# Patient Record
Sex: FEMALE | Race: WHITE | Employment: OTHER | URBAN - NONMETROPOLITAN AREA
[De-identification: names, ages, dates, MRNs, and addresses within clinical notes are randomized per-mention and may not be internally consistent; named-entity substitution may affect disease eponyms.]

---

## 2017-02-21 ENCOUNTER — OFFICE VISIT (OUTPATIENT)
Dept: FAMILY MEDICINE | Facility: OTHER | Age: 62
End: 2017-02-21
Attending: PHYSICIAN ASSISTANT
Payer: COMMERCIAL

## 2017-02-21 VITALS
DIASTOLIC BLOOD PRESSURE: 84 MMHG | BODY MASS INDEX: 23.9 KG/M2 | HEART RATE: 49 BPM | WEIGHT: 140 LBS | TEMPERATURE: 98 F | HEIGHT: 64 IN | SYSTOLIC BLOOD PRESSURE: 120 MMHG | OXYGEN SATURATION: 99 %

## 2017-02-21 DIAGNOSIS — Z01.818 PREOP GENERAL PHYSICAL EXAM: Primary | ICD-10-CM

## 2017-02-21 LAB
BASOPHILS # BLD AUTO: 0.1 10E9/L (ref 0–0.2)
BASOPHILS NFR BLD AUTO: 0.8 %
DIFFERENTIAL METHOD BLD: NORMAL
EOSINOPHIL # BLD AUTO: 0.2 10E9/L (ref 0–0.7)
EOSINOPHIL NFR BLD AUTO: 2.3 %
ERYTHROCYTE [DISTWIDTH] IN BLOOD BY AUTOMATED COUNT: 12.6 % (ref 10–15)
HCT VFR BLD AUTO: 39.9 % (ref 35–47)
HGB BLD-MCNC: 13.6 G/DL (ref 11.7–15.7)
IMM GRANULOCYTES # BLD: 0 10E9/L (ref 0–0.4)
IMM GRANULOCYTES NFR BLD: 0.3 %
LYMPHOCYTES # BLD AUTO: 2.1 10E9/L (ref 0.8–5.3)
LYMPHOCYTES NFR BLD AUTO: 29.8 %
MCH RBC QN AUTO: 30.1 PG (ref 26.5–33)
MCHC RBC AUTO-ENTMCNC: 34.1 G/DL (ref 31.5–36.5)
MCV RBC AUTO: 88 FL (ref 78–100)
MONOCYTES # BLD AUTO: 0.6 10E9/L (ref 0–1.3)
MONOCYTES NFR BLD AUTO: 8.2 %
NEUTROPHILS # BLD AUTO: 4.1 10E9/L (ref 1.6–8.3)
NEUTROPHILS NFR BLD AUTO: 58.6 %
NRBC # BLD AUTO: 0 10*3/UL
NRBC BLD AUTO-RTO: 0 /100
PLATELET # BLD AUTO: 175 10E9/L (ref 150–450)
RBC # BLD AUTO: 4.52 10E12/L (ref 3.8–5.2)
WBC # BLD AUTO: 7.1 10E9/L (ref 4–11)

## 2017-02-21 PROCEDURE — 36415 COLL VENOUS BLD VENIPUNCTURE: CPT | Performed by: PHYSICIAN ASSISTANT

## 2017-02-21 PROCEDURE — 85025 COMPLETE CBC W/AUTO DIFF WBC: CPT | Performed by: PHYSICIAN ASSISTANT

## 2017-02-21 PROCEDURE — 99214 OFFICE O/P EST MOD 30 MIN: CPT | Performed by: PHYSICIAN ASSISTANT

## 2017-02-21 NOTE — PROGRESS NOTES
[facility] [place]  [address]  [place] [address]  [phone]  Dept: [phone]    PRE-OP EVALUATION:  Today's date: 2017    Denise Taylor (: 1955) presents for pre-operative evaluation assessment as requested by Dr. Stephanie griffin.  She requires evaluation and anesthesia risk assessment prior to undergoing surgery/procedure for treatment of right foot surgery .  Proposed procedure: neuroma removals    Date of Surgery/ Procedure: 2017  Time of Surgery/ Procedure: D  Hospital/Surgical Facility: [facility]    Primary Physician: Mirna Roblero  Type of Anesthesia Anticipated: to be determined    Patient has a Health Care Directive or Living Will:  NO    1. NO - Do you have a history of heart attack, stroke, stent, bypass or surgery on an artery in the head, neck, heart or legs?  2. NO - Do you ever have any pain or discomfort in your chest?  3. NO - Do you have a history of  Heart Failure?  4. NO - Are you troubled by shortness of breath when: walking on the level, up a slight hill or at night?  5. NO - Do you currently have a cold, bronchitis or other respiratory infection?  6. NO - Do you have a cough, shortness of breath or wheezing?  7. NO - Do you sometimes get pains in the calves of your legs when you walk?  8. NO - Do you or anyone in your family have previous history of blood clots?  9. NO - Do you or does anyone in your family have a serious bleeding problem such as prolonged bleeding following surgeries or cuts?  10. NO - Have you ever had problems with anemia or been told to take iron pills?  11. NO - Have you had any abnormal blood loss such as black, tarry or bloody stools, or abnormal vaginal bleeding?  12. NO - Have you ever had a blood transfusion?  13. NO - Have you or any of your relatives ever had problems with anesthesia?  14. NO - Do you have sleep apnea, excessive snoring or daytime drowsiness?  15. NO - Do you have any prosthetic heart  valves?  16. NO - Do you have prosthetic joints?  17. NO - Is there any chance that you may be pregnant?      HPI:                                                      Brief HPI related to upcoming procedure: having neuromas removed from right foot.       See problem list for active medical problems.  Problems all longstanding and stable, except as noted/documented.  See ROS for pertinent symptoms related to these conditions.                                                                                                  .    MEDICAL HISTORY:                                                      Patient Active Problem List    Diagnosis Date Noted     Pneumonia 07/20/2015     Priority: Medium     History of basal cell carcinoma 05/18/2015     Priority: Medium     Hyperlipidemia with target LDL less than 130 05/18/2015     Priority: Medium     Diagnosis updated by automated process. Provider to review and confirm.        Past Medical History   Diagnosis Date     Pneumonia      Past Surgical History   Procedure Laterality Date     Colonoscopy  2006     Dr Lara     Shoulder surgery Right 2011/10/2012     Colonoscopy N/A 10/10/2016     Procedure: COLONOSCOPY;  Surgeon: Christine Cintron MD;  Location: [facility]     Current Outpatient Prescriptions   Medication Sig Dispense Refill     Na Sulfate-K Sulfate-Mg Sulf (SUPREP BOWEL PREP) solution Take 177 mLs (1 Bottle) by mouth See Admin Instructions 2 Bottle 0     ASPIRIN PO Take 81 mg by mouth daily       levothyroxine (SYNTHROID, LEVOTHROID) 75 MCG tablet Take 1 tablet (75 mcg) by mouth daily 90 tablet 3     simvastatin (ZOCOR) 40 MG tablet Take 1 tablet (40 mg) by mouth At Bedtime 90 tablet 3     Eletriptan Hydrobromide (RELPAX PO) Take 40 mg by mouth once as needed for migraine       FLUoxetine HCl, PMDD, (SARAFEM) 10 MG TABS Take 10 mg by mouth daily       OTC products: None, except as noted above    Allergies   Allergen Reactions     Penicillins Rash      Latex Allergy:  "NO    Social History   Substance Use Topics     Smoking status: Never Smoker     Smokeless tobacco: Never Used     Alcohol use 1.0 oz/week     2 Glasses of wine per week      Comment: occasionally     History   Drug Use No       REVIEW OF SYSTEMS:                                                    C: NEGATIVE for fever, chills, change in weight  I: NEGATIVE for worrisome rashes, moles or lesions  E: NEGATIVE for vision changes or irritation  E/M: NEGATIVE for ear, mouth and throat problems  R: NEGATIVE for significant cough or SOB  CV: NEGATIVE for chest pain, palpitations or peripheral edema  GI: NEGATIVE for nausea, abdominal pain, heartburn, or change in bowel habits  : NEGATIVE for frequency, dysuria, or hematuria  MUSCULOSKELETAL:see hpi  N: NEGATIVE for weakness, dizziness or paresthesias  E: NEGATIVE for temperature intolerance, skin/hair changes  H: NEGATIVE for bleeding problems  P: NEGATIVE for changes in mood or affect    EXAM:                                                    /84 (BP Location: Left arm, Patient Position: Supine, Cuff Size: Adult Regular)  Pulse (!) 49  Temp 98  F (36.7  C) (Tympanic)  Ht 5' 3.5\" (1.613 m)  Wt 140 lb (63.5 kg)  SpO2 99%  Breastfeeding? No  BMI 24.41 kg/m2    GENERAL APPEARANCE: healthy, alert and no distress     EYES: EOMI,- PERRL     HENT: ear canals and TM's normal and nose and mouth without ulcers or lesions     NECK: no adenopathy, no asymmetry, masses, or scars and thyroid normal to palpation     RESP: lungs clear to auscultation - no rales, rhonchi or wheezes     CV: regular rates and rhythm, normal S1 S2, no S3 or S4 and no murmur, click or rub -     ABDOMEN:  soft, nontender, no HSM or masses and bowel sounds normal     MS: extremities normal- no gross deformities noted, no evidence of inflammation in joints, FROM in all extremities.     SKIN: no suspicious lesions or rashes     NEURO: Normal strength and tone, sensory exam grossly normal, mentation " intact and speech normal     PSYCH: mentation appears normal. and affect normal/bright     LYMPHATICS: No axillary, cervical, inguinal, or supraclavicular nodes    DIAGNOSTICS:                                                      Labs Resulted Today:   Results for orders placed or performed in visit on 11/17/16   XR FOOT RT G/E 3 VW (Clinic Performed)    Narrative    RIGHT FOOT    REPORT:  There appears to have been an osteotomy in the distal first  metatarsal.  Mild degenerative changes are seen in the first  metatarsophalangeal joint.  There also appears to have been an  osteotomy performed of the distal aspect of the proximal phalanx of  the second toe.  The articular spaces are normal in height.  No  destructive lesions are noted.    IMPRESSION:  POSTOPERATIVE CHANGES, DISTAL FIRST METATARSAL AND THE  DISTAL ASPECT OF PROXIMAL PHALANX OF THE SECOND TOE.  Exam Date: Nov 17, 2016 09:48:00 AM  Author: HUMZA MCCORMACK  This report is final and null         No results for input(s): HGB, PLT, INR, NA, POTASSIUM, CR, A1C in the last 50491 hours.     IMPRESSION:                                                    Reason for surgery/procedure: neuroma removal of her right foot. By Stephanie Dietrich on 2/[facility] [place] [facility]   Diagnosis/reason for consult: medical clearance.     The proposed surgical procedure is considered LOW risk.    REVISED CARDIAC RISK INDEX  The patient has the following serious cardiovascular risks for perioperative complications such as (MI, PE, VFib and 3  AV Block):  No serious cardiac risks  INTERPRETATION: 0 risks: Class I (very low risk - 0.4% complication rate)    The patient has the following additional risks for perioperative complications:  No identified additional risks    Results for orders placed or performed in visit on 02/21/17 (from the past 24 hour(s))   CBC with platelets and differential   Result Value Ref Range    WBC 7.1 4.0 - 11.0 10e9/L    RBC Count 4.52 3.8 - 5.2  10e12/L    Hemoglobin 13.6 11.7 - 15.7 g/dL    Hematocrit 39.9 35.0 - 47.0 %    MCV 88 78 - 100 fl    MCH 30.1 26.5 - 33.0 pg    MCHC 34.1 31.5 - 36.5 g/dL    RDW 12.6 10.0 - 15.0 %    Platelet Count 175 150 - 450 10e9/L    Diff Method Automated Method     % Neutrophils 58.6 %    % Lymphocytes 29.8 %    % Monocytes 8.2 %    % Eosinophils 2.3 %    % Basophils 0.8 %    % Immature Granulocytes 0.3 %    Nucleated RBCs 0 0 /100    Absolute Neutrophil 4.1 1.6 - 8.3 10e9/L    Absolute Lymphocytes 2.1 0.8 - 5.3 10e9/L    Absolute Monocytes 0.6 0.0 - 1.3 10e9/L    Absolute Eosinophils 0.2 0.0 - 0.7 10e9/L    Absolute Basophils 0.1 0.0 - 0.2 10e9/L    Abs Immature Granulocytes 0.0 0 - 0.4 10e9/L    Absolute Nucleated RBC 0.0          RECOMMENDATIONS:                                                      1. Preop general physical exam  She will be optimized and CBC is obtained.  No other concerns.   Pt is aware of risk and benefits per Dr. Dietrich.   - CBC with platelets and differential                 Signed Electronically by: DASH Vital    Copy of this evaluation report is provided to requesting physician.    [facility] Preop Guidelines

## 2017-02-21 NOTE — NURSING NOTE
"Chief Complaint   Patient presents with     Pre-Op Exam     2/27/17 right foot surgery- removal of neuromas, rhonda griffin, [facility]        Initial /84 (BP Location: Left arm, Patient Position: Supine, Cuff Size: Adult Regular)  Pulse (!) 49  Temp 98  F (36.7  C) (Tympanic)  Ht 5' 3.5\" (1.613 m)  Wt 140 lb (63.5 kg)  SpO2 99%  Breastfeeding? No  BMI 24.41 kg/m2 Estimated body mass index is 24.41 kg/(m^2) as calculated from the following:    Height as of this encounter: 5' 3.5\" (1.613 m).    Weight as of this encounter: 140 lb (63.5 kg).  Medication Reconciliation: complete   Sammie Granado LPN        "

## 2017-02-21 NOTE — MR AVS SNAPSHOT
After Visit Summary   2/21/2017    Denise Taylor    MRN: [id number]           Patient Information     Date Of Birth          1955        Visit Information        Provider Department      2/21/2017 1:30 PM Mirna Roblero PA [facility]        Today's Diagnoses     Preop general physical exam    -  1      Care Instructions      Before Your Surgery      Call your surgeon if there is any change in your health. This includes signs of a cold or flu (such as a sore throat, runny nose, cough, rash or fever).    Do not smoke, drink alcohol or take over the counter medicine (unless your surgeon or primary care doctor tells you to) for the 24 hours before and after surgery.    If you take prescribed drugs: Follow your doctor s orders about which medicines to take and which to stop until after surgery.    Eating and drinking prior to surgery: follow the instructions from your surgeon    Take a shower or bath the night before surgery. Use the soap your surgeon gave you to gently clean your skin. If you do not have soap from your surgeon, use your regular soap. Do not shave or scrub the surgery site.  Wear clean pajamas and have clean sheets on your bed.         Follow-ups after your visit        Who to contact     If you have questions or need follow up information about today's clinic visit or your schedule please contact [facility] directly at [phone].  Normal or non-critical lab and imaging results will be communicated to you by MyChart, letter or phone within 4 business days after the clinic has received the results. If you do not hear from us within 7 days, please contact the clinic through MyChart or phone. If you have a critical or abnormal lab result, we will notify you by phone as soon as possible.  Submit refill requests through [employer] or call your pharmacy and they will forward the refill request to us. Please allow 3 business days for your refill to be  "completed.          Additional Information About Your Visit        [employer] Information     [employer] lets you send messages to your doctor, view your test results, renew your prescriptions, schedule appointments and more. To sign up, go to [email or web address] . Click on \"Log in\" on the left side of the screen, which will take you to the Welcome page. Then click on \"Sign up Now\" on the right side of the page.     You will be asked to enter the access code listed below, as well as some personal information. Please follow the directions to create your username and password.     Your access code is: [email or web address]  Expires: 2017  2:22 PM     Your access code will  in 90 days. If you need help or a new code, please call your [place] clinic or [phone].        Care EveryWhere ID     This is your Care EveryWhere ID. This could be used by other organizations to access your [place] medical records  [id number]        Your Vitals Were     Pulse Temperature Height Pulse Oximetry Breastfeeding? BMI (Body Mass Index)    49 98  F (36.7  C) (Tympanic) 5' 3.5\" (1.613 m) 99% No 24.41 kg/m2       Blood Pressure from Last 3 Encounters:   17 120/84   16 115/80   10/10/16 118/79    Weight from Last 3 Encounters:   17 140 lb (63.5 kg)   16 135 lb (61.2 kg)   10/10/16 138 lb (62.6 kg)              We Performed the Following     CBC with platelets and differential        Primary Care Provider Office Phone # Fax #    DASH Angelo [phone]       [facility] [address] [place] [id number]        Thank you!     Thank you for choosing [facility]  for your care. Our goal is always to provide you with excellent care. Hearing back from our patients is one way we can continue to improve our services. Please take a few minutes to complete the written survey that you may receive in the mail after your visit with us. Thank you!             Your " Updated Medication List - Protect others around you: Learn how to safely use, store and throw away your medicines at www.disposemymeds.org.          This list is accurate as of: 2/21/17  2:22 PM.  Always use your most recent med list.                   Brand Name Dispense Instructions for use    ASPIRIN PO      Take 81 mg by mouth daily       levothyroxine 75 MCG tablet    SYNTHROID/LEVOTHROID    90 tablet    Take 1 tablet (75 mcg) by mouth daily       Na Sulfate-K Sulfate-Mg Sulf solution    SUPREP BOWEL PREP    2 Bottle    Take 177 mLs (1 Bottle) by mouth See Admin Instructions       RELPAX PO      Take 40 mg by mouth once as needed for migraine       SARAFEM 10 MG Tabs   Generic drug:  FLUoxetine HCl (PMDD)      Take 10 mg by mouth daily       simvastatin 40 MG tablet    ZOCOR    90 tablet    Take 1 tablet (40 mg) by mouth At Bedtime

## 2017-02-22 ENCOUNTER — TELEPHONE (OUTPATIENT)
Dept: FAMILY MEDICINE | Facility: OTHER | Age: 62
End: 2017-02-22

## 2017-02-27 ENCOUNTER — TRANSFERRED RECORDS (OUTPATIENT)
Dept: HEALTH INFORMATION MANAGEMENT | Facility: HOSPITAL | Age: 62
End: 2017-02-27

## 2017-02-27 ENCOUNTER — RESULTS ONLY (OUTPATIENT)
Dept: LAB | Age: 62
End: 2017-02-27

## 2017-02-27 ENCOUNTER — APPOINTMENT (OUTPATIENT)
Dept: LAB | Facility: HOSPITAL | Age: 62
End: 2017-02-27
Attending: PODIATRIST
Payer: COMMERCIAL

## 2017-02-27 ENCOUNTER — APPOINTMENT (OUTPATIENT)
Dept: ANESTHESIOLOGY | Facility: HOSPITAL | Age: 62
End: 2017-02-27
Attending: PODIATRIST
Payer: COMMERCIAL

## 2017-02-27 PROCEDURE — 88304 TISSUE EXAM BY PATHOLOGIST: CPT | Mod: TC | Performed by: PODIATRIST

## 2017-02-27 PROCEDURE — 01480 ANES OPEN PX LOWER L/A/F NOS: CPT | Mod: QZ | Performed by: NURSE ANESTHETIST, CERTIFIED REGISTERED

## 2017-03-01 LAB — COPATH REPORT: NORMAL

## 2017-07-05 DIAGNOSIS — Z12.31 VISIT FOR SCREENING MAMMOGRAM: Primary | ICD-10-CM

## 2017-07-27 ENCOUNTER — OFFICE VISIT (OUTPATIENT)
Dept: FAMILY MEDICINE | Facility: OTHER | Age: 62
End: 2017-07-27
Attending: PHYSICIAN ASSISTANT
Payer: COMMERCIAL

## 2017-07-27 VITALS
TEMPERATURE: 97.8 F | HEART RATE: 53 BPM | OXYGEN SATURATION: 98 % | WEIGHT: 142 LBS | BODY MASS INDEX: 24.76 KG/M2 | DIASTOLIC BLOOD PRESSURE: 78 MMHG | SYSTOLIC BLOOD PRESSURE: 122 MMHG

## 2017-07-27 DIAGNOSIS — E03.9 HYPOTHYROIDISM, UNSPECIFIED TYPE: ICD-10-CM

## 2017-07-27 DIAGNOSIS — E78.5 HYPERLIPIDEMIA WITH TARGET LDL LESS THAN 130: Primary | ICD-10-CM

## 2017-07-27 DIAGNOSIS — Z11.59 ENCOUNTER FOR HEPATITIS C SCREENING TEST FOR LOW RISK PATIENT: ICD-10-CM

## 2017-07-27 DIAGNOSIS — Z71.89 ACP (ADVANCE CARE PLANNING): Chronic | ICD-10-CM

## 2017-07-27 LAB
ALT SERPL W P-5'-P-CCNC: 41 U/L (ref 0–50)
AST SERPL W P-5'-P-CCNC: 36 U/L (ref 0–45)
CHOLEST SERPL-MCNC: 143 MG/DL
CK SERPL-CCNC: 420 U/L (ref 30–225)
HDLC SERPL-MCNC: 66 MG/DL
LDLC SERPL CALC-MCNC: 61 MG/DL
NONHDLC SERPL-MCNC: 77 MG/DL
TRIGL SERPL-MCNC: 82 MG/DL
TSH SERPL DL<=0.005 MIU/L-ACNC: 1.08 MU/L (ref 0.4–4)

## 2017-07-27 PROCEDURE — 80061 LIPID PANEL: CPT | Performed by: PHYSICIAN ASSISTANT

## 2017-07-27 PROCEDURE — 84450 TRANSFERASE (AST) (SGOT): CPT | Performed by: PHYSICIAN ASSISTANT

## 2017-07-27 PROCEDURE — 84443 ASSAY THYROID STIM HORMONE: CPT | Performed by: PHYSICIAN ASSISTANT

## 2017-07-27 PROCEDURE — 82550 ASSAY OF CK (CPK): CPT | Performed by: PHYSICIAN ASSISTANT

## 2017-07-27 PROCEDURE — 36415 COLL VENOUS BLD VENIPUNCTURE: CPT | Performed by: PHYSICIAN ASSISTANT

## 2017-07-27 PROCEDURE — 84460 ALANINE AMINO (ALT) (SGPT): CPT | Performed by: PHYSICIAN ASSISTANT

## 2017-07-27 PROCEDURE — 99000 SPECIMEN HANDLING OFFICE-LAB: CPT | Performed by: PHYSICIAN ASSISTANT

## 2017-07-27 PROCEDURE — 86803 HEPATITIS C AB TEST: CPT | Mod: 90 | Performed by: PHYSICIAN ASSISTANT

## 2017-07-27 PROCEDURE — 99214 OFFICE O/P EST MOD 30 MIN: CPT | Performed by: PHYSICIAN ASSISTANT

## 2017-07-27 ASSESSMENT — ANXIETY QUESTIONNAIRES
2. NOT BEING ABLE TO STOP OR CONTROL WORRYING: NOT AT ALL
5. BEING SO RESTLESS THAT IT IS HARD TO SIT STILL: NOT AT ALL
3. WORRYING TOO MUCH ABOUT DIFFERENT THINGS: NOT AT ALL
4. TROUBLE RELAXING: NOT AT ALL
7. FEELING AFRAID AS IF SOMETHING AWFUL MIGHT HAPPEN: NOT AT ALL
6. BECOMING EASILY ANNOYED OR IRRITABLE: NOT AT ALL
GAD7 TOTAL SCORE: 0
1. FEELING NERVOUS, ANXIOUS, OR ON EDGE: NOT AT ALL

## 2017-07-27 ASSESSMENT — PAIN SCALES - GENERAL: PAINLEVEL: NO PAIN (0)

## 2017-07-27 NOTE — PROGRESS NOTES
SUBJECTIVE:                                                    Denise Taylor is a 61 year old female who presents to clinic today for the following health issues:        Hyperlipidemia Follow-Up      Rate your low fat/cholesterol diet?: not monitoring fat    Taking statin?  Yes, no muscle aches from statin    Other lipid medications/supplements?:  none    Hypothyroidism Follow-up      Since last visit, patient describes the following symptoms: Weight stable, no hair loss, no skin changes, no constipation, no loose stools        Amount of exercise or physical activity: None    Problems taking medications regularly: No    Medication side effects: none  Diet: regular (no restrictions)          Problem list and histories reviewed & adjusted, as indicated.  Additional history: as documented    Patient Active Problem List   Diagnosis     History of basal cell carcinoma     Hyperlipidemia with target LDL less than 130     Pneumonia     ACP (advance care planning)     Hypothyroidism, unspecified type     Past Surgical History:   Procedure Laterality Date     COLONOSCOPY  2006    Dr Lara     COLONOSCOPY N/A 10/10/2016    Procedure: COLONOSCOPY;  Surgeon: Christine Cintron MD;  Location: [facility]     SHOULDER SURGERY Right 2011/10/2012       Social History   Substance Use Topics     Smoking status: Never Smoker     Smokeless tobacco: Never Used     Alcohol use 1.0 oz/week     2 Glasses of wine per week      Comment: occasionally     Family History   Problem Relation Age of Onset     Myocardial Infarction Mother      Hypertension Mother      Stomach Cancer Father          Current Outpatient Prescriptions   Medication Sig Dispense Refill     Na Sulfate-K Sulfate-Mg Sulf (SUPREP BOWEL PREP) solution Take 177 mLs (1 Bottle) by mouth See Admin Instructions 2 Bottle 0     ASPIRIN PO Take 81 mg by mouth daily       levothyroxine (SYNTHROID, LEVOTHROID) 75 MCG tablet Take 1 tablet (75 mcg) by mouth daily 90 tablet 3      simvastatin (ZOCOR) 40 MG tablet Take 1 tablet (40 mg) by mouth At Bedtime 90 tablet 3     Eletriptan Hydrobromide (RELPAX PO) Take 40 mg by mouth once as needed for migraine       FLUoxetine HCl, PMDD, (SARAFEM) 10 MG TABS Take 10 mg by mouth daily       Allergies   Allergen Reactions     Penicillins Rash     BP Readings from Last 3 Encounters:   07/27/17 122/78   02/21/17 120/84   11/17/16 115/80    Wt Readings from Last 3 Encounters:   07/27/17 142 lb (64.4 kg)   02/21/17 140 lb (63.5 kg)   11/17/16 135 lb (61.2 kg)                        Reviewed and updated as needed this visit by clinical staffTobao  Meds       Reviewed and updated as needed this visit by Provider         ROS:  Constitutional, neuro, ENT, endocrine, pulmonary, cardiac, gastrointestinal, genitourinary, musculoskeletal, integument and psychiatric systems are negative, except as otherwise noted.      OBJECTIVE:                                                    /78 (BP Location: Left arm, Patient Position: Chair, Cuff Size: Adult Regular)  Pulse 53  Temp 97.8  F (36.6  C) (Tympanic)  Wt 142 lb (64.4 kg)  SpO2 98%  Breastfeeding? No  BMI 24.76 kg/m2  Body mass index is 24.76 kg/(m^2).  GENERAL APPEARANCE: healthy, alert and no distress  EYES: Eyes grossly normal to inspection, PERRL and conjunctivae and sclerae normal  HENT: ear canals and TM's normal and nose and mouth without ulcers or lesions  NECK: no adenopathy, no asymmetry, masses, or scars and thyroid normal to palpation  RESP: lungs clear to auscultation - no rales, rhonchi or wheezes  CV: regular rates and rhythm, normal S1 S2, no S3 or S4 and no murmur, click or rub  MS: extremities normal- no gross deformities noted  SKIN: no suspicious lesions or rashes  NEURO: Normal strength and tone, mentation intact and speech normal  PSYCH: mentation appears normal and affect normal/bright    Diagnostic test results:  Diagnostic Test Results:  Results for orders placed or  performed in visit on 07/27/17   Lipid Profile   Result Value Ref Range    Cholesterol 143 <200 mg/dL    Triglycerides 82 <150 mg/dL    HDL Cholesterol 66 >49 mg/dL    LDL Cholesterol Calculated 61 <100 mg/dL    Non HDL Cholesterol 77 <130 mg/dL   TSH with free T4 reflex   Result Value Ref Range    TSH 1.08 0.40 - 4.00 mU/L   AST   Result Value Ref Range    AST 36 0 - 45 U/L   ALT   Result Value Ref Range    ALT 41 0 - 50 U/L   CK total   Result Value Ref Range    CK Total 420 (H) 30 - 225 U/L   Hepatitis C Screen Reflex to HCV RNA Quant and Genotype   Result Value Ref Range    Hepatitis C Antibody  NR     Nonreactive   Assay performance characteristics have not been established for newborns,   infants, and children            ASSESSMENT/PLAN:                                                    1. ACP (advance care planning)  Given this.     2. Hyperlipidemia with target LDL less than 130  Long standing.  Doing ok on the Simvastatin.  Mild heel pain which would not be classic of myalgia.  otherwise has no complaints. Recheck   - Lipid Profile  - AST  - ALT  - CK total    3. Hypothyroidism, unspecified type  She is going to have medication given and dose per labs.   - TSH with free T4 reflex    4. Encounter for hepatitis C screening test for low risk patient  Discussion held and would like to go ahead with this.   - Hepatitis C Screen Reflex to HCV RNA Quant and Genotype      See Patient Instructions    DASH Vital  [facility]

## 2017-07-27 NOTE — LETTER
Denise Taylor  [address] [address]        July 31, 2017          Dear ,    We are writing to inform you of your test results.    Your test results fall within the expected range(s) or remain unchanged from previous results.  Please continue with current treatment plan.    Resulted Orders   Lipid Profile   Result Value Ref Range    Cholesterol 143 <200 mg/dL    Triglycerides 82 <150 mg/dL      Comment:      Fasting specimen    HDL Cholesterol 66 >49 mg/dL    LDL Cholesterol Calculated 61 <100 mg/dL      Comment:      Desirable:       <100 mg/dl    Non HDL Cholesterol 77 <130 mg/dL   TSH with free T4 reflex   Result Value Ref Range    TSH 1.08 0.40 - 4.00 mU/L   AST   Result Value Ref Range    AST 36 0 - 45 U/L   ALT   Result Value Ref Range    ALT 41 0 - 50 U/L   CK total   Result Value Ref Range    CK Total 420 (H) 30 - 225 U/L   Hepatitis C Screen Reflex to HCV RNA Quant and Genotype   Result Value Ref Range    Hepatitis C Antibody  NR     Nonreactive   Assay performance characteristics have not been established for newborns,   infants, and children         If you have any questions or concerns, please call the clinic at the number listed above.       Sincerely,      The office of  DASH Vital

## 2017-07-27 NOTE — MR AVS SNAPSHOT
After Visit Summary   7/27/2017    Denise Taylor    MRN: [id number]           Patient Information     Date Of Birth          1955        Visit Information        Provider Department      7/27/2017 1:45 PM Mirna Roblero PA [facility]        Today's Diagnoses     Hyperlipidemia with target LDL less than 130    -  1    ACP (advance care planning)        Hypothyroidism, unspecified type        Encounter for hepatitis C screening test for low risk patient          Care Instructions      Thank you for choosing [facility].   I have office hours 8:00 am to 4:30 pm on Monday's, Wednesday's, Thursday's and Friday's. My nurse and I are out of the office every Tuesday.    Following your visit, when your labs and diagnostic testing have returned, I will review then and you will be contacted by my nurse.  If you are on My Chart, you can also view results there.    For refills, notify your pharmacy regarding what you need and the pharmacy will generate a refill request. Do not call my nurse as she is unable to process refill request. Please plan ahead and allow 3-5 days for refill requests.    You will generally receive a reminder call the day prior to your appointment.  If you cannot attend your appointment, please cancel your appointment with as much notice as possible.  If there is a pattern of failure to present for your appointments, I cannot provide consistent, meaningful, ongoing care for you. It is very important to me that you come in for your care, so we can best assist you with your health care needs.    IMPORTANT:  Please note that it is my standard of practice to NOT participate in prescribing ongoing requested Narcotic Analgesic therapy, and/or participate in the prescribing of other controlled substances.  My nurse and I am happy to assist you with the process of referral for alternative pain management as needed, and other treatment modalities including but  not limited to:  Physical Therapy, Physical Medicine and Rehab, Counseling, Chiropractic Care, Orthopedic Care, and non-narcotic medication management.     In the event that you need to be seen for emergent concerns and I am out of office,  please see one of my colleagues for acute concerns.  You may also present to  or ER.  I appreciate the opportunity to serve you and look forward to supporting your healthcare needs in the future. Please contact me with any questions or concerns that you may have.    Sincerely,      Mirna Roblero RN, PA-C               Follow-ups after your visit        Who to contact     If you have questions or need follow up information about today's clinic visit or your schedule please contact [facility] directly at [phone].  Normal or non-critical lab and imaging results will be communicated to you by MyChart, letter or phone within 4 business days after the clinic has received the results. If you do not hear from us within 7 days, please contact the clinic through MyChart or phone. If you have a critical or abnormal lab result, we will notify you by phone as soon as possible.  Submit refill requests through [employer] or call your pharmacy and they will forward the refill request to us. Please allow 3 business days for your refill to be completed.          Additional Information About Your Visit        Care EveryWhere ID     This is your Care EveryWhere ID. This could be used by other organizations to access your [facility] medical records  [id number]        Your Vitals Were     Pulse Temperature Pulse Oximetry Breastfeeding? BMI (Body Mass Index)       53 97.8  F (36.6  C) (Tympanic) 98% No 24.76 kg/m2        Blood Pressure from Last 3 Encounters:   07/27/17 122/78   02/21/17 120/84   11/17/16 115/80    Weight from Last 3 Encounters:   07/27/17 142 lb (64.4 kg)   02/21/17 140 lb (63.5 kg)   11/17/16 135 lb (61.2 kg)              We Performed the Following     ALT     AST      CK total     Hepatitis C Screen Reflex to HCV RNA Quant and Genotype     Lipid Profile     TSH with free T4 reflex        Primary Care Provider Office Phone # Fax #    DASH Angelo [phone]       [facility] [address]  [facility] [address]        Equal Access to Services     GIGI KYLE : Hadii aad ku hadasho Soomaali, waaxda luqadaha, qaybta kaalmada adeegyada, waxay karlin hayaan adejasmina barrosocamilajewell [place]ott. So [facility] [phone].    ATENCIÓN: Si habla español, tiene a mayo disposición servicios gratuitos de asistencia lingüística. Deepthi al [phone].    We comply with applicable federal civil rights laws and [place] laws. We do not discriminate on the basis of race, color, national origin, age, disability sex, sexual orientation or gender identity.            Thank you!     Thank you for choosing [facility]  for your care. Our goal is always to provide you with excellent care. Hearing back from our patients is one way we can continue to improve our services. Please take a few minutes to complete the written survey that you may receive in the mail after your visit with us. Thank you!             Your Updated Medication List - Protect others around you: Learn how to safely use, store and throw away your medicines at www.disposemymeds.org.          This list is accurate as of: 7/27/17  2:04 PM.  Always use your most recent med list.                   Brand Name Dispense Instructions for use Diagnosis    ASPIRIN PO      Take 81 mg by mouth daily        levothyroxine 75 MCG tablet    SYNTHROID/LEVOTHROID    90 tablet    Take 1 tablet (75 mcg) by mouth daily    Hypothyroidism, unspecified type       Na Sulfate-K Sulfate-Mg Sulf solution    SUPREP BOWEL PREP    2 Bottle    Take 177 mLs (1 Bottle) by mouth See Admin Instructions    Screen for colon cancer       RELPAX PO      Take 40 mg by mouth once as needed for migraine        SARAFEM 10 MG Tabs   Generic  drug:  FLUoxetine HCl (PMDD)      Take 10 mg by mouth daily        simvastatin 40 MG tablet    ZOCOR    90 tablet    Take 1 tablet (40 mg) by mouth At Bedtime    High cholesterol

## 2017-07-27 NOTE — PATIENT INSTRUCTIONS
Thank you for choosing [facility].   I have office hours 8:00 am to 4:30 pm on Monday's, Wednesday's, Thursday's and Friday's. My nurse and I are out of the office every Tuesday.    Following your visit, when your labs and diagnostic testing have returned, I will review then and you will be contacted by my nurse.  If you are on My Chart, you can also view results there.    For refills, notify your pharmacy regarding what you need and the pharmacy will generate a refill request. Do not call my nurse as she is unable to process refill request. Please plan ahead and allow 3-5 days for refill requests.    You will generally receive a reminder call the day prior to your appointment.  If you cannot attend your appointment, please cancel your appointment with as much notice as possible.  If there is a pattern of failure to present for your appointments, I cannot provide consistent, meaningful, ongoing care for you. It is very important to me that you come in for your care, so we can best assist you with your health care needs.    IMPORTANT:  Please note that it is my standard of practice to NOT participate in prescribing ongoing requested Narcotic Analgesic therapy, and/or participate in the prescribing of other controlled substances.  My nurse and I am happy to assist you with the process of referral for alternative pain management as needed, and other treatment modalities including but not limited to:  Physical Therapy, Physical Medicine and Rehab, Counseling, Chiropractic Care, Orthopedic Care, and non-narcotic medication management.     In the event that you need to be seen for emergent concerns and I am out of office,  please see one of my colleagues for acute concerns.  You may also present to  or ER.  I appreciate the opportunity to serve you and look forward to supporting your healthcare needs in the future. Please contact me with any questions or concerns that you may  have.    Sincerely,      Mirna Roblero RN, PA-C

## 2017-07-27 NOTE — NURSING NOTE
"Chief Complaint   Patient presents with     *_* Health Care Directive *_*     declined       Initial /78 (BP Location: Left arm, Patient Position: Chair, Cuff Size: Adult Regular)  Pulse 53  Temp 97.8  F (36.6  C) (Tympanic)  Wt 142 lb (64.4 kg)  SpO2 98%  Breastfeeding? No  BMI 24.76 kg/m2 Estimated body mass index is 24.76 kg/(m^2) as calculated from the following:    Height as of 2/21/17: 5' 3.5\" (1.613 m).    Weight as of this encounter: 142 lb (64.4 kg).  Medication Reconciliation: complete   Marah Henry CMA(AAMA)   "

## 2017-07-28 ENCOUNTER — TELEPHONE (OUTPATIENT)
Dept: FAMILY MEDICINE | Facility: OTHER | Age: 62
End: 2017-07-28

## 2017-07-28 DIAGNOSIS — E78.5 HYPERLIPIDEMIA LDL GOAL <100: Primary | ICD-10-CM

## 2017-07-28 DIAGNOSIS — E03.9 HYPOTHYROIDISM, UNSPECIFIED TYPE: ICD-10-CM

## 2017-07-28 LAB — HCV AB SERPL QL IA: NORMAL

## 2017-07-28 RX ORDER — ROSUVASTATIN CALCIUM 5 MG/1
5 TABLET, COATED ORAL DAILY
Qty: 30 TABLET | Refills: 1 | Status: SHIPPED | OUTPATIENT
Start: 2017-07-28 | End: 2017-09-11

## 2017-07-28 RX ORDER — LEVOTHYROXINE SODIUM 75 UG/1
75 TABLET ORAL DAILY
Qty: 90 TABLET | Refills: 3 | Status: SHIPPED | OUTPATIENT
Start: 2017-07-28 | End: 2018-07-17

## 2017-07-28 ASSESSMENT — ANXIETY QUESTIONNAIRES: GAD7 TOTAL SCORE: 0

## 2017-07-28 ASSESSMENT — PATIENT HEALTH QUESTIONNAIRE - PHQ9: SUM OF ALL RESPONSES TO PHQ QUESTIONS 1-9: 0

## 2017-07-28 NOTE — TELEPHONE ENCOUNTER
Patient notified of labs, Is willing to try Crestor. Would like this sent into [facility]. Also needs refill of thyroid meds.  Nancy Israel LPN

## 2017-09-11 ENCOUNTER — TELEPHONE (OUTPATIENT)
Dept: FAMILY MEDICINE | Facility: OTHER | Age: 62
End: 2017-09-11

## 2017-09-11 NOTE — TELEPHONE ENCOUNTER
Spoke with patient. Continues to have the same muscle aches and issues she was having with the Zocor. Should she continue the Crestor, wondering if she needs labs redrawn as well.

## 2017-09-11 NOTE — TELEPHONE ENCOUNTER
10:22 AM    Reason for Call: Phone Call    Description: Patient had been started on rosuvastatin (CRESTOR) 5 MG tablet and is inquiring if she needs to be seen again before she can get a refill? If you could call patient back at your earliest convenience.    Was an appointment offered for this call? No  If yes : Appointment type              Date    Preferred method for responding to this message: Telephone Call [phone]  What is your phone number ?    If we cannot reach you directly, may we leave a detailed response at the number you provided? Yes    Can this message wait until your PCP/provider returns, if available today? YES    Opal Jacobs

## 2017-09-11 NOTE — TELEPHONE ENCOUNTER
Spoke with patient. She will stop the medication. See you back in about a week to discuss treatment options for her cholesterol and/or concerns if the pain is continuing.

## 2017-09-18 DIAGNOSIS — Z12.31 VISIT FOR SCREENING MAMMOGRAM: Primary | ICD-10-CM

## 2017-09-18 PROCEDURE — 77063 BREAST TOMOSYNTHESIS BI: CPT | Mod: TC | Performed by: RADIOLOGY

## 2017-09-18 PROCEDURE — G0202 SCR MAMMO BI INCL CAD: HCPCS | Mod: TC | Performed by: RADIOLOGY

## 2017-09-21 ENCOUNTER — OFFICE VISIT (OUTPATIENT)
Dept: FAMILY MEDICINE | Facility: OTHER | Age: 62
End: 2017-09-21
Attending: PHYSICIAN ASSISTANT
Payer: COMMERCIAL

## 2017-09-21 VITALS
SYSTOLIC BLOOD PRESSURE: 112 MMHG | DIASTOLIC BLOOD PRESSURE: 76 MMHG | HEART RATE: 51 BPM | WEIGHT: 140 LBS | TEMPERATURE: 97.6 F | BODY MASS INDEX: 24.41 KG/M2 | OXYGEN SATURATION: 95 %

## 2017-09-21 DIAGNOSIS — Z23 NEED FOR PROPHYLACTIC VACCINATION AND INOCULATION AGAINST INFLUENZA: ICD-10-CM

## 2017-09-21 DIAGNOSIS — R74.8 ELEVATED CK: ICD-10-CM

## 2017-09-21 DIAGNOSIS — M25.50 MULTIPLE JOINT PAIN: Primary | ICD-10-CM

## 2017-09-21 LAB
ALBUMIN SERPL-MCNC: 4.3 G/DL (ref 3.4–5)
ALP SERPL-CCNC: 64 U/L (ref 40–150)
ALT SERPL W P-5'-P-CCNC: 39 U/L (ref 0–50)
ANION GAP SERPL CALCULATED.3IONS-SCNC: 8 MMOL/L (ref 3–14)
AST SERPL W P-5'-P-CCNC: 40 U/L (ref 0–45)
BASOPHILS # BLD AUTO: 0 10E9/L (ref 0–0.2)
BASOPHILS NFR BLD AUTO: 0.7 %
BILIRUB SERPL-MCNC: 0.5 MG/DL (ref 0.2–1.3)
BUN SERPL-MCNC: 18 MG/DL (ref 7–30)
CALCIUM SERPL-MCNC: 9.3 MG/DL (ref 8.5–10.1)
CHLORIDE SERPL-SCNC: 104 MMOL/L (ref 94–109)
CK SERPL-CCNC: 517 U/L (ref 30–225)
CO2 SERPL-SCNC: 27 MMOL/L (ref 20–32)
CREAT SERPL-MCNC: 0.8 MG/DL (ref 0.52–1.04)
CRP SERPL-MCNC: <2.9 MG/L (ref 0–8)
DIFFERENTIAL METHOD BLD: NORMAL
EOSINOPHIL # BLD AUTO: 0.1 10E9/L (ref 0–0.7)
EOSINOPHIL NFR BLD AUTO: 1.9 %
ERYTHROCYTE [DISTWIDTH] IN BLOOD BY AUTOMATED COUNT: 13.2 % (ref 10–15)
ERYTHROCYTE [SEDIMENTATION RATE] IN BLOOD BY WESTERGREN METHOD: 9 MM/H (ref 0–30)
GFR SERPL CREATININE-BSD FRML MDRD: 73 ML/MIN/1.7M2
GLUCOSE SERPL-MCNC: 80 MG/DL (ref 70–99)
HCT VFR BLD AUTO: 41.5 % (ref 35–47)
HGB BLD-MCNC: 14 G/DL (ref 11.7–15.7)
IMM GRANULOCYTES # BLD: 0 10E9/L (ref 0–0.4)
IMM GRANULOCYTES NFR BLD: 0.4 %
LYMPHOCYTES # BLD AUTO: 1.7 10E9/L (ref 0.8–5.3)
LYMPHOCYTES NFR BLD AUTO: 29.9 %
MCH RBC QN AUTO: 30.4 PG (ref 26.5–33)
MCHC RBC AUTO-ENTMCNC: 33.7 G/DL (ref 31.5–36.5)
MCV RBC AUTO: 90 FL (ref 78–100)
MONOCYTES # BLD AUTO: 0.5 10E9/L (ref 0–1.3)
MONOCYTES NFR BLD AUTO: 8 %
NEUTROPHILS # BLD AUTO: 3.3 10E9/L (ref 1.6–8.3)
NEUTROPHILS NFR BLD AUTO: 59.1 %
NRBC # BLD AUTO: 0 10*3/UL
NRBC BLD AUTO-RTO: 0 /100
PLATELET # BLD AUTO: 168 10E9/L (ref 150–450)
POTASSIUM SERPL-SCNC: 3.9 MMOL/L (ref 3.4–5.3)
PROT SERPL-MCNC: 7.7 G/DL (ref 6.8–8.8)
RBC # BLD AUTO: 4.61 10E12/L (ref 3.8–5.2)
SODIUM SERPL-SCNC: 139 MMOL/L (ref 133–144)
URATE SERPL-MCNC: 4.4 MG/DL (ref 2.6–6)
WBC # BLD AUTO: 5.7 10E9/L (ref 4–11)

## 2017-09-21 PROCEDURE — 86431 RHEUMATOID FACTOR QUANT: CPT | Mod: 90 | Performed by: PHYSICIAN ASSISTANT

## 2017-09-21 PROCEDURE — 86140 C-REACTIVE PROTEIN: CPT | Performed by: PHYSICIAN ASSISTANT

## 2017-09-21 PROCEDURE — 80053 COMPREHEN METABOLIC PANEL: CPT | Performed by: PHYSICIAN ASSISTANT

## 2017-09-21 PROCEDURE — 86618 LYME DISEASE ANTIBODY: CPT | Mod: 90 | Performed by: PHYSICIAN ASSISTANT

## 2017-09-21 PROCEDURE — 86225 DNA ANTIBODY NATIVE: CPT | Performed by: PHYSICIAN ASSISTANT

## 2017-09-21 PROCEDURE — 99000 SPECIMEN HANDLING OFFICE-LAB: CPT | Performed by: PHYSICIAN ASSISTANT

## 2017-09-21 PROCEDURE — 82550 ASSAY OF CK (CPK): CPT | Performed by: PHYSICIAN ASSISTANT

## 2017-09-21 PROCEDURE — 36415 COLL VENOUS BLD VENIPUNCTURE: CPT | Performed by: PHYSICIAN ASSISTANT

## 2017-09-21 PROCEDURE — 90471 IMMUNIZATION ADMIN: CPT | Performed by: PHYSICIAN ASSISTANT

## 2017-09-21 PROCEDURE — 90686 IIV4 VACC NO PRSV 0.5 ML IM: CPT | Performed by: PHYSICIAN ASSISTANT

## 2017-09-21 PROCEDURE — 85652 RBC SED RATE AUTOMATED: CPT | Performed by: PHYSICIAN ASSISTANT

## 2017-09-21 PROCEDURE — 85025 COMPLETE CBC W/AUTO DIFF WBC: CPT | Performed by: PHYSICIAN ASSISTANT

## 2017-09-21 PROCEDURE — 84550 ASSAY OF BLOOD/URIC ACID: CPT | Performed by: PHYSICIAN ASSISTANT

## 2017-09-21 PROCEDURE — 99214 OFFICE O/P EST MOD 30 MIN: CPT | Mod: 25 | Performed by: PHYSICIAN ASSISTANT

## 2017-09-21 ASSESSMENT — PAIN SCALES - GENERAL: PAINLEVEL: NO PAIN (0)

## 2017-09-21 NOTE — PATIENT INSTRUCTIONS
Thank you for choosing [facility].   I have office hours 8:00 am to 4:30 pm on Monday's, Wednesday's, Thursday's and Friday's. My nurse and I are out of the office every Tuesday.    Following your visit, when your labs and diagnostic testing have returned, I will review then and you will be contacted by my nurse.  If you are on My Chart, you can also view results there.    For refills, notify your pharmacy regarding what you need and the pharmacy will generate a refill request. Do not call my nurse as she is unable to process refill request. Please plan ahead and allow 3-5 days for refill requests.    You will generally receive a reminder call the day prior to your appointment.  If you cannot attend your appointment, please cancel your appointment with as much notice as possible.  If there is a pattern of failure to present for your appointments, I cannot provide consistent, meaningful, ongoing care for you. It is very important to me that you come in for your care, so we can best assist you with your health care needs.    IMPORTANT:  Please note that it is my standard of practice to NOT participate in prescribing ongoing requested Narcotic Analgesic therapy, and/or participate in the prescribing of other controlled substances.  My nurse and I am happy to assist you with the process of referral for alternative pain management as needed, and other treatment modalities including but not limited to:  Physical Therapy, Physical Medicine and Rehab, Counseling, Chiropractic Care, Orthopedic Care, and non-narcotic medication management.     In the event that you need to be seen for emergent concerns and I am out of office,  please see one of my colleagues for acute concerns.  You may also present to  or ER.  I appreciate the opportunity to serve you and look forward to supporting your healthcare needs in the future. Please contact me with any questions or concerns that you may  have.    Sincerely,      Mirna Roblero RN, PA-C

## 2017-09-21 NOTE — PROGRESS NOTES
SUBJECTIVE:   Denise Taylor is a 62 year old female who presents to clinic today for the following health issues:        Hyperlipidemia Follow-Up      Rate your low fat/cholesterol diet?: not monitoring fat    Taking statin?  No, had too many side effect. CK went up.     Other lipid medications/supplements?:  none        Amount of exercise or physical activity: None    Problems taking medications regularly: No    Medication side effects: none  Diet: regular (no restrictions)          Problem list and histories reviewed & adjusted, as indicated.  Additional history: as documented    Patient Active Problem List   Diagnosis     History of basal cell carcinoma     Hyperlipidemia with target LDL less than 130     Pneumonia     ACP (advance care planning)     Hypothyroidism, unspecified type     Past Surgical History:   Procedure Laterality Date     COLONOSCOPY  2006    Dr Lara     COLONOSCOPY N/A 10/10/2016    Procedure: COLONOSCOPY;  Surgeon: Christine Cintron MD;  Location: [facility] OR     SHOULDER SURGERY Right 2011/10/2012       Social History   Substance Use Topics     Smoking status: Never Smoker     Smokeless tobacco: Never Used     Alcohol use 1.0 oz/week     2 Glasses of wine per week      Comment: occasionally     Family History   Problem Relation Age of Onset     Myocardial Infarction Mother      Hypertension Mother      Stomach Cancer Father          Current Outpatient Prescriptions   Medication Sig Dispense Refill     levothyroxine (SYNTHROID/LEVOTHROID) 75 MCG tablet Take 1 tablet (75 mcg) by mouth daily 90 tablet 3     ASPIRIN PO Take 81 mg by mouth daily       Eletriptan Hydrobromide (RELPAX PO) Take 40 mg by mouth once as needed for migraine       FLUoxetine HCl, PMDD, (SARAFEM) 10 MG TABS Take 10 mg by mouth daily       Allergies   Allergen Reactions     Crestor [Rosuvastatin] Muscle Pain (Myalgia)     Zocor [Simvastatin] Muscle Pain (Myalgia)     Penicillins Rash     Recent Labs   Lab Test   "07/27/17   1413  07/27/16   1038  07/17/15   1106   LDL  61  66  82   HDL  66  66  60   TRIG  82  99  104   ALT  41   --    --    TSH  1.08  2.77  1.32      BP Readings from Last 3 Encounters:   09/21/17 112/76   07/27/17 122/78   02/21/17 120/84    Wt Readings from Last 3 Encounters:   09/21/17 140 lb (63.5 kg)   07/27/17 142 lb (64.4 kg)   02/21/17 140 lb (63.5 kg)                          Reviewed and updated as needed this visit by clinical staffAllergies       Reviewed and updated as needed this visit by Provider         ROS:  Constitutional, neuro, ENT, endocrine, pulmonary, cardiac, gastrointestinal, genitourinary, musculoskeletal, integument and psychiatric systems are negative, except as otherwise noted.      OBJECTIVE:                                                    /76 (BP Location: Left arm, Patient Position: Chair, Cuff Size: Adult Regular)  Pulse 51  Temp 97.6  F (36.4  C) (Tympanic)  Wt 140 lb (63.5 kg)  SpO2 95%  BMI 24.41 kg/m2  Body mass index is 24.41 kg/(m^2).  GENERAL APPEARANCE: healthy, alert and no distress  EYES: Eyes grossly normal to inspection, PERRL and conjunctivae and sclerae normal  HENT: ear canals and TM's normal and nose and mouth without ulcers or lesions  NECK: no adenopathy, no asymmetry, masses, or scars and thyroid normal to palpation  RESP: lungs clear to auscultation - no rales, rhonchi or wheezes  CV: regular rates and rhythm, normal S1 S2, no S3 or S4 and no murmur, click or rub  LYMPHATICS: normal ant/post cervical and supraclavicular nodes  MS: extremities normal- no gross deformities noted. Pain in many muscle regions and joints.  No stiffness now but start like that in the a.m. For a \"short while\".   No warmth no redness. Seems all symptoms are on the left side. Left arm, shoulder, leg, knee, foot.   SKIN: no suspicious lesions or rashes  NEURO: Normal strength and tone, mentation intact and speech normal  PSYCH: mentation appears normal and affect " normal/bright    Diagnostic test results:  Diagnostic Test Results:  Results for orders placed or performed in visit on 09/21/17 (from the past 24 hour(s))   CRP, inflammation   Result Value Ref Range    CRP Inflammation <2.9 0.0 - 8.0 mg/L   ESR: Erythrocyte sedimentation rate   Result Value Ref Range    Sed Rate 9 0 - 30 mm/h   CK total   Result Value Ref Range    CK Total 517 (H) 30 - 225 U/L   Uric acid   Result Value Ref Range    Uric Acid 4.4 2.6 - 6.0 mg/dL   CBC with platelets differential   Result Value Ref Range    WBC 5.7 4.0 - 11.0 10e9/L    RBC Count 4.61 3.8 - 5.2 10e12/L    Hemoglobin 14.0 11.7 - 15.7 g/dL    Hematocrit 41.5 35.0 - 47.0 %    MCV 90 78 - 100 fl    MCH 30.4 26.5 - 33.0 pg    MCHC 33.7 31.5 - 36.5 g/dL    RDW 13.2 10.0 - 15.0 %    Platelet Count 168 150 - 450 10e9/L    Diff Method Automated Method     % Neutrophils 59.1 %    % Lymphocytes 29.9 %    % Monocytes 8.0 %    % Eosinophils 1.9 %    % Basophils 0.7 %    % Immature Granulocytes 0.4 %    Nucleated RBCs 0 0 /100    Absolute Neutrophil 3.3 1.6 - 8.3 10e9/L    Absolute Lymphocytes 1.7 0.8 - 5.3 10e9/L    Absolute Monocytes 0.5 0.0 - 1.3 10e9/L    Absolute Eosinophils 0.1 0.0 - 0.7 10e9/L    Absolute Basophils 0.0 0.0 - 0.2 10e9/L    Abs Immature Granulocytes 0.0 0 - 0.4 10e9/L    Absolute Nucleated RBC 0.0           ASSESSMENT/PLAN:                                                    1. Multiple joint pain  Her left side of her body remains sore with morning stiffness. Nothing is symmetrical and nothing is showing warmth or  Effusion.  But her CRP is elevating and her pain is not better.  All other testing is negative.  I do not think this is related to her cholesterol medications.   - CRP, inflammation  - ESR: Erythrocyte sedimentation rate  - Lyme Disease Dania with reflex to WB Serum  - Rheumatoid factor  - Uric acid  - DNA double stranded antibodies  - CBC with platelets differential  - RHEUMATOLOGY REFERRAL    2. Elevated CK  Now  mid 500's. Not sure why it is not really going down. Spoke with Dr. Nikia Llamas and we will make referral.   - CK total  - UA reflex to Microscopic and Culture - [place]; Future  - Comprehensive metabolic panel (BMP + Alb, Alk Phos, ALT, AST, Total. Bili, TP)  - RHEUMATOLOGY REFERRAL    3. Need for prophylactic vaccination and inoculation against influenza  Given.   - HC FLU VAC PRESRV FREE QUAD SPLIT VIR 3+YRS IM  - Vaccine Administration, Initial [[id number]]        See Patient Instructions    Minra Roblero, PA  [facility] [place]

## 2017-09-21 NOTE — MR AVS SNAPSHOT
After Visit Summary   9/21/2017    Denise Taylor    MRN: [id number]           Patient Information     Date Of Birth          1955        Visit Information        Provider Department      9/21/2017 10:15 AM Mirna Roblero PA [facility] ([facility]        Today's Diagnoses     Multiple joint pain    -  1    Elevated CK          Care Instructions      Thank you for choosing [facility].   I have office hours 8:00 am to 4:30 pm on Monday's, Wednesday's, Thursday's and Friday's. My nurse and I are out of the office every Tuesday.    Following your visit, when your labs and diagnostic testing have returned, I will review then and you will be contacted by my nurse.  If you are on My Chart, you can also view results there.    For refills, notify your pharmacy regarding what you need and the pharmacy will generate a refill request. Do not call my nurse as she is unable to process refill request. Please plan ahead and allow 3-5 days for refill requests.    You will generally receive a reminder call the day prior to your appointment.  If you cannot attend your appointment, please cancel your appointment with as much notice as possible.  If there is a pattern of failure to present for your appointments, I cannot provide consistent, meaningful, ongoing care for you. It is very important to me that you come in for your care, so we can best assist you with your health care needs.    IMPORTANT:  Please note that it is my standard of practice to NOT participate in prescribing ongoing requested Narcotic Analgesic therapy, and/or participate in the prescribing of other controlled substances.  My nurse and I am happy to assist you with the process of referral for alternative pain management as needed, and other treatment modalities including but not limited to:  Physical Therapy, Physical Medicine and Rehab, Counseling, Chiropractic Care, Orthopedic Care, and non-narcotic medication  management.     In the event that you need to be seen for emergent concerns and I am out of office,  please see one of my colleagues for acute concerns.  You may also present to UC or ER.  I appreciate the opportunity to serve you and look forward to supporting your healthcare needs in the future. Please contact me with any questions or concerns that you may have.    Sincerely,      Mirna Roblero RN, PA-C               Follow-ups after your visit        Who to contact     If you have questions or need follow up information about today's clinic visit or your schedule please contact [facility] directly at [phone].  Normal or non-critical lab and imaging results will be communicated to you by MyChart, letter or phone within 4 business days after the clinic has received the results. If you do not hear from us within 7 days, please contact the clinic through MyChart or phone. If you have a critical or abnormal lab result, we will notify you by phone as soon as possible.  Submit refill requests through [employer] or call your pharmacy and they will forward the refill request to us. Please allow 3 business days for your refill to be completed.          Additional Information About Your Visit        Care EveryWhere ID     This is your Care EveryWhere ID. This could be used by other organizations to access your [facility] medical records  [id number]        Your Vitals Were     Pulse Temperature Pulse Oximetry BMI (Body Mass Index)          51 97.6  F (36.4  C) (Tympanic) 95% 24.41 kg/m2         Blood Pressure from Last 3 Encounters:   09/21/17 112/76   07/27/17 122/78   02/21/17 120/84    Weight from Last 3 Encounters:   09/21/17 140 lb (63.5 kg)   07/27/17 142 lb (64.4 kg)   02/21/17 140 lb (63.5 kg)              We Performed the Following     CBC with platelets differential     CK total     CRP, inflammation     DNA double stranded antibodies     ESR: Erythrocyte sedimentation rate     Lyme Disease Dania  with reflex to WB Serum     Rheumatoid factor     Uric acid        Primary Care Provider Office Phone # Fax #    DASH Angelo [phone]       [facility] [address]  [facility] [address]        Equal Access to Services     GIGI KYLE : Hadii aad ku hadasho Soomaali, waaxda luqadaha, qaybta kaalmada adeegyada, waxay idiin hayaan adejasmina khkavin uriel scott. So [facility] [phone].    ATENCIÓN: Si habla español, tiene a mayo disposición servicios gratuitos de asistencia lingüística. Llame al [phone].    We comply with applicable federal civil rights laws and [place] laws. We do not discriminate on the basis of race, color, national origin, age, disability sex, sexual orientation or gender identity.            Thank you!     Thank you for choosing [facility]  for your care. Our goal is always to provide you with excellent care. Hearing back from our patients is one way we can continue to improve our services. Please take a few minutes to complete the written survey that you may receive in the mail after your visit with us. Thank you!             Your Updated Medication List - Protect others around you: Learn how to safely use, store and throw away your medicines at www.disposemymeds.org.          This list is accurate as of: 9/21/17 10:45 AM.  Always use your most recent med list.                   Brand Name Dispense Instructions for use Diagnosis    ASPIRIN PO      Take 81 mg by mouth daily        levothyroxine 75 MCG tablet    SYNTHROID/LEVOTHROID    90 tablet    Take 1 tablet (75 mcg) by mouth daily    Hypothyroidism, unspecified type       RELPAX PO      Take 40 mg by mouth once as needed for migraine        SARAFEM 10 MG Tabs   Generic drug:  FLUoxetine HCl (PMDD)      Take 10 mg by mouth daily

## 2017-09-21 NOTE — PROGRESS NOTES
Injectable Influenza Immunization Documentation    1.  Is the person to be vaccinated sick today?   No    2. Does the person to be vaccinated have an allergy to a component   of the vaccine?   No    3. Has the person to be vaccinated ever had a serious reaction   to influenza vaccine in the past?   No    4. Has the person to be vaccinated ever had Guillain-Barré syndrome?   No    Form completed by Marah Henry CMA([facility])

## 2017-09-23 LAB — B BURGDOR IGG+IGM SER QL: 0.03 (ref 0–0.89)

## 2017-09-25 LAB
DSDNA AB SER-ACNC: 3 IU/ML
RHEUMATOID FACT SER NEPH-ACNC: <20 IU/ML (ref 0–20)

## 2017-09-30 ENCOUNTER — HOSPITAL ENCOUNTER (EMERGENCY)
Facility: HOSPITAL | Age: 62
Discharge: HOME | End: 2017-09-30
Attending: PHYSICIAN ASSISTANT | Admitting: PHYSICIAN ASSISTANT
Payer: COMMERCIAL

## 2017-09-30 VITALS
TEMPERATURE: 98.5 F | DIASTOLIC BLOOD PRESSURE: 67 MMHG | SYSTOLIC BLOOD PRESSURE: 131 MMHG | OXYGEN SATURATION: 97 % | RESPIRATION RATE: 16 BRPM

## 2017-09-30 DIAGNOSIS — J18.9 COMMUNITY ACQUIRED PNEUMONIA OF RIGHT MIDDLE LOBE OF LUNG: ICD-10-CM

## 2017-09-30 LAB
BASOPHILS # BLD AUTO: 0.1 10E9/L (ref 0–0.2)
BASOPHILS NFR BLD AUTO: 0.7 %
CRP SERPL-MCNC: 181 MG/L (ref 0–8)
DIFFERENTIAL METHOD BLD: NORMAL
EOSINOPHIL # BLD AUTO: 0.1 10E9/L (ref 0–0.7)
EOSINOPHIL NFR BLD AUTO: 1.6 %
ERYTHROCYTE [DISTWIDTH] IN BLOOD BY AUTOMATED COUNT: 12.8 % (ref 10–15)
HCT VFR BLD AUTO: 36.1 % (ref 35–47)
HGB BLD-MCNC: 12.2 G/DL (ref 11.7–15.7)
IMM GRANULOCYTES # BLD: 0 10E9/L (ref 0–0.4)
IMM GRANULOCYTES NFR BLD: 0.3 %
LYMPHOCYTES # BLD AUTO: 1.4 10E9/L (ref 0.8–5.3)
LYMPHOCYTES NFR BLD AUTO: 19.4 %
MCH RBC QN AUTO: 30.4 PG (ref 26.5–33)
MCHC RBC AUTO-ENTMCNC: 33.8 G/DL (ref 31.5–36.5)
MCV RBC AUTO: 90 FL (ref 78–100)
MONOCYTES # BLD AUTO: 0.7 10E9/L (ref 0–1.3)
MONOCYTES NFR BLD AUTO: 9.9 %
NEUTROPHILS # BLD AUTO: 4.8 10E9/L (ref 1.6–8.3)
NEUTROPHILS NFR BLD AUTO: 68.1 %
NRBC # BLD AUTO: 0 10*3/UL
NRBC BLD AUTO-RTO: 0 /100
PLATELET # BLD AUTO: 198 10E9/L (ref 150–450)
RBC # BLD AUTO: 4.01 10E12/L (ref 3.8–5.2)
WBC # BLD AUTO: 7 10E9/L (ref 4–11)

## 2017-09-30 PROCEDURE — 85025 COMPLETE CBC W/AUTO DIFF WBC: CPT | Performed by: NURSE PRACTITIONER

## 2017-09-30 PROCEDURE — 99214 OFFICE O/P EST MOD 30 MIN: CPT | Performed by: NURSE PRACTITIONER

## 2017-09-30 PROCEDURE — 71020 ZZHC CHEST TWO VIEWS, FRONT/LAT: CPT | Mod: TC

## 2017-09-30 PROCEDURE — 96372 THER/PROPH/DIAG INJ SC/IM: CPT

## 2017-09-30 PROCEDURE — 25000128 H RX IP 250 OP 636: Performed by: NURSE PRACTITIONER

## 2017-09-30 PROCEDURE — 36415 COLL VENOUS BLD VENIPUNCTURE: CPT | Performed by: NURSE PRACTITIONER

## 2017-09-30 PROCEDURE — 86140 C-REACTIVE PROTEIN: CPT | Performed by: NURSE PRACTITIONER

## 2017-09-30 PROCEDURE — 99213 OFFICE O/P EST LOW 20 MIN: CPT | Mod: 25

## 2017-09-30 PROCEDURE — 25000125 ZZHC RX 250: Performed by: NURSE PRACTITIONER

## 2017-09-30 RX ORDER — ALBUTEROL SULFATE 90 UG/1
2 AEROSOL, METERED RESPIRATORY (INHALATION) EVERY 6 HOURS PRN
Qty: 1 INHALER | Refills: 0 | Status: SHIPPED | OUTPATIENT
Start: 2017-09-30 | End: 2017-10-11

## 2017-09-30 RX ORDER — AZITHROMYCIN 250 MG/1
TABLET, FILM COATED ORAL
Qty: 6 TABLET | Refills: 0 | Status: SHIPPED | OUTPATIENT
Start: 2017-09-30 | End: 2017-10-03 | Stop reason: ALTCHOICE

## 2017-09-30 RX ORDER — CEFTRIAXONE SODIUM 1 G
1 VIAL (EA) INJECTION ONCE
Status: COMPLETED | OUTPATIENT
Start: 2017-09-30 | End: 2017-09-30

## 2017-09-30 RX ADMIN — CEFTRIAXONE SODIUM 1 G: 1 INJECTION, POWDER, FOR SOLUTION INTRAMUSCULAR; INTRAVENOUS at 17:43

## 2017-09-30 NOTE — DISCHARGE INSTRUCTIONS

## 2017-09-30 NOTE — PROGRESS NOTES
Initially I thought this pt was assigned to me. This is why I reviewed the record and even started a note. Once I realized the pt would not be mine, I cancelled the note.  Liliya Loomis Certified  Physician Assistant  9/30/2017  5:22 PM  URGENT CARE CLINIC

## 2017-09-30 NOTE — ED AVS SNAPSHOT
[facility]    [address] [address]    Phone:  [phone]                                       Denise Taylor   MRN: [id number]    Department:  [facility]   Date of Visit:  9/30/2017           After Visit Summary Signature Page     I have received my discharge instructions, and my questions have been answered. I have discussed any challenges I see with this plan with the nurse or doctor.    ..........................................................................................................................................  Patient/Patient Representative Signature      ..........................................................................................................................................  Patient Representative Print Name and Relationship to Patient    ..................................................               ................................................  Date                                            Time    ..........................................................................................................................................  Reviewed by Signature/Title    ...................................................              ..............................................  Date                                                            Time

## 2017-09-30 NOTE — ED NOTES
Patient presents with cough X 5 days and body aches X 5 days and fevers X 1 day (today).  Patient received flu shot X 1 week ago.

## 2017-09-30 NOTE — ED AVS SNAPSHOT
[facility]    [address]    [place] [address]    Phone:  [phone]                                       Denise Taylor   MRN: [id number]    Department:  [facility]   Date of Visit:  9/30/2017           Patient Information     Date Of Birth          1955        Your diagnoses for this visit were:     Acute bronchitis, unspecified organism        You were seen by Liliya Loomis PA and Edie Pacheco NP.      Follow-up Information     Follow up with Mirna Roblero PA.    Specialty:  Family Practice    Why:  As needed, If symptoms worsen    Contact information:    [facility]  [address]  [place] [address]  [phone]          Follow up with [facility].    Specialty:  EMERGENCY MEDICINE    Why:  As needed, If symptoms worsen    Contact information:    [address]  [place] [address] [phone]    Additional information:    From [facility]: Take [address]. Turn left at [address]. Turn left at the first stoplight on [address]. At the first stop sign, take a right onto [address]. Take a left into the parking lot and continue through until you reach the North enterance of the building.       From [place]: Take [address]. Take the [address] ramp towards [place]. Turn left onto [address]. Take a slight right onto [address]. Turn left at the first stoplight on [address]. At the first stop sign, take a right onto [address]. Take a left into the parking lot and continue through until you reach the North enterance of the building.       From [place]: Take [address]. Take a right at [address]. At the first stop sign, take a right onto [address]. Take a left into the parking lot and continue through until you reach the North enterance of the building.         Discharge Instructions         What Is Acute  Bronchitis?  Acute bronchitis is when the airways in your lungs (bronchial tubes) become red and swollen (inflamed). It is usually caused by a viral infection. But it can also occur because of a bacteria or allergen. Symptoms include a cough that produces yellow or greenish mucus and can last for days or sometimes weeks.  Inside healthy lungs    Air travels in and out of the lungs through the airways. The linings of these airways produce sticky mucus. This mucus traps particles that enter the lungs. Tiny structures called cilia then sweep the particles out of the airways.     Healthy airway: Airways are normally open. Air moves in and out easily.      Healthy cilia: Tiny, hairlike cilia sweep mucus and particles up and out of the airways.   Lungs with bronchitis  Bronchitis often occurs with a cold or the flu virus. The airways become inflamed (red and swollen). There is a deep hacking cough from the extra mucus. Other symptoms may include:    Wheezing    Chest discomfort    Shortness of breath    Mild fever  A second infection, this time due to bacteria, may then occur. And airways irritated by allergens or smoke are more likely to get infected.        Inflamed airway: Inflammation and extra mucus narrow the airway, causing shortness of breath.      Impaired cilia: Extra mucus impairs cilia, causing congestion and wheezing. Smoking makes the problem worse.   Making a diagnosis  A physical exam, health history, and certain tests help your healthcare provider make the diagnosis.  Health history  Your healthcare provider will ask you about your symptoms.  The exam  Your provider listens to your chest for signs of congestion. He or she may also check your ears, nose, and throat.  Possible tests    A sputum test for bacteria. This requires a sample of mucus from your lungs.    A nasal or throat swab. This tests to see if you have a bacterial infection.    A chest X-ray. This is done if your healthcare provider thinks  you have pneumonia.    Tests to check for an underlying condition. Other tests may be done to check for things such as allergies, asthma, or COPD (chronic obstructive pulmonary disease). You may need to see a specialist for more lung function testing.  Treating a cough  The main treatment for bronchitis is easing symptoms. Avoiding smoke, allergens, and other things that trigger coughing can often help. If the infection is bacterial, you may be given antibiotics. During the illness, it's important to get plenty of sleep. To ease symptoms:    Don t smoke. Also avoid secondhand smoke.    Use a humidifier. Or try breathing in steam from a hot shower. This may help loosen mucus.    Drink a lot of water and juice. They can soothe the throat and may help thin mucus.    Sit up or use extra pillows when in bed. This helps to lessen coughing and congestion.    Ask your provider about using medicine. Ask about using cough medicine, pain and fever medicine, or a decongestant.  Antibiotics  Most cases of bronchitis are caused by cold or flu viruses. They don t need antibiotics to treat them, even if your mucus is thick and green or yellow. Antibiotics don t treat viral illness and antibiotics have not been shown to have any benefit in cases of acute bronchitis. Taking antibiotics when they are not needed increases your risk of getting an infection later that is antibiotic-resistant. Antibiotics can also cause severe cases of diarrhea that require other antibiotics to treat.  It is important that you accept your healthcare provider's opinion to not use antibiotics. Your provider will prescribe antibiotics if the infection is caused by bacteria. If they are prescribed:    Take all of the medicine. Take the medicine until it is used up, even if symptoms have improved. If you don t, the bronchitis may come back.    Take the medicines as directed. For instance, some medicines should be taken with food.    Ask about side effects. Ask  your provider or pharmacist what side effects are common, and what to do about them.  Follow-up care  You should see your provider again in 2 to 3 weeks. By this time, symptoms should have improved. An infection that lasts longer may mean you have a more serious problem.  Prevention    Avoid tobacco smoke. If you smoke, quit. Stay away from smoky places. Ask friends and family not to smoke around you, or in your home or car.    Get checked for allergies.    Ask your provider about getting a yearly flu shot. Also ask about pneumococcal or pneumonia shots.    Wash your hands often. This helps reduce the chance of picking up viruses that cause colds and flu.  Call your healthcare provider if:    Symptoms worsen, or you have new symptoms    Breathing problems worsen or  become severe    Symptoms don t get better within a week, or within 3 days of taking antibiotics   Date Last Reviewed: 2/1/2017 2000-2017 [employer]. [address] [place] [address]. All rights reserved. This information is not intended as a substitute for professional medical care. Always follow your healthcare professional's instructions.             Review of your medicines      START taking        Dose / Directions Last dose taken    albuterol 108 (90 BASE) MCG/ACT Inhaler   Commonly known as:  PROAIR HFA/PROVENTIL HFA/VENTOLIN HFA   Dose:  2 puff   Quantity:  1 Inhaler        Inhale 2 puffs into the lungs every 6 hours as needed for shortness of breath / dyspnea or wheezing   Refills:  0        azithromycin 250 MG tablet   Commonly known as:  ZITHROMAX Z-BYRON   Quantity:  6 tablet        Two tablets on the first day, then one tablet daily for the next 4 days   Refills:  0          Our records show that you are taking the medicines listed below. If these are incorrect, please call your family doctor or clinic.        Dose / Directions Last dose taken    ASPIRIN PO   Dose:  81 mg        Take 81 mg by mouth daily   Refills:   0        levothyroxine 75 MCG tablet   Commonly known as:  SYNTHROID/LEVOTHROID   Dose:  75 mcg   Quantity:  90 tablet        Take 1 tablet (75 mcg) by mouth daily   Refills:  3        RELPAX PO   Dose:  40 mg        Take 40 mg by mouth once as needed for migraine   Refills:  0        SARAFEM 10 MG Tabs   Dose:  10 mg   Generic drug:  FLUoxetine HCl (PMDD)        Take 10 mg by mouth daily   Refills:  0                Prescriptions were sent or printed at these locations (2 Prescriptions)                   [facility] [place] - [address] AT [facility] [address]   [address], [facility] [address]    Telephone:  [phone]   Fax:  [phone]   Hours:                  E-Prescribed (2 of 2)         azithromycin (ZITHROMAX Z-BYRON) 250 MG tablet               albuterol (PROAIR HFA/PROVENTIL HFA/VENTOLIN HFA) 108 (90 BASE) MCG/ACT Inhaler                Procedures and tests performed during your visit     CBC with platelets differential    CRP inflammation    Chest XR,  PA & LAT      Orders Needing Specimen Collection     None      Pending Results     Date and Time Order Name Status Description    9/30/2017 1712 Chest XR,  PA & LAT In process             Pending Culture Results     No orders found from 9/28/2017 to 10/1/2017.            Thank you for choosing [facility]       Thank you for choosing [facility] for your care. Our goal is always to provide you with excellent care. Hearing back from our patients is one way we can continue to improve our services. Please take a few minutes to complete the written survey that you may receive in the mail after you visit with us. Thank you!        Care EveryWhere ID     This is your Care EveryWhere ID. This could be used by other organizations to access your [facility] medical records  [id number]        Equal Access to Services     GIGI KYLE AH: gene Mas qaybta kaalmada adeegyada, waxay idiin hayaan adeeg  felisha trevino ah. So [facility] [phone].    ATENCIÓN: Si habla español, tiene a mayo disposición servicios gratuitos de asistencia lingüística. Llame al [phone].    We comply with applicable federal civil rights laws and [place] laws. We do not discriminate on the basis of race, color, national origin, age, disability, sex, sexual orientation, or gender identity.            After Visit Summary       This is your record. Keep this with you and show to your community pharmacist(s) and doctor(s) at your next visit.

## 2017-10-02 ASSESSMENT — ENCOUNTER SYMPTOMS
NAUSEA: 0
VOMITING: 0
VOICE CHANGE: 1
DIARRHEA: 0
EYES NEGATIVE: 1
HEMATOLOGIC/LYMPHATIC NEGATIVE: 1
GASTROINTESTINAL NEGATIVE: 1
NEUROLOGICAL NEGATIVE: 1
CARDIOVASCULAR NEGATIVE: 1
COUGH: 1
RHINORRHEA: 1
ACTIVITY CHANGE: 0
MUSCULOSKELETAL NEGATIVE: 1

## 2017-10-02 NOTE — PROGRESS NOTES
Chest XR - IMPRESSION: FINDINGS SUGGEST RIGHT MIDDLE LOBE AND LINGULAR PNEUMONIA. FOLLOW-UP UNTIL RESOLUTION IS ADVISED.  Advised to follow up with PCP.  Report routed to PCP, DASH Torrez.

## 2017-10-02 NOTE — ED PROVIDER NOTES
History     Chief Complaint   Patient presents with     Cough     cough, congestion, fever     The history is provided by the patient. No  was used.     Denise Taylor is a 62 year old female who presents with a few days of harsh cough, nasal congestion, ST, laryngitis. Low grade fever.  She has had a hx of pneumonia in the past  She got her flu shot one week ago    I have reviewed the Medications, Allergies, Past Medical and Surgical History, and Social History in the Epic system.        Review of Systems   Constitutional: Negative for activity change.   HENT: Positive for congestion, postnasal drip, rhinorrhea and voice change (laryngitis).    Eyes: Negative.    Respiratory: Positive for cough (harsh, hacking, non productive).    Cardiovascular: Negative.    Gastrointestinal: Negative.  Negative for diarrhea, nausea and vomiting.   Genitourinary: Negative.  Negative for decreased urine volume.   Musculoskeletal: Negative.    Skin: Negative.  Negative for rash.   Neurological: Negative.    Hematological: Negative.        Physical Exam   BP: 131/67  Heart Rate: 59  Temp: 98.5  F (36.9  C)  Resp: 16  SpO2: 97 %  Physical Exam   Constitutional: She is oriented to person, place, and time. She appears well-developed and well-nourished. No distress.   HENT:   Head: Normocephalic and atraumatic.   Right Ear: External ear normal.   Left Ear: External ear normal.   Mouth/Throat: Oropharynx is clear and moist. No oropharyngeal exudate.   Nasal mucosa is erythematous, swollen     Eyes: Conjunctivae and EOM are normal. Pupils are equal, round, and reactive to light.   Neck: Normal range of motion. Neck supple.   Cardiovascular: Normal rate, regular rhythm and normal heart sounds.  Exam reveals no gallop and no friction rub.    No murmur heard.  Pulmonary/Chest: Effort normal and breath sounds normal. She has no wheezes. She has no rales.   Abdominal: Soft. Bowel sounds are normal. She exhibits no  mass. There is no tenderness. There is no rebound and no guarding.   No CVa tenderness   Musculoskeletal: Normal range of motion.   Lymphadenopathy:     She has no cervical adenopathy.   Neurological: She is alert and oriented to person, place, and time.   Skin: Skin is warm and dry. She is not diaphoretic. No erythema.   Nursing note and vitals reviewed.      ED Course     ED Course     Procedures               Labs Ordered and Resulted from Time of ED Arrival Up to the Time of Departure from the ED   CRP INFLAMMATION - Abnormal; Notable for the following:        Result Value    CRP Inflammation 181.0 (*)     All other components within normal limits   CBC WITH PLATELETS DIFFERENTIAL     Medications   cefTRIAXone (ROCEPHIN) 1 g in lidocaine (PF) (XYLOCAINE) 1 % injection (1 g Intramuscular Given 9/30/17 1743)       Assessments & Plan (with Medical Decision Making)     I have reviewed the nursing notes.    Pathophysiology, possible etiology and treatment with potential outcomes, risks, benefits, and alternatives discussed to the best of my ability    Chest x ray obtained and reviewed there are patchy opacities in the right middle lobe and at the hilum/ rads reading pending  Pt treated for a likely CAP. She has had this in the past.  She should closely monitor her sx and f/u for any worsening.  She may end up needing a flouroquinolone for this.   Use sx measures , force fluids, mucinex, tylenol or ibuprofen for fever and discomfort  I have reviewed the findings, diagnosis, plan and need for follow up with the patient.      Discharge Medication List as of 9/30/2017  5:45 PM      START taking these medications    Details   azithromycin (ZITHROMAX Z-BYRON) 250 MG tablet Two tablets on the first day, then one tablet daily for the next 4 days, Disp-6 tablet, R-0, E-Prescribe      albuterol (PROAIR HFA/PROVENTIL HFA/VENTOLIN HFA) 108 (90 BASE) MCG/ACT Inhaler Inhale 2 puffs into the lungs every 6 hours as needed for  shortness of breath / dyspnea or wheezing, Disp-1 Inhaler, R-0, E-Prescribe           Pt verbalizes understanding and agreement with plan.  Follow up for worsening symptoms    Final diagnoses:   Community acquired pneumonia of right middle lobe of lung (H)     Pt verbalizes understanding and agreement with plan.  Follow up for worsening symptoms    9/30/2017   [facility]     Edie Pacheco NP  10/02/17 1006

## 2017-10-03 ENCOUNTER — OFFICE VISIT (OUTPATIENT)
Dept: FAMILY MEDICINE | Facility: OTHER | Age: 62
End: 2017-10-03
Attending: PHYSICIAN ASSISTANT
Payer: COMMERCIAL

## 2017-10-03 VITALS
OXYGEN SATURATION: 97 % | HEIGHT: 64 IN | SYSTOLIC BLOOD PRESSURE: 132 MMHG | HEART RATE: 58 BPM | WEIGHT: 135 LBS | TEMPERATURE: 100.7 F | RESPIRATION RATE: 16 BRPM | BODY MASS INDEX: 23.05 KG/M2 | DIASTOLIC BLOOD PRESSURE: 86 MMHG

## 2017-10-03 DIAGNOSIS — J18.9 PNEUMONIA OF RIGHT MIDDLE LOBE DUE TO INFECTIOUS ORGANISM: Primary | ICD-10-CM

## 2017-10-03 PROCEDURE — 99213 OFFICE O/P EST LOW 20 MIN: CPT | Performed by: NURSE PRACTITIONER

## 2017-10-03 RX ORDER — CODEINE PHOSPHATE AND GUAIFENESIN 10; 100 MG/5ML; MG/5ML
1 SOLUTION ORAL EVERY 4 HOURS PRN
Qty: 180 ML | Refills: 0 | Status: SHIPPED | OUTPATIENT
Start: 2017-10-03 | End: 2017-10-11

## 2017-10-03 RX ORDER — LEVOFLOXACIN 500 MG/1
500 TABLET, FILM COATED ORAL DAILY
Qty: 10 TABLET | Refills: 0 | Status: SHIPPED | OUTPATIENT
Start: 2017-10-03 | End: 2017-10-25

## 2017-10-03 ASSESSMENT — ANXIETY QUESTIONNAIRES
1. FEELING NERVOUS, ANXIOUS, OR ON EDGE: NOT AT ALL
5. BEING SO RESTLESS THAT IT IS HARD TO SIT STILL: NOT AT ALL
IF YOU CHECKED OFF ANY PROBLEMS ON THIS QUESTIONNAIRE, HOW DIFFICULT HAVE THESE PROBLEMS MADE IT FOR YOU TO DO YOUR WORK, TAKE CARE OF THINGS AT HOME, OR GET ALONG WITH OTHER PEOPLE: NOT DIFFICULT AT ALL
GAD7 TOTAL SCORE: 0
7. FEELING AFRAID AS IF SOMETHING AWFUL MIGHT HAPPEN: NOT AT ALL
3. WORRYING TOO MUCH ABOUT DIFFERENT THINGS: NOT AT ALL
2. NOT BEING ABLE TO STOP OR CONTROL WORRYING: NOT AT ALL
6. BECOMING EASILY ANNOYED OR IRRITABLE: NOT AT ALL
4. TROUBLE RELAXING: NOT AT ALL

## 2017-10-03 ASSESSMENT — PAIN SCALES - GENERAL: PAINLEVEL: NO PAIN (0)

## 2017-10-03 ASSESSMENT — PATIENT HEALTH QUESTIONNAIRE - PHQ9: SUM OF ALL RESPONSES TO PHQ QUESTIONS 1-9: 6

## 2017-10-03 NOTE — PATIENT INSTRUCTIONS
Treating Pneumonia  Pneumonia is an infection of one or both of the lungs. Pneumonia:    Is usually caused by either a virus or a bacteria    Can be very serious, especially in infants, young children, and older adults. It s also serious for those with other long-term health problems or weakened immune systems.    Is sometimes treated at home and sometimes in the hospital    Antibiotic medicines  Antibiotics may be prescribed for pneumonia caused by bacteria. They may be pills (oral medicines), or shots (injections). Or they may be given by IV (intravenously) into a vein. If you are taking oral medicines at home:    Fill your prescription and start taking your medicine as soon as you can.    You will likely start to feel better in a day or 2, but don t stop taking the antibiotic.    Use a pill organizer to help you remember to take your medicine.    Let your healthcare provider know if you have side effects.    Take your medicine exactly as directed on the label. Talk to your provider or pharmacist if you have any questions.  Antiviral medicines  Antiviral medicine may be prescribed for pneumonia caused by a virus. For example, antiviral medicine may be prescribed for pneumonia caused by the flu virus. Antibiotics do not work against viruses. If you are taking antiviral medicine at home:    Fill your prescription and start taking your medicine as soon as you can.    Talk with your provider or pharmacist about possible side effects.    Take the medicine exactly as instructed.  To relieve symptoms  There are many medicines that can help relieve symptoms of pneumonia. Some are prescription and some are over-the-counter.  Your healthcare provider may recommend:    Acetaminophen or ibuprofen to lower your fever and to lessen headache or other pain    Cough medicine to loosen mucus or to reduce coughing  Make sure you check with your healthcare provider or pharmacist before taking any over-the-counter medicines.   Special treatments  If you are hospitalized for pneumonia, you may have other therapies, including:    Inhaled medicines to help with breathing or chest congestion    Supplemental oxygen to increase low oxygen levels  Drink fluids and eat healthy  You should eat healthy to help your body fight the infection. Drinking a lot of fluids helps to replace fluids lost from fever and to loosen mucus in your chest.    Diet. Make healthy food choices, including fruits and vegetables, lean meats and other proteins, 100% whole grain and low- or no-fat dairy products.    Fluids. Drink at least 6 to 8 tall glasses a day. Water and 100% fruit or vegetable juice are best.  Get plenty of rest and sleep  You may be more tired than usual for a while. It is important to get enough sleep at night. It s also important to rest during the day. Talk with your healthcare provider if coughing or other symptoms are interfering with your sleep.  Preventing the spread of germs  The best thing you can do to prevent spreading germs is to wash your hands often. You should:    Rub your hand with soap and water for 20 to 30 seconds.    Clean in between your fingers, the backs of your hands, and around your nails.    Dry your hands on a separate towel or use paper towels.  You should also:    Keep alcohol-based hand  nearby.    Make sure you also clean surfaces that you touch. Use a product that kills all types of germs.    Stay away from others until you are feeling better.  When to call your healthcare provider  Call your healthcare provider if you have any of the following:    Symptoms get worse    Fever continues    Shortness of breath gets worse    Increased mucus or mucus that is darker in color    Coughing gets worse    Lips or fingers are bluish in color    Side effects from your medicine   Date Last Reviewed: 12/1/2016 2000-2017 [employer]. [address] [place] [address], [place] [address]. All rights reserved. This  information is not intended as a substitute for professional medical care. Always follow your healthcare professional's instructions.

## 2017-10-03 NOTE — PROGRESS NOTES
SUBJECTIVE:   Denise Taylor is a 62 year old female who presents to clinic today for the following health issues:      RESPIRATORY SYMPTOMS      Duration: 7 days    Description  nasal congestion, sore throat, facial pain/pressure, cough, fever, chills, fatigue/malaise, hoarse voice and achy    Severity: severe - states feels slightly better then Saturday    Accompanying signs and symptoms: no energy, fatigue    History (predisposing factors):  none    Precipitating or alleviating factors: None    Therapies tried and outcome:  rest and fluids albuteral inhaler, tylenol and ibu  Non smoker, non asthmatic.  Saw Edie Castellanosapp in  on Saturday.  Give oral and shot antibiotics.  Relates blisters on left leg/thigh and upper abdomen.  Has had the shingles shot.  Chest is tight.  Worse not than when in ER.          Problem list and histories reviewed & adjusted, as indicated.  Additional history: as documented    Patient Active Problem List   Diagnosis     History of basal cell carcinoma     Hyperlipidemia with target LDL less than 130     Pneumonia     ACP (advance care planning)     Hypothyroidism, unspecified type     Past Surgical History:   Procedure Laterality Date     COLONOSCOPY  2006    Dr Lara     COLONOSCOPY N/A 10/10/2016    Procedure: COLONOSCOPY;  Surgeon: Christine Cintron MD;  Location: [facility]     SHOULDER SURGERY Right 2011/10/2012       Social History   Substance Use Topics     Smoking status: Never Smoker     Smokeless tobacco: Never Used     Alcohol use 1.0 oz/week     2 Glasses of wine per week      Comment: occasionally     Family History   Problem Relation Age of Onset     Myocardial Infarction Mother      Hypertension Mother      Stomach Cancer Father          Current Outpatient Prescriptions   Medication Sig Dispense Refill     albuterol (PROAIR HFA/PROVENTIL HFA/VENTOLIN HFA) 108 (90 BASE) MCG/ACT Inhaler Inhale 2 puffs into the lungs every 6 hours as needed for shortness of breath / dyspnea  "or wheezing 1 Inhaler 0     levothyroxine (SYNTHROID/LEVOTHROID) 75 MCG tablet Take 1 tablet (75 mcg) by mouth daily 90 tablet 3     ASPIRIN PO Take 81 mg by mouth daily       Eletriptan Hydrobromide (RELPAX PO) Take 40 mg by mouth once as needed for migraine       FLUoxetine HCl, PMDD, (SARAFEM) 10 MG TABS Take 10 mg by mouth daily       Allergies   Allergen Reactions     Crestor [Rosuvastatin] Muscle Pain (Myalgia)     Zocor [Simvastatin] Muscle Pain (Myalgia)     Penicillins Rash         Reviewed and updated as needed this visit by clinical staffTobacco  Allergies  Med Hx  Surg Hx  Fam Hx  Soc Hx      Reviewed and updated as needed this visit by Provider         ROS:  CONSTITUTIONAL:chills, fatigue and fever   INTEGUMENTARY/SKIN: noted a rash on her upper abdomen and upper thighs  ENT/MOUTH: upper palate is sore laryngitis   RESP:cough-non productive and SOB/dyspnea  CV: NEGATIVE for chest pain, palpitations or peripheral edema    OBJECTIVE:     /86  Pulse 58  Temp 100.7  F (38.2  C) (Tympanic)  Resp 16  Ht 5' 3.5\" (1.613 m)  Wt 135 lb (61.2 kg)  SpO2 97%  BMI 23.54 kg/m2  Body mass index is 23.54 kg/(m^2).   GENERAL: alert and fatigued  HENT: nasal congestion and horse voice  RESP: non-productive cough and crackles right posterior lung  CV: regular rate and rhythm, normal S1 S2, no S3 or S4, no murmur, click or rub, no peripheral edema and peripheral pulses strong  ABDOMEN: soft, nontender, no hepatosplenomegaly, no masses and bowel sounds normal  SKIN: rash - erythema raised area across upper abdomen itching  PSYCH: mentation appears normal, affect normal/bright  LYMPH: normal ant/post cervical, supraclavicular nodes    Diagnostic Test Results:  Results for orders placed or performed during the hospital encounter of 09/30/17   Chest XR,  PA & LAT    Narrative    PA AND LATERAL RADIOGRAPHS OF THE CHEST    INDICATION: Cough.    COMPARISON:  Today's study is compared to a prior examination which " is  dated July 16, 2014.    FINDINGS: The cardiomediastinal silhouette is stable.  There are new  patchy ill-defined airspace opacities within the lingula and right  middle lobe.  No effusion or pneumothorax is seen.  No  subdiaphragmatic free air is seen.    IMPRESSION: FINDINGS SUGGEST RIGHT MIDDLE LOBE AND LINGULAR PNEUMONIA.  FOLLOW-UP UNTIL RESOLUTION IS ADVISED.      Exam Date: Sep 30, 2017 05:31:00 PM  Author: JUSTICE GORDON  This report is final and signed     CBC with platelets differential   Result Value Ref Range    WBC 7.0 4.0 - 11.0 10e9/L    RBC Count 4.01 3.8 - 5.2 10e12/L    Hemoglobin 12.2 11.7 - 15.7 g/dL    Hematocrit 36.1 35.0 - 47.0 %    MCV 90 78 - 100 fl    MCH 30.4 26.5 - 33.0 pg    MCHC 33.8 31.5 - 36.5 g/dL    RDW 12.8 10.0 - 15.0 %    Platelet Count 198 150 - 450 10e9/L    Diff Method Automated Method     % Neutrophils 68.1 %    % Lymphocytes 19.4 %    % Monocytes 9.9 %    % Eosinophils 1.6 %    % Basophils 0.7 %    % Immature Granulocytes 0.3 %    Nucleated RBCs 0 0 /100    Absolute Neutrophil 4.8 1.6 - 8.3 10e9/L    Absolute Lymphocytes 1.4 0.8 - 5.3 10e9/L    Absolute Monocytes 0.7 0.0 - 1.3 10e9/L    Absolute Eosinophils 0.1 0.0 - 0.7 10e9/L    Absolute Basophils 0.1 0.0 - 0.2 10e9/L    Abs Immature Granulocytes 0.0 0 - 0.4 10e9/L    Absolute Nucleated RBC 0.0    CRP inflammation   Result Value Ref Range    CRP Inflammation 181.0 (H) 0.0 - 8.0 mg/L       ASSESSMENT:       PLAN:   ASSESSMENT / PLAN:  (J18.1) Pneumonia of right middle lobe due to infectious organism ([facility])  (primary encounter diagnosis)  Comment: with continued slightly elevated temperature and only mild improvement in symptoms encouraged follow up with PCP next week. Vital signs remain stable at this time. Continue with inhaler. Changed antibiotic today due to rash on abdomen. Added robitussin AC for cough that is keeping Denise up at night. Denise is encouraged to stay hydrated and rest.  Possible repeat chest xray  next week with follow up. If symptoms worsen Denise is encouraged to go to the emergency room or follow up at the clinic before next appointment if starting to feel better in a few days  Plan:  guaiFENesin-codeine (ROBITUSSIN AC) 100-10 MG/5ML SOLN solution,    levofloxacin (LEVAQUIN) 500 MG tablet              See Patient Instructions    CESAR Michelle [facility] [place] EYAD

## 2017-10-03 NOTE — MR AVS SNAPSHOT
After Visit Summary   10/3/2017    Denise Taylor    MRN: [id number]           Patient Information     Date Of Birth          1955        Visit Information        Provider Department      10/3/2017 3:30 PM Luz Huber APRN [facility]        Today's Diagnoses     Pneumonia of right middle lobe due to infectious organism (H)    -  1      Care Instructions      Treating Pneumonia  Pneumonia is an infection of one or both of the lungs. Pneumonia:    Is usually caused by either a virus or a bacteria    Can be very serious, especially in infants, young children, and older adults. It s also serious for those with other long-term health problems or weakened immune systems.    Is sometimes treated at home and sometimes in the hospital    Antibiotic medicines  Antibiotics may be prescribed for pneumonia caused by bacteria. They may be pills (oral medicines), or shots (injections). Or they may be given by IV (intravenously) into a vein. If you are taking oral medicines at home:    Fill your prescription and start taking your medicine as soon as you can.    You will likely start to feel better in a day or 2, but don t stop taking the antibiotic.    Use a pill organizer to help you remember to take your medicine.    Let your healthcare provider know if you have side effects.    Take your medicine exactly as directed on the label. Talk to your provider or pharmacist if you have any questions.  Antiviral medicines  Antiviral medicine may be prescribed for pneumonia caused by a virus. For example, antiviral medicine may be prescribed for pneumonia caused by the flu virus. Antibiotics do not work against viruses. If you are taking antiviral medicine at home:    Fill your prescription and start taking your medicine as soon as you can.    Talk with your provider or pharmacist about possible side effects.    Take the medicine exactly as instructed.  To relieve symptoms  There are  many medicines that can help relieve symptoms of pneumonia. Some are prescription and some are over-the-counter.  Your healthcare provider may recommend:    Acetaminophen or ibuprofen to lower your fever and to lessen headache or other pain    Cough medicine to loosen mucus or to reduce coughing  Make sure you check with your healthcare provider or pharmacist before taking any over-the-counter medicines.  Special treatments  If you are hospitalized for pneumonia, you may have other therapies, including:    Inhaled medicines to help with breathing or chest congestion    Supplemental oxygen to increase low oxygen levels  Drink fluids and eat healthy  You should eat healthy to help your body fight the infection. Drinking a lot of fluids helps to replace fluids lost from fever and to loosen mucus in your chest.    Diet. Make healthy food choices, including fruits and vegetables, lean meats and other proteins, 100% whole grain and low- or no-fat dairy products.    Fluids. Drink at least 6 to 8 tall glasses a day. Water and 100% fruit or vegetable juice are best.  Get plenty of rest and sleep  You may be more tired than usual for a while. It is important to get enough sleep at night. It s also important to rest during the day. Talk with your healthcare provider if coughing or other symptoms are interfering with your sleep.  Preventing the spread of germs  The best thing you can do to prevent spreading germs is to wash your hands often. You should:    Rub your hand with soap and water for 20 to 30 seconds.    Clean in between your fingers, the backs of your hands, and around your nails.    Dry your hands on a separate towel or use paper towels.  You should also:    Keep alcohol-based hand  nearby.    Make sure you also clean surfaces that you touch. Use a product that kills all types of germs.    Stay away from others until you are feeling better.  When to call your healthcare provider  Call your healthcare  "provider if you have any of the following:    Symptoms get worse    Fever continues    Shortness of breath gets worse    Increased mucus or mucus that is darker in color    Coughing gets worse    Lips or fingers are bluish in color    Side effects from your medicine   Date Last Reviewed: 12/1/2016 2000-2017 [employer]. [address] [place] [address]. All rights reserved. This information is not intended as a substitute for professional medical care. Always follow your healthcare professional's instructions.                Follow-ups after your visit        Follow-up notes from your care team     Return in about 1 week (around 10/10/2017).      Who to contact     If you have questions or need follow up information about today's clinic visit or your schedule please contact [facility] directly at [phone].  Normal or non-critical lab and imaging results will be communicated to you by MyChart, letter or phone within 4 business days after the clinic has received the results. If you do not hear from us within 7 days, please contact the clinic through MyChart or phone. If you have a critical or abnormal lab result, we will notify you by phone as soon as possible.  Submit refill requests through [employer] or call your pharmacy and they will forward the refill request to us. Please allow 3 business days for your refill to be completed.          Additional Information About Your Visit        Care EveryWhere ID     This is your Care EveryWhere ID. This could be used by other organizations to access your [facility] medical records  [id number]        Your Vitals Were     Pulse Temperature Respirations Height Pulse Oximetry BMI (Body Mass Index)    58 100.7  F (38.2  C) (Tympanic) 16 5' 3.5\" (1.613 m) 97% 23.54 kg/m2       Blood Pressure from Last 3 Encounters:   10/03/17 132/86   09/30/17 131/67   09/21/17 112/76    Weight from Last 3 Encounters:   10/03/17 135 lb (61.2 kg)   09/21/17 " 140 lb (63.5 kg)   07/27/17 142 lb (64.4 kg)              Today, you had the following     No orders found for display         Today's Medication Changes          These changes are accurate as of: 10/3/17  3:55 PM.  If you have any questions, ask your nurse or doctor.               Start taking these medicines.        Dose/Directions    guaiFENesin-codeine 100-10 MG/5ML Soln solution   Commonly known as:  ROBITUSSIN AC   Used for:  Pneumonia of right middle lobe due to infectious organism (H)   Started by:  Luz Huber APRN CNP        Dose:  1 tsp.   Take 5 mLs by mouth every 4 hours as needed for cough   Quantity:  180 mL   Refills:  0       levofloxacin 500 MG tablet   Commonly known as:  LEVAQUIN   Used for:  Pneumonia of right middle lobe due to infectious organism (H)   Started by:  Luz Huber APRN CNP        Dose:  500 mg   Take 1 tablet (500 mg) by mouth daily   Quantity:  10 tablet   Refills:  0         Stop taking these medicines if you haven't already. Please contact your care team if you have questions.     azithromycin 250 MG tablet   Commonly known as:  ZITHROMAX Z-BYRON   Stopped by:  Luz Huber APRN CNP                Where to get your medicines      [id number] - [place], [place] - [address] AT [facility] [address], [facility] [address]     Phone:  [phone]     levofloxacin 500 MG tablet         Some of these will need a paper prescription and others can be bought over the counter.  Ask your nurse if you have questions.     Bring a paper prescription for each of these medications     guaiFENesin-codeine 100-10 MG/5ML Soln solution                Primary Care Provider Office Phone # Fax #    DASH Angelo [phone]       [facility] [address] [place] [address] [address]  [facility] [place] [address]        Equal Access to Services     GIGI KYLE AH: gene Mas  melchor yoandy almaarabella ford. So [facility] [phone].    ATENCIÓN: Si chaz young, tiene a mayo disposición servicios gratuitos de asistencia lingüística. Deepthi al [phone].    We comply with applicable federal civil rights laws and [place] laws. We do not discriminate on the basis of race, color, national origin, age, disability, sex, sexual orientation, or gender identity.            Thank you!     Thank you for [facility]  [facility]. Our goal is always to provide you with excellent care. Hearing back from our patients is one way we can continue to improve our services. Please take a few minutes to complete the written survey that you may receive in the mail after your visit with us. Thank you!             Your Updated Medication List - Protect others around you: Learn how to safely use, store and throw away your medicines at www.disposemymeds.org.          This list is accurate as of: 10/3/17  3:55 PM.  Always use your most recent med list.                   Brand Name Dispense Instructions for use Diagnosis    albuterol 108 (90 BASE) MCG/ACT Inhaler    PROAIR HFA/PROVENTIL HFA/VENTOLIN HFA    1 Inhaler    Inhale 2 puffs into the lungs every 6 hours as needed for shortness of breath / dyspnea or wheezing        ASPIRIN PO      Take 81 mg by mouth daily        guaiFENesin-codeine 100-10 MG/5ML Soln solution    ROBITUSSIN AC    180 mL    Take 5 mLs by mouth every 4 hours as needed for cough    Pneumonia of right middle lobe due to infectious organism (H)       levofloxacin 500 MG tablet    LEVAQUIN    10 tablet    Take 1 tablet (500 mg) by mouth daily    Pneumonia of right middle lobe due to infectious organism (H)       levothyroxine 75 MCG tablet    SYNTHROID/LEVOTHROID    90 tablet    Take 1 tablet (75 mcg) by mouth daily    Hypothyroidism, unspecified type       RELPAX PO      Take 40 mg by mouth once as needed for migraine         SARAFEM 10 MG Tabs   Generic drug:  FLUoxetine HCl (PMDD)      Take 10 mg by mouth daily

## 2017-10-03 NOTE — NURSING NOTE
"Chief Complaint   Patient presents with     RECHECK     UC       Initial BP (!) 122/94 (BP Location: Right arm, Patient Position: Sitting, Cuff Size: Adult Regular)  Pulse 58  Temp 100.7  F (38.2  C) (Tympanic)  Resp 16  Ht 5' 3.5\" (1.613 m)  Wt 135 lb (61.2 kg)  SpO2 97%  BMI 23.54 kg/m2 Estimated body mass index is 23.54 kg/(m^2) as calculated from the following:    Height as of this encounter: 5' 3.5\" (1.613 m).    Weight as of this encounter: 135 lb (61.2 kg).  Medication Reconciliation: complete   Sharlene Sousa      "

## 2017-10-04 ASSESSMENT — ANXIETY QUESTIONNAIRES: GAD7 TOTAL SCORE: 0

## 2017-10-11 ENCOUNTER — OFFICE VISIT (OUTPATIENT)
Dept: FAMILY MEDICINE | Facility: OTHER | Age: 62
End: 2017-10-11
Attending: PHYSICIAN ASSISTANT
Payer: COMMERCIAL

## 2017-10-11 ENCOUNTER — RADIANT APPOINTMENT (OUTPATIENT)
Dept: GENERAL RADIOLOGY | Facility: OTHER | Age: 62
End: 2017-10-11
Attending: PHYSICIAN ASSISTANT
Payer: COMMERCIAL

## 2017-10-11 VITALS
DIASTOLIC BLOOD PRESSURE: 82 MMHG | HEART RATE: 54 BPM | SYSTOLIC BLOOD PRESSURE: 114 MMHG | BODY MASS INDEX: 23.89 KG/M2 | WEIGHT: 137 LBS | TEMPERATURE: 97.6 F | OXYGEN SATURATION: 97 %

## 2017-10-11 DIAGNOSIS — J18.9 COMMUNITY ACQUIRED PNEUMONIA OF LEFT LOWER LOBE OF LUNG: ICD-10-CM

## 2017-10-11 DIAGNOSIS — J18.9 COMMUNITY ACQUIRED PNEUMONIA OF LEFT LOWER LOBE OF LUNG: Primary | ICD-10-CM

## 2017-10-11 DIAGNOSIS — R74.8 ELEVATED CK: ICD-10-CM

## 2017-10-11 LAB
BASOPHILS # BLD AUTO: 0 10E9/L (ref 0–0.2)
BASOPHILS NFR BLD AUTO: 0.6 %
CK SERPL-CCNC: 198 U/L (ref 30–225)
CRP SERPL-MCNC: 7.1 MG/L (ref 0–8)
DIFFERENTIAL METHOD BLD: NORMAL
EOSINOPHIL # BLD AUTO: 0.1 10E9/L (ref 0–0.7)
EOSINOPHIL NFR BLD AUTO: 1.6 %
ERYTHROCYTE [DISTWIDTH] IN BLOOD BY AUTOMATED COUNT: 13.1 % (ref 10–15)
HCT VFR BLD AUTO: 37.8 % (ref 35–47)
HGB BLD-MCNC: 12.8 G/DL (ref 11.7–15.7)
IMM GRANULOCYTES # BLD: 0 10E9/L (ref 0–0.4)
IMM GRANULOCYTES NFR BLD: 0.6 %
LYMPHOCYTES # BLD AUTO: 1.8 10E9/L (ref 0.8–5.3)
LYMPHOCYTES NFR BLD AUTO: 25.9 %
MCH RBC QN AUTO: 30 PG (ref 26.5–33)
MCHC RBC AUTO-ENTMCNC: 33.9 G/DL (ref 31.5–36.5)
MCV RBC AUTO: 89 FL (ref 78–100)
MONOCYTES # BLD AUTO: 0.4 10E9/L (ref 0–1.3)
MONOCYTES NFR BLD AUTO: 5.4 %
NEUTROPHILS # BLD AUTO: 4.5 10E9/L (ref 1.6–8.3)
NEUTROPHILS NFR BLD AUTO: 65.9 %
NRBC # BLD AUTO: 0 10*3/UL
NRBC BLD AUTO-RTO: 0 /100
PLATELET # BLD AUTO: 327 10E9/L (ref 150–450)
RBC # BLD AUTO: 4.26 10E12/L (ref 3.8–5.2)
WBC # BLD AUTO: 6.8 10E9/L (ref 4–11)

## 2017-10-11 PROCEDURE — 82550 ASSAY OF CK (CPK): CPT | Performed by: PHYSICIAN ASSISTANT

## 2017-10-11 PROCEDURE — 73030 X-RAY EXAM OF SHOULDER: CPT | Mod: TC

## 2017-10-11 PROCEDURE — 85025 COMPLETE CBC W/AUTO DIFF WBC: CPT | Performed by: PHYSICIAN ASSISTANT

## 2017-10-11 PROCEDURE — 36415 COLL VENOUS BLD VENIPUNCTURE: CPT | Performed by: PHYSICIAN ASSISTANT

## 2017-10-11 PROCEDURE — 86140 C-REACTIVE PROTEIN: CPT | Performed by: PHYSICIAN ASSISTANT

## 2017-10-11 PROCEDURE — 71020 XR CHEST 2 VW: CPT | Mod: TC

## 2017-10-11 PROCEDURE — 99214 OFFICE O/P EST MOD 30 MIN: CPT | Performed by: PHYSICIAN ASSISTANT

## 2017-10-11 RX ORDER — ALBUTEROL SULFATE 90 UG/1
2 AEROSOL, METERED RESPIRATORY (INHALATION) EVERY 6 HOURS PRN
Qty: 1 INHALER | Refills: 0 | Status: SHIPPED | OUTPATIENT
Start: 2017-10-11 | End: 2017-12-18

## 2017-10-11 ASSESSMENT — ANXIETY QUESTIONNAIRES
6. BECOMING EASILY ANNOYED OR IRRITABLE: NOT AT ALL
1. FEELING NERVOUS, ANXIOUS, OR ON EDGE: NOT AT ALL
3. WORRYING TOO MUCH ABOUT DIFFERENT THINGS: NOT AT ALL
5. BEING SO RESTLESS THAT IT IS HARD TO SIT STILL: NOT AT ALL
GAD7 TOTAL SCORE: 0
7. FEELING AFRAID AS IF SOMETHING AWFUL MIGHT HAPPEN: NOT AT ALL
4. TROUBLE RELAXING: NOT AT ALL
2. NOT BEING ABLE TO STOP OR CONTROL WORRYING: NOT AT ALL

## 2017-10-11 ASSESSMENT — PAIN SCALES - GENERAL: PAINLEVEL: NO PAIN (0)

## 2017-10-11 ASSESSMENT — PATIENT HEALTH QUESTIONNAIRE - PHQ9: SUM OF ALL RESPONSES TO PHQ QUESTIONS 1-9: 1

## 2017-10-11 NOTE — MR AVS SNAPSHOT
After Visit Summary   10/11/2017    Denise Taylor    MRN: [id number]           Patient Information     Date Of Birth          1955        Visit Information        Provider Department      10/11/2017 11:00 AM Mirna Roblero PA [facility]        Today's Diagnoses     Community acquired pneumonia of left lower lobe of lung (H)    -  1    Elevated CK          Care Instructions      Thank you for choosing [facility].   I have office hours 8:00 am to 4:30 pm on Monday's, Wednesday's, Thursday's and Friday's. My nurse and I are out of the office every Tuesday.    Following your visit, when your labs and diagnostic testing have returned, I will review then and you will be contacted by my nurse.  If you are on My Chart, you can also view results there.    For refills, notify your pharmacy regarding what you need and the pharmacy will generate a refill request. Do not call my nurse as she is unable to process refill request. Please plan ahead and allow 3-5 days for refill requests.    You will generally receive a reminder call the day prior to your appointment.  If you cannot attend your appointment, please cancel your appointment with as much notice as possible.  If there is a pattern of failure to present for your appointments, I cannot provide consistent, meaningful, ongoing care for you. It is very important to me that you come in for your care, so we can best assist you with your health care needs.    IMPORTANT:  Please note that it is my standard of practice to NOT participate in prescribing ongoing requested Narcotic Analgesic therapy, and/or participate in the prescribing of other controlled substances.  My nurse and I am happy to assist you with the process of referral for alternative pain management as needed, and other treatment modalities including but not limited to:  Physical Therapy, Physical Medicine and Rehab, Counseling, Chiropractic Care, Orthopedic  Care, and non-narcotic medication management.     In the event that you need to be seen for emergent concerns and I am out of office,  please see one of my colleagues for acute concerns.  You may also present to UC or ER.  I appreciate the opportunity to serve you and look forward to supporting your healthcare needs in the future. Please contact me with any questions or concerns that you may have.    Sincerely,      Mirna Roblero RN, PA-C               Follow-ups after your visit        Who to contact     If you have questions or need follow up information about today's clinic visit or your schedule please contact [facility] directly at [phone].  Normal or non-critical lab and imaging results will be communicated to you by MyChart, letter or phone within 4 business days after the clinic has received the results. If you do not hear from us within 7 days, please contact the clinic through MyChart or phone. If you have a critical or abnormal lab result, we will notify you by phone as soon as possible.  Submit refill requests through [employer] or call your pharmacy and they will forward the refill request to us. Please allow 3 business days for your refill to be completed.          Additional Information About Your Visit        Care EveryWhere ID     This is your Care EveryWhere ID. This could be used by other organizations to access your [place] medical records  [id number]        Your Vitals Were     Pulse Temperature Pulse Oximetry BMI (Body Mass Index)          54 97.6  F (36.4  C) (Tympanic) 97% 23.89 kg/m2         Blood Pressure from Last 3 Encounters:   10/11/17 114/82   10/03/17 132/86   09/30/17 131/67    Weight from Last 3 Encounters:   10/11/17 137 lb (62.1 kg)   10/03/17 135 lb (61.2 kg)   09/21/17 140 lb (63.5 kg)              We Performed the Following     CBC with platelets and differential     CK total     CRP, inflammation     XR SHOULDER LEFT G/E 2 VIEWS (Clinic Performed)           Where to get your medicines      These medications were sent [id number] [place] [facility] [address] AT [facility] [address]  [address], [place] [address]     Phone:  [phone]     albuterol 108 (90 BASE) MCG/ACT Inhaler          Primary Care Provider Office Phone # Fax #    Mirna WHEELER DASH Roblero [phone]       [facility] [address]  [place] [address]        Equal Access to Services     [facility]: Hadii aad ku hadasho Soomaali, waaxda luqadaha, qaybta kaalmada adeegyada, waxay idiin hayaan adeeg chioarajewell trevino . So [facility] [phone].    ATENCIÓN: Si habla español, tiene a mayo disposición servicios gratuitos de asistencia lingüística. [facility] [phone].    We comply with applicable federal civil rights laws and [place] laws. We do not discriminate on the basis of race, color, national origin, age, disability, sex, sexual orientation, or gender identity.            Thank you!     Thank you for choosing [facility]  for your care. Our goal is always to provide you with excellent care. Hearing back from our patients is one way we can continue to improve our services. Please take a few minutes to complete the written survey that you may receive in the mail after your visit with us. Thank you!             Your Updated Medication List - Protect others around you: Learn how to safely use, store and throw away your medicines at www.disposemymeds.org.          This list is accurate as of: 10/11/17 12:13 PM.  Always use your most recent med list.                   Brand Name Dispense Instructions for use Diagnosis    albuterol 108 (90 BASE) MCG/ACT Inhaler    PROAIR HFA/PROVENTIL HFA/VENTOLIN HFA    1 Inhaler    Inhale 2 puffs into the lungs every 6 hours as needed for shortness of breath / dyspnea or wheezing    Community acquired pneumonia of left lower lobe of lung (H)       ASPIRIN PO      Take 81 mg by mouth daily         levofloxacin 500 MG tablet    LEVAQUIN    10 tablet    Take 1 tablet (500 mg) by mouth daily    Pneumonia of right middle lobe due to infectious organism (H)       levothyroxine 75 MCG tablet    SYNTHROID/LEVOTHROID    90 tablet    Take 1 tablet (75 mcg) by mouth daily    Hypothyroidism, unspecified type       RELPAX PO      Take 40 mg by mouth once as needed for migraine        SARAFEM 10 MG Tabs   Generic drug:  FLUoxetine HCl (PMDD)      Take 10 mg by mouth daily

## 2017-10-11 NOTE — NURSING NOTE
"Chief Complaint   Patient presents with     URI     follow up pneumonia        Initial /82 (BP Location: Left arm, Patient Position: Chair, Cuff Size: Adult Regular)  Pulse 54  Temp 97.6  F (36.4  C) (Tympanic)  Wt 137 lb (62.1 kg)  SpO2 97%  BMI 23.89 kg/m2 Estimated body mass index is 23.89 kg/(m^2) as calculated from the following:    Height as of 10/3/17: 5' 3.5\" (1.613 m).    Weight as of this encounter: 137 lb (62.1 kg).  Medication Reconciliation: complete   Marah Henry CMA(AAMA)   "

## 2017-10-11 NOTE — PROGRESS NOTES
SUBJECTIVE:   Denise Taylor is a 62 year old female who presents to clinic today for the following health issues:        RESPIRATORY SYMPTOMS      Duration: 1 week follow up for pneumonia     Description  cough, wheezing and fatigue/malaise    Severity: mild    Accompanying signs and symptoms: None    History (predisposing factors):  Pneumonia     Precipitating or alleviating factors: None    Therapies tried and outcome:  rest and fluids albuterol inhaler and Levaquin             Problem list and histories reviewed & adjusted, as indicated.  Additional history: as documented    Patient Active Problem List   Diagnosis     History of basal cell carcinoma     Hyperlipidemia with target LDL less than 130     Pneumonia     ACP (advance care planning)     Hypothyroidism, unspecified type     Past Surgical History:   Procedure Laterality Date     COLONOSCOPY  2006    Dr Lara     COLONOSCOPY N/A 10/10/2016    Procedure: COLONOSCOPY;  Surgeon: Christine Cintron MD;  Location: [facility]     SHOULDER SURGERY Right 2011/10/2012       Social History   Substance Use Topics     Smoking status: Never Smoker     Smokeless tobacco: Never Used     Alcohol use 1.0 oz/week     2 Glasses of wine per week      Comment: occasionally     Family History   Problem Relation Age of Onset     Myocardial Infarction Mother      Hypertension Mother      Stomach Cancer Father          Current Outpatient Prescriptions   Medication Sig Dispense Refill     levofloxacin (LEVAQUIN) 500 MG tablet Take 1 tablet (500 mg) by mouth daily 10 tablet 0     albuterol (PROAIR HFA/PROVENTIL HFA/VENTOLIN HFA) 108 (90 BASE) MCG/ACT Inhaler Inhale 2 puffs into the lungs every 6 hours as needed for shortness of breath / dyspnea or wheezing 1 Inhaler 0     levothyroxine (SYNTHROID/LEVOTHROID) 75 MCG tablet Take 1 tablet (75 mcg) by mouth daily 90 tablet 3     ASPIRIN PO Take 81 mg by mouth daily       Eletriptan Hydrobromide (RELPAX PO) Take 40 mg by mouth once  as needed for migraine       FLUoxetine HCl, PMDD, (SARAFEM) 10 MG TABS Take 10 mg by mouth daily       Allergies   Allergen Reactions     Crestor [Rosuvastatin] Muscle Pain (Myalgia)     Zocor [Simvastatin] Muscle Pain (Myalgia)     Penicillins Rash     Zithromax [Azithromycin] Rash     BP Readings from Last 3 Encounters:   10/11/17 114/82   10/03/17 132/86   09/30/17 131/67    Wt Readings from Last 3 Encounters:   10/11/17 137 lb (62.1 kg)   10/03/17 135 lb (61.2 kg)   09/21/17 140 lb (63.5 kg)                        Reviewed and updated as needed this visit by clinical staffAllergies       Reviewed and updated as needed this visit by Provider         ROS:  Constitutional, neuro, ENT, endocrine, pulmonary, cardiac, gastrointestinal, genitourinary, musculoskeletal, integument and psychiatric systems are negative, except as otherwise noted.      OBJECTIVE:                                                    /82 (BP Location: Left arm, Patient Position: Chair, Cuff Size: Adult Regular)  Pulse 54  Temp 97.6  F (36.4  C) (Tympanic)  Wt 137 lb (62.1 kg)  SpO2 97%  BMI 23.89 kg/m2  Body mass index is 23.89 kg/(m^2).  GENERAL APPEARANCE: healthy, alert and no distress  EYES: Eyes grossly normal to inspection, PERRL and conjunctivae and sclerae normal  HENT: ear canals and TM's normal and nose and mouth without ulcers or lesions. Voice is hoarse.   NECK: no adenopathy, no asymmetry, masses, or scars and thyroid normal to palpation  RESP: lungs clear to auscultation - no rales, rhonchi or wheezes  CV: regular rates and rhythm, normal S1 S2, no S3 or S4 and no murmur, click or rub  LYMPHATICS: normal ant/post cervical and supraclavicular nodes  ABDOMEN: soft, nontender, without hepatosplenomegaly or masses and bowel sounds normal  MS: extremities normal- no gross deformities noted  SKIN: no suspicious lesions or rashes  NEURO: Normal strength and tone, mentation intact and speech normal  PSYCH: she is tired and  fatigued.     Diagnostic test results:  Diagnostic Test Results:  Results for orders placed or performed in visit on 10/11/17 (from the past 24 hour(s))   CBC with platelets and differential   Result Value Ref Range    WBC 6.8 4.0 - 11.0 10e9/L    RBC Count 4.26 3.8 - 5.2 10e12/L    Hemoglobin 12.8 11.7 - 15.7 g/dL    Hematocrit 37.8 35.0 - 47.0 %    MCV 89 78 - 100 fl    MCH 30.0 26.5 - 33.0 pg    MCHC 33.9 31.5 - 36.5 g/dL    RDW 13.1 10.0 - 15.0 %    Platelet Count 327 150 - 450 10e9/L    Diff Method Automated Method     % Neutrophils 65.9 %    % Lymphocytes 25.9 %    % Monocytes 5.4 %    % Eosinophils 1.6 %    % Basophils 0.6 %    % Immature Granulocytes 0.6 %    Nucleated RBCs 0 0 /100    Absolute Neutrophil 4.5 1.6 - 8.3 10e9/L    Absolute Lymphocytes 1.8 0.8 - 5.3 10e9/L    Absolute Monocytes 0.4 0.0 - 1.3 10e9/L    Absolute Eosinophils 0.1 0.0 - 0.7 10e9/L    Absolute Basophils 0.0 0.0 - 0.2 10e9/L    Abs Immature Granulocytes 0.0 0 - 0.4 10e9/L    Absolute Nucleated RBC 0.0    CRP, inflammation   Result Value Ref Range    CRP Inflammation 7.1 0.0 - 8.0 mg/L   CK total   Result Value Ref Range    CK Total 198 30 - 225 U/L          ASSESSMENT/PLAN:                                                        1. Community acquired pneumonia of left lower lobe of lung (H)  Her labs are better, her breathing is better, her coughing is still there.  No fevers.  Recheck again in 2 weeks.  Still has some fluid in left lung.    - XR CHEST 2 VW (Clinic Performed); Future  - CBC with platelets and differential  - CRP, inflammation  - albuterol (PROAIR HFA/PROVENTIL HFA/VENTOLIN HFA) 108 (90 BASE) MCG/ACT Inhaler; Inhale 2 puffs into the lungs every 6 hours as needed for shortness of breath / dyspnea or wheezing  Dispense: 1 Inhaler; Refill: 0    2. Elevated CK  Improved on medications.  We are going to look at her left shoulder still quite painful.     - CK total  - XR SHOULDER LEFT G/E 2 VIEWS (Clinic Performed)    See  Patient Instructions    Mirna Roblero, PA  [facility]

## 2017-10-11 NOTE — PATIENT INSTRUCTIONS
Thank you for choosing [facility].   I have office hours 8:00 am to 4:30 pm on Monday's, Wednesday's, Thursday's and Friday's. My nurse and I are out of the office every Tuesday.    Following your visit, when your labs and diagnostic testing have returned, I will review then and you will be contacted by my nurse.  If you are on My Chart, you can also view results there.    For refills, notify your pharmacy regarding what you need and the pharmacy will generate a refill request. Do not call my nurse as she is unable to process refill request. Please plan ahead and allow 3-5 days for refill requests.    You will generally receive a reminder call the day prior to your appointment.  If you cannot attend your appointment, please cancel your appointment with as much notice as possible.  If there is a pattern of failure to present for your appointments, I cannot provide consistent, meaningful, ongoing care for you. It is very important to me that you come in for your care, so we can best assist you with your health care needs.    IMPORTANT:  Please note that it is my standard of practice to NOT participate in prescribing ongoing requested Narcotic Analgesic therapy, and/or participate in the prescribing of other controlled substances.  My nurse and I am happy to assist you with the process of referral for alternative pain management as needed, and other treatment modalities including but not limited to:  Physical Therapy, Physical Medicine and Rehab, Counseling, Chiropractic Care, Orthopedic Care, and non-narcotic medication management.     In the event that you need to be seen for emergent concerns and I am out of office,  please see one of my colleagues for acute concerns.  You may also present to  or ER.  I appreciate the opportunity to serve you and look forward to supporting your healthcare needs in the future. Please contact me with any questions or concerns that you may  have.    Sincerely,      Mirna Roblero RN, PA-C

## 2017-10-12 ASSESSMENT — ANXIETY QUESTIONNAIRES: GAD7 TOTAL SCORE: 0

## 2017-10-25 ENCOUNTER — RADIANT APPOINTMENT (OUTPATIENT)
Dept: GENERAL RADIOLOGY | Facility: OTHER | Age: 62
End: 2017-10-25
Attending: PHYSICIAN ASSISTANT
Payer: COMMERCIAL

## 2017-10-25 ENCOUNTER — OFFICE VISIT (OUTPATIENT)
Dept: FAMILY MEDICINE | Facility: OTHER | Age: 62
End: 2017-10-25
Attending: PHYSICIAN ASSISTANT
Payer: COMMERCIAL

## 2017-10-25 VITALS
TEMPERATURE: 97.9 F | HEART RATE: 50 BPM | BODY MASS INDEX: 23.89 KG/M2 | DIASTOLIC BLOOD PRESSURE: 78 MMHG | SYSTOLIC BLOOD PRESSURE: 118 MMHG | OXYGEN SATURATION: 97 % | WEIGHT: 137 LBS

## 2017-10-25 DIAGNOSIS — J18.9 PNEUMONIA OF LEFT LOWER LOBE DUE TO INFECTIOUS ORGANISM: ICD-10-CM

## 2017-10-25 DIAGNOSIS — E78.5 HYPERLIPIDEMIA WITH TARGET LDL LESS THAN 130: ICD-10-CM

## 2017-10-25 DIAGNOSIS — J18.9 PNEUMONIA OF LEFT LOWER LOBE DUE TO INFECTIOUS ORGANISM: Primary | ICD-10-CM

## 2017-10-25 PROCEDURE — 71020 XR CHEST 2 VW: CPT | Mod: TC

## 2017-10-25 PROCEDURE — 99213 OFFICE O/P EST LOW 20 MIN: CPT | Performed by: PHYSICIAN ASSISTANT

## 2017-10-25 RX ORDER — ROSUVASTATIN CALCIUM 10 MG/1
10 TABLET, COATED ORAL DAILY
Qty: 30 TABLET | Refills: 1 | Status: SHIPPED | OUTPATIENT
Start: 2017-10-25 | End: 2018-01-11

## 2017-10-25 ASSESSMENT — ANXIETY QUESTIONNAIRES
7. FEELING AFRAID AS IF SOMETHING AWFUL MIGHT HAPPEN: NOT AT ALL
6. BECOMING EASILY ANNOYED OR IRRITABLE: NOT AT ALL
4. TROUBLE RELAXING: NOT AT ALL
5. BEING SO RESTLESS THAT IT IS HARD TO SIT STILL: NOT AT ALL
1. FEELING NERVOUS, ANXIOUS, OR ON EDGE: NOT AT ALL
GAD7 TOTAL SCORE: 0
3. WORRYING TOO MUCH ABOUT DIFFERENT THINGS: NOT AT ALL
2. NOT BEING ABLE TO STOP OR CONTROL WORRYING: NOT AT ALL

## 2017-10-25 ASSESSMENT — PAIN SCALES - GENERAL: PAINLEVEL: NO PAIN (1)

## 2017-10-25 ASSESSMENT — PATIENT HEALTH QUESTIONNAIRE - PHQ9: SUM OF ALL RESPONSES TO PHQ QUESTIONS 1-9: 0

## 2017-10-25 NOTE — PROGRESS NOTES
SUBJECTIVE:   Denise Taylor is a 62 year old female who presents to clinic today for the following health issues:        RESPIRATORY SYMPTOMS      Duration: 1 week follow up pneumonia     Description  cough and mild tightness in chest     Severity: mild    Accompanying signs and symptoms: None    History (predisposing factors):  Pneumonia     Precipitating or alleviating factors: None    Therapies tried and outcome:  rest and fluids albuterol inhaler and finished Levaquin             Problem list and histories reviewed & adjusted, as indicated.  Additional history: as documented    Patient Active Problem List   Diagnosis     History of basal cell carcinoma     Hyperlipidemia with target LDL less than 130     Pneumonia     ACP (advance care planning)     Hypothyroidism, unspecified type     Past Surgical History:   Procedure Laterality Date     COLONOSCOPY  2006    Dr Lara     COLONOSCOPY N/A 10/10/2016    Procedure: COLONOSCOPY;  Surgeon: Christine Cintron MD;  Location: [facility]     SHOULDER SURGERY Right 2011/10/2012       Social History   Substance Use Topics     Smoking status: Never Smoker     Smokeless tobacco: Never Used     Alcohol use 1.0 oz/week     2 Glasses of wine per week      Comment: occasionally     Family History   Problem Relation Age of Onset     Myocardial Infarction Mother      Hypertension Mother      Stomach Cancer Father          Current Outpatient Prescriptions   Medication Sig Dispense Refill     albuterol (PROAIR HFA/PROVENTIL HFA/VENTOLIN HFA) 108 (90 BASE) MCG/ACT Inhaler Inhale 2 puffs into the lungs every 6 hours as needed for shortness of breath / dyspnea or wheezing 1 Inhaler 0     levothyroxine (SYNTHROID/LEVOTHROID) 75 MCG tablet Take 1 tablet (75 mcg) by mouth daily 90 tablet 3     ASPIRIN PO Take 81 mg by mouth daily       Eletriptan Hydrobromide (RELPAX PO) Take 40 mg by mouth once as needed for migraine       FLUoxetine HCl, PMDD, (SARAFEM) 10 MG TABS Take 10 mg by  mouth daily       Allergies   Allergen Reactions     Crestor [Rosuvastatin] Muscle Pain (Myalgia)     Zocor [Simvastatin] Muscle Pain (Myalgia)     Penicillins Rash     Zithromax [Azithromycin] Rash     BP Readings from Last 3 Encounters:   10/25/17 118/78   10/11/17 114/82   10/03/17 132/86    Wt Readings from Last 3 Encounters:   10/25/17 137 lb (62.1 kg)   10/11/17 137 lb (62.1 kg)   10/03/17 135 lb (61.2 kg)                        Reviewed and updated as needed this visit by clinical staffTobacco  Allergies  Meds       Reviewed and updated as needed this visit by Provider         ROS:  Constitutional, neuro, ENT, endocrine, pulmonary, cardiac, gastrointestinal, genitourinary, musculoskeletal, integument and psychiatric systems are negative, except as otherwise noted.      OBJECTIVE:                                                    /78 (BP Location: Left arm, Patient Position: Chair, Cuff Size: Adult Regular)  Pulse 50  Temp 97.9  F (36.6  C) (Tympanic)  Wt 137 lb (62.1 kg)  SpO2 97%  Breastfeeding? No  BMI 23.89 kg/m2  Body mass index is 23.89 kg/(m^2).  GENERAL APPEARANCE: healthy, alert and no distress  EYES: Eyes grossly normal to inspection, PERRL and conjunctivae and sclerae normal  HENT: ear canals and TM's normal and nose and mouth without ulcers or lesions  NECK: no adenopathy, no asymmetry, masses, or scars and thyroid normal to palpation  RESP: lungs clear to auscultation - no rales, rhonchi or wheezes  CV: regular rates and rhythm, normal S1 S2, no S3 or S4 and no murmur, click or rub  LYMPHATICS: normal ant/post cervical and supraclavicular nodes  ABDOMEN: soft, nontender, without hepatosplenomegaly or masses and bowel sounds normal  MS: extremities normal- no gross deformities noted  SKIN: no suspicious lesions or rashes  NEURO: Normal strength and tone, mentation intact and speech normal  PSYCH: mentation appears normal and affect normal/bright    Diagnostic test  results:  Diagnostic Test Results:  No results found for this or any previous visit (from the past 24 hour(s)).       ASSESSMENT/PLAN:                                                    1. Pneumonia of left lower lobe due to infectious organism (H)  CK is better.  Her aches and pains are not better.  Will have her see Rheumatology in Jan.  Knows there is something wrong here.   - XR CHEST 2 VW (Clinic Performed); Future      2. Hyperlipidemia with target LDL less than 130  She is going to be given Crestor back.   - rosuvastatin (CRESTOR) 10 MG tablet; Take 1 tablet (10 mg) by mouth daily  Dispense: 30 tablet; Refill: 1      See Patient Instructions    DASH Vital  [facility] [place]

## 2017-10-25 NOTE — NURSING NOTE
"Chief Complaint   Patient presents with     URI     pnuemonia follow up        Initial /78 (BP Location: Left arm, Patient Position: Chair, Cuff Size: Adult Regular)  Pulse 50  Temp 97.9  F (36.6  C) (Tympanic)  Wt 137 lb (62.1 kg)  SpO2 97%  Breastfeeding? No  BMI 23.89 kg/m2 Estimated body mass index is 23.89 kg/(m^2) as calculated from the following:    Height as of 10/3/17: 5' 3.5\" (1.613 m).    Weight as of this encounter: 137 lb (62.1 kg).  Medication Reconciliation: complete   Marah Henry CMA(AAMA)   "

## 2017-10-25 NOTE — MR AVS SNAPSHOT
After Visit Summary   10/25/2017    Denise Taylor    MRN: [id number]           Patient Information     Date Of Birth          1955        Visit Information        Provider Department      10/25/2017 11:30 AM Mirna Roblero PA [facility]        Today's Diagnoses     Pneumonia of left lower lobe due to infectious organism (H)    -  1    Hyperlipidemia with target LDL less than 130          Care Instructions      Thank you for choosing [facility].   I have office hours 8:00 am to 4:30 pm on Monday's, Wednesday's, Thursday's and Friday's. My nurse and I are out of the office every Tuesday.    Following your visit, when your labs and diagnostic testing have returned, I will review then and you will be contacted by my nurse.  If you are on My Chart, you can also view results there.    For refills, notify your pharmacy regarding what you need and the pharmacy will generate a refill request. Do not call my nurse as she is unable to process refill request. Please plan ahead and allow 3-5 days for refill requests.    You will generally receive a reminder call the day prior to your appointment.  If you cannot attend your appointment, please cancel your appointment with as much notice as possible.  If there is a pattern of failure to present for your appointments, I cannot provide consistent, meaningful, ongoing care for you. It is very important to me that you come in for your care, so we can best assist you with your health care needs.    IMPORTANT:  Please note that it is my standard of practice to NOT participate in prescribing ongoing requested Narcotic Analgesic therapy, and/or participate in the prescribing of other controlled substances.  My nurse and I am happy to assist you with the process of referral for alternative pain management as needed, and other treatment modalities including but not limited to:  Physical Therapy, Physical Medicine and Rehab, Counseling,  Chiropractic Care, Orthopedic Care, and non-narcotic medication management.     In the event that you need to be seen for emergent concerns and I am out of office,  please see one of my colleagues for acute concerns.  You may also present to UC or ER.  I appreciate the opportunity to serve you and look forward to supporting your healthcare needs in the future. Please contact me with any questions or concerns that you may have.    Sincerely,      Mirna Roblero RN, PA-C               Follow-ups after your visit        Who to contact     If you have questions or need follow up information about today's clinic visit or your schedule please contact [facility] directly at [phone].  Normal or non-critical lab and imaging results will be communicated to you by MyChart, letter or phone within 4 business days after the clinic has received the results. If you do not hear from us within 7 days, please contact the clinic through MyChart or phone. If you have a critical or abnormal lab result, we will notify you by phone as soon as possible.  Submit refill requests through [employer] or call your pharmacy and they will forward the refill request to us. Please allow 3 business days for your refill to be completed.          Additional Information About Your Visit        Care EveryWhere ID     This is your Care EveryWhere ID. This could be used by other organizations to access your [place] medical records  [id number]        Your Vitals Were     Pulse Temperature Pulse Oximetry Breastfeeding? BMI (Body Mass Index)       50 97.9  F (36.6  C) (Tympanic) 97% No 23.89 kg/m2        Blood Pressure from Last 3 Encounters:   10/25/17 118/78   10/11/17 114/82   10/03/17 132/86    Weight from Last 3 Encounters:   10/25/17 137 lb (62.1 kg)   10/11/17 137 lb (62.1 kg)   10/03/17 135 lb (61.2 kg)                 Today's Medication Changes          These changes are accurate as of: 10/25/17 12:12 PM.  If you have any questions,  ask your nurse or doctor.               Start taking these medicines.        Dose/Directions    rosuvastatin 10 MG tablet   Commonly known as:  CRESTOR   Used for:  Hyperlipidemia with target LDL less than 130   Started by:  Mirna Roblero PA        Dose:  10 mg   Take 1 tablet (10 mg) by mouth daily   Quantity:  30 tablet   Refills:  1            Where to get your medicines      [id number] - [place], [place] - [address] AT [facility] [address], [facility] [address]     Phone:  [phone]     rosuvastatin 10 MG tablet                Primary Care Provider Office Phone # Fax #    DASH Angelo [phone]       [facility] [address]  [facility] [address]        Equal Access to Services     GIGI KYLE : Hadii aad ku hadasho Soomaali, waaxda luqadaha, qaybta kaalmada adeegyada, waxay karlin hayaan maryam trevino . So [facility] [phone].    ATENCIÓN: Si habla español, tiene a mayo disposición servicios gratuitos de asistencia lingüística. [facility] [phone].    We comply with applicable federal civil rights laws and [place] laws. We do not discriminate on the basis of race, color, national origin, age, disability, sex, sexual orientation, or gender identity.            Thank you!     Thank you for choosing [facility]  for your care. Our goal is always to provide you with excellent care. Hearing back from our patients is one way we can continue to improve our services. Please take a few minutes to complete the written survey that you may receive in the mail after your visit with us. Thank you!             Your Updated Medication List - Protect others around you: Learn how to safely use, store and throw away your medicines at www.disposemymeds.org.          This list is accurate as of: 10/25/17 12:12 PM.  Always use your most recent med list.                   Brand Name Dispense  Instructions for use Diagnosis    albuterol 108 (90 BASE) MCG/ACT Inhaler    PROAIR HFA/PROVENTIL HFA/VENTOLIN HFA    1 Inhaler    Inhale 2 puffs into the lungs every 6 hours as needed for shortness of breath / dyspnea or wheezing    Community acquired pneumonia of left lower lobe of lung (H)       ASPIRIN PO      Take 81 mg by mouth daily        levothyroxine 75 MCG tablet    SYNTHROID/LEVOTHROID    90 tablet    Take 1 tablet (75 mcg) by mouth daily    Hypothyroidism, unspecified type       RELPAX PO      Take 40 mg by mouth once as needed for migraine        rosuvastatin 10 MG tablet    CRESTOR    30 tablet    Take 1 tablet (10 mg) by mouth daily    Hyperlipidemia with target LDL less than 130       SARAFEM 10 MG Tabs   Generic drug:  FLUoxetine HCl (PMDD)      Take 10 mg by mouth daily

## 2017-10-25 NOTE — PATIENT INSTRUCTIONS
Thank you for choosing [facility].   I have office hours 8:00 am to 4:30 pm on Monday's, Wednesday's, Thursday's and Friday's. My nurse and I are out of the office every Tuesday.    Following your visit, when your labs and diagnostic testing have returned, I will review then and you will be contacted by my nurse.  If you are on My Chart, you can also view results there.    For refills, notify your pharmacy regarding what you need and the pharmacy will generate a refill request. Do not call my nurse as she is unable to process refill request. Please plan ahead and allow 3-5 days for refill requests.    You will generally receive a reminder call the day prior to your appointment.  If you cannot attend your appointment, please cancel your appointment with as much notice as possible.  If there is a pattern of failure to present for your appointments, I cannot provide consistent, meaningful, ongoing care for you. It is very important to me that you come in for your care, so we can best assist you with your health care needs.    IMPORTANT:  Please note that it is my standard of practice to NOT participate in prescribing ongoing requested Narcotic Analgesic therapy, and/or participate in the prescribing of other controlled substances.  My nurse and I am happy to assist you with the process of referral for alternative pain management as needed, and other treatment modalities including but not limited to:  Physical Therapy, Physical Medicine and Rehab, Counseling, Chiropractic Care, Orthopedic Care, and non-narcotic medication management.     In the event that you need to be seen for emergent concerns and I am out of office,  please see one of my colleagues for acute concerns.  You may also present to  or ER.  I appreciate the opportunity to serve you and look forward to supporting your healthcare needs in the future. Please contact me with any questions or concerns that you may  have.    Sincerely,      Mirna Roblero RN, PA-C

## 2017-10-26 ASSESSMENT — ANXIETY QUESTIONNAIRES: GAD7 TOTAL SCORE: 0

## 2017-10-30 ENCOUNTER — HOSPITAL ENCOUNTER (OUTPATIENT)
Dept: CT IMAGING | Facility: HOSPITAL | Age: 62
Discharge: HOME | End: 2017-10-30
Attending: PHYSICIAN ASSISTANT | Admitting: PHYSICIAN ASSISTANT
Payer: COMMERCIAL

## 2017-10-30 ENCOUNTER — TELEPHONE (OUTPATIENT)
Dept: FAMILY MEDICINE | Facility: OTHER | Age: 62
End: 2017-10-30

## 2017-10-30 DIAGNOSIS — J18.9 PNEUMONIA OF LEFT LOWER LOBE DUE TO INFECTIOUS ORGANISM: ICD-10-CM

## 2017-10-30 PROCEDURE — 71260 CT THORAX DX C+: CPT | Mod: TC

## 2017-10-30 PROCEDURE — 25000128 H RX IP 250 OP 636: Performed by: RADIOLOGY

## 2017-10-30 RX ORDER — IOPAMIDOL 612 MG/ML
100 INJECTION, SOLUTION INTRAVASCULAR ONCE
Status: COMPLETED | OUTPATIENT
Start: 2017-10-30 | End: 2017-10-30

## 2017-10-30 RX ADMIN — IOPAMIDOL 100 ML: 612 INJECTION, SOLUTION INTRAVENOUS at 15:22

## 2017-10-30 NOTE — TELEPHONE ENCOUNTER
8:03 AM    Reason for Call: Phone Call    Description: Pt called and states that she would like a call back regarding why she has a cat scan for today she does not know why she has this schedule.     Was an appointment offered for this call? No  If yes : Appointment type              Date    Preferred method for responding to this message: Telephone Call  What is your phone number ?    If we cannot reach you directly, may we leave a detailed response at the number you provided? Yes    Can this message wait until your PCP/provider returns, if available today? Not applicable,     Yudi Hines

## 2017-11-22 ENCOUNTER — TELEPHONE (OUTPATIENT)
Dept: FAMILY MEDICINE | Facility: OTHER | Age: 62
End: 2017-11-22

## 2017-11-22 DIAGNOSIS — M25.522 PAIN IN JOINT INVOLVING UPPER ARM, LEFT: Primary | ICD-10-CM

## 2017-11-22 NOTE — TELEPHONE ENCOUNTER
Patient was called backed and patient stated she that see if Mirna Roblero would schedule an MRI for left arm  If it is appropriate due to arm really bothering the patient.  Patient states pain rating in the left arm today is 7/10 and patient is not taking any medication for the arm pain. Patient does have an appointment with a rheumatologist  1/8/18.  Marah Henry CMA([facility])

## 2017-11-22 NOTE — TELEPHONE ENCOUNTER
To: Mirna Roblero nurse  Please call patient back to discuss MRI on her left arm.  Phone is [phone].  Thank you

## 2017-11-28 ENCOUNTER — HOSPITAL ENCOUNTER (OUTPATIENT)
Dept: MRI IMAGING | Facility: HOSPITAL | Age: 62
Discharge: HOME | End: 2017-11-28
Attending: PHYSICIAN ASSISTANT | Admitting: PHYSICIAN ASSISTANT
Payer: COMMERCIAL

## 2017-11-28 DIAGNOSIS — M25.522 PAIN IN JOINT INVOLVING UPPER ARM, LEFT: ICD-10-CM

## 2017-11-28 PROCEDURE — 73221 MRI JOINT UPR EXTREM W/O DYE: CPT | Mod: TC,LT

## 2017-11-30 ENCOUNTER — TELEPHONE (OUTPATIENT)
Dept: FAMILY MEDICINE | Facility: OTHER | Age: 62
End: 2017-11-30

## 2017-11-30 DIAGNOSIS — M77.8 SHOULDER TENDONITIS, LEFT: Primary | ICD-10-CM

## 2017-11-30 NOTE — TELEPHONE ENCOUNTER
Patient needs to see orthopedics for shoulder tendonitis and muscle tearing. Would like to see someone local, Will send referral to one of our providers.  Nancy Israel LPN

## 2017-12-15 ENCOUNTER — TELEPHONE (OUTPATIENT)
Dept: ORTHOPEDICS | Facility: OTHER | Age: 62
End: 2017-12-15

## 2017-12-15 DIAGNOSIS — M25.512 PAIN IN JOINT OF LEFT SHOULDER: Primary | ICD-10-CM

## 2017-12-18 ENCOUNTER — OFFICE VISIT (OUTPATIENT)
Dept: FAMILY MEDICINE | Facility: OTHER | Age: 62
End: 2017-12-18
Attending: FAMILY MEDICINE
Payer: COMMERCIAL

## 2017-12-18 ENCOUNTER — OFFICE VISIT (OUTPATIENT)
Dept: ORTHOPEDICS | Facility: OTHER | Age: 62
End: 2017-12-18
Attending: ORTHOPAEDIC SURGERY
Payer: COMMERCIAL

## 2017-12-18 VITALS
OXYGEN SATURATION: 98 % | HEIGHT: 64 IN | HEART RATE: 66 BPM | WEIGHT: 137 LBS | TEMPERATURE: 98.3 F | SYSTOLIC BLOOD PRESSURE: 126 MMHG | BODY MASS INDEX: 23.39 KG/M2 | DIASTOLIC BLOOD PRESSURE: 70 MMHG

## 2017-12-18 VITALS
HEART RATE: 66 BPM | SYSTOLIC BLOOD PRESSURE: 126 MMHG | DIASTOLIC BLOOD PRESSURE: 70 MMHG | TEMPERATURE: 98.3 F | RESPIRATION RATE: 16 BRPM | HEIGHT: 64 IN | OXYGEN SATURATION: 98 % | WEIGHT: 137 LBS | BODY MASS INDEX: 23.39 KG/M2

## 2017-12-18 DIAGNOSIS — M77.8 SHOULDER TENDONITIS, LEFT: ICD-10-CM

## 2017-12-18 DIAGNOSIS — J20.9 ACUTE BRONCHITIS WITH SYMPTOMS > 10 DAYS: Primary | ICD-10-CM

## 2017-12-18 PROCEDURE — 99203 OFFICE O/P NEW LOW 30 MIN: CPT | Performed by: ORTHOPAEDIC SURGERY

## 2017-12-18 PROCEDURE — 99213 OFFICE O/P EST LOW 20 MIN: CPT | Performed by: FAMILY MEDICINE

## 2017-12-18 RX ORDER — DOXYCYCLINE 100 MG/1
100 TABLET ORAL 2 TIMES DAILY
Qty: 28 TABLET | Refills: 0 | Status: SHIPPED | OUTPATIENT
Start: 2017-12-18 | End: 2018-01-01

## 2017-12-18 ASSESSMENT — ANXIETY QUESTIONNAIRES
6. BECOMING EASILY ANNOYED OR IRRITABLE: NOT AT ALL
4. TROUBLE RELAXING: NOT AT ALL
1. FEELING NERVOUS, ANXIOUS, OR ON EDGE: NOT AT ALL
5. BEING SO RESTLESS THAT IT IS HARD TO SIT STILL: NOT AT ALL
3. WORRYING TOO MUCH ABOUT DIFFERENT THINGS: NOT AT ALL
2. NOT BEING ABLE TO STOP OR CONTROL WORRYING: NOT AT ALL
7. FEELING AFRAID AS IF SOMETHING AWFUL MIGHT HAPPEN: NOT AT ALL
GAD7 TOTAL SCORE: 0

## 2017-12-18 ASSESSMENT — PAIN SCALES - GENERAL
PAINLEVEL: NO PAIN (0)
PAINLEVEL: NO PAIN (0)

## 2017-12-18 ASSESSMENT — PATIENT HEALTH QUESTIONNAIRE - PHQ9: SUM OF ALL RESPONSES TO PHQ QUESTIONS 1-9: 2

## 2017-12-18 NOTE — MR AVS SNAPSHOT
"              After Visit Summary   2017    Denise Taylor    MRN: [id number]           Patient Information     Date Of Birth          1955        Visit Information        Provider Department      2017 10:00 AM Alma Loyd MD [facility]        Today's Diagnoses     Acute bronchitis with symptoms > 10 days    -  1      Care Instructions    Complete antibiotic course.  Symptomatic cares - fluids, rest, OTC remedies.  Follow up as needed.          Follow-ups after your visit        Who to contact     If you have questions or need follow up information about today's clinic visit or your schedule please contact [facility] directly at [phone].  Normal or non-critical lab and imaging results will be communicated to you by MyChart, letter or phone within 4 business days after the clinic has received the results. If you do not hear from us within 7 days, please contact the clinic through MyChart or phone. If you have a critical or abnormal lab result, we will notify you by phone as soon as possible.  Submit refill requests through e[employer]dy or call your pharmacy and they will forward the refill request to us. Please allow 3 business days for your refill to be completed.          Additional Information About Your Visit        MyChart Information     eBuddy lets you send messages to your doctor, view your test results, renew your prescriptions, schedule appointments and more. To sign up, go to [email or web address] . Click on \"Log in\" on the left side of the screen, which will take you to the Welcome page. Then click on \"Sign up Now\" on the right side of the page.     You will be asked to enter the access code listed below, as well as some personal information. Please follow the directions to create your username and password.     Your access code is: [email or web address]  Expires: 3/18/2018 11:04 AM     Your access code will  in 90 days. If you need help or a new " "code, please call your [facility] clinic or [phone].        Care EveryWhere ID     This is your Care EveryWhere ID. This could be used by other organizations to access your [facility] medical records  [id number]        Your Vitals Were     Pulse Temperature Respirations Height Pulse Oximetry BMI (Body Mass Index)    66 98.3  F (36.8  C) 16 5' 3.5\" (1.613 m) 98% 23.89 kg/m2       Blood Pressure from Last 3 Encounters:   12/18/17 126/70   12/18/17 126/70   10/25/17 118/78    Weight from Last 3 Encounters:   12/18/17 137 lb (62.1 kg)   12/18/17 137 lb (62.1 kg)   10/25/17 137 lb (62.1 kg)              Today, you had the following     No orders found for display         Today's Medication Changes          These changes are accurate as of: 12/18/17 12:24 PM.  If you have any questions, ask your nurse or doctor.               Start taking these medicines.        Dose/Directions    doxycycline Monohydrate 100 MG Tabs   Used for:  Acute bronchitis with symptoms > 10 days   Started by:  Alma Loyd MD        Dose:  100 mg   Take 100 mg by mouth 2 times daily for 14 days   Quantity:  28 tablet   Refills:  0         Stop taking these medicines if you haven't already. Please contact your care team if you have questions.     albuterol 108 (90 BASE) MCG/ACT Inhaler   Commonly known as:  PROAIR HFA/PROVENTIL HFA/VENTOLIN HFA   Stopped by:  Alma Loyd MD                Where to get your medicines      These medications were sent to [facility] - [place], [place] - [address] AT [facility] [address]  [address], [place] [address]     Phone:  [phone]     doxycycline Monohydrate 100 MG Tabs                Primary Care Provider Office Phone # Fax #    DASH Angelo [phone]       [facility] [address]  [place] [address]        Equal Access to Services     [facility] SAROJ AH: Hadii florencia hamilton hadasho Soomaali, waaxda luqadaha, qaybta kaalmada " arabella gossjasmina chiokavin zelayaaan ah. So [facility] [phone].    ATENCIÓN: Si frederickla hector, tiene a mayo disposición servicios gratuitos de asistencia lingüística. Deepthi al [phone].    We comply with applicable federal civil rights laws and [place] laws. We do not discriminate on the basis of race, color, national origin, age, disability, sex, sexual orientation, or gender identity.            Thank you!     Thank you for choosing [facility]  for your care. Our goal is always to provide you with excellent care. Hearing back from our patients is one way we can continue to improve our services. Please take a few minutes to complete the written survey that you may receive in the mail after your visit with us. Thank you!             Your Updated Medication List - Protect others around you: Learn how to safely use, store and throw away your medicines at www.disposemymeds.org.          This list is accurate as of: 12/18/17 12:24 PM.  Always use your most recent med list.                   Brand Name Dispense Instructions for use Diagnosis    ASPIRIN PO      Take 81 mg by mouth daily        doxycycline Monohydrate 100 MG Tabs     28 tablet    Take 100 mg by mouth 2 times daily for 14 days    Acute bronchitis with symptoms > 10 days       levothyroxine 75 MCG tablet    SYNTHROID/LEVOTHROID    90 tablet    Take 1 tablet (75 mcg) by mouth daily    Hypothyroidism, unspecified type       RELPAX PO      Take 40 mg by mouth once as needed for migraine        rosuvastatin 10 MG tablet    CRESTOR    30 tablet    Take 1 tablet (10 mg) by mouth daily    Hyperlipidemia with target LDL less than 130       SARAFEM 10 MG Tabs   Generic drug:  FLUoxetine HCl (PMDD)      Take 10 mg by mouth daily

## 2017-12-18 NOTE — PROGRESS NOTES
SUBJECTIVE: 62 year old female complaining of cough, congestion for 2 week(s).   The patient describes fatigue, malaise.   The patient denies a history of fever.   Smoking history: No.   Relevant past medical history: positive for Pneumonia in 10/2017, treated with Levaquin 10/3/17.  Labs done that day, negative, but no xray.  Did also have stomach flu for 48 hours over weekend, vomiting, diarrhea, which has resolved.    Current Outpatient Prescriptions   Medication     doxycycline Monohydrate 100 MG TABS     rosuvastatin (CRESTOR) 10 MG tablet     levothyroxine (SYNTHROID/LEVOTHROID) 75 MCG tablet     ASPIRIN PO     Eletriptan Hydrobromide (RELPAX PO)     FLUoxetine HCl, PMDD, (SARAFEM) 10 MG TABS     No current facility-administered medications for this visit.         Allergies   Allergen Reactions     Crestor [Rosuvastatin] Muscle Pain (Myalgia)     Zocor [Simvastatin] Muscle Pain (Myalgia)     Penicillins Rash     Zithromax [Azithromycin] Rash     Past Medical History:   Diagnosis Date     Pneumonia      Social History     Social History     Marital status:      Spouse name: N/A     Number of children: N/A     Years of education: N/A     Occupational History     Not on file.     Social History Main Topics     Smoking status: Never Smoker     Smokeless tobacco: Never Used     Alcohol use 1.0 oz/week     2 Glasses of wine per week      Comment: occasionally     Drug use: No     Sexual activity: Not on file     Other Topics Concern     Not on file     Social History Narrative       OBJECTIVE: The patient appears alert and no distress.   EARS: negative, External ears normal. Canals clear. TM's normal.  NOSE/SINUS: Nares normal. Septum midline. Mucosa normal. No drainage or sinus tenderness.   THROAT: mild erythema and post nasal drainage   NECK:Neck supple. No adenopathy. Thyroid symmetric, normal size  CHEST: Clear to auscultation  CV: regular rate, rhythm    ASSESSMENT:   (J20.9) Acute bronchitis with  symptoms > 10 days  (primary encounter diagnosis)  Comment: discussed potential viral nature still; patient prefers to proceed with antibiotics  Plan: doxycycline Monohydrate 100 MG TABS         PLAN:   Patient Instructions   Complete antibiotic course.  Symptomatic cares - fluids, rest, OTC remedies.  Follow up as needed.

## 2017-12-18 NOTE — NURSING NOTE
"Chief Complaint   Patient presents with     Consult     Left shoulder pain-MRI done 11/28/17, xrays done, also.-LISA Roblero is referring       Initial /70  Pulse 66  Temp 98.3  F (36.8  C) (Tympanic)  Ht 5' 3.5\" (1.613 m)  Wt 137 lb (62.1 kg)  SpO2 98%  BMI 23.89 kg/m2 Estimated body mass index is 23.89 kg/(m^2) as calculated from the following:    Height as of this encounter: 5' 3.5\" (1.613 m).    Weight as of this encounter: 137 lb (62.1 kg).  Medication Reconciliation: complete   RAVINDER MOODY      "

## 2017-12-18 NOTE — NURSING NOTE
"Chief Complaint   Patient presents with     URI     Had Pneumonia in October.  Feels run down, congested, dry cough.  Patient reports that she has had this cold for two weeks now and wants to catch anything before it turns into something else.       Initial /70  Pulse 66  Temp 98.3  F (36.8  C)  Resp 16  Ht 5' 3.5\" (1.613 m)  Wt 137 lb (62.1 kg)  SpO2 98%  BMI 23.89 kg/m2 Estimated body mass index is 23.89 kg/(m^2) as calculated from the following:    Height as of this encounter: 5' 3.5\" (1.613 m).    Weight as of this encounter: 137 lb (62.1 kg).  Medication Reconciliation: complete     Tashia Arroyo      "

## 2017-12-18 NOTE — PROGRESS NOTES
Patient presents today with a 7 or 8 month history of increasing pain and dysfunction in her left shoulder.  This began insidiously, there was no prior injury, she has no prior history of any similar condition in the left shoulder.  She does have a history of having had a acromioclavicular joint reconstruction that was done several years ago on her right shoulder, she is still not satisfied with results of that procedure.  Her symptoms in her left shoulder or night pain, difficulty with anything that requires lifting the arm over the head and any pushing and pulling such as pushing her arm through her coat vacuuming etc.  She has tried stretching, resting, anti-inflammatory medication avoidance of aggravating activity all without any improvement.     Past medical history: She's had recent bout of upper respiratory infections, knees were very hard for her to recover from.  She was just placed on antibiotics today for a cough, cough is productive and causes some chest discomfort, and a general feeling of malaise.  She denies having fever or chills etc.  Otherwise her past medical history is significant for hypothyroidism hyperlipidemia    Family history: Noncontributory    Allergies Zithromax, penicillins, Zocor and Crestor    Review of systems: General: No fever chills or constitutional symptoms    HEENT: Normal    Chest: Upper respiratory tract infection with cough    Cardiovascular: Normal    GI: Normal    : Normal    Neurologic: Normal.    Musculoskeletal: Refer to present complaints    Physical exam: Patient is an alert, healthy-appearing female in no obvious distress.    HEENT: Cranial nerves II through XII intact, full range of motion cervical spine, midline trachea.    Chest: Normal symmetry and excursion.    Cardiovascular: Regular rate and rhythm, normal capillary refill.    Abdomen: Appears normal    Neurologic: Normal gait and balance    A skilled skeletal: Examination of the right shoulder demonstrates  "a well-healed scar over the right acromioclavicular joint with tenderness over the distal clavicle, sutures from the previous surgery are palpable below the skin.  She has a full range of motion of the right shoulder, she has slight weakness to external rotation, internal rotation and forward flexion.  Examination of the left shoulder demonstrates tenderness over the acromioclavicular joint, tenderness over the coracoid process and tenderness over the long head of the biceps.  Patient has slight weakness to external rotation and slight weakness to forward flexion graded as grade 4+.  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  We discussed treatment options for this and should like to proceed with surgical intervention but unfortunately must first recover from her upper respiratory tract infections and be clear for approximately 30 days to be safe with anesthesia.  She is going to complete her antibiotics, be checked out by her primary care provider to make certain she's safer surgery and follow-up promptly for repeat evaluation and schedule a procedure.  In the meantime gentle range of motion, anti-inflammatory medications for discomfort, and avoiding aggravating activity will help relieve her symptoms somewhat./70  Pulse 66  Temp 98.3  F (36.8  C) (Tympanic)  Ht 5' 3.5\" (1.613 m)  Wt 137 lb (62.1 kg)  SpO2 98%  BMI 23.89 kg/m2  "

## 2017-12-18 NOTE — MR AVS SNAPSHOT
"              After Visit Summary   2017    Denise Taylor    MRN: [id number]           Patient Information     Date Of Birth          1955        Visit Information        Provider Department      2017 10:40 AM Baldev Lou, DO  ORTHOPEDICS        Today's Diagnoses     Shoulder tendonitis, left           Follow-ups after your visit        Who to contact     If you have questions or need follow up information about today's clinic visit or your schedule please contact  ORTHOPEDICS directly at [phone].  Normal or non-critical lab and imaging results will be communicated to you by MyChart, letter or phone within 4 business days after the clinic has received the results. If you do not hear from us within 7 days, please contact the clinic through Street[facility]ark[facility] or phone. If you have a critical or abnormal lab result, we will notify you by phone as soon as possible.  Submit refill requests through Samanage or call your pharmacy and they will forward the refill request to us. Please allow 3 business days for your refill to be completed.          Additional Information About Your Visit        MyChart Information     Samanage lets you send messages to your doctor, view your test results, renew your prescriptions, schedule appointments and more. To sign up, go to [email or web address] . Click on \"Log in\" on the left side of the screen, which will take you to the Welcome page. Then click on \"Sign up Now\" on the right side of the page.     You will be asked to enter the access code listed below, as well as some personal information. Please follow the directions to create your username and password.     Your access code is: [email or web address]  Expires: 3/18/2018 11:04 AM     Your access code will  in 90 days. If you need help or a new code, please call your [facility] or [phone].        Care EveryWhere ID     This is your Care EveryWhere ID. This could be used by other organizations to access " "your [facility] medical records  [id number]        Your Vitals Were     Pulse Temperature Height Pulse Oximetry BMI (Body Mass Index)       66 98.3  F (36.8  C) (Tympanic) 5' 3.5\" (1.613 m) 98% 23.89 kg/m2        Blood Pressure from Last 3 Encounters:   12/18/17 126/70   12/18/17 126/70   10/25/17 118/78    Weight from Last 3 Encounters:   12/18/17 137 lb (62.1 kg)   12/18/17 137 lb (62.1 kg)   10/25/17 137 lb (62.1 kg)              Today, you had the following     No orders found for display         Today's Medication Changes          These changes are accurate as of: 12/18/17 11:04 AM.  If you have any questions, ask your nurse or doctor.               Start taking these medicines.        Dose/Directions    doxycycline Monohydrate 100 MG Tabs   Used for:  Acute bronchitis with symptoms > 10 days   Started by:  Alma Loyd MD        Dose:  100 mg   Take 100 mg by mouth 2 times daily for 14 days   Quantity:  28 tablet   Refills:  0         Stop taking these medicines if you haven't already. Please contact your care team if you have questions.     albuterol 108 (90 BASE) MCG/ACT Inhaler   Commonly known as:  PROAIR HFA/PROVENTIL HFA/VENTOLIN HFA   Stopped by:  Alma Loyd MD                Where to get your medicines      These medications were sent to [id number]  [place], [place] - [address] AT [facility] [address]  [address], [facility] [address]     Phone:  [phone]     doxycycline Monohydrate 100 MG Tabs                Primary Care Provider Office Phone # Fax #    DASH Angelo [phone]       [facility] [address] [facility]  [place] [address]        Equal Access to Services     HERVE KYLE AH: Snehal dimas Soanoop, waaxda luqadaha, qaybta kaalmada adeegyada, arabella scott. [facility] [phone].    ATENCIÓN: Si habla español, tiene a mayo disposición servicios gratuitos de asistencia lingüística. " Deepthi elizondo [phone].    We comply with applicable federal civil rights laws and [place] laws. We do not discriminate on the basis of race, color, national origin, age, disability, sex, sexual orientation, or gender identity.            Thank you!     Thank you for choosing  ORTHOPEDICS  for your care. Our goal is always to provide you with excellent care. Hearing back from our patients is one way we can continue to improve our services. Please take a few minutes to complete the written survey that you may receive in the mail after your visit with us. Thank you!             Your Updated Medication List - Protect others around you: Learn how to safely use, store and throw away your medicines at www.disposemymeds.org.          This list is accurate as of: 12/18/17 11:04 AM.  Always use your most recent med list.                   Brand Name Dispense Instructions for use Diagnosis    ASPIRIN PO      Take 81 mg by mouth daily        doxycycline Monohydrate 100 MG Tabs     28 tablet    Take 100 mg by mouth 2 times daily for 14 days    Acute bronchitis with symptoms > 10 days       levothyroxine 75 MCG tablet    SYNTHROID/LEVOTHROID    90 tablet    Take 1 tablet (75 mcg) by mouth daily    Hypothyroidism, unspecified type       RELPAX PO      Take 40 mg by mouth once as needed for migraine        rosuvastatin 10 MG tablet    CRESTOR    30 tablet    Take 1 tablet (10 mg) by mouth daily    Hyperlipidemia with target LDL less than 130       SARAFEM 10 MG Tabs   Generic drug:  FLUoxetine HCl (PMDD)      Take 10 mg by mouth daily

## 2017-12-18 NOTE — PATIENT INSTRUCTIONS
Complete antibiotic course.  Symptomatic cares - fluids, rest, OTC remedies.  Follow up as needed.

## 2017-12-19 ASSESSMENT — ANXIETY QUESTIONNAIRES: GAD7 TOTAL SCORE: 0

## 2018-01-10 ENCOUNTER — TELEPHONE (OUTPATIENT)
Dept: FAMILY MEDICINE | Facility: OTHER | Age: 63
End: 2018-01-10

## 2018-01-10 NOTE — TELEPHONE ENCOUNTER
Patient was contacted and notified provider schedule full. Patient was offered to e transferred to scheduling to see if another provider has opening or if continues to get worse to go to the ER/Urgent care. Patient stated she would call tomorrow to make appointment with another provider.  Marah Henry CMA([facility])

## 2018-01-10 NOTE — TELEPHONE ENCOUNTER
2:50 PM    Reason for Call: OVERBOOK    Patient is having the following symptoms: URI on and off for 1 months.    The patient is requesting an appointment for 1-11-18 with Mirna Roblero.    Was an appointment offered for this call? Yes, spouse would like her to be seen on 1-11-18  If yes : Appointment type              Date    Preferred method for responding to this message: Telephone Call  What is your phone number ? [phone]    If we cannot reach you directly, may we leave a detailed response at the number you provided? Yes    Can this message wait until your PCP/provider returns, if unavailable today? YES    Opal Jacobs

## 2018-01-11 ENCOUNTER — OFFICE VISIT (OUTPATIENT)
Dept: FAMILY MEDICINE | Facility: OTHER | Age: 63
End: 2018-01-11
Attending: PHYSICIAN ASSISTANT
Payer: COMMERCIAL

## 2018-01-11 ENCOUNTER — RADIANT APPOINTMENT (OUTPATIENT)
Dept: GENERAL RADIOLOGY | Facility: OTHER | Age: 63
End: 2018-01-11
Attending: PHYSICIAN ASSISTANT
Payer: COMMERCIAL

## 2018-01-11 VITALS
SYSTOLIC BLOOD PRESSURE: 118 MMHG | WEIGHT: 138 LBS | HEART RATE: 64 BPM | OXYGEN SATURATION: 98 % | DIASTOLIC BLOOD PRESSURE: 70 MMHG | TEMPERATURE: 99.9 F | BODY MASS INDEX: 24.06 KG/M2 | RESPIRATION RATE: 20 BRPM

## 2018-01-11 DIAGNOSIS — R50.81 FEVER IN OTHER DISEASES: ICD-10-CM

## 2018-01-11 DIAGNOSIS — E78.5 HYPERLIPIDEMIA WITH TARGET LDL LESS THAN 130: ICD-10-CM

## 2018-01-11 DIAGNOSIS — J01.01 ACUTE RECURRENT MAXILLARY SINUSITIS: ICD-10-CM

## 2018-01-11 DIAGNOSIS — R50.81 FEVER IN OTHER DISEASES: Primary | ICD-10-CM

## 2018-01-11 DIAGNOSIS — J11.1 INFLUENZA-LIKE ILLNESS: ICD-10-CM

## 2018-01-11 LAB
BASOPHILS # BLD AUTO: 0 10E9/L (ref 0–0.2)
BASOPHILS NFR BLD AUTO: 0.6 %
CRP SERPL-MCNC: 36 MG/L (ref 0–8)
DIFFERENTIAL METHOD BLD: NORMAL
EOSINOPHIL # BLD AUTO: 0.3 10E9/L (ref 0–0.7)
EOSINOPHIL NFR BLD AUTO: 4.7 %
ERYTHROCYTE [DISTWIDTH] IN BLOOD BY AUTOMATED COUNT: 12.4 % (ref 10–15)
FLUAV+FLUBV AG SPEC QL: NEGATIVE
FLUAV+FLUBV AG SPEC QL: NEGATIVE
HCT VFR BLD AUTO: 38.8 % (ref 35–47)
HGB BLD-MCNC: 13.4 G/DL (ref 11.7–15.7)
IMM GRANULOCYTES # BLD: 0 10E9/L (ref 0–0.4)
IMM GRANULOCYTES NFR BLD: 0.3 %
LYMPHOCYTES # BLD AUTO: 1.3 10E9/L (ref 0.8–5.3)
LYMPHOCYTES NFR BLD AUTO: 18.4 %
MCH RBC QN AUTO: 30.3 PG (ref 26.5–33)
MCHC RBC AUTO-ENTMCNC: 34.5 G/DL (ref 31.5–36.5)
MCV RBC AUTO: 88 FL (ref 78–100)
MONOCYTES # BLD AUTO: 0.5 10E9/L (ref 0–1.3)
MONOCYTES NFR BLD AUTO: 7.5 %
NEUTROPHILS # BLD AUTO: 4.8 10E9/L (ref 1.6–8.3)
NEUTROPHILS NFR BLD AUTO: 68.5 %
NRBC # BLD AUTO: 0 10*3/UL
NRBC BLD AUTO-RTO: 0 /100
PLATELET # BLD AUTO: 183 10E9/L (ref 150–450)
RBC # BLD AUTO: 4.42 10E12/L (ref 3.8–5.2)
SPECIMEN SOURCE: NORMAL
WBC # BLD AUTO: 7 10E9/L (ref 4–11)

## 2018-01-11 PROCEDURE — 85025 COMPLETE CBC W/AUTO DIFF WBC: CPT | Performed by: PHYSICIAN ASSISTANT

## 2018-01-11 PROCEDURE — 71046 X-RAY EXAM CHEST 2 VIEWS: CPT | Mod: TC

## 2018-01-11 PROCEDURE — 99214 OFFICE O/P EST MOD 30 MIN: CPT | Performed by: PHYSICIAN ASSISTANT

## 2018-01-11 PROCEDURE — 87804 INFLUENZA ASSAY W/OPTIC: CPT | Mod: 59 | Performed by: PHYSICIAN ASSISTANT

## 2018-01-11 PROCEDURE — 86140 C-REACTIVE PROTEIN: CPT | Performed by: PHYSICIAN ASSISTANT

## 2018-01-11 PROCEDURE — 36415 COLL VENOUS BLD VENIPUNCTURE: CPT | Performed by: PHYSICIAN ASSISTANT

## 2018-01-11 RX ORDER — CEFPROZIL 500 MG/1
500 TABLET, FILM COATED ORAL 2 TIMES DAILY
Qty: 20 TABLET | Refills: 0 | Status: SHIPPED | OUTPATIENT
Start: 2018-01-11 | End: 2018-02-26

## 2018-01-11 RX ORDER — ROSUVASTATIN CALCIUM 10 MG/1
10 TABLET, COATED ORAL DAILY
Qty: 30 TABLET | Refills: 1 | Status: SHIPPED | OUTPATIENT
Start: 2018-01-11 | End: 2018-03-21

## 2018-01-11 ASSESSMENT — ANXIETY QUESTIONNAIRES
5. BEING SO RESTLESS THAT IT IS HARD TO SIT STILL: NOT AT ALL
1. FEELING NERVOUS, ANXIOUS, OR ON EDGE: NOT AT ALL
3. WORRYING TOO MUCH ABOUT DIFFERENT THINGS: NOT AT ALL
2. NOT BEING ABLE TO STOP OR CONTROL WORRYING: NOT AT ALL
GAD7 TOTAL SCORE: 0
7. FEELING AFRAID AS IF SOMETHING AWFUL MIGHT HAPPEN: NOT AT ALL
6. BECOMING EASILY ANNOYED OR IRRITABLE: NOT AT ALL

## 2018-01-11 ASSESSMENT — PATIENT HEALTH QUESTIONNAIRE - PHQ9
SUM OF ALL RESPONSES TO PHQ QUESTIONS 1-9: 0
5. POOR APPETITE OR OVEREATING: NOT AT ALL

## 2018-01-11 ASSESSMENT — PAIN SCALES - GENERAL: PAINLEVEL: NO PAIN (0)

## 2018-01-11 NOTE — MR AVS SNAPSHOT
After Visit Summary   1/11/2018    Denise Taylor    MRN: [id number]           Patient Information     Date Of Birth          1955        Visit Information        Provider Department      1/11/2018 11:00 AM Mirna Roblero PA [facility]        Today's Diagnoses     Fever in other diseases    -  1    Influenza-like illness        Acute recurrent maxillary sinusitis          Care Instructions      Thank you for choosing [facility].   I have office hours 8:00 am to 4:30 pm on Monday's, Wednesday's, Thursday's and Friday's. My nurse and I are out of the office every Tuesday.    Following your visit, when your labs and diagnostic testing have returned, I will review then and you will be contacted by my nurse.  If you are on My Chart, you can also view results there.    For refills, notify your pharmacy regarding what you need and the pharmacy will generate a refill request. Do not call my nurse as she is unable to process refill request. Please plan ahead and allow 3-5 days for refill requests.    You will generally receive a reminder call the day prior to your appointment.  If you cannot attend your appointment, please cancel your appointment with as much notice as possible.  If there is a pattern of failure to present for your appointments, I cannot provide consistent, meaningful, ongoing care for you. It is very important to me that you come in for your care, so we can best assist you with your health care needs.    IMPORTANT:  Please note that it is my standard of practice to NOT participate in prescribing ongoing requested Narcotic Analgesic therapy, and/or participate in the prescribing of other controlled substances.  My nurse and I am happy to assist you with the process of referral for alternative pain management as needed, and other treatment modalities including but not limited to:  Physical Therapy, Physical Medicine and Rehab, Counseling, Chiropractic  "Care, Orthopedic Care, and non-narcotic medication management.     In the event that you need to be seen for emergent concerns and I am out of office,  please see one of my colleagues for acute concerns.  You may also present to UC or ER.  I appreciate the opportunity to serve you and look forward to supporting your healthcare needs in the future. Please contact me with any questions or concerns that you may have.    Sincerely,      Mirna Roblero RN, PA-C               Follow-ups after your visit        Who to contact     If you have questions or need follow up information about today's clinic visit or your schedule please contact [facility] directly at [phone].  Normal or non-critical lab and imaging results will be communicated to you by MyChart, letter or phone within 4 business days after the clinic has received the results. If you do not hear from us within 7 days, please contact the clinic through MyChart or phone. If you have a critical or abnormal lab result, we will notify you by phone as soon as possible.  Submit refill requests through MDSave or call your pharmacy and they will forward the refill request to us. Please allow 3 business days for your refill to be completed.          Additional Information About Your Visit        MDSave Information     MDSave lets you send messages to your doctor, view your test results, renew your prescriptions, schedule appointments and more. To sign up, go to [email or web address] . Click on \"Log in\" on the left side of the screen, which will take you to the Welcome page. Then click on \"Sign up Now\" on the right side of the page.     You will be asked to enter the access code listed below, as well as some personal information. Please follow the directions to create your username and password.     Your access code is: [email or web address]  Expires: 3/18/2018 11:04 AM     Your access code will  in 90 days. If you need help or a new code, please call " your [facility] clinic or [phone].        Care EveryWhere ID     This is your Care EveryWhere ID. This could be used by other organizations to access your [facility] medical records  [id number]        Your Vitals Were     Pulse Temperature Respirations Pulse Oximetry BMI (Body Mass Index)       64 99.9  F (37.7  C) (Tympanic) 20 98% 24.06 kg/m2        Blood Pressure from Last 3 Encounters:   01/11/18 118/70   12/18/17 126/70   12/18/17 126/70    Weight from Last 3 Encounters:   01/11/18 138 lb (62.6 kg)   12/18/17 137 lb (62.1 kg)   12/18/17 137 lb (62.1 kg)              We Performed the Following     CBC with platelets and differential     CRP, inflammation     Influenza A/B antigen          Today's Medication Changes          These changes are accurate as of: 1/11/18  1:08 PM.  If you have any questions, ask your nurse or doctor.               Start taking these medicines.        Dose/Directions    cefPROZIL 500 MG tablet   Commonly known as:  CEFZIL   Used for:  Acute recurrent maxillary sinusitis   Started by:  Mirna Roblero PA        Dose:  500 mg   Take 1 tablet (500 mg) by mouth 2 times daily   Quantity:  20 tablet   Refills:  0            Where to get your medicines      These medications were sent to [id number]  [place] - [address] AT [facility] [address]  [address], [place] [address]     Phone:  [phone]     cefPROZIL 500 MG tablet                Primary Care Provider Office Phone # Fax #    DASH Angelo [phone]       [facility] [address]  [place] [address]        Equal Access to Services     HERVE KYLE AH: Hadii florencia dimas Soanoop, waaxda luqadaha, qaybta kaalmada adeegyada, arabella scott. [facility] [phone].    ATENCIÓN: Si habla español, tiene a mayo disposición servicios gratuitos de asistencia lingüística. Llame al [phone].    We comply with applicable federal civil  rights laws and [place] laws. We do not discriminate on the basis of race, color, national origin, age, disability, sex, sexual orientation, or gender identity.            Thank you!     Thank you for choosing [facility]  for your care. Our goal is always to provide you with excellent care. Hearing back from our patients is one way we can continue to improve our services. Please take a few minutes to complete the written survey that you may receive in the mail after your visit with us. Thank you!             Your Updated Medication List - Protect others around you: Learn how to safely use, store and throw away your medicines at www.disposemymeds.org.          This list is accurate as of: 1/11/18  1:08 PM.  Always use your most recent med list.                   Brand Name Dispense Instructions for use Diagnosis    ASPIRIN PO      Take 81 mg by mouth daily        cefPROZIL 500 MG tablet    CEFZIL    20 tablet    Take 1 tablet (500 mg) by mouth 2 times daily    Acute recurrent maxillary sinusitis       levothyroxine 75 MCG tablet    SYNTHROID/LEVOTHROID    90 tablet    Take 1 tablet (75 mcg) by mouth daily    Hypothyroidism, unspecified type       RELPAX PO      Take 40 mg by mouth once as needed for migraine        rosuvastatin 10 MG tablet    CRESTOR    30 tablet    Take 1 tablet (10 mg) by mouth daily    Hyperlipidemia with target LDL less than 130       SARAFEM 10 MG Tabs   Generic drug:  FLUoxetine HCl (PMDD)      Take 10 mg by mouth daily

## 2018-01-11 NOTE — PROGRESS NOTES
SUBJECTIVE:   Denise Taylor is a 62 year old female who presents to clinic today for the following health issues:      RESPIRATORY SYMPTOMS      Duration: started in October. This bout started sunday    Description  nasal congestion, cough, fever, chills, headache and fatigue/malaise    Severity: moderate    Accompanying signs and symptoms: None    History (predisposing factors):  Just finished 14 days of Abx and now sick again.  Had pneumonia in October    Precipitating or alleviating factors: none    Therapies tried and outcome:  Mucinex, Ibu helped a little            Problem list and histories reviewed & adjusted, as indicated.  Additional history: as documented    Patient Active Problem List   Diagnosis     History of basal cell carcinoma     Hyperlipidemia with target LDL less than 130     Pneumonia     ACP (advance care planning)     Hypothyroidism, unspecified type     Past Surgical History:   Procedure Laterality Date     COLONOSCOPY  2006    Dr Lara     COLONOSCOPY N/A 10/10/2016    Procedure: COLONOSCOPY;  Surgeon: Christine Cintron MD;  Location: [facility] OR     SHOULDER SURGERY Right 2011/10/2012       Social History   Substance Use Topics     Smoking status: Never Smoker     Smokeless tobacco: Never Used     Alcohol use 1.0 oz/week     2 Glasses of wine per week      Comment: occasionally     Family History   Problem Relation Age of Onset     Myocardial Infarction Mother      Hypertension Mother      Stomach Cancer Father          Current Outpatient Prescriptions   Medication Sig Dispense Refill     rosuvastatin (CRESTOR) 10 MG tablet Take 1 tablet (10 mg) by mouth daily 30 tablet 1     levothyroxine (SYNTHROID/LEVOTHROID) 75 MCG tablet Take 1 tablet (75 mcg) by mouth daily 90 tablet 3     ASPIRIN PO Take 81 mg by mouth daily       Eletriptan Hydrobromide (RELPAX PO) Take 40 mg by mouth once as needed for migraine       FLUoxetine HCl, PMDD, (SARAFEM) 10 MG TABS Take 10 mg by mouth daily        Allergies   Allergen Reactions     Penicillins Rash     Zithromax [Azithromycin] Rash     Recent Labs   Lab Test  09/21/17   1100  07/27/17   1413  07/27/16   1038  07/17/15   1106   LDL   --   61  66  82   HDL   --   66  66  60   TRIG   --   82  99  104   ALT  39  41   --    --    CR  0.80   --    --    --    GFRESTIMATED  73   --    --    --    GFRESTBLACK  88   --    --    --    POTASSIUM  3.9   --    --    --    TSH   --   1.08  2.77  1.32      BP Readings from Last 3 Encounters:   01/11/18 118/70   12/18/17 126/70   12/18/17 126/70    Wt Readings from Last 3 Encounters:   01/11/18 138 lb (62.6 kg)   12/18/17 137 lb (62.1 kg)   12/18/17 137 lb (62.1 kg)                          Reviewed and updated as needed this visit by clinical staff     Reviewed and updated as needed this visit by Provider         ROS:  Constitutional, neuro, ENT, endocrine, pulmonary, cardiac, gastrointestinal, genitourinary, musculoskeletal, integument and psychiatric systems are negative, except as otherwise noted.      OBJECTIVE:                                                    /70 (BP Location: Left arm, Patient Position: Sitting, Cuff Size: Adult Regular)  Pulse 64  Temp 99.9  F (37.7  C) (Tympanic)  Resp 20  Wt 138 lb (62.6 kg)  SpO2 98%  BMI 24.06 kg/m2  Body mass index is 24.06 kg/(m^2).  GENERAL APPEARANCE: alert, moderate distress, pale and fatigued  EYES: Eyes grossly normal to inspection, PERRL and conjunctivae and sclerae normal  HENT: ear canals and TM's normal and nose and mouth without ulcers or lesions  NECK: no adenopathy, no asymmetry, masses, or scars and thyroid normal to palpation  RESP: lungs clear to auscultation - no rales, rhonchi or wheezes  CV: regular rates and rhythm, normal S1 S2, no S3 or S4 and no murmur, click or rub  LYMPHATICS: normal ant/post cervical and supraclavicular nodes  ABDOMEN: soft, nontender, without hepatosplenomegaly or masses and bowel sounds normal  MS: extremities  normal- no gross deformities noted  SKIN: no suspicious lesions or rashes  NEURO: Normal strength and tone, mentation intact and speech normal  PSYCH: mentation appears normal and affect sullen is sick feeling.     Diagnostic test results:  Diagnostic Test Results:  Results for orders placed or performed in visit on 01/11/18 (from the past 24 hour(s))   Influenza A/B antigen   Result Value Ref Range    Influenza A/B Agn Specimen Nares     Influenza A Negative NEG^Negative    Influenza B Negative NEG^Negative   CBC with platelets and differential   Result Value Ref Range    WBC 7.0 4.0 - 11.0 10e9/L    RBC Count 4.42 3.8 - 5.2 10e12/L    Hemoglobin 13.4 11.7 - 15.7 g/dL    Hematocrit 38.8 35.0 - 47.0 %    MCV 88 78 - 100 fl    MCH 30.3 26.5 - 33.0 pg    MCHC 34.5 31.5 - 36.5 g/dL    RDW 12.4 10.0 - 15.0 %    Platelet Count 183 150 - 450 10e9/L    Diff Method Automated Method     % Neutrophils 68.5 %    % Lymphocytes 18.4 %    % Monocytes 7.5 %    % Eosinophils 4.7 %    % Basophils 0.6 %    % Immature Granulocytes 0.3 %    Nucleated RBCs 0 0 /100    Absolute Neutrophil 4.8 1.6 - 8.3 10e9/L    Absolute Lymphocytes 1.3 0.8 - 5.3 10e9/L    Absolute Monocytes 0.5 0.0 - 1.3 10e9/L    Absolute Eosinophils 0.3 0.0 - 0.7 10e9/L    Absolute Basophils 0.0 0.0 - 0.2 10e9/L    Abs Immature Granulocytes 0.0 0 - 0.4 10e9/L    Absolute Nucleated RBC 0.0    CRP, inflammation   Result Value Ref Range    CRP Inflammation 36.0 (H) 0.0 - 8.0 mg/L     Results for orders placed or performed in visit on 01/11/18   CBC with platelets and differential   Result Value Ref Range    WBC 7.0 4.0 - 11.0 10e9/L    RBC Count 4.42 3.8 - 5.2 10e12/L    Hemoglobin 13.4 11.7 - 15.7 g/dL    Hematocrit 38.8 35.0 - 47.0 %    MCV 88 78 - 100 fl    MCH 30.3 26.5 - 33.0 pg    MCHC 34.5 31.5 - 36.5 g/dL    RDW 12.4 10.0 - 15.0 %    Platelet Count 183 150 - 450 10e9/L    Diff Method Automated Method     % Neutrophils 68.5 %    % Lymphocytes 18.4 %    % Monocytes  7.5 %    % Eosinophils 4.7 %    % Basophils 0.6 %    % Immature Granulocytes 0.3 %    Nucleated RBCs 0 0 /100    Absolute Neutrophil 4.8 1.6 - 8.3 10e9/L    Absolute Lymphocytes 1.3 0.8 - 5.3 10e9/L    Absolute Monocytes 0.5 0.0 - 1.3 10e9/L    Absolute Eosinophils 0.3 0.0 - 0.7 10e9/L    Absolute Basophils 0.0 0.0 - 0.2 10e9/L    Abs Immature Granulocytes 0.0 0 - 0.4 10e9/L    Absolute Nucleated RBC 0.0    CRP, inflammation   Result Value Ref Range    CRP Inflammation 36.0 (H) 0.0 - 8.0 mg/L   Influenza A/B antigen   Result Value Ref Range    Influenza A/B Agn Specimen Nares     Influenza A Negative NEG^Negative    Influenza B Negative NEG^Negative          ASSESSMENT/PLAN:                                                    1. Fever in other disease  She is sick again.  Had pneumonia this spring feels like it is back.  Appears ill.  Negative CXR and showing elevated CRP but nothing to cooberate this. Watch for worsening sx. Return in one week if not better.   - Influenza A/B antigen  - CBC with platelets and differential  - CRP, inflammation  - XR CHEST 2 VW (Clinic Performed); Future    2. Influenza-like illness  All testing negative. Home and rest.     3. Acute recurrent maxillary sinusitis  Bloody nasal drainage and facial pain.   Do not believe it is why she has a high fever.   - cefPROZIL (CEFZIL) 500 MG tablet; Take 1 tablet (500 mg) by mouth 2 times daily  Dispense: 20 tablet; Refill: 0

## 2018-01-11 NOTE — PATIENT INSTRUCTIONS
Thank you for choosing [facility].   I have office hours 8:00 am to 4:30 pm on Monday's, Wednesday's, Thursday's and Friday's. My nurse and I are out of the office every Tuesday.    Following your visit, when your labs and diagnostic testing have returned, I will review then and you will be contacted by my nurse.  If you are on My Chart, you can also view results there.    For refills, notify your pharmacy regarding what you need and the pharmacy will generate a refill request. Do not call my nurse as she is unable to process refill request. Please plan ahead and allow 3-5 days for refill requests.    You will generally receive a reminder call the day prior to your appointment.  If you cannot attend your appointment, please cancel your appointment with as much notice as possible.  If there is a pattern of failure to present for your appointments, I cannot provide consistent, meaningful, ongoing care for you. It is very important to me that you come in for your care, so we can best assist you with your health care needs.    IMPORTANT:  Please note that it is my standard of practice to NOT participate in prescribing ongoing requested Narcotic Analgesic therapy, and/or participate in the prescribing of other controlled substances.  My nurse and I am happy to assist you with the process of referral for alternative pain management as needed, and other treatment modalities including but not limited to:  Physical Therapy, Physical Medicine and Rehab, Counseling, Chiropractic Care, Orthopedic Care, and non-narcotic medication management.     In the event that you need to be seen for emergent concerns and I am out of office,  please see one of my colleagues for acute concerns.  You may also present to  or ER.  I appreciate the opportunity to serve you and look forward to supporting your healthcare needs in the future. Please contact me with any questions or concerns that you may  have.    Sincerely,      Mirna Roblero RN, PA-C

## 2018-01-11 NOTE — NURSING NOTE
"Chief Complaint   Patient presents with     URI       Initial /70 (BP Location: Left arm, Patient Position: Sitting, Cuff Size: Adult Regular)  Pulse 64  Temp 99.9  F (37.7  C) (Tympanic)  Resp 20  Wt 138 lb (62.6 kg)  SpO2 98%  BMI 24.06 kg/m2 Estimated body mass index is 24.06 kg/(m^2) as calculated from the following:    Height as of 12/18/17: 5' 3.5\" (1.613 m).    Weight as of this encounter: 138 lb (62.6 kg).  Medication Reconciliation: complete   Nancy Israel LPN    "

## 2018-01-12 ASSESSMENT — ANXIETY QUESTIONNAIRES: GAD7 TOTAL SCORE: 0

## 2018-02-26 ENCOUNTER — OFFICE VISIT (OUTPATIENT)
Dept: FAMILY MEDICINE | Facility: OTHER | Age: 63
End: 2018-02-26
Attending: PHYSICIAN ASSISTANT
Payer: COMMERCIAL

## 2018-02-26 VITALS
WEIGHT: 141 LBS | SYSTOLIC BLOOD PRESSURE: 126 MMHG | OXYGEN SATURATION: 98 % | TEMPERATURE: 97.8 F | DIASTOLIC BLOOD PRESSURE: 84 MMHG | BODY MASS INDEX: 24.59 KG/M2 | HEART RATE: 51 BPM

## 2018-02-26 DIAGNOSIS — Z01.818 PREOP GENERAL PHYSICAL EXAM: Primary | ICD-10-CM

## 2018-02-26 LAB
BASOPHILS # BLD AUTO: 0.1 10E9/L (ref 0–0.2)
BASOPHILS NFR BLD AUTO: 1 %
DIFFERENTIAL METHOD BLD: NORMAL
EOSINOPHIL # BLD AUTO: 0.2 10E9/L (ref 0–0.7)
EOSINOPHIL NFR BLD AUTO: 3.3 %
ERYTHROCYTE [DISTWIDTH] IN BLOOD BY AUTOMATED COUNT: 13.2 % (ref 10–15)
HCT VFR BLD AUTO: 40 % (ref 35–47)
HGB BLD-MCNC: 13.4 G/DL (ref 11.7–15.7)
IMM GRANULOCYTES # BLD: 0 10E9/L (ref 0–0.4)
IMM GRANULOCYTES NFR BLD: 0 %
LYMPHOCYTES # BLD AUTO: 1.7 10E9/L (ref 0.8–5.3)
LYMPHOCYTES NFR BLD AUTO: 32.7 %
MCH RBC QN AUTO: 29.5 PG (ref 26.5–33)
MCHC RBC AUTO-ENTMCNC: 33.5 G/DL (ref 31.5–36.5)
MCV RBC AUTO: 88 FL (ref 78–100)
MONOCYTES # BLD AUTO: 0.5 10E9/L (ref 0–1.3)
MONOCYTES NFR BLD AUTO: 8.6 %
NEUTROPHILS # BLD AUTO: 2.9 10E9/L (ref 1.6–8.3)
NEUTROPHILS NFR BLD AUTO: 54.4 %
NRBC # BLD AUTO: 0 10*3/UL
NRBC BLD AUTO-RTO: 0 /100
PLATELET # BLD AUTO: 150 10E9/L (ref 150–450)
RBC # BLD AUTO: 4.54 10E12/L (ref 3.8–5.2)
WBC # BLD AUTO: 5.2 10E9/L (ref 4–11)

## 2018-02-26 PROCEDURE — 93000 ELECTROCARDIOGRAM COMPLETE: CPT | Performed by: INTERNAL MEDICINE

## 2018-02-26 PROCEDURE — 85025 COMPLETE CBC W/AUTO DIFF WBC: CPT | Performed by: PHYSICIAN ASSISTANT

## 2018-02-26 PROCEDURE — 36415 COLL VENOUS BLD VENIPUNCTURE: CPT | Performed by: PHYSICIAN ASSISTANT

## 2018-02-26 PROCEDURE — 99214 OFFICE O/P EST MOD 30 MIN: CPT | Mod: 25 | Performed by: PHYSICIAN ASSISTANT

## 2018-02-26 ASSESSMENT — ANXIETY QUESTIONNAIRES
3. WORRYING TOO MUCH ABOUT DIFFERENT THINGS: NOT AT ALL
7. FEELING AFRAID AS IF SOMETHING AWFUL MIGHT HAPPEN: NOT AT ALL
6. BECOMING EASILY ANNOYED OR IRRITABLE: NOT AT ALL
1. FEELING NERVOUS, ANXIOUS, OR ON EDGE: NOT AT ALL
5. BEING SO RESTLESS THAT IT IS HARD TO SIT STILL: NOT AT ALL
2. NOT BEING ABLE TO STOP OR CONTROL WORRYING: NOT AT ALL
4. TROUBLE RELAXING: NOT AT ALL
GAD7 TOTAL SCORE: 0

## 2018-02-26 ASSESSMENT — PAIN SCALES - GENERAL: PAINLEVEL: NO PAIN (0)

## 2018-02-26 NOTE — NURSING NOTE
"Chief Complaint   Patient presents with     Pre-Op Exam     left shoulder       Initial /84  Pulse 51  Temp 97.8  F (36.6  C) (Tympanic)  Wt 141 lb (64 kg)  SpO2 98%  BMI 24.59 kg/m2 Estimated body mass index is 24.59 kg/(m^2) as calculated from the following:    Height as of 12/18/17: 5' 3.5\" (1.613 m).    Weight as of this encounter: 141 lb (64 kg).  Medication Reconciliation: complete     Jody Shepard    "

## 2018-02-26 NOTE — MR AVS SNAPSHOT
After Visit Summary   2/26/2018    Denise Taylor    MRN: [id number]           Patient Information     Date Of Birth          1955        Visit Information        Provider Department      2/26/2018 9:00 AM Mirna Roblero PA [facility]        Today's Diagnoses     Preop general physical exam    -  1      Care Instructions      Before Your Surgery      Call your surgeon if there is any change in your health. This includes signs of a cold or flu (such as a sore throat, runny nose, cough, rash or fever).    Do not smoke, drink alcohol or take over the counter medicine (unless your surgeon or primary care doctor tells you to) for the 24 hours before and after surgery.    If you take prescribed drugs: Follow your doctor s orders about which medicines to take and which to stop until after surgery.    Eating and drinking prior to surgery: follow the instructions from your surgeon    Take a shower or bath the night before surgery. Use the soap your surgeon gave you to gently clean your skin. If you do not have soap from your surgeon, use your regular soap. Do not shave or scrub the surgery site.  Wear clean pajamas and have clean sheets on your bed.           Follow-ups after your visit        Your next 10 appointments already scheduled     Feb 27, 2018  9:40 AM CST   (Arrive by 9:25 AM)   Return Visit with Baldev Lou DO    ORTHOPEDICS ([facility] )    [address] [facility] [address] [phone]              Who to contact     If you have questions or need follow up information about today's clinic visit or your schedule please contact [facility] directly at [phone].  Normal or non-critical lab and imaging results will be communicated to you by MyChart, letter or phone within 4 business days after the clinic has received the results. If you do not hear from us within 7 days, please contact the clinic through MyChart or phone.  "If you have a critical or abnormal lab result, we will notify you by phone as soon as possible.  Submit refill requests through [employer] or call your pharmacy and they will forward the refill request to us. Please allow 3 business days for your refill to be completed.          Additional Information About Your Visit        efectivoxhart Information     [employer] lets you send messages to your doctor, view your test results, renew your prescriptions, schedule appointments and more. To sign up, go to [email or web address] . Click on \"Log in\" on the left side of the screen, which will take you to the Welcome page. Then click on \"Sign up Now\" on the right side of the page.     You will be asked to enter the access code listed below, as well as some personal information. Please follow the directions to create your username and password.     Your access code is: [email or web address]  Expires: 3/18/2018 11:04 AM     Your access code will  in 90 days. If you need help or a new code, please call your [place] clinic or [phone].        Care EveryWhere ID     This is your Care EveryWhere ID. This could be used by other organizations to access your [place] medical records  [id number]        Your Vitals Were     Pulse Temperature Pulse Oximetry BMI (Body Mass Index)          51 97.8  F (36.6  C) (Tympanic) 98% 24.59 kg/m2         Blood Pressure from Last 3 Encounters:   18 126/84   18 118/70   17 126/70    Weight from Last 3 Encounters:   18 141 lb (64 kg)   18 138 lb (62.6 kg)   17 137 lb (62.1 kg)              We Performed the Following     CBC with platelets and differential     EKG 12-lead complete w/read - (Clinic Performed)        Primary Care Provider Office Phone # Fax #    DASH Angelo [phone]       [facility] [address]  [facility] [address]        Equal Access to Services     GIGI KYLE AH: gene Mas " yoandy rochecatiajose barnesarabella saleem. So [facility] [phone].    ATENCIÓN: Si chaz young, tiene a mayo disposición servicios gratuitos de asistencia lingüística. Deepthi al [phone].    We comply with applicable federal civil rights laws and [place] laws. We do not discriminate on the basis of race, color, national origin, age, disability, sex, sexual orientation, or gender identity.            Thank you!     Thank you for choosing [facility]  for your care. Our goal is always to provide you with excellent care. Hearing back from our patients is one way we can continue to improve our services. Please take a few minutes to complete the written survey that you may receive in the mail after your visit with us. Thank you!             Your Updated Medication List - Protect others around you: Learn how to safely use, store and throw away your medicines at www.disposemymeds.org.          This list is accurate as of 2/26/18  9:26 AM.  Always use your most recent med list.                   Brand Name Dispense Instructions for use Diagnosis    ASPIRIN PO      Take 81 mg by mouth daily        levothyroxine 75 MCG tablet    SYNTHROID/LEVOTHROID    90 tablet    Take 1 tablet (75 mcg) by mouth daily    Hypothyroidism, unspecified type       RELPAX PO      Take 40 mg by mouth once as needed for migraine        rosuvastatin 10 MG tablet    CRESTOR    30 tablet    Take 1 tablet (10 mg) by mouth daily    Hyperlipidemia with target LDL less than 130       SARAFEM 10 MG Tabs   Generic drug:  FLUoxetine HCl (PMDD)      Take 10 mg by mouth daily

## 2018-02-26 NOTE — PROGRESS NOTES
[facility] [place]  [address]  [place] [address]  [phone]  Dept: [phone]    PRE-OP EVALUATION:  Today's date: 2018    Denise Taylor (: 1955) presents for pre-operative evaluation assessment as requested by Dr. Lou.  She requires evaluation and anesthesia risk assessment prior to undergoing surgery/procedure for treatment of shoulder pain .    Proposed Surgery/ Procedure: shoulder scope  Date of Surgery/ Procedure: TBD  Time of Surgery/ Procedure: TBD  Hospital/Surgical [facility]  Fax number for surgical facility: unknown  Primary Physician: Mirna Roblero  Type of Anesthesia Anticipated: to be determined    Patient has a Health Care Directive or Living Will:  NO    1. NO - Do you have a history of heart attack, stroke, stent, bypass or surgery on an artery in the head, neck, heart or legs?  2. NO - Do you ever have any pain or discomfort in your chest?  3. NO - Do you have a history of  Heart Failure?  4. NO - Are you troubled by shortness of breath when: walking on the level, up a slight hill or at night?  5. NO - Do you currently have a cold, bronchitis or other respiratory infection?  6. NO - Do you have a cough, shortness of breath or wheezing?  7. NO - Do you sometimes get pains in the calves of your legs when you walk?  8. NO - Do you or anyone in your family have previous history of blood clots?  9. NO - Do you or does anyone in your family have a serious bleeding problem such as prolonged bleeding following surgeries or cuts?  10. NO - Have you ever had problems with anemia or been told to take iron pills?  11. NO - Have you had any abnormal blood loss such as black, tarry or bloody stools, or abnormal vaginal bleeding?  12. NO - Have you ever had a blood transfusion?  13. NO - Have you or any of your relatives ever had problems with anesthesia?  14. NO - Do you have sleep apnea, excessive snoring or daytime drowsiness?  15. NO - Do you have any prosthetic  heart valves?  16. NO - Do you have prosthetic joints?  17. NO - Is there any chance that you may be pregnant?      HPI:     HPI related to upcoming procedure: has left rotator cuff tear and impingment.  She will be having laparoscopic repair by Dr. Lou       See problem list for active medical problems.  Problems all longstanding and stable, except as noted/documented.  See ROS for pertinent symptoms related to these conditions.                                                                                                  .    MEDICAL HISTORY:     Patient Active Problem List    Diagnosis Date Noted     ACP (advance care planning) 07/27/2017     Priority: Medium     Advance Care Planning 7/27/2017: ACP Review of Chart / Resources Provided:  Reviewed chart for advance care plan.  Denise MAYELA Taylor has no plan or code status on file. Discussed available resources and provided with information.   Added by Marah Henry             Hypothyroidism, unspecified type 07/27/2017     Priority: Medium     Pneumonia 07/20/2015     Priority: Medium     History of basal cell carcinoma 05/18/2015     Priority: Medium     Hyperlipidemia with target LDL less than 130 05/18/2015     Priority: Medium     Diagnosis updated by automated process. Provider to review and confirm.        Past Medical History:   Diagnosis Date     Pneumonia      Past Surgical History:   Procedure Laterality Date     COLONOSCOPY  2006    Dr Lara     COLONOSCOPY N/A 10/10/2016    Procedure: COLONOSCOPY;  Surgeon: Christine Cintron MD;  Location: [facility] OR     SHOULDER SURGERY Right 2011/10/2012     Current Outpatient Prescriptions   Medication Sig Dispense Refill     rosuvastatin (CRESTOR) 10 MG tablet Take 1 tablet (10 mg) by mouth daily 30 tablet 1     levothyroxine (SYNTHROID/LEVOTHROID) 75 MCG tablet Take 1 tablet (75 mcg) by mouth daily 90 tablet 3     ASPIRIN PO Take 81 mg by mouth daily       Eletriptan Hydrobromide (RELPAX PO) Take 40 mg by  mouth once as needed for migraine       FLUoxetine HCl, PMDD, (SARAFEM) 10 MG TABS Take 10 mg by mouth daily       OTC products: None, except as noted above    Allergies   Allergen Reactions     Penicillins Rash     Zithromax [Azithromycin] Rash      Latex Allergy: NO    Social History   Substance Use Topics     Smoking status: Never Smoker     Smokeless tobacco: Never Used     Alcohol use 1.0 oz/week     2 Glasses of wine per week      Comment: occasionally     History   Drug Use No       REVIEW OF SYSTEMS:   CONSTITUTIONAL: NEGATIVE for fever, chills, change in weight  INTEGUMENTARY/SKIN: NEGATIVE for worrisome rashes, moles or lesions  EYES: NEGATIVE for vision changes or irritation  ENT/MOUTH: NEGATIVE for ear, mouth and throat problems  RESP: NEGATIVE for significant cough or SOB  BREAST: NEGATIVE for masses, tenderness or discharge  CV: NEGATIVE for chest pain, palpitations or peripheral edema  GI: NEGATIVE for nausea, abdominal pain, heartburn, or change in bowel habits  : NEGATIVE for frequency, dysuria, or hematuria  MUSCULOSKELETAL:see hpi   NEURO: NEGATIVE for weakness, dizziness or paresthesias  ENDOCRINE: NEGATIVE for temperature intolerance, skin/hair changes  HEME: NEGATIVE for bleeding problems  PSYCHIATRIC: NEGATIVE for changes in mood or affect    EXAM:   /84  Pulse 51  Temp 97.8  F (36.6  C) (Tympanic)  Wt 141 lb (64 kg)  SpO2 98%  BMI 24.59 kg/m2    GENERAL APPEARANCE: healthy, alert and no distress     EYES: EOMI, PERRL     HENT: ear canals and TM's normal and nose and mouth without ulcers or lesions     NECK: no adenopathy, no asymmetry, masses, or scars and thyroid normal to palpation     RESP: lungs clear to auscultation - no rales, rhonchi or wheezes     CV: regular rates and rhythm, normal S1 S2, no S3 or S4 and no murmur, click or rub     ABDOMEN:  soft, nontender, no HSM or masses and bowel sounds normal     MS: extremities normal- no gross deformities noted, no evidence of  inflammation in joints, FROM in all extremities.     SKIN: no suspicious lesions or rashes     NEURO: Normal strength and tone, sensory exam grossly normal, mentation intact and speech normal     PSYCH: mentation appears normal. and affect normal/bright     LYMPHATICS: No cervical adenopathy    DIAGNOSTICS:     EKG: appears normal, NSR, normal axis, normal intervals, no acute ST/T changes c/w ischemia, no LVH by voltage criteria, unchanged from previous tracings  Labs Resulted Today:   Results for orders placed or performed in visit on 01/11/18   XR CHEST 2 VW (Clinic Performed)    Narrative    PROCEDURE: XR CHEST 2 VW 1/11/2018 12:21 PM    HISTORY: ; Fever in other diseases    COMPARISONS: 10/25/2017.    TECHNIQUE: 2 views.    FINDINGS: Heart is stable in size. No acute infiltrate or effusion is  seen. Lungs are somewhat hyperinflated with flattening of the  diaphragm. Mild degenerative changes seen in the spine.         Impression    IMPRESSION: No acute disease.    SOLEDAD RINCON MD       Recent Labs   Lab Test  01/11/18   1212  10/11/17   1127   09/21/17   1100   HGB  13.4  12.8   < >  14.0   PLT  183  327   < >  168   NA   --    --    --   139   POTASSIUM   --    --    --   3.9   CR   --    --    --   0.80    < > = values in this interval not displayed.        IMPRESSION:   Reason for surgery/procedure: she has Right Rotator Cuff disease and needing repair.  Will be undergoing general anesthesia.   Never reacted int he past to anesthesia.   Diagnosis/reason for consult: medical clearance.       The proposed surgical procedure is considered INTERMEDIATE risk.    REVISED CARDIAC RISK INDEX  The patient has the following serious cardiovascular risks for perioperative complications such as (MI, PE, VFib and 3  AV Block):  No serious cardiac risks  INTERPRETATION: 1 risks: Class II (low risk - 0.9% complication rate)    The patient has the following additional risks for perioperative complications:  No identified  additional risks      ICD-10-CM    1. Preop general physical exam Z01.818      Results for orders placed or performed in visit on 02/26/18 (from the past 24 hour(s))   CBC with platelets and differential   Result Value Ref Range    WBC 5.2 4.0 - 11.0 10e9/L    RBC Count 4.54 3.8 - 5.2 10e12/L    Hemoglobin 13.4 11.7 - 15.7 g/dL    Hematocrit 40.0 35.0 - 47.0 %    MCV 88 78 - 100 fl    MCH 29.5 26.5 - 33.0 pg    MCHC 33.5 31.5 - 36.5 g/dL    RDW 13.2 10.0 - 15.0 %    Platelet Count 150 150 - 450 10e9/L    Diff Method Automated Method     % Neutrophils 54.4 %    % Lymphocytes 32.7 %    % Monocytes 8.6 %    % Eosinophils 3.3 %    % Basophils 1.0 %    % Immature Granulocytes 0.0 %    Nucleated RBCs 0 0 /100    Absolute Neutrophil 2.9 1.6 - 8.3 10e9/L    Absolute Lymphocytes 1.7 0.8 - 5.3 10e9/L    Absolute Monocytes 0.5 0.0 - 1.3 10e9/L    Absolute Eosinophils 0.2 0.0 - 0.7 10e9/L    Absolute Basophils 0.1 0.0 - 0.2 10e9/L    Abs Immature Granulocytes 0.0 0 - 0.4 10e9/L    Absolute Nucleated RBC 0.0          RECOMMENDATIONS:         --Patient is to take all scheduled medications on the day of surgery EXCEPT for modifications listed below.    APPROVAL GIVEN to proceed with proposed procedure, without further diagnostic evaluation       Signed Electronically by: DASH Vital    Copy of this evaluation report is provided to requesting physician.    [place] Preop Guidelines

## 2018-02-27 ENCOUNTER — OFFICE VISIT (OUTPATIENT)
Dept: ORTHOPEDICS | Facility: OTHER | Age: 63
End: 2018-02-27
Attending: ORTHOPAEDIC SURGERY
Payer: COMMERCIAL

## 2018-02-27 VITALS
OXYGEN SATURATION: 98 % | DIASTOLIC BLOOD PRESSURE: 80 MMHG | HEIGHT: 65 IN | TEMPERATURE: 98.2 F | WEIGHT: 141 LBS | BODY MASS INDEX: 23.49 KG/M2 | SYSTOLIC BLOOD PRESSURE: 110 MMHG | HEART RATE: 52 BPM

## 2018-02-27 DIAGNOSIS — M77.8 SHOULDER TENDONITIS, LEFT: Primary | ICD-10-CM

## 2018-02-27 PROCEDURE — 99212 OFFICE O/P EST SF 10 MIN: CPT | Performed by: ORTHOPAEDIC SURGERY

## 2018-02-27 ASSESSMENT — PAIN SCALES - GENERAL: PAINLEVEL: NO PAIN (0)

## 2018-02-27 ASSESSMENT — PATIENT HEALTH QUESTIONNAIRE - PHQ9: SUM OF ALL RESPONSES TO PHQ QUESTIONS 1-9: 0

## 2018-02-27 ASSESSMENT — ANXIETY QUESTIONNAIRES: GAD7 TOTAL SCORE: 0

## 2018-02-27 NOTE — NURSING NOTE
"Chief Complaint   Patient presents with     Consult     Left shoulder surgery        Initial /80  Pulse 52  Temp 98.2  F (36.8  C) (Tympanic)  Ht 5' 5\" (1.651 m)  Wt 141 lb (64 kg)  SpO2 98%  BMI 23.46 kg/m2 Estimated body mass index is 23.46 kg/(m^2) as calculated from the following:    Height as of this encounter: 5' 5\" (1.651 m).    Weight as of this encounter: 141 lb (64 kg).  Medication Reconciliation: complete   Ayesha Camarillo      "

## 2018-02-27 NOTE — H&P (VIEW-ONLY)
[facility] [place]  [address]  [place] [address]  [phone]  Dept: [phone]    PRE-OP EVALUATION:  Today's date: 2018    Denise Taylor (: 1955) presents for pre-operative evaluation assessment as requested by Dr. Lou.  She requires evaluation and anesthesia risk assessment prior to undergoing surgery/procedure for treatment of shoulder pain .    Proposed Surgery/ Procedure: shoulder scope  Date of Surgery/ Procedure: TBD  Time of Surgery/ Procedure: TBD  Hospital/Surgical Facility: [facility]  Fax number for surgical facility: unknown  Primary Physician: Mirna Roblero  Type of Anesthesia Anticipated: to be determined    Patient has a Health Care Directive or Living Will:  NO    1. NO - Do you have a history of heart attack, stroke, stent, bypass or surgery on an artery in the head, neck, heart or legs?  2. NO - Do you ever have any pain or discomfort in your chest?  3. NO - Do you have a history of  Heart Failure?  4. NO - Are you troubled by shortness of breath when: walking on the level, up a slight hill or at night?  5. NO - Do you currently have a cold, bronchitis or other respiratory infection?  6. NO - Do you have a cough, shortness of breath or wheezing?  7. NO - Do you sometimes get pains in the calves of your legs when you walk?  8. NO - Do you or anyone in your family have previous history of blood clots?  9. NO - Do you or does anyone in your family have a serious bleeding problem such as prolonged bleeding following surgeries or cuts?  10. NO - Have you ever had problems with anemia or been told to take iron pills?  11. NO - Have you had any abnormal blood loss such as black, tarry or bloody stools, or abnormal vaginal bleeding?  12. NO - Have you ever had a blood transfusion?  13. NO - Have you or any of your relatives ever had problems with anesthesia?  14. NO - Do you have sleep apnea, excessive snoring or daytime drowsiness?  15. NO - Do you have any prosthetic  heart valves?  16. NO - Do you have prosthetic joints?  17. NO - Is there any chance that you may be pregnant?      HPI:     HPI related to upcoming procedure: has left rotator cuff tear and impingment.  She will be having laparoscopic repair by Dr. Lou       See problem list for active medical problems.  Problems all longstanding and stable, except as noted/documented.  See ROS for pertinent symptoms related to these conditions.                                                                                                  .    MEDICAL HISTORY:     Patient Active Problem List    Diagnosis Date Noted     ACP (advance care planning) 07/27/2017     Priority: Medium     Advance Care Planning 7/27/2017: ACP Review of Chart / Resources Provided:  Reviewed chart for advance care plan.  Denise MAYELA Taylor has no plan or code status on file. Discussed available resources and provided with information.   Added by Marah Henry             Hypothyroidism, unspecified type 07/27/2017     Priority: Medium     Pneumonia 07/20/2015     Priority: Medium     History of basal cell carcinoma 05/18/2015     Priority: Medium     Hyperlipidemia with target LDL less than 130 05/18/2015     Priority: Medium     Diagnosis updated by automated process. Provider to review and confirm.        Past Medical History:   Diagnosis Date     Pneumonia      Past Surgical History:   Procedure Laterality Date     COLONOSCOPY  2006    Dr Lara     COLONOSCOPY N/A 10/10/2016    Procedure: COLONOSCOPY;  Surgeon: Christine Cintron MD;  Location: [facility] OR     SHOULDER SURGERY Right 2011/10/2012     Current Outpatient Prescriptions   Medication Sig Dispense Refill     rosuvastatin (CRESTOR) 10 MG tablet Take 1 tablet (10 mg) by mouth daily 30 tablet 1     levothyroxine (SYNTHROID/LEVOTHROID) 75 MCG tablet Take 1 tablet (75 mcg) by mouth daily 90 tablet 3     ASPIRIN PO Take 81 mg by mouth daily       Eletriptan Hydrobromide (RELPAX PO) Take 40 mg by  mouth once as needed for migraine       FLUoxetine HCl, PMDD, (SARAFEM) 10 MG TABS Take 10 mg by mouth daily       OTC products: None, except as noted above    Allergies   Allergen Reactions     Penicillins Rash     Zithromax [Azithromycin] Rash      Latex Allergy: NO    Social History   Substance Use Topics     Smoking status: Never Smoker     Smokeless tobacco: Never Used     Alcohol use 1.0 oz/week     2 Glasses of wine per week      Comment: occasionally     History   Drug Use No       REVIEW OF SYSTEMS:   CONSTITUTIONAL: NEGATIVE for fever, chills, change in weight  INTEGUMENTARY/SKIN: NEGATIVE for worrisome rashes, moles or lesions  EYES: NEGATIVE for vision changes or irritation  ENT/MOUTH: NEGATIVE for ear, mouth and throat problems  RESP: NEGATIVE for significant cough or SOB  BREAST: NEGATIVE for masses, tenderness or discharge  CV: NEGATIVE for chest pain, palpitations or peripheral edema  GI: NEGATIVE for nausea, abdominal pain, heartburn, or change in bowel habits  : NEGATIVE for frequency, dysuria, or hematuria  MUSCULOSKELETAL:see hpi   NEURO: NEGATIVE for weakness, dizziness or paresthesias  ENDOCRINE: NEGATIVE for temperature intolerance, skin/hair changes  HEME: NEGATIVE for bleeding problems  PSYCHIATRIC: NEGATIVE for changes in mood or affect    EXAM:   /84  Pulse 51  Temp 97.8  F (36.6  C) (Tympanic)  Wt 141 lb (64 kg)  SpO2 98%  BMI 24.59 kg/m2    GENERAL APPEARANCE: healthy, alert and no distress     EYES: EOMI, PERRL     HENT: ear canals and TM's normal and nose and mouth without ulcers or lesions     NECK: no adenopathy, no asymmetry, masses, or scars and thyroid normal to palpation     RESP: lungs clear to auscultation - no rales, rhonchi or wheezes     CV: regular rates and rhythm, normal S1 S2, no S3 or S4 and no murmur, click or rub     ABDOMEN:  soft, nontender, no HSM or masses and bowel sounds normal     MS: extremities normal- no gross deformities noted, no evidence of  inflammation in joints, FROM in all extremities.     SKIN: no suspicious lesions or rashes     NEURO: Normal strength and tone, sensory exam grossly normal, mentation intact and speech normal     PSYCH: mentation appears normal. and affect normal/bright     LYMPHATICS: No cervical adenopathy    DIAGNOSTICS:     EKG: appears normal, NSR, normal axis, normal intervals, no acute ST/T changes c/w ischemia, no LVH by voltage criteria, unchanged from previous tracings  Labs Resulted Today:   Results for orders placed or performed in visit on 01/11/18   XR CHEST 2 VW (Clinic Performed)    Narrative    PROCEDURE: XR CHEST 2 VW 1/11/2018 12:21 PM    HISTORY: ; Fever in other diseases    COMPARISONS: 10/25/2017.    TECHNIQUE: 2 views.    FINDINGS: Heart is stable in size. No acute infiltrate or effusion is  seen. Lungs are somewhat hyperinflated with flattening of the  diaphragm. Mild degenerative changes seen in the spine.         Impression    IMPRESSION: No acute disease.    SOLEDAD RINCON MD       Recent Labs   Lab Test  01/11/18   1212  10/11/17   1127   09/21/17   1100   HGB  13.4  12.8   < >  14.0   PLT  183  327   < >  168   NA   --    --    --   139   POTASSIUM   --    --    --   3.9   CR   --    --    --   0.80    < > = values in this interval not displayed.        IMPRESSION:   Reason for surgery/procedure: she has Right Rotator Cuff disease and needing repair.  Will be undergoing general anesthesia.   Never reacted int he past to anesthesia.   Diagnosis/reason for consult: medical clearance.       The proposed surgical procedure is considered INTERMEDIATE risk.    REVISED CARDIAC RISK INDEX  The patient has the following serious cardiovascular risks for perioperative complications such as (MI, PE, VFib and 3  AV Block):  No serious cardiac risks  INTERPRETATION: 1 risks: Class II (low risk - 0.9% complication rate)    The patient has the following additional risks for perioperative complications:  No identified  additional risks      ICD-10-CM    1. Preop general physical exam Z01.818      Results for orders placed or performed in visit on 02/26/18 (from the past 24 hour(s))   CBC with platelets and differential   Result Value Ref Range    WBC 5.2 4.0 - 11.0 10e9/L    RBC Count 4.54 3.8 - 5.2 10e12/L    Hemoglobin 13.4 11.7 - 15.7 g/dL    Hematocrit 40.0 35.0 - 47.0 %    MCV 88 78 - 100 fl    MCH 29.5 26.5 - 33.0 pg    MCHC 33.5 31.5 - 36.5 g/dL    RDW 13.2 10.0 - 15.0 %    Platelet Count 150 150 - 450 10e9/L    Diff Method Automated Method     % Neutrophils 54.4 %    % Lymphocytes 32.7 %    % Monocytes 8.6 %    % Eosinophils 3.3 %    % Basophils 1.0 %    % Immature Granulocytes 0.0 %    Nucleated RBCs 0 0 /100    Absolute Neutrophil 2.9 1.6 - 8.3 10e9/L    Absolute Lymphocytes 1.7 0.8 - 5.3 10e9/L    Absolute Monocytes 0.5 0.0 - 1.3 10e9/L    Absolute Eosinophils 0.2 0.0 - 0.7 10e9/L    Absolute Basophils 0.1 0.0 - 0.2 10e9/L    Abs Immature Granulocytes 0.0 0 - 0.4 10e9/L    Absolute Nucleated RBC 0.0          RECOMMENDATIONS:         --Patient is to take all scheduled medications on the day of surgery EXCEPT for modifications listed below.    APPROVAL GIVEN to proceed with proposed procedure, without further diagnostic evaluation       Signed Electronically by: DASH Vital    Copy of this evaluation report is provided to requesting physician.    [facility] Preop Guidelines

## 2018-02-27 NOTE — MR AVS SNAPSHOT
"              After Visit Summary   2018    Denise Taylor    MRN: [id number]           Patient Information     Date Of Birth          1955        Visit Information        Provider Department      2018 9:40 AM Baldev Lou DO  OR[facility]ED[facility]         Follow-ups after your visit        Your next 10 appointments already scheduled     Mar 20, 2018  9:40 AM CDT   (Arrive by 9:20 AM)   Post Op with Baldev Lou DO    ORTHOPEDICS ([facility] )    [address]   [place] [address] [phone]              Who to contact     If you have questions or need follow up information about today's clinic visit or your schedule please contact  ORTHOPEDICS directly at [phone].  Normal or non-critical lab and imaging results will be communicated to you by MyChart, letter or phone within 4 business days after the clinic has received the results. If you do not hear from us within 7 days, please contact the clinic through MyChart or phone. If you have a critical or abnormal lab result, we will notify you by phone as soon as possible.  Submit refill requests through [employer] or call your pharmacy and they will forward the refill request to us. Please allow 3 business days for your refill to be completed.          Additional Information About Your Visit        MyChart Information     [employer] lets you send messages to your doctor, view your test results, renew your prescriptions, schedule appointments and more. To sign up, go to [email or web address] . Click on \"Log in\" on the left side of the screen, which will take you to the Welcome page. Then click on \"Sign up Now\" on the right side of the page.     You will be asked to enter the access code listed below, as well as some personal information. Please follow the directions to create your username and password.     Your access code is: [email or web address]  Expires: 3/18/2018 11:04 AM     Your access code will  in 90 days. If " "you need help or a new code, please call your [facility] clinic or [phone].        Care EveryWhere ID     This is your Care EveryWhere ID. This could be used by other organizations to access your [facility] medical records  [id number]        Your Vitals Were     Pulse Temperature Height Pulse Oximetry BMI (Body Mass Index)       52 98.2  F (36.8  C) (Tympanic) 5' 5\" (1.651 m) 98% 23.46 kg/m2        Blood Pressure from Last 3 Encounters:   02/27/18 110/80   02/26/18 126/84   01/11/18 118/70    Weight from Last 3 Encounters:   02/27/18 141 lb (64 kg)   02/26/18 141 lb (64 kg)   01/11/18 138 lb (62.6 kg)              Today, you had the following     No orders found for display       Primary Care Provider Office Phone # Fax #    DASH Angelo [phone]       [facility] [address] [address]        Equal Access to Services     [facility]: Hadii aad ku hadasho Soomaali, waaxda luqadaha, qaybta kaalmada adeegyada, waxay sujit hayaudra trevino . So [facility] [phone].    ATENCIÓN: Si habla español, tiene a mayo disposición servicios gratuitos de asistencia lingüística. Kaiame al [phone].    We comply with applicable federal civil rights laws and [place] laws. We do not discriminate on the basis of race, color, national origin, age, disability, sex, sexual orientation, or gender identity.            Thank you!     Thank you for choosing  ORTHOPEDICS  for your care. Our goal is always to provide you with excellent care. Hearing back from our patients is one way we can continue to improve our services. Please take a few minutes to complete the written survey that you may receive in the mail after your visit with us. Thank you!             Your Updated Medication List - Protect others around you: Learn how to safely use, store and throw away your medicines at www.disposemymeds.org.          This list is accurate as of 2/27/18  9:44 AM.  Always use " your most recent med list.                   Brand Name Dispense Instructions for use Diagnosis    ASPIRIN PO      Take 81 mg by mouth daily        levothyroxine 75 MCG tablet    SYNTHROID/LEVOTHROID    90 tablet    Take 1 tablet (75 mcg) by mouth daily    Hypothyroidism, unspecified type       RELPAX PO      Take 40 mg by mouth once as needed for migraine        rosuvastatin 10 MG tablet    CRESTOR    30 tablet    Take 1 tablet (10 mg) by mouth daily    Hyperlipidemia with target LDL less than 130       SARAFEM 10 MG Tabs   Generic drug:  FLUoxetine HCl (PMDD)      Take 10 mg by mouth daily

## 2018-02-27 NOTE — PROGRESS NOTES
"Patient presents today to reschedule her left shoulder arthroscopy for acromial clavicular joint arthritis, impingement, and rotator cuff tears.  She also has some biceps tendon pathology that needs to be evaluated and addressed.  She is completely free of her upper respiratory symptoms, had her preop yesterday.  We again discussed technical aspects of the procedure, usual expected recovery time, postoperative restrictions in physical therapy and she is ready for the procedure.  We will schedule this for March 7, vessel via arthroscopic exam of the left shoulder with acromial quicker toward resection, rotator cuff repair and probable biceps tenodesis./80  Pulse 52  Temp 98.2  F (36.8  C) (Tympanic)  Ht 5' 5\" (1.651 m)  Wt 141 lb (64 kg)  SpO2 98%  BMI 23.46 kg/m2  "

## 2018-03-06 ENCOUNTER — ANESTHESIA EVENT (OUTPATIENT)
Dept: SURGERY | Facility: HOSPITAL | Age: 63
End: 2018-03-06
Payer: COMMERCIAL

## 2018-03-06 NOTE — ANESTHESIA PREPROCEDURE EVALUATION
Anesthesia Evaluation     . Pt has had prior anesthetic.            ROS/MED HX    ENT/Pulmonary:  - neg pulmonary ROS     Neurologic:     (+)migraines,     Cardiovascular:  - neg cardiovascular ROS   (+) Dyslipidemia, ----. : . . . :. . Previous cardiac testing date:results:date: results:ECG reviewed date:2/26/2018 results:Marked SB@45, OWN date: results:          METS/Exercise Tolerance:     Hematologic:  - neg hematologic  ROS       Musculoskeletal:   (+) , , other musculoskeletal- SHOULDER TENDONITIS LEFT      GI/Hepatic:  - neg GI/hepatic ROS       Renal/Genitourinary:  - ROS Renal section negative       Endo:     (+) thyroid problem hypothyroidism, .      Psychiatric:     (+) psychiatric history anxiety      Infectious Disease:  - neg infectious disease ROS       Malignancy:   (+) Malignancy History of Skin  Skin CA status post Surgery,         Other:    - neg other ROS                 Physical Exam  Normal systems: cardiovascular and pulmonary    Airway   Mallampati: I  TM distance: >3 FB  Neck ROM: full    Dental     Cardiovascular   Rhythm and rate: regular and normal      Pulmonary    breath sounds clear to auscultation                    Anesthesia Plan      History & Physical Review  History and physical reviewed and following examination; no interval change.    ASA Status:  2 .    NPO Status:  > 8 hours    Plan for General, ETT and Periph. Nerve Block for postop pain with Intravenous and Propofol induction. Maintenance will be Balanced.    PONV prophylaxis:  Ondansetron (or other 5HT-3), Scopolamine patch and Dexamethasone or Solumedrol       Postoperative Care  Postoperative pain management:  IV analgesics, Oral pain medications and Peripheral nerve block (Single Shot).      Consents  Anesthetic plan, risks, benefits and alternatives discussed with:  Patient..                          .

## 2018-03-07 ENCOUNTER — HOSPITAL ENCOUNTER (OUTPATIENT)
Facility: HOSPITAL | Age: 63
Discharge: HOME | End: 2018-03-07
Attending: ORTHOPAEDIC SURGERY | Admitting: ORTHOPAEDIC SURGERY
Payer: COMMERCIAL

## 2018-03-07 ENCOUNTER — ANESTHESIA (OUTPATIENT)
Dept: SURGERY | Facility: HOSPITAL | Age: 63
End: 2018-03-07
Payer: COMMERCIAL

## 2018-03-07 ENCOUNTER — SURGERY (OUTPATIENT)
Age: 63
End: 2018-03-07

## 2018-03-07 VITALS
BODY MASS INDEX: 22.92 KG/M2 | DIASTOLIC BLOOD PRESSURE: 63 MMHG | OXYGEN SATURATION: 93 % | SYSTOLIC BLOOD PRESSURE: 107 MMHG | RESPIRATION RATE: 16 BRPM | TEMPERATURE: 96.8 F | WEIGHT: 137.6 LBS | HEIGHT: 65 IN

## 2018-03-07 DIAGNOSIS — Z98.890 HISTORY OF ARTHROSCOPIC SURGERY OF SHOULDER: Primary | ICD-10-CM

## 2018-03-07 PROCEDURE — 25000125 ZZHC RX 250: Performed by: ORTHOPAEDIC SURGERY

## 2018-03-07 PROCEDURE — 25000125 ZZHC RX 250: Performed by: NURSE ANESTHETIST, CERTIFIED REGISTERED

## 2018-03-07 PROCEDURE — 37000009 ZZH ANESTHESIA TECHNICAL FEE, EACH ADDTL 15 MIN: Performed by: ORTHOPAEDIC SURGERY

## 2018-03-07 PROCEDURE — 25000125 ZZHC RX 250: Performed by: ANESTHESIOLOGY

## 2018-03-07 PROCEDURE — 25000128 H RX IP 250 OP 636: Performed by: ANESTHESIOLOGY

## 2018-03-07 PROCEDURE — 27210794 ZZH OR GENERAL SUPPLY STERILE: Performed by: ORTHOPAEDIC SURGERY

## 2018-03-07 PROCEDURE — 27110028 ZZH OR GENERAL SUPPLY NON-STERILE: Performed by: ORTHOPAEDIC SURGERY

## 2018-03-07 PROCEDURE — 36000093 ZZH SURGERY LEVEL 4 1ST 30 MIN: Performed by: ORTHOPAEDIC SURGERY

## 2018-03-07 PROCEDURE — 71000014 ZZH RECOVERY PHASE 1 LEVEL 2 FIRST HR: Performed by: ORTHOPAEDIC SURGERY

## 2018-03-07 PROCEDURE — 29826 SHO ARTHRS SRG DECOMPRESSION: CPT | Mod: LT | Performed by: ORTHOPAEDIC SURGERY

## 2018-03-07 PROCEDURE — 37000011 ZZH ANESTHESIA WARD SERVICE: Performed by: NURSE ANESTHETIST, CERTIFIED REGISTERED

## 2018-03-07 PROCEDURE — 25000128 H RX IP 250 OP 636: Performed by: NURSE ANESTHETIST, CERTIFIED REGISTERED

## 2018-03-07 PROCEDURE — 29824 SHO ARTHRS SRG DSTL CLAVICLC: CPT | Mod: LT | Performed by: ORTHOPAEDIC SURGERY

## 2018-03-07 PROCEDURE — 01999 UNLISTED ANES PROCEDURE: CPT | Performed by: NURSE ANESTHETIST, CERTIFIED REGISTERED

## 2018-03-07 PROCEDURE — 37000008 ZZH ANESTHESIA TECHNICAL FEE, 1ST 30 MIN: Performed by: ORTHOPAEDIC SURGERY

## 2018-03-07 PROCEDURE — 71000027 ZZH RECOVERY PHASE 2 EACH 15 MINS: Performed by: ORTHOPAEDIC SURGERY

## 2018-03-07 PROCEDURE — C1713 ANCHOR/SCREW BN/BN,TIS/BN: HCPCS | Performed by: ORTHOPAEDIC SURGERY

## 2018-03-07 PROCEDURE — 36000063 ZZH SURGERY LEVEL 4 EA 15 ADDTL MIN: Performed by: ORTHOPAEDIC SURGERY

## 2018-03-07 PROCEDURE — 29827 SHO ARTHRS SRG RT8TR CUF RPR: CPT | Mod: LT | Performed by: ORTHOPAEDIC SURGERY

## 2018-03-07 PROCEDURE — 40000305 ZZH STATISTIC PRE PROC ASSESS I: Performed by: ORTHOPAEDIC SURGERY

## 2018-03-07 DEVICE — FIBERTAK SUTURE ANCHOR DOUBLE LOADED: Type: IMPLANTABLE DEVICE | Site: SHOULDER | Status: FUNCTIONAL

## 2018-03-07 RX ORDER — FENTANYL CITRATE 50 UG/ML
25-50 INJECTION, SOLUTION INTRAMUSCULAR; INTRAVENOUS
Status: DISCONTINUED | OUTPATIENT
Start: 2018-03-07 | End: 2018-03-07 | Stop reason: HOSPADM

## 2018-03-07 RX ORDER — NEOSTIGMINE METHYLSULFATE 1 MG/ML
VIAL (ML) INJECTION PRN
Status: DISCONTINUED | OUTPATIENT
Start: 2018-03-07 | End: 2018-03-07

## 2018-03-07 RX ORDER — KETOROLAC TROMETHAMINE 30 MG/ML
30 INJECTION, SOLUTION INTRAMUSCULAR; INTRAVENOUS EVERY 6 HOURS PRN
Status: DISCONTINUED | OUTPATIENT
Start: 2018-03-07 | End: 2018-03-07 | Stop reason: HOSPADM

## 2018-03-07 RX ORDER — CLINDAMYCIN PHOSPHATE 900 MG/50ML
900 INJECTION, SOLUTION INTRAVENOUS SEE ADMIN INSTRUCTIONS
Status: DISCONTINUED | OUTPATIENT
Start: 2018-03-07 | End: 2018-03-07 | Stop reason: HOSPADM

## 2018-03-07 RX ORDER — ONDANSETRON 4 MG/1
4 TABLET, ORALLY DISINTEGRATING ORAL
Status: DISCONTINUED | OUTPATIENT
Start: 2018-03-07 | End: 2018-03-07 | Stop reason: HOSPADM

## 2018-03-07 RX ORDER — ROPIVACAINE HYDROCHLORIDE 5 MG/ML
INJECTION, SOLUTION EPIDURAL; INFILTRATION; PERINEURAL PRN
Status: DISCONTINUED | OUTPATIENT
Start: 2018-03-07 | End: 2018-03-07

## 2018-03-07 RX ORDER — CLINDAMYCIN PHOSPHATE 900 MG/50ML
900 INJECTION, SOLUTION INTRAVENOUS
Status: COMPLETED | OUTPATIENT
Start: 2018-03-07 | End: 2018-03-07

## 2018-03-07 RX ORDER — ALBUTEROL SULFATE 0.83 MG/ML
2.5 SOLUTION RESPIRATORY (INHALATION) EVERY 4 HOURS PRN
Status: DISCONTINUED | OUTPATIENT
Start: 2018-03-07 | End: 2018-03-07 | Stop reason: HOSPADM

## 2018-03-07 RX ORDER — HYDROCODONE BITARTRATE AND ACETAMINOPHEN 5; 325 MG/1; MG/1
1-2 TABLET ORAL EVERY 4 HOURS PRN
Qty: 30 TABLET | Refills: 0 | Status: SHIPPED | OUTPATIENT
Start: 2018-03-07 | End: 2018-03-15

## 2018-03-07 RX ORDER — DEXAMETHASONE SODIUM PHOSPHATE 4 MG/ML
4 INJECTION, SOLUTION INTRA-ARTICULAR; INTRALESIONAL; INTRAMUSCULAR; INTRAVENOUS; SOFT TISSUE EVERY 10 MIN PRN
Status: DISCONTINUED | OUTPATIENT
Start: 2018-03-07 | End: 2018-03-07 | Stop reason: HOSPADM

## 2018-03-07 RX ORDER — PROMETHAZINE HYDROCHLORIDE 25 MG/ML
12.5 INJECTION, SOLUTION INTRAMUSCULAR; INTRAVENOUS
Status: DISCONTINUED | OUTPATIENT
Start: 2018-03-07 | End: 2018-03-07 | Stop reason: HOSPADM

## 2018-03-07 RX ORDER — SCOLOPAMINE TRANSDERMAL SYSTEM 1 MG/1
1 PATCH, EXTENDED RELEASE TRANSDERMAL ONCE
Status: COMPLETED | OUTPATIENT
Start: 2018-03-07 | End: 2018-03-07

## 2018-03-07 RX ORDER — HYDROMORPHONE HYDROCHLORIDE 1 MG/ML
.3-.5 INJECTION, SOLUTION INTRAMUSCULAR; INTRAVENOUS; SUBCUTANEOUS EVERY 10 MIN PRN
Status: DISCONTINUED | OUTPATIENT
Start: 2018-03-07 | End: 2018-03-07 | Stop reason: HOSPADM

## 2018-03-07 RX ORDER — EPHEDRINE SULFATE 50 MG/ML
INJECTION, SOLUTION INTRAMUSCULAR; INTRAVENOUS; SUBCUTANEOUS PRN
Status: DISCONTINUED | OUTPATIENT
Start: 2018-03-07 | End: 2018-03-07

## 2018-03-07 RX ORDER — SODIUM CHLORIDE, SODIUM LACTATE, POTASSIUM CHLORIDE, CALCIUM CHLORIDE 600; 310; 30; 20 MG/100ML; MG/100ML; MG/100ML; MG/100ML
INJECTION, SOLUTION INTRAVENOUS CONTINUOUS
Status: DISCONTINUED | OUTPATIENT
Start: 2018-03-07 | End: 2018-03-07 | Stop reason: HOSPADM

## 2018-03-07 RX ORDER — NALOXONE HYDROCHLORIDE 0.4 MG/ML
.1-.4 INJECTION, SOLUTION INTRAMUSCULAR; INTRAVENOUS; SUBCUTANEOUS
Status: DISCONTINUED | OUTPATIENT
Start: 2018-03-07 | End: 2018-03-07 | Stop reason: HOSPADM

## 2018-03-07 RX ORDER — DEXAMETHASONE SODIUM PHOSPHATE 10 MG/ML
INJECTION, SOLUTION INTRAMUSCULAR; INTRAVENOUS PRN
Status: DISCONTINUED | OUTPATIENT
Start: 2018-03-07 | End: 2018-03-07

## 2018-03-07 RX ORDER — MEPERIDINE HYDROCHLORIDE 25 MG/ML
12.5 INJECTION INTRAMUSCULAR; INTRAVENOUS; SUBCUTANEOUS
Status: DISCONTINUED | OUTPATIENT
Start: 2018-03-07 | End: 2018-03-07 | Stop reason: HOSPADM

## 2018-03-07 RX ORDER — HYDROCODONE BITARTRATE AND ACETAMINOPHEN 5; 325 MG/1; MG/1
1 TABLET ORAL
Status: DISCONTINUED | OUTPATIENT
Start: 2018-03-07 | End: 2018-03-07 | Stop reason: HOSPADM

## 2018-03-07 RX ORDER — BUPIVACAINE HYDROCHLORIDE AND EPINEPHRINE 5; 5 MG/ML; UG/ML
INJECTION, SOLUTION PERINEURAL PRN
Status: DISCONTINUED | OUTPATIENT
Start: 2018-03-07 | End: 2018-03-07 | Stop reason: HOSPADM

## 2018-03-07 RX ORDER — PROPOFOL 10 MG/ML
INJECTION, EMULSION INTRAVENOUS PRN
Status: DISCONTINUED | OUTPATIENT
Start: 2018-03-07 | End: 2018-03-07

## 2018-03-07 RX ORDER — OXYCODONE HYDROCHLORIDE 5 MG/1
5 TABLET ORAL EVERY 4 HOURS PRN
Status: DISCONTINUED | OUTPATIENT
Start: 2018-03-07 | End: 2018-03-07 | Stop reason: HOSPADM

## 2018-03-07 RX ORDER — ONDANSETRON 2 MG/ML
4 INJECTION INTRAMUSCULAR; INTRAVENOUS EVERY 30 MIN PRN
Status: DISCONTINUED | OUTPATIENT
Start: 2018-03-07 | End: 2018-03-07 | Stop reason: HOSPADM

## 2018-03-07 RX ORDER — ONDANSETRON 2 MG/ML
INJECTION INTRAMUSCULAR; INTRAVENOUS PRN
Status: DISCONTINUED | OUTPATIENT
Start: 2018-03-07 | End: 2018-03-07

## 2018-03-07 RX ORDER — HYDRALAZINE HYDROCHLORIDE 20 MG/ML
2.5-5 INJECTION INTRAMUSCULAR; INTRAVENOUS EVERY 10 MIN PRN
Status: DISCONTINUED | OUTPATIENT
Start: 2018-03-07 | End: 2018-03-07 | Stop reason: HOSPADM

## 2018-03-07 RX ORDER — FENTANYL CITRATE 50 UG/ML
INJECTION, SOLUTION INTRAMUSCULAR; INTRAVENOUS PRN
Status: DISCONTINUED | OUTPATIENT
Start: 2018-03-07 | End: 2018-03-07

## 2018-03-07 RX ORDER — ONDANSETRON 4 MG/1
4 TABLET, ORALLY DISINTEGRATING ORAL EVERY 30 MIN PRN
Status: DISCONTINUED | OUTPATIENT
Start: 2018-03-07 | End: 2018-03-07 | Stop reason: HOSPADM

## 2018-03-07 RX ORDER — LABETALOL HYDROCHLORIDE 5 MG/ML
10 INJECTION, SOLUTION INTRAVENOUS
Status: DISCONTINUED | OUTPATIENT
Start: 2018-03-07 | End: 2018-03-07 | Stop reason: HOSPADM

## 2018-03-07 RX ORDER — LIDOCAINE HYDROCHLORIDE 20 MG/ML
INJECTION, SOLUTION INFILTRATION; PERINEURAL PRN
Status: DISCONTINUED | OUTPATIENT
Start: 2018-03-07 | End: 2018-03-07

## 2018-03-07 RX ADMIN — Medication 5 MG: at 11:46

## 2018-03-07 RX ADMIN — SODIUM CHLORIDE, POTASSIUM CHLORIDE, SODIUM LACTATE AND CALCIUM CHLORIDE: 600; 310; 30; 20 INJECTION, SOLUTION INTRAVENOUS at 09:19

## 2018-03-07 RX ADMIN — ROPIVACAINE HYDROCHLORIDE 20 ML: 5 INJECTION, SOLUTION EPIDURAL; INFILTRATION; PERINEURAL at 10:18

## 2018-03-07 RX ADMIN — Medication 10 MG: at 11:44

## 2018-03-07 RX ADMIN — ROCURONIUM BROMIDE 50 MG: 10 INJECTION INTRAVENOUS at 11:22

## 2018-03-07 RX ADMIN — ONDANSETRON 4 MG: 2 INJECTION INTRAMUSCULAR; INTRAVENOUS at 12:32

## 2018-03-07 RX ADMIN — FENTANYL CITRATE 100 MCG: 50 INJECTION, SOLUTION INTRAMUSCULAR; INTRAVENOUS at 11:21

## 2018-03-07 RX ADMIN — LIDOCAINE HYDROCHLORIDE 40 MG: 20 INJECTION, SOLUTION INFILTRATION; PERINEURAL at 11:22

## 2018-03-07 RX ADMIN — NEOSTIGMINE METHYLSULFATE 1 MG: 1 INJECTION INTRAMUSCULAR; INTRAVENOUS; SUBCUTANEOUS at 12:41

## 2018-03-07 RX ADMIN — Medication 5 MG: at 12:17

## 2018-03-07 RX ADMIN — SCOPALAMINE 1 PATCH: 1 PATCH, EXTENDED RELEASE TRANSDERMAL at 09:22

## 2018-03-07 RX ADMIN — BUPIVACAINE HYDROCHLORIDE AND EPINEPHRINE BITARTRATE 20 ML: 5; .005 INJECTION, SOLUTION PERINEURAL at 12:38

## 2018-03-07 RX ADMIN — PROPOFOL 150 MG: 10 INJECTION, EMULSION INTRAVENOUS at 11:22

## 2018-03-07 RX ADMIN — NEOSTIGMINE METHYLSULFATE 1 MG: 1 INJECTION INTRAMUSCULAR; INTRAVENOUS; SUBCUTANEOUS at 12:39

## 2018-03-07 RX ADMIN — CLINDAMYCIN PHOSPHATE 900 MG: 18 INJECTION, SOLUTION INTRAVENOUS at 11:16

## 2018-03-07 RX ADMIN — DEXAMETHASONE SODIUM PHOSPHATE 10 MG: 10 INJECTION, SOLUTION INTRAMUSCULAR; INTRAVENOUS at 10:18

## 2018-03-07 NOTE — DISCHARGE INSTRUCTIONS
Remove the scopolamine patch behind your right ear         POST OPERATIVE PATIENT INFORMATION  Shoulder Surgery    DIET  No restrictions unless specifically ordered by your physician.  DISCOMFORT  You should have only minimal discomfort.  There may be some tenderness around your incision.  If you have a prescribed medication and it is not controlling your pain, please notify your doctor.  You may use ice to your shoulder for comfort as needed.  CARE OF INCISION  Staples/Stitches: If the staples/stitches were removed during your hospital stay and steri-strips (thin strips of tape) were applied to the incision, leave them on until you see your doctor or they loosen on their own.  If your doctor sends you home with the staples/stitches intact, keep the dressing dry.  If a clear plastic covering has been applied to the incision prior to discharge, leave it on until you return to see your doctor.  You may shower as instructed by your physician.  ACTIVITY  Sling/Immobilizer: Continue to wear your sling or immobilizer at all times unless instructed otherwise.  Be sure the sling/immobilizer is properly positioned and elevates your arm.  Positioning: Position the head of your bed up with pillows.  A small pillow positioned behind your affected shoulder will also keep your shoulder from falling back and this decreases pain.  Keep your arm close to your side.  Exercises: Wiggle your fingers frequently to aid circulation.  Practice other exercises only if you were instructed in physical therapy or by your physician.  STOP EXERCISING IMMEDIATELY IF YOU HAVE SEVERE SHOULDER PAIN OR DISCOMFORT.  ACTIVITIES TO AVOID  Do not move your affected arm/shoulder away from your side unless instructed by your doctor.  This motion places stress on the surgical area.  Do not lie on your affected side unless your doctor says you can.  CONTACT YOUR DOCTOR  Contact your doctor if any of the following conditions occur:    Have loss of movement or  sensation to your fingers or hand.    Notice redness, swelling or warmth around your incision.    Have more than a small amount of drainage.    Develop a fever of 100  or higher, or start having chills.    Have severe pain, unrelieved by the medication prescribed for you.    Fingers/hand become cold, blue or swollen  If you have any questions, call your doctor s office.       CODMAN S EXERCISE    GENTLE PASSIVE RANGE OF MOTION EXERCISE    Rock your body back and forth or in circles.    Let your arm hang at your side and swing like a pendulum.    Don t use arm muscles to move your arm, rather use the sway of your body.    Do this 3 times a day for 3 minutes.    *WHEN NOT EXERCISING KEEP ARM IN SLING! ear after 24 hours after application.   After removing the patch, wash your hands and the area behind your ear thoroughly with soap and water.   The patch will still contain some medicine after use.   To avoid accidental contact or ingestion by children or pets, fold the used patch in half with the sticky side together and throw away in the trash out of the reach of children and pets.         Post-Anesthesia Patient Instructions    IMMEDIATELY FOLLOWING SURGERY:  Do not drive or operate machinery for the first twenty four hours after surgery.  Do not make any important decisions for twenty four hours after surgery or while taking narcotic pain medications or sedatives.  If you develop intractable nausea and vomiting or a severe headache please notify your doctor immediately.    FOLLOW-UP:  Please make an appointment with your surgeon as instructed. You do not need to follow up with anesthesia unless specifically instructed to do so.    WOUND CARE INSTRUCTIONS (if applicable):  Keep a dry clean dressing on the anesthesia/puncture wound site if there is drainage.  Once the wound has quit draining you may leave it open to air.  Generally you should leave the bandage intact for twenty four hours unless there is drainage.  If  the epidural site drains for more than 36-48 hours please call the anesthesia department.    QUESTIONS?:  Please feel free to call your physician or the hospital  if you have any questions, and they will be happy to assist you.

## 2018-03-07 NOTE — ANESTHESIA POSTPROCEDURE EVALUATION
Patient: Denise Taylor    Procedure(s):  LEFT SHOULDER ARTHROSCOPY, REPAIR ROTATOR CUFF: subacromial Decompression and AC Joint Resection - Wound Class: I-Clean    Diagnosis:SHOULDER TENDONITIS LEFT  Diagnosis Additional Information: No value filed.    Anesthesia Type:  General, ETT, Periph. Nerve Block for postop pain    Note:  Anesthesia Post Evaluation    Patient location during evaluation: Bedside  Patient participation: Able to fully participate in evaluation  Level of consciousness: awake and alert  Pain management: adequate  Airway patency: patent  Cardiovascular status: acceptable and stable  Respiratory status: acceptable and room air  Hydration status: acceptable  PONV: none     Anesthetic complications: None          Last vitals:  Vitals:    03/07/18 1335 03/07/18 1340 03/07/18 1400   BP: 109/66 104/68 111/62   Resp: 16 16 16   Temp:      SpO2: 94% 94% 96%         Electronically Signed By: CESAR Negron CRNA  March 7, 2018  2:08 PM

## 2018-03-07 NOTE — ANESTHESIA CARE TRANSFER NOTE
Patient: Denise Taylor    Procedure(s):  LEFT SHOULDER ARTHROSCOPY, REPAIR ROTATOR CUFF: subacromial Decompression and AC Joint Resection - Wound Class: I-Clean    Diagnosis: SHOULDER TENDONITIS LEFT  Diagnosis Additional Information: No value filed.    Anesthesia Type:   General, ETT, Periph. Nerve Block for postop pain     Note:  Airway :Nasal Cannula  Patient transferred to:PACU  Handoff Report: Identifed the Patient, Identified the Reponsible Provider, Reviewed the pertinent medical history, Discussed the surgical course, Reviewed Intra-OP anesthesia mangement and issues during anesthesia, Set expectations for post-procedure period and Allowed opportunity for questions and acknowledgement of understanding      Vitals: (Last set prior to Anesthesia Care Transfer)    CRNA VITALS  3/7/2018 1216 - 3/7/2018 1302      3/7/2018             Pulse: 78    SpO2: 100 %    Resp Rate (observed): (!)  1    Resp Rate (set): 8                Electronically Signed By: CESAR Oliver CRNA  March 7, 2018  1:02 PM

## 2018-03-07 NOTE — ANESTHESIA PROCEDURE NOTES
Peripheral nerve/Neuraxial procedure note : Interscalene  Pre-Procedure  Performed by   Referred by DR. SEN  Location: pre-op    Procedure Times:3/7/2018 10:10 AM and 3/7/2018 10:18 AM  Pre-Anesthestic Checklist: patient identified, IV checked, site marked, risks and benefits discussed, informed consent, monitors and equipment checked, pre-op evaluation, at physician/surgeon's request and post-op pain management    Timeout  Correct Patient: Yes   Correct Procedure: Yes   Correct Site: Yes   Correct Laterality: Yes   Correct Position: Yes   Site Marked: Yes   .   Procedure Documentation    .    Procedure:  left  Interscalene.  Local skin infiltrated with 2 mL of 1% lidocaine.     Ultrasound used to identify targeted nerve, plexus, or vascular marker and placed a needle adjacent to it., Ultrasound was used to visualize the spread of the anesthetic in close proximity to the above stated nerve. A permanent image is entered into the patient's record.  Patient Prep;chlorhexidine gluconate and isopropyl alcohol.  .  Needle: short bevel Needle Gauge: 22.    Needle Length (Inches) 4  Insertion Method: Single Shot.       Assessment/Narrative  Paresthesias: No.  Injection made incrementally with aspirations every 5 mL..  The placement was negative for: blood aspirated, painful injection and site bleeding.  Bolus given via needle..   Secured via.   Complications: none. Comments:  Left interscalene block. R/B/A/C discussed, pt expressed understanding, signed consent. Questions and concerns addressed. Sterility maintained throughout. Injection by JONY King. No complications. Pt reports warmth and tingling in left arm.

## 2018-03-07 NOTE — IP AVS SNAPSHOT
[address] [address]    Phone:  [phone]                                       After Visit Summary   3/7/2018    Denies Taylor    MRN: [id number]           After Visit Summary Signature Page     I have received my discharge instructions, and my questions have been answered. I have discussed any challenges I see with this plan with the nurse or doctor.    ..........................................................................................................................................  Patient/Patient Representative Signature      ..........................................................................................................................................  Patient Representative Print Name and Relationship to Patient    ..................................................               ................................................  Date                                            Time    ..........................................................................................................................................  Reviewed by Signature/Title    ...................................................              ..............................................  Date                                                            Time

## 2018-03-07 NOTE — OP NOTE
Preoperative diagnosis: Left shoulder rotator cuff tear, acromioclavicular joint arthritis and impingement    Postoperative diagnosis: Left shoulder rotator cuff tear, impingement and acromioclavicular joint arthritis    Procedure performed: Left shoulder arthroscopic exam with subacromial decompression, rotator cuff tear repair and acromioclavicular joint resection.    Anesthetic utilized: Interscalene block plus general anesthesia    EBL: Less than 10 cc complications: None    Wound classification: Clean    Description of procedure: After appropriate level anesthetic administered, the patient was prepped draped and positioned in the beachchair position on the operating table.  The shoulder was examined under anesthesia and found to have a full passive range of motion, with no instability noted.  The acromial clavicular joint was enlarged, and peripheral osteophytes could easily be palpated under the skin.  A small incision was made in the posterior aspect of the shoulder and the arthroscope introduced into the glenohumeral joint.  A full-thickness tear just posterior to the intertubercular groove was noted in the supraspinatus tendon.  The biceps tendon itself appeared intact wound without significant inflammation, glenohumeral joint was intact, the labrum was intact.  The tear was localized with a spinal needle and a small incision made over the anterolateral aspect of the shoulder.  Debrider was passed into this arthroscopic portal incision and used to debride the superior surface of the greater tuberosity as well as to debride the torn edge of the rotator cuff tendon.  The arthroscope was then withdrawn and reintroduced into the subacromial space.  Considerable amount of inflamed bursal tissue was present and this was removed using a motorized debrider and arthroscopic cautery.  An arthroscopic bur was used to remove bony excrescences and a sharp spike from the subacromial portion of the bone, as well as to  resect approximately 7 or 8 mm of distal clavicle, removing the peripheral osteophytes as well.  A small drill hole was then made in the prepared bed of the greater tuberosity and a fiber tack suture anchor, double-armed, was passed into the small drill hole the rotator cuff was then repaired using a double row technique, and securely fastened to the prepared bed of bone on the greater tuberosity.  The repair was probed and inspected and found to be quite secure.  The arthroscope was then used to explore the shoulder joint again, no additional pathology was encountered.  The instrumentation was withdrawn and the surgical wounds closed with subcuticular Monocryl suture.  Sterile dressing and a sling were applied and she was taken to the recovery room with no apparent early postoperative complications.

## 2018-03-07 NOTE — OR NURSING
Pateint discharged to home.  Diana score 20. Pain level 0/10.  Discharged from unit via walking .

## 2018-03-13 ENCOUNTER — TELEPHONE (OUTPATIENT)
Dept: ORTHOPEDICS | Facility: OTHER | Age: 63
End: 2018-03-13

## 2018-03-13 DIAGNOSIS — Z98.890 HISTORY OF ARTHROSCOPIC SURGERY OF SHOULDER: ICD-10-CM

## 2018-03-13 NOTE — TELEPHONE ENCOUNTER
Called left voice mail stating she had surgery with Dr. Lou last Wednesday and she will be out of pain medication. States she isn't taking it every 4 hours but she still needs its.  Asked if it could be sent to [facility]    Questions call [phone]

## 2018-03-13 NOTE — TELEPHONE ENCOUNTER
Spoke with patient at 1000. Stated that she is now out of her pain medication. States she only been using it sparingly which seems to keep pain under control. Explained to patient that Dr Lou is out of office today returns tomorrow but is in surgery all day. But that I would contact her primary Mirnaalba Roblero to see if she would fill pain medication until her post up next week and that I would call her back. Patient states understanding.

## 2018-03-15 RX ORDER — HYDROCODONE BITARTRATE AND ACETAMINOPHEN 5; 325 MG/1; MG/1
1-2 TABLET ORAL EVERY 4 HOURS PRN
Qty: 30 TABLET | Refills: 0 | Status: SHIPPED | OUTPATIENT
Start: 2018-03-15 | End: 2018-03-20

## 2018-03-16 NOTE — TELEPHONE ENCOUNTER
Called patient to let her know that her written RX for Norco is ready to be picked up at the [facility] registration desk.  Patient verbalized understanding.

## 2018-03-20 ENCOUNTER — OFFICE VISIT (OUTPATIENT)
Dept: ORTHOPEDICS | Facility: OTHER | Age: 63
End: 2018-03-20
Attending: ORTHOPAEDIC SURGERY
Payer: COMMERCIAL

## 2018-03-20 VITALS
OXYGEN SATURATION: 98 % | TEMPERATURE: 98.6 F | HEIGHT: 65 IN | SYSTOLIC BLOOD PRESSURE: 110 MMHG | WEIGHT: 137 LBS | BODY MASS INDEX: 22.82 KG/M2 | HEART RATE: 55 BPM | DIASTOLIC BLOOD PRESSURE: 84 MMHG

## 2018-03-20 DIAGNOSIS — Z98.890 HISTORY OF ARTHROSCOPIC SURGERY OF SHOULDER: Primary | ICD-10-CM

## 2018-03-20 PROCEDURE — 99024 POSTOP FOLLOW-UP VISIT: CPT | Performed by: ORTHOPAEDIC SURGERY

## 2018-03-20 ASSESSMENT — PAIN SCALES - GENERAL: PAINLEVEL: NO PAIN (1)

## 2018-03-20 NOTE — NURSING NOTE
"Chief Complaint   Patient presents with     Surgical Followup     Left shoulder 3/7/18       Initial /84  Pulse 55  Temp 98.6  F (37  C) (Tympanic)  Ht 5' 5\" (1.651 m)  Wt 137 lb (62.1 kg)  SpO2 98%  BMI 22.8 kg/m2 Estimated body mass index is 22.8 kg/(m^2) as calculated from the following:    Height as of this encounter: 5' 5\" (1.651 m).    Weight as of this encounter: 137 lb (62.1 kg).  Medication Reconciliation: complete   Ayesha Camarillo      "

## 2018-03-20 NOTE — PROGRESS NOTES
"Patient presents today for 2 weeks after her left shoulder arthroscopically assisted rotator cuff repair with subacromial decompression and acromioclavicular joint resection.  She has minimal discomfort at the present time, has been very compliant with her sling wear.  She's been doing her Codman's exercises as well.  The wounds are clean and dry and the limb is neurovascularly intact.  Plan is to have her continue with her present regimen for another 3-4 weeks, follow-up at that time for an examination and referral to physical therapy./84  Pulse 55  Temp 98.6  F (37  C) (Tympanic)  Ht 5' 5\" (1.651 m)  Wt 137 lb (62.1 kg)  SpO2 98%  BMI 22.8 kg/m2  "

## 2018-03-20 NOTE — MR AVS SNAPSHOT
"              After Visit Summary   3/20/2018    Denise Taylor    MRN: [id number]           Patient Information     Date Of Birth          1955        Visit Information        Provider Department      3/20/2018 9:40 AM Baldev Lou DO  ORTHOPEDICS        Today's Diagnoses     History of arthroscopic surgery of shoulder    -  1       Follow-ups after your visit        Your next 10 appointments already scheduled     Apr 17, 2018  9:40 AM CDT   (Arrive by 9:20 AM)   Return Visit with Baldev Lou DO    ORTHOPEDICS ([facility] )    [address] [facility] [id number] [phone]              Who to contact     If you have questions or need follow up information about today's clinic visit or your schedule please contact  ORTHOPEDICS directly at [phone].  Normal or non-critical lab and imaging results will be communicated to you by iLive[employer], letter or phone within 4 business days after the clinic has received the results. If you do not hear from us within 7 days, please contact the clinic through MyChart or phone. If you have a critical or abnormal lab result, we will notify you by phone as soon as possible.  Submit refill requests through [facility] or call your pharmacy and they will forward the refill request to us. Please allow 3 business days for your refill to be completed.          Additional Information About Your Visit        MyChart Information     [facility] lets you send messages to your doctor, view your test results, renew your prescriptions, schedule appointments and more. To sign up, go to [email or web address] . Click on \"Log in\" on the left side of the screen, which will take you to the Welcome page. Then click on \"Sign up Now\" on the right side of the page.     You will be asked to enter the access code listed below, as well as some personal information. Please follow the directions to create your username and password.     Your access code is: " "[email or web address]  Expires: 2018  9:53 AM     Your access code will  in 90 days. If you need help or a new code, please call your [place] clinic or [phone].        Care EveryWhere ID     This is your Care EveryWhere ID. This could be used by other organizations to access your [place] medical records  [id number]        Your Vitals Were     Pulse Temperature Height Pulse Oximetry BMI (Body Mass Index)       55 98.6  F (37  C) (Tympanic) 5' 5\" (1.651 m) 98% 22.8 kg/m2        Blood Pressure from Last 3 Encounters:   18 110/84   18 107/63   18 110/80    Weight from Last 3 Encounters:   18 137 lb (62.1 kg)   18 137 lb 9.6 oz (62.4 kg)   18 141 lb (64 kg)              Today, you had the following     No orders found for display         Today's Medication Changes          These changes are accurate as of 3/20/18 10:03 AM.  If you have any questions, ask your nurse or doctor.               Stop taking these medicines if you haven't already. Please contact your care team if you have questions.     HYDROcodone-acetaminophen 5-325 MG per tablet   Commonly known as:  NORCO   Stopped by:  Baldev Lou DO                    Primary Care Provider Office Phone # Fax #    DASH Angelo [phone]       [address] [address]        Equal Access to Services     [facility]: Hadii florencia ku hadasho Soomaali, waaxda luqadaha, qaybta kaalmada adeegyada, arabella scott. So [facility] [phone].    ATENCIÓN: Si habla español, tiene a mayo disposición servicios gratuitos de asistencia lingüística. Llame al [phone].    We comply with applicable federal civil rights laws and [place] laws. We do not discriminate on the basis of race, color, national origin, age, disability, sex, sexual orientation, or gender identity.            Thank you!     Thank you for choosing  ORTHOPEDICS  for your care. " Our goal is always to provide you with excellent care. Hearing back from our patients is one way we can continue to improve our services. Please take a few minutes to complete the written survey that you may receive in the mail after your visit with us. Thank you!             Your Updated Medication List - Protect others around you: Learn how to safely use, store and throw away your medicines at www.disposemymeds.org.          This list is accurate as of 3/20/18 10:03 AM.  Always use your most recent med list.                   Brand Name Dispense Instructions for use Diagnosis    ASPIRIN PO      Take 81 mg by mouth daily        levothyroxine 75 MCG tablet    SYNTHROID/LEVOTHROID    90 tablet    Take 1 tablet (75 mcg) by mouth daily    Hypothyroidism, unspecified type       RELPAX PO      Take 40 mg by mouth once as needed for migraine        rosuvastatin 10 MG tablet    CRESTOR    30 tablet    Take 1 tablet (10 mg) by mouth daily    Hyperlipidemia with target LDL less than 130       SARAFEM 10 MG Tabs   Generic drug:  FLUoxetine HCl (PMDD)      Take 10 mg by mouth daily

## 2018-03-21 DIAGNOSIS — E78.5 HYPERLIPIDEMIA WITH TARGET LDL LESS THAN 130: ICD-10-CM

## 2018-03-21 RX ORDER — ROSUVASTATIN CALCIUM 10 MG/1
10 TABLET, COATED ORAL DAILY
Qty: 30 TABLET | Refills: 5 | Status: SHIPPED | OUTPATIENT
Start: 2018-03-21 | End: 2018-10-23

## 2018-04-02 ENCOUNTER — RADIANT APPOINTMENT (OUTPATIENT)
Dept: GENERAL RADIOLOGY | Facility: OTHER | Age: 63
End: 2018-04-02
Attending: PHYSICIAN ASSISTANT
Payer: COMMERCIAL

## 2018-04-02 ENCOUNTER — OFFICE VISIT (OUTPATIENT)
Dept: FAMILY MEDICINE | Facility: OTHER | Age: 63
End: 2018-04-02
Attending: PHYSICIAN ASSISTANT
Payer: COMMERCIAL

## 2018-04-02 VITALS
OXYGEN SATURATION: 95 % | WEIGHT: 136.6 LBS | SYSTOLIC BLOOD PRESSURE: 104 MMHG | HEART RATE: 66 BPM | DIASTOLIC BLOOD PRESSURE: 72 MMHG | BODY MASS INDEX: 22.76 KG/M2 | HEIGHT: 65 IN | TEMPERATURE: 100.1 F

## 2018-04-02 DIAGNOSIS — R09.89 PULMONARY CONGESTION: ICD-10-CM

## 2018-04-02 DIAGNOSIS — M79.10 MYALGIA: Primary | ICD-10-CM

## 2018-04-02 LAB
BASOPHILS # BLD AUTO: 0 10E9/L (ref 0–0.2)
BASOPHILS NFR BLD AUTO: 0.4 %
CRP SERPL-MCNC: 37.6 MG/L (ref 0–8)
DIFFERENTIAL METHOD BLD: ABNORMAL
EOSINOPHIL # BLD AUTO: 0.1 10E9/L (ref 0–0.7)
EOSINOPHIL NFR BLD AUTO: 0.6 %
ERYTHROCYTE [DISTWIDTH] IN BLOOD BY AUTOMATED COUNT: 13.2 % (ref 10–15)
FLUAV+FLUBV AG SPEC QL: NEGATIVE
FLUAV+FLUBV AG SPEC QL: NEGATIVE
HCT VFR BLD AUTO: 38.6 % (ref 35–47)
HGB BLD-MCNC: 13.2 G/DL (ref 11.7–15.7)
IMM GRANULOCYTES # BLD: 0 10E9/L (ref 0–0.4)
IMM GRANULOCYTES NFR BLD: 0.3 %
LYMPHOCYTES # BLD AUTO: 1.9 10E9/L (ref 0.8–5.3)
LYMPHOCYTES NFR BLD AUTO: 16.5 %
MCH RBC QN AUTO: 29.6 PG (ref 26.5–33)
MCHC RBC AUTO-ENTMCNC: 34.2 G/DL (ref 31.5–36.5)
MCV RBC AUTO: 87 FL (ref 78–100)
MONOCYTES # BLD AUTO: 0.8 10E9/L (ref 0–1.3)
MONOCYTES NFR BLD AUTO: 6.9 %
NEUTROPHILS # BLD AUTO: 8.5 10E9/L (ref 1.6–8.3)
NEUTROPHILS NFR BLD AUTO: 75.3 %
NRBC # BLD AUTO: 0 10*3/UL
NRBC BLD AUTO-RTO: 0 /100
PLATELET # BLD AUTO: 209 10E9/L (ref 150–450)
RBC # BLD AUTO: 4.46 10E12/L (ref 3.8–5.2)
SPECIMEN SOURCE: NORMAL
WBC # BLD AUTO: 11.3 10E9/L (ref 4–11)

## 2018-04-02 PROCEDURE — 36415 COLL VENOUS BLD VENIPUNCTURE: CPT | Performed by: PHYSICIAN ASSISTANT

## 2018-04-02 PROCEDURE — 87804 INFLUENZA ASSAY W/OPTIC: CPT | Performed by: PHYSICIAN ASSISTANT

## 2018-04-02 PROCEDURE — 99214 OFFICE O/P EST MOD 30 MIN: CPT | Performed by: PHYSICIAN ASSISTANT

## 2018-04-02 PROCEDURE — 85025 COMPLETE CBC W/AUTO DIFF WBC: CPT | Performed by: PHYSICIAN ASSISTANT

## 2018-04-02 PROCEDURE — 71046 X-RAY EXAM CHEST 2 VIEWS: CPT | Mod: TC

## 2018-04-02 PROCEDURE — 86140 C-REACTIVE PROTEIN: CPT | Performed by: PHYSICIAN ASSISTANT

## 2018-04-02 RX ORDER — CEFPROZIL 500 MG/1
500 TABLET, FILM COATED ORAL 2 TIMES DAILY
Qty: 20 TABLET | Refills: 0 | Status: SHIPPED | OUTPATIENT
Start: 2018-04-02 | End: 2018-04-12

## 2018-04-02 RX ORDER — ALBUTEROL SULFATE 90 UG/1
2 AEROSOL, METERED RESPIRATORY (INHALATION) EVERY 6 HOURS PRN
Qty: 1 INHALER | Refills: 0 | Status: SHIPPED | OUTPATIENT
Start: 2018-04-02 | End: 2018-11-09

## 2018-04-02 ASSESSMENT — ANXIETY QUESTIONNAIRES
6. BECOMING EASILY ANNOYED OR IRRITABLE: NOT AT ALL
3. WORRYING TOO MUCH ABOUT DIFFERENT THINGS: NOT AT ALL
5. BEING SO RESTLESS THAT IT IS HARD TO SIT STILL: NOT AT ALL
7. FEELING AFRAID AS IF SOMETHING AWFUL MIGHT HAPPEN: NOT AT ALL
4. TROUBLE RELAXING: NOT AT ALL
GAD7 TOTAL SCORE: 0
2. NOT BEING ABLE TO STOP OR CONTROL WORRYING: NOT AT ALL
1. FEELING NERVOUS, ANXIOUS, OR ON EDGE: NOT AT ALL

## 2018-04-02 ASSESSMENT — PAIN SCALES - GENERAL: PAINLEVEL: NO PAIN (0)

## 2018-04-02 NOTE — NURSING NOTE
"Chief Complaint   Patient presents with     URI       Initial /72  Pulse 66  Temp 100.1  F (37.8  C)  Ht 5' 5\" (1.651 m)  Wt 136 lb 9.6 oz (62 kg)  SpO2 95%  BMI 22.73 kg/m2 Estimated body mass index is 22.73 kg/(m^2) as calculated from the following:    Height as of this encounter: 5' 5\" (1.651 m).    Weight as of this encounter: 136 lb 9.6 oz (62 kg).  Medication Reconciliation: complete   [facility]   Medical Assistant      "

## 2018-04-02 NOTE — MR AVS SNAPSHOT
After Visit Summary   4/2/2018    Denise Taylor    MRN: [id number]           Patient Information     Date Of Birth          1955        Visit Information        Provider Department      4/2/2018 1:30 PM Mirna Roblero PA [facility]        Today's Diagnoses     Myalgia    -  1    Pulmonary congestion          Care Instructions      Thank you for choosing [facility].   I have office hours 8:00 am to 4:30 pm on Monday's, Wednesday's, Thursday's and Friday's. My nurse and I are out of the office every Tuesday.    Following your visit, when your labs and diagnostic testing have returned, I will review then and you will be contacted by my nurse.  If you are on My Chart, you can also view results there.    For refills, notify your pharmacy regarding what you need and the pharmacy will generate a refill request. Do not call my nurse as she is unable to process refill request. Please plan ahead and allow 3-5 days for refill requests.    You will generally receive a reminder call the day prior to your appointment.  If you cannot attend your appointment, please cancel your appointment with as much notice as possible.  If there is a pattern of failure to present for your appointments, I cannot provide consistent, meaningful, ongoing care for you. It is very important to me that you come in for your care, so we can best assist you with your health care needs.    IMPORTANT:  Please note that it is my standard of practice to NOT participate in prescribing ongoing requested Narcotic Analgesic therapy, and/or participate in the prescribing of other controlled substances.  My nurse and I am happy to assist you with the process of referral for alternative pain management as needed, and other treatment modalities including but not limited to:  Physical Therapy, Physical Medicine and Rehab, Counseling, Chiropractic Care, Orthopedic Care, and non-narcotic medication management.  "    In the event that you need to be seen for emergent concerns and I am out of office,  please see one of my colleagues for acute concerns.  You may also present to UC or ER.  I appreciate the opportunity to serve you and look forward to supporting your healthcare needs in the future. Please contact me with any questions or concerns that you may have.    Sincerely,      Mirna Roblero RN, PA-C               Follow-ups after your visit        Your next 10 appointments already scheduled     Apr 17, 2018  9:40 AM CDT   (Arrive by 9:20 AM)   Return Visit with Baldev Lou DO    ORTHOPEDICS ([facility] )    [address] [facility] [address] [phone]              Who to contact     If you have questions or need follow up information about today's clinic visit or your schedule please contact [facility] directly at [phone].  Normal or non-critical lab and imaging results will be communicated to you by MyChart, letter or phone within 4 business days after the clinic has received the results. If you do not hear from us within 7 days, please contact the clinic through MyChart or phone. If you have a critical or abnormal lab result, we will notify you by phone as soon as possible.  Submit refill requests through [facility] or call your pharmacy and they will forward the refill request to us. Please allow 3 business days for your refill to be completed.          Additional Information About Your Visit        MyChart Information     [facility] lets you send messages to your doctor, view your test results, renew your prescriptions, schedule appointments and more. To sign up, go to [email or web address] . Click on \"Log in\" on the left side of the screen, which will take you to the Welcome page. Then click on \"Sign up Now\" on the right side of the page.     You will be asked to enter the access code listed below, as well as some personal information. Please follow the directions " "to create your username and password.     Your access code is: [email or web address]  Expires: 2018  9:53 AM     Your access code will  in 90 days. If you need help or a new code, please call your [place] clinic or [phone].        Care EveryWhere ID     This is your Care EveryWhere ID. This could be used by other organizations to access your [place] medical records  [id number]        Your Vitals Were     Pulse Temperature Height Pulse Oximetry BMI (Body Mass Index)       66 100.1  F (37.8  C) 5' 5\" (1.651 m) 95% 22.73 kg/m2        Blood Pressure from Last 3 Encounters:   18 104/72   18 110/84   18 107/63    Weight from Last 3 Encounters:   18 136 lb 9.6 oz (62 kg)   18 137 lb (62.1 kg)   18 137 lb 9.6 oz (62.4 kg)              We Performed the Following     CBC with platelets and differential     CRP, inflammation     Influenza A/B antigen          Today's Medication Changes          These changes are accurate as of 18  2:43 PM.  If you have any questions, ask your nurse or doctor.               Start taking these medicines.        Dose/Directions    albuterol 108 (90 BASE) MCG/ACT Inhaler   Commonly known as:  PROAIR HFA/PROVENTIL HFA/VENTOLIN HFA   Used for:  Pulmonary congestion   Started by:  Mirna Roblero PA        Dose:  2 puff   Inhale 2 puffs into the lungs every 6 hours as needed for shortness of breath / dyspnea or wheezing   Quantity:  1 Inhaler   Refills:  0       cefPROZIL 500 MG tablet   Commonly known as:  CEFZIL   Used for:  Pulmonary congestion   Started by:  Mirna Roblero PA        Dose:  500 mg   Take 1 tablet (500 mg) by mouth 2 times daily   Quantity:  20 tablet   Refills:  0            Where to get your medicines      These medications were sent to [facility] - [place] - [address] AT [facility] [address], [place] [address]     Phone:  [phone]     albuterol 108 (90 BASE) MCG/ACT Inhaler "    cefPROZIL 500 MG tablet                Primary Care Provider Office Phone # Fax #    Mirna WHEELER DASH Roblero [phone]       [facility] [address] [facility] [address]  [facility] [address]        Equal Access to Services     GIGI KYLE : Hadii florencia ku hadsharlao Soomaali, waaxda luqadaha, qaybta kaalmada adeegyada, waxtommie idiin hayhakeemn adejasmina baeza uriel scott. So [facility] [phone].    ATENCIÓN: Si habla español, tiene a mayo disposición servicios gratuitos de asistencia lingüística. Llame al [phone].    We comply with applicable federal civil rights laws and [place] laws. We do not discriminate on the basis of race, color, national origin, age, disability, sex, sexual orientation, or gender identity.            Thank you!     Thank you for choosing [facility]  for your care. Our goal is always to provide you with excellent care. Hearing back from our patients is one way we can continue to improve our services. Please take a few minutes to complete the written survey that you may receive in the mail after your visit with us. Thank you!             Your Updated Medication List - Protect others around you: Learn how to safely use, store and throw away your medicines at www.disposemymeds.org.          This list is accurate as of 4/2/18  2:43 PM.  Always use your most recent med list.                   Brand Name Dispense Instructions for use Diagnosis    albuterol 108 (90 BASE) MCG/ACT Inhaler    PROAIR HFA/PROVENTIL HFA/VENTOLIN HFA    1 Inhaler    Inhale 2 puffs into the lungs every 6 hours as needed for shortness of breath / dyspnea or wheezing    Pulmonary congestion       ASPIRIN PO      Take 81 mg by mouth daily        cefPROZIL 500 MG tablet    CEFZIL    20 tablet    Take 1 tablet (500 mg) by mouth 2 times daily    Pulmonary congestion       levothyroxine 75 MCG tablet    SYNTHROID/LEVOTHROID    90 tablet    Take 1 tablet (75 mcg) by mouth daily    Hypothyroidism, unspecified type        RELPAX PO      Take 40 mg by mouth once as needed for migraine        rosuvastatin 10 MG tablet    CRESTOR    30 tablet    Take 1 tablet (10 mg) by mouth daily    Hyperlipidemia with target LDL less than 130       SARAFEM 10 MG Tabs   Generic drug:  FLUoxetine HCl (PMDD)      Take 10 mg by mouth daily

## 2018-04-02 NOTE — PROGRESS NOTES
SUBJECTIVE:   Denise Taylor is a 62 year old female who presents to clinic today for the following health issues:      RESPIRATORY SYMPTOMS      Duration: 10 days    Description  nasal congestion, rhinorrhea, facial pain/pressure, cough, fever, chills, headache, fatigue/malaise, hoarse voice and myalgias    Severity: severe    Accompanying signs and symptoms: Coughing,blowing clear mucous    History (predisposing factors):  none    Precipitating or alleviating factors: None    Therapies tried and outcome:  rest and fluids,mucinex,    Ibuprofen,nasal sprays,stress.          Problem list and histories reviewed & adjusted, as indicated.  Additional history: as documented    Patient Active Problem List   Diagnosis     History of basal cell carcinoma     Hyperlipidemia with target LDL less than 130     Pneumonia     ACP (advance care planning)     Hypothyroidism, unspecified type     Past Surgical History:   Procedure Laterality Date     ARTHROSCOPY SHOULDER ROTATOR CUFF REPAIR Left 3/7/2018    Procedure: ARTHROSCOPY SHOULDER ROTATOR CUFF REPAIR;  LEFT SHOULDER ARTHROSCOPY, REPAIR ROTATOR CUFF: subacromial Decompression and AC Joint Resection;  Surgeon: Baldev Lou DO;  Location: [facility]     COLONOSCOPY  2006    Dr Lara     COLONOSCOPY N/A 10/10/2016    Procedure: COLONOSCOPY;  Surgeon: Christine Cintron MD;  Location: [facility]     SHOULDER SURGERY Right 2011/10/2012       Social History   Substance Use Topics     Smoking status: Never Smoker     Smokeless tobacco: Never Used     Alcohol use 1.0 oz/week     2 Glasses of wine per week      Comment: occasionally     Family History   Problem Relation Age of Onset     Myocardial Infarction Mother      Hypertension Mother      Stomach Cancer Father          Current Outpatient Prescriptions   Medication Sig Dispense Refill     rosuvastatin (CRESTOR) 10 MG tablet Take 1 tablet (10 mg) by mouth daily 30 tablet 5     levothyroxine (SYNTHROID/LEVOTHROID) 75 MCG tablet  "Take 1 tablet (75 mcg) by mouth daily 90 tablet 3     ASPIRIN PO Take 81 mg by mouth daily       Eletriptan Hydrobromide (RELPAX PO) Take 40 mg by mouth once as needed for migraine       FLUoxetine HCl, PMDD, (SARAFEM) 10 MG TABS Take 10 mg by mouth daily       Allergies   Allergen Reactions     Penicillins Rash     Zithromax [Azithromycin] Rash     BP Readings from Last 3 Encounters:   04/02/18 104/72   03/20/18 110/84   03/07/18 107/63    Wt Readings from Last 3 Encounters:   04/02/18 136 lb 9.6 oz (62 kg)   03/20/18 137 lb (62.1 kg)   03/07/18 137 lb 9.6 oz (62.4 kg)                    Reviewed and updated as needed this visit by clinical staff  Tobacco  Allergies  Meds  Med Hx  Surg Hx  Fam Hx  Soc Hx      Reviewed and updated as needed this visit by Provider         ROS:  CONSTITUTIONAL: NEGATIVE for fever, chills, change in weight  INTEGUMENTARY/SKIN: NEGATIVE for worrisome rashes, moles or lesions  EYES: NEGATIVE for vision changes or irritation  ENT/MOUTH: has nasal congestion and fatigue, facial pressure and discharge. Lots of post nasal drip and significant coughing.   RESP:NEGATIVE for significant cough or SOB and cough-non productive  CV: NEGATIVE for chest pain, palpitations or peripheral edema  GI: NEGATIVE for nausea, abdominal pain, heartburn, or change in bowel habits  : NEGATIVE for frequency, dysuria, or hematuria  MUSCULOSKELETAL:significant myalgia.   NEURO: NEGATIVE for weakness, dizziness or paresthesias  ENDOCRINE: NEGATIVE for temperature intolerance, skin/hair changes  HEME: NEGATIVE for bleeding problems  PSYCHIATRIC: NEGATIVE for changes in mood or affect    OBJECTIVE:                                                    /72  Pulse 66  Temp 100.1  F (37.8  C)  Ht 5' 5\" (1.651 m)  Wt 136 lb 9.6 oz (62 kg)  SpO2 95%  BMI 22.73 kg/m2  Body mass index is 22.73 kg/(m^2).  GENERAL: healthy, alert and no distress  EYES: Eyes grossly normal to inspection, extraocular movements - " intact, and PERRL  HENT: ear canals- normal; TMs- normal; Nose- normal; Mouth- no ulcers, no lesions  NECK: no tenderness, no adenopathy, no asymmetry, no masses, no stiffness; thyroid- normal to palpation  RESP: diminished sounds on left lung.  Hard coughing and pain in central chest when coughing.    CV: regular rates and rhythm, normal S1 S2, no S3 or S4 and no murmur, no click or rub -  ABDOMEN: soft, no tenderness, no  hepatosplenomegaly, no masses, normal bowel sounds  MS: extremities- no gross deformities noted, no edema  SKIN: no suspicious lesions, no rashes  NEURO: strength and tone- normal, sensory exam- grossly normal, mentation- intact, speech- normal, reflexes- symmetric  BACK: no CVA tenderness, no paralumbar tenderness  - female: cervix- normal, adnexae- normal; uterus- normal, no masses, no discharge  RECTAL- female: no masses, no hemorrhoids  PSYCH: Alert and oriented times 3; speech- coherent , normal rate and volume; able to articulate logical thoughts, able to abstract reason, no tangential thoughts, no hallucinations or delusions, affect- normal  LYMPHATICS: ant. cervical- normal, post. cervical- normal, axillary- normal, supraclavicular- normal, inguinal- normal    Diagnostic test results:  Diagnostic Test Results:  Results for orders placed or performed in visit on 04/02/18 (from the past 24 hour(s))   Influenza A/B antigen   Result Value Ref Range    Influenza A/B Agn Specimen Nose     Influenza A Negative NEG^Negative    Influenza B Negative NEG^Negative   CBC with platelets and differential   Result Value Ref Range    WBC 11.3 (H) 4.0 - 11.0 10e9/L    RBC Count 4.46 3.8 - 5.2 10e12/L    Hemoglobin 13.2 11.7 - 15.7 g/dL    Hematocrit 38.6 35.0 - 47.0 %    MCV 87 78 - 100 fl    MCH 29.6 26.5 - 33.0 pg    MCHC 34.2 31.5 - 36.5 g/dL    RDW 13.2 10.0 - 15.0 %    Platelet Count 209 150 - 450 10e9/L    Diff Method Automated Method     % Neutrophils 75.3 %    % Lymphocytes 16.5 %    % Monocytes  6.9 %    % Eosinophils 0.6 %    % Basophils 0.4 %    % Immature Granulocytes 0.3 %    Nucleated RBCs 0 0 /100    Absolute Neutrophil 8.5 (H) 1.6 - 8.3 10e9/L    Absolute Lymphocytes 1.9 0.8 - 5.3 10e9/L    Absolute Monocytes 0.8 0.0 - 1.3 10e9/L    Absolute Eosinophils 0.1 0.0 - 0.7 10e9/L    Absolute Basophils 0.0 0.0 - 0.2 10e9/L    Abs Immature Granulocytes 0.0 0 - 0.4 10e9/L    Absolute Nucleated RBC 0.0    CRP, inflammation   Result Value Ref Range    CRP Inflammation 37.6 (H) 0.0 - 8.0 mg/L        ASSESSMENT/PLAN:                                                    1. Myalgia  She has bacterial shift in her WBC and is 4 weeks post op.  Has been sick   - Influenza A/B antigen    2. Pulmonary congestion  She is going to be given below.  See us back as recommended.    - XR CHEST 2 VW (Clinic Performed); Future  - CBC with platelets and differential  - CRP, inflammation  - cefPROZIL (CEFZIL) 500 MG tablet; Take 1 tablet (500 mg) by mouth 2 times daily  Dispense: 20 tablet; Refill: 0  - albuterol (PROAIR HFA/PROVENTIL HFA/VENTOLIN HFA) 108 (90 BASE) MCG/ACT Inhaler; Inhale 2 puffs into the lungs every 6 hours as needed for shortness of breath / dyspnea or wheezing  Dispense: 1 Inhaler; Refill: 0        See Patient Instructions    DASH Vital  [facility]

## 2018-04-02 NOTE — PATIENT INSTRUCTIONS
Thank you for choosing [facility].   I have office hours 8:00 am to 4:30 pm on Monday's, Wednesday's, Thursday's and Friday's. My nurse and I are out of the office every Tuesday.    Following your visit, when your labs and diagnostic testing have returned, I will review then and you will be contacted by my nurse.  If you are on My Chart, you can also view results there.    For refills, notify your pharmacy regarding what you need and the pharmacy will generate a refill request. Do not call my nurse as she is unable to process refill request. Please plan ahead and allow 3-5 days for refill requests.    You will generally receive a reminder call the day prior to your appointment.  If you cannot attend your appointment, please cancel your appointment with as much notice as possible.  If there is a pattern of failure to present for your appointments, I cannot provide consistent, meaningful, ongoing care for you. It is very important to me that you come in for your care, so we can best assist you with your health care needs.    IMPORTANT:  Please note that it is my standard of practice to NOT participate in prescribing ongoing requested Narcotic Analgesic therapy, and/or participate in the prescribing of other controlled substances.  My nurse and I am happy to assist you with the process of referral for alternative pain management as needed, and other treatment modalities including but not limited to:  Physical Therapy, Physical Medicine and Rehab, Counseling, Chiropractic Care, Orthopedic Care, and non-narcotic medication management.     In the event that you need to be seen for emergent concerns and I am out of office,  please see one of my colleagues for acute concerns.  You may also present to  or ER.  I appreciate the opportunity to serve you and look forward to supporting your healthcare needs in the future. Please contact me with any questions or concerns that you may  have.    Sincerely,      Mirna Roblero RN, PA-C

## 2018-04-03 ASSESSMENT — PATIENT HEALTH QUESTIONNAIRE - PHQ9: SUM OF ALL RESPONSES TO PHQ QUESTIONS 1-9: 0

## 2018-04-03 ASSESSMENT — ANXIETY QUESTIONNAIRES: GAD7 TOTAL SCORE: 0

## 2018-04-12 ENCOUNTER — OFFICE VISIT (OUTPATIENT)
Dept: FAMILY MEDICINE | Facility: OTHER | Age: 63
End: 2018-04-12
Attending: FAMILY MEDICINE
Payer: COMMERCIAL

## 2018-04-12 VITALS
OXYGEN SATURATION: 98 % | DIASTOLIC BLOOD PRESSURE: 80 MMHG | HEART RATE: 51 BPM | TEMPERATURE: 98.6 F | HEIGHT: 65 IN | SYSTOLIC BLOOD PRESSURE: 112 MMHG | BODY MASS INDEX: 23.09 KG/M2 | WEIGHT: 138.6 LBS

## 2018-04-12 DIAGNOSIS — J20.9 ACUTE BRONCHITIS, UNSPECIFIED ORGANISM: Primary | ICD-10-CM

## 2018-04-12 PROCEDURE — 99213 OFFICE O/P EST LOW 20 MIN: CPT | Performed by: PHYSICIAN ASSISTANT

## 2018-04-12 ASSESSMENT — ANXIETY QUESTIONNAIRES
3. WORRYING TOO MUCH ABOUT DIFFERENT THINGS: NOT AT ALL
2. NOT BEING ABLE TO STOP OR CONTROL WORRYING: NOT AT ALL
4. TROUBLE RELAXING: NOT AT ALL
5. BEING SO RESTLESS THAT IT IS HARD TO SIT STILL: NOT AT ALL
1. FEELING NERVOUS, ANXIOUS, OR ON EDGE: NOT AT ALL
GAD7 TOTAL SCORE: 0
7. FEELING AFRAID AS IF SOMETHING AWFUL MIGHT HAPPEN: NOT AT ALL
6. BECOMING EASILY ANNOYED OR IRRITABLE: NOT AT ALL

## 2018-04-12 ASSESSMENT — PAIN SCALES - GENERAL: PAINLEVEL: NO PAIN (0)

## 2018-04-12 NOTE — PATIENT INSTRUCTIONS
Thank you for choosing [facility].   I have office hours 8:00 am to 4:30 pm on Monday's, Wednesday's, Thursday's and Friday's. My nurse and I are out of the office every Tuesday.    Following your visit, when your labs and diagnostic testing have returned, I will review then and you will be contacted by my nurse.  If you are on My Chart, you can also view results there.    For refills, notify your pharmacy regarding what you need and the pharmacy will generate a refill request. Do not call my nurse as she is unable to process refill request. Please plan ahead and allow 3-5 days for refill requests.    You will generally receive a reminder call the day prior to your appointment.  If you cannot attend your appointment, please cancel your appointment with as much notice as possible.  If there is a pattern of failure to present for your appointments, I cannot provide consistent, meaningful, ongoing care for you. It is very important to me that you come in for your care, so we can best assist you with your health care needs.    IMPORTANT:  Please note that it is my standard of practice to NOT participate in prescribing ongoing requested Narcotic Analgesic therapy, and/or participate in the prescribing of other controlled substances.  My nurse and I am happy to assist you with the process of referral for alternative pain management as needed, and other treatment modalities including but not limited to:  Physical Therapy, Physical Medicine and Rehab, Counseling, Chiropractic Care, Orthopedic Care, and non-narcotic medication management.     In the event that you need to be seen for emergent concerns and I am out of office,  please see one of my colleagues for acute concerns.  You may also present to  or ER.  I appreciate the opportunity to serve you and look forward to supporting your healthcare needs in the future. Please contact me with any questions or concerns that you may  have.    Sincerely,      Mirna Roblero RN, PA-C

## 2018-04-12 NOTE — NURSING NOTE
"Chief Complaint   Patient presents with     URI       Initial /80  Pulse 51  Temp 98.6  F (37  C)  Ht 5' 5\" (1.651 m)  Wt 138 lb 9.6 oz (62.9 kg)  SpO2 98%  BMI 23.06 kg/m2 Estimated body mass index is 23.06 kg/(m^2) as calculated from the following:    Height as of this encounter: 5' 5\" (1.651 m).    Weight as of this encounter: 138 lb 9.6 oz (62.9 kg).  Medication Reconciliation: complete   [facility]   Medical Assistant      "

## 2018-04-12 NOTE — PROGRESS NOTES
SUBJECTIVE:   Denise Taylor is a 62 year old female who presents to clinic today for the following health issues:      RESPIRATORY SYMPTOMS-1 week F/U      Duration: 3/23/2018    Description None      Severity: None    Accompanying signs and symptoms: None    History (predisposing factors):  none    Precipitating or alleviating factors: None    Therapies tried and outcome:  Inhaler,cefzil and feeling much better.          Problem list and histories reviewed & adjusted, as indicated.  Additional history: as documented    Patient Active Problem List   Diagnosis     History of basal cell carcinoma     Hyperlipidemia with target LDL less than 130     Pneumonia     ACP (advance care planning)     Hypothyroidism, unspecified type     Past Surgical History:   Procedure Laterality Date     ARTHROSCOPY SHOULDER ROTATOR CUFF REPAIR Left 3/7/2018    Procedure: ARTHROSCOPY SHOULDER ROTATOR CUFF REPAIR;  LEFT SHOULDER ARTHROSCOPY, REPAIR ROTATOR CUFF: subacromial Decompression and AC Joint Resection;  Surgeon: Baldev Lou DO;  Location: [facility]     COLONOSCOPY  2006    Dr Lara     COLONOSCOPY N/A 10/10/2016    Procedure: COLONOSCOPY;  Surgeon: Christine Cintron MD;  Location: [facility]     SHOULDER SURGERY Right 2011/10/2012       Social History   Substance Use Topics     Smoking status: Never Smoker     Smokeless tobacco: Never Used     Alcohol use 1.0 oz/week     2 Glasses of wine per week      Comment: occasionally     Family History   Problem Relation Age of Onset     Myocardial Infarction Mother      Hypertension Mother      Stomach Cancer Father          Current Outpatient Prescriptions   Medication Sig Dispense Refill     albuterol (PROAIR HFA/PROVENTIL HFA/VENTOLIN HFA) 108 (90 BASE) MCG/ACT Inhaler Inhale 2 puffs into the lungs every 6 hours as needed for shortness of breath / dyspnea or wheezing 1 Inhaler 0     rosuvastatin (CRESTOR) 10 MG tablet Take 1 tablet (10 mg) by mouth daily 30 tablet 5      "levothyroxine (SYNTHROID/LEVOTHROID) 75 MCG tablet Take 1 tablet (75 mcg) by mouth daily 90 tablet 3     ASPIRIN PO Take 81 mg by mouth daily       Eletriptan Hydrobromide (RELPAX PO) Take 40 mg by mouth once as needed for migraine       FLUoxetine HCl, PMDD, (SARAFEM) 10 MG TABS Take 10 mg by mouth daily       Allergies   Allergen Reactions     Penicillins Rash     Zithromax [Azithromycin] Rash     BP Readings from Last 3 Encounters:   04/12/18 112/80   04/02/18 104/72   03/20/18 110/84    Wt Readings from Last 3 Encounters:   04/12/18 138 lb 9.6 oz (62.9 kg)   04/02/18 136 lb 9.6 oz (62 kg)   03/20/18 137 lb (62.1 kg)                    Reviewed and updated as needed this visit by clinical staff  Tobacco  Allergies  Meds  Med Hx  Surg Hx  Fam Hx  Soc Hx      Reviewed and updated as needed this visit by Provider         ROS:  Constitutional, neuro, ENT, endocrine, pulmonary, cardiac, gastrointestinal, genitourinary, musculoskeletal, integument and psychiatric systems are negative, except as otherwise noted.    OBJECTIVE:                                                    /80  Pulse 51  Temp 98.6  F (37  C)  Ht 5' 5\" (1.651 m)  Wt 138 lb 9.6 oz (62.9 kg)  SpO2 98%  BMI 23.06 kg/m2  Body mass index is 23.06 kg/(m^2).  GENERAL APPEARANCE: healthy, alert and no distress  EYES: Eyes grossly normal to inspection, PERRL and conjunctivae and sclerae normal  HENT: ear canals and TM's normal and nose and mouth without ulcers or lesions  NECK: no adenopathy, no asymmetry, masses, or scars and thyroid normal to palpation  RESP: lungs clear to auscultation - no rales, rhonchi or wheezes  CV: regular rates and rhythm, normal S1 S2, no S3 or S4 and no murmur, click or rub  MS: extremities normal- no gross deformities noted  SKIN: no suspicious lesions or rashes  NEURO: Normal strength and tone, mentation intact and speech normal  PSYCH: mentation appears normal and affect normal/bright    Diagnostic test " results:  Diagnostic Test Results:  Results for orders placed or performed in visit on 04/02/18   XR CHEST 2 VW (Clinic Performed)    Narrative    PROCEDURE: XR CHEST 2 VW 4/2/2018 2:09 PM    HISTORY: ; Pulmonary congestion    COMPARISONS: None.    TECHNIQUE: 2 views.    FINDINGS: There is a mild S-shaped scoliosis. Heart is not enlarged.  Lungs are clear and no pleural effusion is seen. Lungs are  hyperinflated with flattening of the diaphragm.         Impression    IMPRESSION: No acute disease.    SOLEDAD RINCON MD        ASSESSMENT/PLAN:                                                      1. Acute bronchitis, unspecified organism  She is much better will continue to use her inhaler.  Let us know if sx are getting worse.     See Patient Instructions    DASH Vital  [facility]

## 2018-04-12 NOTE — MR AVS SNAPSHOT
After Visit Summary   4/12/2018    Denise Taylor    MRN: [id number]           Patient Information     Date Of Birth          1955        Visit Information        Provider Department      4/12/2018 10:45 AM Mirna Roblero PA [facility]        Today's Diagnoses     Acute bronchitis, unspecified organism    -  1      Care Instructions      Thank you for choosing [facility].   I have office hours 8:00 am to 4:30 pm on Monday's, Wednesday's, Thursday's and Friday's. My nurse and I are out of the office every Tuesday.    Following your visit, when your labs and diagnostic testing have returned, I will review then and you will be contacted by my nurse.  If you are on My Chart, you can also view results there.    For refills, notify your pharmacy regarding what you need and the pharmacy will generate a refill request. Do not call my nurse as she is unable to process refill request. Please plan ahead and allow 3-5 days for refill requests.    You will generally receive a reminder call the day prior to your appointment.  If you cannot attend your appointment, please cancel your appointment with as much notice as possible.  If there is a pattern of failure to present for your appointments, I cannot provide consistent, meaningful, ongoing care for you. It is very important to me that you come in for your care, so we can best assist you with your health care needs.    IMPORTANT:  Please note that it is my standard of practice to NOT participate in prescribing ongoing requested Narcotic Analgesic therapy, and/or participate in the prescribing of other controlled substances.  My nurse and I am happy to assist you with the process of referral for alternative pain management as needed, and other treatment modalities including but not limited to:  Physical Therapy, Physical Medicine and Rehab, Counseling, Chiropractic Care, Orthopedic Care, and non-narcotic medication  "management.     In the event that you need to be seen for emergent concerns and I am out of office,  please see one of my colleagues for acute concerns.  You may also present to UC or ER.  I appreciate the opportunity to serve you and look forward to supporting your healthcare needs in the future. Please contact me with any questions or concerns that you may have.    Sincerely,      Mirna Roblero RN, PA-C               Follow-ups after your visit        Your next 10 appointments already scheduled     Apr 17, 2018  9:40 AM CDT   (Arrive by 9:20 AM)   Return Visit with Baldev Lou DO    ORTHOPEDICS ([facility] )    [address] [facility] [address] [phone]              Who to contact     If you have questions or need follow up information about today's clinic visit or your schedule please contact [facility] directly at [phone].  Normal or non-critical lab and imaging results will be communicated to you by MyChart, letter or phone within 4 business days after the clinic has received the results. If you do not hear from us within 7 days, please contact the clinic through MyChart or phone. If you have a critical or abnormal lab result, we will notify you by phone as soon as possible.  Submit refill requests through [employer] or call your pharmacy and they will forward the refill request to us. Please allow 3 business days for your refill to be completed.          Additional Information About Your Visit        [employer] Information     [employer] lets you send messages to your doctor, view your test results, renew your prescriptions, schedule appointments and more. To sign up, go to [email or web address] . Click on \"Log in\" on the left side of the screen, which will take you to the Welcome page. Then click on \"Sign up Now\" on the right side of the page.     You will be asked to enter the access code listed below, as well as some personal information. Please follow the " "directions to create your username and password.     Your access code is: [email or web address]  Expires: 2018  9:53 AM     Your access code will  in 90 days. If you need help or a new code, please call your [place] clinic or [phone].        Care EveryWhere ID     This is your Care EveryWhere ID. This could be used by other organizations to access your [place] medical records  [id number]        Your Vitals Were     Pulse Temperature Height Pulse Oximetry BMI (Body Mass Index)       51 98.6  F (37  C) 5' 5\" (1.651 m) 98% 23.06 kg/m2        Blood Pressure from Last 3 Encounters:   18 112/80   18 104/72   18 110/84    Weight from Last 3 Encounters:   18 138 lb 9.6 oz (62.9 kg)   18 136 lb 9.6 oz (62 kg)   18 137 lb (62.1 kg)              Today, you had the following     No orders found for display       Primary Care Provider Office Phone # Fax #    DASH Angelo [phone]       [facility] [address]  [facility] [address]        Equal Access to Services     GIGI KYLE : Hadii aad ku hadasho Soomaali, waaxda luqadaha, qaybta kaalmada adeegyada, waxay idiin hayaan maryam trevino . So [facility] [phone].    ATENCIÓN: Si habla español, tiene a mayo disposición servicios gratuitos de asistencia lingüística. Deepthi al [phone].    We comply with applicable federal civil rights laws and [place] laws. We do not discriminate on the basis of race, color, national origin, age, disability, sex, sexual orientation, or gender identity.            Thank you!     [facility]  [facility] your care. Our goal is always to provide you with excellent care. Hearing back from our patients is one way we can continue to improve our services. Please take a few minutes to complete the written survey that you may receive in the mail after your visit with us. Thank you!             Your Updated Medication List - Protect " others around you: Learn how to safely use, store and throw away your medicines at www.disposemymeds.org.          This list is accurate as of 4/12/18 11:36 AM.  Always use your most recent med list.                   Brand Name Dispense Instructions for use Diagnosis    albuterol 108 (90 Base) MCG/ACT Inhaler    PROAIR HFA/PROVENTIL HFA/VENTOLIN HFA    1 Inhaler    Inhale 2 puffs into the lungs every 6 hours as needed for shortness of breath / dyspnea or wheezing    Pulmonary congestion       ASPIRIN PO      Take 81 mg by mouth daily        levothyroxine 75 MCG tablet    SYNTHROID/LEVOTHROID    90 tablet    Take 1 tablet (75 mcg) by mouth daily    Hypothyroidism, unspecified type       RELPAX PO      Take 40 mg by mouth once as needed for migraine        rosuvastatin 10 MG tablet    CRESTOR    30 tablet    Take 1 tablet (10 mg) by mouth daily    Hyperlipidemia with target LDL less than 130       SARAFEM 10 MG Tabs   Generic drug:  FLUoxetine HCl (PMDD)      Take 10 mg by mouth daily

## 2018-04-13 ASSESSMENT — ANXIETY QUESTIONNAIRES: GAD7 TOTAL SCORE: 0

## 2018-04-13 ASSESSMENT — PATIENT HEALTH QUESTIONNAIRE - PHQ9: SUM OF ALL RESPONSES TO PHQ QUESTIONS 1-9: 0

## 2018-04-17 ENCOUNTER — OFFICE VISIT (OUTPATIENT)
Dept: ORTHOPEDICS | Facility: OTHER | Age: 63
End: 2018-04-17
Attending: ORTHOPAEDIC SURGERY
Payer: COMMERCIAL

## 2018-04-17 VITALS
OXYGEN SATURATION: 98 % | SYSTOLIC BLOOD PRESSURE: 128 MMHG | WEIGHT: 138 LBS | BODY MASS INDEX: 22.99 KG/M2 | HEIGHT: 65 IN | TEMPERATURE: 98.3 F | DIASTOLIC BLOOD PRESSURE: 84 MMHG | HEART RATE: 52 BPM

## 2018-04-17 DIAGNOSIS — Z98.890 HISTORY OF ARTHROSCOPIC SURGERY OF SHOULDER: Primary | ICD-10-CM

## 2018-04-17 PROCEDURE — 99024 POSTOP FOLLOW-UP VISIT: CPT | Performed by: ORTHOPAEDIC SURGERY

## 2018-04-17 ASSESSMENT — PAIN SCALES - GENERAL: PAINLEVEL: NO PAIN (0)

## 2018-04-17 NOTE — NURSING NOTE
"Chief Complaint   Patient presents with     RECHECK     Left Shoulder       Initial /84  Pulse 52  Temp 98.3  F (36.8  C) (Tympanic)  Ht 5' 5\" (1.651 m)  Wt 138 lb (62.6 kg)  SpO2 98%  BMI 22.96 kg/m2 Estimated body mass index is 22.96 kg/(m^2) as calculated from the following:    Height as of this encounter: 5' 5\" (1.651 m).    Weight as of this encounter: 138 lb (62.6 kg).  Medication Reconciliation: complete   Ayesha Camarillo      "

## 2018-04-17 NOTE — PROGRESS NOTES
"Patient presents today 6 weeks after left shoulder arthroscopic assisted rotator cuff repair, subacromial decompression, and acromioclavicular joint resection.  Her discomfort is largely gone, she is using her arm is just a light helper arm.  It is time for her to begin physical therapy.  Referral was given a physical therapy and she will work on her range of motion, coordination, and strength for the next 4-6 weeks.  We'll recheck her in approximate 4 weeks to see if we need to modify her program and to check her progress./84  Pulse 52  Temp 98.3  F (36.8  C) (Tympanic)  Ht 5' 5\" (1.651 m)  Wt 138 lb (62.6 kg)  SpO2 98%  BMI 22.96 kg/m2  "

## 2018-04-17 NOTE — MR AVS SNAPSHOT
"              After Visit Summary   4/17/2018    Denise Taylor    MRN: [id number]           Patient Information     Date Of Birth          1955        Visit Information        Provider Department      4/17/2018 9:40 AM Baldev Lou, DO  ORTHOPEDICS        Today's Diagnoses     History of arthroscopic surgery of shoulder    -  1       Follow-ups after your visit        Additional Services     PHYSICAL THERAPY REFERRAL       *This therapy referral will be filtered to a centralized scheduling office at [facility] and the patient will receive a call to schedule an appointment at a [place] location most convenient for them. *     [facility] provides Physical Therapy evaluation and treatment and many specialty services across the [place] system.  If requesting a specialty program, please choose from the list below.    If you have not heard from the scheduling office within 2 business days, please call [phone] for all locations, with the exception of [place], please call [phone] and [facility], please call [phone]  Treatment: Evaluation & Treatment  Special Instructions/Modalities: as indicated  Special Programs: rotator cuff repair, left shoulder, progressive rom and strength    Please be aware that coverage of these services is subject to the terms and limitations of your health insurance plan.  Call member services at your health plan with any benefit or coverage questions.      **Note to Provider:  If you are referring outside of [place] for the therapy appointment, please list the name of the location in the \"special instructions\" above, print the referral and give to the patient to schedule the appointment.                  Who to contact     If you have questions or need follow up information about today's clinic visit or your schedule please contact  ORTHOPEDICS directly at [phone].  Normal or non-critical lab and imaging results " "will be communicated to you by MyChart, letter or phone within 4 business days after the clinic has received the results. If you do not hear from us within 7 days, please contact the clinic through [employer] or phone. If you have a critical or abnormal lab result, we will notify you by phone as soon as possible.  Submit refill requests through [employer] or call your pharmacy and they will forward the refill request to us. Please allow 3 business days for your refill to be completed.          Additional Information About Your Visit        [employer] Information     [employer] lets you send messages to your doctor, view your test results, renew your prescriptions, schedule appointments and more. To sign up, go to [email or web address] . Click on \"Log in\" on the left side of the screen, which will take you to the Welcome page. Then click on \"Sign up Now\" on the right side of the page.     You will be asked to enter the access code listed below, as well as some personal information. Please follow the directions to create your username and password.     Your access code is: [email or web address]  Expires: 2018  9:53 AM     Your access code will  in 90 days. If you need help or a new code, please call your [place] clinic or [phone].        Care EveryWhere ID     This is your Care EveryWhere ID. This could be used by other organizations to access your [place] medical records  [id number]        Your Vitals Were     Pulse Temperature Height Pulse Oximetry BMI (Body Mass Index)       52 98.3  F (36.8  C) (Tympanic) 5' 5\" (1.651 m) 98% 22.96 kg/m2        Blood Pressure from Last 3 Encounters:   18 128/84   18 112/80   18 104/72    Weight from Last 3 Encounters:   18 138 lb (62.6 kg)   18 138 lb 9.6 oz (62.9 kg)   18 136 lb 9.6 oz (62 kg)              We Performed the Following     PHYSICAL THERAPY REFERRAL        Primary Care Provider Office Phone # Fax #    DASH Angelo " [phone]       [facility] [address]  [facility] [address]        Equal Access to Services     GIGI KYLE : Hadii aad ku hadjames Gonzalez, phamda maribelnegro, nicoleta kacatiada wolfgang, arabella baeza laMoaudra scott. So [facility] [phone].    ATENCIÓN: Si habla español, tiene a mayo disposición servicios gratuitos de asistencia lingüística. Llame al [phone].    We comply with applicable federal civil rights laws and [place] laws. We do not discriminate on the basis of race, color, national origin, age, disability, sex, sexual orientation, or gender identity.            Thank you!     Thank you for choosing  ORTHOPEDICS  for your care. Our goal is always to provide you with excellent care. Hearing back from our patients is one way we can continue to improve our services. Please take a few minutes to complete the written survey that you may receive in the mail after your visit with us. Thank you!             Your Updated Medication List - Protect others around you: Learn how to safely use, store and throw away your medicines at www.disposemymeds.org.          This list is accurate as of 4/17/18 10:27 AM.  Always use your most recent med list.                   Brand Name Dispense Instructions for use Diagnosis    albuterol 108 (90 Base) MCG/ACT Inhaler    PROAIR HFA/PROVENTIL HFA/VENTOLIN HFA    1 Inhaler    Inhale 2 puffs into the lungs every 6 hours as needed for shortness of breath / dyspnea or wheezing    Pulmonary congestion       ASPIRIN PO      Take 81 mg by mouth daily        levothyroxine 75 MCG tablet    SYNTHROID/LEVOTHROID    90 tablet    Take 1 tablet (75 mcg) by mouth daily    Hypothyroidism, unspecified type       RELPAX PO      Take 40 mg by mouth once as needed for migraine        rosuvastatin 10 MG tablet    CRESTOR    30 tablet    Take 1 tablet (10 mg) by mouth daily    Hyperlipidemia with target LDL less than 130       SARAFEM 10 MG Tabs    Generic drug:  FLUoxetine HCl (PMDD)      Take 10 mg by mouth daily

## 2018-04-24 ENCOUNTER — HOSPITAL ENCOUNTER (OUTPATIENT)
Dept: PHYSICAL THERAPY | Facility: HOSPITAL | Age: 63
Setting detail: THERAPIES SERIES
End: 2018-04-24
Attending: ORTHOPAEDIC SURGERY
Payer: COMMERCIAL

## 2018-04-24 PROCEDURE — 97140 MANUAL THERAPY 1/> REGIONS: CPT | Mod: GP

## 2018-04-24 PROCEDURE — 97161 PT EVAL LOW COMPLEX 20 MIN: CPT | Mod: GP

## 2018-04-24 PROCEDURE — 97110 THERAPEUTIC EXERCISES: CPT | Mod: GP

## 2018-04-26 ENCOUNTER — HOSPITAL ENCOUNTER (OUTPATIENT)
Dept: PHYSICAL THERAPY | Facility: HOSPITAL | Age: 63
Setting detail: THERAPIES SERIES
End: 2018-04-26
Attending: ORTHOPAEDIC SURGERY
Payer: COMMERCIAL

## 2018-04-26 PROCEDURE — 97112 NEUROMUSCULAR REEDUCATION: CPT | Mod: GP

## 2018-04-26 NOTE — PROGRESS NOTES
04/24/18 1200   General Information   Type of Visit Initial OP Ortho PT Evaluation   Start of Care Date 04/24/18   Referring Physician Dr Lou   Patient/Family Goals Statement increase ROM, be able to garden and use my arm   Orders Evaluate and Treat   Date of Order 04/17/18   Insurance Type [employer]   Medical Diagnosis History of arthroscopic surgery of shoulder Z98.890   Surgical/Medical history reviewed Yes   Precautions/Limitations (left shlder s/p RCR )   Body Part(s)   Body Part(s) Shoulder   Presentation and Etiology   Pertinent history of current problem (include personal factors and/or comorbidities that impact the POC) Pt has no medical history to speak of. pt states there was no injury to her left shlder, but regular  wear and tear.  pt notes she had had two surgeries on her right shlder to repain ligaments.   Impairments D. Decreased ROM;E. Decreased flexibility;F. Decreased strength and endurance   Functional Limitations perform activities of daily living;perform desired leisure / sports activities   How/Where did it occur From insidious onset   Onset date of current episode/exacerbation 03/07/18   Chronicity New   Pain rating (0-10 point scale) Worst (/10);Best (/10)   Best (/10) 0   Worst (/10) 1-2   Pain quality C. Aching   Progression of symptoms since onset: Improved   Prior Level of Function   Prior Level of Function-Mobility indep   Prior Level of Function-ADLs indep   Functional Level Prior Comment indep   Fall Risk Screen   Fall screen completed by PT   Have you fallen 2 or more times in the past year? No   Have you fallen and had an injury in the past year? No   Is patient a fall risk? No   Shoulder Objective Findings   Side (if bilateral, select both right and left) Left   Observation Pt amb into dept holding her arm at ther side. left shlder slightly elevated ad forward compared to the rigt side.   Posture good except slight forward ead   Cervical Screen (ROM, quadrant) WFL   Thoracic  Mobility Screen WFL   Scapulothoracic Rhythm limited significantly   Left Shoulder Flexion AROM 92   Left Shoulder Abduction AROM 70   Left Shoulder ER AROM 6   Left Shoulder IR AROM WFL   Planned Therapy Interventions   Planned Therapy Interventions manual therapy;neuromuscular re-education;strengthening   Planned Therapy Interventions Comment Will follow [facility] post operative [facility] rehabilitationprotocol for guidelines   Clinical Impression   Criteria for Skilled Therapeutic Interventions Met yes, treatment indicated   PT Diagnosis muscle imbalance s/p RCR   Clinical Presentation Stable/Uncomplicated   Clinical Presentation Rationale thrapist judgement   Clinical Decision Making (Complexity) Low complexity   Therapy Frequency daily   Predicted Duration of Therapy Intervention (days/wks) 2   Risk & Benefits of therapy have been explained Yes   Patient, Family & other staff in agreement with plan of care Yes   Clinical Impression Comments Pt is one week out of sling. Pt needed remined not to over do .   Education Assessment   Barriers to Learning No barriers   ORTHO GOALS   PT Ortho Eval Goals 1;2;3;4   Ortho Goal 1   Goal Identifier STG 1   Goal Description pt to obtain full ROM of left UE in order for pt to reach overhead for daily self cares   Target Date 05/08/18   Ortho Goal 2   Goal Identifier STG2   Goal Description pt to increase scapularhumeral motion inorder to reach forward  into a cupboard   Target Date 05/15/18   Ortho Goal 3   Goal Identifier LTG 1   Goal Description Pt to obtain good/normal strength of her left upper Quadrant inorder for her to return to gardening   Target Date 06/07/18   Ortho Goal 4   Goal Identifier LTG 2   Goal Description PT to return to previous levels of activities prior to surgers in order to return to previous llife   Target Date 06/07/18   Total Evaluation Time   Total Evaluation Time 20

## 2018-05-01 ENCOUNTER — HOSPITAL ENCOUNTER (OUTPATIENT)
Dept: PHYSICAL THERAPY | Facility: HOSPITAL | Age: 63
Setting detail: THERAPIES SERIES
End: 2018-05-01
Attending: ORTHOPAEDIC SURGERY
Payer: COMMERCIAL

## 2018-05-01 PROCEDURE — 97112 NEUROMUSCULAR REEDUCATION: CPT | Mod: GP

## 2018-05-01 PROCEDURE — 97140 MANUAL THERAPY 1/> REGIONS: CPT | Mod: GP

## 2018-05-15 ENCOUNTER — HOSPITAL ENCOUNTER (OUTPATIENT)
Dept: PHYSICAL THERAPY | Facility: HOSPITAL | Age: 63
Setting detail: THERAPIES SERIES
End: 2018-05-15
Attending: ORTHOPAEDIC SURGERY
Payer: COMMERCIAL

## 2018-05-15 ENCOUNTER — OFFICE VISIT (OUTPATIENT)
Dept: ORTHOPEDICS | Facility: OTHER | Age: 63
End: 2018-05-15
Attending: ORTHOPAEDIC SURGERY
Payer: COMMERCIAL

## 2018-05-15 VITALS
WEIGHT: 138 LBS | TEMPERATURE: 97.2 F | SYSTOLIC BLOOD PRESSURE: 130 MMHG | BODY MASS INDEX: 22.99 KG/M2 | OXYGEN SATURATION: 98 % | HEIGHT: 65 IN | DIASTOLIC BLOOD PRESSURE: 84 MMHG | HEART RATE: 50 BPM

## 2018-05-15 DIAGNOSIS — Z98.890 HISTORY OF ARTHROSCOPIC SURGERY OF SHOULDER: Primary | ICD-10-CM

## 2018-05-15 PROCEDURE — 40000718 ZZHC STATISTIC PT DEPARTMENT ORTHO VISIT

## 2018-05-15 PROCEDURE — 97110 THERAPEUTIC EXERCISES: CPT | Mod: GP

## 2018-05-15 PROCEDURE — 97140 MANUAL THERAPY 1/> REGIONS: CPT | Mod: GP

## 2018-05-15 PROCEDURE — 99024 POSTOP FOLLOW-UP VISIT: CPT | Performed by: ORTHOPAEDIC SURGERY

## 2018-05-15 ASSESSMENT — PAIN SCALES - GENERAL: PAINLEVEL: NO PAIN (0)

## 2018-05-15 NOTE — NURSING NOTE
"Chief Complaint   Patient presents with     RECHECK     Left Shoulder       Initial /84  Pulse 50  Temp 97.2  F (36.2  C) (Tympanic)  Ht 5' 5\" (1.651 m)  Wt 138 lb (62.6 kg)  SpO2 98%  BMI 22.96 kg/m2 Estimated body mass index is 22.96 kg/(m^2) as calculated from the following:    Height as of this encounter: 5' 5\" (1.651 m).    Weight as of this encounter: 138 lb (62.6 kg).  Medication Reconciliation: complete    Ayesha Camarillo LPN    "

## 2018-05-15 NOTE — PROGRESS NOTES
Patient presents today follow-up after physical therapy for her left shoulder arthroscopic rotator cuff repair subacromial decompression.  She is already achieved functional strength and range of motion and is using her arm as normally for all household and outdoor activities.  She is extremely pleased with results of her surgery.  Just a small nodular mass in the palm of the right hand in line with the middle finger flexor tendon that occasionally enlarges and gets in the way with tool use, that she may wish to have it electively removed at some point in time the future.  The meantime she can continue with increasing her activity as tolerated and follow-up on an as-needed basis.

## 2018-05-15 NOTE — MR AVS SNAPSHOT
After Visit Summary   5/15/2018    Denise Taylor    MRN: [id number]           Patient Information     Date Of Birth          1955        Visit Information        Provider Department      5/15/2018 9:40 AM Baldev Lou DO  ORTHOPEDICS        Today's Diagnoses     History of arthroscopic surgery of shoulder    -  1       Follow-ups after your visit        Your next 10 appointments already scheduled     May 15, 2018 10:30 AM CDT   Treatment with Christine Tornow, PTA   [facility] ([facility] )    [address] [address]   [phone]            May 17, 2018 11:30 AM CDT   Treatment with Christine Tornow, PTA   [facility] ([facility] )    [address] [address]   [phone]            May 22, 2018 11:30 AM CDT   Treatment with Christine Tornow, PTA   [facility] ([facility] )    [address] [address]   [phone]            May 24, 2018 11:30 AM CDT   Treatment with Puja Siddiqi PT   [facility] ([facility] )    [address] [address]   [phone]            May 29, 2018 11:30 AM CDT   Treatment with Puja Siddiqi PT   [facility] ([facility] )    [address] [address]   [phone]            May 31, 2018 11:30 AM CDT   Treatment with Christine Tornow, PTA   [facility] ([facility] )    [address] [id number]   [phone]              Who to contact     If you have questions or need follow up information about today's clinic visit or your schedule please contact  ORTHOPEDICS directly at [phone].  Normal or non-critical lab and imaging results will be communicated to you by MyChart, letter or phone within 4 business days after the clinic has received the results. If you do not hear from us within 7 days, please contact the clinic through MyChart or phone. If you have a  "critical or abnormal lab result, we will notify you by phone as soon as possible.  Submit refill requests through [employer] or call your pharmacy and they will forward the refill request to us. Please allow 3 business days for your refill to be completed.          Additional Information About Your Visit        MyChart Information     [employer] lets you send messages to your doctor, view your test results, renew your prescriptions, schedule appointments and more. To sign up, go to [email or web address] . Click on \"Log in\" on the left side of the screen, which will take you to the Welcome page. Then click on \"Sign up Now\" on the right side of the page.     You will be asked to enter the access code listed below, as well as some personal information. Please follow the directions to create your username and password.     Your access code is: [email or web address]  Expires: 2018  9:53 AM     Your access code will  in 90 days. If you need help or a new code, please call your [place] clinic or [phone].        Care EveryWhere ID     This is your Care EveryWhere ID. This could be used by other organizations to access your [place] medical records  [id number]        Your Vitals Were     Pulse Temperature Height Pulse Oximetry BMI (Body Mass Index)       50 97.2  F (36.2  C) (Tympanic) 5' 5\" (1.651 m) 98% 22.96 kg/m2        Blood Pressure from Last 3 Encounters:   05/15/18 130/84   18 128/84   18 112/80    Weight from Last 3 Encounters:   05/15/18 138 lb (62.6 kg)   18 138 lb (62.6 kg)   18 138 lb 9.6 oz (62.9 kg)              Today, you had the following     No orders found for display       Primary Care Provider Office Phone # Fax #    DASH Angelo [phone]       [address] [place] [id number]        Equal Access to Services     GIGI KYLE AH: Snehal Gonzalez, gene roche, qaybta kaalmaarabella bell" lajesús scott. So [facility] [phone].    ATENCIÓN: Si habla hector, tiene a mayo disposición servicios gratuitos de asistencia lingüística. Deepthi al [phone].    We comply with applicable federal civil rights laws and [place] laws. We do not discriminate on the basis of race, color, national origin, age, disability, sex, sexual orientation, or gender identity.            Thank you!     Thank you for choosing  ORTHOPEDICS  for your care. Our goal is always to provide you with excellent care. Hearing back from our patients is one way we can continue to improve our services. Please take a few minutes to complete the written survey that you may receive in the mail after your visit with us. Thank you!             Your Updated Medication List - Protect others around you: Learn how to safely use, store and throw away your medicines at www.disposemymeds.org.          This list is accurate as of 5/15/18  9:46 AM.  Always use your most recent med list.                   Brand Name Dispense Instructions for use Diagnosis    albuterol 108 (90 Base) MCG/ACT Inhaler    PROAIR HFA/PROVENTIL HFA/VENTOLIN HFA    1 Inhaler    Inhale 2 puffs into the lungs every 6 hours as needed for shortness of breath / dyspnea or wheezing    Pulmonary congestion       ASPIRIN PO      Take 81 mg by mouth daily        levothyroxine 75 MCG tablet    SYNTHROID/LEVOTHROID    90 tablet    Take 1 tablet (75 mcg) by mouth daily    Hypothyroidism, unspecified type       RELPAX PO      Take 40 mg by mouth once as needed for migraine        rosuvastatin 10 MG tablet    CRESTOR    30 tablet    Take 1 tablet (10 mg) by mouth daily    Hyperlipidemia with target LDL less than 130       SARAFEM 10 MG Tabs   Generic drug:  FLUoxetine HCl (PMDD)      Take 10 mg by mouth daily

## 2018-05-17 ENCOUNTER — HOSPITAL ENCOUNTER (OUTPATIENT)
Dept: PHYSICAL THERAPY | Facility: HOSPITAL | Age: 63
Setting detail: THERAPIES SERIES
End: 2018-05-17
Attending: ORTHOPAEDIC SURGERY
Payer: COMMERCIAL

## 2018-05-17 PROCEDURE — 97110 THERAPEUTIC EXERCISES: CPT | Mod: GP

## 2018-05-17 PROCEDURE — 97140 MANUAL THERAPY 1/> REGIONS: CPT | Mod: GP

## 2018-05-17 PROCEDURE — 40000718 ZZHC STATISTIC PT DEPARTMENT ORTHO VISIT

## 2018-05-22 ENCOUNTER — HOSPITAL ENCOUNTER (OUTPATIENT)
Dept: PHYSICAL THERAPY | Facility: HOSPITAL | Age: 63
Setting detail: THERAPIES SERIES
End: 2018-05-22
Attending: ORTHOPAEDIC SURGERY
Payer: COMMERCIAL

## 2018-05-22 PROCEDURE — 97110 THERAPEUTIC EXERCISES: CPT | Mod: GP

## 2018-05-22 PROCEDURE — 40000718 ZZHC STATISTIC PT DEPARTMENT ORTHO VISIT

## 2018-06-03 ENCOUNTER — HEALTH MAINTENANCE LETTER (OUTPATIENT)
Age: 63
End: 2018-06-03

## 2018-07-17 DIAGNOSIS — E03.9 HYPOTHYROIDISM, UNSPECIFIED TYPE: ICD-10-CM

## 2018-07-17 RX ORDER — LEVOTHYROXINE SODIUM 75 UG/1
75 TABLET ORAL DAILY
Qty: 30 TABLET | Refills: 0 | Status: SHIPPED | OUTPATIENT
Start: 2018-07-17 | End: 2018-08-01

## 2018-08-01 DIAGNOSIS — E03.9 HYPOTHYROIDISM, UNSPECIFIED TYPE: Primary | ICD-10-CM

## 2018-08-01 DIAGNOSIS — E03.9 HYPOTHYROIDISM, UNSPECIFIED TYPE: ICD-10-CM

## 2018-08-01 DIAGNOSIS — R74.8 ELEVATED CK: ICD-10-CM

## 2018-08-01 LAB
ALBUMIN UR-MCNC: NEGATIVE MG/DL
APPEARANCE UR: CLEAR
BILIRUB UR QL STRIP: NEGATIVE
COLOR UR AUTO: NORMAL
GLUCOSE UR STRIP-MCNC: NEGATIVE MG/DL
HGB UR QL STRIP: NEGATIVE
KETONES UR STRIP-MCNC: NEGATIVE MG/DL
LEUKOCYTE ESTERASE UR QL STRIP: NEGATIVE
NITRATE UR QL: NEGATIVE
PH UR STRIP: 6.5 PH (ref 4.7–8)
SOURCE: NORMAL
SP GR UR STRIP: 1.01 (ref 1–1.03)
T4 FREE SERPL-MCNC: 0.98 NG/DL (ref 0.76–1.46)
TSH SERPL DL<=0.005 MIU/L-ACNC: 4.59 MU/L (ref 0.4–4)
UROBILINOGEN UR STRIP-MCNC: NORMAL MG/DL (ref 0–2)

## 2018-08-01 PROCEDURE — 84443 ASSAY THYROID STIM HORMONE: CPT | Performed by: PHYSICIAN ASSISTANT

## 2018-08-01 PROCEDURE — 84439 ASSAY OF FREE THYROXINE: CPT | Performed by: PHYSICIAN ASSISTANT

## 2018-08-01 PROCEDURE — 36415 COLL VENOUS BLD VENIPUNCTURE: CPT | Performed by: PHYSICIAN ASSISTANT

## 2018-08-01 PROCEDURE — 81003 URINALYSIS AUTO W/O SCOPE: CPT | Performed by: PHYSICIAN ASSISTANT

## 2018-08-01 RX ORDER — LEVOTHYROXINE SODIUM 100 UG/1
100 TABLET ORAL DAILY
Qty: 60 TABLET | Refills: 1 | Status: SHIPPED | OUTPATIENT
Start: 2018-08-01 | End: 2018-09-27 | Stop reason: DRUGHIGH

## 2018-09-04 DIAGNOSIS — M25.511 RIGHT SHOULDER PAIN: Primary | ICD-10-CM

## 2018-09-18 ENCOUNTER — OFFICE VISIT (OUTPATIENT)
Dept: ORTHOPEDICS | Facility: OTHER | Age: 63
End: 2018-09-18
Attending: ORTHOPAEDIC SURGERY
Payer: COMMERCIAL

## 2018-09-18 ENCOUNTER — RADIANT APPOINTMENT (OUTPATIENT)
Dept: GENERAL RADIOLOGY | Facility: OTHER | Age: 63
End: 2018-09-18
Attending: ORTHOPAEDIC SURGERY
Payer: COMMERCIAL

## 2018-09-18 VITALS
DIASTOLIC BLOOD PRESSURE: 70 MMHG | HEART RATE: 57 BPM | WEIGHT: 138 LBS | OXYGEN SATURATION: 98 % | TEMPERATURE: 98.2 F | SYSTOLIC BLOOD PRESSURE: 96 MMHG | BODY MASS INDEX: 22.99 KG/M2 | HEIGHT: 65 IN

## 2018-09-18 DIAGNOSIS — M25.511 RIGHT SHOULDER PAIN: ICD-10-CM

## 2018-09-18 DIAGNOSIS — G89.29 CHRONIC RIGHT SHOULDER PAIN: Primary | ICD-10-CM

## 2018-09-18 DIAGNOSIS — M25.511 CHRONIC RIGHT SHOULDER PAIN: Primary | ICD-10-CM

## 2018-09-18 PROCEDURE — 73030 X-RAY EXAM OF SHOULDER: CPT | Mod: TC

## 2018-09-18 PROCEDURE — 99213 OFFICE O/P EST LOW 20 MIN: CPT | Performed by: ORTHOPAEDIC SURGERY

## 2018-09-18 ASSESSMENT — PAIN SCALES - GENERAL: PAINLEVEL: NO PAIN (1)

## 2018-09-18 NOTE — MR AVS SNAPSHOT
"              After Visit Summary   9/18/2018    Denise Taylor    MRN: [id number]           Patient Information     Date Of Birth          1955        Visit Information        Provider Department      9/18/2018 1:40 PM Baldev Lou,  [facility]        Today's Diagnoses     Chronic right shoulder pain    -  1       Follow-ups after your visit        Your next 10 appointments already scheduled     Sep 21, 2018 11:30 AM CDT   (Arrive by 11:15 AM)   MA SCREENING BILATERAL W/ ORTIZ with HCMA1   [facility]    [address]  [facility] [address]   [phone]           Three-dimensional (3D) mammograms are available at [place] locations in [facility], [place], [facility], [place], [place], and [place]. [facility] locations include [place] [facility] in [place]. Benefits of 3D mammograms include: - Improved rate of cancer detection - Decreases your chance of having to go back for more tests, which means fewer: - \"False-positive\" results (This means that there is an abnormal area but it isn't cancer.) - Invasive testing procedures, such as a biopsy or surgery - Can provide clearer images of the breast if you have dense breast tissue. 3D mammography is an optional exam that anyone can have with a 2D mammogram. It doesn't replace or take the place of a 2D mammogram. 2D mammograms remain an effective screening test for all women.  Not all insurance companies cover the cost of a 3D mammogram. Check with your insurance.            Sep 27, 2018  7:15 AM CDT   (Arrive by 7:00 AM)   MR SHOULDER RIGHT W/O CONTRAST with HIMR1   [facility] ([facility] )    [address] [id number] [phone]           Take your medicines as usual, unless your doctor tells you not to. Bring a list of your current medicines to your exam (including vitamins, minerals and " over-the-counter drugs). Also bring the results of similar scans you may have had.  Please remove any body piercings and hair extensions before you arrive.  Follow your doctor s orders. If you do not, we may have to postpone your exam.  You may or may not receive IV contrast for this exam pending the discretion of the Radiologist.  You do not need to do anything special to prepare.  The MRI machine uses a strong magnet. Please wear clothes without metal (snaps, zippers). A sweatsuit works well, or we may give you a hospital gown.   **IMPORTANT** THE INSTRUCTIONS BELOW ARE ONLY FOR THOSE PATIENTS WHO HAVE BEEN PRESCRIBED SEDATION OR GENERAL ANESTHESIA DURING THEIR MRI PROCEDURE:  IF YOUR DOCTOR PRESCRIBED ORAL SEDATION (take medicine to help you relax during your exam):   You must get the medicine from your doctor (oral medication) before you arrive. Bring the medicine to the exam. Do not take it at home. You ll be told when to take it upon arriving for your exam.   Arrive one hour early. Bring someone who can take you home after the test. Your medicine will make you sleepy. After the exam, you may not drive, take a bus or take a taxi by yourself.  IF YOUR DOCTOR PRESCRIBED IV SEDATION:   Arrive one hour early. Bring someone who can take you home after the test. Your medicine will make you sleepy. After the exam, you may not drive, take a bus or take a taxi by yourself.   No eating 6 hours before your exam. You may have clear liquids up until 4 hours before your exam. (Clear liquids include water, clear tea, black coffee and fruit juice without pulp.)  IF YOUR DOCTOR PRESCRIBED ANESTHESIA (be asleep for your exam):   Arrive 1 1/2 hours early. Bring someone who can take you home after the test. You may not drive, take a bus or take a taxi by yourself.   No eating 8 hours before your exam. You may have clear liquids up until 4 hours before your exam. (Clear liquids include water, clear tea, black coffee and fruit juice  without pulp.)   You will spend four to five hours in the recovery room.  Please call the Imaging Department at your exam site with any questions.            Sep 27, 2018  8:40 AM CDT   (Arrive by 8:25 AM)   SHORT with DASH Angelo   [facility] [place] ([facility] [place] )    [address]  [place] [address]   [phone]            Oct 02, 2018 10:20 AM CDT   (Arrive by 10:05 AM)   Return Visit with Baldev Lou DO   [facility] [place] ([facility] [place] )    [address]  [place] [id number] [phone]              Future tests that were ordered for you today     Open Future Orders        Priority Expected Expires Ordered    MR Shoulder Right w/o Contrast Routine  9/18/2019 9/18/2018    MA Screen Bilateral w/Raj Routine  9/17/2019 9/17/2018            Who to contact     If you have questions or need follow up information about today's clinic visit or your schedule please contact [facility] directly at [phone].  Normal or non-critical lab and imaging results will be communicated to you by MyChart, letter or phone within 4 business days after the clinic has received the results. If you do not hear from us within 7 days, please contact the clinic through [employer] or phone. If you have a critical or abnormal lab result, we will notify you by phone as soon as possible.  Submit refill requests through [employer] or call your pharmacy and they will forward the refill request to us. Please allow 3 business days for your refill to be completed.          Additional Information About Your Visit        MyChart Information     [employer] gives you secure access to your electronic health record. If you see a primary care provider, you can also send messages to your care team and make appointments. If you have questions, please call your primary care clinic.  If you do not have a primary care provider, please call  "[phone] and they will assist you.        Care EveryWhere ID     This is your Care EveryWhere ID. This could be used by other organizations to access your [facility] medical records  [id number]        Your Vitals Were     Pulse Temperature Height Pulse Oximetry BMI (Body Mass Index)       57 98.2  F (36.8  C) (Tympanic) 1.651 m (5' 5\") 98% 22.96 kg/m2        Blood Pressure from Last 3 Encounters:   09/18/18 96/70   05/15/18 130/84   04/17/18 128/84    Weight from Last 3 Encounters:   09/18/18 62.6 kg (138 lb)   05/15/18 62.6 kg (138 lb)   04/17/18 62.6 kg (138 lb)               Primary Care Provider Office Phone # Fax #    DASH Angelo [phone]       [address] [place] [address]        Equal Access to Services     GIGI KYLE AH: Hadii aad ku hadasho Soomaali, waaxda luqadaha, qaybta kaalmada adeegyada, waxay idiin hayhakeemn maryam trevino . So [facility] [phone].    ATENCIÓN: Si habla español, tiene a mayo disposición servicios gratuitos de asistencia lingüística. Llame al [phone].    We comply with applicable federal civil rights laws and [place] laws. We do not discriminate on the basis of race, color, national origin, age, disability, sex, sexual orientation, or gender identity.            Thank you!     Thank you for choosing [facility]  for your care. Our goal is always to provide you with excellent care. Hearing back from our patients is one way we can continue to improve our services. Please take a few minutes to complete the written survey that you may receive in the mail after your visit with us. Thank you!             Your Updated Medication List - Protect others around you: Learn how to safely use, store and throw away your medicines at www.disposemymeds.org.          This list is accurate as of 9/18/18  2:03 PM.  Always use your most recent med list.                   Brand Name Dispense Instructions for use Diagnosis    " albuterol 108 (90 Base) MCG/ACT inhaler    PROAIR HFA/PROVENTIL HFA/VENTOLIN HFA    1 Inhaler    Inhale 2 puffs into the lungs every 6 hours as needed for shortness of breath / dyspnea or wheezing    Pulmonary congestion       ASPIRIN PO      Take 81 mg by mouth daily        levothyroxine 100 MCG tablet    SYNTHROID/LEVOTHROID    60 tablet    Take 1 tablet (100 mcg) by mouth daily    Hypothyroidism, unspecified type       RELPAX PO      Take 40 mg by mouth once as needed for migraine        rosuvastatin 10 MG tablet    CRESTOR    30 tablet    Take 1 tablet (10 mg) by mouth daily    Hyperlipidemia with target LDL less than 130       SARAFEM 10 MG Tabs   Generic drug:  FLUoxetine HCl (PMDD)      Take 10 mg by mouth daily

## 2018-09-18 NOTE — NURSING NOTE
"Chief Complaint   Patient presents with     Pain     New Issue/ Right Shoulder Pain/ Xrays First       Initial BP 96/70  Pulse 57  Temp 98.2  F (36.8  C) (Tympanic)  Ht 5' 5\" (1.651 m)  Wt 138 lb (62.6 kg)  SpO2 98%  BMI 22.96 kg/m2 Estimated body mass index is 22.96 kg/(m^2) as calculated from the following:    Height as of this encounter: 5' 5\" (1.651 m).    Weight as of this encounter: 138 lb (62.6 kg).  Medication Reconciliation: complete    Ayesha Camarillo LPN    "

## 2018-09-18 NOTE — PROGRESS NOTES
Patient presents today with chronic pain of her right shoulder.  She had acromioclavicular joint injury with subsequent reconstructions of the acromioclavicular joint ×2, this was approximately 7 or 8 years ago.  She states that her shoulder is never really felt the same and she is always had some discomfort in the anterior shoulder area, but 2 months ago this became greatly exacerbated.  She noticed that her right scapula and was protruding further than her left, when she puts her arms in certain positions she gets a numb or tingling sensation that goes all the way down the dorsum of her hand.  She also has night pain, pain with attempts at lifting anything overhead.    Past medical history is reviewed, she has controlled her reactive airway disease, controlled hyper lipidemia, controlled hypothyroidism.  She's had previous surgical procedures including recently a left shoulder rotator cuff repair with no difficulty with anesthetic or bleeding.    Review of systems: No recent changes in her health.    Allergies: Penicillin and azithromycin    Physical exam: Both shoulders are examined today.  There is noticeable atrophy in the anterior aspect of the right shoulder.  The previous clavicular resection and reconstruction remains stable, there is a palpable metallic implant beneath her thin skin.  There is a palpable gap between the acromion and the clavicle measuring approximately 2 cm.  The patient does have a full range of motion actively and passively but obviously has discomfort at forward elevation of the arm from 60 to approximately 110-120 .  Chest tenderness over the long head of the biceps, tenderness over the coracoid process, tenderness over the lateral acromion and lateral humerus.  She has weakness to supraspinatus isolation testing, and weakness to biceps strength testing.  His biceps strength testing also causes proximal shoulder pain.  Palpation around the anterior shoulder and along the coracoid  "process reproduce her numbness that she is experiencing along the dorsum of the arm and down into the hand.  She does have protrusion of the interpolar scapula on the right hybrid no winging is noted with muscle testing.  X-rays demonstrate resection of the distal clavicle with reconstruction using soft tissue and a suture anchor.    Assessment and plan: The patient has symptoms of a rotator cuff tear as well as inflammation around the shoulder, I'm not certain what is causing the numb sensation as yet.  Going to order an MRI and have her follow up after this is completed.  In the meantime continue to monitor along her symptoms and see if there is anything else she notices that may be helpful in helping with the diagnosis.  If the MRI is equivocal, neurology consult may be warranted.BP 96/70  Pulse 57  Temp 98.2  F (36.8  C) (Tympanic)  Ht 5' 5\" (1.651 m)  Wt 138 lb (62.6 kg)  SpO2 98%  BMI 22.96 kg/m2  "

## 2018-09-21 ENCOUNTER — RADIANT APPOINTMENT (OUTPATIENT)
Dept: MAMMOGRAPHY | Facility: OTHER | Age: 63
End: 2018-09-21
Attending: PHYSICIAN ASSISTANT
Payer: COMMERCIAL

## 2018-09-21 DIAGNOSIS — Z12.39 SCREENING BREAST EXAMINATION: ICD-10-CM

## 2018-09-21 PROCEDURE — 77063 BREAST TOMOSYNTHESIS BI: CPT | Mod: TC

## 2018-09-21 PROCEDURE — 77067 SCR MAMMO BI INCL CAD: CPT | Mod: TC

## 2018-09-24 NOTE — PROGRESS NOTES
SUBJECTIVE:   Denise Taylor is a 63 year old female who presents to clinic today for the following health issues:      Hypothyroidism Follow-up      Since last visit, patient describes the following symptoms: dry skin      Amount of exercise or physical activity: None    Problems taking medications regularly: No    Medication side effects: none    Diet: regular (no restrictions)        RESPIRATORY SYMPTOMS      Duration: Ongoing for 3 days now    Description  nasal congestion, rhinorrhea, sore throat, cough, fever and fatigue/malaise    Severity: moderate    Accompanying signs and symptoms: Runny nose, cough, fever, fatigue, sore throat     History (predisposing factors):  none    Precipitating or alleviating factors: None    Therapies tried and outcome:  Cough syrup, ibuprofen - helping some      Problem list and histories reviewed & adjusted, as indicated.  Additional history: as documented    Patient Active Problem List   Diagnosis     History of basal cell carcinoma     Hyperlipidemia with target LDL less than 130     Pneumonia     ACP (advance care planning)     Hypothyroidism, unspecified type     Past Surgical History:   Procedure Laterality Date     ARTHROSCOPY SHOULDER ROTATOR CUFF REPAIR Left 3/7/2018    Procedure: ARTHROSCOPY SHOULDER ROTATOR CUFF REPAIR;  LEFT SHOULDER ARTHROSCOPY, REPAIR ROTATOR CUFF: subacromial Decompression and AC Joint Resection;  Surgeon: Baldev Lou DO;  [facility]: [facility]     COLONOSCOPY  2006    Dr Lara     COLONOSCOPY N/A 10/10/2016    Procedure: COLONOSCOPY;  Surgeon: Christine Cintron MD;  Location: [facility]     SHOULDER SURGERY Right 2011/10/2012       Social History   Substance Use Topics     Smoking status: Never Smoker     Smokeless tobacco: Never Used     Alcohol use 1.0 oz/week     2 Glasses of wine per week      Comment: occasionally     Family History   Problem Relation Age of Onset     Myocardial Infarction Mother      Hypertension Mother      Stomach  Cancer Father          Current Outpatient Prescriptions   Medication Sig Dispense Refill     ASPIRIN PO Take 81 mg by mouth daily       Eletriptan Hydrobromide (RELPAX PO) Take 40 mg by mouth once as needed for migraine       FLUoxetine HCl, PMDD, (SARAFEM) 10 MG TABS Take 10 mg by mouth daily       levothyroxine (SYNTHROID/LEVOTHROID) 100 MCG tablet Take 1 tablet (100 mcg) by mouth daily 60 tablet 1     rosuvastatin (CRESTOR) 10 MG tablet Take 1 tablet (10 mg) by mouth daily 30 tablet 5     albuterol (PROAIR HFA/PROVENTIL HFA/VENTOLIN HFA) 108 (90 BASE) MCG/ACT Inhaler Inhale 2 puffs into the lungs every 6 hours as needed for shortness of breath / dyspnea or wheezing (Patient not taking: Reported on 9/27/2018) 1 Inhaler 0     Allergies   Allergen Reactions     Penicillins Rash     Zithromax [Azithromycin] Rash     Recent Labs   Lab Test  08/01/18   1005  09/21/17   1100  07/27/17   1413  07/27/16   1038  07/17/15   1106   LDL   --    --   61  66  82   HDL   --    --   66  66  60   TRIG   --    --   82  99  104   ALT   --   39  41   --    --    CR   --   0.80   --    --    --    GFRESTIMATED   --   73   --    --    --    GFRESTBLACK   --   88   --    --    --    POTASSIUM   --   3.9   --    --    --    TSH  4.59*   --   1.08  2.77  1.32      BP Readings from Last 3 Encounters:   09/27/18 140/88   09/18/18 96/70   05/15/18 130/84    Wt Readings from Last 3 Encounters:   09/27/18 139 lb 12.8 oz (63.4 kg)   09/18/18 138 lb (62.6 kg)   05/15/18 138 lb (62.6 kg)                    Reviewed and updated as needed this visit by clinical staff       Reviewed and updated as needed this visit by Provider         ROS:  Constitutional, neuro, ENT, endocrine, pulmonary, cardiac, gastrointestinal, genitourinary, musculoskeletal, integument and psychiatric systems are negative, except as otherwise noted.    OBJECTIVE:                                                    /88 (BP Location: Left arm, Patient Position: Chair, Cuff  Size: Adult Regular)  Pulse 60  Temp 99.4  F (37.4  C) (Tympanic)  Wt 139 lb 12.8 oz (63.4 kg)  SpO2 95%  BMI 23.26 kg/m2  Body mass index is 23.26 kg/(m^2).  GENERAL APPEARANCE:, alert and no distress  EYES: Eyes grossly normal to inspection, PERRL and conjunctivae and sclerae normal  HENT: ear canals and TM's normal and nose and mouth without ulcers or lesions  NECK: no adenopathy, no asymmetry, masses, or scars and thyroid normal to palpation  RESP: lungs clear to auscultation - no rales, rhonchi or wheezes  CV: regular rates and rhythm, normal S1 S2, no S3 or S4 and no murmur, click or rub  LYMPHATICS: no cervical adenopathy  ABDOMEN: soft, nontender, without hepatosplenomegaly or masses and bowel sounds normal  MS: extremities normal- no gross deformities noted  SKIN: no suspicious lesions or rashes  NEURO: Normal strength and tone, mentation intact and speech normal  PSYCH: mentation appears normal and affect normal/bright    Diagnostic test results:  Diagnostic Test Results:  No results found for this or any previous visit (from the past 24 hour(s)).     ASSESSMENT/PLAN:                                                    1. Acute recurrent pansinusitis  She is given cefzil see us back as recommended.   - cefPROZIL (CEFZIL) 500 MG tablet; Take 1 tablet (500 mg) by mouth 2 times daily  Dispense: 20 tablet; Refill: 0  - CBC with platelets differential    2. Acquired hypothyroidism  Due for recheck was abnormal.   - TSH with free T4 reflex    3. Mixed hyperlipidemia  Recheck cholesterol today.   - Lipid Profile; Future      See Patient Instructions    DASH Vital  [facility]

## 2018-09-27 ENCOUNTER — HOSPITAL ENCOUNTER (OUTPATIENT)
Dept: MRI IMAGING | Facility: HOSPITAL | Age: 63
Discharge: HOME | End: 2018-09-27
Attending: ORTHOPAEDIC SURGERY | Admitting: ORTHOPAEDIC SURGERY
Payer: COMMERCIAL

## 2018-09-27 ENCOUNTER — OFFICE VISIT (OUTPATIENT)
Dept: FAMILY MEDICINE | Facility: OTHER | Age: 63
End: 2018-09-27
Attending: PHYSICIAN ASSISTANT
Payer: COMMERCIAL

## 2018-09-27 ENCOUNTER — TELEPHONE (OUTPATIENT)
Dept: FAMILY MEDICINE | Facility: OTHER | Age: 63
End: 2018-09-27

## 2018-09-27 VITALS
DIASTOLIC BLOOD PRESSURE: 88 MMHG | OXYGEN SATURATION: 95 % | TEMPERATURE: 99.4 F | SYSTOLIC BLOOD PRESSURE: 140 MMHG | BODY MASS INDEX: 23.26 KG/M2 | HEART RATE: 60 BPM | WEIGHT: 139.8 LBS

## 2018-09-27 DIAGNOSIS — J01.41 ACUTE RECURRENT PANSINUSITIS: Primary | ICD-10-CM

## 2018-09-27 DIAGNOSIS — E78.2 MIXED HYPERLIPIDEMIA: ICD-10-CM

## 2018-09-27 DIAGNOSIS — G89.29 CHRONIC RIGHT SHOULDER PAIN: ICD-10-CM

## 2018-09-27 DIAGNOSIS — E03.9 ACQUIRED HYPOTHYROIDISM: Primary | ICD-10-CM

## 2018-09-27 DIAGNOSIS — E03.9 ACQUIRED HYPOTHYROIDISM: ICD-10-CM

## 2018-09-27 DIAGNOSIS — M25.511 CHRONIC RIGHT SHOULDER PAIN: ICD-10-CM

## 2018-09-27 LAB
BASOPHILS # BLD AUTO: 0.1 10E9/L (ref 0–0.2)
BASOPHILS NFR BLD AUTO: 0.6 %
CHOLEST SERPL-MCNC: 139 MG/DL
DIFFERENTIAL METHOD BLD: NORMAL
EOSINOPHIL # BLD AUTO: 0.2 10E9/L (ref 0–0.7)
EOSINOPHIL NFR BLD AUTO: 2.4 %
ERYTHROCYTE [DISTWIDTH] IN BLOOD BY AUTOMATED COUNT: 12.6 % (ref 10–15)
HCT VFR BLD AUTO: 39.6 % (ref 35–47)
HDLC SERPL-MCNC: 52 MG/DL
HGB BLD-MCNC: 13.4 G/DL (ref 11.7–15.7)
IMM GRANULOCYTES # BLD: 0 10E9/L (ref 0–0.4)
IMM GRANULOCYTES NFR BLD: 0.2 %
LDLC SERPL CALC-MCNC: 63 MG/DL
LYMPHOCYTES # BLD AUTO: 1 10E9/L (ref 0.8–5.3)
LYMPHOCYTES NFR BLD AUTO: 12.5 %
MCH RBC QN AUTO: 30.1 PG (ref 26.5–33)
MCHC RBC AUTO-ENTMCNC: 33.8 G/DL (ref 31.5–36.5)
MCV RBC AUTO: 89 FL (ref 78–100)
MONOCYTES # BLD AUTO: 0.6 10E9/L (ref 0–1.3)
MONOCYTES NFR BLD AUTO: 7.6 %
NEUTROPHILS # BLD AUTO: 6.3 10E9/L (ref 1.6–8.3)
NEUTROPHILS NFR BLD AUTO: 76.7 %
NONHDLC SERPL-MCNC: 87 MG/DL
NRBC # BLD AUTO: 0 10*3/UL
NRBC BLD AUTO-RTO: 0 /100
PLATELET # BLD AUTO: 158 10E9/L (ref 150–450)
RBC # BLD AUTO: 4.45 10E12/L (ref 3.8–5.2)
T4 FREE SERPL-MCNC: 1.47 NG/DL (ref 0.76–1.46)
TRIGL SERPL-MCNC: 119 MG/DL
TSH SERPL DL<=0.005 MIU/L-ACNC: 0.28 MU/L (ref 0.4–4)
WBC # BLD AUTO: 8.2 10E9/L (ref 4–11)

## 2018-09-27 PROCEDURE — 84443 ASSAY THYROID STIM HORMONE: CPT | Performed by: PHYSICIAN ASSISTANT

## 2018-09-27 PROCEDURE — 85025 COMPLETE CBC W/AUTO DIFF WBC: CPT | Performed by: PHYSICIAN ASSISTANT

## 2018-09-27 PROCEDURE — 73221 MRI JOINT UPR EXTREM W/O DYE: CPT | Mod: TC,RT

## 2018-09-27 PROCEDURE — 99214 OFFICE O/P EST MOD 30 MIN: CPT | Performed by: PHYSICIAN ASSISTANT

## 2018-09-27 PROCEDURE — 80061 LIPID PANEL: CPT | Performed by: PHYSICIAN ASSISTANT

## 2018-09-27 PROCEDURE — 84439 ASSAY OF FREE THYROXINE: CPT | Performed by: PHYSICIAN ASSISTANT

## 2018-09-27 PROCEDURE — 36415 COLL VENOUS BLD VENIPUNCTURE: CPT | Performed by: PHYSICIAN ASSISTANT

## 2018-09-27 RX ORDER — CEFPROZIL 500 MG/1
500 TABLET, FILM COATED ORAL 2 TIMES DAILY
Qty: 20 TABLET | Refills: 0 | Status: SHIPPED | OUTPATIENT
Start: 2018-09-27 | End: 2018-11-09

## 2018-09-27 RX ORDER — LEVOTHYROXINE SODIUM 88 UG/1
88 TABLET ORAL DAILY
Qty: 90 TABLET | Refills: 1 | Status: SHIPPED | OUTPATIENT
Start: 2018-09-27 | End: 2019-03-15

## 2018-09-27 ASSESSMENT — ANXIETY QUESTIONNAIRES
6. BECOMING EASILY ANNOYED OR IRRITABLE: NOT AT ALL
2. NOT BEING ABLE TO STOP OR CONTROL WORRYING: NOT AT ALL
3. WORRYING TOO MUCH ABOUT DIFFERENT THINGS: NOT AT ALL
GAD7 TOTAL SCORE: 0
4. TROUBLE RELAXING: NOT AT ALL
5. BEING SO RESTLESS THAT IT IS HARD TO SIT STILL: NOT AT ALL
7. FEELING AFRAID AS IF SOMETHING AWFUL MIGHT HAPPEN: NOT AT ALL
1. FEELING NERVOUS, ANXIOUS, OR ON EDGE: NOT AT ALL

## 2018-09-27 ASSESSMENT — PAIN SCALES - GENERAL: PAINLEVEL: MILD PAIN (2)

## 2018-09-27 NOTE — PATIENT INSTRUCTIONS
Thank you for choosing [facility].   I have office hours 8:00 am to 4:30 pm on Monday's, Wednesday's, Thursday's and Friday's. My nurse and I are out of the office every Tuesday.    Following your visit, when your labs and diagnostic testing have returned, I will review then and you will be contacted by my nurse.  If you are on My Chart, you can also view results there.    For refills, notify your pharmacy regarding what you need and the pharmacy will generate a refill request. Do not call my nurse as she is unable to process refill request. Please plan ahead and allow 3-5 days for refill requests.    You will generally receive a reminder call the day prior to your appointment.  If you cannot attend your appointment, please cancel your appointment with as much notice as possible.  If there is a pattern of failure to present for your appointments, I cannot provide consistent, meaningful, ongoing care for you. It is very important to me that you come in for your care, so we can best assist you with your health care needs.    IMPORTANT:  Please note that it is my standard of practice to NOT participate in prescribing ongoing requested Narcotic Analgesic therapy, and/or participate in the prescribing of other controlled substances.  My nurse and I am happy to assist you with the process of referral for alternative pain management as needed, and other treatment modalities including but not limited to:  Physical Therapy, Physical Medicine and Rehab, Counseling, Chiropractic Care, Orthopedic Care, and non-narcotic medication management.     In the event that you need to be seen for emergent concerns and I am out of office,  please see one of my colleagues for acute concerns.  You may also present to  or ER.  I appreciate the opportunity to serve you and look forward to supporting your healthcare needs in the future. Please contact me with any questions or concerns that you may  have.    Sincerely,      Mirna Roblero RN, PA-C

## 2018-09-27 NOTE — NURSING NOTE
"Chief Complaint   Patient presents with     Thyroid Problem       Initial /88 (BP Location: Left arm, Patient Position: Chair, Cuff Size: Adult Regular)  Pulse 60  Temp 99.4  F (37.4  C) (Tympanic)  Wt 139 lb 12.8 oz (63.4 kg)  SpO2 95%  BMI 23.26 kg/m2 Estimated body mass index is 23.26 kg/(m^2) as calculated from the following:    Height as of 9/18/18: 5' 5\" (1.651 m).    Weight as of this encounter: 139 lb 12.8 oz (63.4 kg).  Medication Reconciliation: stefano Alonzo    "

## 2018-09-27 NOTE — MR AVS SNAPSHOT
After Visit Summary   9/27/2018    Denise Taylor    MRN: [id number]           Patient Information     Date Of Birth          1955        Visit Information        Provider Department      9/27/2018 8:40 AM Mirna Roblero PA [facility]        Today's Diagnoses     Acute recurrent pansinusitis    -  1    Acquired hypothyroidism        Mixed hyperlipidemia          Care Instructions      Thank you for choosing [facility].   I have office hours 8:00 am to 4:30 pm on Monday's, Wednesday's, Thursday's and Friday's. My nurse and I are out of the office every Tuesday.    Following your visit, when your labs and diagnostic testing have returned, I will review then and you will be contacted by my nurse.  If you are on My Chart, you can also view results there.    For refills, notify your pharmacy regarding what you need and the pharmacy will generate a refill request. Do not call my nurse as she is unable to process refill request. Please plan ahead and allow 3-5 days for refill requests.    You will generally receive a reminder call the day prior to your appointment.  If you cannot attend your appointment, please cancel your appointment with as much notice as possible.  If there is a pattern of failure to present for your appointments, I cannot provide consistent, meaningful, ongoing care for you. It is very important to me that you come in for your care, so we can best assist you with your health care needs.    IMPORTANT:  Please note that it is my standard of practice to NOT participate in prescribing ongoing requested Narcotic Analgesic therapy, and/or participate in the prescribing of other controlled substances.  My nurse and I am happy to assist you with the process of referral for alternative pain management as needed, and other treatment modalities including but not limited to:  Physical Therapy, Physical Medicine and Rehab, Counseling, Chiropractic  Care, Orthopedic Care, and non-narcotic medication management.     In the event that you need to be seen for emergent concerns and I am out of office,  please see one of my colleagues for acute concerns.  You may also present to  or ER.  I appreciate the opportunity to serve you and look forward to supporting your healthcare needs in the future. Please contact me with any questions or concerns that you may have.    Sincerely,      Mirna Roblero RN, PA-C               Follow-ups after your visit        Your next 10 appointments already scheduled     Oct 02, 2018 10:20 AM CDT   (Arrive by 10:05 AM)   [facility]dev Lou DO   [facility]    [address] [facility] [address] [phone]              Who to contact     If you have questions or need follow up information about today's clinic visit or your schedule please contact [facility] directly at [phone].  Normal or non-critical lab and imaging results will be communicated to you by mLEDhart, letter or phone within 4 business days after the clinic has received the results. If you do not hear from us within 7 days, please contact the clinic through [employer] or phone. If you have a critical or abnormal lab result, we will notify you by phone as soon as possible.  Submit refill requests through Kimble or call your pharmacy and they will forward the refill request to us. Please allow 3 business days for your refill to be completed.          Additional Information About Your Visit        [employer] Information     Kimble gives you secure access to your electronic health record. If you see a primary care provider, you can also send messages to your care team and make appointments. If you have questions, please call your primary care clinic.  If you do not have a primary care provider, please call [phone] and they will assist you.        Care EveryWhere ID      This is your Care EveryWhere ID. This could be used by other organizations to access your [place] medical records  [id number]        Your Vitals Were     Pulse Temperature Pulse Oximetry BMI (Body Mass Index)          60 99.4  F (37.4  C) (Tympanic) 95% 23.26 kg/m2         Blood Pressure from Last 3 Encounters:   09/27/18 140/88   09/18/18 96/70   05/15/18 130/84    Weight from Last 3 Encounters:   09/27/18 139 lb 12.8 oz (63.4 kg)   09/18/18 138 lb (62.6 kg)   05/15/18 138 lb (62.6 kg)              We Performed the Following     CBC with platelets differential     TSH with free T4 reflex          Today's Medication Changes          These changes are accurate as of 9/27/18  9:15 AM.  If you have any questions, ask your nurse or doctor.               Start taking these medicines.        Dose/Directions    cefPROZIL 500 MG tablet   Commonly known as:  CEFZIL   Used for:  Acute recurrent pansinusitis   Started by:  Mirna Roblero PA        Dose:  500 mg   Take 1 tablet (500 mg) by mouth 2 times daily   Quantity:  20 tablet   Refills:  0            Where to get your medicines      These medications were sent to [facility] [address] [place] [address] [address] AT [facility] [address]  [address], [facility] [address]     Phone:  [phone]     cefPROZIL 500 MG tablet                Primary Care Provider Office Phone # Fax #    DASH Angelo [phone]       [address] [place] [address]        Equal Access to Services     [facility]: Hadii florencia hamilton hadasho Soanoop, waaxda luqadaha, qaybta kaalmada wolfgang, arabella scott. So [facility] [phone].    ATENCIÓN: Si habla español, tiene a mayo disposición servicios gratuitos de asistencia lingüística. Llame al [phone].    We comply with applicable federal civil rights laws and [place] laws. We do not discriminate on the basis of race, color, national origin, age, disability, sex,  sexual orientation, or gender identity.            Thank you!     Thank you for choosing [facility]  for your care. Our goal is always to provide you with excellent care. Hearing back from our patients is one way we can continue to improve our services. Please take a few minutes to complete the written survey that you may receive in the mail after your visit with us. Thank you!             Your Updated Medication List - Protect others around you: Learn how to safely use, store and throw away your medicines at www.disposemymeds.org.          This list is accurate as of 9/27/18  9:15 AM.  Always use your most recent med list.                   Brand Name Dispense Instructions for use Diagnosis    albuterol 108 (90 Base) MCG/ACT inhaler    PROAIR HFA/PROVENTIL HFA/VENTOLIN HFA    1 Inhaler    Inhale 2 puffs into the lungs every 6 hours as needed for shortness of breath / dyspnea or wheezing    Pulmonary congestion       ASPIRIN PO      Take 81 mg by mouth daily        cefPROZIL 500 MG tablet    CEFZIL    20 tablet    Take 1 tablet (500 mg) by mouth 2 times daily    Acute recurrent pansinusitis       levothyroxine 100 MCG tablet    SYNTHROID/LEVOTHROID    60 tablet    Take 1 tablet (100 mcg) by mouth daily    Hypothyroidism, unspecified type       RELPAX PO      Take 40 mg by mouth once as needed for migraine        rosuvastatin 10 MG tablet    CRESTOR    30 tablet    Take 1 tablet (10 mg) by mouth daily    Hyperlipidemia with target LDL less than 130       SARAFEM 10 MG Tabs   Generic drug:  FLUoxetine HCl (PMDD)      Take 10 mg by mouth daily

## 2018-09-28 ASSESSMENT — ANXIETY QUESTIONNAIRES: GAD7 TOTAL SCORE: 0

## 2018-09-28 ASSESSMENT — PATIENT HEALTH QUESTIONNAIRE - PHQ9: SUM OF ALL RESPONSES TO PHQ QUESTIONS 1-9: 0

## 2018-10-02 ENCOUNTER — OFFICE VISIT (OUTPATIENT)
Dept: ORTHOPEDICS | Facility: OTHER | Age: 63
End: 2018-10-02
Attending: ORTHOPAEDIC SURGERY
Payer: COMMERCIAL

## 2018-10-02 VITALS
WEIGHT: 139 LBS | BODY MASS INDEX: 23.16 KG/M2 | TEMPERATURE: 98 F | OXYGEN SATURATION: 99 % | DIASTOLIC BLOOD PRESSURE: 64 MMHG | HEART RATE: 49 BPM | HEIGHT: 65 IN | SYSTOLIC BLOOD PRESSURE: 108 MMHG

## 2018-10-02 DIAGNOSIS — G89.29 CHRONIC RIGHT SHOULDER PAIN: Primary | ICD-10-CM

## 2018-10-02 DIAGNOSIS — M25.511 CHRONIC RIGHT SHOULDER PAIN: Primary | ICD-10-CM

## 2018-10-02 PROCEDURE — 99212 OFFICE O/P EST SF 10 MIN: CPT | Performed by: ORTHOPAEDIC SURGERY

## 2018-10-02 ASSESSMENT — PAIN SCALES - GENERAL: PAINLEVEL: NO PAIN (1)

## 2018-10-02 NOTE — MR AVS SNAPSHOT
"              After Visit Summary   10/2/2018    Denise Taylor    MRN: [id number]           Patient Information     Date Of Birth          1955        Visit Information        Provider Department      10/2/2018 10:20 AM Baldev Lou DO [facility]        Today's Diagnoses     Chronic right shoulder pain    -  1       Follow-ups after your visit        Who to contact     If you have questions or need follow up information about today's clinic visit or your schedule please contact [facility] directly at [phone].  Normal or non-critical lab and imaging results will be communicated to you by Gigi Hillhart, letter or phone within 4 business days after the clinic has received the results. If you do not hear from us within 7 days, please contact the clinic through Gigi Hillhart or phone. If you have a critical or abnormal lab result, we will notify you by phone as soon as possible.  Submit refill requests through Amware or call your pharmacy and they will forward the refill request to us. Please allow 3 business days for your refill to be completed.          Additional Information About Your Visit        MyChart Information     [employer] gives you secure access to your electronic health record. If you see a primary care provider, you can also send messages to your care team and make appointments. If you have questions, please call your primary care clinic.  If you do not have a primary care provider, please call [phone] and they will assist you.        Care EveryWhere ID     This is your Care EveryWhere ID. This could be used by other organizations to access your [place] medical records  [id number]        Your Vitals Were     Pulse Temperature Height Pulse Oximetry BMI (Body Mass Index)       49 98  F (36.7  C) (Tympanic) 5' 5\" (1.651 m) 99% 23.13 kg/m2        Blood Pressure from Last 3 Encounters:   10/02/18 108/64   09/27/18 140/88   09/18/18 96/70    Weight " from Last 3 Encounters:   10/02/18 139 lb (63 kg)   09/27/18 139 lb 12.8 oz (63.4 kg)   09/18/18 138 lb (62.6 kg)              Today, you had the following     No orders found for display       Primary Care Provider Office Phone # Fax #    Mirna WHEELER DASH Roblero [phone]       [address] [address]        Equal Access to Services     [facility] : Hadii aad ku hadasho Soomaali, waaxda luqadaha, qaybta kaalmada adeegyada, waxay idiin hayaan adeeg kharash lajesús . So [facility] [phone].    ATENCIÓN: Si frederickla esplinwood, tiene a mayo disposición servicios gratuitos de asistencia lingüística. [facility] [phone].    We comply with applicable federal civil rights laws and [place] laws. We do not discriminate on the basis of race, color, national origin, age, disability, sex, sexual orientation, or gender identity.            Thank you!     Thank you for choosing [facility]  for your care. Our goal is always to provide you with excellent care. Hearing back from our patients is one way we can continue to improve our services. Please take a few minutes to complete the written survey that you may receive in the mail after your visit with us. Thank you!             Your Updated Medication List - Protect others around you: Learn how to safely use, store and throw away your medicines at www.disposemymeds.org.          This list is accurate as of 10/2/18 10:37 AM.  Always use your most recent med list.                   Brand Name Dispense Instructions for use Diagnosis    albuterol 108 (90 Base) MCG/ACT inhaler    PROAIR HFA/PROVENTIL HFA/VENTOLIN HFA    1 Inhaler    Inhale 2 puffs into the lungs every 6 hours as needed for shortness of breath / dyspnea or wheezing    Pulmonary congestion       ASPIRIN PO      Take 81 mg by mouth daily        cefPROZIL 500 MG tablet    CEFZIL    20 tablet    Take 1 tablet (500 mg) by mouth 2 times daily    Acute recurrent  pansinusitis       levothyroxine 88 MCG tablet    SYNTHROID/LEVOTHROID    90 tablet    Take 1 tablet (88 mcg) by mouth daily    Acquired hypothyroidism       RELPAX PO      Take 40 mg by mouth once as needed for migraine        rosuvastatin 10 MG tablet    CRESTOR    30 tablet    Take 1 tablet (10 mg) by mouth daily    Hyperlipidemia with target LDL less than 130       SARAFEM 10 MG Tabs   Generic drug:  FLUoxetine HCl (PMDD)      Take 10 mg by mouth daily

## 2018-10-02 NOTE — PROGRESS NOTES
"Patient presents a follow up for the MRI of her right shoulder.  MRI of the right shoulder demonstrates postoperative changes from her previous chronic third joint reconstruction, also demonstrates some severe tendinosis and probable partial tearing of the rotator cuff, and a large mass that encompasses approximately half of the circumference of the humerus just below the surgical neck and was incorporated around the biceps tendon as well.  This was not noted on the initial radiology read, and I have discussed the findings with the patient.  Going to recommend that this MRI be reevaluated by the reading radiologist, and then we will plan on how best to approach these findings.  Once I have contacted the radiologist and reviewed the findings with him, I'll contact Denise and make a plan for addressing her chronic shoulder pain./64  Pulse (!) 49  Temp 98  F (36.7  C) (Tympanic)  Ht 5' 5\" (1.651 m)  Wt 139 lb (63 kg)  SpO2 99%  BMI 23.13 kg/m2  "

## 2018-10-02 NOTE — NURSING NOTE
"Chief Complaint   Patient presents with     Results     MRI Right Shoulder       Initial /64  Pulse (!) 49  Temp 98  F (36.7  C) (Tympanic)  Ht 5' 5\" (1.651 m)  Wt 139 lb (63 kg)  SpO2 99%  BMI 23.13 kg/m2 Estimated body mass index is 23.13 kg/(m^2) as calculated from the following:    Height as of this encounter: 5' 5\" (1.651 m).    Weight as of this encounter: 139 lb (63 kg).  Medication Reconciliation: complete    Ayesha Camarillo LPN    "

## 2018-10-04 ENCOUNTER — TELEPHONE (OUTPATIENT)
Dept: ORTHOPEDICS | Facility: OTHER | Age: 63
End: 2018-10-04

## 2018-10-04 NOTE — TELEPHONE ENCOUNTER
Contacted patient and relayed information from Dr Lou. Patient states understands and will be making follow up appointment.

## 2018-10-04 NOTE — TELEPHONE ENCOUNTER
Unable to contact patient by phone. Left message for patient to contact office to follow up MRI. Probable benign lesion and discuss options.

## 2018-10-16 ENCOUNTER — OFFICE VISIT (OUTPATIENT)
Dept: ORTHOPEDICS | Facility: OTHER | Age: 63
End: 2018-10-16
Attending: ORTHOPAEDIC SURGERY
Payer: COMMERCIAL

## 2018-10-16 VITALS
DIASTOLIC BLOOD PRESSURE: 60 MMHG | WEIGHT: 139 LBS | TEMPERATURE: 97.3 F | OXYGEN SATURATION: 100 % | HEART RATE: 50 BPM | HEIGHT: 65 IN | SYSTOLIC BLOOD PRESSURE: 110 MMHG | BODY MASS INDEX: 23.16 KG/M2

## 2018-10-16 DIAGNOSIS — G89.29 CHRONIC RIGHT SHOULDER PAIN: Primary | ICD-10-CM

## 2018-10-16 DIAGNOSIS — M25.511 CHRONIC RIGHT SHOULDER PAIN: Primary | ICD-10-CM

## 2018-10-16 PROCEDURE — 99213 OFFICE O/P EST LOW 20 MIN: CPT | Performed by: ORTHOPAEDIC SURGERY

## 2018-10-16 ASSESSMENT — PAIN SCALES - GENERAL: PAINLEVEL: NO PAIN (1)

## 2018-10-16 NOTE — MR AVS SNAPSHOT
After Visit Summary   10/16/2018    Denise Taylor    MRN: [id number]           Patient Information     Date Of Birth          1955        Visit Information        Provider Department      10/16/2018 10:20 AM Baldev Lou DO [facility]        Today's Diagnoses     Chronic right shoulder pain    -  1       Follow-ups after your visit        Your next 10 appointments already scheduled     Nov 09, 2018  9:20 AM CST   (Arrive by 9:05 AM)   Pre-Op physical with DASH Angelo   [facility] [place] ([facility] [place] )    [address]  [place] [address]   [phone]            Nov 29, 2018 10:20 AM CST   (Arrive by 10:05 AM)   Post Op with Baldev Lou DO   [facility] [place] [facility]    [address]  [place] [id number] [phone]              Who to contact     If you have questions or need follow up information about today's clinic visit or your schedule please contact [facility] directly at [phone].  Normal or non-critical lab and imaging results will be communicated to you by MyChart, letter or phone within 4 business days after the clinic has received the results. If you do not hear from us within 7 days, please contact the clinic through Looglahart or phone. If you have a critical or abnormal lab result, we will notify you by phone as soon as possible.  Submit refill requests through [employer] or call your pharmacy and they will forward the refill request to us. Please allow 3 business days for your refill to be completed.          Additional Information About Your Visit        MyChart Information     [employer] gives you secure access to your electronic health record. If you see a primary care provider, you can also send messages to your care team and make appointments. If you have questions, please call your primary care clinic.  If you do not have a  "primary care provider, please call [phone] and they will assist you.        Care EveryWhere ID     This is your Care EveryWhere ID. This could be used by other organizations to access your [place] medical records  [id number]        Your Vitals Were     Pulse Temperature Height Pulse Oximetry BMI (Body Mass Index)       50 97.3  F (36.3  C) (Tympanic) 5' 5\" (1.651 m) 100% 23.13 kg/m2        Blood Pressure from Last 3 Encounters:   10/16/18 110/60   10/02/18 108/64   09/27/18 140/88    Weight from Last 3 Encounters:   10/16/18 139 lb (63 kg)   10/02/18 139 lb (63 kg)   09/27/18 139 lb 12.8 oz (63.4 kg)              Today, you had the following     No orders found for display       Primary Care Provider Office Phone # Fax #    DASH Angelo [phone]       [address] [place] [address]        Equal Access to Services     [facility]: Hadii aad ku hadasho Soomaali, waaxda luqadaha, qaybta kaalmada adeegyada, waxay sujit trevino . So [facility] [phone].    ATENCIÓN: Si habla español, tiene a mayo disposición servicios gratuitos de asistencia lingüística. Llame al [phone].    We comply with applicable federal civil rights laws and [place] laws. We do not discriminate on the basis of race, color, national origin, age, disability, sex, sexual orientation, or gender identity.            Thank you!     Thank you for choosing [facility]  for your care. Our goal is always to provide you with excellent care. Hearing back from our patients is one way we can continue to improve our services. Please take a few minutes to complete the written survey that you may receive in the mail after your visit with us. Thank you!             Your Updated Medication List - Protect others around you: Learn how to safely use, store and throw away your medicines at www.disposemymeds.org.          This list is accurate as of 10/16/18 11:03 AM.  Always " use your most recent med list.                   Brand Name Dispense Instructions for use Diagnosis    albuterol 108 (90 Base) MCG/ACT inhaler    PROAIR HFA/PROVENTIL HFA/VENTOLIN HFA    1 Inhaler    Inhale 2 puffs into the lungs every 6 hours as needed for shortness of breath / dyspnea or wheezing    Pulmonary congestion       ASPIRIN PO      Take 81 mg by mouth daily        cefPROZIL 500 MG tablet    CEFZIL    20 tablet    Take 1 tablet (500 mg) by mouth 2 times daily    Acute recurrent pansinusitis       levothyroxine 88 MCG tablet    SYNTHROID/LEVOTHROID    90 tablet    Take 1 tablet (88 mcg) by mouth daily    Acquired hypothyroidism       RELPAX PO      Take 40 mg by mouth once as needed for migraine        rosuvastatin 10 MG tablet    CRESTOR    30 tablet    Take 1 tablet (10 mg) by mouth daily    Hyperlipidemia with target LDL less than 130       SARAFEM 10 MG Tabs   Generic drug:  FLUoxetine HCl (PMDD)      Take 10 mg by mouth daily

## 2018-10-16 NOTE — NURSING NOTE
"Chief Complaint   Patient presents with     Results     second review MRI Results Right Shoulder       Initial /60  Pulse 50  Temp 97.3  F (36.3  C) (Tympanic)  Ht 5' 5\" (1.651 m)  Wt 139 lb (63 kg)  SpO2 100%  BMI 23.13 kg/m2 Estimated body mass index is 23.13 kg/(m^2) as calculated from the following:    Height as of this encounter: 5' 5\" (1.651 m).    Weight as of this encounter: 139 lb (63 kg).  Medication Reconciliation: complete    Ayesha Camarillo LPN    "

## 2018-10-18 NOTE — PROGRESS NOTES
Outpatient Physical Therapy Discharge Note     Patient: Denise Taylor  : 1955    Beginning/End Dates of Reporting Period:  2018 to 10/18/2018    Referring Provider: Dr Lou    Therapy Diagnosis: S/P op shoulder     Client Self Report: see new eval    Objective Measurements:    Goals:  Goal Identifier STG 1   Goal Description pt to obtain full ROM of left UE in order for pt to reach overhead for daily self cares   Target Date 18   Date Met      Progress:     Goal Identifier STG2   Goal Description pt to increase scapularhumeral motion inorder to reach forward  into a cupboard   Target Date 05/15/18   Date Met      Progress:     Goal Identifier LTG 1   Goal Description Pt to obtain good/normal strength of her left upper Quadrant inorder for her to return to gardening   Target Date 18   Date Met      Progress:     Goal Identifier LTG 2   Goal Description PT to return to previous levels of activities prior to surgers in order to return to previous llife   Target Date 18   Date Met      Progress:       Progress Toward Goals:   Not assessed this period. Goals partially met over five appointments total.    Plan:  Discharge from therapy.    Discharge:    Reason for Discharge: Patient has not made expected progress due to interrupted treatment attendance.    Equipment Issued: n/a    Discharge Plan: current status of patient is unknown.    DANITA GraffT  (for Puja Siddiqi, PT)

## 2018-10-23 DIAGNOSIS — E78.5 HYPERLIPIDEMIA WITH TARGET LDL LESS THAN 130: ICD-10-CM

## 2018-10-23 RX ORDER — ROSUVASTATIN CALCIUM 10 MG/1
10 TABLET, COATED ORAL DAILY
Qty: 30 TABLET | Refills: 0 | Status: SHIPPED | OUTPATIENT
Start: 2018-10-23 | End: 2018-11-19

## 2018-10-23 NOTE — TELEPHONE ENCOUNTER
rosuvastatin      Last Written Prescription Date:  3/21/18  Last Fill Quantity: 30,   # refills: 5  Last Office Visit: 9/27/18  Future Office visit:    Next 5 appointments (look out 90 days)     Nov 09, 2018  9:20 AM CST   (Arrive by 9:05 AM)   Pre-Op physical with DASH Angelo   [facility] ([facility] )    [address] [place] [address]   [phone]

## 2018-11-08 NOTE — PROGRESS NOTES
[facility] [place]  [address]  [place] [address]  [phone]  Dept: [phone]    PRE-OP EVALUATION:  Today's date: 2018    Denise Taylor (: 1955) presents for pre-operative evaluation assessment as requested by Dr. Lou.  She requires evaluation and anesthesia risk assessment prior to undergoing surgery/procedure for treatment of Right shoulder debridement, rotator cuff repair excision of lipoma .    Proposed Surgery/ Procedure:Right shoulder debridement, rotator cuff repair excision of lipoma .  Date of Surgery/ Procedure: 18  Time of Surgery/ Procedure: [facility]  Hospital/Surgical Facility: [place]  Primary Physician: Mirna Roblero  Type of Anesthesia Anticipated: to be determined    Patient has a Health Care Directive or Living Will:  YES    1. NO - Do you have a history of heart attack, stroke, stent, bypass or surgery on an artery in the head, neck, heart or legs?  2. NO - Do you ever have any pain or discomfort in your chest?  3. NO - Do you have a history of  Heart Failure?  4. NO - Are you troubled by shortness of breath when: walking on the level, up a slight hill or at night?  5. NO - Do you currently have a cold, bronchitis or other respiratory infection?  6. NO - Do you have a cough, shortness of breath or wheezing?  7. NO - Do you sometimes get pains in the calves of your legs when you walk?  8. NO - Do you or anyone in your family have previous history of blood clots?  9. NO - Do you or does anyone in your family have a serious bleeding problem such as prolonged bleeding following surgeries or cuts?  10. NO - Have you ever had problems with anemia or been told to take iron pills?  11. NO - Have you had any abnormal blood loss such as black, tarry or bloody stools, or abnormal vaginal bleeding?  12. NO - Have you ever had a blood transfusion?  13. NO - Have you or any of your relatives ever had problems with anesthesia?  14. NO - Do you have sleep  apnea, excessive snoring or daytime drowsiness?  15. NO - Do you have any prosthetic heart valves?  16. NO - Do you have prosthetic joints?  17. NO - Is there any chance that you may be pregnant?      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  Has much Debré in her shoulder.        See problem list for active medical problems.  Problems all longstanding and stable, except as noted/documented.  See ROS for pertinent symptoms related to these conditions.                                                                                                                                                          .    MEDICAL HISTORY:     Patient Active Problem List    Diagnosis Date Noted     ACP (advance care planning) 07/27/2017     Priority: Medium     Advance Care Planning 7/27/2017: ACP Review of Chart / Resources Provided:  Reviewed chart for advance care plan.  Denise Taylor has no plan or code status on file. Discussed available resources and provided with information.   Added by Marah Henry             Hypothyroidism, unspecified type 07/27/2017     Priority: Medium     Pneumonia 07/20/2015     Priority: Medium     History of basal cell carcinoma 05/18/2015     Priority: Medium     Hyperlipidemia with target LDL less than 130 05/18/2015     Priority: Medium     Diagnosis updated by automated process. Provider to review and confirm.        Past Medical History:   Diagnosis Date     Pneumonia      Past Surgical History:   Procedure Laterality Date     ARTHROSCOPY SHOULDER ROTATOR CUFF REPAIR Left 3/7/2018    Procedure: ARTHROSCOPY SHOULDER ROTATOR CUFF REPAIR;  LEFT SHOULDER ARTHROSCOPY, REPAIR ROTATOR CUFF: subacromial Decompression and AC Joint Resection;  Surgeon: Baldev Lou DO;  Location: [facility]     COLONOSCOPY  2006    Dr Lara     COLONOSCOPY N/A 10/10/2016    Procedure:  "COLONOSCOPY;  Surgeon: Christine Cintron MD;  Location: [facility]     SHOULDER SURGERY Right 2011/10/2012     Current Outpatient Prescriptions   Medication Sig Dispense Refill     ASPIRIN PO Take 81 mg by mouth daily       Eletriptan Hydrobromide (RELPAX PO) Take 40 mg by mouth once as needed for migraine       FLUoxetine HCl, PMDD, (SARAFEM) 10 MG TABS Take 10 mg by mouth daily       levothyroxine (SYNTHROID/LEVOTHROID) 88 MCG tablet Take 1 tablet (88 mcg) by mouth daily 90 tablet 1     rosuvastatin (CRESTOR) 10 MG tablet Take 1 tablet (10 mg) by mouth daily 30 tablet 0     OTC products: None, except as noted above    Allergies   Allergen Reactions     Penicillins Rash     Zithromax [Azithromycin] Rash      Latex Allergy: NO    Social History   Substance Use Topics     Smoking status: Never Smoker     Smokeless tobacco: Never Used     Alcohol use 1.0 oz/week     2 Glasses of wine per week      Comment: occasionally     History   Drug Use No       REVIEW OF SYSTEMS:   CONSTITUTIONAL: NEGATIVE for fever, chills, change in weight  INTEGUMENTARY/SKIN: NEGATIVE for worrisome rashes, moles or lesions  EYES: NEGATIVE for vision changes or irritation  ENT/MOUTH: NEGATIVE for ear, mouth and throat problems  RESP: NEGATIVE for significant cough or SOB  BREAST: NEGATIVE for masses, tenderness or discharge  CV: NEGATIVE for chest pain, palpitations or peripheral edema  GI: NEGATIVE for nausea, abdominal pain, heartburn, or change in bowel habits  : NEGATIVE for frequency, dysuria, or hematuria  MUSCULOSKELETAL: see HPI  NEURO: NEGATIVE for weakness, dizziness or paresthesias  ENDOCRINE: NEGATIVE for temperature intolerance, skin/hair changes  HEME: NEGATIVE for bleeding problems  PSYCHIATRIC: NEGATIVE for changes in mood or affect    EXAM:   /78 (BP Location: Left arm, Patient Position: Sitting, Cuff Size: Adult Regular)  Pulse 68  Temp 98.2  F (36.8  C) (Tympanic)  Ht 5' 5\" (1.651 m)  Wt 140 lb (63.5 kg)  SpO2 99%  " BMI 23.3 kg/m2    GENERAL APPEARANCE: healthy, alert and no distress     EYES: EOMI, PERRL     HENT: ear canals and TM's normal and nose and mouth without ulcers or lesions     NECK: no adenopathy, no asymmetry, masses, or scars and thyroid normal to palpation     RESP: lungs clear to auscultation - no rales, rhonchi or wheezes     CV: regular rates and rhythm, normal S1 S2, no S3 or S4 and no murmur, click or rub     ABDOMEN:  soft, nontender, no HSM or masses and bowel sounds normal     MS: right shoulder pain with painful motion. Pain into the back of her shoulder blade. Tingling in her hands.      SKIN: no suspicious lesions or rashes     NEURO: Normal strength and tone, sensory exam grossly normal, mentation intact and speech normal     PSYCH: mentation appears normal. and affect normal/bright     LYMPHATICS: No cervical adenopathy    DIAGNOSTICS:   EKG: Not indicated due to non-vascular surgery and low risk of event (age <65 and without cardiac risk factors) EKG done in Feb 2018.     Recent Labs   Lab Test  09/27/18   0923  04/02/18   1358   09/21/17   1100   HGB  13.4  13.2   < >  14.0   PLT  158  209   < >  168   NA   --    --    --   139   POTASSIUM   --    --    --   3.9   CR   --    --    --   0.80    < > = values in this interval not displayed.        IMPRESSION:   Reason for surgery/procedure: right shoulder rotator cuff repair.   Diagnosis/reason for consult: medical clearance.     The proposed surgical procedure is considered INTERMEDIATE risk.    REVISED CARDIAC RISK INDEX  The patient has the following serious cardiovascular risks for perioperative complications such as (MI, PE, VFib and 3  AV Block):  No serious cardiac risks  INTERPRETATION: 0 risks: Class I (very low risk - 0.4% complication rate)    The patient has the following additional risks for perioperative complications:  No identified additional risks      ICD-10-CM    1. Preop general physical exam Z01.818 CBC with platelets     Basic  metabolic panel       RECOMMENDATIONS:       Has a mild cough will check in with us in a week if sx are worsening.   --Patient is to take all scheduled medications on the day of surgery EXCEPT for modifications listed below.    APPROVAL GIVEN to proceed with proposed procedure, without further diagnostic evaluation       Signed Electronically by: DASH Vital    Copy of this evaluation report is provided to requesting physician.    [place] Preop Guidelines    Revised Cardiac Risk Index

## 2018-11-09 ENCOUNTER — OFFICE VISIT (OUTPATIENT)
Dept: FAMILY MEDICINE | Facility: OTHER | Age: 63
End: 2018-11-09
Attending: PHYSICIAN ASSISTANT
Payer: COMMERCIAL

## 2018-11-09 VITALS
BODY MASS INDEX: 23.32 KG/M2 | WEIGHT: 140 LBS | OXYGEN SATURATION: 99 % | HEIGHT: 65 IN | TEMPERATURE: 98.2 F | HEART RATE: 68 BPM | SYSTOLIC BLOOD PRESSURE: 110 MMHG | DIASTOLIC BLOOD PRESSURE: 78 MMHG

## 2018-11-09 DIAGNOSIS — Z01.818 PREOP GENERAL PHYSICAL EXAM: Primary | ICD-10-CM

## 2018-11-09 LAB
ANION GAP SERPL CALCULATED.3IONS-SCNC: 5 MMOL/L (ref 3–14)
BUN SERPL-MCNC: 13 MG/DL (ref 7–30)
CALCIUM SERPL-MCNC: 8.8 MG/DL (ref 8.5–10.1)
CHLORIDE SERPL-SCNC: 105 MMOL/L (ref 94–109)
CO2 SERPL-SCNC: 28 MMOL/L (ref 20–32)
CREAT SERPL-MCNC: 0.72 MG/DL (ref 0.52–1.04)
ERYTHROCYTE [DISTWIDTH] IN BLOOD BY AUTOMATED COUNT: 12.5 % (ref 10–15)
GFR SERPL CREATININE-BSD FRML MDRD: 82 ML/MIN/1.7M2
GLUCOSE SERPL-MCNC: 85 MG/DL (ref 70–99)
HCT VFR BLD AUTO: 41.1 % (ref 35–47)
HGB BLD-MCNC: 13.9 G/DL (ref 11.7–15.7)
MCH RBC QN AUTO: 29.6 PG (ref 26.5–33)
MCHC RBC AUTO-ENTMCNC: 33.8 G/DL (ref 31.5–36.5)
MCV RBC AUTO: 88 FL (ref 78–100)
PLATELET # BLD AUTO: 147 10E9/L (ref 150–450)
POTASSIUM SERPL-SCNC: 3.6 MMOL/L (ref 3.4–5.3)
RBC # BLD AUTO: 4.69 10E12/L (ref 3.8–5.2)
SODIUM SERPL-SCNC: 138 MMOL/L (ref 133–144)
WBC # BLD AUTO: 3.8 10E9/L (ref 4–11)

## 2018-11-09 PROCEDURE — 80048 BASIC METABOLIC PNL TOTAL CA: CPT | Performed by: PHYSICIAN ASSISTANT

## 2018-11-09 PROCEDURE — 99214 OFFICE O/P EST MOD 30 MIN: CPT | Performed by: PHYSICIAN ASSISTANT

## 2018-11-09 PROCEDURE — 36415 COLL VENOUS BLD VENIPUNCTURE: CPT | Performed by: PHYSICIAN ASSISTANT

## 2018-11-09 PROCEDURE — 85027 COMPLETE CBC AUTOMATED: CPT | Performed by: PHYSICIAN ASSISTANT

## 2018-11-09 ASSESSMENT — ANXIETY QUESTIONNAIRES
2. NOT BEING ABLE TO STOP OR CONTROL WORRYING: NOT AT ALL
7. FEELING AFRAID AS IF SOMETHING AWFUL MIGHT HAPPEN: NOT AT ALL
GAD7 TOTAL SCORE: 0
1. FEELING NERVOUS, ANXIOUS, OR ON EDGE: NOT AT ALL
6. BECOMING EASILY ANNOYED OR IRRITABLE: NOT AT ALL
5. BEING SO RESTLESS THAT IT IS HARD TO SIT STILL: NOT AT ALL
3. WORRYING TOO MUCH ABOUT DIFFERENT THINGS: NOT AT ALL

## 2018-11-09 ASSESSMENT — PATIENT HEALTH QUESTIONNAIRE - PHQ9
5. POOR APPETITE OR OVEREATING: NOT AT ALL
SUM OF ALL RESPONSES TO PHQ QUESTIONS 1-9: 0

## 2018-11-09 ASSESSMENT — PAIN SCALES - GENERAL: PAINLEVEL: NO PAIN (1)

## 2018-11-09 NOTE — MR AVS SNAPSHOT
After Visit Summary   11/9/2018    Denise Taylor    MRN: [id number]           Patient Information     Date Of Birth          1955        Visit Information        Provider Department      11/9/2018 9:20 AM Mirna Roblero PA [facility]        Today's Diagnoses     Preop general physical exam    -  1      Care Instructions      Before Your Surgery      Call your surgeon if there is any change in your health. This includes signs of a cold or flu (such as a sore throat, runny nose, cough, rash or fever).    Do not smoke, drink alcohol or take over the counter medicine (unless your surgeon or primary care doctor tells you to) for the 24 hours before and after surgery.    If you take prescribed drugs: Follow your doctor s orders about which medicines to take and which to stop until after surgery.    Eating and drinking prior to surgery: follow the instructions from your surgeon    Take a shower or bath the night before surgery. Use the soap your surgeon gave you to gently clean your skin. If you do not have soap from your surgeon, use your regular soap. Do not shave or scrub the surgery site.  Wear clean pajamas and have clean sheets on your bed.           Follow-ups after your visit        Your next 10 appointments already scheduled     Nov 14, 2018   Procedure with Baldev Lou DO   [facility] ([facility] )    [address] [id number] [phone]            Nov 29, 2018 10:20 AM CST   (Arrive by 10:05 AM)   Post Op with Baldev Lou DO   [facility] )    [address] [id number] [phone]            Dec 12, 2018  1:00 PM CST   (Arrive by 12:45 PM)   Ortho Eval with Octavio Zepeda, SUMA   [facility] ([facility] )    [address] [address]   [phone]              Who to contact     If you have questions or need  "follow up information about today's clinic visit or your schedule please contact [facility] directly at [phone].  Normal or non-critical lab and imaging results will be communicated to you by MyChart, letter or phone within 4 business days after the clinic has received the results. If you do not hear from us within 7 days, please contact the clinic through [employer] or phone. If you have a critical or abnormal lab result, we will notify you by phone as soon as possible.  Submit refill requests through [employer] or call your pharmacy and they will forward the refill request to us. Please allow 3 business days for your refill to be completed.          Additional Information About Your Visit        [facility] Information     [employer] gives you secure access to your electronic health record. If you see a primary care provider, you can also send messages to your care team and make appointments. If you have questions, please call your primary care clinic.  If you do not have a primary care provider, please call [phone] and they will assist you.        Care EveryWhere ID     This is your Care EveryWhere ID. This could be used by other organizations to access your [facility] medical records  [id number]        Your Vitals Were     Pulse Temperature Height Pulse Oximetry BMI (Body Mass Index)       68 98.2  F (36.8  C) (Tympanic) 5' 5\" (1.651 m) 99% 23.3 kg/m2        Blood Pressure from Last 3 Encounters:   11/09/18 110/78   10/16/18 110/60   10/02/18 108/64    Weight from Last 3 Encounters:   11/09/18 140 lb (63.5 kg)   10/16/18 139 lb (63 kg)   10/02/18 139 lb (63 kg)              We Performed the Following     Basic metabolic panel     CBC with platelets          Today's Medication Changes          These changes are accurate as of 11/9/18  9:53 AM.  If you have any questions, ask your nurse or doctor.               Stop taking these medicines if you haven't already. Please contact your care team if " you have questions.     albuterol 108 (90 Base) MCG/ACT inhaler   Commonly known as:  PROAIR HFA/PROVENTIL HFA/VENTOLIN HFA   Stopped by:  Mirna Roblero PA           cefPROZIL 500 MG tablet   Commonly known as:  CEFZIL   Stopped by:  Mirna Roblero PA                    Primary Care Provider Office Phone # Fax #    DASH Angelo [phone]       [address] [place] [address]        Equal Access to Services     [facility] : Hadii aad ku hadasho Soomaali, waaxda luqadaha, qaybta kaalmada adeegyada, waxay idiin hayaan adeeg kharash lajesús . So [facility] [phone].    ATENCIÓN: Si habla español, tiene a mayo disposición servicios gratuitos de asistencia lingüística. Ll[facility] [phone].    We comply with applicable federal civil rights laws and [place] laws. We do not discriminate on the basis of race, color, national origin, age, disability, sex, sexual orientation, or gender identity.            Thank you!     Thank you for choosing [facility]  for your care. Our goal is always to provide you with excellent care. Hearing back from our patients is one way we can continue to improve our services. Please take a few minutes to complete the written survey that you may receive in the mail after your visit with us. Thank you!             Your Updated Medication List - Protect others around you: Learn how to safely use, store and throw away your medicines at www.disposemymeds.org.          This list is accurate as of 11/9/18  9:53 AM.  Always use your most recent med list.                   Brand Name Dispense Instructions for use Diagnosis    ASPIRIN PO      Take 81 mg by mouth daily        levothyroxine 88 MCG tablet    SYNTHROID/LEVOTHROID    90 tablet    Take 1 tablet (88 mcg) by mouth daily    Acquired hypothyroidism       RELPAX PO      Take 40 mg by mouth once as needed for migraine        rosuvastatin 10 MG tablet    CRESTOR    30 tablet    Take  1 tablet (10 mg) by mouth daily    Hyperlipidemia with target LDL less than 130       SARAFEM 10 MG Tabs   Generic drug:  FLUoxetine HCl (PMDD)      Take 10 mg by mouth daily

## 2018-11-09 NOTE — NURSING NOTE
"Chief Complaint   Patient presents with     Pre-Op Exam       Initial /78 (BP Location: Left arm, Patient Position: Sitting, Cuff Size: Adult Regular)  Pulse 68  Temp 98.2  F (36.8  C) (Tympanic)  Ht 5' 5\" (1.651 m)  Wt 140 lb (63.5 kg)  SpO2 99%  BMI 23.3 kg/m2 Estimated body mass index is 23.3 kg/(m^2) as calculated from the following:    Height as of this encounter: 5' 5\" (1.651 m).    Weight as of this encounter: 140 lb (63.5 kg).  Medication Reconciliation: complete    Petra Bautista LPN  "

## 2018-11-10 ASSESSMENT — ANXIETY QUESTIONNAIRES: GAD7 TOTAL SCORE: 0

## 2018-11-12 NOTE — H&P (VIEW-ONLY)
[facility] [place]  [address]  [place] [address]  [phone]  Dept: [phone]    PRE-OP EVALUATION:  Today's date: 2018    Denise Taylor (: 1955) presents for pre-operative evaluation assessment as requested by Dr. Lou.  She requires evaluation and anesthesia risk assessment prior to undergoing surgery/procedure for treatment of Right shoulder debridement, rotator cuff repair excision of lipoma .    Proposed Surgery/ Procedure:Right shoulder debridement, rotator cuff repair excision of lipoma .  Date of Surgery/ Procedure: 18  Time of Surgery/ Procedure: [facility]  Hospital/Surgical Facility: [place]  Primary Physician: Mirna Roblero  Type of Anesthesia Anticipated: to be determined    Patient has a Health Care Directive or Living Will:  YES    1. NO - Do you have a history of heart attack, stroke, stent, bypass or surgery on an artery in the head, neck, heart or legs?  2. NO - Do you ever have any pain or discomfort in your chest?  3. NO - Do you have a history of  Heart Failure?  4. NO - Are you troubled by shortness of breath when: walking on the level, up a slight hill or at night?  5. NO - Do you currently have a cold, bronchitis or other respiratory infection?  6. NO - Do you have a cough, shortness of breath or wheezing?  7. NO - Do you sometimes get pains in the calves of your legs when you walk?  8. NO - Do you or anyone in your family have previous history of blood clots?  9. NO - Do you or does anyone in your family have a serious bleeding problem such as prolonged bleeding following surgeries or cuts?  10. NO - Have you ever had problems with anemia or been told to take iron pills?  11. NO - Have you had any abnormal blood loss such as black, tarry or bloody stools, or abnormal vaginal bleeding?  12. NO - Have you ever had a blood transfusion?  13. NO - Have you or any of your relatives ever had problems with anesthesia?  14. NO - Do you have sleep  apnea, excessive snoring or daytime drowsiness?  15. NO - Do you have any prosthetic heart valves?  16. NO - Do you have prosthetic joints?  17. NO - Is there any chance that you may be pregnant?      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  Has much Debré in her shoulder.        See problem list for active medical problems.  Problems all longstanding and stable, except as noted/documented.  See ROS for pertinent symptoms related to these conditions.                                                                                                                                                          .    MEDICAL HISTORY:     Patient Active Problem List    Diagnosis Date Noted     ACP (advance care planning) 07/27/2017     Priority: Medium     Advance Care Planning 7/27/2017: ACP Review of Chart / Resources Provided:  Reviewed chart for advance care plan.  Denise Taylor has no plan or code status on file. Discussed available resources and provided with information.   Added by Marah Henry             Hypothyroidism, unspecified type 07/27/2017     Priority: Medium     Pneumonia 07/20/2015     Priority: Medium     History of basal cell carcinoma 05/18/2015     Priority: Medium     Hyperlipidemia with target LDL less than 130 05/18/2015     Priority: Medium     Diagnosis updated by automated process. Provider to review and confirm.        Past Medical History:   Diagnosis Date     Pneumonia      Past Surgical History:   Procedure Laterality Date     ARTHROSCOPY SHOULDER ROTATOR CUFF REPAIR Left 3/7/2018    Procedure: ARTHROSCOPY SHOULDER ROTATOR CUFF REPAIR;  LEFT SHOULDER ARTHROSCOPY, REPAIR ROTATOR CUFF: subacromial Decompression and AC Joint Resection;  Surgeon: Baldev Lou DO;  Location: [facility]     COLONOSCOPY  2006    Dr Lara     COLONOSCOPY N/A 10/10/2016    Procedure:  "COLONOSCOPY;  Surgeon: Christine Cintron MD;  Location: [facility]     SHOULDER SURGERY Right 2011/10/2012     Current Outpatient Prescriptions   Medication Sig Dispense Refill     ASPIRIN PO Take 81 mg by mouth daily       Eletriptan Hydrobromide (RELPAX PO) Take 40 mg by mouth once as needed for migraine       FLUoxetine HCl, PMDD, (SARAFEM) 10 MG TABS Take 10 mg by mouth daily       levothyroxine (SYNTHROID/LEVOTHROID) 88 MCG tablet Take 1 tablet (88 mcg) by mouth daily 90 tablet 1     rosuvastatin (CRESTOR) 10 MG tablet Take 1 tablet (10 mg) by mouth daily 30 tablet 0     OTC products: None, except as noted above    Allergies   Allergen Reactions     Penicillins Rash     Zithromax [Azithromycin] Rash      Latex Allergy: NO    Social History   Substance Use Topics     Smoking status: Never Smoker     Smokeless tobacco: Never Used     Alcohol use 1.0 oz/week     2 Glasses of wine per week      Comment: occasionally     History   Drug Use No       REVIEW OF SYSTEMS:   CONSTITUTIONAL: NEGATIVE for fever, chills, change in weight  INTEGUMENTARY/SKIN: NEGATIVE for worrisome rashes, moles or lesions  EYES: NEGATIVE for vision changes or irritation  ENT/MOUTH: NEGATIVE for ear, mouth and throat problems  RESP: NEGATIVE for significant cough or SOB  BREAST: NEGATIVE for masses, tenderness or discharge  CV: NEGATIVE for chest pain, palpitations or peripheral edema  GI: NEGATIVE for nausea, abdominal pain, heartburn, or change in bowel habits  : NEGATIVE for frequency, dysuria, or hematuria  MUSCULOSKELETAL: see HPI  NEURO: NEGATIVE for weakness, dizziness or paresthesias  ENDOCRINE: NEGATIVE for temperature intolerance, skin/hair changes  HEME: NEGATIVE for bleeding problems  PSYCHIATRIC: NEGATIVE for changes in mood or affect    EXAM:   /78 (BP Location: Left arm, Patient Position: Sitting, Cuff Size: Adult Regular)  Pulse 68  Temp 98.2  F (36.8  C) (Tympanic)  Ht 5' 5\" (1.651 m)  Wt 140 lb (63.5 kg)  SpO2 99%  " BMI 23.3 kg/m2    GENERAL APPEARANCE: healthy, alert and no distress     EYES: EOMI, PERRL     HENT: ear canals and TM's normal and nose and mouth without ulcers or lesions     NECK: no adenopathy, no asymmetry, masses, or scars and thyroid normal to palpation     RESP: lungs clear to auscultation - no rales, rhonchi or wheezes     CV: regular rates and rhythm, normal S1 S2, no S3 or S4 and no murmur, click or rub     ABDOMEN:  soft, nontender, no HSM or masses and bowel sounds normal     MS: right shoulder pain with painful motion. Pain into the back of her shoulder blade. Tingling in her hands.      SKIN: no suspicious lesions or rashes     NEURO: Normal strength and tone, sensory exam grossly normal, mentation intact and speech normal     PSYCH: mentation appears normal. and affect normal/bright     LYMPHATICS: No cervical adenopathy    DIAGNOSTICS:   EKG: Not indicated due to non-vascular surgery and low risk of event (age <65 and without cardiac risk factors) EKG done in Feb 2018.     Recent Labs   Lab Test  09/27/18   0923  04/02/18   1358   09/21/17   1100   HGB  13.4  13.2   < >  14.0   PLT  158  209   < >  168   NA   --    --    --   139   POTASSIUM   --    --    --   3.9   CR   --    --    --   0.80    < > = values in this interval not displayed.        IMPRESSION:   Reason for surgery/procedure: right shoulder rotator cuff repair.   Diagnosis/reason for consult: medical clearance.     The proposed surgical procedure is considered INTERMEDIATE risk.    REVISED CARDIAC RISK INDEX  The patient has the following serious cardiovascular risks for perioperative complications such as (MI, PE, VFib and 3  AV Block):  No serious cardiac risks  INTERPRETATION: 0 risks: Class I (very low risk - 0.4% complication rate)    The patient has the following additional risks for perioperative complications:  No identified additional risks      ICD-10-CM    1. Preop general physical exam Z01.818 CBC with platelets     Basic  metabolic panel       RECOMMENDATIONS:       Has a mild cough will check in with us in a week if sx are worsening.   --Patient is to take all scheduled medications on the day of surgery EXCEPT for modifications listed below.    APPROVAL GIVEN to proceed with proposed procedure, without further diagnostic evaluation       Signed Electronically by: DASH Vital    Copy of this evaluation report is provided to requesting physician.    [facility] Preop Guidelines    Revised Cardiac Risk Index

## 2018-11-13 ENCOUNTER — ANESTHESIA EVENT (OUTPATIENT)
Dept: SURGERY | Facility: HOSPITAL | Age: 63
End: 2018-11-13
Payer: COMMERCIAL

## 2018-11-13 NOTE — ANESTHESIA PREPROCEDURE EVALUATION
Anesthesia Evaluation     . Pt has had prior anesthetic.            ROS/MED HX    ENT/Pulmonary:     (+), recent URI . .    Neurologic:  - neg neurologic ROS     Cardiovascular:     (+) Dyslipidemia, ----. : . . . :. . Previous cardiac testing date:results:date: results:ECG reviewed date:2/26/18 results:Sinus Abelardo 45 date: results:          METS/Exercise Tolerance:     Hematologic:  - neg hematologic  ROS       Musculoskeletal:   (+) , , other musculoskeletal- Right Shoulder Derangement       GI/Hepatic:  - neg GI/hepatic ROS       Renal/Genitourinary:  - ROS Renal section negative       Endo:     (+) thyroid problem hypothyroidism, .      Psychiatric:  - neg psychiatric ROS       Infectious Disease:  - neg infectious disease ROS       Malignancy:   (+) Malignancy History of Skin          Other:    - neg other ROS                 Physical Exam  Normal systems: cardiovascular, pulmonary and dental    Airway   Mallampati: II  TM distance: >3 FB  Neck ROM: full    Dental     Cardiovascular   Rhythm and rate: regular and normal      Pulmonary    breath sounds clear to auscultation                    Anesthesia Plan      History & Physical Review  History and physical reviewed and following examination; no interval change.    ASA Status:  2 .    NPO Status:  > 8 hours    Plan for General, ETT and Periph. Nerve Block for postop pain with Intravenous and Propofol induction. Maintenance will be Balanced.    PONV prophylaxis:  Ondansetron (or other 5HT-3), Scopolamine patch and Dexamethasone or Solumedrol  11/8/18 [place] H&P ok      Postoperative Care  Postoperative pain management:  IV analgesics, Oral pain medications and Peripheral nerve block (Single Shot).      Consents  Anesthetic plan, risks, benefits and alternatives discussed with:  Patient..                          .

## 2018-11-14 ENCOUNTER — HOSPITAL ENCOUNTER (OUTPATIENT)
Facility: HOSPITAL | Age: 63
Discharge: HOME | End: 2018-11-14
Attending: ORTHOPAEDIC SURGERY | Admitting: ORTHOPAEDIC SURGERY
Payer: COMMERCIAL

## 2018-11-14 ENCOUNTER — SURGERY (OUTPATIENT)
Age: 63
End: 2018-11-14

## 2018-11-14 ENCOUNTER — ANESTHESIA (OUTPATIENT)
Dept: SURGERY | Facility: HOSPITAL | Age: 63
End: 2018-11-14
Payer: COMMERCIAL

## 2018-11-14 VITALS
TEMPERATURE: 97.2 F | SYSTOLIC BLOOD PRESSURE: 122 MMHG | DIASTOLIC BLOOD PRESSURE: 78 MMHG | OXYGEN SATURATION: 98 % | RESPIRATION RATE: 18 BRPM

## 2018-11-14 DIAGNOSIS — Z98.890 STATUS POST SHOULDER SURGERY: Primary | ICD-10-CM

## 2018-11-14 DIAGNOSIS — Z98.890 H/O SHOULDER SURGERY: ICD-10-CM

## 2018-11-14 PROCEDURE — 25000125 ZZHC RX 250: Performed by: NURSE ANESTHETIST, CERTIFIED REGISTERED

## 2018-11-14 PROCEDURE — 25000125 ZZHC RX 250: Performed by: ANESTHESIOLOGY

## 2018-11-14 PROCEDURE — 23076 EXC SHOULDER TUM DEEP < 5 CM: CPT | Mod: RT | Performed by: ORTHOPAEDIC SURGERY

## 2018-11-14 PROCEDURE — 88304 TISSUE EXAM BY PATHOLOGIST: CPT | Mod: TC | Performed by: ORTHOPAEDIC SURGERY

## 2018-11-14 PROCEDURE — 37000011 ZZH ANESTHESIA WARD SERVICE: Performed by: NURSE ANESTHETIST, CERTIFIED REGISTERED

## 2018-11-14 PROCEDURE — 36000058 ZZH SURGERY LEVEL 3 EA 15 ADDTL MIN: Performed by: ORTHOPAEDIC SURGERY

## 2018-11-14 PROCEDURE — 37000008 ZZH ANESTHESIA TECHNICAL FEE, 1ST 30 MIN: Performed by: ORTHOPAEDIC SURGERY

## 2018-11-14 PROCEDURE — 27110028 ZZH OR GENERAL SUPPLY NON-STERILE: Performed by: ORTHOPAEDIC SURGERY

## 2018-11-14 PROCEDURE — 37000009 ZZH ANESTHESIA TECHNICAL FEE, EACH ADDTL 15 MIN: Performed by: ORTHOPAEDIC SURGERY

## 2018-11-14 PROCEDURE — 25000566 ZZH SEVOFLURANE, EA 15 MIN: Performed by: NURSE ANESTHETIST, CERTIFIED REGISTERED

## 2018-11-14 PROCEDURE — 71000014 ZZH RECOVERY PHASE 1 LEVEL 2 FIRST HR: Performed by: ORTHOPAEDIC SURGERY

## 2018-11-14 PROCEDURE — 25000566 ZZH SEVOFLURANE, EA 15 MIN: Performed by: ANESTHESIOLOGY

## 2018-11-14 PROCEDURE — 25000128 H RX IP 250 OP 636: Performed by: ANESTHESIOLOGY

## 2018-11-14 PROCEDURE — 29823 SHO ARTHRS SRG XTNSV DBRDMT: CPT | Performed by: ANESTHESIOLOGY

## 2018-11-14 PROCEDURE — 71000027 ZZH RECOVERY PHASE 2 EACH 15 MINS: Performed by: ORTHOPAEDIC SURGERY

## 2018-11-14 PROCEDURE — 40000306 ZZH STATISTIC PRE PROC ASSESS II: Performed by: ORTHOPAEDIC SURGERY

## 2018-11-14 PROCEDURE — 01999 UNLISTED ANES PROCEDURE: CPT | Performed by: NURSE ANESTHETIST, CERTIFIED REGISTERED

## 2018-11-14 PROCEDURE — 23000 RMVL SBDLTD CLCREOUS DEP OPN: CPT | Mod: RT | Performed by: ORTHOPAEDIC SURGERY

## 2018-11-14 PROCEDURE — 25000128 H RX IP 250 OP 636: Performed by: NURSE ANESTHETIST, CERTIFIED REGISTERED

## 2018-11-14 PROCEDURE — 25000125 ZZHC RX 250: Performed by: ORTHOPAEDIC SURGERY

## 2018-11-14 PROCEDURE — 27210794 ZZH OR GENERAL SUPPLY STERILE: Performed by: ORTHOPAEDIC SURGERY

## 2018-11-14 PROCEDURE — 36000056 ZZH SURGERY LEVEL 3 1ST 30 MIN: Performed by: ORTHOPAEDIC SURGERY

## 2018-11-14 RX ORDER — CLINDAMYCIN PHOSPHATE 900 MG/50ML
900 INJECTION, SOLUTION INTRAVENOUS
Status: COMPLETED | OUTPATIENT
Start: 2018-11-14 | End: 2018-11-14

## 2018-11-14 RX ORDER — PROPOFOL 10 MG/ML
INJECTION, EMULSION INTRAVENOUS PRN
Status: DISCONTINUED | OUTPATIENT
Start: 2018-11-14 | End: 2018-11-15

## 2018-11-14 RX ORDER — HYDROCODONE BITARTRATE AND ACETAMINOPHEN 5; 325 MG/1; MG/1
1 TABLET ORAL
Status: DISCONTINUED | OUTPATIENT
Start: 2018-11-14 | End: 2018-11-14 | Stop reason: HOSPADM

## 2018-11-14 RX ORDER — FENTANYL CITRATE 50 UG/ML
25-50 INJECTION, SOLUTION INTRAMUSCULAR; INTRAVENOUS
Status: DISCONTINUED | OUTPATIENT
Start: 2018-11-14 | End: 2018-11-14 | Stop reason: HOSPADM

## 2018-11-14 RX ORDER — NEOSTIGMINE METHYLSULFATE 1 MG/ML
VIAL (ML) INJECTION PRN
Status: DISCONTINUED | OUTPATIENT
Start: 2018-11-14 | End: 2018-11-15

## 2018-11-14 RX ORDER — GLYCOPYRROLATE 0.2 MG/ML
INJECTION, SOLUTION INTRAMUSCULAR; INTRAVENOUS PRN
Status: DISCONTINUED | OUTPATIENT
Start: 2018-11-14 | End: 2018-11-15

## 2018-11-14 RX ORDER — EPHEDRINE SULFATE 50 MG/ML
INJECTION, SOLUTION INTRAMUSCULAR; INTRAVENOUS; SUBCUTANEOUS PRN
Status: DISCONTINUED | OUTPATIENT
Start: 2018-11-14 | End: 2018-11-15

## 2018-11-14 RX ORDER — LIDOCAINE HYDROCHLORIDE 20 MG/ML
INJECTION, SOLUTION INFILTRATION; PERINEURAL PRN
Status: DISCONTINUED | OUTPATIENT
Start: 2018-11-14 | End: 2018-11-15

## 2018-11-14 RX ORDER — NALOXONE HYDROCHLORIDE 0.4 MG/ML
.1-.4 INJECTION, SOLUTION INTRAMUSCULAR; INTRAVENOUS; SUBCUTANEOUS
Status: DISCONTINUED | OUTPATIENT
Start: 2018-11-14 | End: 2018-11-14 | Stop reason: HOSPADM

## 2018-11-14 RX ORDER — ALBUTEROL SULFATE 0.83 MG/ML
2.5 SOLUTION RESPIRATORY (INHALATION) EVERY 4 HOURS PRN
Status: DISCONTINUED | OUTPATIENT
Start: 2018-11-14 | End: 2018-11-14 | Stop reason: HOSPADM

## 2018-11-14 RX ORDER — ONDANSETRON 2 MG/ML
INJECTION INTRAMUSCULAR; INTRAVENOUS PRN
Status: DISCONTINUED | OUTPATIENT
Start: 2018-11-14 | End: 2018-11-15

## 2018-11-14 RX ORDER — SCOLOPAMINE TRANSDERMAL SYSTEM 1 MG/1
1 PATCH, EXTENDED RELEASE TRANSDERMAL ONCE
Status: COMPLETED | OUTPATIENT
Start: 2018-11-14 | End: 2018-11-14

## 2018-11-14 RX ORDER — DEXAMETHASONE SODIUM PHOSPHATE 10 MG/ML
INJECTION, SOLUTION INTRAMUSCULAR; INTRAVENOUS PRN
Status: DISCONTINUED | OUTPATIENT
Start: 2018-11-14 | End: 2018-11-15

## 2018-11-14 RX ORDER — BACITRACIN ZINC 500 [USP'U]/G
OINTMENT TOPICAL
Status: DISCONTINUED
Start: 2018-11-14 | End: 2018-11-14 | Stop reason: HOSPADM

## 2018-11-14 RX ORDER — CLINDAMYCIN PHOSPHATE 900 MG/50ML
900 INJECTION, SOLUTION INTRAVENOUS SEE ADMIN INSTRUCTIONS
Status: DISCONTINUED | OUTPATIENT
Start: 2018-11-14 | End: 2018-11-14 | Stop reason: HOSPADM

## 2018-11-14 RX ORDER — BUPIVACAINE HYDROCHLORIDE 5 MG/ML
INJECTION, SOLUTION EPIDURAL; INTRACAUDAL PRN
Status: DISCONTINUED | OUTPATIENT
Start: 2018-11-14 | End: 2018-11-15

## 2018-11-14 RX ORDER — ONDANSETRON 2 MG/ML
4 INJECTION INTRAMUSCULAR; INTRAVENOUS EVERY 30 MIN PRN
Status: DISCONTINUED | OUTPATIENT
Start: 2018-11-14 | End: 2018-11-14 | Stop reason: HOSPADM

## 2018-11-14 RX ORDER — SODIUM CHLORIDE, SODIUM LACTATE, POTASSIUM CHLORIDE, CALCIUM CHLORIDE 600; 310; 30; 20 MG/100ML; MG/100ML; MG/100ML; MG/100ML
INJECTION, SOLUTION INTRAVENOUS CONTINUOUS
Status: DISCONTINUED | OUTPATIENT
Start: 2018-11-14 | End: 2018-11-14 | Stop reason: HOSPADM

## 2018-11-14 RX ORDER — ONDANSETRON 4 MG/1
4 TABLET, ORALLY DISINTEGRATING ORAL EVERY 30 MIN PRN
Status: DISCONTINUED | OUTPATIENT
Start: 2018-11-14 | End: 2018-11-14 | Stop reason: HOSPADM

## 2018-11-14 RX ORDER — HYDROCODONE BITARTRATE AND ACETAMINOPHEN 5; 325 MG/1; MG/1
1-2 TABLET ORAL EVERY 4 HOURS PRN
Qty: 10 TABLET | Refills: 0 | Status: SHIPPED | OUTPATIENT
Start: 2018-11-14 | End: 2018-11-16

## 2018-11-14 RX ORDER — FENTANYL CITRATE 50 UG/ML
INJECTION, SOLUTION INTRAMUSCULAR; INTRAVENOUS PRN
Status: DISCONTINUED | OUTPATIENT
Start: 2018-11-14 | End: 2018-11-15

## 2018-11-14 RX ORDER — MEPERIDINE HYDROCHLORIDE 50 MG/ML
12.5 INJECTION INTRAMUSCULAR; INTRAVENOUS; SUBCUTANEOUS
Status: DISCONTINUED | OUTPATIENT
Start: 2018-11-14 | End: 2018-11-14 | Stop reason: HOSPADM

## 2018-11-14 RX ORDER — HYDROCODONE BITARTRATE AND ACETAMINOPHEN 5; 325 MG/1; MG/1
1-2 TABLET ORAL EVERY 4 HOURS PRN
Qty: 30 TABLET | Refills: 0 | Status: SHIPPED | OUTPATIENT
Start: 2018-11-14 | End: 2018-11-29

## 2018-11-14 RX ORDER — CEFAZOLIN SODIUM 1 G/3ML
INJECTION, POWDER, FOR SOLUTION INTRAMUSCULAR; INTRAVENOUS PRN
Status: DISCONTINUED | OUTPATIENT
Start: 2018-11-14 | End: 2018-11-14

## 2018-11-14 RX ORDER — BUPIVACAINE HYDROCHLORIDE AND EPINEPHRINE 5; 5 MG/ML; UG/ML
INJECTION, SOLUTION PERINEURAL
Status: DISCONTINUED
Start: 2018-11-14 | End: 2018-11-14 | Stop reason: HOSPADM

## 2018-11-14 RX ADMIN — LIDOCAINE HYDROCHLORIDE 60 MG: 20 INJECTION, SOLUTION INFILTRATION; PERINEURAL at 08:32

## 2018-11-14 RX ADMIN — DEXAMETHASONE SODIUM PHOSPHATE 10 MG: 10 INJECTION, SOLUTION INTRAMUSCULAR; INTRAVENOUS at 07:40

## 2018-11-14 RX ADMIN — MIDAZOLAM 2 MG: 1 INJECTION INTRAMUSCULAR; INTRAVENOUS at 07:30

## 2018-11-14 RX ADMIN — SODIUM CHLORIDE, POTASSIUM CHLORIDE, SODIUM LACTATE AND CALCIUM CHLORIDE: 600; 310; 30; 20 INJECTION, SOLUTION INTRAVENOUS at 08:25

## 2018-11-14 RX ADMIN — FENTANYL CITRATE 50 MCG: 50 INJECTION, SOLUTION INTRAMUSCULAR; INTRAVENOUS at 08:32

## 2018-11-14 RX ADMIN — SCOPALAMINE 1 PATCH: 1 PATCH, EXTENDED RELEASE TRANSDERMAL at 07:46

## 2018-11-14 RX ADMIN — SODIUM CHLORIDE, POTASSIUM CHLORIDE, SODIUM LACTATE AND CALCIUM CHLORIDE: 600; 310; 30; 20 INJECTION, SOLUTION INTRAVENOUS at 08:53

## 2018-11-14 RX ADMIN — Medication 3 MG: at 09:34

## 2018-11-14 RX ADMIN — ONDANSETRON 4 MG: 2 INJECTION INTRAMUSCULAR; INTRAVENOUS at 09:33

## 2018-11-14 RX ADMIN — PROPOFOL 130 MG: 10 INJECTION, EMULSION INTRAVENOUS at 08:32

## 2018-11-14 RX ADMIN — ROCURONIUM BROMIDE 40 MG: 10 INJECTION INTRAVENOUS at 08:32

## 2018-11-14 RX ADMIN — GLYCOPYRROLATE 0.2 MG: 0.2 INJECTION, SOLUTION INTRAMUSCULAR; INTRAVENOUS at 09:33

## 2018-11-14 RX ADMIN — BUPIVACAINE HYDROCHLORIDE 20 ML: 5 INJECTION, SOLUTION EPIDURAL; INTRACAUDAL at 07:40

## 2018-11-14 RX ADMIN — CLINDAMYCIN PHOSPHATE 900 MG: 18 INJECTION, SOLUTION INTRAVENOUS at 08:36

## 2018-11-14 RX ADMIN — PROPOFOL 30 MG: 10 INJECTION, EMULSION INTRAVENOUS at 09:41

## 2018-11-14 RX ADMIN — Medication 10 MG: at 08:47

## 2018-11-14 NOTE — IP AVS SNAPSHOT
[facility] Preop/Phase II    [address] [address]    Phone:  [phone]                                       After Visit Summary   11/14/2018    Denise Taylor    MRN: [id number]           After Visit Summary Signature Page     I have received my discharge instructions, and my questions have been answered. I have discussed any challenges I see with this plan with the nurse or doctor.    ..........................................................................................................................................  Patient/Patient Representative Signature      ..........................................................................................................................................  Patient Representative Print Name and Relationship to Patient    ..................................................               ................................................  Date                                   Time    ..........................................................................................................................................  Reviewed by Signature/Title    ...................................................              ..............................................  Date                                               Time          22EPIC Rev 08/18

## 2018-11-14 NOTE — OP NOTE
Preoperative diagnosis: Right shoulder submuscular lipoma, possible rotator cuff tear, laxity and scar tissue around previous acromioclavicular joint reconstruction    Postoperative diagnosis: Right shoulder submuscular lipoma, intact rotator cuff, laxity and scar tissue around previous acromioclavicular joint reconstruction    Procedure performed: Right shoulder excision of submuscular lipoma from beneath the deltoid, exploration of the rotator cuff, excision of scar tissue and revision of previous acromioclavicular joint reconstruction.    Anesthetic utilized: Interscalene block plus general anesthesia    EBL: Less than 10, complications: None, wound classification: Clean    Description of procedure: After the patient been anesthetized and prepped and draped, she was placed into the beachchair position.  A small incision was made over the previous scar over the acromioclavicular joint.  The previous scar was excised, and dissection carried down to the reconstructed acromioclavicular joint and distal clavicle.  There were some large knots of suture as well as a considerable amount of calcification and firm fibrous scar tissue that was tenting the skin as well as causing crepitation and joint noise around the acromioclavicular joint.  This firm mass was also impinging on the underlying rotator cuff.  The scar tissue, old suture, and calcified tissue was removed from the acromioclavicular joint, and from around the distal clavicle.  The clavicle was then stabilized by placing some sutures through the clavipectoral fascia and anchoring this down to the undersurface of the clavicle in the cortical clavicular ligament.  Dissection was carried out both proximally and distally to make certain that a large thick flap of tissue could be placed over this area.  Once this full-thickness flap of tissue was mobilized, this incision was closed with layered interrupted sutures.  Attention was then turned to the lipoma and the  rotator cuff.  An incision was made in the anterior aspect of the shoulder, the anterior deltoid was identified and  from the auricle brachialis tendon.  A retractor was placed under the deltoid and under the coracobrachialis.  The anterior and superior aspect of the lipoma were then identified.  This was a fairly large lipoma that had wrapped around the humerus from anterior to posterior.  Using careful dissection to preserve the capsule as much as possible, the entire mass was dissected from anterior superior to posterior and inferior.  The entire mass was removed en bloc.  The bursal tissue over the superior aspect of the shoulder was opened and the rotator cuff carefully inspected.  The MRI had suggested that this may have some small focal tears however no tears were noted in the visible rotator cuff, the rotator cuff could be visualized from the superior fibers of the subscapularis tendon posteriorly to the teres fibers.  Again no full-thickness tears are significant abnormalities were noted in the rotator cuff.  The deltoid was allowed to fall back into its anatomic position, subcutaneous tissue and skin closed in a layered closure with absorbable suture.  The 2 wounds were bolstered with Steri-Strips, and a sterile dressing applied.  She was then placed in a sling and taken to the recovery room to complete her wake up from the anesthetic.

## 2018-11-14 NOTE — ANESTHESIA PROCEDURE NOTES
Peripheral nerve/Neuraxial procedure note : Interscalene  Pre-Procedure    Location: pre-op    Procedure Times:11/14/2018 7:35 AM and 11/14/2018 7:40 AM  Pre-Anesthestic Checklist: patient identified, IV checked, site marked, risks and benefits discussed, informed consent, monitors and equipment checked, pre-op evaluation, at physician/surgeon's request and post-op pain management    Timeout  Correct Patient: Yes   Correct Procedure: Yes   Correct Site: Yes   Correct Laterality: Yes   Correct Position: Yes   Site Marked: Yes   .   Procedure Documentation    .    Procedure:  right  Interscalene.  Local skin infiltrated with mL of 1% lidocaine.     Ultrasound used to identify targeted nerve, plexus, or vascular marker and placed a needle adjacent to it., Ultrasound was used to visualize the spread of the anesthetic in close proximity to the above stated nerve. A permanent image is entered into the patient's record.  Patient Prep;chlorhexidine gluconate and isopropyl alcohol.  .  Needle: insulated Needle Gauge: 22.    Needle Length (Inches) 2  Insertion Method: Single Shot.       Assessment/Narrative  Paresthesias: No.  Injection made incrementally with aspirations every 5 mL..  The placement was negative for: blood aspirated, painful injection and site bleeding.  Bolus given via needle. No blood aspirated via catheter.   Secured via.   Complications: none.

## 2018-11-14 NOTE — ANESTHESIA POSTPROCEDURE EVALUATION
Patient: Denise Taylor    Procedure(s):  RIGHT SHOULDER DEBRIDEMENT, EXCISION OF LIPOMA  RIGHT UPPER ARM, EXCISION SCAR TISSUE AC JOINT    Diagnosis:CHRONIC RIGHT SHOULDER PAIN  Diagnosis Additional Information: No value filed.    Anesthesia Type:  General, ETT, Periph. Nerve Block for postop pain    Note:  Anesthesia Post Evaluation    Patient location during evaluation: Phase 2, PACU and Bedside  Patient participation: Able to participate in evaluation but full recovery from regional anesthesia has not yet ocurrred but is anticipated to occur within 48 hours  Level of consciousness: awake and alert  Pain management: adequate  Airway patency: patent  Cardiovascular status: acceptable  Respiratory status: acceptable  Hydration status: stable  PONV: none     Anesthetic complications: None          Last vitals:  Vitals:    11/14/18 1015 11/14/18 1020 11/14/18 1045   BP: 131/79 126/79    Resp: 16 16 18   Temp:  97.2  F (36.2  C)    SpO2: 100% 99%          Electronically Signed By: Tyrone Padron MD  November 14, 2018  10:52 AM

## 2018-11-14 NOTE — IP AVS SNAPSHOT
MRN:[id number]                      After Visit Summary   11/14/2018    Denise Taylor    MRN: [id number]           Thank you!     Thank you for choosing [facility] for your care. Our goal is always to provide you with excellent care. Hearing back from our patients is one way we can continue to improve our services. Please take a few minutes to complete the written survey that you may receive in the mail after you visit with us. Thank you!        Patient Information     Date Of Birth          1955        About your hospital stay     You were admitted on:  November 14, 2018 You last received care in the:  [facility]    You were discharged on:  November 14, 2018       Who to Call     For medical emergencies, please call 911.  For non-urgent questions about your medical care, please call your primary care provider or clinic, [phone]  For questions related to your surgery, please call your surgery clinic        Attending Provider     Provider Baldev Bustillo DO Orthopedics       Primary Care Provider Office Phone # Fax #    DASH Angelo [phone]      After Care Instructions     Discharge Instructions       Review outpatient procedure discharge instructions with patient as directed by Provider            Discharge Instructions       Review outpatient procedure discharge instructions with patient as directed by Provider            Dressing Change       Change dressing on third day after surgery.            Remove dressing - at 72 hours                  Your next 10 appointments already scheduled     Nov 29, 2018 10:20 AM CST   (Arrive by 10:05 AM)   Post Op with Baldev Lou DO   [facility] [place] ([facility] [place] )    [address]  [place] [id number] [phone]            Dec 12, 2018  1:00 PM CST   (Arrive by 12:45 PM)   Ortho Eval with Octavio Zepeda PT   [facility] [place]  [address] [address]   [phone]              Further instructions from your care team             POST OPERATIVE PATIENT INFORMATION  Shoulder Surgery    DIET  No restrictions unless specifically ordered by your physician.  DISCOMFORT  You should have only minimal discomfort.  There may be some tenderness around your incision.  If you have a prescribed medication and it is not controlling your pain, please notify your doctor.  You may use ice to your shoulder for comfort as needed.  CARE OF INCISION  Staples/Stitches: If the staples/stitches were removed during your hospital stay and steri-strips (thin strips of tape) were applied to the incision, leave them on until you see your doctor or they loosen on their own.  If your doctor sends you home with the staples/stitches intact, keep the dressing dry.  If a clear plastic covering has been applied to the incision prior to discharge, leave it on until you return to see your doctor.  You may shower as instructed by your physician.  ACTIVITY  Sling/Immobilizer: Continue to wear your sling or immobilizer at all times unless instructed otherwise.  Be sure the sling/immobilizer is properly positioned and elevates your arm.  Positioning: Position the head of your bed up with pillows.  A small pillow positioned behind your affected shoulder will also keep your shoulder from falling back and this decreases pain.  Keep your arm close to your side.  Exercises: Wiggle your fingers frequently to aid circulation.  Practice other exercises only if you were instructed in physical therapy or by your physician.  STOP EXERCISING IMMEDIATELY IF YOU HAVE SEVERE SHOULDER PAIN OR DISCOMFORT.  ACTIVITIES TO AVOID  Do not move your affected arm/shoulder away from your side unless instructed by your doctor.  This motion places stress on the surgical area.  Do not lie on your affected side unless your doctor says you can.  CONTACT YOUR DOCTOR  Contact your  doctor if any of the following conditions occur:    Have loss of movement or sensation to your fingers or hand.    Notice redness, swelling or warmth around your incision.    Have more than a small amount of drainage.    Develop a fever of 100  or higher, or start having chills.    Have severe pain, unrelieved by the medication prescribed for you.    Fingers/hand become cold, blue or swollen  If you have any questions, call your doctor s office.       CODMAN S EXERCISE    GENTLE PASSIVE RANGE OF MOTION EXERCISE    Rock your body back and forth or in circles.    Let your arm hang at your side and swing like a pendulum.    Don t use arm muscles to move your arm, rather use the sway of your body.    Do this 3 times a day for 3 minutes.    *WHEN NOT EXERCISING KEEP ARM IN SLING!    Post-Anesthesia Patient Instructions    IMMEDIATELY FOLLOWING SURGERY:  Do not drive or operate machinery for the first twenty four hours after surgery.  Do not make any important decisions for twenty four hours after surgery or while taking narcotic pain medications or sedatives.  If you develop intractable nausea and vomiting or a severe headache please notify your doctor immediately.    FOLLOW-UP:  Please make an appointment with your surgeon as instructed. You do not need to follow up with anesthesia unless specifically instructed to do so.    WOUND CARE INSTRUCTIONS (if applicable):  Keep a dry clean dressing on the anesthesia/puncture wound site if there is drainage.  Once the wound has quit draining you may leave it open to air.  Generally you should leave the bandage intact for twenty four hours unless there is drainage.  If the epidural site drains for more than 36-48 hours please call the anesthesia department.    QUESTIONS?:  Please feel free to call your physician or the hospital  if you have any questions, and they will be happy to assist you.       Scopolamine PatchRemove the scopolamine patch behind your ear after 24  hours after application.   After removing the patch, wash your hands and the area behind your ear thoroughly with soap and water.   The patch will still contain some medicine after use.   To avoid accidental contact or ingestion by children or pets, fold the used patch in half with the sticky side together and throw away in the trash out of the reach of children and pets.         Pending Results     No orders found from 11/12/2018 to 11/15/2018.            Admission Information     Date & Time Provider Department Dept. Phone    11/14/2018 Baldev Lou ChuyDO [facility] Preop/Phase [phone]      Your Vitals Were     Blood Pressure Temperature Respirations Pulse Oximetry          126/79 97.2  F (36.2  C) (Oral) 18 99%        MyChart Information     [facility] gives you secure access to your electronic health record. If you see a primary care provider, you can also send messages to your care team and make appointments. If you have questions, please call your primary care clinic.  If you do not have a primary care provider, please call [phone] and they will assist you.        Care EveryWhere ID     This is your Care EveryWhere ID. This could be used by other organizations to access your [facility] medical records  [id number]        Equal Access to Services     GIGI KYLE AH: Snehal Gonzalez, wadonna roche, qacristalta kaalmajose goss, arabella scott. So [facility] [phone].    ATENCIÓN: Si habla español, tiene a mayo disposición servicios gratuitos de asistencia lingüística. Llame al [phone].    We comply with applicable federal civil rights laws and [place] laws. We do not discriminate on the basis of race, color, national origin, age, disability, sex, sexual orientation, or gender identity.               Review of your medicines      START taking        Dose / Directions    * HYDROcodone-acetaminophen 5-325 MG per tablet   Commonly known as:  NORCO   Used for:  Status post  shoulder surgery        Dose:  1-2 tablet   Take 1-2 tablets by mouth every 4 hours as needed for moderate to severe pain   Quantity:  10 tablet   Refills:  0       * HYDROcodone-acetaminophen 5-325 MG per tablet   Commonly known as:  NORCO   Used for:  H/O shoulder surgery        Dose:  1-2 tablet   Take 1-2 tablets by mouth every 4 hours as needed for moderate to severe pain   Quantity:  30 tablet   Refills:  0       * Notice:  This list has 2 medication(s) that are the same as other medications prescribed for you. Read the directions carefully, and ask your doctor or other care provider to review them with you.      CONTINUE these medicines which have NOT CHANGED        Dose / Directions    ASPIRIN PO        Dose:  81 mg   Take 81 mg by mouth daily   Refills:  0       levothyroxine 88 MCG tablet   Commonly known as:  SYNTHROID/LEVOTHROID   Used for:  Acquired hypothyroidism        Dose:  88 mcg   Take 1 tablet (88 mcg) by mouth daily   Quantity:  90 tablet   Refills:  1       RELPAX PO        Dose:  40 mg   Take 40 mg by mouth once as needed for migraine   Refills:  0       rosuvastatin 10 MG tablet   Commonly known as:  CRESTOR   Used for:  Hyperlipidemia with target LDL less than 130        Dose:  10 mg   Take 1 tablet (10 mg) by mouth daily   Quantity:  30 tablet   Refills:  0       SARAFEM 10 MG Tabs   Generic drug:  FLUoxetine HCl (PMDD)        Dose:  10 mg   Take 10 mg by mouth daily   Refills:  0            Where to get your medicines      Some of these will need a paper prescription and others can be bought over the counter. Ask your nurse if you have questions.     Bring a paper prescription for each of these medications     HYDROcodone-acetaminophen 5-325 MG per tablet    HYDROcodone-acetaminophen 5-325 MG per tablet                Protect others around you: Learn how to safely use, store and throw away your medicines at www.disposemymeds.org.        Information about OPIOIDS     PRESCRIPTION OPIOIDS:  WHAT YOU NEED TO KNOW   We gave you an opioid (narcotic) pain medicine. It is important to manage your pain, but opioids are not always the best choice. You should first try all the other options your care team gave you. Take this medicine for as short a time (and as few doses) as possible.    Some activities can increase your pain, such as bandage changes or therapy sessions. It may help to take your pain medicine 30 to 60 minutes before these activities. Reduce your stress by getting enough sleep, working on hobbies you enjoy and practicing relaxation or meditation. Talk to your care team about ways to manage your pain beyond prescription opioids.    These medicines have risks:    DO NOT drive when on new or higher doses of pain medicine. These medicines can affect your alertness and reaction times, and you could be arrested for driving under the influence (DUI). If you need to use opioids long-term, talk to your care team about driving.    DO NOT operate heavy machinery    DO NOT do any other dangerous activities while taking these medicines.    DO NOT drink any alcohol while taking these medicines.     If the opioid prescribed includes acetaminophen, DO NOT take with any other medicines that contain acetaminophen. Read all labels carefully. Look for the word  acetaminophen  or  Tylenol.  Ask your pharmacist if you have questions or are unsure.    You can get addicted to pain medicines, especially if you have a history of addiction (chemical, alcohol or substance dependence). Talk to your care team about ways to reduce this risk.    All opioids tend to cause constipation. Drink plenty of water and eat foods that have a lot of fiber, such as fruits, vegetables, prune juice, apple juice and high-fiber cereal. Take a laxative (Miralax, milk of magnesia, Colace, Senna) if you don t move your bowels at least every other day. Other side effects include upset stomach, sleepiness, dizziness, throwing up, tolerance  (needing more of the medicine to have the same effect), physical dependence and slowed breathing.    Store your pills in a secure place, locked if possible. We will not replace any lost or stolen medicine. If you don t finish your medicine, please throw away (dispose) as directed by your pharmacist. The [place] [employer] has more information about safe disposal: https://www.pca.[facility].[place].us/living-green/managing-unwanted-medications             Medication List: This is a list of all your medications and when to take them. Check marks below indicate your daily home schedule. Keep this list as a reference.      Medications           Morning Afternoon Evening Bedtime As Needed    ASPIRIN PO   Take 81 mg by mouth daily                                * HYDROcodone-acetaminophen 5-325 MG per tablet   Commonly known as:  NORCO   Take 1-2 tablets by mouth every 4 hours as needed for moderate to severe pain                                * HYDROcodone-acetaminophen 5-325 MG per tablet   Commonly known as:  NORCO   Take 1-2 tablets by mouth every 4 hours as needed for moderate to severe pain                                levothyroxine 88 MCG tablet   Commonly known as:  SYNTHROID/LEVOTHROID   Take 1 tablet (88 mcg) by mouth daily                                RELPAX PO   Take 40 mg by mouth once as needed for migraine                                rosuvastatin 10 MG tablet   Commonly known as:  CRESTOR   Take 1 tablet (10 mg) by mouth daily                                SARAFEM 10 MG Tabs   Take 10 mg by mouth daily   Generic drug:  FLUoxetine HCl (PMDD)                                * Notice:  This list has 2 medication(s) that are the same as other medications prescribed for you. Read the directions carefully, and ask your doctor or other care provider to review them with you.

## 2018-11-14 NOTE — DISCHARGE INSTRUCTIONS
POST OPERATIVE PATIENT INFORMATION  Shoulder Surgery    DIET  No restrictions unless specifically ordered by your physician.  DISCOMFORT  You should have only minimal discomfort.  There may be some tenderness around your incision.  If you have a prescribed medication and it is not controlling your pain, please notify your doctor.  You may use ice to your shoulder for comfort as needed.  CARE OF INCISION  Staples/Stitches: If the staples/stitches were removed during your hospital stay and steri-strips (thin strips of tape) were applied to the incision, leave them on until you see your doctor or they loosen on their own.  If your doctor sends you home with the staples/stitches intact, keep the dressing dry.  If a clear plastic covering has been applied to the incision prior to discharge, leave it on until you return to see your doctor.  You may shower as instructed by your physician.  ACTIVITY  Sling/Immobilizer: Continue to wear your sling or immobilizer at all times unless instructed otherwise.  Be sure the sling/immobilizer is properly positioned and elevates your arm.  Positioning: Position the head of your bed up with pillows.  A small pillow positioned behind your affected shoulder will also keep your shoulder from falling back and this decreases pain.  Keep your arm close to your side.  Exercises: Wiggle your fingers frequently to aid circulation.  Practice other exercises only if you were instructed in physical therapy or by your physician.  STOP EXERCISING IMMEDIATELY IF YOU HAVE SEVERE SHOULDER PAIN OR DISCOMFORT.  ACTIVITIES TO AVOID  Do not move your affected arm/shoulder away from your side unless instructed by your doctor.  This motion places stress on the surgical area.  Do not lie on your affected side unless your doctor says you can.  CONTACT YOUR DOCTOR  Contact your doctor if any of the following conditions occur:    Have loss of movement or sensation to your fingers or hand.    Notice  redness, swelling or warmth around your incision.    Have more than a small amount of drainage.    Develop a fever of 100  or higher, or start having chills.    Have severe pain, unrelieved by the medication prescribed for you.    Fingers/hand become cold, blue or swollen  If you have any questions, call your doctor s office.       CODMAN S EXERCISE    GENTLE PASSIVE RANGE OF MOTION EXERCISE    Rock your body back and forth or in circles.    Let your arm hang at your side and swing like a pendulum.    Don t use arm muscles to move your arm, rather use the sway of your body.    Do this 3 times a day for 3 minutes.    *WHEN NOT EXERCISING KEEP ARM IN SLING!    Post-Anesthesia Patient Instructions    IMMEDIATELY FOLLOWING SURGERY:  Do not drive or operate machinery for the first twenty four hours after surgery.  Do not make any important decisions for twenty four hours after surgery or while taking narcotic pain medications or sedatives.  If you develop intractable nausea and vomiting or a severe headache please notify your doctor immediately.    FOLLOW-UP:  Please make an appointment with your surgeon as instructed. You do not need to follow up with anesthesia unless specifically instructed to do so.    WOUND CARE INSTRUCTIONS (if applicable):  Keep a dry clean dressing on the anesthesia/puncture wound site if there is drainage.  Once the wound has quit draining you may leave it open to air.  Generally you should leave the bandage intact for twenty four hours unless there is drainage.  If the epidural site drains for more than 36-48 hours please call the anesthesia department.    QUESTIONS?:  Please feel free to call your physician or the hospital  if you have any questions, and they will be happy to assist you.       Scopolamine PatchRemove the scopolamine patch behind your ear after 24 hours after application.   After removing the patch, wash your hands and the area behind your ear thoroughly with soap and  water.   The patch will still contain some medicine after use.   To avoid accidental contact or ingestion by children or pets, fold the used patch in half with the sticky side together and throw away in the trash out of the reach of children and pets.

## 2018-11-14 NOTE — ANESTHESIA CARE TRANSFER NOTE
Patient: Denise Taylor    Procedure(s):  RIGHT SHOULDER DEBRIDEMENT, EXCISION OF LIPOMA  RIGHT UPPER ARM, EXCISION SCAR TISSUE AC JOINT    Diagnosis: CHRONIC RIGHT SHOULDER PAIN  Diagnosis Additional Information: No value filed.    Anesthesia Type:   General, ETT, Periph. Nerve Block for postop pain     Note:  Airway :Nasal Cannula  Patient transferred to:PACU  Handoff Report: Identifed the Patient, Identified the Reponsible Provider, Reviewed the pertinent medical history, Discussed the surgical course, Reviewed Intra-OP anesthesia mangement and issues during anesthesia, Set expectations for post-procedure period and Allowed opportunity for questions and acknowledgement of understanding      Vitals: (Last set prior to Anesthesia Care Transfer)    CRNA VITALS  11/14/2018 0918 - 11/14/2018 1018      11/14/2018             NIBP: (!)  170/104    Pulse: 76    NIBP Mean: 130    Ht Rate: 76    SpO2: 100 %    Resp Rate (observed): 19                Electronically Signed By: CESAR Villar CRNA  November 14, 2018  10:47 AM

## 2018-11-14 NOTE — OR NURSING
Patient and responsible adult given discharge instructions with no questions regarding instructions. Diana score 20. Pain level 0/10.  Discharged from unit via wheel chair. Patient discharged to home.

## 2018-11-15 LAB — COPATH REPORT: NORMAL

## 2018-11-15 NOTE — ADDENDUM NOTE
Addendum  created 11/15/18 0809 by Deann Garrido APRN CRNA    Anesthesia Event edited, Procedure Event Log accessed, Sign clinical note

## 2018-11-15 NOTE — ANESTHESIA CARE TRANSFER NOTE
Patient: Denise Taylor    Procedure(s):  RIGHT SHOULDER DEBRIDEMENT, EXCISION OF LIPOMA  RIGHT UPPER ARM, EXCISION SCAR TISSUE AC JOINT    Diagnosis: CHRONIC RIGHT SHOULDER PAIN  Diagnosis Additional Information: No value filed.    Anesthesia Type:   General, ETT, Periph. Nerve Block for postop pain     Note:  Airway :Nasal Cannula  Patient transferred to:PACU  Handoff Report: Identifed the Patient, Identified the Reponsible Provider, Reviewed the pertinent medical history, Discussed the surgical course, Reviewed Intra-OP anesthesia mangement and issues during anesthesia, Set expectations for post-procedure period and Allowed opportunity for questions and acknowledgement of understanding      Vitals: (Last set prior to Anesthesia Care Transfer)    CRNA VITALS  11/14/2018 0918 - 11/14/2018 1018      11/14/2018             NIBP: (!)  170/104    Pulse: 76    NIBP Mean: 130    Ht Rate: 76    SpO2: 100 %    Resp Rate (observed): 19                Electronically Signed By: CESAR Villar CRNA  November 15, 2018  8:07 AM

## 2018-11-16 ENCOUNTER — HOSPITAL ENCOUNTER (EMERGENCY)
Facility: HOSPITAL | Age: 63
Discharge: HOME | End: 2018-11-16
Attending: FAMILY MEDICINE | Admitting: FAMILY MEDICINE
Payer: COMMERCIAL

## 2018-11-16 ENCOUNTER — OFFICE VISIT (OUTPATIENT)
Dept: FAMILY MEDICINE | Facility: OTHER | Age: 63
End: 2018-11-16
Attending: PHYSICIAN ASSISTANT
Payer: COMMERCIAL

## 2018-11-16 VITALS
OXYGEN SATURATION: 97 % | HEIGHT: 65 IN | BODY MASS INDEX: 22.82 KG/M2 | DIASTOLIC BLOOD PRESSURE: 48 MMHG | HEART RATE: 54 BPM | SYSTOLIC BLOOD PRESSURE: 96 MMHG | TEMPERATURE: 99.2 F | WEIGHT: 137 LBS

## 2018-11-16 VITALS — DIASTOLIC BLOOD PRESSURE: 75 MMHG | SYSTOLIC BLOOD PRESSURE: 119 MMHG | OXYGEN SATURATION: 96 % | TEMPERATURE: 99.5 F

## 2018-11-16 DIAGNOSIS — R11.10 VOMITING AND DIARRHEA: ICD-10-CM

## 2018-11-16 DIAGNOSIS — R10.13 ABDOMINAL PAIN, EPIGASTRIC: Primary | ICD-10-CM

## 2018-11-16 DIAGNOSIS — R31.9 HEMATURIA, UNSPECIFIED TYPE: ICD-10-CM

## 2018-11-16 DIAGNOSIS — Z98.890 POSTOPERATIVE STATE: ICD-10-CM

## 2018-11-16 DIAGNOSIS — R19.7 VOMITING AND DIARRHEA: ICD-10-CM

## 2018-11-16 LAB
ALBUMIN SERPL-MCNC: 3.8 G/DL (ref 3.4–5)
ALBUMIN UR-MCNC: NEGATIVE MG/DL
ALP SERPL-CCNC: 101 U/L (ref 40–150)
ALT SERPL W P-5'-P-CCNC: 45 U/L (ref 0–50)
ANION GAP SERPL CALCULATED.3IONS-SCNC: 6 MMOL/L (ref 3–14)
APPEARANCE UR: CLEAR
AST SERPL W P-5'-P-CCNC: 25 U/L (ref 0–45)
BACTERIA #/AREA URNS HPF: ABNORMAL /HPF
BASOPHILS # BLD AUTO: 0 10E9/L (ref 0–0.2)
BASOPHILS NFR BLD AUTO: 0.5 %
BILIRUB SERPL-MCNC: 0.4 MG/DL (ref 0.2–1.3)
BILIRUB UR QL STRIP: NEGATIVE
BUN SERPL-MCNC: 11 MG/DL (ref 7–30)
CALCIUM SERPL-MCNC: 8.3 MG/DL (ref 8.5–10.1)
CHLORIDE SERPL-SCNC: 102 MMOL/L (ref 94–109)
CO2 SERPL-SCNC: 30 MMOL/L (ref 20–32)
COLOR UR AUTO: YELLOW
CREAT SERPL-MCNC: 0.73 MG/DL (ref 0.52–1.04)
DIFFERENTIAL METHOD BLD: NORMAL
EOSINOPHIL # BLD AUTO: 0.1 10E9/L (ref 0–0.7)
EOSINOPHIL NFR BLD AUTO: 0.7 %
ERYTHROCYTE [DISTWIDTH] IN BLOOD BY AUTOMATED COUNT: 12.7 % (ref 10–15)
GFR SERPL CREATININE-BSD FRML MDRD: 80 ML/MIN/1.7M2
GLUCOSE SERPL-MCNC: 91 MG/DL (ref 70–99)
GLUCOSE UR STRIP-MCNC: NEGATIVE MG/DL
HCT VFR BLD AUTO: 38.2 % (ref 35–47)
HGB BLD-MCNC: 12.7 G/DL (ref 11.7–15.7)
HGB UR QL STRIP: NEGATIVE
IMM GRANULOCYTES # BLD: 0 10E9/L (ref 0–0.4)
IMM GRANULOCYTES NFR BLD: 0.4 %
KETONES UR STRIP-MCNC: NEGATIVE MG/DL
LACTATE BLD-SCNC: 1.2 MMOL/L (ref 0.7–2)
LEUKOCYTE ESTERASE UR QL STRIP: ABNORMAL
LYMPHOCYTES # BLD AUTO: 2 10E9/L (ref 0.8–5.3)
LYMPHOCYTES NFR BLD AUTO: 24.1 %
MCH RBC QN AUTO: 29.5 PG (ref 26.5–33)
MCHC RBC AUTO-ENTMCNC: 33.2 G/DL (ref 31.5–36.5)
MCV RBC AUTO: 89 FL (ref 78–100)
MONOCYTES # BLD AUTO: 0.5 10E9/L (ref 0–1.3)
MONOCYTES NFR BLD AUTO: 6.4 %
MUCOUS THREADS #/AREA URNS LPF: PRESENT /LPF
NEUTROPHILS # BLD AUTO: 5.7 10E9/L (ref 1.6–8.3)
NEUTROPHILS NFR BLD AUTO: 67.9 %
NITRATE UR QL: NEGATIVE
NRBC # BLD AUTO: 0 10*3/UL
NRBC BLD AUTO-RTO: 0 /100
PH UR STRIP: 6.5 PH (ref 4.7–8)
PLATELET # BLD AUTO: 204 10E9/L (ref 150–450)
POTASSIUM SERPL-SCNC: 3.8 MMOL/L (ref 3.4–5.3)
PROT SERPL-MCNC: 7 G/DL (ref 6.8–8.8)
RBC # BLD AUTO: 4.31 10E12/L (ref 3.8–5.2)
RBC #/AREA URNS AUTO: 3 /HPF (ref 0–2)
SODIUM SERPL-SCNC: 138 MMOL/L (ref 133–144)
SOURCE: ABNORMAL
SP GR UR STRIP: 1.01 (ref 1–1.03)
UROBILINOGEN UR STRIP-MCNC: 2 MG/DL (ref 0–2)
WBC # BLD AUTO: 8.4 10E9/L (ref 4–11)
WBC #/AREA URNS AUTO: 4 /HPF (ref 0–5)

## 2018-11-16 PROCEDURE — 80053 COMPREHEN METABOLIC PANEL: CPT | Performed by: FAMILY MEDICINE

## 2018-11-16 PROCEDURE — 99283 EMERGENCY DEPT VISIT LOW MDM: CPT

## 2018-11-16 PROCEDURE — 99283 EMERGENCY DEPT VISIT LOW MDM: CPT | Mod: Z6 | Performed by: FAMILY MEDICINE

## 2018-11-16 PROCEDURE — 81001 URINALYSIS AUTO W/SCOPE: CPT | Performed by: FAMILY MEDICINE

## 2018-11-16 PROCEDURE — 85025 COMPLETE CBC W/AUTO DIFF WBC: CPT | Performed by: FAMILY MEDICINE

## 2018-11-16 PROCEDURE — 36415 COLL VENOUS BLD VENIPUNCTURE: CPT | Performed by: FAMILY MEDICINE

## 2018-11-16 PROCEDURE — 83605 ASSAY OF LACTIC ACID: CPT | Performed by: FAMILY MEDICINE

## 2018-11-16 RX ORDER — ONDANSETRON 4 MG/1
4 TABLET, ORALLY DISINTEGRATING ORAL EVERY 8 HOURS PRN
Qty: 10 TABLET | Refills: 0 | Status: SHIPPED | OUTPATIENT
Start: 2018-11-16 | End: 2019-05-01

## 2018-11-16 ASSESSMENT — ANXIETY QUESTIONNAIRES
2. NOT BEING ABLE TO STOP OR CONTROL WORRYING: NOT AT ALL
3. WORRYING TOO MUCH ABOUT DIFFERENT THINGS: NOT AT ALL
5. BEING SO RESTLESS THAT IT IS HARD TO SIT STILL: NOT AT ALL
1. FEELING NERVOUS, ANXIOUS, OR ON EDGE: NOT AT ALL
GAD7 TOTAL SCORE: 0
7. FEELING AFRAID AS IF SOMETHING AWFUL MIGHT HAPPEN: NOT AT ALL
6. BECOMING EASILY ANNOYED OR IRRITABLE: NOT AT ALL

## 2018-11-16 ASSESSMENT — PATIENT HEALTH QUESTIONNAIRE - PHQ9
SUM OF ALL RESPONSES TO PHQ QUESTIONS 1-9: 0
5. POOR APPETITE OR OVEREATING: NOT AT ALL

## 2018-11-16 ASSESSMENT — PAIN SCALES - GENERAL: PAINLEVEL: MILD PAIN (2)

## 2018-11-16 NOTE — MR AVS SNAPSHOT
After Visit Summary   11/16/2018    Denise Taylor    MRN: [id number]           Patient Information     Date Of Birth          1955        Visit Information        Provider Department      11/16/2018 11:20 AM Mirna Rbolero PA [facility]        Today's Diagnoses     Abdominal pain, epigastric    -  1    Vomiting and diarrhea        Postoperative state           Follow-ups after your visit        Your next 10 appointments already scheduled     Nov 29, 2018 10:20 AM CST   (Arrive by 10:05 AM)   [facility] with Baldev Lou DO   [facility]    [address] [id number] [phone]            Dec 12, 2018  1:00 PM CST   (Arrive by 12:45 PM)   Ortho Eval with Octavio Zepeda, PT   [facility] ([facility] )    [address] [address]   [phone]              Who to contact     If you have questions or need follow up information about today's clinic visit or your schedule please contact [facility] directly at [phone].  Normal or non-critical lab and imaging results will be communicated to you by MyChart, letter or phone within 4 business days after the clinic has received the results. If you do not hear from us within 7 days, please contact the clinic through [employer] or phone. If you have a critical or abnormal lab result, we will notify you by phone as soon as possible.  Submit refill requests through [employer] or call your pharmacy and they will forward the refill request to us. Please allow 3 business days for your refill to be completed.          Additional Information About Your Visit        MyChart Information     [employer] gives you secure access to your electronic health record. If you see a primary care provider, you can also send messages to your care team and make appointments. If you have questions, please call your primary care  "clinic.  If you do not have a primary care provider, please call [phone] and they will assist you.        Care EveryWhere ID     This is your Care EveryWhere ID. This could be used by other organizations to access your [facility] medical records  [id number]        Your Vitals Were     Pulse Temperature Height Pulse Oximetry BMI (Body Mass Index)       54 99.2  F (37.3  C) (Tympanic) 5' 5\" (1.651 m) 97% 22.8 kg/m2        Blood Pressure from Last 3 Encounters:   11/16/18 113/70   11/16/18 96/48   11/14/18 122/78    Weight from Last 3 Encounters:   11/16/18 137 lb (62.1 kg)   11/09/18 140 lb (63.5 kg)   10/16/18 139 lb (63 kg)              Today, you had the following     No orders found for display         Today's Medication Changes          These changes are accurate as of 11/16/18  1:11 PM.  If you have any questions, ask your nurse or doctor.               These medicines have changed or have updated prescriptions.        Dose/Directions    HYDROcodone-acetaminophen 5-325 MG per tablet   Commonly known as:  NORCO   This may have changed:  Another medication with the same name was removed. Continue taking this medication, and follow the directions you see here.   Used for:  H/O shoulder surgery   Changed by:  Mirna Roblero PA        Dose:  1-2 tablet   Take 1-2 tablets by mouth every 4 hours as needed for moderate to severe pain   Quantity:  30 tablet   Refills:  0                Primary Care Provider Office Phone # Fax #    DASH Angelo [phone]       [address] [place] [address]        Equal Access to Services     [facility] AH: Hadii florencia hamilton hadasho Soomaali, waaxda luqadaha, qaybta kaalmada arabella goss. So [facility] [phone].    ATENCIÓN: Si habla español, tiene a mayo disposición servicios gratuitos de asistencia lingüística. Llame al [phone].    We comply with applicable federal civil rights laws and [place] laws. " We do not discriminate on the basis of race, color, national origin, age, disability, sex, sexual orientation, or gender identity.            Thank you!     Thank you for choosing [facility]  for your care. Our goal is always to provide you with excellent care. Hearing back from our patients is one way we can continue to improve our services. Please take a few minutes to complete the written survey that you may receive in the mail after your visit with us. Thank you!             Your Updated Medication List - Protect others around you: Learn how to safely use, store and throw away your medicines at www.disposemymeds.org.          This list is accurate as of 11/16/18  1:11 PM.  Always use your most recent med list.                   Brand Name Dispense Instructions for use Diagnosis    ASPIRIN PO      Take 81 mg by mouth daily        HYDROcodone-acetaminophen 5-325 MG per tablet    NORCO    30 tablet    Take 1-2 tablets by mouth every 4 hours as needed for moderate to severe pain    H/O shoulder surgery       levothyroxine 88 MCG tablet    SYNTHROID/LEVOTHROID    90 tablet    Take 1 tablet (88 mcg) by mouth daily    Acquired hypothyroidism       RELPAX PO      Take 40 mg by mouth once as needed for migraine        rosuvastatin 10 MG tablet    CRESTOR    30 tablet    Take 1 tablet (10 mg) by mouth daily    Hyperlipidemia with target LDL less than 130       SARAFEM 10 MG Tabs   Generic drug:  FLUoxetine HCl (PMDD)      Take 10 mg by mouth daily

## 2018-11-16 NOTE — ED NOTES
Patient arrives accompanied by friend for evaluation of hematuria, nausea, vomiting, and diarrhea. Patient was into clinic today and BP was noted in the 80's. In triage, BP of 113/70. Reporting onset of abdominal cramping/epigastric pain last night. 8-10 episodes of emesis and multiple episodes of diarrhea. Patient also reporting hematuria since yesterday. Call light within reach.

## 2018-11-16 NOTE — DISCHARGE INSTRUCTIONS
Denise,    There is no evidence of bleeding at this time.  Follow up with LISA Roblero in 1 month for UA follow up.

## 2018-11-16 NOTE — ED NOTES
"Discharge instructions reviewed with patient.  Patient verbalized understanding.  Declines discharge vitals at this time states \"I just want to go home and take a nap\"  Home.  "

## 2018-11-16 NOTE — PROGRESS NOTES
SUBJECTIVE:   Denise Taylor is a 63 year old female who presents to clinic today for the following health issues:      Blood in urine      Duration: 1 day    Description (location/character/radiation): Blood in urine    Intensity:  moderate    Accompanying signs and symptoms: Up in the night loose stools and vomiting    History (similar episodes/previous evaluation): Recent surgery, right shoulder    Precipitating or alleviating factors: None    Therapies tried and outcome: None           Problem list and histories reviewed & adjusted, as indicated.  Additional history: as documented    Patient Active Problem List   Diagnosis     History of basal cell carcinoma     Hyperlipidemia with target LDL less than 130     Pneumonia     ACP (advance care planning)     Hypothyroidism, unspecified type     AC separation, type 5     Actinic keratosis     Capillary disease     Other dyschromia     Scar condition and fibrosis of skin     Past Surgical History:   Procedure Laterality Date     ARTHROSCOPY SHOULDER ROTATOR CUFF REPAIR Left 3/7/2018    Procedure: ARTHROSCOPY SHOULDER ROTATOR CUFF REPAIR;  LEFT SHOULDER ARTHROSCOPY, REPAIR ROTATOR CUFF: subacromial Decompression and AC Joint Resection;  Surgeon: Baldev Lou DO;  Location: [facility]     ARTHROTOMY SHOULDER, ROTATOR CUFF REPAIR, COMBINED Right 11/14/2018    Procedure: RIGHT SHOULDER DEBRIDEMENT, EXCISION OF LIPOMA  RIGHT UPPER ARM, EXCISION SCAR TISSUE AC JOINT;  Surgeon: Baldev Lou DO;  Location: [facility]     COLONOSCOPY  2006    Dr Lara     COLONOSCOPY N/A 10/10/2016    Procedure: COLONOSCOPY;  Surgeon: Christine Cintron MD;  Location: [facility]     SHOULDER SURGERY Right 2011/10/2012       Social History   Substance Use Topics     Smoking status: Never Smoker     Smokeless tobacco: Never Used     Alcohol use 1.0 oz/week     2 Glasses of wine per week      Comment: occasionally     Family History   Problem Relation Age of Onset     Myocardial Infarction  Mother      Hypertension Mother      Stomach Cancer Father          Current Outpatient Prescriptions   Medication Sig Dispense Refill     ASPIRIN PO Take 81 mg by mouth daily       Eletriptan Hydrobromide (RELPAX PO) Take 40 mg by mouth once as needed for migraine       FLUoxetine HCl, PMDD, (SARAFEM) 10 MG TABS Take 10 mg by mouth daily       HYDROcodone-acetaminophen (NORCO) 5-325 MG per tablet Take 1-2 tablets by mouth every 4 hours as needed for moderate to severe pain 30 tablet 0     levothyroxine (SYNTHROID/LEVOTHROID) 88 MCG tablet Take 1 tablet (88 mcg) by mouth daily 90 tablet 1     rosuvastatin (CRESTOR) 10 MG tablet Take 1 tablet (10 mg) by mouth daily 30 tablet 0     Allergies   Allergen Reactions     Penicillins Rash     Zithromax [Azithromycin] Rash     Recent Labs   Lab Test  11/16/18   1225  11/09/18   0959  09/27/18   0923  08/01/18   1005  09/21/17   1100   07/27/17   1413  07/27/16   1038   LDL   --    --   63   --    --    --   61  66   HDL   --    --   52   --    --    --   66  66   TRIG   --    --   119   --    --    --   82  99   ALT  45   --    --    --   39   --   41   --    CR  0.73  0.72   --    --   0.80   < >   --    --    GFRESTIMATED  80  82   --    --   73   < >   --    --    GFRESTBLACK  >90  >90   --    --   88   < >   --    --    POTASSIUM  3.8  3.6   --    --   3.9   < >   --    --    TSH   --    --   0.28*  4.59*   --    --   1.08  2.77    < > = values in this interval not displayed.      BP Readings from Last 3 Encounters:   11/16/18 113/70   11/16/18 96/48   11/14/18 122/78    Wt Readings from Last 3 Encounters:   11/16/18 137 lb (62.1 kg)   11/09/18 140 lb (63.5 kg)   10/16/18 139 lb (63 kg)                    Reviewed and updated as needed this visit by clinical staff  Tobacco  Allergies  Meds       Reviewed and updated as needed this visit by Provider         ROS:  Constitutional, HEENT, cardiovascular, pulmonary, gi and gu systems are negative, except as otherwise  "noted.    OBJECTIVE:                                                    BP 96/48 (BP Location: Left arm, Patient Position: Sitting, Cuff Size: Adult Regular)  Pulse 54  Temp 99.2  F (37.3  C) (Tympanic)  Ht 5' 5\" (1.651 m)  Wt 137 lb (62.1 kg)  SpO2 97%  BMI 22.8 kg/m2  Body mass index is 22.8 kg/(m^2).  GENERAL APPEARANCE: alert, moderate distress, pale, crying and and holding on to her abdomen  ABDOMEN: pain high in epigastrium.          ASSESSMENT/PLAN:                                                    1. Abdominal pain, epigastric  Spoke with Dr. Alegre. She was transferred to see the ER for IV and more complex evaluation today.     2. Vomiting and diarrhea      3. Postoperative state        See Patient Instructions    DASH Vital  [facility] [place]    "

## 2018-11-16 NOTE — ED AVS SNAPSHOT
[facility]    [address] [address]    Phone:  [phone]                                       Denise Taylor   MRN: [id number]    Department:  [facility]   Date of Visit:  11/16/2018           Patient Information     Date Of Birth          1955        Your diagnoses for this visit were:     Hematuria, unspecified type        You were seen by Geneva Alegre MD.        Discharge Instructions       Denise,    There is no evidence of bleeding at this time.  Follow up with LISA Roblero in 1 month for UA follow up.    Your next 10 appointments already scheduled     Nov 29, 2018 10:20 AM CST   (Arrive by 10:05 AM)   [facility]    [address] [place] [id number] [phone]            Dec 12, 2018  1:00 PM CST   (Arrive by 12:45 PM)   Ortho Eval with Octavio Zepeda, PT   [facility] ([facility] )    [address] [place] [address] [phone]                 Review of your medicines      Our records show that you are taking the medicines listed below. If these are incorrect, please call your family doctor or clinic.        Dose / Directions Last dose taken    ASPIRIN PO   Dose:  81 mg        Take 81 mg by mouth daily   Refills:  0        HYDROcodone-acetaminophen 5-325 MG per tablet   Commonly known as:  NORCO   Dose:  1-2 tablet   Quantity:  30 tablet        Take 1-2 tablets by mouth every 4 hours as needed for moderate to severe pain   Refills:  0        levothyroxine 88 MCG tablet   Commonly known as:  SYNTHROID/LEVOTHROID   Dose:  88 mcg   Quantity:  90 tablet        Take 1 tablet (88 mcg) by mouth daily   Refills:  1        RELPAX PO   Dose:  40 mg        Take 40 mg by mouth once as needed for migraine   Refills:  0        rosuvastatin 10 MG tablet   Commonly known as:  CRESTOR   Dose:  10 mg   Quantity:  30 tablet        Take  1 tablet (10 mg) by mouth daily   Refills:  0        SARAFEM 10 MG Tabs   Dose:  10 mg   Generic drug:  FLUoxetine HCl (PMDD)        Take 10 mg by mouth daily   Refills:  0                Procedures and tests performed during your visit     CBC with platelets differential    Comprehensive metabolic panel    Lactic acid whole blood    UA reflex to Microscopic      Orders Needing Specimen Collection     None      Pending Results     No orders found from 11/14/2018 to 11/17/2018.            Pending Culture Results     No orders found from 11/14/2018 to 11/17/2018.            Thank you for choosing [facility]       Thank you for choosing [facility] for your care. Our goal is always to provide you with excellent care. Hearing back from our patients is one way we can continue to improve our services. Please take a few minutes to complete the written survey that you may receive in the mail after you visit with us. Thank you!        [employer] Information     [employer] gives you secure access to your electronic health record. If you see a primary care provider, you can also send messages to your care team and make appointments. If you have questions, please call your primary care clinic.  If you do not have a primary care provider, please call [phone] and they will assist you.        Care EveryWhere ID     This is your Care EveryWhere ID. This could be used by other organizations to access your [facility] medical records  [id number]        Equal Access to Services     GIGI KYLE : Snehal Gonzalez, waleroyda melchor, qaybta kaalmada wolfgang, arabella scott. So [facility] [phone].    ATENCIÓN: Si habla español, tiene a mayo disposición servicios gratuitos de asistencia lingüística. Llame al [phone].    We comply with applicable federal civil rights laws and [place] laws. We do not discriminate on the basis of race, color, national origin, age, disability, sex, sexual orientation, or  gender identity.            After Visit Summary       This is your record. Keep this with you and show to your community pharmacist(s) and doctor(s) at your next visit.

## 2018-11-16 NOTE — ED AVS SNAPSHOT
[facility]    [address] [address]    Phone:  [phone]                                       Denise Taylor   MRN: [id number]    Department:  [facility]   Date of Visit:  11/16/2018           After Visit Summary Signature Page     I have received my discharge instructions, and my questions have been answered. I have discussed any challenges I see with this plan with the nurse or doctor.    ..........................................................................................................................................  Patient/Patient Representative Signature      ..........................................................................................................................................  Patient Representative Print Name and Relationship to Patient    ..................................................               ................................................  Date                                   Time    ..........................................................................................................................................  Reviewed by Signature/Title    ...................................................              ..............................................  Date                                               Time          22EPIC Rev 08/18

## 2018-11-16 NOTE — NURSING NOTE
"Chief Complaint   Patient presents with     Urinary Problem       Initial BP 96/48 (BP Location: Left arm, Patient Position: Sitting, Cuff Size: Adult Regular)  Pulse 54  Temp 99.2  F (37.3  C) (Tympanic)  Ht 5' 5\" (1.651 m)  Wt 137 lb (62.1 kg)  SpO2 97%  BMI 22.8 kg/m2 Estimated body mass index is 22.8 kg/(m^2) as calculated from the following:    Height as of this encounter: 5' 5\" (1.651 m).    Weight as of this encounter: 137 lb (62.1 kg).  Medication Reconciliation: complete    Petra Bautista LPN  "

## 2018-11-17 ASSESSMENT — ANXIETY QUESTIONNAIRES: GAD7 TOTAL SCORE: 0

## 2018-11-17 NOTE — ED PROVIDER NOTES
"eMERGENCY dEPARTMENT eNCOUnter        CHIEF COMPLAINT    Chief Complaint   Patient presents with     Fever     started today     Hypotension     80s/40s at clinic     Hematuria     Blood in urine noticed in the middle of the night     Abdominal Pain     Started this morning     Nausea, Vomiting, & Diarrhea     Started in the middle of the night - \"all night long\"        HPI    Denise Taylor is a 63 year old female who is sent over from clinic with complaints of fever, hypotension, blood in urine, abdominal pain and vomiting and diarrhea last night.  She was up all night.  She is s/p shoulder surgery by Dr. Lou:    Procedure performed: Right shoulder excision of submuscular lipoma from beneath the deltoid, exploration of the rotator cuff, excision of scar tissue and revision of previous acromioclavicular joint reconstruction.    Denise states she has a somewhat sensitive stomach.  Last night she started with vomiting, vomited several times.  She had two episodes of urination with blood in the toilet.  She came to the clinic today for evaluation of the hematuria, sent to ED for further evaluation.  I was initially concerned about her recent surgery but she states that is going very well, taking minimum pain medication.  She has no concerns about that, just about the hematuria.  She is thinking her abdominal pain is from the vomiting, she is feeling better today.  \"I just basically want my urine checked and then I would like to go home\"    REVIEW OF SYSTEMS    Neurologic: no LOC, No hearing loss  Cardiac: No Chest Pain, no syncope  Respiratory: No cough or difficulty breathing  GI:vomiting and diarrhea last night  :positive for hematuria  General:low grade fever of 99  All other systems reviewed and are negative.    PAST MEDICAL & SURGICAL HISTORY    Past Medical History:   Diagnosis Date     Pneumonia      Past Surgical History:   Procedure Laterality Date     ARTHROSCOPY SHOULDER ROTATOR CUFF REPAIR Left " 3/7/2018    Procedure: ARTHROSCOPY SHOULDER ROTATOR CUFF REPAIR;  LEFT SHOULDER ARTHROSCOPY, REPAIR ROTATOR CUFF: subacromial Decompression and AC Joint Resection;  Surgeon: Baldev Lou DO;  Location: [facility]     ARTHROTOMY SHOULDER, ROTATOR CUFF REPAIR, COMBINED Right 11/14/2018    Procedure: RIGHT SHOULDER DEBRIDEMENT, EXCISION OF LIPOMA  RIGHT UPPER ARM, EXCISION SCAR TISSUE AC JOINT;  Surgeon: Baldev Lou DO;  Location: [facility]     COLONOSCOPY  2006    Dr Lara     COLONOSCOPY N/A 10/10/2016    Procedure: COLONOSCOPY;  Surgeon: Christine Cintron MD;  Location: [facility]     SHOULDER SURGERY Right 2011/10/2012       CURRENT MEDICATIONS    Current Outpatient Rx   Medication Sig Dispense Refill     ASPIRIN PO Take 81 mg by mouth daily       Eletriptan Hydrobromide (RELPAX PO) Take 40 mg by mouth once as needed for migraine       FLUoxetine HCl, PMDD, (SARAFEM) 10 MG TABS Take 10 mg by mouth daily       HYDROcodone-acetaminophen (NORCO) 5-325 MG per tablet Take 1-2 tablets by mouth every 4 hours as needed for moderate to severe pain 30 tablet 0     levothyroxine (SYNTHROID/LEVOTHROID) 88 MCG tablet Take 1 tablet (88 mcg) by mouth daily 90 tablet 1     ondansetron (ZOFRAN ODT) 4 MG ODT tab Take 1 tablet (4 mg) by mouth every 8 hours as needed for nausea 10 tablet 0     rosuvastatin (CRESTOR) 10 MG tablet Take 1 tablet (10 mg) by mouth daily 30 tablet 0       ALLERGIES    Allergies   Allergen Reactions     Penicillins Rash     Zithromax [Azithromycin] Rash       SOCIAL & FAMILY HISTORY    Social History     Social History     Marital status:      Spouse name: N/A     Number of children: N/A     Years of education: N/A     Social History Main Topics     Smoking status: Never Smoker     Smokeless tobacco: Never Used     Alcohol use 1.0 oz/week     2 Glasses of wine per week      Comment: occasionally     Drug use: No     Sexual activity: Not on file     Other Topics Concern     Not on file     Social History  Narrative     Family History   Problem Relation Age of Onset     Myocardial Infarction Mother      Hypertension Mother      Stomach Cancer Father        PHYSICAL EXAM    VITAL SIGNS: /75  Temp 99.5  F (37.5  C) (Tympanic)  SpO2 96%   Constitutional:  Well developed, well nourished, no acute distress   Eyes: Pupils equally round and react to light, sclera nonicteric  HENT:  Atraumatic, no trismus  Neck: supple, no JVD, no posterior neck tenderness  Cardiovascular:  regular rate, no murmurs  GI:  Soft, nontender, normal bowel sounds  Musculoskeletal:  No edema, sling on right shoulder    Integument:  Well hydrated, no petechiae   Neurologic:  Alert & oriented, no slurred speech          ED COURSE & MEDICAL DECISION MAKING    Pertinent Labs & Imaging studies reviewed and interpreted. (See chart for details)    See chart for details of medications given during the ED stay.    Vitals:    11/16/18 1137 11/16/18 1319   BP: 113/70 119/75   Temp: 99.5  F (37.5  C)    TempSrc: Tympanic    SpO2: 96%            FINAL IMPRESSION    1. Hematuria, unspecified type        PLAN    Her urine only shows a few RBC and is not grossly bloody.  I am not sure of cause of hematuria.  Not hypotensive here.   I am going to discharge her to home, she will need to follow up on hematuria with LISA Roblero once she is past this post-op period.  Return to ER in the meantime as needed.       Geneva Alegre MD  11/17/18 1121

## 2018-11-19 DIAGNOSIS — E78.5 HYPERLIPIDEMIA WITH TARGET LDL LESS THAN 130: ICD-10-CM

## 2018-11-19 NOTE — TELEPHONE ENCOUNTER
rosuvastatin (CRESTOR) 10 MG tablet    Last Written Prescription Date:  10/23/18  Last Fill Quantity: 30,   # refills: 0  Last Office Visit: 11/16/18  Future Office visit:

## 2018-11-20 RX ORDER — ROSUVASTATIN CALCIUM 10 MG/1
10 TABLET, COATED ORAL DAILY
Qty: 30 TABLET | Refills: 10 | Status: SHIPPED | OUTPATIENT
Start: 2018-11-20 | End: 2019-06-27

## 2018-11-29 ENCOUNTER — OFFICE VISIT (OUTPATIENT)
Dept: ORTHOPEDICS | Facility: OTHER | Age: 63
End: 2018-11-29
Attending: ORTHOPAEDIC SURGERY
Payer: COMMERCIAL

## 2018-11-29 VITALS
DIASTOLIC BLOOD PRESSURE: 80 MMHG | TEMPERATURE: 98.5 F | HEART RATE: 60 BPM | OXYGEN SATURATION: 98 % | SYSTOLIC BLOOD PRESSURE: 110 MMHG | BODY MASS INDEX: 22.82 KG/M2 | WEIGHT: 137 LBS | HEIGHT: 65 IN

## 2018-11-29 DIAGNOSIS — M25.511 CHRONIC RIGHT SHOULDER PAIN: Primary | ICD-10-CM

## 2018-11-29 DIAGNOSIS — Z23 NEED FOR PROPHYLACTIC VACCINATION AND INOCULATION AGAINST INFLUENZA: ICD-10-CM

## 2018-11-29 DIAGNOSIS — G89.29 CHRONIC RIGHT SHOULDER PAIN: Primary | ICD-10-CM

## 2018-11-29 DIAGNOSIS — Z98.890 HISTORY OF ARTHROSCOPIC SURGERY OF SHOULDER: ICD-10-CM

## 2018-11-29 PROCEDURE — 90471 IMMUNIZATION ADMIN: CPT | Performed by: ORTHOPAEDIC SURGERY

## 2018-11-29 PROCEDURE — 99024 POSTOP FOLLOW-UP VISIT: CPT | Performed by: ORTHOPAEDIC SURGERY

## 2018-11-29 PROCEDURE — 90682 RIV4 VACC RECOMBINANT DNA IM: CPT | Performed by: ORTHOPAEDIC SURGERY

## 2018-11-29 ASSESSMENT — PAIN SCALES - GENERAL: PAINLEVEL: NO PAIN (1)

## 2018-11-29 NOTE — PROGRESS NOTES
Injectable Influenza Immunization Documentation    1.  Is the person to be vaccinated sick today?   No    2. Does the person to be vaccinated have an allergy to a component   of the vaccine?   No  Egg Allergy Algorithm Link    3. Has the person to be vaccinated ever had a serious reaction   to influenza vaccine in the past?   No    4. Has the person to be vaccinated ever had Guillain-Barré syndrome?   No    Prior to injection verified patient identity using patient's name and date of birth.  Form completed by Carolann Schultz LPN

## 2018-11-29 NOTE — PROGRESS NOTES
"Patient presents today 2 weeks after her debridement and revision of previous acromial clavicular joint resection, and excision of a very large lipoma from the subdeltoid region of her right upper arm.  Reviewed the pathology report, which demonstrated benign lipoma, we also discussed the possible risk of recurrence, discussed the findings at surgery as well.  At the present time she is doing quite well she states her shoulder feels much better and she is having a lot less cracking snapping and popping over the acromioclavicular joint.  Her wounds are clean and dry, she has a full range of motion of her shoulder, there is no bruising or swelling about the operative sites.  The plan is for her to gently and progressively increase her activity back to normal and follow-up with orthopedics on an as-needed basis.  She did request influenza immunization today, and that was given by our staff in the office./80  Pulse 60  Temp 98.5  F (36.9  C) (Tympanic)  Ht 5' 5\" (1.651 m)  Wt 137 lb (62.1 kg)  SpO2 98%  BMI 22.8 kg/m2  "

## 2018-11-29 NOTE — MR AVS SNAPSHOT
After Visit Summary   11/29/2018    Denise Taylor    MRN: [id number]           Patient Information     Date Of Birth          1955        Visit Information        Provider Department      11/29/2018 10:20 AM Baldev Lou DO [facility] [place] [facility]        Today's Diagnoses     Chronic right shoulder pain    -  1    History of arthroscopic surgery of shoulder           Follow-ups after your visit        Your next 10 appointments already scheduled     Dec 12, 2018  1:00 PM CST   (Arrive by 12:45 PM)   Ortho Eval with Octavio Zepeda PT   [facility] ([facility] )    [address] [place] [id number]   [phone]              Who to contact     If you have questions or need follow up information about today's clinic visit or your schedule please contact [facility] directly at [phone].  Normal or non-critical lab and imaging results will be communicated to you by MyChart, letter or phone within 4 business days after the clinic has received the results. If you do not hear from us within 7 days, please contact the clinic through [employer] or phone. If you have a critical or abnormal lab result, we will notify you by phone as soon as possible.  Submit refill requests through [employer] or call your pharmacy and they will forward the refill request to us. Please allow 3 business days for your refill to be completed.          Additional Information About Your Visit        MyChart Information     [employer] gives you secure access to your electronic health record. If you see a primary care provider, you can also send messages to your care team and make appointments. If you have questions, please call your primary care clinic.  If you do not have a primary care provider, please call [phone] and they will assist you.        Care EveryWhere ID     This is your Care EveryWhere ID. This could be used by other organizations to access your  "[facility] medical records  [id number]        Your Vitals Were     Pulse Temperature Height Pulse Oximetry BMI (Body Mass Index)       60 98.5  F (36.9  C) (Tympanic) 5' 5\" (1.651 m) 98% 22.8 kg/m2        Blood Pressure from Last 3 Encounters:   11/29/18 110/80   11/16/18 119/75   11/16/18 96/48    Weight from Last 3 Encounters:   11/29/18 137 lb (62.1 kg)   11/16/18 137 lb (62.1 kg)   11/09/18 140 lb (63.5 kg)              Today, you had the following     No orders found for display         Today's Medication Changes          These changes are accurate as of 11/29/18 10:41 AM.  If you have any questions, ask your nurse or doctor.               Stop taking these medicines if you haven't already. Please contact your care team if you have questions.     HYDROcodone-acetaminophen 5-325 MG tablet   Commonly known as:  NORCO   Stopped by:  Baldev Lou,                     Primary Care Provider Office Phone # Fax #    MirnaDASH Cabrales [phone]       [address] [place], [place] [id number]        Equal Access to Services     HERVE KYLE AH: Hadii florencia hamilton hadasho Soomaali, waaxda luqadaha, qaybta kaalmada adeegyada, arabella scott. So [facility] [phone].    ATENCIÓN: Si habla español, tiene a mayo disposición servicios gratuitos de asistencia lingüística. Deepthi al [phone].    We comply with applicable federal civil rights laws and [place] laws. We do not discriminate on the basis of race, color, national origin, age, disability, sex, sexual orientation, or gender identity.            Thank you!     Thank you for choosing [facility]  for your care. Our goal is always to provide you with excellent care. Hearing back from our patients is one way we can continue to improve our services. Please take a few minutes to complete the written survey that you may receive in the mail after your visit with us. Thank you!             Your Updated " Medication List - Protect others around you: Learn how to safely use, store and throw away your medicines at www.disposemymeds.org.          This list is accurate as of 11/29/18 10:41 AM.  Always use your most recent med list.                   Brand Name Dispense Instructions for use Diagnosis    ASPIRIN PO      Take 81 mg by mouth daily        levothyroxine 88 MCG tablet    SYNTHROID/LEVOTHROID    90 tablet    Take 1 tablet (88 mcg) by mouth daily    Acquired hypothyroidism       RELPAX PO      Take 40 mg by mouth once as needed for migraine        rosuvastatin 10 MG tablet    CRESTOR    30 tablet    Take 1 tablet (10 mg) by mouth daily    Hyperlipidemia with target LDL less than 130       SARAFEM 10 MG Tabs   Generic drug:  FLUoxetine HCl (PMDD)      Take 10 mg by mouth daily

## 2018-11-29 NOTE — NURSING NOTE
"Chief Complaint   Patient presents with     Surgical Followup     Right Shoulder       Initial /80  Pulse 60  Temp 98.5  F (36.9  C) (Tympanic)  Ht 5' 5\" (1.651 m)  Wt 137 lb (62.1 kg)  SpO2 98%  BMI 22.8 kg/m2 Estimated body mass index is 22.8 kg/(m^2) as calculated from the following:    Height as of this encounter: 5' 5\" (1.651 m).    Weight as of this encounter: 137 lb (62.1 kg).  Medication Reconciliation: complete    Ayesha Camarillo LPN    "

## 2018-12-13 ENCOUNTER — OFFICE VISIT (OUTPATIENT)
Dept: FAMILY MEDICINE | Facility: OTHER | Age: 63
End: 2018-12-13
Attending: FAMILY MEDICINE
Payer: COMMERCIAL

## 2018-12-13 VITALS
SYSTOLIC BLOOD PRESSURE: 138 MMHG | DIASTOLIC BLOOD PRESSURE: 80 MMHG | HEART RATE: 53 BPM | TEMPERATURE: 97.8 F | OXYGEN SATURATION: 97 % | WEIGHT: 141 LBS | BODY MASS INDEX: 23.46 KG/M2

## 2018-12-13 DIAGNOSIS — J06.9 VIRAL URI: ICD-10-CM

## 2018-12-13 DIAGNOSIS — J02.9 SORE THROAT: Primary | ICD-10-CM

## 2018-12-13 LAB
DEPRECATED S PYO AG THROAT QL EIA: NORMAL
SPECIMEN SOURCE: NORMAL

## 2018-12-13 PROCEDURE — 99213 OFFICE O/P EST LOW 20 MIN: CPT | Performed by: FAMILY MEDICINE

## 2018-12-13 PROCEDURE — 87880 STREP A ASSAY W/OPTIC: CPT | Performed by: FAMILY MEDICINE

## 2018-12-13 PROCEDURE — 87081 CULTURE SCREEN ONLY: CPT | Performed by: FAMILY MEDICINE

## 2018-12-13 ASSESSMENT — PAIN SCALES - GENERAL: PAINLEVEL: SEVERE PAIN (6)

## 2018-12-13 ASSESSMENT — ANXIETY QUESTIONNAIRES
GAD7 TOTAL SCORE: 0
6. BECOMING EASILY ANNOYED OR IRRITABLE: NOT AT ALL
5. BEING SO RESTLESS THAT IT IS HARD TO SIT STILL: NOT AT ALL
1. FEELING NERVOUS, ANXIOUS, OR ON EDGE: NOT AT ALL
7. FEELING AFRAID AS IF SOMETHING AWFUL MIGHT HAPPEN: NOT AT ALL
4. TROUBLE RELAXING: NOT AT ALL
3. WORRYING TOO MUCH ABOUT DIFFERENT THINGS: NOT AT ALL
2. NOT BEING ABLE TO STOP OR CONTROL WORRYING: NOT AT ALL

## 2018-12-13 ASSESSMENT — PATIENT HEALTH QUESTIONNAIRE - PHQ9: SUM OF ALL RESPONSES TO PHQ QUESTIONS 1-9: 0

## 2018-12-13 NOTE — PROGRESS NOTES
SUBJECTIVE:   Denise Taylor is a 63 year old female who presents to clinic today for the following health issues:    RESPIRATORY SYMPTOMS      Duration: Ongoing for 3 days     Description  nasal congestion, rhinorrhea, sore throat, cough, ear pain bilateral, fatigue/malaise and hoarse voice    Severity: moderate 6/10    Accompanying signs and symptoms: None    History (predisposing factors):  none    Precipitating or alleviating factors: None    Therapies tried and outcome:  Sinus spray , ibuprofen - helps; Afrin    No vomiting or diarrhea    Fever at onset 102; resolved by morning and hasn't recurred        Problem list and histories reviewed & adjusted, as indicated.  Additional history: as documented    Current Outpatient Medications   Medication     ASPIRIN PO     Eletriptan Hydrobromide (RELPAX PO)     FLUoxetine HCl, PMDD, (SARAFEM) 10 MG TABS     levothyroxine (SYNTHROID/LEVOTHROID) 88 MCG tablet     rosuvastatin (CRESTOR) 10 MG tablet     No current facility-administered medications for this visit.        Patient Active Problem List   Diagnosis     History of basal cell carcinoma     Hyperlipidemia with target LDL less than 130     Pneumonia     ACP (advance care planning)     Hypothyroidism, unspecified type     AC separation, type 5     Actinic keratosis     Capillary disease     Other dyschromia     Scar condition and fibrosis of skin     Past Surgical History:   Procedure Laterality Date     ARTHROSCOPY SHOULDER ROTATOR CUFF REPAIR Left 3/7/2018    Procedure: ARTHROSCOPY SHOULDER ROTATOR CUFF REPAIR;  LEFT SHOULDER ARTHROSCOPY, REPAIR ROTATOR CUFF: subacromial Decompression and AC Joint Resection;  Surgeon: Baldev Lou DO;  Location: [facility]     ARTHROTOMY SHOULDER, ROTATOR CUFF REPAIR, COMBINED Right 11/14/2018    Procedure: RIGHT SHOULDER DEBRIDEMENT, EXCISION OF LIPOMA  RIGHT UPPER ARM, EXCISION SCAR TISSUE AC JOINT;  Surgeon: Baldev Lou DO;  Location: [facility]     COLONOSCOPY  2006     Dr Lara     COLONOSCOPY N/A 10/10/2016    Procedure: COLONOSCOPY;  Surgeon: Christine Cintron MD;  Location: [facility]     SHOULDER SURGERY Right 2011/10/2012       Social History     Tobacco Use     Smoking status: Never Smoker     Smokeless tobacco: Never Used   Substance Use Topics     Alcohol use: Yes     Alcohol/week: 1.0 oz     Types: 2 Glasses of wine per week     Comment: occasionally     Family History   Problem Relation Age of Onset     Myocardial Infarction Mother      Hypertension Mother      Stomach Cancer Father            Reviewed and updated as needed this visit by clinical staff       Reviewed and updated as needed this visit by Provider         ROS:  Constitutional, HEENT, cardiovascular, pulmonary, gi and gu systems are negative, except as otherwise noted.    OBJECTIVE:     /80 (BP Location: Right arm, Patient Position: Chair, Cuff Size: Adult Regular)   Pulse 53   Temp 97.8  F (36.6  C) (Tympanic)   Wt 64 kg (141 lb)   SpO2 97%   BMI 23.46 kg/m    Body mass index is 23.46 kg/m .  GENERAL: healthy, alert and no distress  EYES: Eyes grossly normal to inspection, PERRL and conjunctivae and sclerae normal  HENT: normal cephalic/atraumatic, ear canals and TM's normal, nasal mucosa edematous , oral mucous membranes moist and oropharynx with mild erythema, 1 small area of exudate vs tonsillith on right tonsil  NECK: no adenopathy, no asymmetry, masses, or scars and thyroid normal to palpation  RESP: lungs clear to auscultation - no rales, rhonchi or wheezes  CV: regular rate and rhythm, normal S1 S2, no S3 or S4, no murmur, click or rub, no peripheral edema and peripheral pulses strong  ABDOMEN: soft, nontender, no hepatosplenomegaly, no masses and bowel sounds normal  MS: no gross musculoskeletal defects noted, no edema  PSYCH: mentation appears normal, affect normal/bright    Diagnostic Test Results:  Results for orders placed or performed in visit on 12/13/18 (from the past 24 hour(s))    Rapid strep screen   Result Value Ref Range    Specimen Description Throat     Rapid Strep A Screen       NEGATIVE: No Group A streptococcal antigen detected by immunoassay, await culture report.       ASSESSMENT/PLAN:   (J02.9) Sore throat  (primary encounter diagnosis)  Comment: negative strep  Plan: Rapid strep screen, Beta strep group A culture        Likely viral; symptomatic cares; follow up as needed    (J06.9) Viral URI  Comment: as above  Plan: symptomatic cares            Alma Maldonado MD  [facility]

## 2018-12-13 NOTE — NURSING NOTE
"Chief Complaint   Patient presents with     URI       Initial /80 (BP Location: Right arm, Patient Position: Chair, Cuff Size: Adult Regular)   Pulse 53   Temp 97.8  F (36.6  C) (Tympanic)   Wt 64 kg (141 lb)   SpO2 97%   BMI 23.46 kg/m   Estimated body mass index is 23.46 kg/m  as calculated from the following:    Height as of 11/29/18: 1.651 m (5' 5\").    Weight as of this encounter: 64 kg (141 lb).  Medication Reconciliation: complete    Norma Saab LPN    "

## 2018-12-14 ASSESSMENT — ANXIETY QUESTIONNAIRES: GAD7 TOTAL SCORE: 0

## 2018-12-15 LAB
BACTERIA SPEC CULT: NORMAL
SPECIMEN SOURCE: NORMAL

## 2018-12-17 ENCOUNTER — HOSPITAL ENCOUNTER (EMERGENCY)
Facility: HOSPITAL | Age: 63
Discharge: HOME | End: 2018-12-17
Attending: PHYSICIAN ASSISTANT | Admitting: PHYSICIAN ASSISTANT
Payer: COMMERCIAL

## 2018-12-17 VITALS
SYSTOLIC BLOOD PRESSURE: 140 MMHG | HEART RATE: 72 BPM | OXYGEN SATURATION: 97 % | TEMPERATURE: 101 F | DIASTOLIC BLOOD PRESSURE: 98 MMHG | RESPIRATION RATE: 18 BRPM

## 2018-12-17 DIAGNOSIS — J20.9 ACUTE BRONCHITIS, UNSPECIFIED ORGANISM: ICD-10-CM

## 2018-12-17 DIAGNOSIS — R53.83 FATIGUE: ICD-10-CM

## 2018-12-17 DIAGNOSIS — J01.00 ACUTE MAXILLARY SINUSITIS, RECURRENCE NOT SPECIFIED: ICD-10-CM

## 2018-12-17 PROCEDURE — G0463 HOSPITAL OUTPT CLINIC VISIT: HCPCS

## 2018-12-17 PROCEDURE — 99213 OFFICE O/P EST LOW 20 MIN: CPT | Performed by: PHYSICIAN ASSISTANT

## 2018-12-17 RX ORDER — CEFDINIR 300 MG/1
300 CAPSULE ORAL 2 TIMES DAILY
Qty: 60 CAPSULE | Refills: 0 | Status: SHIPPED | OUTPATIENT
Start: 2018-12-17 | End: 2019-05-01

## 2018-12-17 RX ORDER — ALBUTEROL SULFATE 90 UG/1
2 AEROSOL, METERED RESPIRATORY (INHALATION) EVERY 6 HOURS PRN
Qty: 1 INHALER | Refills: 0 | Status: SHIPPED | OUTPATIENT
Start: 2018-12-17 | End: 2019-06-03

## 2018-12-17 ASSESSMENT — ENCOUNTER SYMPTOMS
FATIGUE: 1
LIGHT-HEADEDNESS: 0
DIZZINESS: 0
VOICE CHANGE: 0
CARDIOVASCULAR NEGATIVE: 1
NAUSEA: 0
NECK PAIN: 0
PSYCHIATRIC NEGATIVE: 1
NECK STIFFNESS: 0
FEVER: 1
SINUS PAIN: 1
TROUBLE SWALLOWING: 0
SINUS PRESSURE: 1
DIARRHEA: 0
EYE REDNESS: 0
COUGH: 1
VOMITING: 0
HEADACHES: 0
EYE DISCHARGE: 0
APPETITE CHANGE: 1
ABDOMINAL PAIN: 0

## 2018-12-17 NOTE — ED PROVIDER NOTES
History     Chief Complaint   Patient presents with     Cough     Generalized Body Aches     The history is provided by the patient. No  was used.     Denise Taylor is a 63 year old female who has one week of cough/congestion, fever and body aches. It started with a sore throat but this has resolved. Has maxillary sinus pain/pressure. No change in b/b habits. No new rash    Problem List:    Patient Active Problem List    Diagnosis Date Noted     ACP (advance care planning) 07/27/2017     Priority: Medium     Advance Care Planning 7/27/2017: ACP Review of Chart / Resources Provided:  Reviewed chart for advance care plan.  Denise Taylor has no plan or code status on file. Discussed available resources and provided with information.   Added by Marah Henry             Hypothyroidism, unspecified type 07/27/2017     Priority: Medium     Pneumonia 07/20/2015     Priority: Medium     History of basal cell carcinoma 05/18/2015     Priority: Medium     Hyperlipidemia with target LDL less than 130 05/18/2015     Priority: Medium     Diagnosis updated by automated process. Provider to review and confirm.       Actinic keratosis 12/01/2011     Priority: Medium     AC separation, type 5 06/27/2011     Priority: Medium     Overview:   IMO Update 10/11       Capillary disease 08/06/2007     Priority: Medium     Overview:   IMO Update 10/11       Other dyschromia 08/06/2007     Priority: Medium     Scar condition and fibrosis of skin 08/06/2007     Priority: Medium        Past Medical History:    Past Medical History:   Diagnosis Date     Pneumonia        Past Surgical History:    Past Surgical History:   Procedure Laterality Date     ARTHROSCOPY SHOULDER ROTATOR CUFF REPAIR Left 3/7/2018    Procedure: ARTHROSCOPY SHOULDER ROTATOR CUFF REPAIR;  LEFT SHOULDER ARTHROSCOPY, REPAIR ROTATOR CUFF: subacromial Decompression and AC Joint Resection;  Surgeon: Baldev Lou DO;  [facility]: [facility]      ARTHROTOMY SHOULDER, ROTATOR CUFF REPAIR, COMBINED Right 11/14/2018    Procedure: RIGHT SHOULDER DEBRIDEMENT, EXCISION OF LIPOMA  RIGHT UPPER ARM, EXCISION SCAR TISSUE AC JOINT;  Surgeon: Baldev Lou DO;  Location: [facility]     COLONOSCOPY  2006    Dr Lara     COLONOSCOPY N/A 10/10/2016    Procedure: COLONOSCOPY;  Surgeon: Christine Cintron MD;  Location: [facility]     SHOULDER SURGERY Right 2011/10/2012       Family History:    Family History   Problem Relation Age of Onset     Myocardial Infarction Mother      Hypertension Mother      Stomach Cancer Father        Social History:  Marital Status:   [2]  Social History     Tobacco Use     Smoking status: Never Smoker     Smokeless tobacco: Never Used   Substance Use Topics     Alcohol use: Yes     Alcohol/week: 1.0 oz     Types: 2 Glasses of wine per week     Comment: occasionally     Drug use: No        Medications:      albuterol (PROAIR HFA/PROVENTIL HFA/VENTOLIN HFA) 108 (90 Base) MCG/ACT inhaler   ASPIRIN PO   cefdinir (OMNICEF) 300 MG capsule   Eletriptan Hydrobromide (RELPAX PO)   FLUoxetine HCl, PMDD, (SARAFEM) 10 MG TABS   levothyroxine (SYNTHROID/LEVOTHROID) 88 MCG tablet   rosuvastatin (CRESTOR) 10 MG tablet         Review of Systems   Constitutional: Positive for appetite change, fatigue and fever.   HENT: Positive for congestion, sinus pressure and sinus pain. Negative for ear pain, trouble swallowing and voice change.    Eyes: Negative for discharge and redness.   Respiratory: Positive for cough.    Cardiovascular: Negative.    Gastrointestinal: Negative for abdominal pain, diarrhea, nausea and vomiting.   Genitourinary: Negative.    Musculoskeletal: Negative for neck pain and neck stiffness.   Skin: Negative for rash.   Neurological: Negative for dizziness, light-headedness and headaches.   Psychiatric/Behavioral: Negative.        Physical Exam   BP: 140/98  Pulse: 72  Temp: 101  F (38.3  C)  Resp: 18  SpO2: 97 %      Physical Exam    Constitutional: She is oriented to person, place, and time. She appears well-developed and well-nourished. No distress.   HENT:   Head: Normocephalic and atraumatic.   Right Ear: External ear normal.   Left Ear: External ear normal.   Mouth/Throat: Oropharynx is clear and moist.   Bilateral TMs/canals clear/wnl  maxillary sinus TTP     Eyes: Conjunctivae and EOM are normal. Right eye exhibits no discharge. Left eye exhibits no discharge.   Neck: Normal range of motion. Neck supple.   Cardiovascular: Normal rate, regular rhythm and normal heart sounds.   Pulmonary/Chest: Effort normal and breath sounds normal. No respiratory distress.   Abdominal: Soft. Bowel sounds are normal. She exhibits no distension. There is no tenderness.   Neurological: She is alert and oriented to person, place, and time.   Skin: Skin is warm and dry. No rash noted. She is not diaphoretic.   Psychiatric: She has a normal mood and affect.   Nursing note and vitals reviewed.      ED Course        Procedures          No results found for this or any previous visit (from the past 24 hour(s)).    Medications - No data to display    Assessments & Plan (with Medical Decision Making)     I have reviewed the nursing notes.    I have reviewed the findings, diagnosis, plan and need for follow up with the patient.         Medication List      Started    albuterol 108 (90 Base) MCG/ACT inhaler  Commonly known as:  PROAIR HFA/PROVENTIL HFA/VENTOLIN HFA  2 puffs, Inhalation, EVERY 6 HOURS PRN     cefdinir 300 MG capsule  Commonly known as:  OMNICEF  300 mg, Oral, 2 TIMES DAILY            Final diagnoses:   Acute bronchitis, unspecified organism   Acute maxillary sinusitis, recurrence not specified   Fatigue           Patient verbally educated and given appropriate education sheets for each of the diagnoses and has no questions.  Take OTC motrin or tylenol as directed on the bottle as needed.  Take prescription medications as directed.  Increase fluids,  wash hands often.  Sleep in a recliner or with multiple pillows until this has resolved.  Follow up with your provider if symptoms increase or if further concerns develop, return to the ER.  Liliya Loomis Certified   Physician Assistant  12/17/2018  4:14 PM  URGENT CARE CLINIC    12/17/2018   [facility]     Liliya Loomis PA  12/17/18 [id number]

## 2018-12-17 NOTE — ED AVS SNAPSHOT
[facility]  [address] [address]  Phone:  [phone]                                    Denise Taylor   MRN: [id number]    Department:  [facility]   Date of Visit:  12/17/2018           After Visit Summary Signature Page    I have received my discharge instructions, and my questions have been answered. I have discussed any challenges I see with this plan with the nurse or doctor.    ..........................................................................................................................................  Patient/Patient Representative Signature      ..........................................................................................................................................  Patient Representative Print Name and Relationship to Patient    ..................................................               ................................................  Date                                   Time    ..........................................................................................................................................  Reviewed by Signature/Title    ...................................................              ..............................................  Date                                               Time          22EPIC Rev 08/18

## 2018-12-17 NOTE — ED TRIAGE NOTES
Pt is here with her . She is complaining of a sore throat which began last Tuesday. States that now for the past 5 days she has had chest congestion, coughing, body aches, sinus congestion and fever. Had strep test done in clinic last week and it was negative.

## 2019-03-15 DIAGNOSIS — E03.9 ACQUIRED HYPOTHYROIDISM: ICD-10-CM

## 2019-03-15 RX ORDER — LEVOTHYROXINE SODIUM 88 UG/1
88 TABLET ORAL DAILY
Qty: 90 TABLET | Refills: 0 | Status: SHIPPED | OUTPATIENT
Start: 2019-03-15 | End: 2019-06-27

## 2019-03-15 NOTE — TELEPHONE ENCOUNTER
levothyroxine (SYNTHROID/LEVOTHROID) 88 MCG tablet    Last Written Prescription Date:  9/27/18  Last Fill Quantity: 90,   # refills: 1  Last Office Visit: 12/13/18  Future Office visit:

## 2019-03-18 DIAGNOSIS — E03.9 ACQUIRED HYPOTHYROIDISM: ICD-10-CM

## 2019-03-18 NOTE — TELEPHONE ENCOUNTER
Levothyroxine       Last Written Prescription Date:  3/15/2019  Last Fill Quantity: 90,   # refills: 0  Last Office Visit: 12/13/2018  Future Office visit:

## 2019-03-20 RX ORDER — LEVOTHYROXINE SODIUM 88 UG/1
88 TABLET ORAL DAILY
Qty: 90 TABLET | Refills: 0 | OUTPATIENT
Start: 2019-03-20

## 2019-04-19 ENCOUNTER — TELEPHONE (OUTPATIENT)
Dept: FAMILY MEDICINE | Facility: OTHER | Age: 64
End: 2019-04-19

## 2019-04-19 DIAGNOSIS — H10.021 PINK EYE DISEASE OF RIGHT EYE: Primary | ICD-10-CM

## 2019-04-19 NOTE — TELEPHONE ENCOUNTER
Pt states she thinks she has pink eye would like a prescription called in to [facility] in [place] and would like a phone call back to know weather it is done or not. Thank you. Sammie Granado LPN

## 2019-04-21 RX ORDER — GENTAMICIN SULFATE 3 MG/ML
1-2 SOLUTION/ DROPS OPHTHALMIC EVERY 4 HOURS
Qty: 5 ML | Refills: 1 | Status: SHIPPED | OUTPATIENT
Start: 2019-04-21 | End: 2019-05-01

## 2019-05-01 ENCOUNTER — OFFICE VISIT (OUTPATIENT)
Dept: FAMILY MEDICINE | Facility: OTHER | Age: 64
End: 2019-05-01
Attending: NURSE PRACTITIONER
Payer: COMMERCIAL

## 2019-05-01 ENCOUNTER — ANCILLARY PROCEDURE (OUTPATIENT)
Dept: GENERAL RADIOLOGY | Facility: OTHER | Age: 64
End: 2019-05-01
Attending: NURSE PRACTITIONER
Payer: COMMERCIAL

## 2019-05-01 VITALS
HEART RATE: 66 BPM | SYSTOLIC BLOOD PRESSURE: 128 MMHG | HEIGHT: 65 IN | TEMPERATURE: 98.5 F | DIASTOLIC BLOOD PRESSURE: 80 MMHG | RESPIRATION RATE: 18 BRPM | OXYGEN SATURATION: 93 % | BODY MASS INDEX: 22.99 KG/M2 | WEIGHT: 138 LBS

## 2019-05-01 DIAGNOSIS — J18.9 PNEUMONIA OF LEFT LOWER LOBE DUE TO INFECTIOUS ORGANISM: Primary | ICD-10-CM

## 2019-05-01 DIAGNOSIS — R05.9 COUGH: ICD-10-CM

## 2019-05-01 LAB
BASOPHILS # BLD AUTO: 0 10E9/L (ref 0–0.2)
BASOPHILS NFR BLD AUTO: 0.5 %
DIFFERENTIAL METHOD BLD: NORMAL
EOSINOPHIL # BLD AUTO: 0.2 10E9/L (ref 0–0.7)
EOSINOPHIL NFR BLD AUTO: 2.6 %
ERYTHROCYTE [DISTWIDTH] IN BLOOD BY AUTOMATED COUNT: 13 % (ref 10–15)
HCT VFR BLD AUTO: 39.9 % (ref 35–47)
HGB BLD-MCNC: 13.2 G/DL (ref 11.7–15.7)
IMM GRANULOCYTES # BLD: 0 10E9/L (ref 0–0.4)
IMM GRANULOCYTES NFR BLD: 0.4 %
LYMPHOCYTES # BLD AUTO: 2.1 10E9/L (ref 0.8–5.3)
LYMPHOCYTES NFR BLD AUTO: 25.6 %
MCH RBC QN AUTO: 29.1 PG (ref 26.5–33)
MCHC RBC AUTO-ENTMCNC: 33.1 G/DL (ref 31.5–36.5)
MCV RBC AUTO: 88 FL (ref 78–100)
MONOCYTES # BLD AUTO: 0.5 10E9/L (ref 0–1.3)
MONOCYTES NFR BLD AUTO: 6.2 %
NEUTROPHILS # BLD AUTO: 5.3 10E9/L (ref 1.6–8.3)
NEUTROPHILS NFR BLD AUTO: 64.7 %
NRBC # BLD AUTO: 0 10*3/UL
NRBC BLD AUTO-RTO: 0 /100
PLATELET # BLD AUTO: 268 10E9/L (ref 150–450)
RBC # BLD AUTO: 4.54 10E12/L (ref 3.8–5.2)
WBC # BLD AUTO: 8.2 10E9/L (ref 4–11)

## 2019-05-01 PROCEDURE — 85025 COMPLETE CBC W/AUTO DIFF WBC: CPT | Performed by: NURSE PRACTITIONER

## 2019-05-01 PROCEDURE — 99213 OFFICE O/P EST LOW 20 MIN: CPT | Performed by: NURSE PRACTITIONER

## 2019-05-01 PROCEDURE — 71046 X-RAY EXAM CHEST 2 VIEWS: CPT | Mod: TC

## 2019-05-01 PROCEDURE — 36415 COLL VENOUS BLD VENIPUNCTURE: CPT | Performed by: NURSE PRACTITIONER

## 2019-05-01 RX ORDER — CEFDINIR 300 MG/1
300 CAPSULE ORAL 2 TIMES DAILY
Qty: 20 CAPSULE | Refills: 0 | Status: SHIPPED | OUTPATIENT
Start: 2019-05-01 | End: 2019-06-03

## 2019-05-01 ASSESSMENT — PAIN SCALES - GENERAL: PAINLEVEL: MILD PAIN (2)

## 2019-05-01 ASSESSMENT — MIFFLIN-ST. JEOR: SCORE: 1181.84

## 2019-05-01 NOTE — PROGRESS NOTES
SUBJECTIVE:   Denise Taylor is a 63 year old female who presents to clinic today for the following   health issues:      RESPIRATORY SYMPTOMS/left lung pain      Duration: 1 week    Description  cough, fever, chills, fatigue/malaise, myalgias and left lung pain    Severity: moderate    Accompanying signs and symptoms: None    History (predisposing factors):  Has history of pnemonia    Precipitating or alleviating factors: None    Therapies tried and outcome:  none        Additional history: as documented    Reviewed  and updated as needed this visit by clinical staff         Reviewed and updated as needed this visit by Provider         Patient Active Problem List   Diagnosis     History of basal cell carcinoma     Hyperlipidemia with target LDL less than 130     Pneumonia     ACP (advance care planning)     Hypothyroidism, unspecified type     AC separation, type 5     Actinic keratosis     Capillary disease     Other dyschromia     Scar condition and fibrosis of skin     Past Surgical History:   Procedure Laterality Date     ARTHROSCOPY SHOULDER ROTATOR CUFF REPAIR Left 3/7/2018    Procedure: ARTHROSCOPY SHOULDER ROTATOR CUFF REPAIR;  LEFT SHOULDER ARTHROSCOPY, REPAIR ROTATOR CUFF: subacromial Decompression and AC Joint Resection;  Surgeon: Baldev Lou DO;  Location: [facility]     ARTHROTOMY SHOULDER, ROTATOR CUFF REPAIR, COMBINED Right 11/14/2018    Procedure: RIGHT SHOULDER DEBRIDEMENT, EXCISION OF LIPOMA  RIGHT UPPER ARM, EXCISION SCAR TISSUE AC JOINT;  Surgeon: Baldev Lou DO;  Location: [facility]     COLONOSCOPY  2006    Dr Lara     COLONOSCOPY N/A 10/10/2016    Procedure: COLONOSCOPY;  Surgeon: Christine Cintron MD;  Location: [facility]     SHOULDER SURGERY Right 2011/10/2012       Social History     Tobacco Use     Smoking status: Never Smoker     Smokeless tobacco: Never Used   Substance Use Topics     Alcohol use: Yes     Alcohol/week: 1.0 oz     Types: 2 Glasses of wine per week     Comment:  "occasionally     Family History   Problem Relation Age of Onset     Myocardial Infarction Mother      Hypertension Mother      Stomach Cancer Father          Current Outpatient Medications   Medication Sig Dispense Refill     Eletriptan Hydrobromide (RELPAX PO) Take 40 mg by mouth once as needed for migraine       FLUoxetine HCl, PMDD, (SARAFEM) 10 MG TABS Take 10 mg by mouth daily       levothyroxine (SYNTHROID/LEVOTHROID) 88 MCG tablet Take 1 tablet (88 mcg) by mouth daily 90 tablet 0     rosuvastatin (CRESTOR) 10 MG tablet Take 1 tablet (10 mg) by mouth daily 30 tablet 10     albuterol (PROAIR HFA/PROVENTIL HFA/VENTOLIN HFA) 108 (90 Base) MCG/ACT inhaler Inhale 2 puffs into the lungs every 6 hours as needed for shortness of breath / dyspnea or wheezing (Patient not taking: Reported on 5/1/2019) 1 Inhaler 0     ASPIRIN PO Take 81 mg by mouth daily       Allergies   Allergen Reactions     Penicillins Rash     Zithromax [Azithromycin] Rash       ROS:  CONSTITUTIONAL:chills, fatigue and fever   INTEGUMENTARY/SKIN: NEGATIVE for worrisome rashes, moles or lesions  ENT/MOUTH: NEGATIVE for ear, mouth and throat problems  RESP:cough and pain with deep breathing   CV: pain with deep breath     OBJECTIVE:     /80   Pulse 66   Temp 98.5  F (36.9  C) (Tympanic)   Resp 18   Ht 1.651 m (5' 5\")   Wt 62.6 kg (138 lb)   SpO2 93%   BMI 22.96 kg/m    Body mass index is 22.96 kg/m .   GENERAL: alert and no distress  HENT: ear canals and TM's normal, nose and mouth without ulcers or lesions  NECK: no adenopathy, no asymmetry, masses, or scars and thyroid normal to palpation  RESP: congestion left lower lobe  CV: regular rate and rhythm, normal S1 S2, no S3 or S4, no murmur, click or rub, no peripheral edema and peripheral pulses strong  ABDOMEN: soft, nontender, no hepatosplenomegaly, no masses and bowel sounds normal  SKIN: no suspicious lesions or rashes  PSYCH: mentation appears normal, affect normal/bright  LYMPH: " normal ant/post cervical, supraclavicular nodes    Diagnostic Test Results:  Results for orders placed or performed in visit on 05/01/19 (from the past 24 hour(s))   CBC with platelets and differential   Result Value Ref Range    WBC 8.2 4.0 - 11.0 10e9/L    RBC Count 4.54 3.8 - 5.2 10e12/L    Hemoglobin 13.2 11.7 - 15.7 g/dL    Hematocrit 39.9 35.0 - 47.0 %    MCV 88 78 - 100 fl    MCH 29.1 26.5 - 33.0 pg    MCHC 33.1 31.5 - 36.5 g/dL    RDW 13.0 10.0 - 15.0 %    Platelet Count 268 150 - 450 10e9/L    Diff Method Automated Method     % Neutrophils 64.7 %    % Lymphocytes 25.6 %    % Monocytes 6.2 %    % Eosinophils 2.6 %    % Basophils 0.5 %    % Immature Granulocytes 0.4 %    Nucleated RBCs 0 0 /100    Absolute Neutrophil 5.3 1.6 - 8.3 10e9/L    Absolute Lymphocytes 2.1 0.8 - 5.3 10e9/L    Absolute Monocytes 0.5 0.0 - 1.3 10e9/L    Absolute Eosinophils 0.2 0.0 - 0.7 10e9/L    Absolute Basophils 0.0 0.0 - 0.2 10e9/L    Abs Immature Granulocytes 0.0 0 - 0.4 10e9/L    Absolute Nucleated RBC 0.0      XR CHEST 2 VW  HISTORY: 63 years Female Cough  COMPARISON: None  TECHNIQUE: 2 views of the chest were obtained.     FINDINGS: There is silhouetting of the cardiac apex as seen on the PA  view. There is increased density within the lingula on the lateral  view.     The lungs are otherwise clear. Heart size and pulmonary vascularity  are within normal limits.                                                                   IMPRESSION: Lingular airspace opacity compatible with atelectasis  versus pneumonia.     JOEL ANNE MD    ASSESSMENT/PLAN:     1. Pneumonia of left lower lobe due to infectious organism ([facility])  Plan to treat for pneumonia due to results of chest XR and symptoms. Denise has a follow up appointment with Mirna BOWLING in 9-10 days.  Keep appointment. She should follow up the beginning of next week if not feeling better or go to the ER if symptoms worsen.   - CBC with platelets and differential  - XR  CHEST 2 VW (Clinic Performed); Future  - cefdinir (OMNICEF) 300 MG capsule; Take 1 capsule (300 mg) by mouth 2 times daily for 10 days  Dispense: 20 capsule; Refill: 0        See Patient Instructions    CESAR Michelle [facility] [place]

## 2019-05-01 NOTE — NURSING NOTE
"Chief Complaint   Patient presents with     URI       Initial /80   Pulse 66   Temp 98.5  F (36.9  C) (Tympanic)   Resp 18   Ht 1.651 m (5' 5\")   Wt 62.6 kg (138 lb)   SpO2 93%   BMI 22.96 kg/m   Estimated body mass index is 22.96 kg/m  as calculated from the following:    Height as of this encounter: 1.651 m (5' 5\").    Weight as of this encounter: 62.6 kg (138 lb).  Medication Reconciliation: complete    Randa Villasenor LPN  "

## 2019-05-01 NOTE — PATIENT INSTRUCTIONS
Patient Education     Treating Pneumonia  Pneumonia is an infection of one or both of the lungs. Pneumonia:    Is usually caused by either a virus or a bacteria    Can be very serious, especially in infants, young children, and older adults. It s also serious for those with other long-term health problems or weakened immune systems.    Is sometimes treated at home and sometimes in the hospital    Antibiotic medicines  Antibiotics may be prescribed for pneumonia caused by bacteria. They may be pills (oral medicines), or shots (injections). Or they may be given by IV (intravenously) into a vein. If you are taking oral medicines at home:    Fill your prescription and start taking your medicine as soon as you can.    You will likely start to feel better in a day or 2, but don t stop taking the antibiotic.    Use a pill organizer to help you remember to take your medicine.    Let your healthcare provider know if you have side effects.    Take your medicine exactly as directed on the label. Talk to your provider or pharmacist if you have any questions.  Antiviral medicines  Antiviral medicine may be prescribed for pneumonia caused by a virus. For example, antiviral medicine may be prescribed for pneumonia caused by the flu virus. Antibiotics do not work against viruses. If you are taking antiviral medicine at home:    Fill your prescription and start taking your medicine as soon as you can.    Talk with your provider or pharmacist about possible side effects.    Take the medicine exactly as instructed.  To relieve symptoms  There are many medicines that can help relieve symptoms of pneumonia. Some are prescription and some are over-the-counter.  Your healthcare provider may recommend:    Acetaminophen or ibuprofen to lower your fever and to lessen headache or other pain    Cough medicine to loosen mucus or to reduce coughing  Make sure you check with your healthcare provider or pharmacist before taking any  over-the-counter medicines.  Special treatments  If you are hospitalized for pneumonia, you may have other therapies, including:    Inhaled medicines to help with breathing or chest congestion    Supplemental oxygen to increase low oxygen levels  Drink fluids and eat healthy  You should eat healthy to help your body fight the infection. Drinking a lot of fluids helps to replace fluids lost from fever and to loosen mucus in your chest.    Diet. Make healthy food choices, including fruits and vegetables, lean meats and other proteins, 100% whole grain and low- or no-fat dairy products.    Fluids. Drink at least 6 to 8 tall glasses a day. Water and 100% fruit or vegetable juice are best.  Get plenty of rest and sleep  You may be more tired than usual for a while. It is important to get enough sleep at night. It s also important to rest during the day. Talk with your healthcare provider if coughing or other symptoms are interfering with your sleep.  Preventing the spread of germs  The best thing you can do to prevent spreading germs is to wash your hands often. You should:    Rub your hand with soap and water for 20 to 30 seconds.    Clean in between your fingers, the backs of your hands, and around your nails.    Dry your hands on a separate towel or use paper towels.  You should also:    Keep alcohol-based hand  nearby.    Make sure you also clean surfaces that you touch. Use a product that kills all types of germs.    Stay away from others until you are feeling better.  When to call your healthcare provider  Call your healthcare provider if you have any of the following:    Symptoms get worse    Fever continues    Shortness of breath gets worse    Increased mucus or mucus that is darker in color    Coughing gets worse    Lips or fingers are bluish in color    Side effects from your medicine   Date Last Reviewed: 12/1/2016 2000-2018 [employer]. [address] [place] [address], [place] [address]. All  rights reserved. This information is not intended as a substitute for professional medical care. Always follow your healthcare professional's instructions.

## 2019-05-30 NOTE — PROGRESS NOTES
Tanya Taylor is a 63 year old female who presents to clinic today for the following health issues:    HPI   Pneumonia f/u      Duration: f/u from 5-1-19    Description (location/character/radiation): dx pneumonia     Intensity:  moderate    Accompanying signs and symptoms: cough, fever, chills, myalgias, left lung pain    History (similar episodes/previous evaluation): hx of pneumonia     Precipitating or alleviating factors: None    Therapies tried and outcome: pneumonia          Patient Active Problem List   Diagnosis     History of basal cell carcinoma     Hyperlipidemia with target LDL less than 130     Pneumonia     ACP (advance care planning)     Hypothyroidism, unspecified type     AC separation, type 5     Actinic keratosis     Capillary disease     Other dyschromia     Scar condition and fibrosis of skin     Past Surgical History:   Procedure Laterality Date     ARTHROSCOPY SHOULDER ROTATOR CUFF REPAIR Left 3/7/2018    Procedure: ARTHROSCOPY SHOULDER ROTATOR CUFF REPAIR;  LEFT SHOULDER ARTHROSCOPY, REPAIR ROTATOR CUFF: subacromial Decompression and AC Joint Resection;  Surgeon: Baldev Lou DO;  [facility]     ARTHROTOMY SHOULDER, ROTATOR CUFF REPAIR, COMBINED Right 11/14/2018    Procedure: RIGHT SHOULDER DEBRIDEMENT, EXCISION OF LIPOMA  RIGHT UPPER ARM, EXCISION SCAR TISSUE AC JOINT;  Surgeon: Baldev Lou DO;  Location: [facility]     COLONOSCOPY  2006    Dr Lara     COLONOSCOPY N/A 10/10/2016    Procedure: COLONOSCOPY;  Surgeon: Christine Cintron MD;  Location: [facility]     SHOULDER SURGERY Right 2011/10/2012       Social History     Tobacco Use     Smoking status: Never Smoker     Smokeless tobacco: Never Used   Substance Use Topics     Alcohol use: Yes     Alcohol/week: 1.0 oz     Types: 2 Glasses of wine per week     Comment: occasionally     Family History   Problem Relation Age of Onset     Myocardial Infarction Mother      Hypertension Mother      Stomach Cancer Father           Current Outpatient Medications   Medication Sig Dispense Refill     Eletriptan Hydrobromide (RELPAX PO) Take 40 mg by mouth once as needed for migraine       FLUoxetine HCl, PMDD, (SARAFEM) 10 MG TABS Take 10 mg by mouth daily       levothyroxine (SYNTHROID/LEVOTHROID) 88 MCG tablet Take 1 tablet (88 mcg) by mouth daily 90 tablet 0     rosuvastatin (CRESTOR) 10 MG tablet Take 1 tablet (10 mg) by mouth daily 30 tablet 10     Allergies   Allergen Reactions     Penicillins Rash     Zithromax [Azithromycin] Rash     Recent Labs   Lab Test 11/16/18  1225 11/09/18  0959 09/27/18  0923 08/01/18  1005 09/21/17  1100  07/27/17  1413 07/27/16  1038   LDL  --   --  63  --   --   --  61 66   HDL  --   --  52  --   --   --  66 66   TRIG  --   --  119  --   --   --  82 99   ALT 45  --   --   --  39  --  41  --    CR 0.73 0.72  --   --  0.80   < >  --   --    GFRESTIMATED 80 82  --   --  73   < >  --   --    GFRESTBLACK >90 >90  --   --  88   < >  --   --    POTASSIUM 3.8 3.6  --   --  3.9   < >  --   --    TSH  --   --  0.28* 4.59*  --   --  1.08 2.77    < > = values in this interval not displayed.      BP Readings from Last 3 Encounters:   06/03/19 121/78   05/01/19 128/80   12/17/18 140/98    Wt Readings from Last 3 Encounters:   06/03/19 62.6 kg (138 lb)   05/01/19 62.6 kg (138 lb)   12/13/18 64 kg (141 lb)                      Reviewed and updated as needed this visit by Provider         Review of Systems   ROS COMP: Constitutional, HEENT, cardiovascular, pulmonary, GI, , musculoskeletal, neuro, skin, endocrine and psych systems are negative, except as otherwise noted.      Objective    There were no vitals taken for this visit.  There is no height or weight on file to calculate BMI.  Physical Exam   GENERAL: healthy, alert and no distress  NECK: no adenopathy, no asymmetry, masses, or scars and thyroid normal to palpation  RESP: lungs clear to auscultation - no rales, rhonchi or wheezes  CV: regular rate and  rhythm, normal S1 S2, no S3 or S4, no murmur, click or rub, no peripheral edema and peripheral pulses strong  ABDOMEN: soft, nontender, no hepatosplenomegaly, no masses and bowel sounds normal  MS: no gross musculoskeletal defects noted, no edema    Diagnostic Test Results:  Labs reviewed in Epic  Results for orders placed or performed in visit on 05/01/19   XR CHEST 2 VW (Clinic Performed)    Narrative    XR CHEST 2 VW    HISTORY: 63 years Female Cough    COMPARISON: None    TECHNIQUE: 2 views of the chest were obtained.    FINDINGS: There is silhouetting of the cardiac apex as seen on the PA  view. There is increased density within the lingula on the lateral  view.    The lungs are otherwise clear. Heart size and pulmonary vascularity  are within normal limits.        Impression    IMPRESSION: Lingular airspace opacity compatible with atelectasis  versus pneumonia.    JOEL ANNE MD           Assessment & Plan     1. Lung infection  She is finally clear.  Has had numerous infections in the last year in her lungs and sinus. We will have a CT done and  Have her see Pulmonary Medicine.   - CT Chest/Abdomen/Pelvis w Contrast; Future  - PULMONARY MEDICINE REFERRAL    2. Chronic maxillary sinusitis  See ENT.  Looking for source of recurrent infections.   - OTOLARYNGOLOGY REFERRAL       See Patient Instructions    No follow-ups on file.    DASH Vital  [facility] [place]

## 2019-06-03 ENCOUNTER — OFFICE VISIT (OUTPATIENT)
Dept: FAMILY MEDICINE | Facility: OTHER | Age: 64
End: 2019-06-03
Attending: PHYSICIAN ASSISTANT
Payer: COMMERCIAL

## 2019-06-03 VITALS
OXYGEN SATURATION: 99 % | HEART RATE: 51 BPM | TEMPERATURE: 98.3 F | DIASTOLIC BLOOD PRESSURE: 78 MMHG | SYSTOLIC BLOOD PRESSURE: 121 MMHG | BODY MASS INDEX: 22.99 KG/M2 | HEIGHT: 65 IN | WEIGHT: 138 LBS

## 2019-06-03 DIAGNOSIS — J32.0 CHRONIC MAXILLARY SINUSITIS: ICD-10-CM

## 2019-06-03 DIAGNOSIS — L98.9 FACIAL LESION: ICD-10-CM

## 2019-06-03 DIAGNOSIS — J18.9 LUNG INFECTION: Primary | ICD-10-CM

## 2019-06-03 PROCEDURE — 99214 OFFICE O/P EST MOD 30 MIN: CPT | Performed by: PHYSICIAN ASSISTANT

## 2019-06-03 ASSESSMENT — ANXIETY QUESTIONNAIRES
7. FEELING AFRAID AS IF SOMETHING AWFUL MIGHT HAPPEN: NOT AT ALL
5. BEING SO RESTLESS THAT IT IS HARD TO SIT STILL: NOT AT ALL
GAD7 TOTAL SCORE: 0
6. BECOMING EASILY ANNOYED OR IRRITABLE: NOT AT ALL
1. FEELING NERVOUS, ANXIOUS, OR ON EDGE: NOT AT ALL
3. WORRYING TOO MUCH ABOUT DIFFERENT THINGS: NOT AT ALL
2. NOT BEING ABLE TO STOP OR CONTROL WORRYING: NOT AT ALL

## 2019-06-03 ASSESSMENT — MIFFLIN-ST. JEOR: SCORE: 1181.84

## 2019-06-03 ASSESSMENT — PATIENT HEALTH QUESTIONNAIRE - PHQ9
SUM OF ALL RESPONSES TO PHQ QUESTIONS 1-9: 0
5. POOR APPETITE OR OVEREATING: NOT AT ALL

## 2019-06-03 ASSESSMENT — PAIN SCALES - GENERAL: PAINLEVEL: NO PAIN (0)

## 2019-06-03 NOTE — NURSING NOTE
"Chief Complaint   Patient presents with     Follow Up     URI       Initial /78 (BP Location: Left arm, Cuff Size: Adult Regular)   Pulse 51   Temp 98.3  F (36.8  C) (Tympanic)   Ht 1.651 m (5' 5\")   Wt 62.6 kg (138 lb)   SpO2 99%   BMI 22.96 kg/m   Estimated body mass index is 22.96 kg/m  as calculated from the following:    Height as of this encounter: 1.651 m (5' 5\").    Weight as of this encounter: 62.6 kg (138 lb).  Medication Reconciliation: complete    Tram Lima LPN  "

## 2019-06-03 NOTE — PATIENT INSTRUCTIONS
Thank you for choosing [facility].   I have office hours 8:00 am to 4:30 pm on Monday's, Wednesday's, Thursday's and Friday's. My nurse and I are out of the office every Tuesday.    Following your visit, when your labs and diagnostic testing have returned, I will review then and you will be contacted by my nurse.  If you are on My Chart, you can also view results there.    For refills, notify your pharmacy regarding what you need and the pharmacy will generate a refill request. Do not call my nurse as she is unable to process refill request. Please plan ahead and allow 3-5 days for refill requests.    You will generally receive a reminder call the day prior to your appointment.  If you cannot attend your appointment, please cancel your appointment with as much notice as possible.  If there is a pattern of failure to present for your appointments, I cannot provide consistent, meaningful, ongoing care for you. It is very important to me that you come in for your care, so we can best assist you with your health care needs.    IMPORTANT:  Please note that it is my standard of practice to NOT participate in prescribing ongoing requested Narcotic Analgesic therapy, and/or participate in the prescribing of other controlled substances.  My nurse and I am happy to assist you with the process of referral for alternative pain management as needed, and other treatment modalities including but not limited to:  Physical Therapy, Physical Medicine and Rehab, Counseling, Chiropractic Care, Orthopedic Care, and non-narcotic medication management.     In the event that you need to be seen for emergent concerns and I am out of office,  please see one of my colleagues for acute concerns.  You may also present to  or ER.  I appreciate the opportunity to serve you and look forward to supporting your healthcare needs in the future. Please contact me with any questions or concerns that you may  have.    Sincerely,      Mirna Roblero RN, PA-C

## 2019-06-04 ASSESSMENT — ANXIETY QUESTIONNAIRES: GAD7 TOTAL SCORE: 0

## 2019-06-11 ENCOUNTER — OFFICE VISIT (OUTPATIENT)
Dept: OTOLARYNGOLOGY | Facility: OTHER | Age: 64
End: 2019-06-11
Attending: PHYSICIAN ASSISTANT
Payer: COMMERCIAL

## 2019-06-11 ENCOUNTER — TELEPHONE (OUTPATIENT)
Dept: FAMILY MEDICINE | Facility: OTHER | Age: 64
End: 2019-06-11

## 2019-06-11 VITALS
DIASTOLIC BLOOD PRESSURE: 82 MMHG | WEIGHT: 138 LBS | OXYGEN SATURATION: 98 % | HEIGHT: 65 IN | HEART RATE: 50 BPM | TEMPERATURE: 97.8 F | SYSTOLIC BLOOD PRESSURE: 110 MMHG | BODY MASS INDEX: 22.99 KG/M2

## 2019-06-11 DIAGNOSIS — R09.82 PND (POST-NASAL DRIP): ICD-10-CM

## 2019-06-11 DIAGNOSIS — J32.9 RECURRENT SINUS INFECTIONS: ICD-10-CM

## 2019-06-11 DIAGNOSIS — R05.9 COUGH: Primary | ICD-10-CM

## 2019-06-11 DIAGNOSIS — J98.8 RECURRENT RESPIRATORY INFECTION: ICD-10-CM

## 2019-06-11 PROCEDURE — 99214 OFFICE O/P EST MOD 30 MIN: CPT | Performed by: NURSE PRACTITIONER

## 2019-06-11 RX ORDER — MONTELUKAST SODIUM 10 MG/1
10 TABLET ORAL AT BEDTIME
Qty: 30 TABLET | Refills: 3 | Status: SHIPPED | OUTPATIENT
Start: 2019-06-11 | End: 2019-06-30

## 2019-06-11 ASSESSMENT — PAIN SCALES - GENERAL: PAINLEVEL: NO PAIN (0)

## 2019-06-11 ASSESSMENT — MIFFLIN-ST. JEOR: SCORE: 1181.84

## 2019-06-11 NOTE — PROGRESS NOTES
Otolaryngology Note         Chief Complaint:     Patient presents with:  Referral: Mirna Roblero    Chronic Maxillary Sinusitis         History of Present Illness:     Denise Taylor is a 63 year old female  I was asked to see for evaluation of recurrent respiratory infections. The patient was sent here by  Mirna Roblero.     Today Denise tells me that she has had issues with recurrent bronchitis/pneumonia for the past 4 years. Overall, she is not concerned regarding recurrent sinusitis.    She will have an upcoming chest CT along with referral to pulmonology.   In the past 2.5 years, 5 antibiotics for respiratory infections as noted below.  Treated for 3 different sinus infections in the past 1.5 years.     No chronic cough, but states when she does cough, she has 'rattling' in her chest.    Chronic PND in the winter time, felt it was worse this past year. Constantly having to clear her throat. States no current symptoms.  Denies pharyngitis, globus sensation, solid/liquid dysphagia, otalgia, hoarseness, unintentional weight loss or any fever/night sweats.   No prior or current history of GERD.  Cough few times/week, more so dry currently.   No current SOB or wheezing, nasal congestion, nasal drainage, sinus pain/pressure or PND.  No current or past tobacco use.  No prior chronic lung diagnosis.     Denies history of seasonal allergies.  No new environmental concerns.     Current treatment: Vaporizer with mild relief.     5/1/19: Pneumonia, Omnicef  12/17/19: Bronchitis/ABRS, Cefdinir and albuterol  9/27/18: ABRS, Cefprozil  4/12/18: Bronchitis  1/11/18: ABRS, Cefprozil  12/18/17: Bronchitis  10/30/17: Pneumonia         Medications:     Current Outpatient Rx   Medication Sig Dispense Refill     Eletriptan Hydrobromide (RELPAX PO) Take 40 mg by mouth once as needed for migraine       FLUoxetine HCl, PMDD, (SARAFEM) 10 MG TABS Take 10 mg by mouth daily       levothyroxine (SYNTHROID/LEVOTHROID) 88 MCG  "tablet Take 1 tablet (88 mcg) by mouth daily 90 tablet 0     rosuvastatin (CRESTOR) 10 MG tablet Take 1 tablet (10 mg) by mouth daily 30 tablet 10            Allergies:     Allergies: Penicillins and Zithromax [azithromycin]          Past Medical History:     Past Medical History:   Diagnosis Date     Pneumonia             Past Surgical History:     Past Surgical History:   Procedure Laterality Date     ARTHROSCOPY SHOULDER ROTATOR CUFF REPAIR Left 3/7/2018    Procedure: ARTHROSCOPY SHOULDER ROTATOR CUFF REPAIR;  LEFT SHOULDER ARTHROSCOPY, REPAIR ROTATOR CUFF: subacromial Decompression and AC Joint Resection;  Surgeon: Baldev Lou DO;  Location: [facility]     ARTHROTOMY SHOULDER, ROTATOR CUFF REPAIR, COMBINED Right 11/14/2018    Procedure: RIGHT SHOULDER DEBRIDEMENT, EXCISION OF LIPOMA  RIGHT UPPER ARM, EXCISION SCAR TISSUE AC JOINT;  Surgeon: Baldev Lou DO;  Location: [facility]     COLONOSCOPY  2006    Dr Lara     COLONOSCOPY N/A 10/10/2016    Procedure: COLONOSCOPY;  Surgeon: Christine Cintron MD;  Location: [facility]     SHOULDER SURGERY Right 2011/10/2012       ENT family history reviewed         Social History:     Social History     Tobacco Use     Smoking status: Never Smoker     Smokeless tobacco: Never Used   Substance Use Topics     Alcohol use: Yes     Alcohol/week: 1.0 oz     Types: 2 Glasses of wine per week     Comment: occasionally     Drug use: No            Review of Systems:     ROS: See HPI         Physical Exam:     /82   Pulse 50   Temp 97.8  F (36.6  C) (Tympanic)   Ht 1.651 m (5' 5\")   Wt 62.6 kg (138 lb)   SpO2 98%   BMI 22.96 kg/m    General - The patient is well nourished and well developed, and appears to have good nutritional status.  Alert and oriented to person and place, answers questions and cooperates with examination appropriately.   Head and Face - Normocephalic and atraumatic, with no gross asymmetry noted.  The facial nerve is intact, with strong symmetric " movements.  Voice and Breathing - The patient was breathing comfortably without the use of accessory muscles. There was no wheezing, stridor. The patients voice was clear and strong, and had appropriate pitch and quality.  Ears - External ear normal. Canals are patent. Right tympanic membrane is intact without effusion, retraction or mass. Left tympanic membrane is intact without effusion, retraction or mass.  Eyes - Extraocular movements intact, and the pupils were reactive to light. Sclera were not icteric or injected, conjunctiva were pink and moist.  Mouth - Examination of the oral cavity showed pink, healthy oral mucosa. Dentition in good condition. No lesions or ulcerations noted. The tongue was mobile and midline.   Throat - The walls of the oropharynx were smooth, pink, moist, symmetric, and had no lesions or ulcerations.  The tonsillar pillars and soft palate were symmetric. The uvula was midline on elevation.    Neck - Normal midline excursion of the laryngotracheal complex during swallowing.  Full range of motion on passive movement.  Palpation of the occipital, submental, submandibular, internal jugular chain, and supraclavicular nodes did not demonstrate any abnormal lymph nodes or masses.  Palpation of the thyroid was soft and smooth, with no nodules or goiter appreciated.  The trachea was mobile and midline.  Nose - External contour is symmetric, no gross deflection or scars.  Nasal mucosa is pink and moist with no abnormal mucus. No polyps, masses, or purulence noted on examination.  Respiratory - Unlabored breathing, clear breath sounds throughout    Chest X-ray 5/1/19  FINDINGS: There is silhouetting of the cardiac apex as seen on the PA  view. There is increased density within the lingula on the lateral  view.  The lungs are otherwise clear. Heart size and pulmonary vascularity  are within normal limits.                                                                 IMPRESSION: Lingular airspace  opacity compatible with atelectasis  versus pneumonia.         Assessment and Plan:       ICD-10-CM    1. Cough R05 montelukast (SINGULAIR) 10 MG tablet   2. Recurrent sinus infections J32.9    3. Recurrent respiratory infection J98.8    4. PND (post-nasal drip), winter time R09.82      Discussed trial of antihistamine and leukotriene inhibitor.  She is hesitant to go on 2, stating she does not like to take too many medications.    Trial of daily Singulair.    Allergen avoidance measures were discussed and are important in preventing symptoms from occurring or worsening.  Follow up for allergy testing as recommended.  This may be a blood test (mRAST) or skin testing (modified quantitative testing).  Indications for allergy testing include:   1) Confirm suspicion of allergic rhinitis due to inhalant allergies  2) Identify the offending allergen to determine specific mode of treatment  3) In the case of chronic rhinosinusitis: when symptoms are not controlled by avoidance and pharmacotherapy  4) In the Asthma patient when exacerbations may be due to perennial allergen exposure  5) Suspect food allergy  6) Otitis Media, chronic rhinitis, atopic dermatitis, Meniere disease, headache, pharyngitis or eye symptoms    Signed consent was obtained, and the risks of immunotherapy were discussed, including the potential for anaphylaxis.      Complete previously planned chest CT and referral to pulmonology.    If ongoing symptoms at follow-up, perform flexible laryngoscopy for evaluation of nasal cavity and larynx.    I will see her after allergy testing, but encouraged her to follow-up sooner as needed.    Emerald Hanson NP  ENT  [facility], [place]  [phone]

## 2019-06-11 NOTE — LETTER
6/11/2019         RE: Denise Taylor  [address] [facility] [address]        Dear Colleague,    Thank you for referring your patient, Denise Taylor, to the [facility]. Please see a copy of my visit note below.    Otolaryngology Note         Chief Complaint:     Patient presents with:  Referral: Mirna Roblero    Chronic Maxillary Sinusitis         History of Present Illness:     Denise Taylor is a 63 year old female  I was asked to see for evaluation of recurrent respiratory infections. The patient was sent here by  Mirna Roblero.     Today Denise tells me that she has had issues with recurrent bronchitis/pneumonia for the past 4 years. Overall, she is not concerned regarding recurrent sinusitis.    She will have an upcoming chest CT along with referral to pulmonology.   In the past 2.5 years, 5 antibiotics for respiratory infections as noted below.  Treated for 3 different sinus infections in the past 1.5 years.     No chronic cough, but states when she does cough, she has 'rattling' in her chest.    Chronic PND in the winter time, felt it was worse this past year. Constantly having to clear her throat. States no current symptoms.  Denies pharyngitis, globus sensation, solid/liquid dysphagia, otalgia, hoarseness, unintentional weight loss or any fever/night sweats.   No prior or current history of GERD.  Cough few times/week, more so dry currently.   No current SOB or wheezing, nasal congestion, nasal drainage, sinus pain/pressure or PND.  No current or past tobacco use.  No prior chronic lung diagnosis.     Denies history of seasonal allergies.  No new environmental concerns.     Current treatment: Vaporizer with mild relief.     5/1/19: Pneumonia, Omnicef  12/17/19: Bronchitis/ABRS, Cefdinir and albuterol  9/27/18: ABRS, Cefprozil  4/12/18: Bronchitis  1/11/18: ABRS, Cefprozil  12/18/17: Bronchitis  10/30/17: Pneumonia         Medications:     Current  "Outpatient Rx   Medication Sig Dispense Refill     Eletriptan Hydrobromide (RELPAX PO) Take 40 mg by mouth once as needed for migraine       FLUoxetine HCl, PMDD, (SARAFEM) 10 MG TABS Take 10 mg by mouth daily       levothyroxine (SYNTHROID/LEVOTHROID) 88 MCG tablet Take 1 tablet (88 mcg) by mouth daily 90 tablet 0     rosuvastatin (CRESTOR) 10 MG tablet Take 1 tablet (10 mg) by mouth daily 30 tablet 10            Allergies:     Allergies: Penicillins and Zithromax [azithromycin]          Past Medical History:     Past Medical History:   Diagnosis Date     Pneumonia             Past Surgical History:     Past Surgical History:   Procedure Laterality Date     ARTHROSCOPY SHOULDER ROTATOR CUFF REPAIR Left 3/7/2018    Procedure: ARTHROSCOPY SHOULDER ROTATOR CUFF REPAIR;  LEFT SHOULDER ARTHROSCOPY, REPAIR ROTATOR CUFF: subacromial Decompression and AC Joint Resection;  Surgeon: Baldev Lou DO;  Location: [facility]     ARTHROTOMY SHOULDER, ROTATOR CUFF REPAIR, COMBINED Right 11/14/2018    Procedure: RIGHT SHOULDER DEBRIDEMENT, EXCISION OF LIPOMA  RIGHT UPPER ARM, EXCISION SCAR TISSUE AC JOINT;  Surgeon: Baldev Lou DO;  Location: [facility]     COLONOSCOPY  2006    Dr Lara     COLONOSCOPY N/A 10/10/2016    Procedure: COLONOSCOPY;  Surgeon: Christine Cintron MD;  Location: [facility]     SHOULDER SURGERY Right 2011/10/2012       ENT family history reviewed         Social History:     Social History     Tobacco Use     Smoking status: Never Smoker     Smokeless tobacco: Never Used   Substance Use Topics     Alcohol use: Yes     Alcohol/week: 1.0 oz     Types: 2 Glasses of wine per week     Comment: occasionally     Drug use: No            Review of Systems:     ROS: See HPI         Physical Exam:     /82   Pulse 50   Temp 97.8  F (36.6  C) (Tympanic)   Ht 1.651 m (5' 5\")   Wt 62.6 kg (138 lb)   SpO2 98%   BMI 22.96 kg/m     General - The patient is well nourished and well developed, and appears to have good " nutritional status.  Alert and oriented to person and place, answers questions and cooperates with examination appropriately.   Head and Face - Normocephalic and atraumatic, with no gross asymmetry noted.  The facial nerve is intact, with strong symmetric movements.  Voice and Breathing - The patient was breathing comfortably without the use of accessory muscles. There was no wheezing, stridor. The patients voice was clear and strong, and had appropriate pitch and quality.  Ears - External ear normal. Canals are patent. Right tympanic membrane is intact without effusion, retraction or mass. Left tympanic membrane is intact without effusion, retraction or mass.  Eyes - Extraocular movements intact, and the pupils were reactive to light. Sclera were not icteric or injected, conjunctiva were pink and moist.  Mouth - Examination of the oral cavity showed pink, healthy oral mucosa. Dentition in good condition. No lesions or ulcerations noted. The tongue was mobile and midline.   Throat - The walls of the oropharynx were smooth, pink, moist, symmetric, and had no lesions or ulcerations.  The tonsillar pillars and soft palate were symmetric. The uvula was midline on elevation.    Neck - Normal midline excursion of the laryngotracheal complex during swallowing.  Full range of motion on passive movement.  Palpation of the occipital, submental, submandibular, internal jugular chain, and supraclavicular nodes did not demonstrate any abnormal lymph nodes or masses.  Palpation of the thyroid was soft and smooth, with no nodules or goiter appreciated.  The trachea was mobile and midline.  Nose - External contour is symmetric, no gross deflection or scars.  Nasal mucosa is pink and moist with no abnormal mucus. No polyps, masses, or purulence noted on examination.  Respiratory - Unlabored breathing, clear breath sounds throughout    Chest X-ray 5/1/19  FINDINGS: There is silhouetting of the cardiac apex as seen on the PA  view.  There is increased density within the lingula on the lateral  view.  The lungs are otherwise clear. Heart size and pulmonary vascularity  are within normal limits.                                                                 IMPRESSION: Lingular airspace opacity compatible with atelectasis  versus pneumonia.         Assessment and Plan:       ICD-10-CM    1. Cough R05 montelukast (SINGULAIR) 10 MG tablet   2. Recurrent sinus infections J32.9    3. Recurrent respiratory infection J98.8    4. PND (post-nasal drip), winter time R09.82      Discussed trial of antihistamine and leukotriene inhibitor.  She is hesitant to go on 2, stating she does not like to take too many medications.    Trial of daily Singulair.    Allergen avoidance measures were discussed and are important in preventing symptoms from occurring or worsening.  Follow up for allergy testing as recommended.  This may be a blood test (mRAST) or skin testing (modified quantitative testing).  Indications for allergy testing include:   1) Confirm suspicion of allergic rhinitis due to inhalant allergies  2) Identify the offending allergen to determine specific mode of treatment  3) In the case of chronic rhinosinusitis: when symptoms are not controlled by avoidance and pharmacotherapy  4) In the Asthma patient when exacerbations may be due to perennial allergen exposure  5) Suspect food allergy  6) Otitis Media, chronic rhinitis, atopic dermatitis, Meniere disease, headache, pharyngitis or eye symptoms    Signed consent was obtained, and the risks of immunotherapy were discussed, including the potential for anaphylaxis.      Complete previously planned chest CT and referral to pulmonology.    If ongoing symptoms at follow-up, perform flexible laryngoscopy for evaluation of nasal cavity and larynx.    I will see her after allergy testing, but encouraged her to follow-up sooner as needed.    Emerald Hanson NP  ENT  [facility],  [place]  [phone]      Again, thank you for allowing me to participate in the care of your patient.        Sincerely,        CESAR Bain CNP

## 2019-06-11 NOTE — TELEPHONE ENCOUNTER
Denise hasn't heard from a Pulmonary to get set up for an appointment, she is wondering where in [place] did the referral got sent to? [phone]

## 2019-06-11 NOTE — PATIENT INSTRUCTIONS
Start singular.  Complete allergy testing.    Thank you for allowing Emerald Hanson CNP and our ENT team to participate in your care.  If your medications are too expensive, please give the nurse a call.  We can possibly change this medication.  If you have a scheduling or an appointment question please contact [facility] Coordinator at their direct line [phone]  ALL nursing questions or concerns can be directed to your ENT nurse at: [phone] - Carolyn

## 2019-06-11 NOTE — NURSING NOTE
"Chief Complaint   Patient presents with     Referral     Mirna Roblero    Chronic Maxillary Sinusitis       Initial /82   Pulse 50   Temp 97.8  F (36.6  C) (Tympanic)   Ht 1.651 m (5' 5\")   Wt 62.6 kg (138 lb)   SpO2 98%   BMI 22.96 kg/m   Estimated body mass index is 22.96 kg/m  as calculated from the following:    Height as of this encounter: 1.651 m (5' 5\").    Weight as of this encounter: 62.6 kg (138 lb).  Medication Reconciliation: complete    Ayesha Camarillo LPN  "

## 2019-06-12 NOTE — TELEPHONE ENCOUNTER
Left detailed message for pt advising the referral is still pending review and that is why she has not heard from scheduling yet. Left call back number if any further questions.

## 2019-06-18 ENCOUNTER — HOSPITAL ENCOUNTER (OUTPATIENT)
Dept: CARDIOLOGY | Facility: HOSPITAL | Age: 64
Discharge: HOME | End: 2019-06-18
Attending: PHYSICIAN ASSISTANT | Admitting: PHYSICIAN ASSISTANT
Payer: COMMERCIAL

## 2019-06-18 ENCOUNTER — HOSPITAL ENCOUNTER (OUTPATIENT)
Dept: CT IMAGING | Facility: HOSPITAL | Age: 64
End: 2019-06-18
Attending: PHYSICIAN ASSISTANT
Payer: COMMERCIAL

## 2019-06-18 DIAGNOSIS — J18.9 LUNG INFECTION: ICD-10-CM

## 2019-06-18 PROCEDURE — 93306 TTE W/DOPPLER COMPLETE: CPT | Mod: TC

## 2019-06-18 PROCEDURE — 74177 CT ABD & PELVIS W/CONTRAST: CPT | Mod: TC

## 2019-06-18 PROCEDURE — 93306 TTE W/DOPPLER COMPLETE: CPT | Mod: 26 | Performed by: INTERNAL MEDICINE

## 2019-06-18 PROCEDURE — 25500064 ZZH RX 255 OP 636: Performed by: RADIOLOGY

## 2019-06-18 PROCEDURE — 71260 CT THORAX DX C+: CPT | Mod: TC

## 2019-06-18 RX ORDER — IOPAMIDOL 612 MG/ML
100 INJECTION, SOLUTION INTRAVASCULAR ONCE
Status: COMPLETED | OUTPATIENT
Start: 2019-06-18 | End: 2019-06-18

## 2019-06-18 RX ADMIN — DIATRIZOATE MEGLUMINE AND DIATRIZOATE SODIUM 30 ML: 660; 100 SOLUTION ORAL; RECTAL at 11:12

## 2019-06-18 RX ADMIN — IOPAMIDOL 100 ML: 612 INJECTION, SOLUTION INTRAVENOUS at 11:13

## 2019-06-25 DIAGNOSIS — E78.5 HYPERLIPIDEMIA WITH TARGET LDL LESS THAN 130: ICD-10-CM

## 2019-06-25 DIAGNOSIS — E03.9 ACQUIRED HYPOTHYROIDISM: ICD-10-CM

## 2019-06-25 NOTE — TELEPHONE ENCOUNTER
[facility]       Last Written Prescription Date:  11/20/18  Last Fill Quantity: 30,   # refills: 10  Last Office Visit: 6/3/19  Future Office visit:    Next 5 appointments (look out 90 days)    Jul 29, 2019  1:30 PM CDT  Office Visit with HC ALLERGY TESTING  [facility] ([facility] ) [address]  [place] [address]  [phone]   Jul 29, 2019  3:30 PM CDT  (Arrive by 3:15 PM)  Return Visit with CESAR Hernandez CNP  [facility] ([facility] ) [address]  [place] [address]  [phone]               levothyroxine (SYNTHROID/LEVOTHROID) 88 MCG tablet    Last Written Prescription Date:  3/15/19  Last Fill Quantity: 90,   # refills: 0  Last Office Visit: 6/3/19  Future Office visit:    Next 5 appointments (look out 90 days)    Jul 29, 2019  1:30 PM CDT  Office Visit with HC ALLERGY TESTING  [facility] ([facility] ) [address]  [place] [address]  [phone]   Jul 29, 2019  3:30 PM CDT  (Arrive by 3:15 PM)  Return Visit with CESAR Hernandez CNP  [facility] ([facility] ) [address]  [place] [address]  [phone]

## 2019-06-27 ENCOUNTER — TELEPHONE (OUTPATIENT)
Dept: OTOLARYNGOLOGY | Facility: OTHER | Age: 64
End: 2019-06-27

## 2019-06-27 RX ORDER — ROSUVASTATIN CALCIUM 10 MG/1
10 TABLET, COATED ORAL DAILY
Qty: 30 TABLET | Refills: 2 | Status: SHIPPED | OUTPATIENT
Start: 2019-06-27 | End: 2019-07-24

## 2019-06-27 RX ORDER — LEVOTHYROXINE SODIUM 88 UG/1
88 TABLET ORAL DAILY
Qty: 60 TABLET | Refills: 0 | Status: SHIPPED | OUTPATIENT
Start: 2019-06-27 | End: 2019-08-30

## 2019-06-27 NOTE — TELEPHONE ENCOUNTER
Patient called wanting to speak to Emerald Hanson in regards to medication reaction.    Patient is aware Emerald is out of the office today.

## 2019-06-27 NOTE — TELEPHONE ENCOUNTER
I tried to call patient to find out what was going on as far as the medication reaction. I will try her back later.

## 2019-06-28 NOTE — TELEPHONE ENCOUNTER
OK. Not a common side effect. Other GERD sx have minimal chance of developing. Stay off of for now and continue daily antihistamine and allergy testing as we discussed. She should be seen if there are current concerns regarding her tongue/mouth.

## 2019-06-30 ENCOUNTER — APPOINTMENT (OUTPATIENT)
Dept: GENERAL RADIOLOGY | Facility: HOSPITAL | Age: 64
End: 2019-06-30
Attending: EMERGENCY MEDICINE
Payer: COMMERCIAL

## 2019-06-30 ENCOUNTER — HOSPITAL ENCOUNTER (EMERGENCY)
Facility: HOSPITAL | Age: 64
Discharge: HOME | End: 2019-06-30
Attending: EMERGENCY MEDICINE | Admitting: EMERGENCY MEDICINE
Payer: COMMERCIAL

## 2019-06-30 VITALS
SYSTOLIC BLOOD PRESSURE: 141 MMHG | DIASTOLIC BLOOD PRESSURE: 80 MMHG | HEART RATE: 56 BPM | RESPIRATION RATE: 54 BRPM | BODY MASS INDEX: 24.03 KG/M2 | OXYGEN SATURATION: 98 % | TEMPERATURE: 98.4 F | WEIGHT: 144.4 LBS

## 2019-06-30 DIAGNOSIS — R07.81 PLEURITIC CHEST PAIN: Primary | ICD-10-CM

## 2019-06-30 LAB
ALBUMIN SERPL-MCNC: 3.8 G/DL (ref 3.4–5)
ALP SERPL-CCNC: 96 U/L (ref 40–150)
ALT SERPL W P-5'-P-CCNC: 45 U/L (ref 0–50)
ANION GAP SERPL CALCULATED.3IONS-SCNC: 7 MMOL/L (ref 3–14)
AST SERPL W P-5'-P-CCNC: 39 U/L (ref 0–45)
BASOPHILS # BLD AUTO: 0 10E9/L (ref 0–0.2)
BASOPHILS NFR BLD AUTO: 0.5 %
BILIRUB SERPL-MCNC: 0.2 MG/DL (ref 0.2–1.3)
BUN SERPL-MCNC: 23 MG/DL (ref 7–30)
CALCIUM SERPL-MCNC: 8.7 MG/DL (ref 8.5–10.1)
CHLORIDE SERPL-SCNC: 107 MMOL/L (ref 94–109)
CO2 SERPL-SCNC: 26 MMOL/L (ref 20–32)
CREAT SERPL-MCNC: 0.66 MG/DL (ref 0.52–1.04)
D DIMER PPP DDU-MCNC: <200 NG/ML D-DU (ref 0–300)
DIFFERENTIAL METHOD BLD: NORMAL
EOSINOPHIL # BLD AUTO: 0.2 10E9/L (ref 0–0.7)
EOSINOPHIL NFR BLD AUTO: 2.8 %
ERYTHROCYTE [DISTWIDTH] IN BLOOD BY AUTOMATED COUNT: 13.1 % (ref 10–15)
GFR SERPL CREATININE-BSD FRML MDRD: >90 ML/MIN/{1.73_M2}
GLUCOSE SERPL-MCNC: 144 MG/DL (ref 70–99)
HCT VFR BLD AUTO: 36 % (ref 35–47)
HGB BLD-MCNC: 12.2 G/DL (ref 11.7–15.7)
IMM GRANULOCYTES # BLD: 0 10E9/L (ref 0–0.4)
IMM GRANULOCYTES NFR BLD: 0.3 %
LIPASE SERPL-CCNC: 199 U/L (ref 73–393)
LYMPHOCYTES # BLD AUTO: 2 10E9/L (ref 0.8–5.3)
LYMPHOCYTES NFR BLD AUTO: 26.6 %
MAGNESIUM SERPL-MCNC: 2 MG/DL (ref 1.6–2.3)
MCH RBC QN AUTO: 30 PG (ref 26.5–33)
MCHC RBC AUTO-ENTMCNC: 33.9 G/DL (ref 31.5–36.5)
MCV RBC AUTO: 89 FL (ref 78–100)
MONOCYTES # BLD AUTO: 0.6 10E9/L (ref 0–1.3)
MONOCYTES NFR BLD AUTO: 7.8 %
NEUTROPHILS # BLD AUTO: 4.7 10E9/L (ref 1.6–8.3)
NEUTROPHILS NFR BLD AUTO: 62 %
NRBC # BLD AUTO: 0 10*3/UL
NRBC BLD AUTO-RTO: 0 /100
NT-PROBNP SERPL-MCNC: 219 PG/ML (ref 0–900)
PLATELET # BLD AUTO: 177 10E9/L (ref 150–450)
POTASSIUM SERPL-SCNC: 3.7 MMOL/L (ref 3.4–5.3)
PROT SERPL-MCNC: 7.5 G/DL (ref 6.8–8.8)
RBC # BLD AUTO: 4.06 10E12/L (ref 3.8–5.2)
SODIUM SERPL-SCNC: 140 MMOL/L (ref 133–144)
TROPONIN I SERPL-MCNC: <0.015 UG/L (ref 0–0.04)
WBC # BLD AUTO: 7.5 10E9/L (ref 4–11)

## 2019-06-30 PROCEDURE — 85379 FIBRIN DEGRADATION QUANT: CPT | Performed by: EMERGENCY MEDICINE

## 2019-06-30 PROCEDURE — 93010 ELECTROCARDIOGRAM REPORT: CPT | Performed by: INTERNAL MEDICINE

## 2019-06-30 PROCEDURE — 96375 TX/PRO/DX INJ NEW DRUG ADDON: CPT

## 2019-06-30 PROCEDURE — 99285 EMERGENCY DEPT VISIT HI MDM: CPT | Mod: 25

## 2019-06-30 PROCEDURE — 83690 ASSAY OF LIPASE: CPT | Performed by: EMERGENCY MEDICINE

## 2019-06-30 PROCEDURE — 71046 X-RAY EXAM CHEST 2 VIEWS: CPT | Mod: TC

## 2019-06-30 PROCEDURE — 83735 ASSAY OF MAGNESIUM: CPT | Performed by: EMERGENCY MEDICINE

## 2019-06-30 PROCEDURE — 36415 COLL VENOUS BLD VENIPUNCTURE: CPT | Performed by: EMERGENCY MEDICINE

## 2019-06-30 PROCEDURE — 96374 THER/PROPH/DIAG INJ IV PUSH: CPT

## 2019-06-30 PROCEDURE — 85025 COMPLETE CBC W/AUTO DIFF WBC: CPT | Performed by: EMERGENCY MEDICINE

## 2019-06-30 PROCEDURE — 84484 ASSAY OF TROPONIN QUANT: CPT | Performed by: EMERGENCY MEDICINE

## 2019-06-30 PROCEDURE — 99285 EMERGENCY DEPT VISIT HI MDM: CPT | Mod: Z6 | Performed by: EMERGENCY MEDICINE

## 2019-06-30 PROCEDURE — 25000128 H RX IP 250 OP 636: Performed by: EMERGENCY MEDICINE

## 2019-06-30 PROCEDURE — 80053 COMPREHEN METABOLIC PANEL: CPT | Performed by: EMERGENCY MEDICINE

## 2019-06-30 PROCEDURE — 93005 ELECTROCARDIOGRAM TRACING: CPT

## 2019-06-30 PROCEDURE — 83880 ASSAY OF NATRIURETIC PEPTIDE: CPT | Performed by: EMERGENCY MEDICINE

## 2019-06-30 RX ORDER — KETOROLAC TROMETHAMINE 15 MG/ML
30 INJECTION, SOLUTION INTRAMUSCULAR; INTRAVENOUS ONCE
Status: COMPLETED | OUTPATIENT
Start: 2019-06-30 | End: 2019-06-30

## 2019-06-30 RX ORDER — ASPIRIN 81 MG/1
324 TABLET ORAL ONCE
COMMUNITY
End: 2019-07-24

## 2019-06-30 RX ORDER — ASPIRIN 81 MG/1
324 TABLET, CHEWABLE ORAL ONCE
Status: DISCONTINUED | OUTPATIENT
Start: 2019-06-30 | End: 2019-07-01 | Stop reason: HOSPADM

## 2019-06-30 RX ORDER — HYDRALAZINE HYDROCHLORIDE 20 MG/ML
20 INJECTION INTRAMUSCULAR; INTRAVENOUS ONCE
Status: DISCONTINUED | OUTPATIENT
Start: 2019-06-30 | End: 2019-06-30

## 2019-06-30 RX ORDER — LORAZEPAM 2 MG/ML
1 INJECTION INTRAMUSCULAR ONCE
Status: COMPLETED | OUTPATIENT
Start: 2019-06-30 | End: 2019-06-30

## 2019-06-30 RX ORDER — NITROGLYCERIN 0.4 MG/1
0.4 TABLET SUBLINGUAL EVERY 5 MIN PRN
Status: DISCONTINUED | OUTPATIENT
Start: 2019-06-30 | End: 2019-07-01 | Stop reason: HOSPADM

## 2019-06-30 RX ADMIN — LORAZEPAM 1 MG: 2 INJECTION, SOLUTION INTRAMUSCULAR; INTRAVENOUS at 22:50

## 2019-06-30 RX ADMIN — KETOROLAC TROMETHAMINE 30 MG: 15 INJECTION, SOLUTION INTRAMUSCULAR; INTRAVENOUS at 22:50

## 2019-06-30 ASSESSMENT — ENCOUNTER SYMPTOMS
COLOR CHANGE: 0
ABDOMINAL PAIN: 0
CONFUSION: 0
SHORTNESS OF BREATH: 0
ARTHRALGIAS: 0
DIFFICULTY URINATING: 0
FEVER: 0
EYE REDNESS: 0
NECK STIFFNESS: 0
HEADACHES: 0

## 2019-06-30 NOTE — ED AVS SNAPSHOT
[facility]  [address] [address]  Phone:  [phone]                                    Denise Taylor   MRN: [id number]    Department:  [facility]   Date of Visit:  6/30/2019           After Visit Summary Signature Page    I have received my discharge instructions, and my questions have been answered. I have discussed any challenges I see with this plan with the nurse or doctor.    ..........................................................................................................................................  Patient/Patient Representative Signature      ..........................................................................................................................................  Patient Representative Print Name and Relationship to Patient    ..................................................               ................................................  Date                                   Time    ..........................................................................................................................................  Reviewed by Signature/Title    ...................................................              ..............................................  Date                                               Time          22EPIC Rev 08/18

## 2019-07-01 ENCOUNTER — TELEPHONE (OUTPATIENT)
Dept: FAMILY MEDICINE | Facility: OTHER | Age: 64
End: 2019-07-01

## 2019-07-01 RX ORDER — DOXYCYCLINE 100 MG/1
100 CAPSULE ORAL 2 TIMES DAILY
Qty: 14 CAPSULE | Refills: 0 | Status: SHIPPED | OUTPATIENT
Start: 2019-07-01 | End: 2019-07-24

## 2019-07-01 NOTE — ED PROVIDER NOTES
History     Chief Complaint   Patient presents with     Chest Pain     right sided chest pain started about an hour ago, pt sitting in the reclyner      HPI  Denise Taylor is a 63 year old female who presented to the emergency department accompanied by the spouse.  Patient started having right-sided pleuritic chest pain 1 hour ago.  Pain was very severe but has since subsided.  Pain is worsened by chest wall movement and taking a deep breath.  Had a mild subjective fever yesterday but denies sore throat or cough.  No palpitations, shortness of breath, orthopnea, paroxysmal nocturnal dyspnea, recent trauma.  Patient did some heavy lifting 2 days ago when she was helping her son move to a new house.  Denies any prior coronary syndromes.  Has history of hyperlipidemia and was treated for pneumonia in 2015.  Denies any other concerns.    Allergies:  Allergies   Allergen Reactions     Penicillins Rash     Zithromax [Azithromycin] Rash       Problem List:    Patient Active Problem List    Diagnosis Date Noted     ACP (advance care planning) 07/27/2017     Priority: Medium     Advance Care Planning 7/27/2017: ACP Review of Chart / Resources Provided:  Reviewed chart for advance care plan.  Denise Taylor has no plan or code status on file. Discussed available resources and provided with information.   Added by Marah Henry             Hypothyroidism, unspecified type 07/27/2017     Priority: Medium     Pneumonia 07/20/2015     Priority: Medium     History of basal cell carcinoma 05/18/2015     Priority: Medium     Hyperlipidemia with target LDL less than 130 05/18/2015     Priority: Medium     Diagnosis updated by automated process. Provider to review and confirm.       Actinic keratosis 12/01/2011     Priority: Medium     AC separation, type 5 06/27/2011     Priority: Medium     Overview:   IMO Update 10/11       Capillary disease 08/06/2007     Priority: Medium     Overview:   IMO Update 10/11       Other  dyschromia 08/06/2007     Priority: Medium     Scar condition and fibrosis of skin 08/06/2007     Priority: Medium        Past Medical History:    Past Medical History:   Diagnosis Date     Pneumonia        Past Surgical History:    Past Surgical History:   Procedure Laterality Date     ARTHROSCOPY SHOULDER ROTATOR CUFF REPAIR Left 3/7/2018    Procedure: ARTHROSCOPY SHOULDER ROTATOR CUFF REPAIR;  LEFT SHOULDER ARTHROSCOPY, REPAIR ROTATOR CUFF: subacromial Decompression and AC Joint Resection;  Surgeon: Baldev Lou DO;  Location: [facility]     ARTHROTOMY SHOULDER, ROTATOR CUFF REPAIR, COMBINED Right 11/14/2018    Procedure: RIGHT SHOULDER DEBRIDEMENT, EXCISION OF LIPOMA  RIGHT UPPER ARM, EXCISION SCAR TISSUE AC JOINT;  Surgeon: Baldev Lou DO;  Location: [facility]     COLONOSCOPY  2006    Dr Lara     COLONOSCOPY N/A 10/10/2016    Procedure: COLONOSCOPY;  Surgeon: Christine Cintron MD;  Location: [facility]     SHOULDER SURGERY Right 2011/10/2012       Family History:    Family History   Problem Relation Age of Onset     Myocardial Infarction Mother      Hypertension Mother      Stomach Cancer Father        Social History:  Marital Status:   [2]  Social History     Tobacco Use     Smoking status: Never Smoker     Smokeless tobacco: Never Used   Substance Use Topics     Alcohol use: Yes     Alcohol/week: 1.0 oz     Types: 2 Glasses of wine per week     Comment: occasionally     Drug use: No        Medications:      aspirin 81 MG EC tablet   FLUoxetine HCl, PMDD, (SARAFEM) 10 MG TABS   levothyroxine (SYNTHROID/LEVOTHROID) 88 MCG tablet   rosuvastatin (CRESTOR) 10 MG tablet   Eletriptan Hydrobromide (RELPAX PO)         Review of Systems   Constitutional: Negative for fever.   HENT: Negative for congestion.    Eyes: Negative for redness.   Respiratory: Negative for shortness of breath.    Cardiovascular: Positive for chest pain.   Gastrointestinal: Negative for abdominal pain.   Genitourinary: Negative for  difficulty urinating.   Musculoskeletal: Negative for arthralgias and neck stiffness.   Skin: Negative for color change.   Neurological: Negative for headaches.   Psychiatric/Behavioral: Negative for confusion.   All other systems reviewed and are negative.      Physical Exam   BP: (!) 187/89  Pulse: 56  Heart Rate: 54  Temp: 99.3  F (37.4  C)  Resp: 20  Weight: 65.5 kg (144 lb 6.4 oz)  SpO2: 100 %      Physical Exam   Constitutional: She appears well-developed and well-nourished. No distress.   HENT:   Head: Normocephalic and atraumatic.   Mouth/Throat: Oropharynx is clear and moist. No oropharyngeal exudate.   Eyes: Pupils are equal, round, and reactive to light. No scleral icterus.   Cardiovascular: Normal rate, regular rhythm, normal heart sounds and intact distal pulses.   Pulmonary/Chest: Effort normal and breath sounds normal. No stridor. No respiratory distress. She has no wheezes. She exhibits tenderness.       Abdominal: Soft. Bowel sounds are normal. She exhibits no distension. There is no tenderness.   Musculoskeletal: She exhibits no edema or tenderness.   Skin: Skin is warm. No rash noted. She is not diaphoretic.   Nursing note and vitals reviewed.      ED Course        Procedures       EKG Interpretation:      Interpreted by Chalino West  Time reviewed: 10:40 PM  Symptoms at time of EKG: Right-sided pleuritic chest pain  Rhythm: normal sinus   Rate: 57 bpm  Axis: normal  Ectopy: none  Conduction: normal  ST Segments/ T Waves: No ST-T wave changes  Q Waves: none  Comparison to prior: No old EKG available    Clinical Impression: normal EKG            Results for orders placed or performed during the hospital encounter of 06/30/19 (from the past 24 hour(s))   CBC with platelets differential   Result Value Ref Range    WBC 7.5 4.0 - 11.0 10e9/L    RBC Count 4.06 3.8 - 5.2 10e12/L    Hemoglobin 12.2 11.7 - 15.7 g/dL    Hematocrit 36.0 35.0 - 47.0 %    MCV 89 78 - 100 fl    MCH 30.0 26.5 - 33.0 pg     MCHC 33.9 31.5 - 36.5 g/dL    RDW 13.1 10.0 - 15.0 %    Platelet Count 177 150 - 450 10e9/L    Diff Method Automated Method     % Neutrophils 62.0 %    % Lymphocytes 26.6 %    % Monocytes 7.8 %    % Eosinophils 2.8 %    % Basophils 0.5 %    % Immature Granulocytes 0.3 %    Nucleated RBCs 0 0 /100    Absolute Neutrophil 4.7 1.6 - 8.3 10e9/L    Absolute Lymphocytes 2.0 0.8 - 5.3 10e9/L    Absolute Monocytes 0.6 0.0 - 1.3 10e9/L    Absolute Eosinophils 0.2 0.0 - 0.7 10e9/L    Absolute Basophils 0.0 0.0 - 0.2 10e9/L    Abs Immature Granulocytes 0.0 0 - 0.4 10e9/L    Absolute Nucleated RBC 0.0    Troponin I   Result Value Ref Range    Troponin I ES <0.015 0.000 - 0.045 ug/L   Comprehensive metabolic panel   Result Value Ref Range    Sodium 140 133 - 144 mmol/L    Potassium 3.7 3.4 - 5.3 mmol/L    Chloride 107 94 - 109 mmol/L    Carbon Dioxide 26 20 - 32 mmol/L    Anion Gap 7 3 - 14 mmol/L    Glucose 144 (H) 70 - 99 mg/dL    Urea Nitrogen 23 7 - 30 mg/dL    Creatinine 0.66 0.52 - 1.04 mg/dL    GFR Estimate >90 >60 mL/min/[1.73_m2]    GFR Estimate If Black >90 >60 mL/min/[1.73_m2]    Calcium 8.7 8.5 - 10.1 mg/dL    Bilirubin Total 0.2 0.2 - 1.3 mg/dL    Albumin 3.8 3.4 - 5.0 g/dL    Protein Total 7.5 6.8 - 8.8 g/dL    Alkaline Phosphatase 96 40 - 150 U/L    ALT 45 0 - 50 U/L    AST 39 0 - 45 U/L   Magnesium   Result Value Ref Range    Magnesium 2.0 1.6 - 2.3 mg/dL   Lipase   Result Value Ref Range    Lipase 199 73 - 393 U/L   D-Dimer ([facility])   Result Value Ref Range    D-Dimer ng/mL <200 0 - 300 ng/ml D-DU   Nt probnp inpatient (BNP)   Result Value Ref Range    N-Terminal Pro BNP Inpatient 219 0 - 900 pg/mL       Medications   ketorolac (TORADOL) injection 30 mg (30 mg Intravenous Given 6/30/19 2250)   LORazepam (ATIVAN) injection 1 mg (1 mg Intravenous Given 6/30/19 2250)       Assessments & Plan (with Medical Decision Making)   Right-sided pleuritic chest pain: Evaluation at the ED was negative for acute coronary  syndrome.  Normal EKG, d-dimer, BNP, lipase, magnesium, CBC, CMP and negative troponin.  Normal chest x-ray.  Pain relieved with IV Toradol at the emergency department and a gram of Ativan was given because of anxiety.  Discharged home and instructed to buy OTC Motrin or Tylenol as needed for pain.  Follow-up with PCP if not better.  Return to ED if condition worsens.  Patient was in agreement with treatment plan.    I have reviewed the nursing notes.    I have reviewed the findings, diagnosis, plan and need for follow up with the patient.       Medication List      There are no discharge medications for this visit.         Final diagnoses:   Pleuritic chest pain       6/30/2019   [facility]     Chalino West MD  07/01/19 0139

## 2019-07-01 NOTE — TELEPHONE ENCOUNTER
10:22 AM    Reason for Call: OVERBOOK    Patient is having the following symptoms: follow up ER pneumonia  for 1 weeks.    The patient is requesting an appointment for ASAP with Mirna Roblero.    Was an appointment offered for this call? No  If yes : Appointment type              Date    Preferred method for responding to this message: Telephone Call  What is your phone number ?[phone]    If we cannot reach you directly, may we leave a detailed response at the number you provided? Yes    Can this message wait until your PCP/provider returns, if unavailable today? Not applicable, pcp is in     [facility]

## 2019-07-03 ENCOUNTER — OFFICE VISIT (OUTPATIENT)
Dept: FAMILY MEDICINE | Facility: OTHER | Age: 64
End: 2019-07-03
Attending: NURSE PRACTITIONER
Payer: COMMERCIAL

## 2019-07-03 VITALS
RESPIRATION RATE: 20 BRPM | TEMPERATURE: 98 F | DIASTOLIC BLOOD PRESSURE: 60 MMHG | OXYGEN SATURATION: 99 % | SYSTOLIC BLOOD PRESSURE: 110 MMHG | BODY MASS INDEX: 22.47 KG/M2 | WEIGHT: 135 LBS | HEART RATE: 63 BPM

## 2019-07-03 DIAGNOSIS — J18.9 PNEUMONIA OF RIGHT LUNG DUE TO INFECTIOUS ORGANISM, UNSPECIFIED PART OF LUNG: Primary | ICD-10-CM

## 2019-07-03 PROCEDURE — 99213 OFFICE O/P EST LOW 20 MIN: CPT | Performed by: NURSE PRACTITIONER

## 2019-07-03 ASSESSMENT — PAIN SCALES - GENERAL: PAINLEVEL: MILD PAIN (2)

## 2019-07-03 NOTE — PROGRESS NOTES
Subjective     Denise Taylor is a 63 year old female who presents to clinic today for the following health issues:    HPI   ED/UC Followup:    Facility:  [facility]  Date of visit: 6/30/2019  Reason for visit: pneumonia  Current Status: stomach hurts taking doxycycline, started antibiotic on Monday  Her symptoms are starting to improve, decreased pain and no further episodes of fevers.  She has an appointment with pulmonary and allergy testing coming up           Patient Active Problem List   Diagnosis     History of basal cell carcinoma     Hyperlipidemia with target LDL less than 130     Pneumonia     ACP (advance care planning)     Hypothyroidism, unspecified type     AC separation, type 5     Actinic keratosis     Capillary disease     Other dyschromia     Scar condition and fibrosis of skin     Past Surgical History:   Procedure Laterality Date     ARTHROSCOPY SHOULDER ROTATOR CUFF REPAIR Left 3/7/2018    Procedure: ARTHROSCOPY SHOULDER ROTATOR CUFF REPAIR;  LEFT SHOULDER ARTHROSCOPY, REPAIR ROTATOR CUFF: subacromial Decompression and AC Joint Resection;  Surgeon: Baldev Lou DO;  Location: [facility]     ARTHROTOMY SHOULDER, ROTATOR CUFF REPAIR, COMBINED Right 11/14/2018    Procedure: RIGHT SHOULDER DEBRIDEMENT, EXCISION OF LIPOMA  RIGHT UPPER ARM, EXCISION SCAR TISSUE AC JOINT;  Surgeon: Baldev Lou DO;  Location: [facility]     COLONOSCOPY  2006    Dr Lara     COLONOSCOPY N/A 10/10/2016    Procedure: COLONOSCOPY;  Surgeon: Christine Cintron MD;  Location: [facility]     SHOULDER SURGERY Right 2011/10/2012       Social History     Tobacco Use     Smoking status: Never Smoker     Smokeless tobacco: Never Used   Substance Use Topics     Alcohol use: Yes     Alcohol/week: 1.0 oz     Types: 2 Glasses of wine per week     Comment: occasionally     Family History   Problem Relation Age of Onset     Myocardial Infarction Mother      Hypertension Mother      Stomach Cancer Father          Current Outpatient  Medications   Medication Sig Dispense Refill     aspirin 81 MG EC tablet Take 324 mg by mouth once       doxycycline hyclate (VIBRAMYCIN) 100 MG capsule Take 1 capsule (100 mg) by mouth 2 times daily for 7 days 14 capsule 0     Eletriptan Hydrobromide (RELPAX PO) Take 40 mg by mouth once as needed for migraine       FLUoxetine HCl, PMDD, (SARAFEM) 10 MG TABS Take 10 mg by mouth daily       levothyroxine (SYNTHROID/LEVOTHROID) 88 MCG tablet Take 1 tablet (88 mcg) by mouth daily 60 tablet 0     rosuvastatin (CRESTOR) 10 MG tablet Take 1 tablet (10 mg) by mouth daily 30 tablet 2     Allergies   Allergen Reactions     Penicillins Rash     Zithromax [Azithromycin] Rash         Reviewed and updated as needed this visit by Provider         Review of Systems   ROS COMP: CONSTITUTIONAL:fevers have cleared now  INTEGUMENTARY/SKIN: NEGATIVE for worrisome rashes, moles or lesions  RESP:NEGATIVE for significant cough or SOB  CV: continues to have some pain right upper chest pain is decreasing       Objective    /60   Pulse 63   Temp 98  F (36.7  C) (Tympanic)   Resp 20   Wt 61.2 kg (135 lb)   SpO2 99%   BMI 22.47 kg/m    Body mass index is 22.47 kg/m .  Physical Exam   GENERAL: healthy, alert and no distress  HENT: ear canals and TM's normal, nose and mouth without ulcers or lesions  NECK: no adenopathy, no asymmetry, masses, or scars and thyroid normal to palpation  RESP: lungs clear to auscultation - no rales, rhonchi or wheezes  CV: regular rate and rhythm, normal S1 S2, no S3 or S4, no murmur, click or rub, no peripheral edema and peripheral pulses strong  ABDOMEN: soft, nontender, no hepatosplenomegaly, no masses and bowel sounds normal  SKIN: no suspicious lesions or rashes  PSYCH: mentation appears normal, affect normal/bright    Diagnostic Test Results:  Labs reviewed in Epic  Results for orders placed or performed during the hospital encounter of 06/30/19   XR Chest 2 Views    Narrative    PROCEDURE:  XR  CHEST 2 VW    HISTORY:  Chest pain.     COMPARISON:  May 1, 2019    FINDINGS:   The cardiac silhouette is normal in size. The pulmonary vasculature is  normal.  There is some patchy right middle lobe opacities consistent  with atelectasis or pneumonia. No pleural effusion or pneumothorax.      Impression    IMPRESSION:  Patchy right middle lobe opacity consistent with  atelectasis or pneumonia      HUMZA MCCORMACK MD   CBC with platelets differential   Result Value Ref Range    WBC 7.5 4.0 - 11.0 10e9/L    RBC Count 4.06 3.8 - 5.2 10e12/L    Hemoglobin 12.2 11.7 - 15.7 g/dL    Hematocrit 36.0 35.0 - 47.0 %    MCV 89 78 - 100 fl    MCH 30.0 26.5 - 33.0 pg    MCHC 33.9 31.5 - 36.5 g/dL    RDW 13.1 10.0 - 15.0 %    Platelet Count 177 150 - 450 10e9/L    Diff Method Automated Method     % Neutrophils 62.0 %    % Lymphocytes 26.6 %    % Monocytes 7.8 %    % Eosinophils 2.8 %    % Basophils 0.5 %    % Immature Granulocytes 0.3 %    Nucleated RBCs 0 0 /100    Absolute Neutrophil 4.7 1.6 - 8.3 10e9/L    Absolute Lymphocytes 2.0 0.8 - 5.3 10e9/L    Absolute Monocytes 0.6 0.0 - 1.3 10e9/L    Absolute Eosinophils 0.2 0.0 - 0.7 10e9/L    Absolute Basophils 0.0 0.0 - 0.2 10e9/L    Abs Immature Granulocytes 0.0 0 - 0.4 10e9/L    Absolute Nucleated RBC 0.0    Troponin I   Result Value Ref Range    Troponin I ES <0.015 0.000 - 0.045 ug/L   Comprehensive metabolic panel   Result Value Ref Range    Sodium 140 133 - 144 mmol/L    Potassium 3.7 3.4 - 5.3 mmol/L    Chloride 107 94 - 109 mmol/L    Carbon Dioxide 26 20 - 32 mmol/L    Anion Gap 7 3 - 14 mmol/L    Glucose 144 (H) 70 - 99 mg/dL    Urea Nitrogen 23 7 - 30 mg/dL    Creatinine 0.66 0.52 - 1.04 mg/dL    GFR Estimate >90 >60 mL/min/[1.73_m2]    GFR Estimate If Black >90 >60 mL/min/[1.73_m2]    Calcium 8.7 8.5 - 10.1 mg/dL    Bilirubin Total 0.2 0.2 - 1.3 mg/dL    Albumin 3.8 3.4 - 5.0 g/dL    Protein Total 7.5 6.8 - 8.8 g/dL    Alkaline Phosphatase 96 40 - 150 U/L    ALT 45 0 -  50 U/L    AST 39 0 - 45 U/L   Magnesium   Result Value Ref Range    Magnesium 2.0 1.6 - 2.3 mg/dL   Lipase   Result Value Ref Range    Lipase 199 73 - 393 U/L   D-Dimer ([facility])   Result Value Ref Range    D-Dimer ng/mL <200 0 - 300 ng/ml D-DU   Nt probnp inpatient (BNP)   Result Value Ref Range    N-Terminal Pro BNP Inpatient 219 0 - 900 pg/mL           Assessment & Plan     1. Pneumonia of right lung due to infectious organism, unspecified part of lung  Symptoms improving. Did discussed coming back for a repeat chest XR, but she has an appointment with pulmonology in about 2 weeks. She should follow up if no improvement or worsening symptoms.   Discussed the importance of sunscreen with her antibiotic. She states she has to eat with antibiotic due to upset stomach from medication.          See Patient Instructions    No follow-ups on file.    [facility]

## 2019-07-03 NOTE — NURSING NOTE
"Chief Complaint   Patient presents with     Urgent Care     follow up on pneumonia       Initial /60   Pulse 63   Temp 98  F (36.7  C) (Tympanic)   Resp 20   Wt 61.2 kg (135 lb)   SpO2 99%   BMI 22.47 kg/m   Estimated body mass index is 22.47 kg/m  as calculated from the following:    Height as of 6/11/19: 1.651 m (5' 5\").    Weight as of this encounter: 61.2 kg (135 lb).  Medication Reconciliation: complete   Randa Villasenor      "

## 2019-07-03 NOTE — PATIENT INSTRUCTIONS
Patient Education     Patient Education    Distilled Water, Docusate Sodium, Methylparaben, Poloxamer 182, Propylene Glycol, Propylparaben, Salicylic Acid, Sodium Hydroxide, tetrasodium EDTA Topical pad, cleanser, Doxycycline Monohydrate Oral capsule    Distilled Water, Docusate Sodium, Methylparaben, Poloxamer 182, Propylene Glycol, Propylparaben, Salicylic Acid, Sodium Hydroxide, tetrasodium EDTA Topical pad, cleanser, Doxycycline Monohydrate Oral tablet    Doxycycline Calcium Oral suspension    Doxycycline Hyclate Gastro-resistant tablet    Doxycycline Hyclate Oral capsule    Doxycycline Hyclate Oral capsule [Periodontitis]    Doxycycline Hyclate Oral capsule, gastro-resistant    Doxycycline Hyclate Oral capsule, gastro-resistant pellets    Doxycycline Hyclate Oral capsule, gastro-resistant sprinkles    Doxycycline Hyclate Oral tablet    Doxycycline Hyclate Oral tablet [Periodontitis]    Doxycycline Hyclate Solution for injection    Doxycycline Monohydrate Oral capsule    Doxycycline Monohydrate Oral capsule, biphasic release    Doxycycline Monohydrate Oral suspension    Doxycycline Monohydrate Oral tablet  Doxycycline Hyclate Oral capsule  What is this medicine?  DOXYCYCLINE (dox marcial GUERO toussaintalba) is a tetracycline antibiotic. It kills certain bacteria or stops their growth. It is used to treat many kinds of infections, like dental, skin, respiratory, and urinary tract infections. It also treats acne, Lyme disease, malaria, and certain sexually transmitted infections.  This medicine may be used for other purposes; ask your health care provider or pharmacist if you have questions.  What should I tell my health care provider before I take this medicine?  They need to know if you have any of these conditions:    liver disease    long exposure to sunlight like working outdoors    stomach problems like colitis    an unusual or allergic reaction to doxycycline, tetracycline antibiotics, other medicines, foods, dyes, or  preservatives    pregnant or trying to get pregnant    breast-feeding  How should I use this medicine?  Take this medicine by mouth with a full glass of water. Follow the directions on the prescription label. It is best to take this medicine without food, but if it upsets your stomach take it with food. Take your medicine at regular intervals. Do not take your medicine more often than directed. Take all of your medicine as directed even if you think you are better. Do not skip doses or stop your medicine early.  Talk to your pediatrician regarding the use of this medicine in children. Special care may be needed. While this drug may be prescribed for children as young as 8 years old for selected conditions, precautions do apply.  Overdosage: If you think you have taken too much of this medicine contact a poison control center or emergency room at once.  NOTE: This medicine is only for you. Do not share this medicine with others.  What if I miss a dose?  If you miss a dose, take it as soon as you can. If it is almost time for your next dose, take only that dose. Do not take double or extra doses.  What may interact with this medicine?    antacids    barbiturates    birth control pills    bismuth subsalicylate    carbamazepine    methoxyflurane    other antibiotics    phenytoin    vitamins that contain iron    warfarin  This list may not describe all possible interactions. Give your health care provider a list of all the medicines, herbs, non-prescription drugs, or dietary supplements you use. Also tell them if you smoke, drink alcohol, or use illegal drugs. Some items may interact with your medicine.  What should I watch for while using this medicine?  Tell your doctor or health care professional if your symptoms do not improve.  Do not treat diarrhea with over the counter products. Contact your doctor if you have diarrhea that lasts more than 2 days or if it is severe and watery.  Do not take this medicine just  before going to bed. It may not dissolve properly when you lay down and can cause pain in your throat. Drink plenty of fluids while taking this medicine to also help reduce irritation in your throat.  This medicine can make you more sensitive to the sun. Keep out of the sun. If you cannot avoid being in the sun, wear protective clothing and use sunscreen. Do not use sun lamps or tanning beds/booths.  Birth control pills may not work properly while you are taking this medicine. Talk to your doctor about using an extra method of birth control.  If you are being treated for a sexually transmitted infection, avoid sexual contact until you have finished your treatment. Your sexual partner may also need treatment.  Avoid antacids, aluminum, calcium, magnesium, and iron products for 4 hours before and 2 hours after taking a dose of this medicine.  If you are using this medicine to prevent malaria, you should still protect yourself from contact with mosquitos. Stay in screened-in areas, use mosquito nets, keep your body covered, and use an insect repellent.  What side effects may I notice from receiving this medicine?  Side effects that you should report to your doctor or health care professional as soon as possible:    allergic reactions like skin rash, itching or hives, swelling of the face, lips, or tongue    difficulty breathing    fever    itching in the rectal or genital area    pain on swallowing    redness, blistering, peeling or loosening of the skin, including inside the mouth    severe stomach pain or cramps    unusual bleeding or bruising    unusually weak or tired    yellowing of the eyes or skin  Side effects that usually do not require medical attention (report to your doctor or health care professional if they continue or are bothersome):    diarrhea    loss of appetite    nausea, vomiting  This list may not describe all possible side effects. Call your doctor for medical advice about side effects. You may  report side effects to FDA at 8-003-FDA-7946.  Where should I keep my medicine?  Keep out of the reach of children.  Store at room temperature, below 30 degrees C (86 degrees F). Protect from light. Keep container tightly closed. Throw away any unused medicine after the expiration date. Taking this medicine after the expiration date can make you seriously ill.  NOTE:This sheet is a summary. It may not cover all possible information. If you have questions about this medicine, talk to your doctor, pharmacist, or health care provider. Copyright  2016 [employer]

## 2019-07-08 ENCOUNTER — TELEPHONE (OUTPATIENT)
Dept: FAMILY MEDICINE | Facility: OTHER | Age: 64
End: 2019-07-08

## 2019-07-08 DIAGNOSIS — E78.5 HYPERLIPIDEMIA WITH TARGET LDL LESS THAN 130: ICD-10-CM

## 2019-07-08 DIAGNOSIS — Z12.31 ENCOUNTER FOR SCREENING MAMMOGRAM FOR BREAST CANCER: ICD-10-CM

## 2019-07-08 DIAGNOSIS — E03.9 HYPOTHYROIDISM, UNSPECIFIED TYPE: Primary | ICD-10-CM

## 2019-07-08 NOTE — TELEPHONE ENCOUNTER
Patient called about her upcoming pulmonary function test. She has a few questions she would like to speak to you about. Patient can be reached at [phone]. Please contact.

## 2019-07-18 NOTE — PROGRESS NOTES
Subjective     Denise Taylor is a 63 year old female who presents to clinic today for the following health issues:    HPI   Hyperlipidemia Follow-Up      Are you having any of the following symptoms? (Select all that apply)  No complaints of shortness of breath, chest pain or pressure.  No increased sweating or nausea with activity.  No left-sided neck or arm pain.  No complaints of pain in calves when walking 1-2 blocks.    Are you regularly taking any medication or supplement to lower your cholesterol?   Yes- crestor     Are you having muscle aches or other side effects that you think could be caused by your cholesterol lowering medication?  No      Hypothyroidism Follow-up      Since last visit, patient describes the following symptoms: Weight stable, no hair loss, no skin changes, no constipation, no loose stools      Amount of exercise or physical activity: 2-3 days/week for an average of 15-30 minutes    Problems taking medications regularly: No    Medication side effects: none    Diet: regular (no restrictions)      Depression and Anxiety Follow-Up    How are you doing with your depression since your last visit? No change    How are you doing with your anxiety since your last visit?  No change    Are you having other symptoms that might be associated with depression or anxiety? No    Have you had a significant life event? No     Do you have any concerns with your use of alcohol or other drugs? No    Social History     Tobacco Use     Smoking status: Never Smoker     Smokeless tobacco: Never Used   Substance Use Topics     Alcohol use: Yes     Alcohol/week: 1.0 oz     Types: 2 Glasses of wine per week     Comment: occasionally     Drug use: No     PHQ 11/16/2018 12/13/2018 6/3/2019   PHQ-9 Total Score 0 0 0   Q9: Thoughts of better off dead/self-harm past 2 weeks Not at all Not at all Not at all     BERTA-7 SCORE 11/16/2018 12/13/2018 6/3/2019   Total Score 0 0 0     No flowsheet data found.  Advised the  patient to go to the emergency room for assessment and immediate support    Suicide Assessment Five-step Evaluation and Treatment (SAFE-T)  Migraine     Since your last clinic visit, how have your headaches changed?  No change    How often are you getting headaches or migraines? monthly     Are you able to do normal daily activities when you have a migraine? Yes    Are you taking rescue/relief medications? (Select all that apply) No    How helpful is your rescue/relief medication?  I get total relief    Are you taking any medications to prevent migraines? (Select all that apply)  No    In the past 4 weeks, how often have you gone to urgent care or the emergency room because of your headaches?  0      Patient Active Problem List   Diagnosis     History of basal cell carcinoma     Hyperlipidemia with target LDL less than 130     Pneumonia     ACP (advance care planning)     Hypothyroidism, unspecified type     AC separation, type 5     Actinic keratosis     Capillary disease     Other dyschromia     Scar condition and fibrosis of skin     Past Surgical History:   Procedure Laterality Date     ARTHROSCOPY SHOULDER ROTATOR CUFF REPAIR Left 3/7/2018    Procedure: ARTHROSCOPY SHOULDER ROTATOR CUFF REPAIR;  LEFT SHOULDER ARTHROSCOPY, REPAIR ROTATOR CUFF: subacromial Decompression and AC Joint Resection;  Surgeon: Baldev Lou DO;  Location: [facility]     ARTHROTOMY SHOULDER, ROTATOR CUFF REPAIR, COMBINED Right 11/14/2018    Procedure: RIGHT SHOULDER DEBRIDEMENT, EXCISION OF LIPOMA  RIGHT UPPER ARM, EXCISION SCAR TISSUE AC JOINT;  Surgeon: Baldev Lou DO;  Location: [facility]     COLONOSCOPY  2006    Dr Lara     COLONOSCOPY N/A 10/10/2016    Procedure: COLONOSCOPY;  Surgeon: Christine Cintron MD;  Location: [facility]     SHOULDER SURGERY Right 2011/10/2012       Social History     Tobacco Use     Smoking status: Never Smoker     Smokeless tobacco: Never Used   Substance Use Topics     Alcohol use: Yes     Alcohol/week: 1.0  oz     Types: 2 Glasses of wine per week     Comment: occasionally     Family History   Problem Relation Age of Onset     Myocardial Infarction Mother      Hypertension Mother      Stomach Cancer Father          Current Outpatient Medications   Medication Sig Dispense Refill     eletriptan (RELPAX) 40 MG tablet Take 1 tablet (40 mg) by mouth once as needed for migraine 12 tablet 3     FLUoxetine HCl, PMDD, (SARAFEM) 10 MG TABS Take 10 mg by mouth daily 90 tablet 3     levothyroxine (SYNTHROID/LEVOTHROID) 88 MCG tablet Take 1 tablet (88 mcg) by mouth daily 60 tablet 0     rosuvastatin (CRESTOR) 10 MG tablet Take 1 tablet (10 mg) by mouth daily 90 tablet 2     Allergies   Allergen Reactions     Penicillins Rash     Zithromax [Azithromycin] Rash     Recent Labs   Lab Test 06/30/19  2235 11/16/18  1225  09/27/18  0923 08/01/18  1005 09/21/17  1100 07/27/17  1413  07/27/16  1038   LDL  --   --   --  63  --   --  61  --  66   HDL  --   --   --  52  --   --  66  --  66   TRIG  --   --   --  119  --   --  82  --  99   ALT 45 45  --   --   --  39 41   < >  --    CR 0.66 0.73   < >  --   --  0.80  --   --   --    GFRESTIMATED >90 80   < >  --   --  73  --   --   --    GFRESTBLACK >90 >90   < >  --   --  88  --   --   --    POTASSIUM 3.7 3.8   < >  --   --  3.9  --   --   --    TSH  --   --   --  0.28* 4.59*  --  1.08  --  2.77    < > = values in this interval not displayed.      BP Readings from Last 3 Encounters:   07/24/19 110/80   07/03/19 110/60   06/30/19 141/80    Wt Readings from Last 3 Encounters:   07/24/19 61.2 kg (135 lb)   07/03/19 61.2 kg (135 lb)   06/30/19 65.5 kg (144 lb 6.4 oz)                      Reviewed and updated as needed this visit by Provider         Review of Systems   ROS COMP: Constitutional, HEENT, cardiovascular, pulmonary, gi and gu systems are negative, except as otherwise noted.      Objective    /80 (Patient Position: Sitting)   Pulse 52   Wt 61.2 kg (135 lb)   SpO2 97%   BMI 22.47  kg/m    Body mass index is 22.47 kg/m .  Physical Exam   GENERAL: healthy, alert and no distress  EYES: Eyes grossly normal to inspection, PERRL and conjunctivae and sclerae normal  HENT: ear canals and TM's normal, nose and mouth without ulcers or lesions  NECK: no adenopathy, no asymmetry, masses, or scars and thyroid normal to palpation  RESP: lungs clear to auscultation - no rales, rhonchi or wheezes  BREAST: has right breast mass in upper out right breast  But has no tenderness or nipple discharge and no palpable axillary masses or adenopathy. Does have a right nipple inversion.   CV: regular rate and rhythm, normal S1 S2, no S3 or S4, no murmur, click or rub, no peripheral edema and peripheral pulses strong  ABDOMEN: soft, nontender, no hepatosplenomegaly, no masses and bowel sounds normal  MS: no gross musculoskeletal defects noted, no edema  SKIN: no suspicious lesions or rashes  NEURO: Normal strength and tone, mentation intact and speech normal  PSYCH: mentation appears normal, affect normal/bright  LYMPH: no cervical, supraclavicular, axillary, or inguinal adenopathy    Diagnostic Test Results:  Labs reviewed in Epic  No results found for this or any previous visit (from the past 24 hour(s)).        Assessment & Plan     1. Fever and chills  Again sick. Chills and fever. Not willing to treat check for lymes and other tick born disease always respiratory. Will be seeing Pulmonary.   - Erythrocyte sedimentation rate auto  - CBC with platelets differential  - Lyme Disease Dania with reflex to WB Serum  - Parasite stain  - Babesia antibody IgG IgM  - Anaplasma phagocytoph antibody IgG IgM  - TSH with free T4 reflex    2. Acquired hypothyroidism  Checking labs. Weight loss.     3. Hypothyroidism, unspecified type      4. Migraine without status migrainosus, not intractable, unspecified migraine type  Refill of relplax. Helps migraines but much less migraine.   - eletriptan (RELPAX) 40 MG tablet; Take 1 tablet  (40 mg) by mouth once as needed for migraine  Dispense: 12 tablet; Refill: 3    5. Major depressive disorder, recurrent episode, mild (H)  Refill   - FLUoxetine HCl, PMDD, (SARAFEM) 10 MG TABS; Take 10 mg by mouth daily  Dispense: 90 tablet; Refill: 3    6. Hyperlipidemia with target LDL less than 130  Taking crestor needing labs.   - rosuvastatin (CRESTOR) 10 MG tablet; Take 1 tablet (10 mg) by mouth daily  Dispense: 90 tablet; Refill: 2    7. Encounter for screening for HIV  Due for screening.   - HIV Antigen Antibody Combo    8. Encounter for screening mammogram for breast cancer  Sent for this. Concerning lesions. Will do ASAP in morning.   - [facility] DIAGNOSTIC DIGITAL BILATERAL ([place]); Future  - US Breast Right Complete 4 Quadrants; Future    9. Breast mass, right  New for her and large fixed and firm. Nipple is inverted.              See Patient Instructions    No follow-ups on file.    DASH Vital  [facility] - [place]

## 2019-07-24 ENCOUNTER — OFFICE VISIT (OUTPATIENT)
Dept: FAMILY MEDICINE | Facility: OTHER | Age: 64
End: 2019-07-24
Attending: PHYSICIAN ASSISTANT
Payer: COMMERCIAL

## 2019-07-24 VITALS
BODY MASS INDEX: 22.47 KG/M2 | OXYGEN SATURATION: 97 % | HEART RATE: 52 BPM | DIASTOLIC BLOOD PRESSURE: 80 MMHG | WEIGHT: 135 LBS | SYSTOLIC BLOOD PRESSURE: 110 MMHG

## 2019-07-24 DIAGNOSIS — E03.9 HYPOTHYROIDISM, UNSPECIFIED TYPE: ICD-10-CM

## 2019-07-24 DIAGNOSIS — N63.10 BREAST MASS, RIGHT: ICD-10-CM

## 2019-07-24 DIAGNOSIS — R50.9 FEVER AND CHILLS: Primary | ICD-10-CM

## 2019-07-24 DIAGNOSIS — Z11.4 ENCOUNTER FOR SCREENING FOR HIV: ICD-10-CM

## 2019-07-24 DIAGNOSIS — E78.5 HYPERLIPIDEMIA WITH TARGET LDL LESS THAN 130: ICD-10-CM

## 2019-07-24 DIAGNOSIS — E03.9 ACQUIRED HYPOTHYROIDISM: ICD-10-CM

## 2019-07-24 DIAGNOSIS — Z12.31 ENCOUNTER FOR SCREENING MAMMOGRAM FOR BREAST CANCER: ICD-10-CM

## 2019-07-24 DIAGNOSIS — F33.0 MAJOR DEPRESSIVE DISORDER, RECURRENT EPISODE, MILD (H): ICD-10-CM

## 2019-07-24 DIAGNOSIS — G43.909 MIGRAINE WITHOUT STATUS MIGRAINOSUS, NOT INTRACTABLE, UNSPECIFIED MIGRAINE TYPE: ICD-10-CM

## 2019-07-24 LAB
BASOPHILS # BLD AUTO: 0.1 10E9/L (ref 0–0.2)
BASOPHILS NFR BLD AUTO: 0.8 %
DIFFERENTIAL METHOD BLD: NORMAL
EOSINOPHIL # BLD AUTO: 0.2 10E9/L (ref 0–0.7)
EOSINOPHIL NFR BLD AUTO: 3 %
ERYTHROCYTE [DISTWIDTH] IN BLOOD BY AUTOMATED COUNT: 13.1 % (ref 10–15)
ERYTHROCYTE [SEDIMENTATION RATE] IN BLOOD BY WESTERGREN METHOD: 18 MM/H (ref 0–30)
HCT VFR BLD AUTO: 37.8 % (ref 35–47)
HGB BLD-MCNC: 12.8 G/DL (ref 11.7–15.7)
IMM GRANULOCYTES # BLD: 0 10E9/L (ref 0–0.4)
IMM GRANULOCYTES NFR BLD: 0.2 %
LYMPHOCYTES # BLD AUTO: 2.1 10E9/L (ref 0.8–5.3)
LYMPHOCYTES NFR BLD AUTO: 32.2 %
MCH RBC QN AUTO: 30.2 PG (ref 26.5–33)
MCHC RBC AUTO-ENTMCNC: 33.9 G/DL (ref 31.5–36.5)
MCV RBC AUTO: 89 FL (ref 78–100)
MONOCYTES # BLD AUTO: 0.6 10E9/L (ref 0–1.3)
MONOCYTES NFR BLD AUTO: 8.8 %
NEUTROPHILS # BLD AUTO: 3.5 10E9/L (ref 1.6–8.3)
NEUTROPHILS NFR BLD AUTO: 55 %
NRBC # BLD AUTO: 0 10*3/UL
NRBC BLD AUTO-RTO: 0 /100
PLATELET # BLD AUTO: 163 10E9/L (ref 150–450)
RBC # BLD AUTO: 4.24 10E12/L (ref 3.8–5.2)
T4 FREE SERPL-MCNC: 1.2 NG/DL (ref 0.76–1.46)
TSH SERPL DL<=0.005 MIU/L-ACNC: 0.21 MU/L (ref 0.4–4)
WBC # BLD AUTO: 6.4 10E9/L (ref 4–11)

## 2019-07-24 PROCEDURE — 85025 COMPLETE CBC W/AUTO DIFF WBC: CPT | Performed by: PHYSICIAN ASSISTANT

## 2019-07-24 PROCEDURE — 99000 SPECIMEN HANDLING OFFICE-LAB: CPT | Performed by: PHYSICIAN ASSISTANT

## 2019-07-24 PROCEDURE — 87207 SMEAR SPECIAL STAIN: CPT | Mod: 90 | Performed by: PHYSICIAN ASSISTANT

## 2019-07-24 PROCEDURE — 86618 LYME DISEASE ANTIBODY: CPT | Mod: 90 | Performed by: PHYSICIAN ASSISTANT

## 2019-07-24 PROCEDURE — 86753 PROTOZOA ANTIBODY NOS: CPT | Mod: 59 | Performed by: PHYSICIAN ASSISTANT

## 2019-07-24 PROCEDURE — 84439 ASSAY OF FREE THYROXINE: CPT | Performed by: PHYSICIAN ASSISTANT

## 2019-07-24 PROCEDURE — 87015 SPECIMEN INFECT AGNT CONCNTJ: CPT | Performed by: PHYSICIAN ASSISTANT

## 2019-07-24 PROCEDURE — 99214 OFFICE O/P EST MOD 30 MIN: CPT | Performed by: PHYSICIAN ASSISTANT

## 2019-07-24 PROCEDURE — 87389 HIV-1 AG W/HIV-1&-2 AB AG IA: CPT | Mod: 90 | Performed by: PHYSICIAN ASSISTANT

## 2019-07-24 PROCEDURE — 85652 RBC SED RATE AUTOMATED: CPT | Performed by: PHYSICIAN ASSISTANT

## 2019-07-24 PROCEDURE — 86666 EHRLICHIA ANTIBODY: CPT | Mod: 90 | Performed by: PHYSICIAN ASSISTANT

## 2019-07-24 PROCEDURE — 86753 PROTOZOA ANTIBODY NOS: CPT | Mod: 90 | Performed by: PHYSICIAN ASSISTANT

## 2019-07-24 PROCEDURE — 84443 ASSAY THYROID STIM HORMONE: CPT | Performed by: PHYSICIAN ASSISTANT

## 2019-07-24 PROCEDURE — 36415 COLL VENOUS BLD VENIPUNCTURE: CPT | Performed by: PHYSICIAN ASSISTANT

## 2019-07-24 RX ORDER — FLUOXETINE 10 MG/1
10 TABLET ORAL DAILY
Qty: 90 TABLET | Refills: 3 | Status: SHIPPED | OUTPATIENT
Start: 2019-07-24 | End: 2020-07-02

## 2019-07-24 RX ORDER — ROSUVASTATIN CALCIUM 10 MG/1
10 TABLET, COATED ORAL DAILY
Qty: 90 TABLET | Refills: 2 | Status: SHIPPED | OUTPATIENT
Start: 2019-07-24 | End: 2020-08-13

## 2019-07-24 RX ORDER — ELETRIPTAN HYDROBROMIDE 40 MG/1
40 TABLET, FILM COATED ORAL
Qty: 12 TABLET | Refills: 3 | Status: SHIPPED | OUTPATIENT
Start: 2019-07-24 | End: 2020-10-09

## 2019-07-24 ASSESSMENT — PAIN SCALES - GENERAL: PAINLEVEL: NO PAIN (0)

## 2019-07-24 NOTE — NURSING NOTE
"Chief Complaint   Patient presents with     Lipids     Thyroid Problem       Initial /80 (Patient Position: Sitting)   Pulse 52   Wt 61.2 kg (135 lb)   SpO2 97%   BMI 22.47 kg/m   Estimated body mass index is 22.47 kg/m  as calculated from the following:    Height as of 6/11/19: 1.651 m (5' 5\").    Weight as of this encounter: 61.2 kg (135 lb).  Medication Reconciliation: complete  "

## 2019-07-24 NOTE — PATIENT INSTRUCTIONS
Thank you for choosing [facility].   I have office hours 8:00 am to 4:30 pm on Monday's, Wednesday's, Thursday's and Friday's. My nurse and I are out of the office every Tuesday.    Following your visit, when your labs and diagnostic testing have returned, I will review then and you will be contacted by my nurse.  If you are on My Chart, you can also view results there.    For refills, notify your pharmacy regarding what you need and the pharmacy will generate a refill request. Do not call my nurse as she is unable to process refill request. Please plan ahead and allow 3-5 days for refill requests.    You will generally receive a reminder call the day prior to your appointment.  If you cannot attend your appointment, please cancel your appointment with as much notice as possible.  If there is a pattern of failure to present for your appointments, I cannot provide consistent, meaningful, ongoing care for you. It is very important to me that you come in for your care, so we can best assist you with your health care needs.    IMPORTANT:  Please note that it is my standard of practice to NOT participate in prescribing ongoing requested Narcotic Analgesic therapy, and/or participate in the prescribing of other controlled substances.  My nurse and I am happy to assist you with the process of referral for alternative pain management as needed, and other treatment modalities including but not limited to:  Physical Therapy, Physical Medicine and Rehab, Counseling, Chiropractic Care, Orthopedic Care, and non-narcotic medication management.     In the event that you need to be seen for emergent concerns and I am out of office,  please see one of my colleagues for acute concerns.  You may also present to  or ER.  I appreciate the opportunity to serve you and look forward to supporting your healthcare needs in the future. Please contact me with any questions or concerns that you may  have.    Sincerely,      Mirna Roblero RN, PA-C

## 2019-07-25 ENCOUNTER — HOSPITAL ENCOUNTER (OUTPATIENT)
Dept: ULTRASOUND IMAGING | Facility: HOSPITAL | Age: 64
Discharge: HOME | End: 2019-07-25
Attending: PHYSICIAN ASSISTANT | Admitting: PHYSICIAN ASSISTANT
Payer: COMMERCIAL

## 2019-07-25 ENCOUNTER — ANCILLARY PROCEDURE (OUTPATIENT)
Dept: MAMMOGRAPHY | Facility: OTHER | Age: 64
End: 2019-07-25
Attending: PHYSICIAN ASSISTANT
Payer: COMMERCIAL

## 2019-07-25 DIAGNOSIS — N63.10 BREAST MASS, RIGHT: ICD-10-CM

## 2019-07-25 DIAGNOSIS — Z12.31 ENCOUNTER FOR SCREENING MAMMOGRAM FOR BREAST CANCER: ICD-10-CM

## 2019-07-25 PROCEDURE — 76642 ULTRASOUND BREAST LIMITED: CPT | Mod: TC,RT

## 2019-07-25 PROCEDURE — 77065 DX MAMMO INCL CAD UNI: CPT | Mod: TC

## 2019-07-25 PROCEDURE — G0279 TOMOSYNTHESIS, MAMMO: HCPCS | Mod: TC

## 2019-07-26 ENCOUNTER — OFFICE VISIT (OUTPATIENT)
Dept: SURGERY | Facility: OTHER | Age: 64
End: 2019-07-26
Attending: PHYSICIAN ASSISTANT
Payer: COMMERCIAL

## 2019-07-26 ENCOUNTER — TELEPHONE (OUTPATIENT)
Dept: FAMILY MEDICINE | Facility: OTHER | Age: 64
End: 2019-07-26

## 2019-07-26 VITALS
WEIGHT: 135 LBS | SYSTOLIC BLOOD PRESSURE: 124 MMHG | HEART RATE: 55 BPM | OXYGEN SATURATION: 98 % | BODY MASS INDEX: 22.49 KG/M2 | TEMPERATURE: 98.8 F | DIASTOLIC BLOOD PRESSURE: 70 MMHG | HEIGHT: 65 IN

## 2019-07-26 DIAGNOSIS — N63.10 BREAST MASS, RIGHT: Primary | ICD-10-CM

## 2019-07-26 DIAGNOSIS — N63.10 LUMP OF BREAST, RIGHT: Primary | ICD-10-CM

## 2019-07-26 LAB
B BURGDOR IGG+IGM SER QL: 0.05 (ref 0–0.89)
HIV 1+2 AB+HIV1 P24 AG SERPL QL IA: NONREACTIVE
PARASITE SPEC INSPECT: NORMAL
SPECIMEN SOURCE: NORMAL

## 2019-07-26 PROCEDURE — 99214 OFFICE O/P EST MOD 30 MIN: CPT | Performed by: SURGERY

## 2019-07-26 RX ORDER — LIDOCAINE HYDROCHLORIDE 10 MG/ML
10 INJECTION, SOLUTION EPIDURAL; INFILTRATION; INTRACAUDAL; PERINEURAL ONCE
Status: CANCELLED | OUTPATIENT
Start: 2019-07-29

## 2019-07-26 RX ORDER — DEXTROSE MONOHYDRATE 25 G/50ML
25-50 INJECTION, SOLUTION INTRAVENOUS
Status: CANCELLED | OUTPATIENT
Start: 2019-07-29

## 2019-07-26 RX ORDER — NICOTINE POLACRILEX 4 MG
15-30 LOZENGE BUCCAL
Status: CANCELLED | OUTPATIENT
Start: 2019-07-29

## 2019-07-26 ASSESSMENT — PAIN SCALES - GENERAL: PAINLEVEL: NO PAIN (0)

## 2019-07-26 ASSESSMENT — MIFFLIN-ST. JEOR: SCORE: 1168.24

## 2019-07-26 NOTE — NURSING NOTE
"Chief Complaint   Patient presents with     Consult     Consult for breast mass-Mammogram and right breast ultrasound done yesterday.  LISA Roblero is primary.  No pain.         Initial /70 (BP Location: Right arm, Patient Position: Chair, Cuff Size: Adult Regular)   Pulse 55   Temp 98.8  F (37.1  C) (Tympanic)   Ht 1.651 m (5' 5\")   Wt 61.2 kg (135 lb)   SpO2 98%   BMI 22.47 kg/m   Estimated body mass index is 22.47 kg/m  as calculated from the following:    Height as of this encounter: 1.651 m (5' 5\").    Weight as of this encounter: 61.2 kg (135 lb).  Medication Reconciliation: complete   RAVINDER MOODY      "

## 2019-07-26 NOTE — PATIENT INSTRUCTIONS
Thank you for allowing Dr. Dupree and our surgical team to participate in your care.  If you have a scheduling or an appointment question please contact our Health Unit Coordinator at her direct line [phone].   ALL nursing questions or concerns can be directed to your surgical nurse at: [phone]

## 2019-07-28 LAB
A PHAGOCYTOPH IGG TITR SER IF: NORMAL {TITER}
A PHAGOCYTOPH IGM TITR SER IF: NORMAL {TITER}
B MICROTI IGG TITR SER: ABNORMAL {TITER}
B MICROTI IGM TITR SER: ABNORMAL {TITER}

## 2019-07-29 ENCOUNTER — HOSPITAL ENCOUNTER (OUTPATIENT)
Dept: ULTRASOUND IMAGING | Facility: HOSPITAL | Age: 64
Discharge: HOME | End: 2019-07-29
Attending: SURGERY | Admitting: SURGERY
Payer: COMMERCIAL

## 2019-07-29 ENCOUNTER — OFFICE VISIT (OUTPATIENT)
Dept: ALLERGY | Facility: OTHER | Age: 64
End: 2019-07-29
Attending: NURSE PRACTITIONER
Payer: COMMERCIAL

## 2019-07-29 ENCOUNTER — OFFICE VISIT (OUTPATIENT)
Dept: OTOLARYNGOLOGY | Facility: OTHER | Age: 64
End: 2019-07-29
Attending: NURSE PRACTITIONER
Payer: COMMERCIAL

## 2019-07-29 ENCOUNTER — ANCILLARY PROCEDURE (OUTPATIENT)
Dept: MAMMOGRAPHY | Facility: OTHER | Age: 64
End: 2019-07-29
Attending: RADIOLOGY
Payer: COMMERCIAL

## 2019-07-29 VITALS
SYSTOLIC BLOOD PRESSURE: 114 MMHG | DIASTOLIC BLOOD PRESSURE: 80 MMHG | TEMPERATURE: 97.4 F | OXYGEN SATURATION: 97 % | HEART RATE: 67 BPM

## 2019-07-29 VITALS — HEART RATE: 62 BPM | SYSTOLIC BLOOD PRESSURE: 120 MMHG | DIASTOLIC BLOOD PRESSURE: 78 MMHG

## 2019-07-29 DIAGNOSIS — J34.3 NASAL TURBINATE HYPERTROPHY: ICD-10-CM

## 2019-07-29 DIAGNOSIS — J30.2 PERENNIAL ALLERGIC RHINITIS WITH SEASONAL VARIATION: Primary | ICD-10-CM

## 2019-07-29 DIAGNOSIS — R50.9 INTERMITTENT FEVER OF UNKNOWN ORIGIN: ICD-10-CM

## 2019-07-29 DIAGNOSIS — N63.10 LUMP OF BREAST, RIGHT: ICD-10-CM

## 2019-07-29 DIAGNOSIS — J30.89 PERENNIAL ALLERGIC RHINITIS WITH SEASONAL VARIATION: Primary | ICD-10-CM

## 2019-07-29 DIAGNOSIS — R09.82 PND (POST-NASAL DRIP): ICD-10-CM

## 2019-07-29 DIAGNOSIS — J98.8 RECURRENT RESPIRATORY INFECTION: ICD-10-CM

## 2019-07-29 DIAGNOSIS — J34.2 DNS (DEVIATED NASAL SEPTUM): ICD-10-CM

## 2019-07-29 PROCEDURE — 00000158 ZZHCL STATISTIC H-FISH PROCESS B/S: Performed by: SURGERY

## 2019-07-29 PROCEDURE — 88342 IMHCHEM/IMCYTCHM 1ST ANTB: CPT | Mod: TC | Performed by: SURGERY

## 2019-07-29 PROCEDURE — 88305 TISSUE EXAM BY PATHOLOGIST: CPT | Mod: TC | Performed by: SURGERY

## 2019-07-29 PROCEDURE — 19083 BX BREAST 1ST LESION US IMAG: CPT | Mod: TC,RT

## 2019-07-29 PROCEDURE — 88377 M/PHMTRC ALYS ISHQUANT/SEMIQ: CPT | Performed by: PATHOLOGY

## 2019-07-29 PROCEDURE — 88341 IMHCHEM/IMCYTCHM EA ADD ANTB: CPT | Mod: 59 | Performed by: SURGERY

## 2019-07-29 PROCEDURE — 95004 PERQ TESTS W/ALRGNC XTRCS: CPT

## 2019-07-29 PROCEDURE — 95024 IQ TESTS W/ALLERGENIC XTRCS: CPT

## 2019-07-29 PROCEDURE — 31575 DIAGNOSTIC LARYNGOSCOPY: CPT | Performed by: NURSE PRACTITIONER

## 2019-07-29 PROCEDURE — 40000986 ZZH STATISTIC EXAM NOT IN OR

## 2019-07-29 PROCEDURE — 88360 TUMOR IMMUNOHISTOCHEM/MANUAL: CPT | Mod: TC | Performed by: SURGERY

## 2019-07-29 PROCEDURE — 00000159 ZZHCL STATISTIC H-SEND OUTS PREP: Performed by: SURGERY

## 2019-07-29 PROCEDURE — 99213 OFFICE O/P EST LOW 20 MIN: CPT | Mod: 25 | Performed by: NURSE PRACTITIONER

## 2019-07-29 PROCEDURE — 25000125 ZZHC RX 250

## 2019-07-29 RX ORDER — LIDOCAINE HYDROCHLORIDE 10 MG/ML
INJECTION, SOLUTION EPIDURAL; INFILTRATION; INTRACAUDAL; PERINEURAL
Status: COMPLETED
Start: 2019-07-29 | End: 2019-07-29

## 2019-07-29 RX ORDER — LIDOCAINE HYDROCHLORIDE 10 MG/ML
10 INJECTION, SOLUTION EPIDURAL; INFILTRATION; INTRACAUDAL; PERINEURAL ONCE
Status: COMPLETED | OUTPATIENT
Start: 2019-07-29 | End: 2019-07-29

## 2019-07-29 RX ORDER — NICOTINE POLACRILEX 4 MG
15-30 LOZENGE BUCCAL
Status: DISCONTINUED | OUTPATIENT
Start: 2019-07-29 | End: 2019-07-30 | Stop reason: HOSPADM

## 2019-07-29 RX ORDER — FLUTICASONE PROPIONATE 50 MCG
2 SPRAY, SUSPENSION (ML) NASAL DAILY
Qty: 16 G | Refills: 11 | Status: SHIPPED | OUTPATIENT
Start: 2019-07-29 | End: 2019-10-02

## 2019-07-29 RX ORDER — LEVOCETIRIZINE DIHYDROCHLORIDE 5 MG/1
5 TABLET, FILM COATED ORAL EVERY EVENING
Qty: 30 TABLET | Refills: 11 | Status: ON HOLD | OUTPATIENT
Start: 2019-07-29 | End: 2019-10-09

## 2019-07-29 RX ORDER — DEXTROSE MONOHYDRATE 25 G/50ML
25-50 INJECTION, SOLUTION INTRAVENOUS
Status: DISCONTINUED | OUTPATIENT
Start: 2019-07-29 | End: 2019-07-30 | Stop reason: HOSPADM

## 2019-07-29 RX ADMIN — LIDOCAINE HYDROCHLORIDE 5 ML: 10 INJECTION, SOLUTION EPIDURAL; INFILTRATION; INTRACAUDAL; PERINEURAL at 10:44

## 2019-07-29 RX ADMIN — LIDOCAINE HYDROCHLORIDE,EPINEPHRINE BITARTRATE 8 ML: 20; .01 INJECTION, SOLUTION INFILTRATION; PERINEURAL at 10:44

## 2019-07-29 ASSESSMENT — PAIN SCALES - GENERAL: PAINLEVEL: NO PAIN (0)

## 2019-07-29 NOTE — NURSING NOTE
"Chief Complaint   Patient presents with     Other     MQT allergy testing and follow-up       Initial /80 (BP Location: Right arm, Patient Position: Chair, Cuff Size: Adult Regular)   Pulse 67   Temp 97.4  F (36.3  C) (Oral)   SpO2 97%  Estimated body mass index is 22.47 kg/m  as calculated from the following:    Height as of 7/26/19: 1.651 m (5' 5\").    Weight as of 7/26/19: 61.2 kg (135 lb).  Medication Reconciliation: complete     This patient presents today for allergy skin testing.      Symptoms have included frequent respiratory issues including bronchitis and pneumonia.  Patient states this has been the case for approximately 4 years.  She gets PND, generally in the winter, which causes constant throat clearing.  When she gets sick with bronchitis and/or pneumonia, she gets a cough.  Overall, she has had these issues during all seasons.  There is no worsening of symptoms by season.   Patient has had some sinus infections in the past, but does not report chronic sinus infections. She denies a history of skin issues or asthma.    This patient lives in a single family home, with a basement[place]  [place]  [place] 10 years old. This patient does not suspect mold, water or moisture issues in the home.  There is no carpet in the home, just area rugs.  Home has propane and electric forced air heat and does have central air conditioning.        This patient has no pets  She used to have a cat, but it passed away approximately 2 years ago..      This patient has not had allergy testing in the past.    This patient's medications have been reviewed prior to testing and all appropriate medications have been stopped.    Consent is signed by patient and signature is verified.     MQT/ID test is performed per protocol.  The patient tolerated testing well.  Benadryl gel was applied to testing sites on patient's skin, per standing order.  All findings are recorded on the paper flow sheet. " Results are reviewed with this patient.  They are given written information regarding allergy.       The patient will follow-up with Emerald Hanson CNP, for treatment plan.      Jalyn Cochran RN

## 2019-07-29 NOTE — IP AVS SNAPSHOT
[facility]  [address] [place] [id number]  Phone:  [phone]                                    After Visit Summary   7/29/2019    Denise Taylor    MRN: [id number]           After Visit Summary Signature Page    I have received my discharge instructions, and my questions have been answered. I have discussed any challenges I see with this plan with the nurse or doctor.    ..........................................................................................................................................  Patient/Patient Representative Signature      ..........................................................................................................................................  Patient Representative Print Name and Relationship to Patient    ..................................................               ................................................  Date                                   Time    ..........................................................................................................................................  Reviewed by Signature/Title    ...................................................              ..............................................  Date                                               Time          22EPIC Rev 08/18

## 2019-07-29 NOTE — PROGRESS NOTES
[facility] Consultation    CC:  Right breast lump     HPI:  This 63 year old year old female is seen at the request of Mirna Roblero for evaluation of right breast lump. She reports that it was first noticed at her annual exam with her PCP. This lead her to a mammogram and US. She is up to date on her mammograms. She denies any skin changes, tenderness, or nipple discharge.    Age at first menarche: 13-14  Age at first live birth: 28  1st degree relatives with breast cancer: 0  Previous biopsies: 0     Past Medical History:   Diagnosis Date     Pneumonia        Past Surgical History:   Procedure Laterality Date     ARTHROSCOPY SHOULDER ROTATOR CUFF REPAIR Left 3/7/2018    Procedure: ARTHROSCOPY SHOULDER ROTATOR CUFF REPAIR;  LEFT SHOULDER ARTHROSCOPY, REPAIR ROTATOR CUFF: subacromial Decompression and AC Joint Resection;  Surgeon: Baldev Lou DO;  Location: [facility]     ARTHROTOMY SHOULDER, ROTATOR CUFF REPAIR, COMBINED Right 11/14/2018    Procedure: RIGHT SHOULDER DEBRIDEMENT, EXCISION OF LIPOMA  RIGHT UPPER ARM, EXCISION SCAR TISSUE AC JOINT;  Surgeon: Baldev Lou DO;  Location: [facility]     COLONOSCOPY  2006    Dr Lara     COLONOSCOPY N/A 10/10/2016    Procedure: COLONOSCOPY;  Surgeon: Christine Cintron MD;  Location: [facility]     SHOULDER SURGERY Right 2011/10/2012       Allergies   Allergen Reactions     Penicillins Rash     Zithromax [Azithromycin] Rash       Current Outpatient Medications   Medication     eletriptan (RELPAX) 40 MG tablet     FLUoxetine HCl, PMDD, (SARAFEM) 10 MG TABS     levothyroxine (SYNTHROID/LEVOTHROID) 88 MCG tablet     rosuvastatin (CRESTOR) 10 MG tablet     No current facility-administered medications for this visit.        HABITS:    Social History     Tobacco Use     Smoking status: Never Smoker     Smokeless tobacco: Never Used   Substance Use Topics     Alcohol use: Yes     Alcohol/week: 1.0 oz     Types: 2 Glasses of wine per week     Comment: occasionally      "Drug use: No       Family History   Problem Relation Age of Onset     Myocardial Infarction Mother      Hypertension Mother      Stomach Cancer Father        REVIEW OF SYSTEMS:  Ten point review of systems negative except those mentioned in the HPI.     OBJECTIVE:    /70 (BP Location: Right arm, Patient Position: Chair, Cuff Size: Adult Regular)   Pulse 55   Temp 98.8  F (37.1  C) (Tympanic)   Ht 1.651 m (5' 5\")   Wt 61.2 kg (135 lb)   SpO2 98%   BMI 22.47 kg/m      GENERAL: Generally appears well, in no distress with appropriate affect.  HEENT:   Sclerae anicteric - normocephalic atraumatic   Respiratory:  No acute distress, no splinting   Cardiovascular:  Regular Rate and Rhythm  BREASTS: In the seated position, with the arms elevated, breast are symmetric. There is no overlying skin change.   In the supine position, the right breast shows largely fatty replacement with scattered fibrofatty change. There is a discrete mass in the upper outer quadrant that is mobile, possibly 2.5-3 cm. The right nipple has some inversion.  The subareolar region, axillary tail and right axilla are without finding.   The left breast shows largely fatty replacement with scattered fibrofatty change. There are no discrete or suspicious masses or regions of density. The subareolar region, axillary tail and right axilla are without finding.    :  deferred  Extremities:  Extremities normal. No deformities, edema, or skin discoloration.  Skin:  no suspicious lesions or rashes  Neurological: grossly intact  Psych:  Alert, oriented, affect appropriate with normal decision making ability.    IMPRESSION:    63 y.o. Female with no significant risk factors for breast cancer comes to evaluate a breast mass palpated at her annual physical. We reviewed her imaging as well as went over her risk factors. Her Crys model score was 6.8% lifetime risk of developing breast cancer which is less than average for her age. On exam I do feel a " mobile mass, I discussed that this is most likely benign but would like to get a biopsy to help determine the next step. I discussed that going straight to excisional biopsy would not be best as on exam it would end up being a larger excision and with her breast size this may lead to a poor cosmetic outcome. She is in agreement with proceeding with biopsy.     PLAN:  Radiology guided biopsy of right breast.     30 minutes spent with patient, over 50% of time was counseling the patient on treatment options, natural history of disease process, answering all questions.       Michael Dupree MD,     7/29/2019  9:19 AM

## 2019-07-29 NOTE — LETTER
7/29/2019         RE: Denise Taylor  [address] [facility] [id number]        Dear Colleague,    Thank you for referring your patient, Denise Taylor, to the [facility]. Please see a copy of my visit note below.    Otolaryngology Progress Note           Chief Complaint:     Patient presents with:  Other: MQT allergy testing and follow-up         History of Present Illness:     Denise Taylor is here to follow-up on MQT intradermal allergy testing that was performed today. Testing was tolerated without any complications.     Allergy History  Symptoms: recurrent bronchitis/pneumonia, intermittent cough/PND/throat clearing  Duration of symptoms: 2.5 years  Exacerbating factors: unknown  Sinus History: No chronic sinusitis.     Did Singulair for 2 days and she stopped due to experiencing side effects of decreased taste and burning mouth, which resolved 1 week after stopping Singulair.     Since she saw me last, she was treated at least once for respiratory infection.     Cough continues, but is very intermittent, not even weekly.  Last few days, felt more PND. Today noticed PND when laying down.   No pharyngitis, globus sensation.  Experiencing throat clearing due to tickle in her throat.   No chronic sinusitis or current sinus pain/pressure.   No sneezing or itchy/watery eyes. No hoarseness.  Recent intermittent fevers, lab work reviewed. (TSH slightly low, normal T4, PCP is monitoring this).   No weight loss. No otalgia or solid/liquid dysphagia.   No GERD/reflux.     See's pulmonology tomorrow.     MQT Results 7/29/19   High sensitivities: none  Moderate sensitivities: none  Low sensitivities: ragweed, pigweed, birch, sharla, eastern cottonwood, black walnut, aspergillus, epicoccum, helminthosporium, dust         Review of Systems:     ROS: See HPI         Physical Exam:     /80 (BP Location: Right arm, Patient Position: Chair, Cuff Size: Adult Regular)    Pulse 67   Temp 97.4  F (36.3  C) (Oral)   SpO2 97%   General - The patient is well nourished and well developed, and appears to have good nutritional status.  Alert and oriented to person and place, interactive.  Head and Face - Normocephalic and atraumatic, with no gross asymmetry noted of the contour of the facial features.  The facial nerve is intact, with strong symmetric movements.  Neck- No palpable lymphadenopathy or thyroid mass.  Trachea is midline.  Eyes - Extraocular movements intact.   Ears- External ears normal. Canals clear. Right tympanic membrane intact without effusion, worrisome retraction or mass. Left tympanic membrane intact without effusion, worrisome retraction or mass.    Nose - External contour symmetric.  Mouth - Examination of the oral cavity shows pink, healthy, moist mucosa. No lesions or ulceration noted. The tongue is mobile and midline.    Throat - The walls of the oropharynx were smooth, pink, moist, symmetric, and had no lesions or ulcerations.  The tonsillar pillars and soft palate were symmetric.  The uvula was midline on elevation.      Procedure  Flexible Laryngoscopy-    Attempts at mirror laryngoscopy were not possible due to gag reflex. Therefore I proceeded with a fiberoptic examination after informed consent. First I sprayed both sides of the nose with a mixture of lidocaine and neosynephrine. I then passed the scope through the nasal cavity.     The nasal cavity was remarkable for left DNS, right septal spur, left sided ITH, clear secretions both posterior nasal cavities.    The nasopharynx was mucosally covered and symmetric with increase in thick/clear drainage on posterior nasopharyngeal wall. The eustachian tube openings were unobstructed. Going further down I had a clear view of the base of tongue which had normal appearing lingual tonsillar tissue. The base of tongue was free of lesions, and the vallecula was open. The epiglottis was smooth and mucosally covered.  The supraglottic larynx was then clearly visualized. The mucosa of the arytenoids and interarytenoids are noted to be normal with no erythema or edema. The vocal cords moved smoothly and symmetrically and were pearly white. The pyriform sinuses were open and without sarkis mass or pooling of secretions upon valsalva, and the limited view of the postcricoid region did not show any lesions. The patient tolerated the procedure well.    Component      Latest Ref Rng & Units 7/24/2019 7/24/2019           4:35 PM  4:36 PM   WBC      4.0 - 11.0 10e9/L 6.4    RBC Count      3.8 - 5.2 10e12/L 4.24    Hemoglobin      11.7 - 15.7 g/dL 12.8    Hematocrit      35.0 - 47.0 % 37.8    MCV      78 - 100 fl 89    MCH      26.5 - 33.0 pg 30.2    MCHC      31.5 - 36.5 g/dL 33.9    RDW      10.0 - 15.0 % 13.1    Platelet Count      150 - 450 10e9/L 163    Diff Method       Automated Method    % Neutrophils      % 55.0    % Lymphocytes      % 32.2    % Monocytes      % 8.8    % Eosinophils      % 3.0    % Basophils      % 0.8    % Immature Granulocytes      % 0.2    Nucleated RBCs      0 /100 0    Absolute Neutrophil      1.6 - 8.3 10e9/L 3.5    Absolute Lymphocytes      0.8 - 5.3 10e9/L 2.1    Absolute Monocytes      0.0 - 1.3 10e9/L 0.6    Absolute Eosinophils      0.0 - 0.7 10e9/L 0.2    Absolute Basophils      0.0 - 0.2 10e9/L 0.1    Abs Immature Granulocytes      0 - 0.4 10e9/L 0.0    Absolute Nucleated RBC       0.0    Specimen Description        Blood   Parasite Stain        No Babesia found   Babesia IgG      <1:16 < 1:16    Babesia IgM      <1:20 1:20 (A)    A Phagocytophil IgG      <1:80 <1:80    A Phagocytophil IgM      <1:16 < 1:16    Sed Rate      0 - 30 mm/h 18    Lyme Disease Antibodies Serum      0.00 - 0.89 0.05    TSH      0.40 - 4.00 mU/L 0.21 (L)    HIV Antigen Antibody Combo      NR:Nonreactive     Nonreactive    T4 Free      0.76 - 1.46 ng/dL 1.20             Assessment and Plan:       ICD-10-CM    1. Perennial  allergic rhinitis with seasonal variation J30.89 fluticasone (FLONASE) 50 MCG/ACT nasal spray    J30.2 levocetirizine (XYZAL) 5 MG tablet   2. PND (post-nasal drip) R09.82    3. Nasal turbinate hypertrophy J34.3    4. DNS (deviated nasal septum) J34.2    5. Recurrent respiratory infection J98.8    6. Intermittent fever of unknown origin R50.9      Reviewed MQT with her.   She does have positive findings.  Laryngoscopy did show left DNS, left ITH, right septal spur and posterior clear nasal drainage/PND with normal appearing larynx.    Start Flonase and Xyzal as directed, along with starting BID/PRN Goran med sinus irrigations.  Allergen avoidance.   I am hoping this will help with the PND, cough, throat clearing and maybe even respiratory infections.     Keep follow-up for PFT's/pulmonologist.    Discussed need to see infectious disease/immunology, pending pulmonology follow-up.     Follow-up in 4-6 weeks, sooner as needed.     Emerald Hanson NP  ENT  [facility]  [phone]              Again, thank you for allowing me to participate in the care of your patient.        Sincerely,        Emerald Hanson, CESAR CNP

## 2019-07-29 NOTE — PROGRESS NOTES
Otolaryngology Progress Note           Chief Complaint:     Patient presents with:  Other: MQT allergy testing and follow-up         History of Present Illness:     Denise PEDRO Jareddirk is here to follow-up on MQT intradermal allergy testing that was performed today. Testing was tolerated without any complications.     Allergy History  Symptoms: recurrent bronchitis/pneumonia, intermittent cough/PND/throat clearing  Duration of symptoms: 2.5 years  Exacerbating factors: unknown  Sinus History: No chronic sinusitis.     Did Singulair for 2 days and she stopped due to experiencing side effects of decreased taste and burning mouth, which resolved 1 week after stopping Singulair.     Since she saw me last, she was treated at least once for respiratory infection.     Cough continues, but is very intermittent, not even weekly.  Last few days, felt more PND. Today noticed PND when laying down.   No pharyngitis, globus sensation.  Experiencing throat clearing due to tickle in her throat.   No chronic sinusitis or current sinus pain/pressure.   No sneezing or itchy/watery eyes. No hoarseness.  Recent intermittent fevers, lab work reviewed. (TSH slightly low, normal T4, PCP is monitoring this).   No weight loss. No otalgia or solid/liquid dysphagia.   No GERD/reflux.     See's pulmonology tomorrow.     MQT Results 7/29/19   High sensitivities: none  Moderate sensitivities: none  Low sensitivities: ragweed, pigweed, birch, sharla, eastern cottonwood, black walnut, aspergillus, epicoccum, helminthosporium, dust         Review of Systems:     ROS: See HPI         Physical Exam:     /80 (BP Location: Right arm, Patient Position: Chair, Cuff Size: Adult Regular)   Pulse 67   Temp 97.4  F (36.3  C) (Oral)   SpO2 97%   General - The patient is well nourished and well developed, and appears to have good nutritional status.  Alert and oriented to person and place, interactive.  Head and Face - Normocephalic and atraumatic, with  no gross asymmetry noted of the contour of the facial features.  The facial nerve is intact, with strong symmetric movements.  Neck- No palpable lymphadenopathy or thyroid mass.  Trachea is midline.  Eyes - Extraocular movements intact.   Ears- External ears normal. Canals clear. Right tympanic membrane intact without effusion, worrisome retraction or mass. Left tympanic membrane intact without effusion, worrisome retraction or mass.    Nose - External contour symmetric.  Mouth - Examination of the oral cavity shows pink, healthy, moist mucosa. No lesions or ulceration noted. The tongue is mobile and midline.    Throat - The walls of the oropharynx were smooth, pink, moist, symmetric, and had no lesions or ulcerations.  The tonsillar pillars and soft palate were symmetric.  The uvula was midline on elevation.      Procedure  Flexible Laryngoscopy-    Attempts at mirror laryngoscopy were not possible due to gag reflex. Therefore I proceeded with a fiberoptic examination after informed consent. First I sprayed both sides of the nose with a mixture of lidocaine and neosynephrine. I then passed the scope through the nasal cavity.     The nasal cavity was remarkable for left DNS, right septal spur, left sided ITH, clear secretions both posterior nasal cavities.    The nasopharynx was mucosally covered and symmetric with increase in thick/clear drainage on posterior nasopharyngeal wall. The eustachian tube openings were unobstructed. Going further down I had a clear view of the base of tongue which had normal appearing lingual tonsillar tissue. The base of tongue was free of lesions, and the vallecula was open. The epiglottis was smooth and mucosally covered. The supraglottic larynx was then clearly visualized. The mucosa of the arytenoids and interarytenoids are noted to be normal with no erythema or edema. The vocal cords moved smoothly and symmetrically and were pearly white. The pyriform sinuses were open and without  sarkis mass or pooling of secretions upon valsalva, and the limited view of the postcricoid region did not show any lesions. The patient tolerated the procedure well.    Component      Latest Ref Rng & Units 7/24/2019 7/24/2019           4:35 PM  4:36 PM   WBC      4.0 - 11.0 10e9/L 6.4    RBC Count      3.8 - 5.2 10e12/L 4.24    Hemoglobin      11.7 - 15.7 g/dL 12.8    Hematocrit      35.0 - 47.0 % 37.8    MCV      78 - 100 fl 89    MCH      26.5 - 33.0 pg 30.2    MCHC      31.5 - 36.5 g/dL 33.9    RDW      10.0 - 15.0 % 13.1    Platelet Count      150 - 450 10e9/L 163    Diff Method       Automated Method    % Neutrophils      % 55.0    % Lymphocytes      % 32.2    % Monocytes      % 8.8    % Eosinophils      % 3.0    % Basophils      % 0.8    % Immature Granulocytes      % 0.2    Nucleated RBCs      0 /100 0    Absolute Neutrophil      1.6 - 8.3 10e9/L 3.5    Absolute Lymphocytes      0.8 - 5.3 10e9/L 2.1    Absolute Monocytes      0.0 - 1.3 10e9/L 0.6    Absolute Eosinophils      0.0 - 0.7 10e9/L 0.2    Absolute Basophils      0.0 - 0.2 10e9/L 0.1    Abs Immature Granulocytes      0 - 0.4 10e9/L 0.0    Absolute Nucleated RBC       0.0    Specimen Description        Blood   Parasite Stain        No Babesia found   Babesia IgG      <1:16 < 1:16    Babesia IgM      <1:20 1:20 (A)    A Phagocytophil IgG      <1:80 <1:80    A Phagocytophil IgM      <1:16 < 1:16    Sed Rate      0 - 30 mm/h 18    Lyme Disease Antibodies Serum      0.00 - 0.89 0.05    TSH      0.40 - 4.00 mU/L 0.21 (L)    HIV Antigen Antibody Combo      NR:Nonreactive     Nonreactive    T4 Free      0.76 - 1.46 ng/dL 1.20             Assessment and Plan:       ICD-10-CM    1. Perennial allergic rhinitis with seasonal variation J30.89 fluticasone (FLONASE) 50 MCG/ACT nasal spray    J30.2 levocetirizine (XYZAL) 5 MG tablet   2. PND (post-nasal drip) R09.82    3. Nasal turbinate hypertrophy J34.3    4. DNS (deviated nasal septum) J34.2    5. Recurrent  respiratory infection J98.8    6. Intermittent fever of unknown origin R50.9      Reviewed MQT with her.   She does have positive findings.  Laryngoscopy did show left DNS, left ITH, right septal spur and posterior clear nasal drainage/PND with normal appearing larynx.    Start Flonase and Xyzal as directed, along with starting BID/PRN Goran med sinus irrigations.  Allergen avoidance.   I am hoping this will help with the PND, cough, throat clearing and maybe even respiratory infections.     Keep follow-up for PFT's/pulmonologist.    Discussed need to see infectious disease/immunology, pending pulmonology follow-up.     Follow-up in 4-6 weeks, sooner as needed.     Emerald Hanson NP  ENT  [facility] [place]  [phone]

## 2019-07-29 NOTE — IP AVS SNAPSHOT
MRN:[id number]                      After Visit Summary   7/29/2019    Denise Taylor    MRN: [id number]           Visit Information        Provider Department      7/29/2019 10:30 AM HIXRRN; HIIRRAD; HIUS1 HI ULTRASOUND           Review of your medicines      UNREVIEWED medicines. Ask your doctor about these medicines       Dose / Directions   eletriptan 40 MG tablet  Commonly known as:  RELPAX  Used for:  Migraine without status migrainosus, not intractable, unspecified migraine type      Dose:  40 mg  Take 1 tablet (40 mg) by mouth once as needed for migraine  Quantity:  12 tablet  Refills:  3     FLUoxetine HCl (PMDD) 10 MG Tabs  Commonly known as:  SARAFEM  Used for:  Major depressive disorder, recurrent episode, mild (H)      Dose:  10 mg  Take 10 mg by mouth daily  Quantity:  90 tablet  Refills:  3     levothyroxine 88 MCG tablet  Commonly known as:  SYNTHROID/LEVOTHROID  Used for:  Acquired hypothyroidism      Dose:  88 mcg  Take 1 tablet (88 mcg) by mouth daily  Quantity:  60 tablet  Refills:  0     rosuvastatin 10 MG tablet  Commonly known as:  CRESTOR  Used for:  Hyperlipidemia with target LDL less than 130      Dose:  10 mg  Take 1 tablet (10 mg) by mouth daily  Quantity:  90 tablet  Refills:  2              Protect others around you: Learn how to safely use, store and throw away your medicines at www.disposemymeds.org.       Follow-ups after your visit       Your next 10 appointments already scheduled    Jul 29, 2019 11:15 AM CDT  (Arrive by 10:00 AM)  MA POST PROCEDURE RIGHT with HCMA1  [facility] ([facility] ) [address] [place] [address]  [phone]   How do I prepare for my exam? (Food and drink instructions)  No Food and Drink Restrictions.    How do I prepare for my exam? (Other instructions)  Do not use any powder, lotion or deodorant under your arms or on your breast. If you do, we will ask you to remove it before your  "exam.    What should I wear: Wear comfortable, two-piece clothing.    How long does the exam take: Most scans will take 15 minutes.    What should I bring: Bring any previous mammograms from other facilities or have them mailed to the breast center.    Do I need a : No  is needed.    What do I need to tell my doctor: If you have any allergies, tell your care team.    What should I do after the exam: No restrictions, you may resume normal activities.    What is this test: This test is an x-ray of the breast to look for breast disease. The breast is pressed between two plates to flatten and spread the tissue. An X-ray is taken of the breast from different angles.    Who should I call with questions: If you have any questions, please call the Imaging Department where you will have your exam. Directions, parking instructions, and other information are available on our website, [email or web address].    Other information about my exam  Three-dimensional (3D) mammograms are available at [facility] locations in [facility], [facility], [facility], [place], [facility] and [place]. The [facility] locations include [place] and the [facility] in [place].    Benefits of 3D mammograms include:  * Improved rate of cancer detection  * Decreases your chance of having to go back for more tests, which means fewer:  * \"False-positive\" results (This means that there is an abnormal area but it isn't cancer.)  * Invasive testing procedures, such as a biopsy or surgery  * Can provide clearer images of the breast if you have dense breast tissue.    *3D mammography is an optional exam that anyone can have with a 2D mammogram. It doesn't replace or take the place of a 2D mammogram. 2D mammograms remain an effective screening test for all women. Not all insurance companies cover the cost of a 3D mammogram. Check with your insurance.     Jul 29, 2019  1:30 PM CDT  Office Visit with HC ALLERGY " TESTING  [facility] ) [address]  [facility] [place] [address]  [phone]   Bring a current list of meds and any records pertaining to this visit.  For Physicals, please bring immunization records and any forms needing to be filled out. Please arrive 10 minutes early to complete paperwork.     Jul 29, 2019  3:30 PM CDT  (Arrive by 3:15 PM)  Return Visit with CESAR Hernandez CNP  [facility] ([facility] ) [address]  [place] [address]  [phone]      Jul 30, 2019  4:15 PM CDT  Pulmonary Function with [facility] PULMONARY LAB  [facility] ([facility] ) [address] [address] [phone]   No Inhalers for 6 hours prior to test  No Smoking 2 hours prior to test     Sep 20, 2019 10:00 AM CDT  (Arrive by 9:45 AM)  MA SCREENING DIGITAL BILATERAL with HCMA1  [facility] ([facility] ) [address]  [facility] [address]  [phone]   How do I prepare for my exam? (Food and drink instructions)  No Food and Drink Restrictions.    How do I prepare for my exam? (Other instructions)  Do not use any powder, lotion or deodorant under your arms or on your breast. If you do, we will ask you to remove it before your exam.    What should I wear: Wear comfortable, two-piece clothing.    How long does the exam take: Most scans will take 15 minutes.    What should I bring: Bring any previous mammograms from other facilities or have them mailed to the breast center.    Do I need a : No  is needed.    What do I need to tell my doctor: If you have any allergies, tell your care team.    What should I do after the exam: No restrictions, you may resume normal activities.    What is this test: This test is an x-ray of the breast to look for breast disease. The breast is pressed between two plates to flatten and spread the tissue. An X-ray  "is taken of the breast from different angles.    Who should I call with questions: If you have any questions, please call the Imaging Department where you will have your exam. Directions, parking instructions, and other information are available on our website, [email or web address].    Other information about my exam  Three-dimensional (3D) mammograms are available at [facility] locations in [facility], [facility], [place], [facility] and [place].  Health locations include [place] and the [facility] in [place].    Benefits of 3D mammograms include:  * Improved rate of cancer detection  * Decreases your chance of having to go back for more tests, which means fewer:  * \"False-positive\" results (This means that there is an abnormal area but it isn't cancer.)  * Invasive testing procedures, such as a biopsy or surgery  * Can provide clearer images of the breast if you have dense breast tissue.    *3D mammography is an optional exam that anyone can have with a 2D mammogram. It doesn't replace or take the place of a 2D mammogram. 2D mammograms remain an effective screening test for all women. Not all insurance companies cover the cost of a 3D mammogram. Check with your insurance.  Three-dimensional (3D) mammograms are available at [facility] locations in [facility], [facility], [facility], and [place]. Health locations include [place] and [facility] in [place].  Benefits of 3D mammograms include:  - Improved rate of cancer detection  - Decreases your chance of having to go back for more tests, which means fewer:  - \"False-positive\" results (This means that there is an abnormal area but it isn't cancer.)  - Invasive testing procedures, such as a biopsy or surgery  - Can provide clearer images of the breast if you have dense breast tissue.  3D mammography is an optional exam that anyone can have with a 2D " mammogram. It doesn't replace or take the place of a 2D mammogram. 2D mammograms remain an effective screening test for all women. Not all insurance companies cover the cost of a 3D mammogram. Check with your insurance.        Care Instructions       Further instructions from your care team         DISCHARGE INSTRUCTIONS FOR  BREAST BIOPSY    BREAST BIOPSY  A needle was used to locate the breast tissue. Tissue was removed to check the cells and be examined by a Pathologist. This will help your doctor plan any further treatment or follow-up. Your doctor will tell you the results of the tests in 3 to 5 days.    Follow these instructions:    Climb stairs slowly and use the hand rail. Avoid extra trips. No running or jumping.    No pushing, pulling or straining (vacuuming, shoveling, sweeping etc.)    You may shower tomorrow, pat the are dry around the tegaderm dressing.    Wear a bra at all times until your breast is fully healed.    Apply ice to the breast during the first few hours following the procedure to relieve swelling and bruising.    Steri strips stay in place for 7-10 days or until they fall off. Do not pull them off.    May remove pressure dressing after 24 hours.    Call your doctor if you have:    increased bleeding or drainage from your incision.    swelling, redness or opening of your incision.    foul smell from your incision or dressing.    red streaks from your incision.    chills or fever over 101 degrees (by mouth).    pain not helped by your pain medication.    trouble or pain with breathing.    any new problems or concerns.    Additional Information About Your Visit       [employer] Information    [employer] gives you secure access to your electronic health record. If you see a primary care provider, you can also send messages to your care team and make appointments. If you have questions, please call your primary care clinic.  If you do not have a primary care provider, please call [phone] and  they will assist you.       Care EveryWhere ID    This is your Care EveryWhere ID. This could be used by other organizations to access your [facility] medical records  [id number]       Your Vitals Were     Blood Pressure   138/84    Pulse   62             Primary Care Provider Office Phone # Fax #    Mirna DASH Hazel [phone]      Equal Access to Services    [facility]: Hadii aad ku hadasho Soomaali, waaxda luqadaha, qaybta kaalmada adeegyada, waxay karlin hayaan adeeg robertjewell ladonnan . So [facility] [phone].    ATENCIÓN: Si habla español, tiene a mayo disposición servicios gratuitos de asistencia lingüística. Llame al [phone].    We comply with applicable federal civil rights laws and [place] laws. We do not discriminate on the basis of race, color, national origin, age, disability, sex, sexual orientation, or gender identity.           Thank you!    Thank you for choosing [facility] for your care. Our goal is always to provide you with excellent care. Hearing back from our patients is one way we can continue to improve our services. Please take a few minutes to complete the written survey that you may receive in the mail after you visit with us. Thank you!            Medication List      ASK your doctor about these medications          Morning Afternoon Evening Bedtime As Needed    eletriptan 40 MG tablet  Also known as:  RELPAX  INSTRUCTIONS:  Take 1 tablet (40 mg) by mouth once as needed for migraine                     FLUoxetine HCl (PMDD) 10 MG Tabs  Also known as:  SARAFEM  INSTRUCTIONS:  Take 10 mg by mouth daily                     levothyroxine 88 MCG tablet  Also known as:  SYNTHROID/LEVOTHROID  INSTRUCTIONS:  Take 1 tablet (88 mcg) by mouth daily                     rosuvastatin 10 MG tablet  Also known as:  CRESTOR  INSTRUCTIONS:  Take 1 tablet (10 mg) by mouth daily

## 2019-07-29 NOTE — PATIENT INSTRUCTIONS
Start Flonase, Xyzal, and Goran med.    Goran Med Saline irrigation bottle.  Fill bottle up to line with warm distilled water and then add 1 packet of the salt solution that comes with the bottle (2 packets if you were instructed to use hypertonic saline). Dissolve in bottle up to 240 ml shyla.  Irrigate each side of your nose leaning over the sink, using 1/2 the volume of the bottle in each nostril every irrigation.Irrigate 2-3 times daily and as needed.  After irrigation, blow nose gently, then apply any other nasal medication that you may have been prescribed today (nasal steroids or nasal antihistamines)     Follow up in 4-6 weeks.    Thank you for allowing Emerald Hanson CNP and our ENT team to participate in your care.  If your medications are too expensive, please give the nurse a call.  We can possibly change this medication.  If you have a scheduling or an appointment question please contact [facility] Health Unit Coordinator at their direct line [phone]  ALL nursing questions or concerns can be directed to your ENT nurse at: [phone] - [place]

## 2019-07-29 NOTE — PROGRESS NOTES
Prior to testing, verified patient's identity using patient's name and date of birth.    Denise was seen for allergy skin testing. Patient was seen by this nurse in conjunction with ENT provider. All encounter details are documented in ENT Provider's appointment from this same date. Please see referenced encounter for this visits documentation.     Jalyn Cochran RN

## 2019-07-29 NOTE — PROGRESS NOTES
Procedure: right breast biopsy, right    There were  no complications and patient has no symptoms..      Radiologist:Dr Joy Lowery RN

## 2019-07-30 ENCOUNTER — TELEPHONE (OUTPATIENT)
Dept: SURGERY | Facility: OTHER | Age: 64
End: 2019-07-30

## 2019-07-30 ENCOUNTER — TELEPHONE (OUTPATIENT)
Dept: INTERVENTIONAL RADIOLOGY/VASCULAR | Facility: HOSPITAL | Age: 64
End: 2019-07-30

## 2019-07-30 ENCOUNTER — HOSPITAL ENCOUNTER (OUTPATIENT)
Dept: RESPIRATORY THERAPY | Facility: HOSPITAL | Age: 64
Discharge: HOME | End: 2019-07-30
Attending: PHYSICIAN ASSISTANT | Admitting: PHYSICIAN ASSISTANT
Payer: COMMERCIAL

## 2019-07-30 DIAGNOSIS — J18.9 LUNG INFECTION: ICD-10-CM

## 2019-07-30 DIAGNOSIS — E78.5 HYPERLIPIDEMIA WITH TARGET LDL LESS THAN 130: ICD-10-CM

## 2019-07-30 LAB
ALBUMIN SERPL-MCNC: 3.9 G/DL (ref 3.4–5)
ALP SERPL-CCNC: 73 U/L (ref 40–150)
ALT SERPL W P-5'-P-CCNC: 39 U/L (ref 0–50)
ANION GAP SERPL CALCULATED.3IONS-SCNC: 5 MMOL/L (ref 3–14)
AST SERPL W P-5'-P-CCNC: 36 U/L (ref 0–45)
BASOPHILS # BLD AUTO: 0 10E9/L (ref 0–0.2)
BASOPHILS NFR BLD AUTO: 0.5 %
BILIRUB SERPL-MCNC: 0.4 MG/DL (ref 0.2–1.3)
BUN SERPL-MCNC: 16 MG/DL (ref 7–30)
CALCIUM SERPL-MCNC: 9 MG/DL (ref 8.5–10.1)
CHLORIDE SERPL-SCNC: 106 MMOL/L (ref 94–109)
CHOLEST SERPL-MCNC: 161 MG/DL
CO2 SERPL-SCNC: 29 MMOL/L (ref 20–32)
COHGB MFR BLD: 1.3 % (ref 0–2)
CREAT SERPL-MCNC: 0.76 MG/DL (ref 0.52–1.04)
DIFFERENTIAL METHOD BLD: NORMAL
EOSINOPHIL # BLD AUTO: 0.1 10E9/L (ref 0–0.7)
EOSINOPHIL NFR BLD AUTO: 2.5 %
ERYTHROCYTE [DISTWIDTH] IN BLOOD BY AUTOMATED COUNT: 12.8 % (ref 10–15)
GFR SERPL CREATININE-BSD FRML MDRD: 83 ML/MIN/{1.73_M2}
GLUCOSE SERPL-MCNC: 86 MG/DL (ref 70–99)
HCT VFR BLD AUTO: 40.2 % (ref 35–47)
HDLC SERPL-MCNC: 56 MG/DL
HGB BLD-MCNC: 13.4 G/DL (ref 11.7–15.7)
HGB BLD-MCNC: 13.4 G/DL (ref 11.7–15.7)
IMM GRANULOCYTES # BLD: 0 10E9/L (ref 0–0.4)
IMM GRANULOCYTES NFR BLD: 0.4 %
LDLC SERPL CALC-MCNC: 84 MG/DL
LYMPHOCYTES # BLD AUTO: 2 10E9/L (ref 0.8–5.3)
LYMPHOCYTES NFR BLD AUTO: 35.4 %
MCH RBC QN AUTO: 29.7 PG (ref 26.5–33)
MCHC RBC AUTO-ENTMCNC: 33.3 G/DL (ref 31.5–36.5)
MCV RBC AUTO: 89 FL (ref 78–100)
MONOCYTES # BLD AUTO: 0.5 10E9/L (ref 0–1.3)
MONOCYTES NFR BLD AUTO: 8.4 %
NEUTROPHILS # BLD AUTO: 3 10E9/L (ref 1.6–8.3)
NEUTROPHILS NFR BLD AUTO: 52.8 %
NONHDLC SERPL-MCNC: 105 MG/DL
NRBC # BLD AUTO: 0 10*3/UL
NRBC BLD AUTO-RTO: 0 /100
PLATELET # BLD AUTO: 187 10E9/L (ref 150–450)
POTASSIUM SERPL-SCNC: 4.2 MMOL/L (ref 3.4–5.3)
PROT SERPL-MCNC: 7.9 G/DL (ref 6.8–8.8)
RBC # BLD AUTO: 4.51 10E12/L (ref 3.8–5.2)
SODIUM SERPL-SCNC: 140 MMOL/L (ref 133–144)
TRIGL SERPL-MCNC: 107 MG/DL
WBC # BLD AUTO: 5.6 10E9/L (ref 4–11)

## 2019-07-30 PROCEDURE — 94726 PLETHYSMOGRAPHY LUNG VOLUMES: CPT | Mod: 26 | Performed by: INTERNAL MEDICINE

## 2019-07-30 PROCEDURE — 85018 HEMOGLOBIN: CPT | Performed by: PHYSICIAN ASSISTANT

## 2019-07-30 PROCEDURE — 82375 ASSAY CARBOXYHB QUANT: CPT | Performed by: PHYSICIAN ASSISTANT

## 2019-07-30 PROCEDURE — 94729 DIFFUSING CAPACITY: CPT | Mod: 26 | Performed by: INTERNAL MEDICINE

## 2019-07-30 PROCEDURE — 80053 COMPREHEN METABOLIC PANEL: CPT | Performed by: PHYSICIAN ASSISTANT

## 2019-07-30 PROCEDURE — 36415 COLL VENOUS BLD VENIPUNCTURE: CPT | Performed by: PHYSICIAN ASSISTANT

## 2019-07-30 PROCEDURE — 94729 DIFFUSING CAPACITY: CPT

## 2019-07-30 PROCEDURE — 94010 BREATHING CAPACITY TEST: CPT

## 2019-07-30 PROCEDURE — 80061 LIPID PANEL: CPT | Performed by: PHYSICIAN ASSISTANT

## 2019-07-30 PROCEDURE — 94010 BREATHING CAPACITY TEST: CPT | Mod: 26 | Performed by: INTERNAL MEDICINE

## 2019-07-30 PROCEDURE — 85025 COMPLETE CBC W/AUTO DIFF WBC: CPT | Performed by: PHYSICIAN ASSISTANT

## 2019-07-30 PROCEDURE — 94726 PLETHYSMOGRAPHY LUNG VOLUMES: CPT

## 2019-07-30 RX ORDER — ALBUTEROL SULFATE 0.83 MG/ML
2.5 SOLUTION RESPIRATORY (INHALATION)
Status: DISCONTINUED | OUTPATIENT
Start: 2019-07-30 | End: 2019-07-31 | Stop reason: HOSPADM

## 2019-07-30 NOTE — TELEPHONE ENCOUNTER
Dr Kwan called with results for this patient regarding her right breast biopsy.  Patient's pathology is coming back as right breast cancer, possibly invasive lobular carcinoma.  Additional stains have been ordered and those are pending.  RAVINDER MOODY

## 2019-07-30 NOTE — TELEPHONE ENCOUNTER
Pt states she is a little black and blue but doing great and has no pain.  Pt states that she has no questions or complaints at this time

## 2019-08-02 LAB — COPATH REPORT: NORMAL

## 2019-08-05 ENCOUNTER — OFFICE VISIT (OUTPATIENT)
Dept: SURGERY | Facility: OTHER | Age: 64
End: 2019-08-05
Attending: PHYSICIAN ASSISTANT
Payer: COMMERCIAL

## 2019-08-05 VITALS
SYSTOLIC BLOOD PRESSURE: 138 MMHG | WEIGHT: 135 LBS | BODY MASS INDEX: 22.49 KG/M2 | DIASTOLIC BLOOD PRESSURE: 78 MMHG | TEMPERATURE: 97.5 F | HEART RATE: 65 BPM | OXYGEN SATURATION: 99 % | HEIGHT: 65 IN

## 2019-08-05 DIAGNOSIS — C50.011 MALIGNANT NEOPLASM OF NIPPLE OF RIGHT BREAST IN FEMALE, UNSPECIFIED ESTROGEN RECEPTOR STATUS (H): Primary | ICD-10-CM

## 2019-08-05 PROCEDURE — 99214 OFFICE O/P EST MOD 30 MIN: CPT | Performed by: SURGERY

## 2019-08-05 ASSESSMENT — MIFFLIN-ST. JEOR: SCORE: 1168.24

## 2019-08-05 ASSESSMENT — PAIN SCALES - GENERAL: PAINLEVEL: NO PAIN (0)

## 2019-08-05 NOTE — PROGRESS NOTES
"[facility] Progress Note      S: Here today to discuss results, here with  as well.     O:   Vitals:  /78 (BP Location: Right arm, Cuff Size: Adult Regular)   Pulse 65   Temp 97.5  F (36.4  C) (Tympanic)   Ht 1.651 m (5' 5\")   Wt 61.2 kg (135 lb)   SpO2 99%   BMI 22.47 kg/m      No exam performed at this visit     Assessment/Plan    63 y.o. Female with history of recurrent pneumonia returns to clinic to discuss results related to her recent  breast biopsy showing invasive cancer. I discussed with the patient what information I had and what information I was still awaiting. I am yet to have the hormone receptor status. I discussed that if all three are negative that the next step would be chemotherapy. I discussed the different surgical options and she is leaning towards lumpectomy. I discussed with patient that while I can feel a distinct mass on exam it is somewhat difficult to delineate on imaging. I would therefore like to get a breast MRI in the hopes a more distinct boarder can be seen as this will help with operative planning. I reviewed her Chest/abdomen/pelvis CT scan from June as well as recent lab work. There are some irregularities in the chest though with her history of recurrent pneumonia these are difficult to know significance. Her lymph nodes on the right axilla are more prominent on right than left though not clinically evident on exam. Will again use MRI and likely US to get a better look at these as well. Will have results of receptor status by Thursday this week and will call patient back.       30 minutes spent with patient, over 50% of time was counseling the patient on treatment options, natural history of disease process, answering all questions.   :      Michael Dupree     "

## 2019-08-05 NOTE — PATIENT INSTRUCTIONS
Thank you for allowing Dr. Dupree and our surgical team to participate in your care. Please call our health unit coordinator at [phone] with scheduling questions or the nurse at [phone] with any other questions or concerns.

## 2019-08-05 NOTE — NURSING NOTE
"Chief Complaint   Patient presents with     Surgical Followup     s/p- right ultrasound guided breast needle biopsy 7/29/19       Initial /78 (BP Location: Right arm, Cuff Size: Adult Regular)   Pulse 65   Temp 97.5  F (36.4  C) (Tympanic)   Ht 1.651 m (5' 5\")   Wt 61.2 kg (135 lb)   SpO2 99%   BMI 22.47 kg/m   Estimated body mass index is 22.47 kg/m  as calculated from the following:    Height as of this encounter: 1.651 m (5' 5\").    Weight as of this encounter: 61.2 kg (135 lb).  Medication Reconciliation: complete   Natalia Bronw LPN      "

## 2019-08-08 ENCOUNTER — TELEPHONE (OUTPATIENT)
Dept: SURGERY | Facility: OTHER | Age: 64
End: 2019-08-08

## 2019-08-12 ENCOUNTER — TELEPHONE (OUTPATIENT)
Dept: FAMILY MEDICINE | Facility: OTHER | Age: 64
End: 2019-08-12

## 2019-08-12 ENCOUNTER — HOSPITAL ENCOUNTER (OUTPATIENT)
Dept: ULTRASOUND IMAGING | Facility: HOSPITAL | Age: 64
End: 2019-08-12
Attending: SURGERY
Payer: COMMERCIAL

## 2019-08-12 ENCOUNTER — HOSPITAL ENCOUNTER (OUTPATIENT)
Dept: MRI IMAGING | Facility: HOSPITAL | Age: 64
Discharge: HOME | End: 2019-08-12
Attending: SURGERY | Admitting: SURGERY
Payer: COMMERCIAL

## 2019-08-12 DIAGNOSIS — C50.011 MALIGNANT NEOPLASM OF NIPPLE OF RIGHT BREAST IN FEMALE, UNSPECIFIED ESTROGEN RECEPTOR STATUS (H): ICD-10-CM

## 2019-08-12 DIAGNOSIS — J98.4 PNEUMONITIS: Primary | ICD-10-CM

## 2019-08-12 LAB — COPATH REPORT: NORMAL

## 2019-08-12 PROCEDURE — 76882 US LMTD JT/FCL EVL NVASC XTR: CPT | Mod: TC,RT

## 2019-08-12 PROCEDURE — A9585 GADOBUTROL INJECTION: HCPCS | Performed by: RADIOLOGY

## 2019-08-12 PROCEDURE — 25500064 ZZH RX 255 OP 636: Performed by: RADIOLOGY

## 2019-08-12 PROCEDURE — 77049 MRI BREAST C-+ W/CAD BI: CPT | Mod: TC

## 2019-08-12 RX ORDER — GADOBUTROL 604.72 MG/ML
7.5 INJECTION INTRAVENOUS ONCE
Status: COMPLETED | OUTPATIENT
Start: 2019-08-12 | End: 2019-08-12

## 2019-08-12 RX ORDER — LEVOFLOXACIN 500 MG/1
500 TABLET, FILM COATED ORAL DAILY
Qty: 7 TABLET | Refills: 0 | Status: SHIPPED | OUTPATIENT
Start: 2019-08-12 | End: 2019-10-02

## 2019-08-12 RX ADMIN — GADOBUTROL 7.5 ML: 604.72 INJECTION INTRAVENOUS at 15:02

## 2019-08-12 NOTE — TELEPHONE ENCOUNTER
7:52 AM    Reason for Call: Phone Call    Description: Denise has a fever and congestion again and would like to get antibiotics. Please call Denise to advise .    Was an appointment offered for this call?   No    Preferred method for responding to this message: [phone]    If we cannot reach you directly, may we leave a detailed response at the number you provided? Yes      Camille Martin

## 2019-08-14 ENCOUNTER — TELEPHONE (OUTPATIENT)
Dept: SURGERY | Facility: OTHER | Age: 64
End: 2019-08-14

## 2019-08-14 DIAGNOSIS — C50.919 INVASIVE CARCINOMA OF BREAST (H): Primary | ICD-10-CM

## 2019-08-14 NOTE — TELEPHONE ENCOUNTER
Left message for patient to return our call regarding setting up a procedure.  Patient will be scheduled for a right wire localized lumpectomy with sentinel lymph node on Friday, August 23, 2019 with Dr Dupree if it works for her schedule.  Will await patient's return call.   RAVINDER MOODY

## 2019-08-16 ENCOUNTER — ANESTHESIA EVENT (OUTPATIENT)
Dept: SURGERY | Facility: HOSPITAL | Age: 64
End: 2019-08-16
Payer: COMMERCIAL

## 2019-08-16 NOTE — ANESTHESIA PREPROCEDURE EVALUATION
Anesthesia Pre-Procedure Evaluation    Patient: Denise Taylor   MRN: [id number] : 1955          Preoperative Diagnosis: INVASIVE CARCINOMA OF BREAST    Procedure(s):  RIGHT WIRE LOCALIZED LUMPECTOMY WITH SENINEL LYMP NODE    Past Medical History:   Diagnosis Date     Pneumonia      Past Surgical History:   Procedure Laterality Date     ARTHROSCOPY SHOULDER ROTATOR CUFF REPAIR Left 3/7/2018    Procedure: ARTHROSCOPY SHOULDER ROTATOR CUFF REPAIR;  LEFT SHOULDER ARTHROSCOPY, REPAIR ROTATOR CUFF: subacromial Decompression and AC Joint Resection;  Surgeon: Baldev Lou DO;  Location: [facility]     ARTHROTOMY SHOULDER, ROTATOR CUFF REPAIR, COMBINED Right 2018    Procedure: RIGHT SHOULDER DEBRIDEMENT, EXCISION OF LIPOMA  RIGHT UPPER ARM, EXCISION SCAR TISSUE AC JOINT;  Surgeon: Baldev Lou DO;  Location: [facility] OR     COLONOSCOPY      Dr Lara     COLONOSCOPY N/A 10/10/2016    Procedure: COLONOSCOPY;  Surgeon: Christine Cintron MD;  Location: [facility] OR     SHOULDER SURGERY Right 2011/10/2012       Anesthesia Evaluation     . Pt has had prior anesthetic.     No history of anesthetic complications          ROS/MED HX    ENT/Pulmonary:     (+)tobacco use, Past use , recent URI resolved 2 weeks ago: . .    Neurologic:  - neg neurologic ROS     Cardiovascular:     (+) Dyslipidemia, ----. : . . . :. . Previous cardiac testing date:results:date: results:ECG reviewed date:19 results:Sinus josiah date: results:          METS/Exercise Tolerance:     Hematologic:  - neg hematologic  ROS       Musculoskeletal:   (+) arthritis,  -       GI/Hepatic:  - neg GI/hepatic ROS       Renal/Genitourinary:  - ROS Renal section negative       Endo:     (+) thyroid problem hypothyroidism, .      Psychiatric:  - neg psychiatric ROS       Infectious Disease:         Malignancy:   (+) Malignancy History of Breast and Other  Breast CA Active status post. Other CA basal jonny carcinoma status post         Other: Comment: Scar  "condition and fibrosis of skin                         Physical Exam  Normal systems: cardiovascular, pulmonary and dental    Airway   Mallampati: I  TM distance: >3 FB  Neck ROM: full    Dental     Cardiovascular   Rhythm and rate: regular and normal      Pulmonary    breath sounds clear to auscultation            Lab Results   Component Value Date    WBC 5.6 07/30/2019    HGB 13.4 07/30/2019    HCT 40.2 07/30/2019     07/30/2019    CRP 37.6 (H) 04/02/2018    SED 18 07/24/2019     07/30/2019    POTASSIUM 4.2 07/30/2019    CHLORIDE 106 07/30/2019    CO2 29 07/30/2019    BUN 16 07/30/2019    CR 0.76 07/30/2019    GLC 86 07/30/2019    STEVE 9.0 07/30/2019    MAG 2.0 06/30/2019    ALBUMIN 3.9 07/30/2019    PROTTOTAL 7.9 07/30/2019    ALT 39 07/30/2019    AST 36 07/30/2019    ALKPHOS 73 07/30/2019    BILITOTAL 0.4 07/30/2019    LIPASE 199 06/30/2019    TSH 0.21 (L) 07/24/2019    T4 1.20 07/24/2019       Preop Vitals  BP Readings from Last 3 Encounters:   08/05/19 138/78   07/29/19 120/78   07/29/19 114/80    Pulse Readings from Last 3 Encounters:   08/05/19 65   07/29/19 62   07/29/19 67      Resp Readings from Last 3 Encounters:   07/03/19 20   06/30/19 (!) 54   05/01/19 18    SpO2 Readings from Last 3 Encounters:   08/05/19 99%   07/29/19 97%   07/26/19 98%      Temp Readings from Last 1 Encounters:   08/05/19 97.5  F (36.4  C) (Tympanic)    Ht Readings from Last 1 Encounters:   08/05/19 1.651 m (5' 5\")      Wt Readings from Last 1 Encounters:   08/05/19 61.2 kg (135 lb)    Estimated body mass index is 22.47 kg/m  as calculated from the following:    Height as of 8/5/19: 1.651 m (5' 5\").    Weight as of 8/5/19: 61.2 kg (135 lb).       Anesthesia Plan      History & Physical Review  History and physical reviewed and following examination; no interval change.    ASA Status:  3 .    NPO Status:  > 8 hours    Plan for General, LMA and Periph. Nerve Block for postop pain with Intravenous and Propofol induction. " Maintenance will be Balanced.    PONV prophylaxis:  Ondansetron (or other 5HT-3), Dexamethasone or Solumedrol and Scopolamine patch       Postoperative Care  Postoperative pain management:  IV analgesics, Oral pain medications and Peripheral nerve block (Single Shot).      Consents  Anesthetic plan, risks, benefits and alternatives discussed with:  Patient..                 CESAR Villar CRNA

## 2019-08-16 NOTE — OR NURSING
Sent to breast center via e mail.  RE:  Denise Taylor [birth year]    Scheduled for right wire localized lumpectomy with sentinel lymph node right for invasive mammary carcinoma with Dr. Dupree.    Thank you!    [facility]  [address] [place] [address]  [id number]  [email or web address]    Office: [phone]  Fax [phone]

## 2019-08-19 RX ORDER — LIDOCAINE HYDROCHLORIDE 10 MG/ML
10 INJECTION, SOLUTION EPIDURAL; INFILTRATION; INTRACAUDAL; PERINEURAL ONCE
Status: CANCELLED | OUTPATIENT
Start: 2019-08-23

## 2019-08-22 RX ORDER — LIDOCAINE HYDROCHLORIDE 10 MG/ML
10 INJECTION, SOLUTION EPIDURAL; INFILTRATION; INTRACAUDAL; PERINEURAL ONCE
Status: CANCELLED | OUTPATIENT
Start: 2019-08-22 | End: 2019-08-22

## 2019-08-23 ENCOUNTER — HOSPITAL ENCOUNTER (OUTPATIENT)
Dept: NUCLEAR MEDICINE | Facility: HOSPITAL | Age: 64
Discharge: HOME | End: 2019-08-23
Attending: SURGERY | Admitting: SURGERY
Payer: COMMERCIAL

## 2019-08-23 ENCOUNTER — ANESTHESIA (OUTPATIENT)
Dept: SURGERY | Facility: HOSPITAL | Age: 64
End: 2019-08-23
Payer: COMMERCIAL

## 2019-08-23 ENCOUNTER — HOSPITAL ENCOUNTER (OUTPATIENT)
Facility: HOSPITAL | Age: 64
Discharge: HOME | End: 2019-08-23
Attending: SURGERY | Admitting: SURGERY
Payer: COMMERCIAL

## 2019-08-23 ENCOUNTER — ANCILLARY PROCEDURE (OUTPATIENT)
Dept: MAMMOGRAPHY | Facility: OTHER | Age: 64
End: 2019-08-23
Attending: RADIOLOGY
Payer: COMMERCIAL

## 2019-08-23 ENCOUNTER — TRANSFERRED RECORDS (OUTPATIENT)
Dept: HEALTH INFORMATION MANAGEMENT | Facility: OTHER | Age: 64
End: 2019-08-23

## 2019-08-23 ENCOUNTER — HOSPITAL ENCOUNTER (OUTPATIENT)
Dept: MAMMOGRAPHY | Facility: OTHER | Age: 64
End: 2019-08-23
Attending: SURGERY
Payer: COMMERCIAL

## 2019-08-23 VITALS
TEMPERATURE: 96.8 F | SYSTOLIC BLOOD PRESSURE: 131 MMHG | RESPIRATION RATE: 16 BRPM | DIASTOLIC BLOOD PRESSURE: 71 MMHG | OXYGEN SATURATION: 97 %

## 2019-08-23 DIAGNOSIS — C50.911 INVASIVE LOBULAR CARCINOMA OF BREAST, STAGE 1, RIGHT (H): Primary | ICD-10-CM

## 2019-08-23 DIAGNOSIS — C50.911 INVASIVE DUCTAL CARCINOMA OF RIGHT BREAST (H): ICD-10-CM

## 2019-08-23 DIAGNOSIS — C50.919 INVASIVE CARCINOMA OF BREAST (H): ICD-10-CM

## 2019-08-23 PROCEDURE — 27210794 ZZH OR GENERAL SUPPLY STERILE: Performed by: SURGERY

## 2019-08-23 PROCEDURE — 25000125 ZZHC RX 250: Performed by: SURGERY

## 2019-08-23 PROCEDURE — 19281 PERQ DEVICE BREAST 1ST IMAG: CPT | Mod: 59

## 2019-08-23 PROCEDURE — 25000128 H RX IP 250 OP 636: Performed by: SURGERY

## 2019-08-23 PROCEDURE — 36000056 ZZH SURGERY LEVEL 3 1ST 30 MIN: Performed by: SURGERY

## 2019-08-23 PROCEDURE — 27110028 ZZH OR GENERAL SUPPLY NON-STERILE: Performed by: SURGERY

## 2019-08-23 PROCEDURE — 71000014 ZZH RECOVERY PHASE 1 LEVEL 2 FIRST HR: Performed by: SURGERY

## 2019-08-23 PROCEDURE — 40000306 ZZH STATISTIC PRE PROC ASSESS II: Performed by: SURGERY

## 2019-08-23 PROCEDURE — 25000128 H RX IP 250 OP 636: Performed by: NURSE ANESTHETIST, CERTIFIED REGISTERED

## 2019-08-23 PROCEDURE — 71000027 ZZH RECOVERY PHASE 2 EACH 15 MINS: Performed by: SURGERY

## 2019-08-23 PROCEDURE — 19301 PARTIAL MASTECTOMY: CPT | Mod: RT | Performed by: SURGERY

## 2019-08-23 PROCEDURE — 37000009 ZZH ANESTHESIA TECHNICAL FEE, EACH ADDTL 15 MIN: Performed by: SURGERY

## 2019-08-23 PROCEDURE — 19125 EXCISION BREAST LESION: CPT | Performed by: ANESTHESIOLOGY

## 2019-08-23 PROCEDURE — 25000128 H RX IP 250 OP 636: Performed by: ANESTHESIOLOGY

## 2019-08-23 PROCEDURE — 25000125 ZZHC RX 250: Performed by: NURSE ANESTHETIST, CERTIFIED REGISTERED

## 2019-08-23 PROCEDURE — 25000125 ZZHC RX 250

## 2019-08-23 PROCEDURE — 64488 TAP BLOCK BI INJECTION: CPT | Mod: 59 | Performed by: NURSE ANESTHETIST, CERTIFIED REGISTERED

## 2019-08-23 PROCEDURE — 19125 EXCISION BREAST LESION: CPT | Performed by: NURSE ANESTHETIST, CERTIFIED REGISTERED

## 2019-08-23 PROCEDURE — 25000125 ZZHC RX 250: Performed by: ANESTHESIOLOGY

## 2019-08-23 PROCEDURE — A9541 TC99M SULFUR COLLOID: HCPCS | Performed by: RADIOLOGY

## 2019-08-23 PROCEDURE — 25800030 ZZH RX IP 258 OP 636: Performed by: ANESTHESIOLOGY

## 2019-08-23 PROCEDURE — 34300033 ZZH RX 343: Performed by: RADIOLOGY

## 2019-08-23 PROCEDURE — 88307 TISSUE EXAM BY PATHOLOGIST: CPT | Mod: TC | Performed by: SURGERY

## 2019-08-23 PROCEDURE — 38500 BIOPSY/REMOVAL LYMPH NODES: CPT | Mod: 51 | Performed by: SURGERY

## 2019-08-23 PROCEDURE — 38792 RA TRACER ID OF SENTINL NODE: CPT | Mod: TC

## 2019-08-23 PROCEDURE — 36000058 ZZH SURGERY LEVEL 3 EA 15 ADDTL MIN: Performed by: SURGERY

## 2019-08-23 PROCEDURE — 25800030 ZZH RX IP 258 OP 636: Performed by: NURSE ANESTHETIST, CERTIFIED REGISTERED

## 2019-08-23 PROCEDURE — 76098 X-RAY EXAM SURGICAL SPECIMEN: CPT | Mod: TC

## 2019-08-23 PROCEDURE — 37000008 ZZH ANESTHESIA TECHNICAL FEE, 1ST 30 MIN: Performed by: SURGERY

## 2019-08-23 RX ORDER — AMOXICILLIN 250 MG
1-2 CAPSULE ORAL 2 TIMES DAILY
Qty: 30 TABLET | Refills: 0 | Status: SHIPPED | OUTPATIENT
Start: 2019-08-23 | End: 2019-10-02

## 2019-08-23 RX ORDER — SCOLOPAMINE TRANSDERMAL SYSTEM 1 MG/1
1 PATCH, EXTENDED RELEASE TRANSDERMAL ONCE
Status: COMPLETED | OUTPATIENT
Start: 2019-08-23 | End: 2019-08-23

## 2019-08-23 RX ORDER — SODIUM CHLORIDE, SODIUM LACTATE, POTASSIUM CHLORIDE, CALCIUM CHLORIDE 600; 310; 30; 20 MG/100ML; MG/100ML; MG/100ML; MG/100ML
INJECTION, SOLUTION INTRAVENOUS CONTINUOUS
Status: DISCONTINUED | OUTPATIENT
Start: 2019-08-23 | End: 2019-08-23 | Stop reason: HOSPADM

## 2019-08-23 RX ORDER — FENTANYL CITRATE 50 UG/ML
INJECTION, SOLUTION INTRAMUSCULAR; INTRAVENOUS PRN
Status: DISCONTINUED | OUTPATIENT
Start: 2019-08-23 | End: 2019-08-23

## 2019-08-23 RX ORDER — CLINDAMYCIN PHOSPHATE 900 MG/50ML
900 INJECTION, SOLUTION INTRAVENOUS
Status: DISCONTINUED | OUTPATIENT
Start: 2019-08-23 | End: 2019-08-23 | Stop reason: HOSPADM

## 2019-08-23 RX ORDER — ONDANSETRON 2 MG/ML
INJECTION INTRAMUSCULAR; INTRAVENOUS PRN
Status: DISCONTINUED | OUTPATIENT
Start: 2019-08-23 | End: 2019-08-23

## 2019-08-23 RX ORDER — HYDROMORPHONE HYDROCHLORIDE 1 MG/ML
.3-.5 INJECTION, SOLUTION INTRAMUSCULAR; INTRAVENOUS; SUBCUTANEOUS EVERY 10 MIN PRN
Status: DISCONTINUED | OUTPATIENT
Start: 2019-08-23 | End: 2019-08-23 | Stop reason: HOSPADM

## 2019-08-23 RX ORDER — OXYCODONE HYDROCHLORIDE 5 MG/1
5 TABLET ORAL EVERY 6 HOURS PRN
Qty: 20 TABLET | Refills: 0 | Status: SHIPPED | OUTPATIENT
Start: 2019-08-23 | End: 2019-10-02

## 2019-08-23 RX ORDER — CLINDAMYCIN PHOSPHATE 900 MG/50ML
900 INJECTION, SOLUTION INTRAVENOUS SEE ADMIN INSTRUCTIONS
Status: DISCONTINUED | OUTPATIENT
Start: 2019-08-23 | End: 2019-08-23 | Stop reason: HOSPADM

## 2019-08-23 RX ORDER — EPHEDRINE SULFATE 50 MG/ML
INJECTION, SOLUTION INTRAMUSCULAR; INTRAVENOUS; SUBCUTANEOUS PRN
Status: DISCONTINUED | OUTPATIENT
Start: 2019-08-23 | End: 2019-08-23

## 2019-08-23 RX ORDER — ONDANSETRON 4 MG/1
4 TABLET, ORALLY DISINTEGRATING ORAL EVERY 30 MIN PRN
Status: DISCONTINUED | OUTPATIENT
Start: 2019-08-23 | End: 2019-08-23 | Stop reason: HOSPADM

## 2019-08-23 RX ORDER — MEPERIDINE HYDROCHLORIDE 50 MG/ML
12.5 INJECTION INTRAMUSCULAR; INTRAVENOUS; SUBCUTANEOUS
Status: DISCONTINUED | OUTPATIENT
Start: 2019-08-23 | End: 2019-08-23 | Stop reason: HOSPADM

## 2019-08-23 RX ORDER — LIDOCAINE HYDROCHLORIDE 10 MG/ML
INJECTION, SOLUTION EPIDURAL; INFILTRATION; INTRACAUDAL; PERINEURAL
Status: COMPLETED
Start: 2019-08-23 | End: 2019-08-23

## 2019-08-23 RX ORDER — ONDANSETRON 2 MG/ML
4 INJECTION INTRAMUSCULAR; INTRAVENOUS EVERY 30 MIN PRN
Status: DISCONTINUED | OUTPATIENT
Start: 2019-08-23 | End: 2019-08-23 | Stop reason: HOSPADM

## 2019-08-23 RX ORDER — LIDOCAINE 40 MG/G
CREAM TOPICAL
Status: DISCONTINUED | OUTPATIENT
Start: 2019-08-23 | End: 2019-08-23 | Stop reason: HOSPADM

## 2019-08-23 RX ORDER — FENTANYL CITRATE 50 UG/ML
25-50 INJECTION, SOLUTION INTRAMUSCULAR; INTRAVENOUS
Status: DISCONTINUED | OUTPATIENT
Start: 2019-08-23 | End: 2019-08-23 | Stop reason: HOSPADM

## 2019-08-23 RX ORDER — HYDROCODONE BITARTRATE AND ACETAMINOPHEN 5; 325 MG/1; MG/1
1 TABLET ORAL EVERY 4 HOURS PRN
Qty: 20 TABLET | Refills: 0 | Status: SHIPPED | OUTPATIENT
Start: 2019-08-23 | End: 2019-10-02

## 2019-08-23 RX ORDER — ROPIVACAINE HYDROCHLORIDE 5 MG/ML
INJECTION, SOLUTION EPIDURAL; INFILTRATION; PERINEURAL PRN
Status: DISCONTINUED | OUTPATIENT
Start: 2019-08-23 | End: 2019-08-23

## 2019-08-23 RX ORDER — ALBUTEROL SULFATE 0.83 MG/ML
2.5 SOLUTION RESPIRATORY (INHALATION) EVERY 4 HOURS PRN
Status: DISCONTINUED | OUTPATIENT
Start: 2019-08-23 | End: 2019-08-23 | Stop reason: HOSPADM

## 2019-08-23 RX ORDER — DEXAMETHASONE SODIUM PHOSPHATE 10 MG/ML
INJECTION, SOLUTION INTRAMUSCULAR; INTRAVENOUS PRN
Status: DISCONTINUED | OUTPATIENT
Start: 2019-08-23 | End: 2019-08-23

## 2019-08-23 RX ORDER — LIDOCAINE HYDROCHLORIDE 20 MG/ML
INJECTION, SOLUTION INFILTRATION; PERINEURAL PRN
Status: DISCONTINUED | OUTPATIENT
Start: 2019-08-23 | End: 2019-08-23

## 2019-08-23 RX ORDER — PROPOFOL 10 MG/ML
INJECTION, EMULSION INTRAVENOUS PRN
Status: DISCONTINUED | OUTPATIENT
Start: 2019-08-23 | End: 2019-08-23

## 2019-08-23 RX ORDER — NALOXONE HYDROCHLORIDE 0.4 MG/ML
.1-.4 INJECTION, SOLUTION INTRAMUSCULAR; INTRAVENOUS; SUBCUTANEOUS
Status: DISCONTINUED | OUTPATIENT
Start: 2019-08-23 | End: 2019-08-23 | Stop reason: HOSPADM

## 2019-08-23 RX ADMIN — DEXAMETHASONE SODIUM PHOSPHATE 10 MG: 10 INJECTION, SOLUTION INTRAMUSCULAR; INTRAVENOUS at 09:06

## 2019-08-23 RX ADMIN — Medication 10 MG: at 09:39

## 2019-08-23 RX ADMIN — FENTANYL CITRATE 25 MCG: 50 INJECTION, SOLUTION INTRAMUSCULAR; INTRAVENOUS at 09:21

## 2019-08-23 RX ADMIN — SODIUM CHLORIDE, POTASSIUM CHLORIDE, SODIUM LACTATE AND CALCIUM CHLORIDE: 600; 310; 30; 20 INJECTION, SOLUTION INTRAVENOUS at 08:16

## 2019-08-23 RX ADMIN — HYDROMORPHONE HYDROCHLORIDE 0.5 MG: 1 INJECTION, SOLUTION INTRAMUSCULAR; INTRAVENOUS; SUBCUTANEOUS at 11:58

## 2019-08-23 RX ADMIN — Medication 5 MG: at 11:25

## 2019-08-23 RX ADMIN — PROPOFOL 150 MG: 10 INJECTION, EMULSION INTRAVENOUS at 09:21

## 2019-08-23 RX ADMIN — Medication 2 MCI.: at 08:55

## 2019-08-23 RX ADMIN — LIDOCAINE HYDROCHLORIDE 2.5 MG: 10 INJECTION, SOLUTION EPIDURAL; INFILTRATION; INTRACAUDAL; PERINEURAL at 08:28

## 2019-08-23 RX ADMIN — ONDANSETRON 4 MG: 2 INJECTION INTRAMUSCULAR; INTRAVENOUS at 11:34

## 2019-08-23 RX ADMIN — PROPOFOL 50 MG: 10 INJECTION, EMULSION INTRAVENOUS at 11:56

## 2019-08-23 RX ADMIN — MIDAZOLAM 1 MG: 1 INJECTION INTRAMUSCULAR; INTRAVENOUS at 07:56

## 2019-08-23 RX ADMIN — ROPIVACAINE HYDROCHLORIDE 20 ML: 5 INJECTION, SOLUTION EPIDURAL; INFILTRATION; PERINEURAL at 09:06

## 2019-08-23 RX ADMIN — MIDAZOLAM 1 MG: 1 INJECTION INTRAMUSCULAR; INTRAVENOUS at 09:00

## 2019-08-23 RX ADMIN — FENTANYL CITRATE 25 MCG: 50 INJECTION, SOLUTION INTRAMUSCULAR; INTRAVENOUS at 09:51

## 2019-08-23 RX ADMIN — CLINDAMYCIN PHOSPHATE 900 MG: 18 INJECTION, SOLUTION INTRAVENOUS at 09:14

## 2019-08-23 RX ADMIN — SODIUM CHLORIDE, POTASSIUM CHLORIDE, SODIUM LACTATE AND CALCIUM CHLORIDE 1000 ML: 600; 310; 30; 20 INJECTION, SOLUTION INTRAVENOUS at 07:50

## 2019-08-23 RX ADMIN — Medication 5 MG: at 09:35

## 2019-08-23 RX ADMIN — FENTANYL CITRATE 25 MCG: 50 INJECTION, SOLUTION INTRAMUSCULAR; INTRAVENOUS at 09:49

## 2019-08-23 RX ADMIN — Medication 10 MG: at 09:24

## 2019-08-23 RX ADMIN — LIDOCAINE HYDROCHLORIDE 20 MG: 20 INJECTION, SOLUTION INFILTRATION; PERINEURAL at 09:21

## 2019-08-23 RX ADMIN — MIDAZOLAM 1 MG: 1 INJECTION INTRAMUSCULAR; INTRAVENOUS at 09:14

## 2019-08-23 RX ADMIN — DEXAMETHASONE SODIUM PHOSPHATE 6 MG: 10 INJECTION, SOLUTION INTRAMUSCULAR; INTRAVENOUS at 09:32

## 2019-08-23 RX ADMIN — SCOPALAMINE 1 PATCH: 1 PATCH, EXTENDED RELEASE TRANSDERMAL at 07:34

## 2019-08-23 RX ADMIN — FENTANYL CITRATE 25 MCG: 50 INJECTION, SOLUTION INTRAMUSCULAR; INTRAVENOUS at 10:05

## 2019-08-23 ASSESSMENT — LIFESTYLE VARIABLES: TOBACCO_USE: 1

## 2019-08-23 NOTE — DISCHARGE INSTRUCTIONS
Denise Taylor 63 year old    Returned from Breast Center  Exam Performed : right breast wire localization  Pushed to Reading Service?: No  Tolerated Procedure : well  May resume previous diet : No - Comments N/A  Time Returned to Unit : 8:39  Report Given to : Kourtney WILL    8/23/2019 8:53 AM   Jalyn Karimi

## 2019-08-23 NOTE — ANESTHESIA CARE TRANSFER NOTE
Patient: Denise Taylor    Procedure(s):  RIGHT WIRE LOCALIZED LUMPECTOMY WITH SENTINEL  LYMP NODE    Diagnosis: INVASIVE CARCINOMA OF BREAST  Diagnosis Additional Information: No value filed.    Anesthesia Type:   General, LMA, Periph. Nerve Block for postop pain     Note:  Airway :Oral Airway and Face Mask  Patient transferred to:PACU  Handoff Report: Identifed the Patient, Identified the Reponsible Provider, Reviewed the pertinent medical history, Discussed the surgical course, Reviewed Intra-OP anesthesia mangement and issues during anesthesia, Set expectations for post-procedure period and Allowed opportunity for questions and acknowledgement of understanding      Vitals: (Last set prior to Anesthesia Care Transfer)    CRNA VITALS  8/23/2019 1139 - 8/23/2019 1217      8/23/2019             Resp Rate (set):  8                Electronically Signed By: CESAR Rodriguez CRNA  August 23, 2019  12:17 PM

## 2019-08-23 NOTE — DISCHARGE INSTRUCTIONS
Scopolamine skin patches remove tomorrow.  Brand Name: Transderm Scop  What is this medicine?  SCOPOLAMINE (skoe MARKO a meen) is used to prevent nausea and vomiting caused by motion sickness, anesthesia and surgery.  How should I use this medicine?  This medicine is for external use only. Follow the directions on the prescription label. One patch contains enough medicine to prevent motion sickness for up to 3 days. Apply the patch at least 4 hours before you need it and only wear one disc at a time. Choose an area behind the ear, that is clean, dry, hairless and free from any cuts or irritation. Wipe the area with a clean dry tissue. Peel off the plastic backing of the skin patch, trying not to touch the adhesive side with your hands. Do not cut the patches. Firmly apply to the area you have chosen, with the metallic side of the patch to the skin and the tan-colored side showing. Once firmly in place, wash your hands well with soap and water. Remove the disc after 3 days, or sooner if you no longer need it. After removing the patch, wash your hands and the area behind your ear thoroughly with soap and water. The patch will still contain some medicine after use. To avoid accidental contact or ingestion by children or pets, fold the used patch in half with the sticky side together and throw away in the trash out of the reach of children and pets. If you need to use a second patch after you remove the first, place it behind the other ear.   Talk to your pediatrician regarding the use of this medicine in children. Special care may be needed.  What side effects may I notice from receiving this medicine?  Side effects that you should report to your doctor or health care professional as soon as possible:    agitation, nervousness, confusion    blurred vision and other eye problems    dizziness, drowsiness    eye pain or redness in the whites of the eye    hallucinations    pain or difficulty passing urine    skin rash,  itching    vomiting  Side effects that usually do not require medical attention (report to your doctor or health care professional if they continue or are bothersome):    headache    nausea  What may interact with this medicine?    benztropine    bethanechol    medicines for anxiety or sleeping problems like diazepam or temazepam    medicines for hay fever and other allergies    medicines for mental depression    muscle relaxants    What if I miss a dose?  Make sure you apply the patch at least 4 hours before you need it. You can apply it the night before traveling.  Where should I keep my medicine?  Keep out of the reach of children.  Store at room temperature between 20 and 25 degrees C (68 and 77 degrees F). Throw away any unused medicine after the expiration date. When you remove a patch, fold it and throw it in the trash as described above.  What should I tell my health care provider before I take this medicine?  They need to know if you have any of these conditions:    glaucoma    kidney or liver disease    an unusual or allergic reaction (especially skin allergy) to scopolamine, atropine, other medicines, foods, dyes, or preservatives    pregnant or trying to get pregnant    breast-feeding  What should I watch for while using this medicine?  Keep the patch dry, if possible, to prevent it from falling off. Limited contact with water, however, as in bathing or swimming, will not affect the system. If the patch falls off, throw it away and put a new one behind the other ear.  You may get drowsy or dizzy. Do not drive, use machinery, or do anything that needs mental alertness until you know how this medicine affects you. Do not stand or sit up quickly, especially if you are an older patient. This reduces the risk of dizzy or fainting spells. Alcohol may interfere with the effect of this medicine. Avoid alcoholic drinks.  Your mouth may get dry. Chewing sugarless gum or sucking hard candy, and drinking plenty of  water may help. Contact your doctor if the problem does not go away or is severe.  This medicine may cause dry eyes and blurred vision. If you wear contact lenses you may feel some discomfort. Lubricating drops may help. See your eye doctor if the problem does not go away or is severe.  If you are going to have a magnetic resonance imaging (MRI) procedure, tell your MRI technician if you have this patch on your body. It must be removed before a MRI.  NOTE:This sheet is a summary. It may not cover all possible information. If you have questions about this medicine, talk to your doctor, pharmacist, or health care provider. Copyright  2018 [employer]          Post-Anesthesia Patient Instructions    IMMEDIATELY FOLLOWING SURGERY:  Do not drive or operate machinery for the first twenty four hours after surgery.  Do not make any important decisions for twenty four hours after surgery or while taking narcotic pain medications or sedatives.  If you develop intractable nausea and vomiting or a severe headache please notify your doctor immediately.    FOLLOW-UP:  Please make an appointment with your surgeon as instructed. You do not need to follow up with anesthesia unless specifically instructed to do so.    WOUND CARE INSTRUCTIONS (if applicable):  Keep a dry clean dressing on the anesthesia/puncture wound site if there is drainage.  Once the wound has quit draining you may leave it open to air.  Generally you should leave the bandage intact for twenty four hours unless there is drainage.  If the epidural site drains for more than 36-48 hours please call the anesthesia department.    QUESTIONS?:  Please feel free to call your physician or the hospital  if you have any questions, and they will be happy to assist you.

## 2019-08-23 NOTE — ANESTHESIA PROCEDURE NOTES
Peripheral nerve/Neuraxial procedure note : Paravertebral  Pre-Procedure    Location:   Procedure Times:8/23/2019 9:00 AM and 8/23/2019 9:06 AM  Pre-Anesthestic Checklist: patient identified, IV checked, site marked, risks and benefits discussed, informed consent, monitors and equipment checked, pre-op evaluation, at physician/surgeon's request and post-op pain management    Timeout  Correct Patient: Yes   Correct Procedure: Yes   Correct Site: Yes   Correct Laterality: Yes   Correct Position: Yes   Site Marked: Yes   .   Procedure Documentation    .    Procedure:  right  Paravertebral.  Insertion Site:T4-5    Ultrasound used to identify targeted nerve, plexus, or vascular marker and placed a needle adjacent to it., Ultrasound was used to visualize the spread of the anesthetic in close proximity to the above stated nerve. A permanent image is entered into the patient's record.  Patient Prep;chlorhexidine gluconate and isopropyl alcohol.  .  Needle: insulated, short bevel Needle Gauge: 20.    Needle Length (Inches) 4  Insertion Method: Single Shot.       Assessment/Narrative  Paresthesias: No.  Injection made incrementally with aspirations every 806 mL..  The placement was negative for: blood aspirated and painful injection.  Bolus given via needle..   Secured via.   Complications: none. Comments:  Assisted by Andrez Mojica CRNA

## 2019-08-23 NOTE — OP NOTE
"PREOPERATIVE DIAGNOSIS: Infiltrating lobular malignancy, right breast   POSTOPERATIVE DIAGNOSIS: Infiltrating lobular malignancy, right breast     PROCEDURE: Wire localized segmental resection, right breast malignancy   Right sentinel node biopsy   INDICATIONS: See clinic note    OPERATIVE FINDINGS: There was one hotspot with node with neoprobe ex-vivo reading of 600. There was a palpable mass that was taken to areas of normal appearing fat and down to the chest wall. An additional area underneath the nipple was taken due to some nodularity felt on exam. Clip was confirmed, gross path margins appeared clear.      DESCRIPTION OF PROCEDURE: With the patient returned from Diagnostic Imaging with localization wire in place in the right breast and radioactive sulfur colloid injected one and one half hours previously, she was placed in the supine position on the operating table and general anesthesia was induced. The right breast, chest wall, axilla and upper arm were prepped and draped sterilely. The requisite timeout pause was then observed during which the patient's correct identity and planned procedure were confirmed and the preoperatively placed ink shyla denoting the right side was visualized. With concordance among the operating room staff, the procedure was commenced by identifying the \"hot spot\" radioactivity in the right axilla. An incision was made just inferior to the hairline and carried through the skin and subcutaneous tissue with bleeding controlled by electrocautery. With blunt dissection, one area of radioactivity was identified and excised. A small node was confirmed with ex-vivo counts confirming sentinel node identity with a count of 600 The The probe was returned to the axillary wound and no significant radioactivity was identified confirming complete sentinel node sampling. The wound was irrigated with saline solution. Meticulous hemostasis was confirmed, then the axilla was closed in layers being " sure to close the clavipectoral fascia.     Attention was directed to the breast and the localization wire was noted at the superior midline of right breast. A curvilinear incision along the site of introduction of the localization wire was taken through full thickness skin to subcutaneous tissue with bleeding controlled by electrocautery. The localization wire was placed in the center of dissection and full thickness skin and subcutaneous tissue was elevated off the anterior investing breast fascia with electrocautery dissection. The segmental resection was taken to and beyond the tip of the wire with gross margins in all directions. The mass was much larger than the imaging had indicated and tracked inferior, posterior and medial to the tip of the wire. The procedure proceeded by feel at this point with a rather large segmental resection.  The specimen was excised, orienting sutures placed and submitted to radiology where specimen mammography confirmed satisfactory resection with the localization clip present and good gross margins confirmed. After palpating the cavity there appeared to be some nodularity just under the nipple this was excised and sent as a ne anterior margin.   Gross pathologic examination confirmed wide margins from the primary tumor. The wound was irrigated with saline solution. Hemostasis was further obtained with electrocautery and suture ligatures of 3-0 Vicryl. Ligaclips were placed around the biopsy site for location for post surgical adjuvant radiation.  . The breast incision was closed with interrupted 3-0 vicryl in the subcutaneous tissue and 4-0 monocryl subcuticular skin reapproximation.  Sterile dressings were applied and the patient was transported to the recovery room in good condition. The estimated blood loss was 30 cc and there were no apparent complications. The procedure was well tolerated and the patient left the operating room upon confirmation that the final sponge, needle  and instrument counts were correct and the post operative debriefing acknowledged by all.    Michael Dupree MD

## 2019-08-23 NOTE — ANESTHESIA POSTPROCEDURE EVALUATION
Patient: Denise Taylor    Procedure(s):  RIGHT WIRE LOCALIZED LUMPECTOMY WITH SENTINEL  LYMP NODE    Diagnosis:INVASIVE CARCINOMA OF BREAST  Diagnosis Additional Information: No value filed.    Anesthesia Type:  General, LMA, Periph. Nerve Block for postop pain    Note:  Anesthesia Post Evaluation    Patient location during evaluation: Phase 2  Patient participation: Able to fully participate in evaluation  Level of consciousness: awake and alert  Pain management: adequate  Airway patency: patent  Cardiovascular status: acceptable  Respiratory status: acceptable  Hydration status: acceptable  PONV: none     Anesthetic complications: None          Last vitals:  Vitals:    08/23/19 1315 08/23/19 1330 08/23/19 1345   BP: 121/73 130/72 131/71   Resp: 16  16   Temp:      SpO2: 97% 97% 97%         Electronically Signed By: CESAR Mcconnell CRNA  August 23, 2019  3:34 PM

## 2019-08-23 NOTE — BRIEF OP NOTE
[facility]    Brief Operative Note    Pre-operative diagnosis: INVASIVE CARCINOMA OF BREAST  Post-operative diagnosis Same   Procedure: Procedure(s):  RIGHT WIRE LOCALIZED LUMPECTOMY WITH SENTINEL  LYMP NODE  Surgeon: Surgeon(s) and Role:     * Michael Dupree MD - Primary  Anesthesia: General   Estimated blood loss: Less than 50 ml  Drains: None  Specimens:   ID Type Source Tests Collected by Time Destination   A : right axilla sentinel node Tissue Lymph Node, [place] SURGICAL PATHOLOGY EXAM Michael Dupree MD 8/23/2019 10:21 AM    B : right breast-upper outer quadrant- short stitch superior, long stitch lateral, double stitch deep Tissue Breast, Right SURGICAL PATHOLOGY EXAM Michael Dupree MD 8/23/2019 11:24 AM    C : New Anterior Margin- Stitch Marking Anterior Margin Tissue Breast, Right SURGICAL PATHOLOGY EXAM Michael Dupree MD 8/23/2019 11:33 AM      Findings:   There was a mass oriented lateral-posterior that was tenting down the nipple on the right. The lymphnode removed had counts of 600 no other significant lymphnodes identified.   Complications: None.  Implants:  * No implants in log *

## 2019-08-26 LAB — COPATH REPORT: NORMAL

## 2019-08-28 ENCOUNTER — OFFICE VISIT (OUTPATIENT)
Dept: SURGERY | Facility: OTHER | Age: 64
End: 2019-08-28
Attending: SURGERY
Payer: COMMERCIAL

## 2019-08-28 ENCOUNTER — MYC MEDICAL ADVICE (OUTPATIENT)
Dept: SURGERY | Facility: OTHER | Age: 64
End: 2019-08-28

## 2019-08-28 VITALS
HEIGHT: 65 IN | WEIGHT: 141.8 LBS | OXYGEN SATURATION: 96 % | TEMPERATURE: 97.7 F | DIASTOLIC BLOOD PRESSURE: 88 MMHG | SYSTOLIC BLOOD PRESSURE: 138 MMHG | HEART RATE: 55 BPM | RESPIRATION RATE: 18 BRPM | BODY MASS INDEX: 23.63 KG/M2

## 2019-08-28 DIAGNOSIS — C50.911 INVASIVE LOBULAR CARCINOMA OF RIGHT BREAST IN FEMALE (H): ICD-10-CM

## 2019-08-28 DIAGNOSIS — C50.911 INVASIVE LOBULAR CARCINOMA OF BREAST, STAGE 1, RIGHT (H): Primary | ICD-10-CM

## 2019-08-28 PROBLEM — F17.211 CIGARETTE NICOTINE DEPENDENCE IN REMISSION: Status: ACTIVE | Noted: 2019-08-22

## 2019-08-28 PROBLEM — J18.9 RECURRENT PNEUMONIA: Status: ACTIVE | Noted: 2019-08-22

## 2019-08-28 PROCEDURE — 99024 POSTOP FOLLOW-UP VISIT: CPT | Performed by: SURGERY

## 2019-08-28 RX ORDER — SENNOSIDES 8.6 MG
8.6 CAPSULE ORAL PRN
Refills: 0 | COMMUNITY
Start: 2019-08-23 | End: 2019-10-02

## 2019-08-28 ASSESSMENT — PAIN SCALES - GENERAL: PAINLEVEL: NO PAIN (1)

## 2019-08-28 ASSESSMENT — MIFFLIN-ST. JEOR: SCORE: 1199.08

## 2019-08-28 NOTE — NURSING NOTE
"Chief Complaint   Patient presents with     Surgical Followup     s/p-wire localized segmental resection right breast 8/23/19       Initial /88 (BP Location: Right arm, Patient Position: Sitting, Cuff Size: Adult Large)   Pulse 55   Temp 97.7  F (36.5  C) (Tympanic)   Resp 18   Ht 1.651 m (5' 5\")   Wt 64.3 kg (141 lb 12.8 oz)   SpO2 96%   BMI 23.60 kg/m   Estimated body mass index is 23.6 kg/m  as calculated from the following:    Height as of this encounter: 1.651 m (5' 5\").    Weight as of this encounter: 64.3 kg (141 lb 12.8 oz).  Medication Reconciliation: complete    "

## 2019-08-28 NOTE — PATIENT INSTRUCTIONS
Thank you for allowing Dr. Dupree and our surgical team to participate in your care.  If you have a scheduling or an appointment question please contact our Health Unit Coordinator at her direct line [phone].   ALL nursing questions or concerns can be directed to your surgical nurse at: [phone]-[place]    An order has been placed to the [facility]- Dr Anguiano.  The [facility] will be contacting you to set this up.  Please call if you have further questions or concerns.

## 2019-08-28 NOTE — PROGRESS NOTES
"[facility] Progress Note    HPI:63 y.o. Female with history of recurrent pneumonia returns to clinic to discuss results related to her recent  breast biopsy showing invasive cancer. Lumpectomy with SLN performed on 8/23. Positive anterior and medial margins. Here today to discuss results.       S: Doing well some pain in her right scapula though no real breast pain, no other concerns. No redness some swelling and bruising.     O:   Vitals:  /88 (BP Location: Right arm, Patient Position: Sitting, Cuff Size: Adult Large)   Pulse 55   Temp 97.7  F (36.5  C) (Tympanic)   Resp 18   Ht 1.651 m (5' 5\")   Wt 64.3 kg (141 lb 12.8 oz)   SpO2 96%   BMI 23.60 kg/m        Physical Exam:  G: alert oriented, no acute distress   Breast: there is some blue dye at the incision from the methylene blue injection. Both inscisions are healing up without concerns.   Ext: WWP     Assessment/Plan:    63 y.o. Female with (T2N0) Stage IA/IB invasive lobular breast cancer, returns s/p lumpectomy. Discussed with patient that we had a positive anterior as well as medial margin. I had discussed initially the possibility of this from the abnormal appearance of her pre-op imaging. My concern is that this tumor is more infiltrating as I performed a generous lumpectomy and took an additional margin just beneath the nipple which came back as positive as well. I discussed that I believe the next step would be a mastectomy as with her breast size and indistinct boarders of her tumor that I fear would be unable to get a clear margin. I discussed the idea of immediate reconstruction and she would want this. We do not have plastic surgery availability here so will plan on referral to the [facility] as they would have access to all the imaging we have done up here. I will send off her oncotype Dx today for a recurrence score. She is in agreement with plan.        Michael Dupree MD     "

## 2019-08-28 NOTE — TELEPHONE ENCOUNTER
ONCOLOGY INTAKE: Records Information      APPT INFORMATION:  Referring provider:  Dr. Michael Dupree  Referring provider s clinic:  [facility]  Reason for visit/diagnosis:  breast cancer  Has patient been notified of appointment date and time?: yes    RECORDS INFORMATION:  Were the records received with the referral (via [employer])? No - internal referral, records are in epic

## 2019-08-28 NOTE — Clinical Note
Sending her down to the [facility] as she will now need a mastectomy, and she wants to have immediate reconstruction.

## 2019-08-28 NOTE — Clinical Note
Hi Dr. Anguiano, we have not met but I work as a general surgeon up at [facility]. I am sending down a lady with an invasive lobular carcinoma who I had recently done a lumpectomy on. The imaging was inconclusive as to the size of the tumor initially, but was much larger than expected. I did end up with a positive margin and discussed with patient that would recommend mastectomy as I was right under the nipple and still had a positive anterior margin. She was interested in immediate reconstruction so I thought she would benefit from your opinion as well availability of plastic surgery. Very nice woman and family, I appreciate your help in advance.

## 2019-08-29 DIAGNOSIS — E03.9 ACQUIRED HYPOTHYROIDISM: ICD-10-CM

## 2019-08-29 NOTE — TELEPHONE ENCOUNTER
Synthroid       Last Written Prescription Date:  6/27/19  Last Fill Quantity: 60,   # refills: 0  Last Office Visit: 7/24/19  Future Office visit:    Next 5 appointments (look out 90 days)    Sep 25, 2019  1:00 PM CDT  (Arrive by 12:45 PM)  Return Visit with CESAR Hernandez CNP  [facility] ([facility] ) [address]  [place] [address]  [phone]

## 2019-08-30 RX ORDER — LEVOTHYROXINE SODIUM 88 UG/1
88 TABLET ORAL DAILY
Qty: 60 TABLET | Refills: 3 | Status: SHIPPED | OUTPATIENT
Start: 2019-08-30 | End: 2020-04-15

## 2019-08-30 NOTE — TELEPHONE ENCOUNTER
Done patient given earlier appointment with other recommendation from Dr. Dupree and brigitte.   Megan Huber LPN

## 2019-09-16 ENCOUNTER — TELEPHONE (OUTPATIENT)
Dept: SURGERY | Facility: CLINIC | Age: 64
End: 2019-09-16

## 2019-09-16 NOTE — TELEPHONE ENCOUNTER
PREVISIT INFORMATION                                                    Denise Taylor is scheduled for future visit with Dr. Rubin on 9/19/19 at the [facility] specialty clinics.    Reason for visit: Invasive Lobular Carcinoma right breast  Referring provider: Michael Dupree MD  Have images been done?: Yes   Location: [facility]   Date: 8/14/19  Previous pathology reports?: No, available in chart  Have previous office records been requested?: No      REVIEW                                                      New patient packet mailed to patient: Yes    PLAN/FOLLOW-UP NEEDED                                                      Previsit review complete.  Patient will see provider at future scheduled appointment.     Writer called and spoke with patient with appointment reminder.    Patient Reminders Given:    Informed patient to bring an updated list of allergies, medications, pharmacy details and insurance information. Directed patient to come to the 2nd floor, check-in D for their appointment. Informed patient to call back if appointment needs to be cancelled or rescheduled at [phone].    Reminded patient to bring any outside records regarding this appointment or have them faxed to clinic at [phone].    Mary Cummins LPN

## 2019-09-18 NOTE — PROGRESS NOTES
[facility] Consultation    HPI:   Denise Taylor is a 64 year old female who is seen in consultation at the request of Dr. Dupree for evaluation of consideration of mastectomy after right breast lumpectomy for a grade 2 invasive lobular carcinoma with positive medial and anterior margins and separate anterior margins which was also positive. She had lumpectomy with SLNB on 8/23/2019 with Dr. Dupree and had more extensive disease surgically than expected based on imaging. She had originally presented on 7/25/2019 with a palpable mass in the right breast and imaging revealed no findings on mammogram or ultrasound. She then did have a biopsy which revealed invasive lobular carcinoma. She had a breast MRI which revealed post biopsy changes measuring 4.7cm and abnormal enhancement spanning 1.7cm.     She reports she has healed fine after her lumpectomy. She had some soreness in the axilla, minimal pain in the breast.       Hormonal history:  menarche 15yo, 2 children, 1st at age 28,  Post-menopausal, 25yrs OCP use, no HRT, no fertility treatment.     Family history of breast cancer: No  Family history of ovarian cancer:  No  Family history of colon cancer: Yes - father  Family history of prostate cancer: No    Imaging:     Recent Results (from the past 744 hour(s))   MA Breast Wire Placement 1st Lesion Righ    Narrative    PROCEDURE: MA BREAST WIRE PLACEMENT 1ST LESION RT 8/23/2019 8:45 AM    HISTORY: Right breast; Invasive carcinoma of breast (H)    COMPARISONS: 7/29/2019 and 7/25/2019.    TECHNIQUE: The procedure was discussed the patient and informed  consent was obtained. Timeout procedure was followed.    Using a CC approach clip in the upper outer quadrant of the right  breast was localized. Skin was cleansed with Betadine. Local  anesthesia was administered. 7 cm localization needle was advanced  into the breast adjacent to the clip. After confirmation of tip  location the wire was placed in the  needle was removed. Patient was  sent to the operating room with wire in place.           Impression    IMPRESSION: Wire localization of the right breast using mammographic  guidance.    SOLEDAD RINCON MD   NM Lymphoscintigraphy Injection only    Narrative    PROCEDURE: NM LYMPHOSCINTIGRAPHY INJECTION ONLY 8/23/2019 9:07 AM    HISTORY: Invasive carcinoma of breast (H)    COMPARISONS: None.    TECHNIQUE: The procedure was discussed patient and informed consent  was obtained. Timeout procedure was followed.    Skin was cleansed. 4 separate injections were made around the nipple  with doses of 0.5 uCi of technetium 99m filtered sulfur colloid with  1% lidocaine. Following the procedure the breast was massaged for 3  minutes.         Impression    IMPRESSION: [place] node injection in patient with known invasive  carcinoma the right breast.    SOLEDAD RINCON MD   MA Breast Specimen Right    Narrative    PROCEDURE: MA BREAST SPECIMEN RIGHT 8/23/2019 11:33 AM    HISTORY: Invasive ductal carcinoma of right breast (H)    COMPARISONS: None.    TECHNIQUE: Single view of the intraoperative specimen.    FINDINGS: Specimen radiograph contains the biopsy clip. It also  contains the distal end of the wire.         Impression    IMPRESSION: Wire localization with specimen which contains the biopsy  clip.    SOLEDAD RINCON MD         Past Medical History:   has a past medical history of Pneumonia.      Current Outpatient Medications:      cetirizine HCl (ZYRTEC ALLERGY) 10 MG CAPS, Take 10 mg by mouth daily, Disp: , Rfl:      eletriptan (RELPAX) 40 MG tablet, Take 1 tablet (40 mg) by mouth once as needed for migraine, Disp: 12 tablet, Rfl: 3     FLUoxetine HCl, PMDD, (SARAFEM) 10 MG TABS, Take 10 mg by mouth daily, Disp: 90 tablet, Rfl: 3     fluticasone (FLONASE) 50 MCG/ACT nasal spray, Spray 2 sprays into both nostrils daily, Disp: 16 g, Rfl: 11     HYDROcodone-acetaminophen (NORCO) 5-325 MG tablet, Take 1 tablet by  mouth every 4 hours as needed for moderate to severe pain, Disp: 20 tablet, Rfl: 0     levocetirizine (XYZAL) 5 MG tablet, Take 1 tablet (5 mg) by mouth every evening, Disp: 30 tablet, Rfl: 11     levofloxacin (LEVAQUIN) 500 MG tablet, Take 1 tablet (500 mg) by mouth daily, Disp: 7 tablet, Rfl: 0     levothyroxine (SYNTHROID/LEVOTHROID) 88 MCG tablet, Take 1 tablet (88 mcg) by mouth daily, Disp: 60 tablet, Rfl: 3     rosuvastatin (CRESTOR) 10 MG tablet, Take 1 tablet (10 mg) by mouth daily, Disp: 90 tablet, Rfl: 2     senna-docusate (SENOKOT-S/PERICOLACE) 8.6-50 MG tablet, Take 1-2 tablets by mouth 2 times daily, Disp: 30 tablet, Rfl: 0     Sennosides (SENNA) 8.6 MG CAPS, 8.6 mg by Oral or Feeding Tube route as needed, Disp: , Rfl: 0    Past Surgical History:  Past Surgical History:   Procedure Laterality Date     ARTHROSCOPY SHOULDER ROTATOR CUFF REPAIR Left 3/7/2018    Procedure: ARTHROSCOPY SHOULDER ROTATOR CUFF REPAIR;  LEFT SHOULDER ARTHROSCOPY, REPAIR ROTATOR CUFF: subacromial Decompression and AC Joint Resection;  Surgeon: Baldev Lou DO;  Location: [facility]     ARTHROTOMY SHOULDER, ROTATOR CUFF REPAIR, COMBINED Right 11/14/2018    Procedure: RIGHT SHOULDER DEBRIDEMENT, EXCISION OF LIPOMA  RIGHT UPPER ARM, EXCISION SCAR TISSUE AC JOINT;  Surgeon: Baldev Lou DO;  Location: [facility]     BIOPSY BREAST NEEDLE LOCALIZATION, BIOPSY NODE SENTINEL, COMBINED Right 8/23/2019    Procedure: RIGHT WIRE LOCALIZED LUMPECTOMY WITH SENTINEL  LYMP NODE;  Surgeon: Michael Dupree MD;  Location: [facility]     COLONOSCOPY  2006    Dr Lara     COLONOSCOPY N/A 10/10/2016    Procedure: COLONOSCOPY;  Surgeon: Christine Cintron MD;  Location: [facility]     SHOULDER SURGERY Right 2011/10/2012           Allergies   Allergen Reactions     Penicillins Rash     Zithromax [Azithromycin] Rash        Social History:  Social History     Socioeconomic History     Marital status:      Spouse name: Not on file     Number of children: Not  "on file     Years of education: Not on file     Highest education level: Not on file   Occupational History     Not on file   Social Needs     Financial resource strain: Not on file     Food insecurity:     Worry: Not on file     Inability: Not on file     Transportation needs:     Medical: Not on file     Non-medical: Not on file   Tobacco Use     Smoking status: Never Smoker     Smokeless tobacco: Never Used   Substance and Sexual Activity     Alcohol use: Yes     Alcohol/week: 1.0 oz     Types: 2 Glasses of wine per week     Comment: occasionally     Drug use: No     Sexual activity: Not on file   Lifestyle     Physical activity:     Days per week: Not on file     Minutes per session: Not on file     Stress: Not on file   Relationships     Social connections:     Talks on phone: Not on file     Gets together: Not on file     Attends Hinduism service: Not on file     Active member of club or organization: Not on file     Attends meetings of clubs or organizations: Not on file     Relationship status: Not on file     Intimate partner violence:     Fear of current or ex partner: Not on file     Emotionally abused: Not on file     Physically abused: Not on file     Forced sexual activity: Not on file   Other Topics Concern     Parent/sibling w/ CABG, MI or angioplasty before 65F 55M? Not Asked   Social History Narrative     Not on file        ROS:  The 10 point review of systems is negative other than noted in the HPI and above.    PE:  Vitals: BP (!) 159/88   Pulse (!) 49   Ht 1.651 m (5' 5\")   Wt 61.9 kg (136 lb 8 oz)   SpO2 99%   BMI 22.71 kg/m    General appearance: well-nourished, sitting comfortably, no apparent distress  Psych: normal affect, pleasant  HEENT:  Head normocephalic and atraumatic, pupils equal and round, conjunctivae clear, mucous membranes moist, external ears and nose normal  Neck: Supple without thyromegaly or masses  Lungs: Respirations unlabored  Lymphatic: No cervical, or " supraclavicular lymphadenopathy  Extremities: Without edema  Musculoskeletal:  Normal station and gait  Neurologic: nonfocal, grossly intact times four extremities, alert and oriented times three  Psychiatric: Mood and affect are appropriate  Skin: Without lesions or rashes    Breast:  A bilateral breast exam was performed in the supine position.. Bilateral breasts were palpated in a circumferential clockwise fashion including the supraclavicular and axillary areas.   Right breast with lumpectomy scar on the upper outer breast with residual blue dye present at skin level, elevation of right NAC compared to left. Firmness spanning 5-6 cm underlying the lumpectomy incision. No fluctuance. Left breast is normal. No masses. Axillary incision on right healing well, no seroma    Lymph:       No supraclavicular/infraclavicular adenopathy.   Axillary adenopathy: none    Assessment:  Right  breast invasive lobular carcinoma, grade 2, ER +, [place] +, [id number] - measuring 4.5 cm at 10:00 and 5 cm from the nipple s/p lumpectomy with positive margins anteriorly and medially and cancer spanning 4.5cm.     Plan:  Denise is a irwin 64yof who as above has had lumpectomy with SLNB for a large invasive lobular carcinoma which was not apparent on diagnostic imaging and MRI grossly underestimated extent of disease.  I reviewed the imaging and pathology reports with her and her  and explained the findings.  We talked about the fact that this is invasive lobular  that is large in size and despite a large lumpectomy, positive margins remain.  We also talked about the fact that the tumor has favorable features (strongly ER/NY positive and no lymphvascular invasion) and should be quite treatable. An oncotype was ordered and we will try to find results of this as if >25 would have her see oncology prior to surgery and likely place port at time of surgery. If <25 would have no benefit from chemotherapy.     We next discussed the surgical  options for treatment. I would recommend completion mastectomy given the above findings and she is in agreement with this plan. I would not anticipate post mastectomy radiation unless there is a significant amount of residual disease. Her sentinel node was negative and she does not require further dale surgery.     We also talked about post-mastectomy reconstruction and the stages involved. We also discussed the various types of mastectomy, including total, skin-sparing, and nipple-sparing mastectomy.  We reviewed that the nipple-sparing technique is cosmetic; sensation and contractility will likely be lost.  She  is a candidate for nipple-sparing mastectomy from an oncologic perspective; however, her NAC is already higher on the right and I would also have to excise the anterior skin margin over her lumpectomy so this cosmetically may not be the best option and she is fine with that.  The option of having immediate versus delayed reconstruction was also discussed.   We reviewed that the advantages of immediate reconstruction includes superior cosmetics, as the skin is preserved.  She would like bilateral mastectomies. We discussed removing her left breast would not change survival related to her right cancer but can decrease risk going forward.     She thought she had genetic testing - Jordyn will look into this and if not enter orders as she is interested.      Plan:   Plastic surgery referral  Schedule bilateral skin sparing mastectomies  Follow up oncotype results (if >25 will reach out to Dr. Dupree to assist in scheduling with Oncology in [place])  Check if genetic counseling has been ordered    Time spent with the patient with greater that 50% of the time in discussion was 60 minutes.    Opal Rubin MD      Please route or send letter to:  Primary Care Provider (PCP) and Referring Provider

## 2019-09-19 ENCOUNTER — TELEPHONE (OUTPATIENT)
Dept: SURGERY | Facility: PHYSICIAN GROUP | Age: 64
End: 2019-09-19

## 2019-09-19 ENCOUNTER — TELEPHONE (OUTPATIENT)
Dept: SURGERY | Facility: CLINIC | Age: 64
End: 2019-09-19

## 2019-09-19 ENCOUNTER — OFFICE VISIT (OUTPATIENT)
Dept: SURGERY | Facility: CLINIC | Age: 64
End: 2019-09-19
Payer: COMMERCIAL

## 2019-09-19 ENCOUNTER — CARE COORDINATION (OUTPATIENT)
Dept: SURGERY | Facility: CLINIC | Age: 64
End: 2019-09-19

## 2019-09-19 VITALS
WEIGHT: 136.5 LBS | OXYGEN SATURATION: 99 % | SYSTOLIC BLOOD PRESSURE: 159 MMHG | HEIGHT: 65 IN | HEART RATE: 49 BPM | DIASTOLIC BLOOD PRESSURE: 88 MMHG | BODY MASS INDEX: 22.74 KG/M2

## 2019-09-19 DIAGNOSIS — Z17.0 MALIGNANT NEOPLASM OF UPPER-OUTER QUADRANT OF RIGHT BREAST IN FEMALE, ESTROGEN RECEPTOR POSITIVE (H): Primary | ICD-10-CM

## 2019-09-19 DIAGNOSIS — C50.411 MALIGNANT NEOPLASM OF UPPER-OUTER QUADRANT OF RIGHT BREAST IN FEMALE, ESTROGEN RECEPTOR POSITIVE (H): Primary | ICD-10-CM

## 2019-09-19 DIAGNOSIS — C50.911 INVASIVE LOBULAR CARCINOMA OF RIGHT BREAST IN FEMALE (H): Primary | ICD-10-CM

## 2019-09-19 PROCEDURE — 99244 OFF/OP CNSLTJ NEW/EST MOD 40: CPT | Performed by: SURGERY

## 2019-09-19 ASSESSMENT — MIFFLIN-ST. JEOR: SCORE: 1170.04

## 2019-09-19 ASSESSMENT — PAIN SCALES - GENERAL: PAINLEVEL: NO PAIN (0)

## 2019-09-19 NOTE — PATIENT INSTRUCTIONS
You are scheduled with Dr. Dale Paul at [facility] on 9/23/19 at 11:15am.    Your surgery is scheduled on 10/9/19 at [facility].

## 2019-09-19 NOTE — TELEPHONE ENCOUNTER
Type of surgery: Bilateral skin sparing mastectomy  Location of surgery: [facility]  Date and time of surgery: 10/9/19 at 12pm  Surgeon: Dr. Opal Rubin  Pre-Op Appt Date: Patient to schedule  Post-Op Appt Date: Patient to schedule   Packet sent out: Yes  Pre-cert/Authorization completed:  Not Applicable  Date: 9/19/19    Coordinated with Dr. Dale Paul

## 2019-09-19 NOTE — TELEPHONE ENCOUNTER
Denise notified, Oncotype DX Breast Recurrence Score of 12. Absolute chemotherapy benefit < 1%. Discuss results and further treatment recommendations in greater detail with medical oncologist. Both parties in agreement of plan.    Jordyn PENAN, RN, OCN  Oncology Care Coordinator  [facility]  [phone]

## 2019-09-19 NOTE — NURSING NOTE
Denise Taylor's goals for this visit include:   Chief Complaint   Patient presents with     Consult     Breast cancer       She requests these members of her care team be copied on today's visit information: yes    PCP: Mirna Roblero    Referring Provider:  Michael Dupree MD  [address] [place], [place] [id number]    There were no vitals taken for this visit.    Do you need any medication refills at today's visit? No    Mary Cummins LPN

## 2019-09-19 NOTE — PROGRESS NOTES
Introduced self and explained my role of oncology care coordinator to patient. Accompanied Denise and her  to her surgical consultation today with Dr. Rubin.      Denise was given the new patient packet which includes educational material and support resources such as American [employer]: For Women Facing Breast Cancer, What You Need to Know about Mastectomy Surgery, [employer], Fighting Cancer through Diet and Lifestyle, and [facility] Breast Cancer Support Group.     At the end of the consultation, we reviewed plan of care and education. Denise has decided to proceed with bilateral skin sparking mastectomy. Will arrange for plastic surgery consult. Will obtain results of Oncotype DX which was submitted on lumpectomy specimen. Referral placed for genetic counseling.     Denise has my contact information and knows to contact me in the future with any questions/concerns.     Jordyn PENAN, RN, OCN  Oncology Care Coordinator  [facility]  [phone]

## 2019-09-19 NOTE — LETTER
9/19/2019         RE: Denise Taylor  [address] [place] [facility] [address]        Dear Colleague,    Thank you for referring your patient, Denise Taylor, to the [facility]. Please see a copy of my visit note below.    [facility]ation    HPI:   Denise Taylor is a 64 year old female who is seen in consultation at the request of Dr. Dupree for evaluation of consideration of mastectomy after right breast lumpectomy for a grade 2 invasive lobular carcinoma with positive medial and anterior margins and separate anterior margins which was also positive. She had lumpectomy with SLNB on 8/23/2019 with Dr. Dupree and had more extensive disease surgically than expected based on imaging. She had originally presented on 7/25/2019 with a palpable mass in the right breast and imaging revealed no findings on mammogram or ultrasound. She then did have a biopsy which revealed invasive lobular carcinoma. She had a breast MRI which revealed post biopsy changes measuring 4.7cm and abnormal enhancement spanning 1.7cm.     She reports she has healed fine after her lumpectomy. She had some soreness in the axilla, minimal pain in the breast.       Hormonal history:  menarche 13yo, 2 children, 1st at age 28,  Post-menopausal, 25yrs OCP use, no HRT, no fertility treatment.     Family history of breast cancer: No  Family history of ovarian cancer:  No  Family history of colon cancer: Yes - father  Family history of prostate cancer: No    Imaging:     Recent Results (from the past 744 hour(s))   MA Breast Wire Placement 1st Lesion Righ    Narrative    PROCEDURE: MA BREAST WIRE PLACEMENT 1ST LESION RT 8/23/2019 8:45 AM    HISTORY: Right breast; Invasive carcinoma of breast (H)    COMPARISONS: 7/29/2019 and 7/25/2019.    TECHNIQUE: The procedure was discussed the patient and informed  consent was obtained. Timeout procedure was followed.    Using a CC approach clip in the  upper outer quadrant of the right  breast was localized. Skin was cleansed with Betadine. Local  anesthesia was administered. 7 cm localization needle was advanced  into the breast adjacent to the clip. After confirmation of tip  location the wire was placed in the needle was removed. Patient was  sent to the operating room with wire in place.           Impression    IMPRESSION: Wire localization of the right breast using mammographic  guidance.    SOLEDAD RINCON MD   NM Lymphoscintigraphy Injection only    Narrative    PROCEDURE: NM LYMPHOSCINTIGRAPHY INJECTION ONLY 8/23/2019 9:07 AM    HISTORY: Invasive carcinoma of breast (H)    COMPARISONS: None.    TECHNIQUE: The procedure was discussed patient and informed consent  was obtained. Timeout procedure was followed.    Skin was cleansed. 4 separate injections were made around the nipple  with doses of 0.5 uCi of technetium 99m filtered sulfur colloid with  1% lidocaine. Following the procedure the breast was massaged for 3  minutes.         Impression    IMPRESSION: [place] node injection in patient with known invasive  carcinoma the right breast.    SOLEDAD RINCON MD   MA Breast Specimen Right    Narrative    PROCEDURE: MA BREAST SPECIMEN RIGHT 8/23/2019 11:33 AM    HISTORY: Invasive ductal carcinoma of right breast (H)    COMPARISONS: None.    TECHNIQUE: Single view of the intraoperative specimen.    FINDINGS: Specimen radiograph contains the biopsy clip. It also  contains the distal end of the wire.         Impression    IMPRESSION: Wire localization with specimen which contains the biopsy  clip.    SOLEDAD RINCON MD         Past Medical History:   has a past medical history of Pneumonia.      Current Outpatient Medications:      cetirizine HCl (ZYRTEC ALLERGY) 10 MG CAPS, Take 10 mg by mouth daily, Disp: , Rfl:      eletriptan (RELPAX) 40 MG tablet, Take 1 tablet (40 mg) by mouth once as needed for migraine, Disp: 12 tablet, Rfl: 3     FLUoxetine HCl,  PMDD, (SARAFEM) 10 MG TABS, Take 10 mg by mouth daily, Disp: 90 tablet, Rfl: 3     fluticasone (FLONASE) 50 MCG/ACT nasal spray, Spray 2 sprays into both nostrils daily, Disp: 16 g, Rfl: 11     HYDROcodone-acetaminophen (NORCO) 5-325 MG tablet, Take 1 tablet by mouth every 4 hours as needed for moderate to severe pain, Disp: 20 tablet, Rfl: 0     levocetirizine (XYZAL) 5 MG tablet, Take 1 tablet (5 mg) by mouth every evening, Disp: 30 tablet, Rfl: 11     levofloxacin (LEVAQUIN) 500 MG tablet, Take 1 tablet (500 mg) by mouth daily, Disp: 7 tablet, Rfl: 0     levothyroxine (SYNTHROID/LEVOTHROID) 88 MCG tablet, Take 1 tablet (88 mcg) by mouth daily, Disp: 60 tablet, Rfl: 3     rosuvastatin (CRESTOR) 10 MG tablet, Take 1 tablet (10 mg) by mouth daily, Disp: 90 tablet, Rfl: 2     senna-docusate (SENOKOT-S/PERICOLACE) 8.6-50 MG tablet, Take 1-2 tablets by mouth 2 times daily, Disp: 30 tablet, Rfl: 0     Sennosides (SENNA) 8.6 MG CAPS, 8.6 mg by Oral or Feeding Tube route as needed, Disp: , Rfl: 0    Past Surgical History:  Past Surgical History:   Procedure Laterality Date     ARTHROSCOPY SHOULDER ROTATOR CUFF REPAIR Left 3/7/2018    Procedure: ARTHROSCOPY SHOULDER ROTATOR CUFF REPAIR;  LEFT SHOULDER ARTHROSCOPY, REPAIR ROTATOR CUFF: subacromial Decompression and AC Joint Resection;  Surgeon: Baldev Lou DO;  Location: [facility]     ARTHROTOMY SHOULDER, ROTATOR CUFF REPAIR, COMBINED Right 11/14/2018    Procedure: RIGHT SHOULDER DEBRIDEMENT, EXCISION OF LIPOMA  RIGHT UPPER ARM, EXCISION SCAR TISSUE AC JOINT;  Surgeon: Baldev Lou DO;  Location: [facility]     BIOPSY BREAST NEEDLE LOCALIZATION, BIOPSY NODE SENTINEL, COMBINED Right 8/23/2019    Procedure: RIGHT WIRE LOCALIZED LUMPECTOMY WITH SENTINEL  LYMP NODE;  Surgeon: Michael Dupree MD;  Location: [facility]     COLONOSCOPY  2006    Dr Lara     COLONOSCOPY N/A 10/10/2016    Procedure: COLONOSCOPY;  Surgeon: Christine Cintron MD;  Location: [facility]     SHOULDER SURGERY  "Right 2011/10/2012           Allergies   Allergen Reactions     Penicillins Rash     Zithromax [Azithromycin] Rash        Social History:  Social History     Socioeconomic History     Marital status:      Spouse name: Not on file     Number of children: Not on file     Years of education: Not on file     Highest education level: Not on file   Occupational History     Not on file   Social Needs     Financial resource strain: Not on file     Food insecurity:     Worry: Not on file     Inability: Not on file     Transportation needs:     Medical: Not on file     Non-medical: Not on file   Tobacco Use     Smoking status: Never Smoker     Smokeless tobacco: Never Used   Substance and Sexual Activity     Alcohol use: Yes     Alcohol/week: 1.0 oz     Types: 2 Glasses of wine per week     Comment: occasionally     Drug use: No     Sexual activity: Not on file   Lifestyle     Physical activity:     Days per week: Not on file     Minutes per session: Not on file     Stress: Not on file   Relationships     Social connections:     Talks on phone: Not on file     Gets together: Not on file     Attends Holiness service: Not on file     Active member of club or organization: Not on file     Attends meetings of clubs or organizations: Not on file     Relationship status: Not on file     Intimate partner violence:     Fear of current or ex partner: Not on file     Emotionally abused: Not on file     Physically abused: Not on file     Forced sexual activity: Not on file   Other Topics Concern     Parent/sibling w/ CABG, MI or angioplasty before 65F 55M? Not Asked   Social History Narrative     Not on file        ROS:  The 10 point review of systems is negative other than noted in the HPI and above.    PE:  Vitals: BP (!) 159/88   Pulse (!) 49   Ht 1.651 m (5' 5\")   Wt 61.9 kg (136 lb 8 oz)   SpO2 99%   BMI 22.71 kg/m     General appearance: well-nourished, sitting comfortably, no apparent distress  Psych: normal affect, " pleasant  HEENT:  Head normocephalic and atraumatic, pupils equal and round, conjunctivae clear, mucous membranes moist, external ears and nose normal  Neck: Supple without thyromegaly or masses  Lungs: Respirations unlabored  Lymphatic: No cervical, or supraclavicular lymphadenopathy  Extremities: Without edema  Musculoskeletal:  Normal station and gait  Neurologic: nonfocal, grossly intact times four extremities, alert and oriented times three  Psychiatric: Mood and affect are appropriate  Skin: Without lesions or rashes    Breast:  A bilateral breast exam was performed in the supine position.. Bilateral breasts were palpated in a circumferential clockwise fashion including the supraclavicular and axillary areas.   Right breast with lumpectomy scar on the upper outer breast with residual blue dye present at skin level, elevation of right NAC compared to left. Firmness spanning 5-6 cm underlying the lumpectomy incision. No fluctuance. Left breast is normal. No masses. Axillary incision on right healing well, no seroma    Lymph:       No supraclavicular/infraclavicular adenopathy.   Axillary adenopathy: none    Assessment:  Right  breast invasive lobular carcinoma, grade 2, ER +, SC +, [id number] - measuring 4.5 cm at 10:00 and 5 cm from the nipple s/p lumpectomy with positive margins anteriorly and medially and cancer spanning 4.5cm.     Plan:  Denise is a irwin 64yof who as above has had lumpectomy with SLNB for a large invasive lobular carcinoma which was not apparent on diagnostic imaging and MRI grossly underestimated extent of disease.  I reviewed the imaging and pathology reports with her and her  and explained the findings.  We talked about the fact that this is invasive lobular  that is large in size and despite a large lumpectomy, positive margins remain.  We also talked about the fact that the tumor has favorable features (strongly ER/SC positive and no lymphvascular invasion) and should be quite  treatable. An oncotype was ordered and we will try to find results of this as if >25 would have her see oncology prior to surgery and likely place port at time of surgery. If <25 would have no benefit from chemotherapy.     We next discussed the surgical options for treatment. I would recommend completion mastectomy given the above findings and she is in agreement with this plan. I would not anticipate post mastectomy radiation unless there is a significant amount of residual disease. Her sentinel node was negative and she does not require further dale surgery.     We also talked about post-mastectomy reconstruction and the stages involved. We also discussed the various types of mastectomy, including total, skin-sparing, and nipple-sparing mastectomy.  We reviewed that the nipple-sparing technique is cosmetic; sensation and contractility will likely be lost.   She  is a candidate for nipple-sparing mastectomy from an oncologic perspective; however, her NAC is already higher on the right and I would also have to excise the anterior skin margin over her lumpectomy so this cosmetically may not be the best option and she is fine with that.  The option of having immediate versus delayed reconstruction was also discussed.   We reviewed that the advantages of immediate reconstruction includes superior cosmetics, as the skin is preserved.  She would like bilateral mastectomies. We discussed removing her left breast would not change survival related to her right cancer but can decrease risk going forward.     She thought she had genetic testing - Jordyn will look into this and if not enter orders as she is interested.      Plan:   Plastic surgery referral  Schedule bilateral skin sparing mastectomies  Follow up oncotype results (if >25 will reach out to Dr. Dupree to assist in scheduling with Oncology in [place])  Check if genetic counseling has been ordered    Time spent with the patient with greater that 50% of the time in  discussion was 60 minutes.    Opal Rubin MD      Please route or send letter to:  Primary Care Provider (PCP) and Referring Provider         Again, thank you for allowing me to participate in the care of your patient.        Sincerely,        Opal Rubin MD

## 2019-09-20 ENCOUNTER — MEDICAL CORRESPONDENCE (OUTPATIENT)
Dept: HEALTH INFORMATION MANAGEMENT | Facility: CLINIC | Age: 64
End: 2019-09-20

## 2019-09-26 ENCOUNTER — PRE VISIT (OUTPATIENT)
Dept: RADIATION THERAPY | Facility: OUTPATIENT CENTER | Age: 64
End: 2019-09-26

## 2019-09-26 NOTE — PROGRESS NOTES
[facility] [place]  [address]  [place] [address]  [phone]  Dept: [phone]    PRE-OP EVALUATION:  Today's date: 10/2/2019    Denise Taylor (: 1955) presents for pre-operative evaluation assessment as requested by Dr. Nguyen.  She requires evaluation and anesthesia risk assessment prior to undergoing surgery/procedure for treatment of mastectomy .    Proposed Surgery/ Procedure: mastectomy  Date of Surgery/ Procedure: 10-9-19  Time of Surgery/ Procedure: [facility]  Hospital/Surgical Facility: [facility]   Primary Physician: Mirna Roblero  Type of Anesthesia Anticipated: to be determined    Patient has a Health Care Directive or Living Will:  YES on file     1. NO - Do you have a history of heart attack, stroke, stent, bypass or surgery on an artery in the head, neck, heart or legs?  2. NO - Do you ever have any pain or discomfort in your chest?  3. NO - Do you have a history of  Heart Failure?  4. NO - Are you troubled by shortness of breath when: walking on the level, up a slight hill or at night?  5. NO - Do you currently have a cold, bronchitis or other respiratory infection?  6. NO - Do you have a cough, shortness of breath or wheezing?  7. NO - Do you sometimes get pains in the calves of your legs when you walk?  8. NO - Do you or anyone in your family have previous history of blood clots?  9. NO - Do you or does anyone in your family have a serious bleeding problem such as prolonged bleeding following surgeries or cuts?  10. NO - Have you ever had problems with anemia or been told to take iron pills?  11. NO - Have you had any abnormal blood loss such as black, tarry or bloody stools, or abnormal vaginal bleeding?  12. NO - Have you ever had a blood transfusion?  13. NO - Have you or any of your relatives ever had problems with anesthesia?  14. NO - Do you have sleep apnea, excessive snoring or daytime drowsiness?  15. NO - Do you have any prosthetic heart  valves?  16. NO - Do you have prosthetic joints?  17. NO - Is there any chance that you may be pregnant?      HPI:     HPI related to upcoming procedure: she will be having a double mastectomy due to lobular carcinoma       See problem list for active medical problems.  Problems all longstanding and stable, except as noted/documented.  See ROS for pertinent symptoms related to these conditions.      MEDICAL HISTORY:     Patient Active Problem List    Diagnosis Date Noted     Invasive lobular carcinoma of right breast in female (H) 08/28/2019     Priority: Medium     Malignant neoplasm of right female breast, unspecified estrogen receptor status, unspecified site of breast (H) 08/22/2019     Priority: Medium     Cigarette nicotine dependence in remission 08/22/2019     Priority: Medium     Recurrent pneumonia 08/22/2019     Priority: Medium     ACP (advance care planning) 07/27/2017     Priority: Medium     Advance Care Planning 7/27/2017: ACP Review of Chart / Resources Provided:  Reviewed chart for advance care plan.  Denise MAYELA Taylor has no plan or code status on file. Discussed available resources and provided with information.   Added by Marah Henry             Hypothyroidism, unspecified type 07/27/2017     Priority: Medium     Pneumonia 07/20/2015     Priority: Medium     History of basal cell carcinoma 05/18/2015     Priority: Medium     Hyperlipidemia with target LDL less than 130 05/18/2015     Priority: Medium     Diagnosis updated by automated process. Provider to review and confirm.       Actinic keratosis 12/01/2011     Priority: Medium     AC separation, type 5 06/27/2011     Priority: Medium     Overview:   IMO Update 10/11       Capillary disease 08/06/2007     Priority: Medium     Overview:   IMO Update 10/11       Other dyschromia 08/06/2007     Priority: Medium     Scar condition and fibrosis of skin 08/06/2007     Priority: Medium      Past Medical History:   Diagnosis Date     Pneumonia       Past Surgical History:   Procedure Laterality Date     ARTHROSCOPY SHOULDER ROTATOR CUFF REPAIR Left 3/7/2018    Procedure: ARTHROSCOPY SHOULDER ROTATOR CUFF REPAIR;  LEFT SHOULDER ARTHROSCOPY, REPAIR ROTATOR CUFF: subacromial Decompression and AC Joint Resection;  Surgeon: Baldev Lou DO;  Location: [facility]     ARTHROTOMY SHOULDER, ROTATOR CUFF REPAIR, COMBINED Right 11/14/2018    Procedure: RIGHT SHOULDER DEBRIDEMENT, EXCISION OF LIPOMA  RIGHT UPPER ARM, EXCISION SCAR TISSUE AC JOINT;  Surgeon: Baldev Lou DO;  Location: [facility]     BIOPSY BREAST NEEDLE LOCALIZATION, BIOPSY NODE SENTINEL, COMBINED Right 8/23/2019    Procedure: RIGHT WIRE LOCALIZED LUMPECTOMY WITH SENTINEL  LYMP NODE;  Surgeon: Michael Dupree MD;  Location: [facility]     COLONOSCOPY  2006    Dr Lara     COLONOSCOPY N/A 10/10/2016    Procedure: COLONOSCOPY;  Surgeon: Christine Cintron MD;  Location: [facility]     SHOULDER SURGERY Right 2011/10/2012     Current Outpatient Medications   Medication Sig Dispense Refill     eletriptan (RELPAX) 40 MG tablet Take 1 tablet (40 mg) by mouth once as needed for migraine 12 tablet 3     FLUoxetine HCl, PMDD, (SARAFEM) 10 MG TABS Take 10 mg by mouth daily 90 tablet 3     fluticasone (ARNUITY ELLIPTA) 200 MCG/ACT inhaler        levocetirizine (XYZAL) 5 MG tablet Take 1 tablet (5 mg) by mouth every evening 30 tablet 11     levothyroxine (SYNTHROID/LEVOTHROID) 88 MCG tablet Take 1 tablet (88 mcg) by mouth daily 60 tablet 3     rosuvastatin (CRESTOR) 10 MG tablet Take 1 tablet (10 mg) by mouth daily 90 tablet 2     OTC products: None, except as noted above    Allergies   Allergen Reactions     Penicillins Rash     Zithromax [Azithromycin] Rash      Latex Allergy: NO    Social History     Tobacco Use     Smoking status: Never Smoker     Smokeless tobacco: Never Used   Substance Use Topics     Alcohol use: Yes     Alcohol/week: 1.7 standard drinks     Types: 2 Glasses of wine per week     Comment: occasionally  "    History   Drug Use No       REVIEW OF SYSTEMS:   CONSTITUTIONAL: NEGATIVE for fever, chills, change in weight  INTEGUMENTARY/SKIN: NEGATIVE for worrisome rashes, moles or lesions  EYES: NEGATIVE for vision changes or irritation  ENT/MOUTH: NEGATIVE for ear, mouth and throat problems  RESP: NEGATIVE for significant cough or SOB  BREAST: NEGATIVE for masses, tenderness or discharge  CV: NEGATIVE for chest pain, palpitations or peripheral edema  GI: NEGATIVE for nausea, abdominal pain, heartburn, or change in bowel habits  : NEGATIVE for frequency, dysuria, or hematuria  MUSCULOSKELETAL: NEGATIVE for significant arthralgias or myalgia  NEURO: NEGATIVE for weakness, dizziness or paresthesias  ENDOCRINE: NEGATIVE for temperature intolerance, skin/hair changes  HEME: NEGATIVE for bleeding problems  PSYCHIATRIC: NEGATIVE for changes in mood or affect    EXAM:   /88 (Patient Position: Sitting)   Pulse 54   Ht 1.651 m (5' 5\")   Wt 61.7 kg (136 lb)   SpO2 99%   BMI 22.63 kg/m      GENERAL APPEARANCE: healthy, alert and no distress     EYES: EOMI, PERRL     HENT: ear canals and TM's normal and nose and mouth without ulcers or lesions     NECK: no adenopathy, no asymmetry, masses, or scars and thyroid normal to palpation     RESP: lungs clear to auscultation - no rales, rhonchi or wheezes     BREAST: she has scar on right breast nipple inverted.      CV: regular rates and rhythm, normal S1 S2, no S3 or S4 and no murmur, click or rub     ABDOMEN:  soft, nontender, no HSM or masses and bowel sounds normal     MS: extremities normal- no gross deformities noted, no evidence of inflammation in joints, FROM in all extremities.     SKIN: no suspicious lesions or rashes     NEURO: Normal strength and tone, sensory exam grossly normal, mentation intact and speech normal     PSYCH: mentation appears normal. and affect normal/bright     LYMPHATICS: No cervical adenopathy    DIAGNOSTICS:     EKG: appears normal, NSR, sinus " bradycardia, normal axis, normal intervals, no acute ST/T changes c/w ischemia, no LVH by voltage criteria, unchanged from previous tracings  Labs Drawn and in Process:   Unresulted Labs Ordered in the Past 30 Days of this Admission     No orders found from 9/2/2019 to 10/3/2019.          Recent Labs   Lab Test 07/30/19  1623 07/30/19  1247 07/24/19  1635 06/30/19  2235   HGB 13.4 13.4 12.8 12.2   PLT  --  187 163 177   NA  --  140  --  140   POTASSIUM  --  4.2  --  3.7   CR  --  0.76  --  0.66      Results for orders placed or performed in visit on 10/02/19   CBC with platelets   Result Value Ref Range    WBC 6.8 4.0 - 11.0 10e9/L    RBC Count 4.78 3.8 - 5.2 10e12/L    Hemoglobin 14.2 11.7 - 15.7 g/dL    Hematocrit 42.5 35.0 - 47.0 %    MCV 89 78 - 100 fl    MCH 29.7 26.5 - 33.0 pg    MCHC 33.4 31.5 - 36.5 g/dL    RDW 13.3 10.0 - 15.0 %    Platelet Count 170 150 - 450 10e9/L   Comprehensive metabolic panel   Result Value Ref Range    Sodium 139 133 - 144 mmol/L    Potassium 3.8 3.4 - 5.3 mmol/L    Chloride 106 94 - 109 mmol/L    Carbon Dioxide 28 20 - 32 mmol/L    Anion Gap 5 3 - 14 mmol/L    Glucose 86 70 - 99 mg/dL    Urea Nitrogen 16 7 - 30 mg/dL    Creatinine 0.75 0.52 - 1.04 mg/dL    GFR Estimate 84 >60 mL/min/[1.73_m2]    GFR Estimate If Black >90 >60 mL/min/[1.73_m2]    Calcium 9.2 8.5 - 10.1 mg/dL    Bilirubin Total 0.4 0.2 - 1.3 mg/dL    Albumin 4.3 3.4 - 5.0 g/dL    Protein Total 8.1 6.8 - 8.8 g/dL    Alkaline Phosphatase 71 40 - 150 U/L    ALT 39 0 - 50 U/L    AST 30 0 - 45 U/L     CXR :  Stable with chronic changes. Seeing Pulmonary.   IMPRESSION:   Reason for surgery/procedure: bilateral mastectomy. Has lobular breast cancer.  Will be doing above the muscle implants.   Diagnosis/reason for consult: medical clearance.     The proposed surgical procedure is considered INTERMEDIATE risk.    REVISED CARDIAC RISK INDEX  The patient has the following serious cardiovascular risks for perioperative  complications such as (MI, PE, VFib and 3  AV Block):  No serious cardiac risks  INTERPRETATION: 1 risks: Class II (low risk - 0.9% complication rate)    The patient has the following additional risks for perioperative complications:  No identified additional risks      ICD-10-CM    1. Preop general physical exam Z01.818 EKG 12-lead complete w/read - (Clinic Performed)     CBC with platelets     XR CHEST 2 VW (Clinic Performed)     Comprehensive metabolic panel       RECOMMENDATIONS:         --Patient is to take all scheduled medications on the day of surgery EXCEPT for modifications listed below.    APPROVAL GIVEN to proceed with proposed procedure, without further diagnostic evaluation       Signed Electronically by: DASH Vital    Copy of this evaluation report is provided to requesting physician.    [facility] Preop Guidelines    Revised Cardiac Risk Index

## 2019-10-02 ENCOUNTER — OFFICE VISIT (OUTPATIENT)
Dept: FAMILY MEDICINE | Facility: OTHER | Age: 64
End: 2019-10-02
Attending: PHYSICIAN ASSISTANT
Payer: COMMERCIAL

## 2019-10-02 ENCOUNTER — ANCILLARY PROCEDURE (OUTPATIENT)
Dept: GENERAL RADIOLOGY | Facility: OTHER | Age: 64
End: 2019-10-02
Attending: PHYSICIAN ASSISTANT
Payer: COMMERCIAL

## 2019-10-02 VITALS
SYSTOLIC BLOOD PRESSURE: 122 MMHG | WEIGHT: 136 LBS | BODY MASS INDEX: 22.66 KG/M2 | HEIGHT: 65 IN | HEART RATE: 54 BPM | OXYGEN SATURATION: 99 % | DIASTOLIC BLOOD PRESSURE: 88 MMHG

## 2019-10-02 DIAGNOSIS — Z01.818 PREOP GENERAL PHYSICAL EXAM: ICD-10-CM

## 2019-10-02 DIAGNOSIS — Z01.818 PREOP GENERAL PHYSICAL EXAM: Primary | ICD-10-CM

## 2019-10-02 LAB
ALBUMIN SERPL-MCNC: 4.3 G/DL (ref 3.4–5)
ALP SERPL-CCNC: 71 U/L (ref 40–150)
ALT SERPL W P-5'-P-CCNC: 39 U/L (ref 0–50)
ANION GAP SERPL CALCULATED.3IONS-SCNC: 5 MMOL/L (ref 3–14)
AST SERPL W P-5'-P-CCNC: 30 U/L (ref 0–45)
BILIRUB SERPL-MCNC: 0.4 MG/DL (ref 0.2–1.3)
BUN SERPL-MCNC: 16 MG/DL (ref 7–30)
CALCIUM SERPL-MCNC: 9.2 MG/DL (ref 8.5–10.1)
CHLORIDE SERPL-SCNC: 106 MMOL/L (ref 94–109)
CO2 SERPL-SCNC: 28 MMOL/L (ref 20–32)
CREAT SERPL-MCNC: 0.75 MG/DL (ref 0.52–1.04)
ERYTHROCYTE [DISTWIDTH] IN BLOOD BY AUTOMATED COUNT: 13.3 % (ref 10–15)
GFR SERPL CREATININE-BSD FRML MDRD: 84 ML/MIN/{1.73_M2}
GLUCOSE SERPL-MCNC: 86 MG/DL (ref 70–99)
HCT VFR BLD AUTO: 42.5 % (ref 35–47)
HGB BLD-MCNC: 14.2 G/DL (ref 11.7–15.7)
MCH RBC QN AUTO: 29.7 PG (ref 26.5–33)
MCHC RBC AUTO-ENTMCNC: 33.4 G/DL (ref 31.5–36.5)
MCV RBC AUTO: 89 FL (ref 78–100)
PLATELET # BLD AUTO: 170 10E9/L (ref 150–450)
POTASSIUM SERPL-SCNC: 3.8 MMOL/L (ref 3.4–5.3)
PROT SERPL-MCNC: 8.1 G/DL (ref 6.8–8.8)
RBC # BLD AUTO: 4.78 10E12/L (ref 3.8–5.2)
SODIUM SERPL-SCNC: 139 MMOL/L (ref 133–144)
WBC # BLD AUTO: 6.8 10E9/L (ref 4–11)

## 2019-10-02 PROCEDURE — 99214 OFFICE O/P EST MOD 30 MIN: CPT | Performed by: PHYSICIAN ASSISTANT

## 2019-10-02 PROCEDURE — 80053 COMPREHEN METABOLIC PANEL: CPT | Performed by: PHYSICIAN ASSISTANT

## 2019-10-02 PROCEDURE — 93000 ELECTROCARDIOGRAM COMPLETE: CPT | Performed by: INTERNAL MEDICINE

## 2019-10-02 PROCEDURE — 85027 COMPLETE CBC AUTOMATED: CPT | Performed by: PHYSICIAN ASSISTANT

## 2019-10-02 PROCEDURE — 36415 COLL VENOUS BLD VENIPUNCTURE: CPT | Performed by: PHYSICIAN ASSISTANT

## 2019-10-02 PROCEDURE — 71046 X-RAY EXAM CHEST 2 VIEWS: CPT | Mod: TC

## 2019-10-02 ASSESSMENT — ANXIETY QUESTIONNAIRES
5. BEING SO RESTLESS THAT IT IS HARD TO SIT STILL: NOT AT ALL
4. TROUBLE RELAXING: NOT AT ALL
2. NOT BEING ABLE TO STOP OR CONTROL WORRYING: NOT AT ALL
3. WORRYING TOO MUCH ABOUT DIFFERENT THINGS: NOT AT ALL
6. BECOMING EASILY ANNOYED OR IRRITABLE: NOT AT ALL
1. FEELING NERVOUS, ANXIOUS, OR ON EDGE: NOT AT ALL
GAD7 TOTAL SCORE: 0
7. FEELING AFRAID AS IF SOMETHING AWFUL MIGHT HAPPEN: NOT AT ALL

## 2019-10-02 ASSESSMENT — PAIN SCALES - GENERAL: PAINLEVEL: NO PAIN (0)

## 2019-10-02 ASSESSMENT — MIFFLIN-ST. JEOR: SCORE: 1167.77

## 2019-10-02 ASSESSMENT — PATIENT HEALTH QUESTIONNAIRE - PHQ9: SUM OF ALL RESPONSES TO PHQ QUESTIONS 1-9: 0

## 2019-10-02 NOTE — NURSING NOTE
"Chief Complaint   Patient presents with     Pre-Op Exam       Initial /88 (Patient Position: Sitting)   Pulse 54   Ht 1.651 m (5' 5\")   Wt 61.7 kg (136 lb)   SpO2 99%   BMI 22.63 kg/m   Estimated body mass index is 22.63 kg/m  as calculated from the following:    Height as of this encounter: 1.651 m (5' 5\").    Weight as of this encounter: 61.7 kg (136 lb).  Medication Reconciliation: complete  Isadora Arellano LPN  "

## 2019-10-02 NOTE — LETTER
My Depression Action Plan  Name: Denise Taylor   Date of Birth 1955  Date: 9/26/2019    My doctor: Mirna Roblero   My clinic: [facility] [place]  [address]  [place] [address]  [phone]          GREEN    ZONE   Good Control    What it looks like:     Things are going generally well. You have normal up s and down s. You may even feel depressed from time to time, but bad moods usually last less than a day.   What you need to do:  1. Continue to care for yourself (see self care plan)  2. Check your depression survival kit and update it as needed  3. Follow your physician s recommendations including any medication.  4. Do not stop taking medication unless you consult with your physician first.           YELLOW         ZONE Getting Worse    What it looks like:     Depression is starting to interfere with your life.     It may be hard to get out of bed; you may be starting to isolate yourself from others.    Symptoms of depression are starting to last most all day and this has happened for several days.     You may have suicidal thoughts but they are not constant.   What you need to do:     1. Call your care team, your response to treatment will improve if you keep your care team informed of your progress. Yellow periods are signs an adjustment may need to be made.     2. Continue your self-care, even if you have to fake it!    3. Talk to someone in your support network    4. Open up your depression survival kit           RED    ZONE Medical Alert - Get Help    What it looks like:     Depression is seriously interfering with your life.     You may experience these or other symptoms: You can t get out of bed most days, can t work or engage in other necessary activities, you have trouble taking care of basic hygiene, or basic responsibilities, thoughts of suicide or death that will not go away, self-injurious behavior.     What you need to do:  1. Call your care team and  request a same-day appointment. If they are not available (weekends or after hours) call your local crisis line, emergency room or 911.            Depression Self Care Plan / Survival Kit    Self-Care for Depression  Here s the deal. Your body and mind are really not as separate as most people think.  What you do and think affects how you feel and how you feel influences what you do and think. This means if you do things that people who feel good do, it will help you feel better.  Sometimes this is all it takes.  There is also a place for medication and therapy depending on how severe your depression is, so be sure to consult with your medical provider and/ or Behavioral Health Consultant if your symptoms are worsening or not improving.     In order to better manage my stress, I will:    Exercise  Get some form of exercise, every day. This will help reduce pain and release endorphins, the  feel good  chemicals in your brain. This is almost as good as taking antidepressants!  This is not the same as joining a gym and then never going! (they count on that by the way ) It can be as simple as just going for a walk or doing some gardening, anything that will get you moving.      Hygiene   Maintain good hygiene (Get out of bed in the morning, Make your bed, Brush your teeth, Take a shower, and Get dressed like you were going to work, even if you are unemployed).  If your clothes don't fit try to get ones that do.    Diet  I will strive to eat foods that are good for me, drink plenty of water, and avoid excessive sugar, caffeine, alcohol, and other mood-altering substances.  Some foods that are helpful in depression are: complex carbohydrates, B vitamins, flaxseed, fish or fish oil, fresh fruits and vegetables.    Psychotherapy  I agree to participate in Individual Therapy (if recommended).    Medication  If prescribed medications, I agree to take them.  Missing doses can result in serious side effects.  I understand that  drinking alcohol, or other illicit drug use, may cause potential side effects.  I will not stop my medication abruptly without first discussing it with my provider.    Staying Connected With Others  I will stay in touch with my friends, family members, and my primary care provider/team.    Use your imagination  Be creative.  We all have a creative side; it doesn t matter if it s oil painting, sand castles, or mud pies! This will also kick up the endorphins.    Witness Beauty  (AKA stop and smell the roses) Take a look outside, even in mid-winter. Notice colors, textures. Watch the squirrels and birds.     Service to others  Be of service to others.  There is always someone else in need.  By helping others we can  get out of ourselves  and remember the really important things.  This also provides opportunities for practicing all the other parts of the program.    Humor  Laugh and be silly!  Adjust your TV habits for less news and crime-drama and more comedy.    Control your stress  Try breathing deep, massage therapy, biofeedback, and meditation. Find time to relax each day.     My support system    Clinic Contact:  Phone number:    Contact 1:  Phone number:    Contact 2:  Phone number:    [employer]/:  Phone number:    Therapist:  Phone number:    Local crisis center:    Phone number:    Other community support:  Phone number:

## 2019-10-03 ASSESSMENT — ANXIETY QUESTIONNAIRES: GAD7 TOTAL SCORE: 0

## 2019-10-07 ENCOUNTER — TELEPHONE (OUTPATIENT)
Dept: SURGERY | Facility: CLINIC | Age: 64
End: 2019-10-07

## 2019-10-07 NOTE — TELEPHONE ENCOUNTER
Denise is scheduled for bilateral skin sparing mastectomy on 10/6/2019 with Dr. Rubin. Reconstruction per Dr. Paul. Pre procedure call placed to patient. All questions answered appropriately and thoroughly. Patient knows to contact our office in the interim with questions or concerns.    Jordyn PENAN, RN, OCN  Oncology Care Coordinator  [facility]  [facility]  Phone: [phone]

## 2019-10-09 ENCOUNTER — ANESTHESIA EVENT (OUTPATIENT)
Dept: SURGERY | Facility: CLINIC | Age: 64
End: 2019-10-09
Payer: COMMERCIAL

## 2019-10-09 ENCOUNTER — APPOINTMENT (OUTPATIENT)
Dept: SURGERY | Facility: PHYSICIAN GROUP | Age: 64
End: 2019-10-09
Payer: COMMERCIAL

## 2019-10-09 ENCOUNTER — HOSPITAL ENCOUNTER (OUTPATIENT)
Facility: CLINIC | Age: 64
Discharge: HOME | End: 2019-10-10
Attending: SURGERY | Admitting: PLASTIC SURGERY
Payer: COMMERCIAL

## 2019-10-09 ENCOUNTER — ANESTHESIA (OUTPATIENT)
Dept: SURGERY | Facility: CLINIC | Age: 64
End: 2019-10-09
Payer: COMMERCIAL

## 2019-10-09 DIAGNOSIS — C50.911 MALIGNANT NEOPLASM OF RIGHT FEMALE BREAST, UNSPECIFIED ESTROGEN RECEPTOR STATUS, UNSPECIFIED SITE OF BREAST (H): Primary | ICD-10-CM

## 2019-10-09 DIAGNOSIS — Z90.13 S/P MASTECTOMY, BILATERAL: ICD-10-CM

## 2019-10-09 PROCEDURE — 37000008 ZZH ANESTHESIA TECHNICAL FEE, 1ST 30 MIN: Performed by: SURGERY

## 2019-10-09 PROCEDURE — 25000125 ZZHC RX 250: Performed by: NURSE ANESTHETIST, CERTIFIED REGISTERED

## 2019-10-09 PROCEDURE — 25000125 ZZHC RX 250: Performed by: ANESTHESIOLOGY

## 2019-10-09 PROCEDURE — 25000566 ZZH SEVOFLURANE, EA 15 MIN: Performed by: SURGERY

## 2019-10-09 PROCEDURE — 25000131 ZZH RX MED GY IP 250 OP 636 PS 637: Performed by: ANESTHESIOLOGY

## 2019-10-09 PROCEDURE — 25000132 ZZH RX MED GY IP 250 OP 250 PS 637: Performed by: ANESTHESIOLOGY

## 2019-10-09 PROCEDURE — 19303 MAST SIMPLE COMPLETE: CPT | Mod: AS | Performed by: PHYSICIAN ASSISTANT

## 2019-10-09 PROCEDURE — 37000009 ZZH ANESTHESIA TECHNICAL FEE, EACH ADDTL 15 MIN: Performed by: SURGERY

## 2019-10-09 PROCEDURE — 71000012 ZZH RECOVERY PHASE 1 LEVEL 1 FIRST HR: Performed by: SURGERY

## 2019-10-09 PROCEDURE — 27210794 ZZH OR GENERAL SUPPLY STERILE: Performed by: SURGERY

## 2019-10-09 PROCEDURE — 19303 MAST SIMPLE COMPLETE: CPT | Mod: 50 | Performed by: SURGERY

## 2019-10-09 PROCEDURE — 88307 TISSUE EXAM BY PATHOLOGIST: CPT | Mod: 26,59 | Performed by: SURGERY

## 2019-10-09 PROCEDURE — 88307 TISSUE EXAM BY PATHOLOGIST: CPT | Performed by: SURGERY

## 2019-10-09 PROCEDURE — 25800030 ZZH RX IP 258 OP 636: Performed by: NURSE ANESTHETIST, CERTIFIED REGISTERED

## 2019-10-09 PROCEDURE — 40000170 ZZH STATISTIC PRE-PROCEDURE ASSESSMENT II: Performed by: SURGERY

## 2019-10-09 PROCEDURE — 36000056 ZZH SURGERY LEVEL 3 1ST 30 MIN: Performed by: SURGERY

## 2019-10-09 PROCEDURE — 25000128 H RX IP 250 OP 636: Performed by: NURSE ANESTHETIST, CERTIFIED REGISTERED

## 2019-10-09 PROCEDURE — 71000013 ZZH RECOVERY PHASE 1 LEVEL 1 EA ADDTL HR: Performed by: SURGERY

## 2019-10-09 PROCEDURE — 25000132 ZZH RX MED GY IP 250 OP 250 PS 637: Performed by: SURGERY

## 2019-10-09 PROCEDURE — 25000125 ZZHC RX 250: Performed by: SURGERY

## 2019-10-09 PROCEDURE — 25000128 H RX IP 250 OP 636: Performed by: PLASTIC SURGERY

## 2019-10-09 PROCEDURE — 27810323 ZZHC OR TISSUE EXPANDER OPNP: Performed by: SURGERY

## 2019-10-09 PROCEDURE — 36000058 ZZH SURGERY LEVEL 3 EA 15 ADDTL MIN: Performed by: SURGERY

## 2019-10-09 PROCEDURE — 25800030 ZZH RX IP 258 OP 636: Performed by: PLASTIC SURGERY

## 2019-10-09 PROCEDURE — 25800025 ZZH RX 258: Performed by: PLASTIC SURGERY

## 2019-10-09 DEVICE — IMPLANTABLE DEVICE: Type: IMPLANTABLE DEVICE | Site: BREAST | Status: FUNCTIONAL

## 2019-10-09 RX ORDER — EPHEDRINE SULFATE 50 MG/ML
INJECTION, SOLUTION INTRAMUSCULAR; INTRAVENOUS; SUBCUTANEOUS PRN
Status: DISCONTINUED | OUTPATIENT
Start: 2019-10-09 | End: 2019-10-09

## 2019-10-09 RX ORDER — ROSUVASTATIN CALCIUM 10 MG/1
10 TABLET, COATED ORAL EVERY EVENING
Status: DISCONTINUED | OUTPATIENT
Start: 2019-10-09 | End: 2019-10-10 | Stop reason: HOSPADM

## 2019-10-09 RX ORDER — ACETAMINOPHEN 325 MG/1
650 TABLET ORAL EVERY 6 HOURS PRN
Status: DISCONTINUED | OUTPATIENT
Start: 2019-10-09 | End: 2019-10-10 | Stop reason: HOSPADM

## 2019-10-09 RX ORDER — CLINDAMYCIN PHOSPHATE 900 MG/50ML
900 INJECTION, SOLUTION INTRAVENOUS
Status: DISCONTINUED | OUTPATIENT
Start: 2019-10-09 | End: 2019-10-09 | Stop reason: HOSPADM

## 2019-10-09 RX ORDER — PREGABALIN 150 MG/1
150 CAPSULE ORAL ONCE
Status: COMPLETED | OUTPATIENT
Start: 2019-10-09 | End: 2019-10-09

## 2019-10-09 RX ORDER — CLINDAMYCIN PHOSPHATE 900 MG/50ML
900 INJECTION, SOLUTION INTRAVENOUS SEE ADMIN INSTRUCTIONS
Status: DISCONTINUED | OUTPATIENT
Start: 2019-10-09 | End: 2019-10-09 | Stop reason: HOSPADM

## 2019-10-09 RX ORDER — ROSUVASTATIN CALCIUM 5 MG/1
5 TABLET, COATED ORAL EVERY EVENING
Status: DISCONTINUED | OUTPATIENT
Start: 2019-10-09 | End: 2019-10-09

## 2019-10-09 RX ORDER — OXYCODONE HYDROCHLORIDE 5 MG/1
5-10 TABLET ORAL
Qty: 30 TABLET | Refills: 0 | Status: SHIPPED | OUTPATIENT
Start: 2019-10-09 | End: 2019-10-22

## 2019-10-09 RX ORDER — GLYCOPYRROLATE 0.2 MG/ML
INJECTION, SOLUTION INTRAMUSCULAR; INTRAVENOUS PRN
Status: DISCONTINUED | OUTPATIENT
Start: 2019-10-09 | End: 2019-10-09

## 2019-10-09 RX ORDER — CLINDAMYCIN HCL 300 MG
300 CAPSULE ORAL 4 TIMES DAILY
Qty: 40 CAPSULE | Refills: 1 | Status: SHIPPED | OUTPATIENT
Start: 2019-10-09 | End: 2019-10-09

## 2019-10-09 RX ORDER — CETIRIZINE HYDROCHLORIDE 10 MG/1
10 TABLET ORAL AT BEDTIME
COMMUNITY
End: 2020-02-10

## 2019-10-09 RX ORDER — LEVOTHYROXINE SODIUM 88 UG/1
88 TABLET ORAL
Status: DISCONTINUED | OUTPATIENT
Start: 2019-10-10 | End: 2019-10-10 | Stop reason: HOSPADM

## 2019-10-09 RX ORDER — CEPHALEXIN 500 MG/1
500 CAPSULE ORAL 3 TIMES DAILY
Qty: 30 CAPSULE | Refills: 1 | Status: SHIPPED | OUTPATIENT
Start: 2019-10-09 | End: 2019-10-22

## 2019-10-09 RX ORDER — HYDROMORPHONE HYDROCHLORIDE 1 MG/ML
0.2 INJECTION, SOLUTION INTRAMUSCULAR; INTRAVENOUS; SUBCUTANEOUS
Status: DISCONTINUED | OUTPATIENT
Start: 2019-10-09 | End: 2019-10-10 | Stop reason: HOSPADM

## 2019-10-09 RX ORDER — CEFAZOLIN SODIUM 1 G/3ML
1 INJECTION, POWDER, FOR SOLUTION INTRAMUSCULAR; INTRAVENOUS EVERY 8 HOURS
Status: COMPLETED | OUTPATIENT
Start: 2019-10-09 | End: 2019-10-10

## 2019-10-09 RX ORDER — FLUTICASONE PROPIONATE 50 MCG
1 SPRAY, SUSPENSION (ML) NASAL DAILY
Status: DISCONTINUED | OUTPATIENT
Start: 2019-10-10 | End: 2019-10-09

## 2019-10-09 RX ORDER — PROPOFOL 10 MG/ML
INJECTION, EMULSION INTRAVENOUS CONTINUOUS PRN
Status: DISCONTINUED | OUTPATIENT
Start: 2019-10-09 | End: 2019-10-09

## 2019-10-09 RX ORDER — CEPHALEXIN 500 MG/1
500 CAPSULE ORAL EVERY 8 HOURS SCHEDULED
Status: DISCONTINUED | OUTPATIENT
Start: 2019-10-10 | End: 2019-10-10 | Stop reason: HOSPADM

## 2019-10-09 RX ORDER — ONDANSETRON 4 MG/1
4 TABLET, ORALLY DISINTEGRATING ORAL EVERY 6 HOURS PRN
Status: DISCONTINUED | OUTPATIENT
Start: 2019-10-09 | End: 2019-10-10 | Stop reason: HOSPADM

## 2019-10-09 RX ORDER — CLINDAMYCIN PHOSPHATE 900 MG/50ML
900 INJECTION, SOLUTION INTRAVENOUS
Status: COMPLETED | OUTPATIENT
Start: 2019-10-09 | End: 2019-10-09

## 2019-10-09 RX ORDER — HYDROXYZINE HYDROCHLORIDE 25 MG/1
25 TABLET, FILM COATED ORAL ONCE
Status: COMPLETED | OUTPATIENT
Start: 2019-10-09 | End: 2019-10-09

## 2019-10-09 RX ORDER — BUPIVACAINE HYDROCHLORIDE 5 MG/ML
INJECTION, SOLUTION PERINEURAL PRN
Status: DISCONTINUED | OUTPATIENT
Start: 2019-10-09 | End: 2019-10-09 | Stop reason: HOSPADM

## 2019-10-09 RX ORDER — MAGNESIUM HYDROXIDE 1200 MG/15ML
LIQUID ORAL PRN
Status: DISCONTINUED | OUTPATIENT
Start: 2019-10-09 | End: 2019-10-09 | Stop reason: HOSPADM

## 2019-10-09 RX ORDER — APREPITANT 40 MG/1
40 CAPSULE ORAL ONCE
Status: COMPLETED | OUTPATIENT
Start: 2019-10-09 | End: 2019-10-09

## 2019-10-09 RX ORDER — FLUOXETINE 10 MG/1
10 CAPSULE ORAL DAILY
Status: DISCONTINUED | OUTPATIENT
Start: 2019-10-10 | End: 2019-10-10 | Stop reason: HOSPADM

## 2019-10-09 RX ORDER — SODIUM CHLORIDE, SODIUM LACTATE, POTASSIUM CHLORIDE, CALCIUM CHLORIDE 600; 310; 30; 20 MG/100ML; MG/100ML; MG/100ML; MG/100ML
INJECTION, SOLUTION INTRAVENOUS CONTINUOUS
Status: DISCONTINUED | OUTPATIENT
Start: 2019-10-09 | End: 2019-10-10

## 2019-10-09 RX ORDER — ONDANSETRON 2 MG/ML
4 INJECTION INTRAMUSCULAR; INTRAVENOUS EVERY 6 HOURS PRN
Status: DISCONTINUED | OUTPATIENT
Start: 2019-10-09 | End: 2019-10-10 | Stop reason: HOSPADM

## 2019-10-09 RX ORDER — FENTANYL CITRATE 50 UG/ML
INJECTION, SOLUTION INTRAMUSCULAR; INTRAVENOUS PRN
Status: DISCONTINUED | OUTPATIENT
Start: 2019-10-09 | End: 2019-10-09

## 2019-10-09 RX ORDER — METHOCARBAMOL 750 MG/1
750 TABLET, FILM COATED ORAL EVERY 6 HOURS PRN
Qty: 60 TABLET | Refills: 0 | Status: SHIPPED | OUTPATIENT
Start: 2019-10-09 | End: 2019-10-22

## 2019-10-09 RX ORDER — LIDOCAINE 40 MG/G
CREAM TOPICAL
Status: DISCONTINUED | OUTPATIENT
Start: 2019-10-09 | End: 2019-10-10 | Stop reason: HOSPADM

## 2019-10-09 RX ORDER — SODIUM CHLORIDE, SODIUM LACTATE, POTASSIUM CHLORIDE, CALCIUM CHLORIDE 600; 310; 30; 20 MG/100ML; MG/100ML; MG/100ML; MG/100ML
INJECTION, SOLUTION INTRAVENOUS CONTINUOUS PRN
Status: DISCONTINUED | OUTPATIENT
Start: 2019-10-09 | End: 2019-10-09

## 2019-10-09 RX ORDER — PROPOFOL 10 MG/ML
INJECTION, EMULSION INTRAVENOUS PRN
Status: DISCONTINUED | OUTPATIENT
Start: 2019-10-09 | End: 2019-10-09

## 2019-10-09 RX ORDER — TRAMADOL HYDROCHLORIDE 50 MG/1
50-100 TABLET ORAL EVERY 6 HOURS PRN
Status: DISCONTINUED | OUTPATIENT
Start: 2019-10-09 | End: 2019-10-10 | Stop reason: HOSPADM

## 2019-10-09 RX ORDER — NEOSTIGMINE METHYLSULFATE 1 MG/ML
VIAL (ML) INJECTION PRN
Status: DISCONTINUED | OUTPATIENT
Start: 2019-10-09 | End: 2019-10-09

## 2019-10-09 RX ORDER — DEXAMETHASONE SODIUM PHOSPHATE 4 MG/ML
INJECTION, SOLUTION INTRA-ARTICULAR; INTRALESIONAL; INTRAMUSCULAR; INTRAVENOUS; SOFT TISSUE PRN
Status: DISCONTINUED | OUTPATIENT
Start: 2019-10-09 | End: 2019-10-09

## 2019-10-09 RX ORDER — OXYCODONE HCL 10 MG/1
10 TABLET, FILM COATED, EXTENDED RELEASE ORAL ONCE
Status: COMPLETED | OUTPATIENT
Start: 2019-10-09 | End: 2019-10-09

## 2019-10-09 RX ORDER — ONDANSETRON 2 MG/ML
INJECTION INTRAMUSCULAR; INTRAVENOUS PRN
Status: DISCONTINUED | OUTPATIENT
Start: 2019-10-09 | End: 2019-10-09

## 2019-10-09 RX ORDER — LIDOCAINE HYDROCHLORIDE 20 MG/ML
INJECTION, SOLUTION INFILTRATION; PERINEURAL PRN
Status: DISCONTINUED | OUTPATIENT
Start: 2019-10-09 | End: 2019-10-09

## 2019-10-09 RX ORDER — NALOXONE HYDROCHLORIDE 0.4 MG/ML
.1-.4 INJECTION, SOLUTION INTRAMUSCULAR; INTRAVENOUS; SUBCUTANEOUS
Status: DISCONTINUED | OUTPATIENT
Start: 2019-10-09 | End: 2019-10-10 | Stop reason: HOSPADM

## 2019-10-09 RX ORDER — ACETAMINOPHEN 500 MG
1000 TABLET ORAL ONCE
Status: COMPLETED | OUTPATIENT
Start: 2019-10-09 | End: 2019-10-09

## 2019-10-09 RX ADMIN — Medication 5 MG: at 13:01

## 2019-10-09 RX ADMIN — MIDAZOLAM 2 MG: 1 INJECTION INTRAMUSCULAR; INTRAVENOUS at 11:42

## 2019-10-09 RX ADMIN — GLYCOPYRROLATE 0.6 MG: 0.2 INJECTION, SOLUTION INTRAMUSCULAR; INTRAVENOUS at 14:08

## 2019-10-09 RX ADMIN — HYDROMORPHONE HYDROCHLORIDE 0.5 MG: 1 INJECTION, SOLUTION INTRAMUSCULAR; INTRAVENOUS; SUBCUTANEOUS at 12:40

## 2019-10-09 RX ADMIN — ROCURONIUM BROMIDE 40 MG: 10 INJECTION INTRAVENOUS at 11:47

## 2019-10-09 RX ADMIN — PROPOFOL 150 MG: 10 INJECTION, EMULSION INTRAVENOUS at 11:47

## 2019-10-09 RX ADMIN — ONDANSETRON 4 MG: 2 INJECTION INTRAMUSCULAR; INTRAVENOUS at 13:42

## 2019-10-09 RX ADMIN — Medication 5 MG: at 13:25

## 2019-10-09 RX ADMIN — SODIUM CHLORIDE, POTASSIUM CHLORIDE, SODIUM LACTATE AND CALCIUM CHLORIDE: 600; 310; 30; 20 INJECTION, SOLUTION INTRAVENOUS at 13:01

## 2019-10-09 RX ADMIN — Medication 10 MG: at 11:57

## 2019-10-09 RX ADMIN — CEFAZOLIN 1 G: 1 INJECTION, POWDER, FOR SOLUTION INTRAMUSCULAR; INTRAVENOUS at 20:11

## 2019-10-09 RX ADMIN — ACETAMINOPHEN 1000 MG: 500 TABLET, FILM COATED ORAL at 11:08

## 2019-10-09 RX ADMIN — ROSUVASTATIN CALCIUM 10 MG: 10 TABLET, FILM COATED ORAL at 20:10

## 2019-10-09 RX ADMIN — SODIUM CHLORIDE, POTASSIUM CHLORIDE, SODIUM LACTATE AND CALCIUM CHLORIDE: 600; 310; 30; 20 INJECTION, SOLUTION INTRAVENOUS at 11:42

## 2019-10-09 RX ADMIN — SODIUM CHLORIDE, POTASSIUM CHLORIDE, SODIUM LACTATE AND CALCIUM CHLORIDE: 600; 310; 30; 20 INJECTION, SOLUTION INTRAVENOUS at 22:34

## 2019-10-09 RX ADMIN — PREGABALIN 150 MG: 150 CAPSULE ORAL at 11:08

## 2019-10-09 RX ADMIN — NEOSTIGMINE METHYLSULFATE 3 MG: 1 INJECTION, SOLUTION INTRAVENOUS at 14:08

## 2019-10-09 RX ADMIN — HYDROXYZINE HYDROCHLORIDE 25 MG: 25 TABLET ORAL at 11:08

## 2019-10-09 RX ADMIN — FENTANYL CITRATE 50 MCG: 50 INJECTION, SOLUTION INTRAMUSCULAR; INTRAVENOUS at 11:44

## 2019-10-09 RX ADMIN — APREPITANT 40 MG: 40 CAPSULE ORAL at 11:10

## 2019-10-09 RX ADMIN — CLINDAMYCIN PHOSPHATE 900 MG: 18 INJECTION, SOLUTION INTRAVENOUS at 11:56

## 2019-10-09 RX ADMIN — FENTANYL CITRATE 25 MCG: 50 INJECTION, SOLUTION INTRAMUSCULAR; INTRAVENOUS at 11:47

## 2019-10-09 RX ADMIN — OXYCODONE HYDROCHLORIDE 10 MG: 10 TABLET, FILM COATED, EXTENDED RELEASE ORAL at 11:08

## 2019-10-09 RX ADMIN — SODIUM CHLORIDE, POTASSIUM CHLORIDE, SODIUM LACTATE AND CALCIUM CHLORIDE: 600; 310; 30; 20 INJECTION, SOLUTION INTRAVENOUS at 17:55

## 2019-10-09 RX ADMIN — DEXAMETHASONE SODIUM PHOSPHATE 4 MG: 4 INJECTION, SOLUTION INTRA-ARTICULAR; INTRALESIONAL; INTRAMUSCULAR; INTRAVENOUS; SOFT TISSUE at 11:55

## 2019-10-09 RX ADMIN — PROPOFOL 30 MCG/KG/MIN: 10 INJECTION, EMULSION INTRAVENOUS at 11:50

## 2019-10-09 RX ADMIN — LIDOCAINE HYDROCHLORIDE 80 MG: 20 INJECTION, SOLUTION INFILTRATION; PERINEURAL at 11:47

## 2019-10-09 ASSESSMENT — LIFESTYLE VARIABLES: TOBACCO_USE: 1

## 2019-10-09 ASSESSMENT — ACTIVITIES OF DAILY LIVING (ADL)
RETIRED_COMMUNICATION: 0-->UNDERSTANDS/COMMUNICATES WITHOUT DIFFICULTY
FALL_HISTORY_WITHIN_LAST_SIX_MONTHS: NO
SWALLOWING: 0-->SWALLOWS FOODS/LIQUIDS WITHOUT DIFFICULTY
TRANSFERRING: 0-->INDEPENDENT
AMBULATION: 0-->INDEPENDENT
DRESS: 0-->INDEPENDENT
BATHING: 0-->INDEPENDENT
RETIRED_EATING: 0-->INDEPENDENT
TOILETING: 0-->INDEPENDENT
COGNITION: 0 - NO COGNITION ISSUES REPORTED

## 2019-10-09 ASSESSMENT — ENCOUNTER SYMPTOMS
SEIZURES: 0
DYSRHYTHMIAS: 0

## 2019-10-09 ASSESSMENT — MIFFLIN-ST. JEOR: SCORE: 1156.89

## 2019-10-09 NOTE — PROVIDER NOTIFICATION
KATE Luo RN for Dr. Paul @ 16:55 on 10/9/19--Complete Ancef overnight and start Cephalexin 1st thing in the morning tomorrow 10/10/19.  Leni Fontenot [facility]

## 2019-10-09 NOTE — OP NOTE
[facility] Operative [facility]    PREOPERATIVE DIAGNOSIS:  Right breast invasive lobularl carcinoma    POSTOPERATIVE DIAGNOSIS:  Same, pathology pending    PROCEDURE:    1.  Bilateral skin sparing mastectomies  4. Reconstruction per Dr. Paul, please see their operative report for details regarding their portion of the procedure.     SURGEON:  Opal Rubin MD    ASSISTANT:  Daniela Cantor PA-C  The physician s assistant was medically necessary for their expertise in retraction and suctioning.    ANESTHESIA:  General.    BLOOD LOSS: 25ml    FINDINGS: scar around lumpectomy present but able to easily create new anterior plane and skin over prior lumpectomy excised with specimen.     INDICATIONS:   Denise is a 64yof who presented after right breast lumpectomy and SLNB which revealed grade 2 invasive lobular carcinoma with positive medial and anterior margins and separate anterior margins which was also positive. She had lumpectomy with SLNB on 8/23/2019 with Dr. Dupree and had more extensive disease surgically than expected based on imaging. She had originally presented on 7/25/2019 with a palpable mass in the right breast and imaging revealed no findings on mammogram or ultrasound. She then did have a biopsy which revealed invasive lobular carcinoma. She had a breast MRI which revealed post biopsy changes measuring 4.7cm and abnormal enhancement spanning 1.7cm. She elected to proceed with completion mastectomy on the right and left prophylactic mastectomy with reconstruction.     DETAILS OF PROCEDURE:     The patient was taken to the operating room and placed in supine position and general anesthesia by endotracheal tube.   Perioperative antiobiotics were given. SCDs were placed on the lower extremities. A olivares catheter was placed using sterile technique.  The patient was marked by Dr. Paul with simple periareolar ellipse incisions, on the right encompassing her prior lumpectomy scar.  The breasts  and axilla were prepped with hibaclens and draped in the usual sterile fashion.  The timeout procedure was performed.      Starting on the right side, a periareolar ellipse incision including her prior upper outer lumpectomy scar was made following the pre operative markings with a #10 scalpel blade and deepened with electrocautery.  The superior flap was raised with electrocautery up to the top edge of the breast tissue just under the clavicle.  There was a moderate amount of inflammatory change/fibrosis related to her prior lumpectomy but I was able to create a new plane anterior to the prior lumpectomy cavity which appeared normal. The inferior flap was similarly raised using the cautery down to the inframammary fold.  Flaps were raised medially to the lateral aspect of the sternum and laterally to the lateral chest wall. The breast tissue was then elevated off of the pectoralis fascia with cautery. The fascia was excised with the breast tissue.  Once the breast was removed, it was oriented with sutures with a short suture superior and long suture lateral.  The breast was sent to pathology to be placed in formalin and have routine pathology exam.      A similar  incision was made on the left side with a #10 scalpel blade and deepened with electrocautery.  Again, the flaps were raised with electrocautery and the breast was dissected away from the pectoralis fascia.  The tissue was oriented with a short suture superior and long suture lateral.  The breast was then sent to pathology and placed in formalin for permanent section.        Dr. Paul then performed their portion of the procedure with reconstruction. Please see their operative report for details.     Opal Rubin MD

## 2019-10-09 NOTE — ANESTHESIA PREPROCEDURE EVALUATION
Anesthesia Pre-Procedure Evaluation    Patient: Denise Taylor   MRN: [id number] : 1955          Preoperative Diagnosis: RIGHT BREAST CANCER    Procedure(s):  BILATERAL SKIN SPARRING MASTECTOMY (CASS)  BILATERAL TISSUE EXPANDERS (RAMIREZ)    Past Medical History:   Diagnosis Date     Pneumonia      Past Surgical History:   Procedure Laterality Date     ARTHROSCOPY SHOULDER ROTATOR CUFF REPAIR Left 3/7/2018    Procedure: ARTHROSCOPY SHOULDER ROTATOR CUFF REPAIR;  LEFT SHOULDER ARTHROSCOPY, REPAIR ROTATOR CUFF: subacromial Decompression and AC Joint Resection;  Surgeon: Baldev Lou DO;  Location: [facility] OR     ARTHROTOMY SHOULDER, ROTATOR CUFF REPAIR, COMBINED Right 2018    Procedure: RIGHT SHOULDER DEBRIDEMENT, EXCISION OF LIPOMA  RIGHT UPPER ARM, EXCISION SCAR TISSUE AC JOINT;  Surgeon: Baldev Luo DO;  Location: [facility] OR     BIOPSY BREAST NEEDLE LOCALIZATION, BIOPSY NODE SENTINEL, COMBINED Right 2019    Procedure: RIGHT WIRE LOCALIZED LUMPECTOMY WITH SENTINEL  LYMP NODE;  Surgeon: Michael Dupree MD;  Location: [facility] OR     COLONOSCOPY      Dr Lara     COLONOSCOPY N/A 10/10/2016    Procedure: COLONOSCOPY;  Surgeon: Christine Cintron MD;  Location: [facility] OR     ORTHOPEDIC SURGERY      feet neuromas removed     SHOULDER SURGERY Right 2011/10/2012     Allergies   Allergen Reactions     Penicillins Rash     Zithromax [Azithromycin] Rash     Prior to Admission medications    Medication Sig Start Date End Date Taking? Authorizing Provider   cetirizine (ZYRTEC) 10 MG tablet Take 10 mg by mouth At Bedtime   Yes Reported, Patient   eletriptan (RELPAX) 40 MG tablet Take 1 tablet (40 mg) by mouth once as needed for migraine 19  Yes Mirna Roblero PA   FLUoxetine HCl, PMDD, (SARAFEM) 10 MG TABS Take 10 mg by mouth daily 19  Yes Mirna Roblero PA   fluticasone (ARNUITY ELLIPTA) 200 MCG/ACT inhaler Inhale 1 puff into the lungs daily  19  Yes Reported, Patient    levothyroxine (SYNTHROID/LEVOTHROID) 88 MCG tablet Take 1 tablet (88 mcg) by mouth daily 8/30/19  Yes Mirna Roblero PA   rosuvastatin (CRESTOR) 10 MG tablet Take 1 tablet (10 mg) by mouth daily 7/24/19  Yes Mirna Roblero PA     ECG: sinus josiah, no st or twave abnormalities.      Anesthesia Evaluation     .             ROS/MED HX    ENT/Pulmonary:     (+)tobacco use, Past use , recent URI resolved no pneumonia bouts since starting flovent: . .   (-) asthma and sleep apnea   Neurologic:      (-) seizures, CVA and migraines   Cardiovascular:     (+) Dyslipidemia, ----. : . . . :. .      (-) hypertension, CASTILLO, arrhythmias and valvular problems/murmurs   METS/Exercise Tolerance:  >4 METS   Hematologic:        (-) history of blood clots, anemia and other hematologic disorder   Musculoskeletal:        (-) arthritis   GI/Hepatic:        (-) GERD and liver disease   Renal/Genitourinary:      (-) renal disease and nephrolithiasis   Endo:     (+) thyroid problem hypothyroidism, .   (-) Type I DM and Type II DM   Psychiatric:        (-) psychiatric history   Infectious Disease:        (-) Recent Fever   Malignancy:         Other:                          Physical Exam  Normal systems: cardiovascular, pulmonary and dental    Airway   Mallampati: I  TM distance: >3 FB  Neck ROM: full    Dental     Cardiovascular   Rhythm and rate: regular and normal      Pulmonary    breath sounds clear to auscultation            Lab Results   Component Value Date    WBC 6.8 10/02/2019    HGB 14.2 10/02/2019    HCT 42.5 10/02/2019     10/02/2019    CRP 37.6 (H) 04/02/2018    SED 18 07/24/2019     10/02/2019    POTASSIUM 3.8 10/02/2019    CHLORIDE 106 10/02/2019    CO2 28 10/02/2019    BUN 16 10/02/2019    CR 0.75 10/02/2019    GLC 86 10/02/2019    STEVE 9.2 10/02/2019    MAG 2.0 06/30/2019    ALBUMIN 4.3 10/02/2019    PROTTOTAL 8.1 10/02/2019    ALT 39 10/02/2019    AST 30 10/02/2019    ALKPHOS 71 10/02/2019    BILITOTAL  "0.4 10/02/2019    LIPASE 199 06/30/2019    TSH 0.21 (L) 07/24/2019    T4 1.20 07/24/2019       Preop Vitals  BP Readings from Last 3 Encounters:   10/09/19 (!) 146/96   10/02/19 122/88   09/19/19 (!) 159/88    Pulse Readings from Last 3 Encounters:   10/09/19 50   10/02/19 54   09/19/19 (!) 49      Resp Readings from Last 3 Encounters:   10/09/19 18   08/28/19 18   08/23/19 16    SpO2 Readings from Last 3 Encounters:   10/09/19 99%   10/02/19 99%   09/19/19 99%      Temp Readings from Last 1 Encounters:   10/09/19 36.8  C (98.2  F) (Temporal)    Ht Readings from Last 1 Encounters:   10/09/19 1.651 m (5' 5\")      Wt Readings from Last 1 Encounters:   10/09/19 60.6 kg (133 lb 9.6 oz)    Estimated body mass index is 22.23 kg/m  as calculated from the following:    Height as of this encounter: 1.651 m (5' 5\").    Weight as of this encounter: 60.6 kg (133 lb 9.6 oz).       Anesthesia Plan      History & Physical Review      ASA Status:  2 .    NPO Status:  > 8 hours    Plan for General and ETT with Propofol induction. Maintenance will be Balanced.    PONV prophylaxis:  Ondansetron (or other 5HT-3), Dexamethasone or Solumedrol and Aprepitant  Propofol infusion      Postoperative Care  Postoperative pain management:  Multi-modal analgesia.      Consents  Anesthetic plan, risks, benefits and alternatives discussed with:  Patient..                 Nicole Bustos MD  "

## 2019-10-09 NOTE — ANESTHESIA CARE TRANSFER NOTE
Patient: Denise Taylor    Procedure(s):  BILATERAL SKIN SPARRING MASTECTOMY ([place]ILD[place])  BILATERAL TISSUE EXPANDERS (RAMIREZ)    Diagnosis: RIGHT BREAST CANCER  Diagnosis Additional Information: No value filed.    Anesthesia Type:   General, ETT     Note:  Airway :Face Mask  Patient transferred to:PACU  Comments: Pt to PACU with O2 via mask, airway patent, VSS.  Report to RN.Handoff Report: Identifed the Patient, Identified the Reponsible Provider, Reviewed the pertinent medical history, Discussed the surgical course, Reviewed Intra-OP anesthesia mangement and issues during anesthesia, Set expectations for post-procedure period and Allowed opportunity for questions and acknowledgement of understanding      Vitals: (Last set prior to Anesthesia Care Transfer)    CRNA VITALS  10/9/2019 1352 - 10/9/2019 1429      10/9/2019             Pulse:  80    SpO2:  100 %    Resp Rate (set):  10                Electronically Signed By: CESAR Fry CRNA  October 9, 2019  2:29 PM

## 2019-10-09 NOTE — PROGRESS NOTES
Admission medication history interview status for the 10/9/2019  admission is complete. See EPIC admission navigator for prior to admission medications     Medication history source reliability:Good    Medication history interview source(s):Patient    Medication history resources (including written lists, pill bottles, clinic record):None    Primary pharmacy.[facility], [place]    Additional medication history information not noted on PTA med list :Brought Arnuity inhaler from home.    Time spent in this activity: 15 minutes    Prior to Admission medications    Medication Sig Last Dose Taking? Auth Provider   cetirizine (ZYRTEC) 10 MG tablet Take 10 mg by mouth At Bedtime 10/8/2019 at 2000 Yes Reported, Patient   eletriptan (RELPAX) 40 MG tablet Take 1 tablet (40 mg) by mouth once as needed for migraine OVer 1 month ago at prn Yes Mirna Roblero PA   FLUoxetine HCl, PMDD, (SARAFEM) 10 MG TABS Take 10 mg by mouth daily 10/9/2019 at 0700 Yes Mirna Roblero PA   fluticasone (ARNUITY ELLIPTA) 200 MCG/ACT inhaler Inhale 1 puff into the lungs daily  10/9/2019 at 0800 Yes Reported, Patient   levothyroxine (SYNTHROID/LEVOTHROID) 88 MCG tablet Take 1 tablet (88 mcg) by mouth daily 10/9/2019 at 0700 Yes Mirna Roblero PA   rosuvastatin (CRESTOR) 10 MG tablet Take 1 tablet (10 mg) by mouth daily 10/8/2019 at 2000 Yes Mirna Roblero PA

## 2019-10-09 NOTE — BRIEF OP NOTE
[facility]  Brief Operative Note    Pre-operative diagnosis: RIGHT BREAST CANCER  Post-operative diagnosis: Same    Procedure:  BILATERAL SKIN SPARING MASTECTOMY    Surgeon:  Panel 1:     * Opal Rubin MD - Primary     * Daniela Cantor PA-C - Assisting    Anesthesia: General     Estimated blood loss: 25 mL from Dr. Rubin's portion    Specimens:   ID Type Source Tests Collected by Time Destination   A : right breast Tissue Breast, Right SURGICAL PATHOLOGY EXAM Opal Rubin MD 10/9/2019 12:30 PM    B : left breast Tissue Breast, Left SURGICAL PATHOLOGY EXAM Opal Rubin MD 10/9/2019  1:03 PM      Findings:  As above. No immediate complications. See operative report for full details. Dr. Paul to follow with tissue expanders.     Daniela Cantor PA-C  [facility]  [phone]

## 2019-10-09 NOTE — ANESTHESIA POSTPROCEDURE EVALUATION
Patient: Denise Taylor    Procedure(s):  BILATERAL SKIN SPARRING MASTECTOMY ([place]ILDENBERG)  BILATERAL TISSUE EXPANDERS (RAMIREZ)    Diagnosis:RIGHT BREAST CANCER  Diagnosis Additional Information: No value filed.    Anesthesia Type:  General, ETT    Note:  Anesthesia Post Evaluation    Patient location during evaluation: PACU  Patient participation: Able to fully participate in evaluation  Level of consciousness: sleepy but conscious and responsive to verbal stimuli  Pain management: adequate  Airway patency: patent  Cardiovascular status: acceptable and hemodynamically stable  Respiratory status: acceptable and unassisted  Hydration status: acceptable  PONV: none     Anesthetic complications: None          Last vitals:  Vitals:    10/09/19 1600 10/09/19 1615 10/09/19 1648   BP: 110/72 113/67 116/74   Pulse: 60 (!) 49    Resp: 10 12 12   Temp:   36.6  C (97.9  F)   SpO2: 100% 98% 94%         Electronically Signed By: Germán Denson MD  October 9, 2019  5:18 PM

## 2019-10-09 NOTE — BRIEF OP NOTE
[facility] General Surgery Brief Operative Note    Pre-operative diagnosis: RIGHT BREAST CANCER   Post-operative diagnosis: Same   Procedure: Bilateral first stage breast reconstruction   Surgeon: Dale Paul MD   Assistant(s): None   Anesthesia: General endotracheal anesthesia   Estimated blood loss: Less than 10 ml   Total IV fluids: (See anesthesia record)   Blood transfusion: No transfusion was given during surgery   Total urine output: (See anesthesia record)   Drains: Anup-Blanc   Specimens: None   Implants: Tissue expander and dermal graft   Findings:     Complications: None   Condition: Stable   Comments: See dictated operative report for full details

## 2019-10-10 VITALS
DIASTOLIC BLOOD PRESSURE: 71 MMHG | BODY MASS INDEX: 22.26 KG/M2 | RESPIRATION RATE: 16 BRPM | TEMPERATURE: 97.6 F | OXYGEN SATURATION: 97 % | SYSTOLIC BLOOD PRESSURE: 120 MMHG | HEART RATE: 59 BPM | WEIGHT: 133.6 LBS | HEIGHT: 65 IN

## 2019-10-10 PROCEDURE — G0008 ADMIN INFLUENZA VIRUS VAC: HCPCS

## 2019-10-10 PROCEDURE — 25000128 H RX IP 250 OP 636: Performed by: SURGERY

## 2019-10-10 PROCEDURE — 25000128 H RX IP 250 OP 636: Performed by: PLASTIC SURGERY

## 2019-10-10 PROCEDURE — 25000132 ZZH RX MED GY IP 250 OP 250 PS 637: Performed by: PLASTIC SURGERY

## 2019-10-10 PROCEDURE — 90682 RIV4 VACC RECOMBINANT DNA IM: CPT | Performed by: SURGERY

## 2019-10-10 RX ADMIN — ACETAMINOPHEN 650 MG: 325 TABLET, FILM COATED ORAL at 02:33

## 2019-10-10 RX ADMIN — CEPHALEXIN 500 MG: 500 CAPSULE ORAL at 05:29

## 2019-10-10 RX ADMIN — ACETAMINOPHEN 650 MG: 325 TABLET, FILM COATED ORAL at 11:14

## 2019-10-10 RX ADMIN — CEFAZOLIN 1 G: 1 INJECTION, POWDER, FOR SOLUTION INTRAMUSCULAR; INTRAVENOUS at 03:42

## 2019-10-10 RX ADMIN — FLUOXETINE 10 MG: 10 CAPSULE ORAL at 08:47

## 2019-10-10 RX ADMIN — LEVOTHYROXINE SODIUM 88 MCG: 88 TABLET ORAL at 07:28

## 2019-10-10 RX ADMIN — INFLUENZA A VIRUS A/BRISBANE/02/2018 (H1N1) RECOMBINANT HEMAGGLUTININ ANTIGEN, INFLUENZA A VIRUS A/KANSAS/14/2017 (H3N2) RECOMBINANT HEMAGGLUTININ ANTIGEN, INFLUENZA B VIRUS B/PHUKET/3073/2013 RECOMBINANT HEMAGGLUTININ ANTIGEN, AND INFLUENZA B VIRUS B/MARYLAND/15/2016 RECOMBINANT HEMAGGLUTININ ANTIGEN 0.5 ML: 45; 45; 45; 45 INJECTION INTRAMUSCULAR at 11:15

## 2019-10-10 NOTE — DISCHARGE INSTRUCTIONS
[facility]  Discharge Instructions: Post-Operative Mastectomy    ACTIVITY    Take frequent, short walks and increase your activity gradually.      Avoid strenuous physical activity or heavy lifting greater than 15-20 lbs. for 2 weeks on the side of surgery.  You may climb stairs.     Gentle rotation and stretching of your arms and shoulders will prevent joint stiffness.    You may drive without restrictions when you are not using any prescription pain medication and feel comfortable in a car.    You may return to work/school when you are comfortable without any prescription pain medication.    WOUND CARE    You may remove your outer dressing and shower 48 hours after the surgery.  Pat your incisions dry and leave them open to air.  Re-apply dressing (gauze/tape) as needed for comfort or drainage.    You may have steri-strips (looks like white tape) or staples at your incisions.  You may peel off the steri-strips 2 weeks after your surgery if they have not peeled off on their own.  If you have staples, they will be removed at your next office visit.    Do not soak your incisions in a tub or pool for 2 weeks.     Do not apply any lotions, creams, or ointments to your incisions.    A ridge under your incisions is normal and will gradually resolve.    Wear the mastectomy camisole for comfort.    You may have 1-2 drains at your surgical site, refer to your drain care instructions.  Record output totals daily.  You will need to schedule an appointment for drain removal when your output is less than 30 mL per day for 1-2 days or 2 weeks, whichever comes first.    DIET    Start with liquids, then gradually resume your regular diet as tolerated.     Drink plenty of fluids to stay hydrated.    PAIN    Expect some tenderness and discomfort at the incision site(s).  Use the prescribed pain medication at your discretion.  Expect gradual resolution of your pain over several days.    You may  take ibuprofen with food (unless you have been told not to) instead of or in addition to your prescribed pain medication.  If you are taking Norco or Percocet, do not take any additional acetaminophen/APAP/Tylenol.    Do not drink alcohol or drive while you are taking pain medications.    You may apply ice to your incisions in 20 minute intervals as needed for the next 48 hours.        EXPECTATIONS    Pain medications can cause constipation.  Limit use when possible.  Take over the counter stool softener/stimulant, such as Colace or Senna, 1-2 times a day with plenty of water.  You may take a mild over the counter laxative, such as Miralax or a suppository, as needed.      You may discontinue these medications once you are having regular bowel movements and/or are no longer taking your narcotic pain medication.    Blue dye may have been used during your surgery to locate lymph nodes and can cause your urine to be blue/green for several days after surgery.  This is not a cause for concern and will resolve on its own.     RETURN APPOINTMENT    Follow up with our office when your drain is ready to be removed.  You will also follow up with your surgeon in 2 weeks.  Please call our office at [phone] to schedule your appointment.    CALL OUR OFFICE -[phone] IF YOU HAVE:     Chills or fever above 101 F.    Increased redness, warmth, or drainage at your incisions.    Significant bleeding or swelling.    Pain not relieved by your pain medication or rest.    Increasing pain after the first 48 hours.    Any other concerns or questions.    Revised October 2018

## 2019-10-10 NOTE — PROGRESS NOTES
Plastic Surgery  POD 1  afeb VSS  Drains 65/55  Feeling good; little pain  Skin flaps healthy.  No hematoma/seroma  Path pending.  Plan: home today on keflex,robaxin and oxycodone; help into selvin.  Dale Paul MD

## 2019-10-10 NOTE — PROGRESS NOTES
A&Ox4. AVSS. LS clear. BS active, passing flatus. Voiding. Incisions CDI, camisole on. CARLOS ENRIQUE stripped per orders, teaching completed. Reviewed all medications and discharge instructions with pt and . All questions about discharge instructions and medications answered. IV removed. All belongings sent with pt. Pt discharged home by . Flu shot given.

## 2019-10-10 NOTE — PROGRESS NOTES
"General Surgery Progress Note    Subjective: Denise cavazos doing great. Only used tylenol for pain. Ready for discharge.     Objective: /71 (BP Location: Left arm)   Pulse 59   Temp 97.6  F (36.4  C) (Oral)   Resp 16   Ht 1.651 m (5' 5\")   Wt 60.6 kg (133 lb 9.6 oz)   SpO2 97%   BMI 22.23 kg/m    Gen: alert, no distress  CV: RRR  Pulm: nonlabored breathing  Abd: soft, nt  Chest: incisions look fine. No hematomas. Flaps healthy. No ecchymosis.   Ext: WWP    Assessment/Plan:   Denise Taylor  is a 64 year old female POD 1 s/p bilateral mastectomy with immediate reconstruction. Doing well.   - discharge to home  - Dr. Beverly galeana orders  - follow up with me in 2-4 weeks    Opal Rubin MD  [facility]  [phone]              "

## 2019-10-10 NOTE — PLAN OF CARE
A&Ox4. AVSS. LS clear. BS hypoactive, no flatus. Incisions CDI. CMS intact. Up SBA. Madden patent. Tolerating regular diet. Refused capno. Denies pain. Denies nausea.

## 2019-10-10 NOTE — OP NOTE
Procedure Date: 10/09/2019      PREOPERATIVE DIAGNOSIS:  History of right breast cancer.      POSTOPERATIVE DIAGNOSIS:  Status post bilateral mastectomies.      PROCEDURES:   1.  Bilateral first-stage breast reconstruction with tissue expander and acellular dermal matrix.   a. Right breast tissue expander Bedias CPX4 smooth tall height device, reference [id number], serial #[id number], filled to 90 mL.   b. Right acellular dermal matrix DermACELL ID #[id number], expiration 08/08/2023.   c.  Left tissue expander Bedias CPX4 tissue expander, reference [id number], serial #[id number], filled to 90 mL.     d.  Left dermal graft DermACELL ID #[id number], expiration 08/31/2023.      ANESTHESIA:  General.      POSITION:  Supine.      SURGEON:  Dr. Dale Paul.      DRAINS:  Two #15 round Rei drains.      INDICATIONS:  Ms. Denise Taylor is a 64-year-old lady who was found to have a right breast cancer.  Attempt at surgical lumpectomy was performed, but this was failed due to positive margins.  She now presents for mastectomy.  She has elected to pursue a mastectomy prophylactically on the opposite breast as well.  I saw her in preparation for this.  We have discussed the use of tissue expanders and acellular dermal matrix.  I have explained to her the techniques of breast reconstruction, knowing that this is a multi-staged surgical intervention and that repeat revisional surgery is often necessary.      DESCRIPTION OF PROCEDURE:  The patient was marked in the preoperative area.  These marks include an oblique, relatively wide ellipse on the right side to encompass the old lumpectomy scar which was in the upper outer quadrant horizontally.  A standard ellipse was marked on the left side.  The inframammary crease markings were made as well.      The patient was then taken to the operating room.  After smooth induction of anesthesia, her chest was prepped and draped in sterile usual fashion.  Timeout took place.    Scottie performed bilateral mastectomies.  At the conclusion of her procedure, I was invited to the operative suite.        Timeout took place.  The operation commenced on the left side, where hemostasis was ensured with cautery.  A #15 round Rei drain was placed into the wound, exiting through a separate stab incision.  It was sutured in place using 2-0 silk.  The tissue expander was opened and bathed in antibiotic saline.  The Dermagraft was opened and bathed in antibiotic saline as well.  This was a perforated device.  The graft was then wrapped around the tissue expander and trimmed accordingly.  It was sutured onto itself using 3-0 Vicryl.  Suture tabs remained free.  The air within the device was then aspirated, and it was then filled with 60 mL of saline.  It was placed into the left breast cavity, where it was sutured to the chest wall using 2-0 silk securing the suture tabs to the chest wall.  Another 30 mL of saline was added to the tissue expander at this time, bringing the total to 90 mL.  This was done through a 21-gauge needle into the port.  The needle was then removed and the skin incision was closed in layers using 3-0 Monocryl deep and 4-0 Monocryl superficially.      The right side was addressed in exact similar fashion.  Tissue expander and dermal grafts were placed as done contralaterally.  Again, the drain was placed.  The tissue expander was secured to the chest wall using silk sutures as done contralaterally.  The tissue expander was filled to 90 mL on this side as well.  Skin incision was then closed over the tissue expander, Dermagraft complex in a layered fashion using 3-0 Monocryl deep and 4-0 Monocryl superficially.      The wounds were then cleansed and dressed with Steri-Strip, Adaptic, Kerlix fluffs and an Ace bandage gently wrapped around the patient's torso for light compression.  The drains were dressed independently with 2 x 2 gauze and Medipore tape.      BLOOD LOSS:  10 mL.       COMPLICATIONS:  There were no immediate complications.        COUNTS:  All sponge and needle counts were correct.         MELINDA GUZMÁN MD             D: 10/09/2019   T: 10/10/2019   MT: MILTON      Name:     ILEANA LUNSFORD   MRN:      [id number]        Account:        [id number]   :      1955           Procedure Date: 10/09/2019      Document: [id number]

## 2019-10-10 NOTE — PLAN OF CARE
A/O x4. VSS on RA. Up with SBA. Tolerating regular diet. Lung sounds clear. Bowel sounds active. - flatus. - BM. Adequate urine output via olivares. CARLOS ENRIQUE incisions CDI, stripped q.8 per orders. Pain managed with ibuprofen. Denies nausea. IVF stopped, pt tolerating po fluids.

## 2019-10-11 ENCOUNTER — TELEPHONE (OUTPATIENT)
Dept: SURGERY | Facility: CLINIC | Age: 64
End: 2019-10-11

## 2019-10-11 LAB — COPATH REPORT: NORMAL

## 2019-10-11 NOTE — TELEPHONE ENCOUNTER
Denise is s/p bilateral skin sparing mastectomies on 10/9/2019 with Dr. Rubin. Reconstruction per Dr. Paul. Patient's , Sergio, called to report two episodes of fainting yesterday and concerns of left carmen drain.    Sergio reports, Denise had two separate episodes of fainting yesterday. He reports she has had minimal PO intake due to loss of appetite. She had been taking robaxin and oxycodone. He reports she is experiencing minimal pain, therefore they have stopped the robaxin and oxycodone. Today she has had no episodes of fainting and seems more alert.    He also reports the left carmen drain suture is no longer intact and the drain appears to have become dislodge. There is no swelling around the insertion site and there appears to be moderate sanguineous output in left carmen bulb.    Encouraged small frequent meals. Change positions slowly. Take robaxin and oxycodone only as needed and with food. Secure left carmen drain to skin with tape. Continue to strip tubing as needed and monitor output.     Instructed Sergio to call our office back with the following: episodes of syncope persist, drainage in left carmen bulb diminishes and/or swelling, drainage occurs around left carmen insertion site. Sergio verbalized understanding.    Above reviewed with Dr. Rubin who is in agreement with plan.    Jordyn PENAN, RN, OCN  Oncology Care Coordinator  [facility]  [facility]  Phone: [phone]

## 2019-10-14 ENCOUNTER — TELEPHONE (OUTPATIENT)
Dept: SURGERY | Facility: CLINIC | Age: 64
End: 2019-10-14

## 2019-10-14 NOTE — TELEPHONE ENCOUNTER
Call placed to patient's spouse, Sergio, to f/u on symptoms from last Friday and review pstop appointment details. Requested call back to discuss in greater detail.    Jordyn PENAN, RN, OCN  Oncology Care Coordinator  [facility]  [facility]  Phone: [phone]

## 2019-10-15 ENCOUNTER — TELEPHONE (OUTPATIENT)
Dept: SURGERY | Facility: PHYSICIAN GROUP | Age: 64
End: 2019-10-15

## 2019-10-15 NOTE — TELEPHONE ENCOUNTER
I called Denise with her pathology report after bilateral mastectomies last week. There was no residual cancer in the right breast. Left breast was normal. I would like to see her back for follow up in a couple weeks. She will see me on 10/31 in . She reports she is doing well. Minimal discomfort, feels just a little tight. Not using any pain medications.     Opal Rubin MD  [facility]  [phone]

## 2019-10-18 NOTE — PROGRESS NOTES
[facility] Breast Surgery Postoperative Note    S: Denise returns for follow up after bilateral mastectomies. She is doing well. She still has left drain in place due to a resolving hematoma. Sore on the left. No issues on the right.     Breasts: bilateral incisions healing well. Hematoma on left with swelling present, CARLOS ENRIQUE drain with dark output consistent with hematoma    Pathology:   SPECIMEN(S):   A: Breast, right   B: Breast, left     FINAL DIAGNOSIS:   A: Breast, right, excision   - Chronic and foreign body inflammatory change consistent with previous   excision, no evidence of residual   malignancy     B: Breast, left, mastectomy   - Benign breast tissue     A/P  Denise PEDRO Brandon is recovering from a bilateral skin sparing mastectomies with immediate reconstruction with Dr. Paul on 10/9/2019. She is here for follow up. We discussed her pathology report revealed no residual malignancy. I would recommend she follow up with Oncology and discuss hormone blockade given low oncotype score. No indication for radiation. She should have annual chest wall exams going forward. I typically see patients for 6 month follow up and one year, she can decide if she would like to see me for this or can be seen closer to home. She will try to see Dr. Dupree for drain removal when output <30ml/day.     Thank you for the opportunity to help in her care.    Opal Rubin MD  [facility]  [phone]    Please route or send letter to:  Primary Care Provider (PCP) and Referring Provider

## 2019-10-22 ENCOUNTER — OFFICE VISIT (OUTPATIENT)
Dept: SURGERY | Facility: CLINIC | Age: 64
End: 2019-10-22
Payer: COMMERCIAL

## 2019-10-22 DIAGNOSIS — C50.911 INVASIVE LOBULAR CARCINOMA OF RIGHT BREAST IN FEMALE (H): ICD-10-CM

## 2019-10-22 DIAGNOSIS — Z09 SURGERY FOLLOW-UP EXAMINATION: Primary | ICD-10-CM

## 2019-10-22 PROCEDURE — 99024 POSTOP FOLLOW-UP VISIT: CPT | Performed by: SURGERY

## 2019-10-22 NOTE — PATIENT INSTRUCTIONS
You are scheduled for the following appointments:   1) Medical Oncology at [facility] will contact you directly to schedule consultation (Tentative date: 11/19/19 at 8am)   2) Six month follow-up with Dr. Opal Rubin at [facility] on 4/23/20 at 1pm

## 2019-10-22 NOTE — Clinical Note
Luan Cariasa may try to see you for drain removal on her left so she doesn't have to drive all the way down here for it. Told her to call when <30ml for two days for removal. Surgery went well. She did have a hematoma on the left, which is why drain still in. Has had follow up with Beverly (plastics). No residual cancer on final path.

## 2019-10-28 ENCOUNTER — HOSPITAL ENCOUNTER (EMERGENCY)
Facility: HOSPITAL | Age: 64
Discharge: HOME | End: 2019-10-28
Attending: FAMILY MEDICINE | Admitting: FAMILY MEDICINE
Payer: COMMERCIAL

## 2019-10-28 VITALS
BODY MASS INDEX: 21.63 KG/M2 | SYSTOLIC BLOOD PRESSURE: 114 MMHG | WEIGHT: 130 LBS | RESPIRATION RATE: 18 BRPM | TEMPERATURE: 98 F | OXYGEN SATURATION: 98 % | DIASTOLIC BLOOD PRESSURE: 55 MMHG

## 2019-10-28 DIAGNOSIS — T85.698A BROKEN JACKSON-PRATT DRAIN, INITIAL ENCOUNTER: ICD-10-CM

## 2019-10-28 PROCEDURE — 99282 EMERGENCY DEPT VISIT SF MDM: CPT | Mod: Z6 | Performed by: FAMILY MEDICINE

## 2019-10-28 PROCEDURE — 99282 EMERGENCY DEPT VISIT SF MDM: CPT

## 2019-10-28 ASSESSMENT — ENCOUNTER SYMPTOMS
ACTIVITY CHANGE: 1
DIAPHORESIS: 0
CONSTIPATION: 0
PSYCHIATRIC NEGATIVE: 1
ABDOMINAL PAIN: 0
DIARRHEA: 0
VOMITING: 0
NEUROLOGICAL NEGATIVE: 1
PALPITATIONS: 0
NAUSEA: 0
FEVER: 0
WOUND: 1
SHORTNESS OF BREATH: 0
MUSCULOSKELETAL NEGATIVE: 1
ARTHRALGIAS: 0

## 2019-10-28 NOTE — ED TRIAGE NOTES
"Reports double masectomy on October 9th. Has a CARLOS ENRIQUE drain to L breast which has been producing 50ml of bloody drainage daily. R drain removed 2 weeks ago. Patient states since this morning the drain \"won't stay shut or uninflate.\"  "

## 2019-10-28 NOTE — ED AVS SNAPSHOT
[facility]  [address] [address]  Phone:  [phone]                                    Denise Taylor   MRN: [id number]    Department:  [facility]   Date of Visit:  10/28/2019           After Visit Summary Signature Page    I have received my discharge instructions, and my questions have been answered. I have discussed any challenges I see with this plan with the nurse or doctor.    ..........................................................................................................................................  Patient/Patient Representative Signature      ..........................................................................................................................................  Patient Representative Print Name and Relationship to Patient    ..................................................               ................................................  Date                                   Time    ..........................................................................................................................................  Reviewed by Signature/Title    ...................................................              ..............................................  Date                                               Time          22EPIC Rev 08/18

## 2019-10-29 NOTE — DISCHARGE INSTRUCTIONS
If the area is bleeding and exceeding the capability of the current dressing reinforced with additional 4 x 4 gauze.  Do not remove the current dressing for 48 hours.

## 2019-10-29 NOTE — ED NOTES
Discharge instructions reviewed with patient.  No questions or concerns. Copy of AVS in hand on discharge

## 2019-10-29 NOTE — ED NOTES
Minimal drainage noted from CARLOS ENRIQUE drain in left breast.  Pt does state that is more tender then what the right ever was.  No fever.

## 2019-10-29 NOTE — ED PROVIDER NOTES
History     Chief Complaint   Patient presents with     Post-op Problem     HPI  Denise Taylor is a 64 year old female who had bilateral mastectomy and has seen plastic surgery, had bilateral Anup-Blanc drains in initially.  The right one was removed sometime ago, the left was left in place, they said for max of 21 days.  Today is day 19 and unfortunately the patient is no longer be able to get suction from the drain.  There is no visible hole in the drain, however it is somewhat pulled out from where it was initially and the Anup-Blanc bulb will not stay deflated to put suction on the area.    Allergies:  Allergies   Allergen Reactions     Penicillins Rash     Zithromax [Azithromycin] Rash       Problem List:    Patient Active Problem List    Diagnosis Date Noted     History of breast cancer 10/09/2019     Priority: Medium     Invasive lobular carcinoma of right breast in female (H) 08/28/2019     Priority: Medium     Malignant neoplasm of right female breast, unspecified estrogen receptor status, unspecified site of breast (H) 08/22/2019     Priority: Medium     Cigarette nicotine dependence in remission 08/22/2019     Priority: Medium     Recurrent pneumonia 08/22/2019     Priority: Medium     ACP (advance care planning) 07/27/2017     Priority: Medium     Advance Care Planning 7/27/2017: ACP Review of Chart / Resources Provided:  Reviewed chart for advance care plan.  Denise Taylor has no plan or code status on file. Discussed available resources and provided with information.   Added by Marah Henry             Hypothyroidism, unspecified type 07/27/2017     Priority: Medium     Pneumonia 07/20/2015     Priority: Medium     History of basal cell carcinoma 05/18/2015     Priority: Medium     Hyperlipidemia with target LDL less than 130 05/18/2015     Priority: Medium     Diagnosis updated by automated process. Provider to review and confirm.       Actinic keratosis 12/01/2011     Priority:  Medium     AC separation, type 5 06/27/2011     Priority: Medium     Overview:   IMO Update 10/11       Capillary disease 08/06/2007     Priority: Medium     Overview:   IMO Update 10/11       Other dyschromia 08/06/2007     Priority: Medium     Scar condition and fibrosis of skin 08/06/2007     Priority: Medium        Past Medical History:    Past Medical History:   Diagnosis Date     Pneumonia        Past Surgical History:    Past Surgical History:   Procedure Laterality Date     ARTHROSCOPY SHOULDER ROTATOR CUFF REPAIR Left 3/7/2018    Procedure: ARTHROSCOPY SHOULDER ROTATOR CUFF REPAIR;  LEFT SHOULDER ARTHROSCOPY, REPAIR ROTATOR CUFF: subacromial Decompression and AC Joint Resection;  Surgeon: Baldev Lou DO;  Location: [facility]     ARTHROTOMY SHOULDER, ROTATOR CUFF REPAIR, COMBINED Right 11/14/2018    Procedure: RIGHT SHOULDER DEBRIDEMENT, EXCISION OF LIPOMA  RIGHT UPPER ARM, EXCISION SCAR TISSUE AC JOINT;  Surgeon: Baldev Lou DO;  Location: [facility]     BIOPSY BREAST NEEDLE LOCALIZATION, BIOPSY NODE SENTINEL, COMBINED Right 8/23/2019    Procedure: RIGHT WIRE LOCALIZED LUMPECTOMY WITH SENTINEL  LYMP NODE;  Surgeon: Michael Dupree MD;  Location: [facility]     COLONOSCOPY  2006    Dr Lara     COLONOSCOPY N/A 10/10/2016    Procedure: COLONOSCOPY;  Surgeon: Christine Cintron MD;  Location: [facility]     INSERT TISSUE EXPANDER BREAST BILATERAL Bilateral 10/9/2019    Procedure: BILATERAL TISSUE EXPANDERS (RAMIREZ);  Surgeon: Dale Paul MD;  Location:  OR     MASTECTOMY, NIPPLE SPARING Bilateral 10/9/2019    Procedure: BILATERAL SKIN SPARRING MASTECTOMY (CASS);  Surgeon: Opal Rubin MD;  Location:  OR     ORTHOPEDIC SURGERY      feet neuromas removed     SHOULDER SURGERY Right 2011/10/2012       Family History:    Family History   Problem Relation Age of Onset     Myocardial Infarction Mother      Hypertension Mother      Stomach Cancer Father        Social History:  Marital Status:    [2]  Social History     Tobacco Use     Smoking status: Never Smoker     Smokeless tobacco: Never Used   Substance Use Topics     Alcohol use: Yes     Alcohol/week: 1.7 standard drinks     Types: 2 Glasses of wine per week     Comment: occasionally     Drug use: No        Medications:    cetirizine (ZYRTEC) 10 MG tablet  eletriptan (RELPAX) 40 MG tablet  FLUoxetine HCl, PMDD, (SARAFEM) 10 MG TABS  fluticasone (ARNUITY ELLIPTA) 200 MCG/ACT inhaler  levothyroxine (SYNTHROID/LEVOTHROID) 88 MCG tablet  rosuvastatin (CRESTOR) 10 MG tablet          Review of Systems   Constitutional: Positive for activity change. Negative for diaphoresis and fever.   Respiratory: Negative for shortness of breath.    Cardiovascular: Negative for chest pain and palpitations.   Gastrointestinal: Negative for abdominal pain, constipation, diarrhea, nausea and vomiting.   Genitourinary: Negative.    Musculoskeletal: Negative.  Negative for arthralgias.   Skin: Positive for wound.        Bleeding from Anup-Blanc site   Neurological: Negative.    Psychiatric/Behavioral: Negative.        Physical Exam   BP: 114/55  Heart Rate: 58  Temp: 98  F (36.7  C)  Resp: 18  Weight: 59 kg (130 lb)  SpO2: 98 %      Physical Exam  Vitals signs and nursing note reviewed.   Constitutional:       Appearance: Normal appearance. She is normal weight.   HENT:      Head: Normocephalic and atraumatic.   Neck:      Musculoskeletal: Normal range of motion and neck supple.   Cardiovascular:      Rate and Rhythm: Normal rate and regular rhythm.   Pulmonary:      Effort: Pulmonary effort is normal.      Breath sounds: Normal breath sounds.   Abdominal:      General: There is no distension.      Tenderness: There is no tenderness.   Musculoskeletal: Normal range of motion.   Skin:     General: Skin is warm and dry.      Capillary Refill: Capillary refill takes less than 2 seconds.      Comments: See note for removal of Anup-Blanc drain.  Site does not appear  red or have any signs of infectious drainage.   Neurological:      Mental Status: She is alert and oriented to person, place, and time.   Psychiatric:         Mood and Affect: Mood normal.         ED Course   Spoke with plastic surgery at [facility], he recommended that we remove the Anup-Blanc drain as it is not functioning.  This was done by clipping the suture and pulling the drain.  There was moderate amount of dark red bleeding from the site, it appears to be old.     Procedures    No results found for this or any previous visit (from the past 24 hour(s)).    Medications - No data to display    Assessments & Plan (with Medical Decision Making)   Wound dressed with 2 x 2 and Tegaderm as instructed by plastics.  There is some oozing after the dressing was applied, patient was given additional dressings to place on top of that if needed.  I have reviewed the nursing notes.    I have reviewed the findings, diagnosis, plan and need for follow up with the patient.  Discharge Medication List as of 10/28/2019  7:12 PM          Final diagnoses:   Broken Anup-Blanc drain, initial encounter       10/28/2019   [facility]     Alessia Miranda MD  10/28/19 2005

## 2019-11-01 ENCOUNTER — PATIENT OUTREACH (OUTPATIENT)
Dept: ONCOLOGY | Facility: OTHER | Age: 64
End: 2019-11-01

## 2019-11-01 NOTE — LETTER
[facility]  [address] [place], [place] [address]CA [place]KARINA  [facility] [address]  [phone]      November 1, 2019      Denise Taylor  [address] [facility] [address]      EMERGENCY CARE PLAN  Presenting Problem Treatment Plan   Questions or concerns during clinic hours    24 hour Nurse line available - Call main clinic line and follow prompts I will call the clinic directly: [phone]    24 Hour Nurse Line [phone]- Follow prompts and press option for nurse advisor  Oncology Department:[phone]    [place] [id number] [facility] [place] [address]   Patient needs to schedule an appointment  I will call the scheduling team during business hours at [phone]   Same day treatment   I will call the clinic first, then urgent care if needed   Clinic Care Coordinators [facility]  RN Clinic Care Coordinator  [phone]    Oncology Care Coordinator:[phone]      [phone]   Crisis Services:  Behavioral or Mental Health Behavioral Health [facility]  [phone]   Emergency treatment--Immediately CALL 911

## 2019-11-01 NOTE — PROGRESS NOTES
PRE VISIT MEDICAL ONCOLOGY     REASON FOR APPOINTMENT    Type of Cancer - grade 2 invasive lobular carcinoma right breast. Stage IA/IB , T2NO        SYMPTOMS   Symptom onset - palpable mass right breast        PROVIDERS  Referring MD - Opal Rubin MD  PCP - Jesus Manuel  Surgeon -         TREATMENT TO-DATE FOR THIS CANCER  Biopsy - 8.23.19 right breast invasive lobular carcinoma grade 2         Surgery - 10.9.19 bilateral skin sparring mastectomy      Oncologist -     PRIOR HISTORY OF CANCER  None noted    RECENT IMAGING STUDIES    See Epic tab  Mammogram -   PET -   CT - (CAP)-Chest-Abdomen-Pelvis  /  Head-Neck  Bone Scan (Dexa) -   MRI - Head / Other  ECHO -    X-Ray - last CXR   /   EKG  Other -     LABS PERTINENT TO DIAGNOSIS  See Epic tab   BMP   CBC w/ Platelets   LDH   CMP   CEA   Creatinine   Other - (list)     CENTRAL LINE/PORT   None            HEALTH SCREENING     Mammogram -7.25.19  Colonoscopy -10.10.16  Dental Exam - unknown  T-Dap - 10.1.12  Pneumonia - 7.21.15  Flu Shot - 10.10.19  Shingles - recommended in [facility]    FAMILY CANCER HISTORY    Father -stomach cancer      TOBACCO USE HISTORY    Never smoker     OTHER PERTINENT CANCER INFORMATION NOT IDENTIFIED ELSEWHERE      Called patient and introduced myself to the patient. She is aware of her appt. She did have a drain fall out of her surgical site in breast are. Her surgeon in [place] is aware. She has no other concerns at this time.    Prerna London RN   Oncology Care Coordinator

## 2019-11-05 ENCOUNTER — ONCOLOGY VISIT (OUTPATIENT)
Dept: ONCOLOGY | Facility: OTHER | Age: 64
End: 2019-11-05
Attending: SURGERY
Payer: COMMERCIAL

## 2019-11-05 ENCOUNTER — PATIENT OUTREACH (OUTPATIENT)
Dept: ONCOLOGY | Facility: OTHER | Age: 64
End: 2019-11-05

## 2019-11-05 VITALS
DIASTOLIC BLOOD PRESSURE: 78 MMHG | WEIGHT: 137.79 LBS | BODY MASS INDEX: 22.93 KG/M2 | HEART RATE: 51 BPM | SYSTOLIC BLOOD PRESSURE: 130 MMHG | OXYGEN SATURATION: 97 % | TEMPERATURE: 97.2 F

## 2019-11-05 DIAGNOSIS — C50.911 INVASIVE LOBULAR CARCINOMA OF RIGHT BREAST IN FEMALE (H): Primary | ICD-10-CM

## 2019-11-05 DIAGNOSIS — C50.911 INVASIVE LOBULAR CARCINOMA OF RIGHT BREAST IN FEMALE (H): ICD-10-CM

## 2019-11-05 LAB
ALBUMIN SERPL-MCNC: 4.1 G/DL (ref 3.4–5)
ALP SERPL-CCNC: 67 U/L (ref 40–150)
ALT SERPL W P-5'-P-CCNC: 35 U/L (ref 0–50)
ANION GAP SERPL CALCULATED.3IONS-SCNC: 5 MMOL/L (ref 3–14)
AST SERPL W P-5'-P-CCNC: 36 U/L (ref 0–45)
BASOPHILS # BLD AUTO: 0 10E9/L (ref 0–0.2)
BASOPHILS NFR BLD AUTO: 0.5 %
BILIRUB SERPL-MCNC: 0.4 MG/DL (ref 0.2–1.3)
BUN SERPL-MCNC: 12 MG/DL (ref 7–30)
CALCIUM SERPL-MCNC: 9 MG/DL (ref 8.5–10.1)
CHLORIDE SERPL-SCNC: 107 MMOL/L (ref 94–109)
CO2 SERPL-SCNC: 28 MMOL/L (ref 20–32)
CREAT SERPL-MCNC: 0.72 MG/DL (ref 0.52–1.04)
DIFFERENTIAL METHOD BLD: NORMAL
EOSINOPHIL # BLD AUTO: 0.2 10E9/L (ref 0–0.7)
EOSINOPHIL NFR BLD AUTO: 2.6 %
ERYTHROCYTE [DISTWIDTH] IN BLOOD BY AUTOMATED COUNT: 14.3 % (ref 10–15)
GFR SERPL CREATININE-BSD FRML MDRD: 88 ML/MIN/{1.73_M2}
GLUCOSE SERPL-MCNC: 85 MG/DL (ref 70–99)
HCT VFR BLD AUTO: 35.1 % (ref 35–47)
HGB BLD-MCNC: 11.7 G/DL (ref 11.7–15.7)
IMM GRANULOCYTES # BLD: 0 10E9/L (ref 0–0.4)
IMM GRANULOCYTES NFR BLD: 0.2 %
LDH SERPL L TO P-CCNC: 306 U/L (ref 81–234)
LYMPHOCYTES # BLD AUTO: 1.5 10E9/L (ref 0.8–5.3)
LYMPHOCYTES NFR BLD AUTO: 25 %
MCH RBC QN AUTO: 30.5 PG (ref 26.5–33)
MCHC RBC AUTO-ENTMCNC: 33.3 G/DL (ref 31.5–36.5)
MCV RBC AUTO: 92 FL (ref 78–100)
MONOCYTES # BLD AUTO: 0.6 10E9/L (ref 0–1.3)
MONOCYTES NFR BLD AUTO: 9.5 %
NEUTROPHILS # BLD AUTO: 3.7 10E9/L (ref 1.6–8.3)
NEUTROPHILS NFR BLD AUTO: 62.2 %
NRBC # BLD AUTO: 0 10*3/UL
NRBC BLD AUTO-RTO: 0 /100
PLATELET # BLD AUTO: 198 10E9/L (ref 150–450)
POTASSIUM SERPL-SCNC: 4.2 MMOL/L (ref 3.4–5.3)
PROT SERPL-MCNC: 7.4 G/DL (ref 6.8–8.8)
RBC # BLD AUTO: 3.83 10E12/L (ref 3.8–5.2)
SODIUM SERPL-SCNC: 140 MMOL/L (ref 133–144)
WBC # BLD AUTO: 5.9 10E9/L (ref 4–11)

## 2019-11-05 PROCEDURE — 99204 OFFICE O/P NEW MOD 45 MIN: CPT | Mod: 24 | Performed by: INTERNAL MEDICINE

## 2019-11-05 PROCEDURE — 83615 LACTATE (LD) (LDH) ENZYME: CPT | Performed by: INTERNAL MEDICINE

## 2019-11-05 PROCEDURE — 85025 COMPLETE CBC W/AUTO DIFF WBC: CPT | Performed by: INTERNAL MEDICINE

## 2019-11-05 PROCEDURE — 86300 IMMUNOASSAY TUMOR CA 15-3: CPT | Performed by: INTERNAL MEDICINE

## 2019-11-05 PROCEDURE — 36415 COLL VENOUS BLD VENIPUNCTURE: CPT | Performed by: INTERNAL MEDICINE

## 2019-11-05 PROCEDURE — 80053 COMPREHEN METABOLIC PANEL: CPT | Performed by: INTERNAL MEDICINE

## 2019-11-05 ASSESSMENT — ACTIVITIES OF DAILY LIVING (ADL): DEPENDENT_IADLS:: INDEPENDENT

## 2019-11-05 NOTE — PATIENT INSTRUCTIONS
Labwork ordered today.  MRI/brain and PET scan pending.  Follow up with Dr Noyola when completed.    Please call [phone] with any questions/concerns.      Thank you.

## 2019-11-05 NOTE — PROGRESS NOTES
Visit Date:   11/05/2019      REASON FOR CONSULTATION:  Invasive lobular carcinoma of the right breast.      REQUESTING PHYSICIANS:  Dr. Dupree, DASH Lagos and Dr. Dale Paul, plastic surgeon as well as Dr. Opal Rubin of [facility].      HISTORY OF PRESENT ILLNESS:  Mrs. Taylor is a 64-year-old white female with history of recurrent pneumonia.  We are asked to evaluate concerning new diagnosis of invasive lobular carcinoma of the right breast.  Apparently, she had palpated a lump in her right breast.  Initial mammograms were negative.  The lump persisted and she was referred to Dr. Dupree who saw the patient on 07/26/2019.  At that time of this exam, he noted a discrete mass in the upper outer quadrant of the right breast that measured 2.5 to 3 cm.  He recommended a biopsy of the mass.  This came back as invasive lobular carcinoma of the right breast, grade 2, ER positive, AR positive, HER-2 negative.  The patient subsequently underwent MRI of both breasts and the findings were that there was a small area of enhancement spanning 6.5 x 17 mm in the upper outer right breast 4.7 cm with nipple, likely reflecting the biopsy tract.  This was with some residual malignancy.  Left breast was unremarkable.  The patient subsequently underwent lumpectomy and sentinel node procedure performed by Dr. Dupree which revealed that the patient had a 4.5 cm tumor, invasive lobular carcinoma of the right breast, grade 2, ER positive, AR positive, HER2 negative, sentinel node was negative.  The patient was staged pathologic T2 N0.  The margins were positive.  The patient was referred to the  and was seen by Dr. Opal Rubin as well as Dr. Dale Paul; she went on to have bilateral mastectomy on 10/09/2019 with immediate reconstruction performed by Dr. Rubin and Dr. Dale Paul and pathology was consistent with no evidence of malignancy.  The patient is now here for further evaluation.  In terms  of risk factors for breast cancer,  she had no family history of breast cancer.  Age of menarche was 13.  She had 1 child before the age of 30, another child after the age of 30.  She did not breast feed.  Age of menopause was 52.  She was never on hormone replacement therapy.  She has a remote history of smoking.  No alcohol.  She currently is postop.  She says she is been followed by Dr. Ching at [facility] for history of recurrent pneumonia.  Otherwise, she denies any shortness of breath, chest pain, abdominal pain, fevers, night sweats, weight loss.  She does have drains in place and plans to follow up with Dr. Paul on 11/13.      PAST MEDICAL HISTORY:  As above, history of hyperlipidemia, hypothyroidism, history of basal cell carcinoma, actinic keratosis,  history of tobacco use, history of hyperlipidemia stated above.      ALLERGIES:  SHE IS ALLERGIC TO:   1.   PENICILLIN.   2.   ZITHROMAX.      MEDICATIONS:   1.  Synthroid 88 mcg daily.   2.  Fluconazole 200 mcg 1 puff into lungs daily.   3.  Relpax 40 mg daily.   4.  Fluoxetine 10 mg daily.   5.  Crestor 10 mg daily.   6.  Zyrtec 10 mg at bedtime.      SOCIAL HISTORY:  She had a remote history of smoking.  She quit 30 years ago.  She may have smoked for 10 years.  Alcohol is negative.  She is a retired , taught elementary school.      FAMILY HISTORY:  Significant for father with colon cancer and stomach cancer.      REVIEW OF SYSTEMS:   CENTRAL NERVOUS SYSTEM:  Negative for headache, change in mental status.   RESPIRATORY:  Significant for occasional cough, no shortness of breath, no hemoptysis.   ENT:  Negative for hearing loss, odynophagia, nasal congestion.   GASTROINTESTINAL:  Negative for change in bowel habits, bright red blood per rectum or hematemesis.   CARDIAC:  Negative chest pain, palpitations, orthopnea, PND, leg edema.   MUSCULOSKELETAL:  Unremarkable.   GENITOURINARY:  Negative for nocturia, urgency, frequency,  hematuria.   CONSTITUTIONAL:  Negative for fevers, night sweats, weight loss.   HEMATOLOGIC:  Negative for easy bruisability, gingival bleeding, epistaxis.      PHYSICAL EXAMINATION:   GENERAL:  She is a well-developed, well-nourished middle-aged white female in no acute distress.   VITAL SIGNS:  Blood pressure 130/78, pulse 51, temperature 97.2.   HEENT:  Atraumatic, normocephalic.  Oropharynx nonerythematous.   NECK:  Supple.   LUNGS:  Clear to auscultation and percussion.   HEART:  Regular rhythm, S1 normal.   BREASTS:  Exam deferred.   ABDOMEN:  Soft, normoactive bowel sounds, no masses or tenderness.   LYMPHATICS:  No cervical, supraclavicular, axillary or inguinal nodes.   EXTREMITIES:  No edema.   NEUROLOGIC:  Nonfocal.      LABORATORY DATA:  Laboratories reveal CBC:  White count 5.9, H and H 11.7 and 35.1, platelet count 198,000.      IMPRESSION:  Pathologic T2 N0 lobular carcinoma of the right breast, ER positive, MA positive, HER-2 negative, status post lumpectomy, sentinel node.  Oncotype DX recurrence score was 12, consistent with low risk s/p bilateral mastectomy with immediate reconstruction, we would like to adequately stage the patient with a PET scan and MRI of the brain.  Also obtain CA 27-29.  Otherwise, if the metastatic workup is negative,  the patient would be a candidate for aromatase inhibitor therapy.  We will await the results of PET scan and MRI of the brain lab work and proceed with further workup.  She will continue to follow up with Dr. Paul and Dr. Rubin.         EVELYN DAY MD             D: 2019   T: 2019   MT: ASHELY      Name:     ILEANA LUNSFORD   MRN:      [id number]        Account:      [id number]   :      1955           Visit Date:   2019      Document: [id number]       cc: Dale Rubin MD

## 2019-11-05 NOTE — NURSING NOTE
"Chief Complaint   Patient presents with     RECHECK     follow up Invasive lobular carcinoma of right breast in female        Initial /78   Pulse 51   Temp 97.2  F (36.2  C)   Wt 62.5 kg (137 lb 12.6 oz)   SpO2 97%   BMI 22.93 kg/m   Estimated body mass index is 22.93 kg/m  as calculated from the following:    Height as of 10/9/19: 1.651 m (5' 5\").    Weight as of this encounter: 62.5 kg (137 lb 12.6 oz).  Medication Reconciliation: Honey Patel LPN    "

## 2019-11-05 NOTE — PROGRESS NOTES
Face to Face Oncology Visit      The list of resources in the New Patient Folder, represent the items given to the patient to assist the patient during their cancer journey.  During the face to face visit, with the Oncology RN Care Coordination, the patient was given an explanation of the resources.        New Patient Folder -      ONCOLOGY DEPARTMENT CONTACT INFORMATION  o [facility] Care Plan - Discussed and explained the purpose and use of this form.       Visit-will visit at later date  o Financial, Billing, Transportation or other insurance questions  o [employer] Application - benefit and purpose discussed  o EpiVax Application ([facility] only) - benefit and purpose discussed      Spiritual needs      Nutritional Concerns (describe)    None at this        Physical Health Concerns-none        Clinical Trials explained and discussed     Updated that cancer marker will be drawn and scans will be done.Will folllow up and have POC at that time.    Patient had friend at appointment with her.    Prerna London , RN   Oncology Care Coordinator            Rev 4/9/19/MN

## 2019-11-06 LAB — CANCER AG27-29 SERPL-ACNC: 41 U/ML (ref 0–39)

## 2019-11-19 ENCOUNTER — TELEPHONE (OUTPATIENT)
Dept: PET IMAGING | Facility: HOSPITAL | Age: 64
End: 2019-11-19

## 2019-11-20 ENCOUNTER — HOSPITAL ENCOUNTER (OUTPATIENT)
Dept: PET IMAGING | Facility: HOSPITAL | Age: 64
Discharge: HOME | End: 2019-11-20
Attending: INTERNAL MEDICINE | Admitting: INTERNAL MEDICINE
Payer: COMMERCIAL

## 2019-11-20 DIAGNOSIS — C50.911 INVASIVE LOBULAR CARCINOMA OF RIGHT BREAST IN FEMALE (H): ICD-10-CM

## 2019-11-20 PROCEDURE — 34300033 ZZH RX 343: Performed by: INTERNAL MEDICINE

## 2019-11-20 PROCEDURE — A9552 F18 FDG: HCPCS | Performed by: INTERNAL MEDICINE

## 2019-11-20 PROCEDURE — 78815 PET IMAGE W/CT SKULL-THIGH: CPT | Mod: TC,PS

## 2019-11-20 RX ADMIN — FLUDEOXYGLUCOSE F-18 14.5 MCI.: 500 INJECTION, SOLUTION INTRAVENOUS at 07:29

## 2019-12-03 ENCOUNTER — ONCOLOGY VISIT (OUTPATIENT)
Dept: ONCOLOGY | Facility: OTHER | Age: 64
End: 2019-12-03
Attending: INTERNAL MEDICINE
Payer: COMMERCIAL

## 2019-12-03 VITALS
WEIGHT: 137.35 LBS | OXYGEN SATURATION: 98 % | HEART RATE: 68 BPM | TEMPERATURE: 97.7 F | HEIGHT: 65 IN | BODY MASS INDEX: 22.88 KG/M2 | DIASTOLIC BLOOD PRESSURE: 82 MMHG | SYSTOLIC BLOOD PRESSURE: 114 MMHG

## 2019-12-03 DIAGNOSIS — Z79.811 USE OF AROMATASE INHIBITORS: ICD-10-CM

## 2019-12-03 DIAGNOSIS — C50.911 MALIGNANT NEOPLASM OF RIGHT FEMALE BREAST, UNSPECIFIED ESTROGEN RECEPTOR STATUS, UNSPECIFIED SITE OF BREAST (H): Primary | ICD-10-CM

## 2019-12-03 PROCEDURE — 99214 OFFICE O/P EST MOD 30 MIN: CPT | Performed by: INTERNAL MEDICINE

## 2019-12-03 RX ORDER — ANASTROZOLE 1 MG/1
1 TABLET ORAL DAILY
Qty: 90 TABLET | Refills: 3 | Status: SHIPPED | OUTPATIENT
Start: 2019-12-03 | End: 2020-11-09

## 2019-12-03 ASSESSMENT — MIFFLIN-ST. JEOR: SCORE: 1173.88

## 2019-12-03 ASSESSMENT — PAIN SCALES - GENERAL: PAINLEVEL: NO PAIN (0)

## 2019-12-03 NOTE — PATIENT INSTRUCTIONS
Arimidex and OsCal has been sent to your pharmacy. You will be called with dates for a PET scan, MRI brain, and Dexa scan. These will be done in February. You will follow up with  after these scans.

## 2019-12-03 NOTE — NURSING NOTE
"Chief Complaint   Patient presents with     RECHECK     Invasive lobular carcinoma of right breast in female        Initial /82   Pulse 68   Temp 97.7  F (36.5  C) (Tympanic)   Ht 1.651 m (5' 5\")   Wt 62.3 kg (137 lb 5.6 oz)   SpO2 98%   BMI 22.86 kg/m   Estimated body mass index is 22.86 kg/m  as calculated from the following:    Height as of this encounter: 1.651 m (5' 5\").    Weight as of this encounter: 62.3 kg (137 lb 5.6 oz).  Medication Reconciliation: complete   Immunizations reviewed; 0 pain; phq9=0; ahd=info given  Nikia Bynum LPN  "

## 2019-12-04 NOTE — PROGRESS NOTES
Visit Date:   12/03/2019      HISTORY OF PRESENT ILLNESS:  Mrs. Taylor returns for followup of invasive lobular carcinoma of the right breast.  We had seen the patient in consultation at the request of Dr. Dupree, Mirna Roblero, as well as Dr. Opal Rubin at [facility] on 11/05/2019.  We had seen her.  At that time, she was a 64-year-old white female with a history of recurrent pneumonia, whom we were asked to evaluate concerning a diagnosis of invasive lobular carcinoma of the right breast.  Apparently, she had palpated a lump in her right breast.  Initial mammograms were negative.  The lump persisted, and she was referred to Dr. Dupree, who saw the patient on 07/26/2019.  At that time of his exam, he noted a discrete mass in the upper outer quadrant of the right breast that measured 2.5 to 3 cm.  He recommended biopsy of the mass.  This came back as invasive lobular carcinoma of the right breast, grade 2, ER positive, NH positive, HER-2 negative.  The patient subsequently underwent MRI of both breasts, and the findings were that there was a small area of enhancement spanning 6.5 x 17 mm in the upper outer right breast 4.7 cm, with the nipple likely reflecting the biopsy tract.  The left breast was unremarkable.  The patient subsequently underwent lumpectomy and sentinel node procedure performed by Dr. Dupree, which revealed that the patient had a 4.5 cm tumor, invasive lobular carcinoma of the right breast, grade 2, ER positive, NH positive, HER-2/jenn negative; sentinel node was negative.  The patient was staged pathologic T2 N0.  Margins were positive.  The patient was referred to the  and was seen by Dr. Opal Rubin as well as Dr. Dale Paul, the plastic surgeon.  She went on to have bilateral mastectomy on 10/09/2019 with immediate reconstruction performed by Dr. Rubin and Dr. Dale Paul, and pathology was consistent with no evidence of malignancy.  The patient's risk factors for  breast cancer include that she had no family history of breast cancer.  Age of menarche was 13.  She had 1 child before the age of 30, another child after the age of 30.  She did not breast feed.  Age of menopause was 52.  She was never on hormone replacement therapy.  She had a remote history of smoking.  No alcohol.  She had been followed by Dr. Ching of [facility] for history of recurrent pneumonia.  Otherwise, when we saw her, we felt given the fact her Oncotype DX recurrence score was 12, it was consistent with low-risk breast cancer that she would likely be a candidate for aromatase inhibitor therapy.  We elected to obtain a PET scan and MRI of brain, but prior to this, she was seen by Dr. Ching, who saw the patient on 11/14/2019.  At that time she had a CT chest done at [facility], which revealed there were some new ground-glass opacities in her lungs.  He felt that these were likely infectious or inflammatory but recommended a repeat CT chest in 3 months.  He recommended inhaled corticosteroids.  The patient in the interim also had a PET scan done, which was also read as new ground-glass opacities throughout both lungs which were FDG-avid.  There was worsening atelectasis seen in both lungs; infectious or noninfectious granulomatous disease may have a similar appearance.  The patient states her breathing is better since starting inhaled corticosteroids.  She has less of a cough.  She also had a tumor marker that came back 41.  Otherwise, she is anxious to start on aromatase inhibitor therapy.  She will have implants placed in 01/2019.  She could not have an MRI of the brain due to her expanders that are still in place.  Otherwise, she offers no complaints of fevers, night sweats, weight loss, abdominal pain, chest pain.      PHYSICAL EXAMINATION:   GENERAL:  She is a middle-aged white female in no acute distress.   VITAL SIGNS:  Blood pressure is 114/82, pulse 68, temperature 97.7.    HEENT:  Atraumatic, normocephalic.  Oropharynx nonerythematous.   NECK:  Supple.   LUNGS:  Clear to auscultation and percussion.   HEART:  Regular rhythm, S1 is normal.   ABDOMEN:  Soft.  Normoactive bowel sounds.  Nontender.   LYMPHATICS:  No cervical, supraclavicular, axillary or inguinal nodes.   EXTREMITIES:  No edema.   NEUROLOGIC:  Nonfocal.      LABORATORY DATA:  Laboratories reveal CBC:  White count 5.9, H and H 11.7 and 35.1, platelet count 198,000.  BUN 12, creatinine 0.72.  LFTs are normal.  LDH is 306.  CA 27-29 is 41.      IMPRESSION:  Pathologic T2 N0 lobular carcinoma of the right breast, ER positive, WV positive, HER-2/jenn negative, status post lumpectomy with sentinel node procedure.  Oncotype DX recurrence score was 12, consistent with low risk, status post bilateral mastectomy with immediate reconstruction.  PET scan indicated new ground-glass opacities, which were felt to be inflammatory.  Nonetheless, we would like to repeat PET scan in 2019with an MRI of the brain as well as obtain a bone density scan.  She will be a candidate for aromatase inhibitor therapy.  We will start the patient on Arimidex 1 mg p.o. daily.  We discussed side effects, including hot flashes, risk of arthralgias, risk of osteoporosis and recommended starting the patient on Os-Devan vitamin D 600 mg p.o. b.i.d.  Otherwise, we will see the patient after PET scan and MRI of the brain as well as bone density scan.  Final staging would be stage IIA, ER/WV positive, HER-2 negative.      Forty minutes was spent with the patient, greater than half the time spent in counseling and coordination of care.         EVELYN DAY MD             D: 2019   T: 2019   MT: MILTON      Name:     ILEANA LUNSFORD   MRN:      [id number]        Account:      [id number]   :      1955           Visit Date:   2019      Document: [id number]       cc: Dale Dupree MD        Opal Rubin MD

## 2019-12-09 NOTE — PROGRESS NOTES
[facility] [place]  [address]  [facility] [place] [address]  [phone]  Dept: [phone]    PRE-OP EVALUATION:  Today's date: 2019    Denise Taylor (: 1955) presents for pre-operative evaluation assessment as requested by Dr. Paul.  She requires evaluation and anesthesia risk assessment prior to undergoing surgery/procedure for treatment of breast reconstruction .    Fax number for surgical facility: [phone]  Primary Physician: Mirna Roblero  Type of Anesthesia Anticipated: to be determined    Patient has a Health Care Directive or Living Will:  YES on file    Preop Questions 2019   Who is doing your surgery? Dr. Dale Paul   What are you having done? Breast reconstruction   Date of Surgery/Procedure: 2020   Facility or Hospital where procedure/surgery will be performed: [facility] [place] [address]   1.  Do you have a history of Heart attack, stroke, stent, coronary bypass surgery, or other heart surgery? No   2.  Do you ever have any pain or discomfort in your chest? No   3.  Do you have a history of  Heart Failure? No   4.   Are you troubled by shortness of breath when:  walking on a level surface, or up a slight hill, or at night? No   5.  Do you currently have a cold, bronchitis or other respiratory infection? No   6.  Do you have a cough, shortness of breath, or wheezing? No   7.  Do you sometimes get pains in the calves of your legs when you walk? No   8. Do you or anyone in your family have previous history of blood clots? No   9.  Do you or does anyone in your family have a serious bleeding problem such as prolonged bleeding following surgeries or cuts? No   10. Have you ever had problems with anemia or been told to take iron pills? No   11. Have you had any abnormal blood loss such as black, tarry or bloody stools, or abnormal vaginal bleeding? No   12. Have you ever had a blood transfusion? No   13. Have you or any of  your relatives ever had problems with anesthesia? No   14. Do you have sleep apnea, excessive snoring or daytime drowsiness? No   15. Do you have any prosthetic heart valves? No   16. Do you have prosthetic joints? No   17. Is there any chance that you may be pregnant? No         HPI:     HPI related to upcoming procedure: bilateral breast implants with removal of her spacers.   This will be done by Dr. Paul at  [facility]. [phone].        See problem list for active medical problems.  Problems all longstanding and stable, except as noted/documented.  See ROS for pertinent symptoms related to these conditions.      MEDICAL HISTORY:     Patient Active Problem List    Diagnosis Date Noted     History of breast cancer 10/09/2019     Priority: Medium     Invasive lobular carcinoma of right breast in female (H) 08/28/2019     Priority: Medium     Malignant neoplasm of right female breast, unspecified estrogen receptor status, unspecified site of breast (H) 08/22/2019     Priority: Medium     Cigarette nicotine dependence in remission 08/22/2019     Priority: Medium     Recurrent pneumonia 08/22/2019     Priority: Medium     ACP (advance care planning) 07/27/2017     Priority: Medium     Advance Care Planning 7/27/2017: ACP Review of Chart / Resources Provided:  Reviewed chart for advance care plan.  Denise Taylor has no plan or code status on file. Discussed available resources and provided with information.   Added by Marah Henry             Hypothyroidism, unspecified type 07/27/2017     Priority: Medium     Pneumonia 07/20/2015     Priority: Medium     History of basal cell carcinoma 05/18/2015     Priority: Medium     Hyperlipidemia with target LDL less than 130 05/18/2015     Priority: Medium     Diagnosis updated by automated process. Provider to review and confirm.       Actinic keratosis 12/01/2011     Priority: Medium     AC separation, type 5 06/27/2011     Priority: Medium      Overview:   IMO Update 10/11       Capillary disease 08/06/2007     Priority: Medium     Overview:   IMO Update 10/11       Other dyschromia 08/06/2007     Priority: Medium     Scar condition and fibrosis of skin 08/06/2007     Priority: Medium      Past Medical History:   Diagnosis Date     Pneumonia      Past Surgical History:   Procedure Laterality Date     ARTHROSCOPY SHOULDER ROTATOR CUFF REPAIR Left 3/7/2018    Procedure: ARTHROSCOPY SHOULDER ROTATOR CUFF REPAIR;  LEFT SHOULDER ARTHROSCOPY, REPAIR ROTATOR CUFF: subacromial Decompression and AC Joint Resection;  Surgeon: Baldev Lou DO;  Location: [facility]     ARTHROTOMY SHOULDER, ROTATOR CUFF REPAIR, COMBINED Right 11/14/2018    Procedure: RIGHT SHOULDER DEBRIDEMENT, EXCISION OF LIPOMA  RIGHT UPPER ARM, EXCISION SCAR TISSUE AC JOINT;  Surgeon: Baldev Lou DO;  Location: [facility] OR     BIOPSY BREAST NEEDLE LOCALIZATION, BIOPSY NODE SENTINEL, COMBINED Right 8/23/2019    Procedure: RIGHT WIRE LOCALIZED LUMPECTOMY WITH SENTINEL  LYMP NODE;  Surgeon: Michael Dupree MD;  Location: [facility] OR     COLONOSCOPY  2006    Dr Lara     COLONOSCOPY N/A 10/10/2016    Procedure: COLONOSCOPY;  Surgeon: Christine Cintron MD;  Location: [facility]     INSERT TISSUE EXPANDER BREAST BILATERAL Bilateral 10/9/2019    Procedure: BILATERAL TISSUE EXPANDERS (RAMIREZ);  Surgeon: Dale Paul MD;  Location:  OR     MASTECTOMY, NIPPLE SPARING Bilateral 10/9/2019    Procedure: BILATERAL SKIN SPARRING MASTECTOMY (CASS);  Surgeon: Opal Rubin MD;  Location:  OR     ORTHOPEDIC SURGERY      feet neuromas removed     SHOULDER SURGERY Right 2011/10/2012     Current Outpatient Medications   Medication Sig Dispense Refill     anastrozole (ARIMIDEX) 1 MG tablet Take 1 tablet (1 mg) by mouth daily 90 tablet 3     CALCIUM 600+D3 600-800 MG-UNIT TABS TK 1 T PO BID WITH MEALS  3     calcium carbonate-vitamin D (OS-STEVE) 600-400 MG-UNIT chewable tablet Take 1 chew tab by mouth 2  times daily (with meals) 180 tablet 3     cetirizine (ZYRTEC) 10 MG tablet Take 10 mg by mouth At Bedtime       eletriptan (RELPAX) 40 MG tablet Take 1 tablet (40 mg) by mouth once as needed for migraine 12 tablet 3     FLUoxetine HCl, PMDD, (SARAFEM) 10 MG TABS Take 10 mg by mouth daily 90 tablet 3     fluticasone (ARNUITY ELLIPTA) 200 MCG/ACT inhaler Inhale 1 puff into the lungs daily        levocetirizine (XYZAL) 5 MG tablet   11     levothyroxine (SYNTHROID/LEVOTHROID) 88 MCG tablet Take 1 tablet (88 mcg) by mouth daily 60 tablet 3     rosuvastatin (CRESTOR) 10 MG tablet Take 1 tablet (10 mg) by mouth daily 90 tablet 2     OTC products: None, except as noted above    Allergies   Allergen Reactions     Penicillins Rash     Zithromax [Azithromycin] Rash      Latex Allergy: NO    Social History     Tobacco Use     Smoking status: Never Smoker     Smokeless tobacco: Never Used   Substance Use Topics     Alcohol use: Yes     Alcohol/week: 1.7 standard drinks     Types: 2 Glasses of wine per week     Comment: occasionally     History   Drug Use No       REVIEW OF SYSTEMS:   CONSTITUTIONAL: NEGATIVE for fever, chills, change in weight  INTEGUMENTARY/SKIN: NEGATIVE for worrisome rashes, moles or lesions  EYES: NEGATIVE for vision changes or irritation  ENT/MOUTH: NEGATIVE for ear, mouth and throat problems  RESP: NEGATIVE for significant cough or SOB  BREAST: NEGATIVE for masses, tenderness or discharge  CV: NEGATIVE for chest pain, palpitations or peripheral edema  GI: NEGATIVE for nausea, abdominal pain, heartburn, or change in bowel habits  : NEGATIVE for frequency, dysuria, or hematuria  MUSCULOSKELETAL: NEGATIVE for significant arthralgias or myalgia  NEURO: NEGATIVE for weakness, dizziness or paresthesias  ENDOCRINE: NEGATIVE for temperature intolerance, skin/hair changes  HEME: NEGATIVE for bleeding problems  PSYCHIATRIC: NEGATIVE for changes in mood or affect    EXAM:   BP (!) 122/90 (Patient Position:  "Sitting)   Pulse 53   Ht 1.651 m (5' 5\")   Wt 62.1 kg (137 lb)   SpO2 97%   BMI 22.80 kg/m      GENERAL APPEARANCE: healthy, alert and no distress     EYES: EOMI, PERRL     HENT: ear canals and TM's normal and nose and mouth without ulcers or lesions     NECK: no adenopathy, no asymmetry, masses, or scars and thyroid normal to palpation     RESP: lungs clear to auscultation - no rales, rhonchi or wheezes     CV: regular rates and rhythm, normal S1 S2, no S3 or S4 and no murmur, click or rub     ABDOMEN:  soft, nontender, no HSM or masses and bowel sounds normal     MS: extremities normal- no gross deformities noted, no evidence of inflammation in joints, FROM in all extremities.     SKIN: no suspicious lesions or rashes     NEURO: Normal strength and tone, sensory exam grossly normal, mentation intact and speech normal     PSYCH: mentation appears normal. and affect normal/bright     LYMPHATICS: No cervical adenopathy    DIAGNOSTICS:     EKG: appears normal, NSR, sinus bradycardia, normal axis, normal intervals, no acute ST/T changes c/w ischemia, no LVH by voltage criteria, unchanged from previous tracings  Labs Drawn and in Process:   Unresulted Labs Ordered in the Past 30 Days of this Admission     No orders found from 11/11/2019 to 12/12/2019.          Recent Labs   Lab Test 11/05/19  1135 10/02/19  1430   HGB 11.7 14.2    170    139   POTASSIUM 4.2 3.8   CR 0.72 0.75        IMPRESSION:   Reason for surgery/procedure: bilateral breast implants with removal of spacers.   Diagnosis/reason for consult: medical clearance.       The proposed surgical procedure is considered INTERMEDIATE risk.    REVISED CARDIAC RISK INDEX  The patient has the following serious cardiovascular risks for perioperative complications such as (MI, PE, VFib and 3  AV Block):  No serious cardiac risks  INTERPRETATION: 0 risks: Class I (very low risk - 0.4% complication rate)    The patient has the following additional risks " for perioperative complications:  No identified additional risks      ICD-10-CM    1. Preop general physical exam Z01.818        RECOMMENDATIONS:     --Consult hospital rounder / IM to assist post-op medical management    --Patient is to take all scheduled medications on the day of surgery EXCEPT for modifications listed below.    APPROVAL GIVEN to proceed with proposed procedure, without further diagnostic evaluation       Signed Electronically by: DASH Vital    Copy of this evaluation report is provided to requesting physician.    [facility] Preop Guidelines    Revised Cardiac Risk Index

## 2019-12-11 ENCOUNTER — TELEPHONE (OUTPATIENT)
Dept: FAMILY MEDICINE | Facility: OTHER | Age: 64
End: 2019-12-11

## 2019-12-11 ENCOUNTER — OFFICE VISIT (OUTPATIENT)
Dept: FAMILY MEDICINE | Facility: OTHER | Age: 64
End: 2019-12-11
Attending: PHYSICIAN ASSISTANT
Payer: COMMERCIAL

## 2019-12-11 VITALS
DIASTOLIC BLOOD PRESSURE: 90 MMHG | SYSTOLIC BLOOD PRESSURE: 122 MMHG | OXYGEN SATURATION: 97 % | BODY MASS INDEX: 22.82 KG/M2 | WEIGHT: 137 LBS | HEIGHT: 65 IN | HEART RATE: 53 BPM

## 2019-12-11 DIAGNOSIS — Z01.818 PREOP GENERAL PHYSICAL EXAM: Primary | ICD-10-CM

## 2019-12-11 DIAGNOSIS — Z23 NEED FOR VACCINATION: ICD-10-CM

## 2019-12-11 LAB
BASOPHILS # BLD AUTO: 0.1 10E9/L (ref 0–0.2)
BASOPHILS NFR BLD AUTO: 0.8 %
DIFFERENTIAL METHOD BLD: NORMAL
EOSINOPHIL # BLD AUTO: 0.1 10E9/L (ref 0–0.7)
EOSINOPHIL NFR BLD AUTO: 2.2 %
ERYTHROCYTE [DISTWIDTH] IN BLOOD BY AUTOMATED COUNT: 13 % (ref 10–15)
HCT VFR BLD AUTO: 39.5 % (ref 35–47)
HGB BLD-MCNC: 13 G/DL (ref 11.7–15.7)
IMM GRANULOCYTES # BLD: 0 10E9/L (ref 0–0.4)
IMM GRANULOCYTES NFR BLD: 0.3 %
LYMPHOCYTES # BLD AUTO: 1.9 10E9/L (ref 0.8–5.3)
LYMPHOCYTES NFR BLD AUTO: 29.3 %
MCH RBC QN AUTO: 29.4 PG (ref 26.5–33)
MCHC RBC AUTO-ENTMCNC: 32.9 G/DL (ref 31.5–36.5)
MCV RBC AUTO: 89 FL (ref 78–100)
MONOCYTES # BLD AUTO: 0.6 10E9/L (ref 0–1.3)
MONOCYTES NFR BLD AUTO: 9.3 %
NEUTROPHILS # BLD AUTO: 3.7 10E9/L (ref 1.6–8.3)
NEUTROPHILS NFR BLD AUTO: 58.1 %
NRBC # BLD AUTO: 0 10*3/UL
NRBC BLD AUTO-RTO: 0 /100
PLATELET # BLD AUTO: 183 10E9/L (ref 150–450)
RBC # BLD AUTO: 4.42 10E12/L (ref 3.8–5.2)
WBC # BLD AUTO: 6.4 10E9/L (ref 4–11)

## 2019-12-11 PROCEDURE — 90732 PPSV23 VACC 2 YRS+ SUBQ/IM: CPT | Performed by: PHYSICIAN ASSISTANT

## 2019-12-11 PROCEDURE — 90472 IMMUNIZATION ADMIN EACH ADD: CPT | Performed by: PHYSICIAN ASSISTANT

## 2019-12-11 PROCEDURE — 99214 OFFICE O/P EST MOD 30 MIN: CPT | Mod: 25 | Performed by: PHYSICIAN ASSISTANT

## 2019-12-11 PROCEDURE — 36415 COLL VENOUS BLD VENIPUNCTURE: CPT | Performed by: PHYSICIAN ASSISTANT

## 2019-12-11 PROCEDURE — 85025 COMPLETE CBC W/AUTO DIFF WBC: CPT | Performed by: PHYSICIAN ASSISTANT

## 2019-12-11 PROCEDURE — 90750 HZV VACC RECOMBINANT IM: CPT | Performed by: PHYSICIAN ASSISTANT

## 2019-12-11 PROCEDURE — 90471 IMMUNIZATION ADMIN: CPT | Performed by: PHYSICIAN ASSISTANT

## 2019-12-11 RX ORDER — LEVOCETIRIZINE DIHYDROCHLORIDE 5 MG/1
TABLET, FILM COATED ORAL
Refills: 11 | COMMUNITY
Start: 2019-11-23 | End: 2020-08-06

## 2019-12-11 ASSESSMENT — PAIN SCALES - GENERAL: PAINLEVEL: NO PAIN (0)

## 2019-12-11 ASSESSMENT — MIFFLIN-ST. JEOR: SCORE: 1172.31

## 2019-12-11 NOTE — NURSING NOTE
"Chief Complaint   Patient presents with     Pre-Op Exam       Initial BP (!) 122/90 (Patient Position: Sitting)   Pulse 53   Ht 1.651 m (5' 5\")   Wt 62.1 kg (137 lb)   SpO2 97%   BMI 22.80 kg/m   Estimated body mass index is 22.8 kg/m  as calculated from the following:    Height as of this encounter: 1.651 m (5' 5\").    Weight as of this encounter: 62.1 kg (137 lb).  Medication Reconciliation: complete  Isadora Arellano LPN  "

## 2020-01-10 ENCOUNTER — PATIENT OUTREACH (OUTPATIENT)
Dept: ONCOLOGY | Facility: OTHER | Age: 65
End: 2020-01-10

## 2020-02-04 ENCOUNTER — TELEPHONE (OUTPATIENT)
Dept: PET IMAGING | Facility: HOSPITAL | Age: 65
End: 2020-02-04

## 2020-02-05 ENCOUNTER — HOSPITAL ENCOUNTER (OUTPATIENT)
Dept: PET IMAGING | Facility: HOSPITAL | Age: 65
Discharge: HOME | End: 2020-02-05
Attending: INTERNAL MEDICINE | Admitting: INTERNAL MEDICINE
Payer: COMMERCIAL

## 2020-02-05 DIAGNOSIS — C50.911 MALIGNANT NEOPLASM OF RIGHT FEMALE BREAST, UNSPECIFIED ESTROGEN RECEPTOR STATUS, UNSPECIFIED SITE OF BREAST (H): ICD-10-CM

## 2020-02-05 PROCEDURE — 78815 PET IMAGE W/CT SKULL-THIGH: CPT | Mod: TC,PS

## 2020-02-05 PROCEDURE — A9552 F18 FDG: HCPCS | Performed by: INTERNAL MEDICINE

## 2020-02-05 PROCEDURE — 34300033 ZZH RX 343: Performed by: INTERNAL MEDICINE

## 2020-02-05 RX ADMIN — FLUDEOXYGLUCOSE F-18 13.07 MCI.: 500 INJECTION, SOLUTION INTRAVENOUS at 07:39

## 2020-02-06 ENCOUNTER — HOSPITAL ENCOUNTER (OUTPATIENT)
Dept: MRI IMAGING | Facility: HOSPITAL | Age: 65
Discharge: HOME | End: 2020-02-06
Attending: INTERNAL MEDICINE | Admitting: INTERNAL MEDICINE
Payer: COMMERCIAL

## 2020-02-06 DIAGNOSIS — C50.911 MALIGNANT NEOPLASM OF RIGHT FEMALE BREAST, UNSPECIFIED ESTROGEN RECEPTOR STATUS, UNSPECIFIED SITE OF BREAST (H): ICD-10-CM

## 2020-02-06 LAB
ALBUMIN SERPL-MCNC: 4.2 G/DL (ref 3.4–5)
ALP SERPL-CCNC: 78 U/L (ref 40–150)
ALT SERPL W P-5'-P-CCNC: 35 U/L (ref 0–50)
ANION GAP SERPL CALCULATED.3IONS-SCNC: 6 MMOL/L (ref 3–14)
AST SERPL W P-5'-P-CCNC: 29 U/L (ref 0–45)
BASOPHILS # BLD AUTO: 0 10E9/L (ref 0–0.2)
BASOPHILS NFR BLD AUTO: 0.5 %
BILIRUB SERPL-MCNC: 0.4 MG/DL (ref 0.2–1.3)
BUN SERPL-MCNC: 17 MG/DL (ref 7–30)
CALCIUM SERPL-MCNC: 9.4 MG/DL (ref 8.5–10.1)
CHLORIDE SERPL-SCNC: 104 MMOL/L (ref 94–109)
CO2 SERPL-SCNC: 29 MMOL/L (ref 20–32)
CREAT SERPL-MCNC: 0.77 MG/DL (ref 0.52–1.04)
DIFFERENTIAL METHOD BLD: ABNORMAL
EOSINOPHIL # BLD AUTO: 0.2 10E9/L (ref 0–0.7)
EOSINOPHIL NFR BLD AUTO: 2.6 %
ERYTHROCYTE [DISTWIDTH] IN BLOOD BY AUTOMATED COUNT: 13.7 % (ref 10–15)
GFR SERPL CREATININE-BSD FRML MDRD: 81 ML/MIN/{1.73_M2}
GLUCOSE SERPL-MCNC: 84 MG/DL (ref 70–99)
HCT VFR BLD AUTO: 39.8 % (ref 35–47)
HGB BLD-MCNC: 13 G/DL (ref 11.7–15.7)
IMM GRANULOCYTES # BLD: 0 10E9/L (ref 0–0.4)
IMM GRANULOCYTES NFR BLD: 0.2 %
LDH SERPL L TO P-CCNC: 189 U/L (ref 81–234)
LYMPHOCYTES # BLD AUTO: 2.1 10E9/L (ref 0.8–5.3)
LYMPHOCYTES NFR BLD AUTO: 33.9 %
MCH RBC QN AUTO: 28.4 PG (ref 26.5–33)
MCHC RBC AUTO-ENTMCNC: 32.7 G/DL (ref 31.5–36.5)
MCV RBC AUTO: 87 FL (ref 78–100)
MONOCYTES # BLD AUTO: 0.5 10E9/L (ref 0–1.3)
MONOCYTES NFR BLD AUTO: 8.2 %
NEUTROPHILS # BLD AUTO: 3.3 10E9/L (ref 1.6–8.3)
NEUTROPHILS NFR BLD AUTO: 54.6 %
NRBC # BLD AUTO: 0 10*3/UL
NRBC BLD AUTO-RTO: 0 /100
PLATELET # BLD AUTO: 149 10E9/L (ref 150–450)
POTASSIUM SERPL-SCNC: 3.9 MMOL/L (ref 3.4–5.3)
PROT SERPL-MCNC: 7.9 G/DL (ref 6.8–8.8)
RBC # BLD AUTO: 4.58 10E12/L (ref 3.8–5.2)
SODIUM SERPL-SCNC: 139 MMOL/L (ref 133–144)
WBC # BLD AUTO: 6.1 10E9/L (ref 4–11)

## 2020-02-06 PROCEDURE — 25500064 ZZH RX 255 OP 636: Performed by: RADIOLOGY

## 2020-02-06 PROCEDURE — 83615 LACTATE (LD) (LDH) ENZYME: CPT | Performed by: INTERNAL MEDICINE

## 2020-02-06 PROCEDURE — 86300 IMMUNOASSAY TUMOR CA 15-3: CPT | Performed by: INTERNAL MEDICINE

## 2020-02-06 PROCEDURE — 80053 COMPREHEN METABOLIC PANEL: CPT | Performed by: INTERNAL MEDICINE

## 2020-02-06 PROCEDURE — A9585 GADOBUTROL INJECTION: HCPCS | Performed by: RADIOLOGY

## 2020-02-06 PROCEDURE — 36415 COLL VENOUS BLD VENIPUNCTURE: CPT | Performed by: INTERNAL MEDICINE

## 2020-02-06 PROCEDURE — 70553 MRI BRAIN STEM W/O & W/DYE: CPT | Mod: TC

## 2020-02-06 PROCEDURE — 85025 COMPLETE CBC W/AUTO DIFF WBC: CPT | Performed by: INTERNAL MEDICINE

## 2020-02-06 RX ORDER — GADOBUTROL 604.72 MG/ML
2 INJECTION INTRAVENOUS ONCE
Status: COMPLETED | OUTPATIENT
Start: 2020-02-06 | End: 2020-02-06

## 2020-02-06 RX ADMIN — GADOBUTROL 2 ML: 604.72 INJECTION INTRAVENOUS at 13:35

## 2020-02-07 LAB — CANCER AG27-29 SERPL-ACNC: 34 U/ML (ref 0–39)

## 2020-02-07 ASSESSMENT — PAIN SCALES - GENERAL: PAINLEVEL: NO PAIN (0)

## 2020-02-07 ASSESSMENT — MIFFLIN-ST. JEOR: SCORE: 1163.88

## 2020-02-10 ENCOUNTER — ONCOLOGY VISIT (OUTPATIENT)
Dept: ONCOLOGY | Facility: OTHER | Age: 65
End: 2020-02-10
Attending: INTERNAL MEDICINE
Payer: COMMERCIAL

## 2020-02-10 ENCOUNTER — HOSPITAL ENCOUNTER (OUTPATIENT)
Dept: BONE DENSITY | Facility: HOSPITAL | Age: 65
Discharge: HOME | End: 2020-02-10
Attending: INTERNAL MEDICINE | Admitting: INTERNAL MEDICINE
Payer: COMMERCIAL

## 2020-02-10 VITALS
WEIGHT: 135.14 LBS | BODY MASS INDEX: 22.52 KG/M2 | SYSTOLIC BLOOD PRESSURE: 124 MMHG | RESPIRATION RATE: 18 BRPM | TEMPERATURE: 97.9 F | DIASTOLIC BLOOD PRESSURE: 80 MMHG | OXYGEN SATURATION: 99 % | HEART RATE: 96 BPM | HEIGHT: 65 IN

## 2020-02-10 DIAGNOSIS — C50.911 MALIGNANT NEOPLASM OF RIGHT FEMALE BREAST, UNSPECIFIED ESTROGEN RECEPTOR STATUS, UNSPECIFIED SITE OF BREAST (H): Primary | ICD-10-CM

## 2020-02-10 DIAGNOSIS — Z79.811 USE OF AROMATASE INHIBITORS: ICD-10-CM

## 2020-02-10 DIAGNOSIS — C50.911 MALIGNANT NEOPLASM OF RIGHT BREAST (H): ICD-10-CM

## 2020-02-10 PROCEDURE — 77080 DXA BONE DENSITY AXIAL: CPT | Mod: TC

## 2020-02-10 PROCEDURE — 99213 OFFICE O/P EST LOW 20 MIN: CPT | Performed by: INTERNAL MEDICINE

## 2020-02-10 NOTE — NURSING NOTE
"Chief Complaint   Patient presents with     RECHECK     Invasive lobular carcinoma of right breast in female        Initial /80   Pulse 96   Temp 97.9  F (36.6  C) (Tympanic)   Resp 18   Ht 1.651 m (5' 5\")   Wt 61.3 kg (135 lb 2.3 oz)   SpO2 99%   BMI 22.49 kg/m   Estimated body mass index is 22.49 kg/m  as calculated from the following:    Height as of this encounter: 1.651 m (5' 5\").    Weight as of this encounter: 61.3 kg (135 lb 2.3 oz).  Medication Reconciliation: complete.  Immunizations and advance directives status reviewed. Pain scale =0 , PHQ-2=0.            Leah Gómez LPN    "

## 2020-02-11 NOTE — PROGRESS NOTES
Visit Date:   02/10/2020      HEMATOLOGY/ONCOLOGY CLINIC NOTE      HISTORY OF PRESENT ILLNESS:  Mrs. Taylor returns for followup of invasive lobular carcinoma of the right breast.  We had seen the patient in consultation at the request of Dr. Dupree, Mirna Roblero, as well as Dr. Opal Rubin at [facility] on 11/05/2019.  At that time, she was a 64-year-old white female with a history of recurrent pneumonia we are asked to evaluate concerning a diagnosis of invasive lobular carcinoma of the right breast.  Apparently she had palpated a lump in her right breast.  Initial mammograms were negative.  The lump persisted and she was referred to Dr. Dupree who saw the patient on 07/26/2019.  At that time, on his exam he noted a discrete mass in the upper outer quadrant of the right breast that measured 2.5 to 3 cm.  He recommended biopsy of the mass.  This came back as invasive lobular carcinoma of the right breast, grade 2, ER positive, RI positive, HER-2 negative.  The patient subsequently underwent MRI of both breasts.  The findings were that there was a small area of enhancement spanning 6.5 x 17 mm in the upper outer right breast, 4.7 cm of the nipple, likely reflecting a biopsy tract.  The left breast was unremarkable.  The patient subsequently underwent lumpectomy and sentinel node procedure performed by Dr. Dupree which revealed the patient had a 4.5 cm tumor, invasive lobular carcinoma of the right breast, grade 2, ER positive, RI positive, HER-2 negative.  [place] node was negative.  The patient was staged pathologic T2 N0.  Margins were positive.  The patient was referred to the , was seen by Dr. Opal Rubin as well as Dr. Dale Paul, the plastic surgeon.  She went on to have bilateral mastectomies on 10/09/2019 with immediate reconstruction performed by Dr. Rubin and Dr. Dale Paul.  Pathology was consistent with no evidence of malignancy.  The patient's risk factors for breast  cancer include that she had no family history of breast cancer, age of menarche was 13.  She had 1 child before the age of 30, another child after the age of 30.  She did not breast feed.  Age of menopause was 52.  She was never on hormone replacement therapy.  She had a remote history of smoking.  No alcohol.  She had been followed by Dr. Ching of [facility] for history of recurrent pneumonia.  Otherwise, when we saw her, we felt given the fact that her Oncotype DX recurrence score was 12, it was consistent with low risk breast cancer and elected to be a candidate for aromatase inhibitor therapy.  We elected to obtain a PET scan and MRI of the brain.  Prior to this she was seen by Dr. Ching who saw the patient on 11/14/2019.  At that time she had a CT chest done [facility] which revealed there were some new ground-glass opacities in her lungs.  He felt that these were likely infectious or inflammatory, but recommended repeat CT chest in 3 months.  He recommended inhaled corticosteroids.  The patient, in the interim, also had a PET scan done which was also read as new ground-glass opacities throughout both lungs which were FDG avid.  There was worsening atelectasis seen in both lungs; infectious or noninfectious granulomatous disease may have a similar appearance.  The patient stated her breathing was better since starting inhaled corticosteroids.  She had less of a cough.  She also had a tumor marker came back 41, otherwise she was anxious to start aromatase chemotherapy.  She was planned to have implants placed in 01/2019.  She could not have an MRI of the brain due to her expanders that were still in place.  When we saw the patient last, we felt that we should start the patient on Arimidex 1 mg p.o. daily.  We discussed side effects including hot flashes, risk of arthralgias, risk of osteoporosis, and recommended starting calcium and vitamin D as well.  We elected to repeat PET scan  subsequently on 02/05/2020 and this had shown that the focal areas seen previously with numerous nodules seen previously had resolved.  There was no FDG uptake.  No evidence of active disease.  Nodules and areas of ground-glass opacity seen previously within the lungs had resolved.  There was bilateral breast implant revision.  MRI of the brain was done and this was negative for metastases.  She also had a bone density scan which revealed normal bone mineral density.  She says she is tolerating Arimidex well without significant hot flashes or bone pain.  Her tumor marker now has normalized.  She denied any shortness of breath, fevers, night sweats, weight loss.  Overall, she is doing well.      PHYSICAL EXAMINATION:   GENERAL:  She is a middle-aged white female in no acute distress.   VITAL SIGNS:  Blood pressure 124/80, pulse 96, respiratory rate 18, temperature 97.9.   HEENT:  Atraumatic, normocephalic.  Oropharynx nonerythematous.   NECK:  Supple.   LUNGS:  Clear to auscultation and percussion.   BREASTS:  Exam was deferred.   HEART:  Regular rhythm, S1, S2 normal.   ABDOMEN:  Obese, soft, nontender.   LYMPHATICS:  No cervical, supraclavicular, axillary or inguinal nodes.   EXTREMITIES:  Without edema.   NEUROLOGIC:  Nonfocal.      LABORATORY DATA:  Laboratories reveal a CBC that is within normal limits.  LFTs are normal.  LDH is normal.  CA 27-29 is 34.      IMPRESSION:  Pathologic T2 N0 lobular carcinoma of the right breast, ER positive, DE positive, HER-2 negative, status post lumpectomy and sentinel node procedure.  Oncotype DX recurrence score was 12, consistent with low risk, status post bilateral mastectomies with immediate reconstruction.  PET scan indicated new ground-glass opacities, which were felt to be inflammatory.  Repeat PET scan indicated resolution of ground-glass opacities.  MRI of the brain was negative.  Bone density scan was negative.  The patient was started on Arimidex 1 mg p.o. daily.   The plan is to continue surveillance.  We will see the patient in 3 months; obtain CBC, CMP, LDH, CA 27-29.      Forty minutes was spent with the patient, greater than half the time spent in counseling and coordination of care.         EVELYN DAY MD             D: 02/10/2020   T: 02/10/2020   MT: KIKE      Name:     ILEANA LUNSFORD   MRN:      [id number]        Account:      [id number]   :      1955           Visit Date:   02/10/2020      Document: [id number]       cc: Mirna Dupree MD

## 2020-04-15 DIAGNOSIS — E03.9 ACQUIRED HYPOTHYROIDISM: ICD-10-CM

## 2020-04-15 RX ORDER — LEVOTHYROXINE SODIUM 88 UG/1
88 TABLET ORAL DAILY
Qty: 60 TABLET | Refills: 3 | Status: SHIPPED | OUTPATIENT
Start: 2020-04-15 | End: 2020-07-15

## 2020-04-15 NOTE — TELEPHONE ENCOUNTER
Protocol failed d/t abnormal TSH, as noted below:  Normal TSH on file in past 12 months         Recent Labs   Lab Test 07/24/19  1635   TSH 0.21*         Medication is pended if you approve.  Thank you.        levothyroxine      Last Written Prescription Date:  8/30/2019  Last Fill Quantity: 60,   # refills: 3  Last Office Visit: 12/11/2019  Future Office visit:    Next 5 appointments (look out 90 days)    May 12, 2020  3:00 PM CDT  (Arrive by 2:45 PM)  Return Visit with Che Sesay NP  [facility] ([facility] ) [address] [place] [id number]  [phone]   May 21, 2020 10:30 AM CDT  [facility] with Opal [facility]in MD  [facility] [address] [place] [id number] [phone]           Routing refill request to provider for review/approval because:

## 2020-05-01 PROBLEM — C50.911 INVASIVE LOBULAR CARCINOMA OF RIGHT BREAST IN FEMALE (H): Status: ACTIVE | Noted: 2019-08-28

## 2020-05-14 ENCOUNTER — TRANSFERRED RECORDS (OUTPATIENT)
Dept: HEALTH INFORMATION MANAGEMENT | Facility: CLINIC | Age: 65
End: 2020-05-14

## 2020-06-01 ENCOUNTER — OFFICE VISIT (OUTPATIENT)
Dept: FAMILY MEDICINE | Facility: OTHER | Age: 65
End: 2020-06-01
Attending: FAMILY MEDICINE
Payer: COMMERCIAL

## 2020-06-01 VITALS
HEIGHT: 65 IN | HEART RATE: 50 BPM | BODY MASS INDEX: 23.63 KG/M2 | RESPIRATION RATE: 15 BRPM | DIASTOLIC BLOOD PRESSURE: 78 MMHG | OXYGEN SATURATION: 99 % | WEIGHT: 141.8 LBS | TEMPERATURE: 98 F | SYSTOLIC BLOOD PRESSURE: 112 MMHG

## 2020-06-01 DIAGNOSIS — E03.9 HYPOTHYROIDISM, UNSPECIFIED TYPE: ICD-10-CM

## 2020-06-01 DIAGNOSIS — Z01.818 PREOP GENERAL PHYSICAL EXAM: Primary | ICD-10-CM

## 2020-06-01 LAB
ALBUMIN SERPL-MCNC: 3.9 G/DL (ref 3.4–5)
ALP SERPL-CCNC: 64 U/L (ref 40–150)
ALT SERPL W P-5'-P-CCNC: 45 U/L (ref 0–50)
ANION GAP SERPL CALCULATED.3IONS-SCNC: 4 MMOL/L (ref 3–14)
AST SERPL W P-5'-P-CCNC: 46 U/L (ref 0–45)
BILIRUB SERPL-MCNC: 0.4 MG/DL (ref 0.2–1.3)
BUN SERPL-MCNC: 14 MG/DL (ref 7–30)
CALCIUM SERPL-MCNC: 9.3 MG/DL (ref 8.5–10.1)
CHLORIDE SERPL-SCNC: 105 MMOL/L (ref 94–109)
CO2 SERPL-SCNC: 29 MMOL/L (ref 20–32)
CREAT SERPL-MCNC: 0.77 MG/DL (ref 0.52–1.04)
ERYTHROCYTE [DISTWIDTH] IN BLOOD BY AUTOMATED COUNT: 13.1 % (ref 10–15)
GFR SERPL CREATININE-BSD FRML MDRD: 81 ML/MIN/{1.73_M2}
GLUCOSE SERPL-MCNC: 88 MG/DL (ref 70–99)
HCT VFR BLD AUTO: 43 % (ref 35–47)
HGB BLD-MCNC: 14.2 G/DL (ref 11.7–15.7)
MCH RBC QN AUTO: 29.8 PG (ref 26.5–33)
MCHC RBC AUTO-ENTMCNC: 33 G/DL (ref 31.5–36.5)
MCV RBC AUTO: 90 FL (ref 78–100)
PLATELET # BLD AUTO: 170 10E9/L (ref 150–450)
POTASSIUM SERPL-SCNC: 4.1 MMOL/L (ref 3.4–5.3)
PROT SERPL-MCNC: 7.6 G/DL (ref 6.8–8.8)
RBC # BLD AUTO: 4.77 10E12/L (ref 3.8–5.2)
SODIUM SERPL-SCNC: 138 MMOL/L (ref 133–144)
TSH SERPL DL<=0.005 MIU/L-ACNC: 3.24 MU/L (ref 0.4–4)
WBC # BLD AUTO: 6.3 10E9/L (ref 4–11)

## 2020-06-01 PROCEDURE — 85027 COMPLETE CBC AUTOMATED: CPT | Performed by: FAMILY MEDICINE

## 2020-06-01 PROCEDURE — 84443 ASSAY THYROID STIM HORMONE: CPT | Performed by: FAMILY MEDICINE

## 2020-06-01 PROCEDURE — 80053 COMPREHEN METABOLIC PANEL: CPT | Performed by: FAMILY MEDICINE

## 2020-06-01 PROCEDURE — 36415 COLL VENOUS BLD VENIPUNCTURE: CPT | Performed by: FAMILY MEDICINE

## 2020-06-01 PROCEDURE — 99214 OFFICE O/P EST MOD 30 MIN: CPT | Performed by: FAMILY MEDICINE

## 2020-06-01 PROCEDURE — 93000 ELECTROCARDIOGRAM COMPLETE: CPT | Performed by: INTERNAL MEDICINE

## 2020-06-01 SDOH — HEALTH STABILITY: PHYSICAL HEALTH: ON AVERAGE, HOW MANY DAYS PER WEEK DO YOU ENGAGE IN MODERATE TO STRENUOUS EXERCISE (LIKE A BRISK WALK)?: 5 DAYS

## 2020-06-01 SDOH — HEALTH STABILITY: PHYSICAL HEALTH: ON AVERAGE, HOW MANY MINUTES DO YOU ENGAGE IN EXERCISE AT THIS LEVEL?: 20 MIN

## 2020-06-01 SDOH — HEALTH STABILITY: MENTAL HEALTH: HOW MANY DRINKS CONTAINING ALCOHOL DO YOU HAVE ON A TYPICAL DAY WHEN YOU ARE DRINKING?: 1 OR 2

## 2020-06-01 SDOH — SOCIAL STABILITY: SOCIAL INSECURITY
WITHIN THE LAST YEAR, HAVE TO BEEN RAPED OR FORCED TO HAVE ANY KIND OF SEXUAL ACTIVITY BY YOUR PARTNER OR EX-PARTNER?: NO

## 2020-06-01 SDOH — SOCIAL STABILITY: SOCIAL INSECURITY: WITHIN THE LAST YEAR, HAVE YOU BEEN AFRAID OF YOUR PARTNER OR EX-PARTNER?: NO

## 2020-06-01 SDOH — SOCIAL STABILITY: SOCIAL INSECURITY
WITHIN THE LAST YEAR, HAVE YOU BEEN KICKED, HIT, SLAPPED, OR OTHERWISE PHYSICALLY HURT BY YOUR PARTNER OR EX-PARTNER?: NO

## 2020-06-01 SDOH — SOCIAL STABILITY: SOCIAL INSECURITY: WITHIN THE LAST YEAR, HAVE YOU BEEN HUMILIATED OR EMOTIONALLY ABUSED IN OTHER WAYS BY YOUR PARTNER OR EX-PARTNER?: NO

## 2020-06-01 SDOH — HEALTH STABILITY: MENTAL HEALTH: HOW OFTEN DO YOU HAVE A DRINK CONTAINING ALCOHOL?: 2-4 TIMES A MONTH

## 2020-06-01 SDOH — SOCIAL STABILITY: SOCIAL INSECURITY
WITHIN THE LAST YEAR, HAVE YOU BEEN RAPED OR FORCED TO HAVE ANY KIND OF SEXUAL ACTIVITY BY YOUR PARTNER OR EX-PARTNER?: NO

## 2020-06-01 SDOH — HEALTH STABILITY: MENTAL HEALTH: HOW MANY STANDARD DRINKS CONTAINING ALCOHOL DO YOU HAVE ON A TYPICAL DAY?: 1 OR 2

## 2020-06-01 ASSESSMENT — MIFFLIN-ST. JEOR: SCORE: 1194.08

## 2020-06-01 ASSESSMENT — PAIN SCALES - GENERAL: PAINLEVEL: NO PAIN (0)

## 2020-06-01 NOTE — NURSING NOTE
"Chief Complaint   Patient presents with     Pre-Op Exam       Initial /78 (BP Location: Left arm, Patient Position: Sitting, Cuff Size: Adult Regular)   Pulse 50   Temp 98  F (36.7  C) (Tympanic)   Resp 15   Ht 1.651 m (5' 5\")   Wt 64.3 kg (141 lb 12.8 oz)   SpO2 99%   BMI 23.60 kg/m   Estimated body mass index is 23.6 kg/m  as calculated from the following:    Height as of this encounter: 1.651 m (5' 5\").    Weight as of this encounter: 64.3 kg (141 lb 12.8 oz).  Medication Reconciliation: complete  Angelica Downing LPN  "

## 2020-06-01 NOTE — PROGRESS NOTES
[facility] - [place]  [address]  [place] [address]  [phone]  Dept: [phone]    PRE-OP EVALUATION:  Today's date: 2020    Denise Taylor (: 1955) presents for pre-operative evaluation assessment as requested by Dr. Espino at [facility].  She requires evaluation and anesthesia risk assessment prior to undergoing surgery/procedure for treatment of reconstruction surgery from mastectomy  .    Proposed Surgery/ Procedure: reconstructive surgery  Date of Surgery/ Procedure: 06/10/2020  Time of Surgery/ Procedure: to be determined  Hospital/Surgical Facility: [facility]  Fax number for surgical facility:   Primary Physician: Mirna Roblero  Type of Anesthesia Anticipated: to be determined    Patient has a Health Care Directive or Living Will:  YES     1. NO - Do you have a history of heart attack, stroke, stent, bypass or surgery on an artery in the head, neck, heart or legs?  2. NO - Do you ever have any pain or discomfort in your chest?  3. NO - Do you have a history of  Heart Failure?  4. NO - Are you troubled by shortness of breath when: walking on the level, up a slight hill or at night?  5. NO - Do you currently have a cold, bronchitis or other respiratory infection?  6. NO - Do you have a cough, shortness of breath or wheezing?  7. NO - Do you sometimes get pains in the calves of your legs when you walk?  8. NO - Do you or anyone in your family have previous history of blood clots?  9. NO - Do you or does anyone in your family have a serious bleeding problem such as prolonged bleeding following surgeries or cuts?  10. NO - Have you ever had problems with anemia or been told to take iron pills?  11. NO - Have you had any abnormal blood loss such as black, tarry or bloody stools, or abnormal vaginal bleeding?  12. NO - Have you ever had a blood transfusion?  13. NO - Have you or any of your relatives ever had problems with anesthesia?  14. NO - Do you have sleep  apnea, excessive snoring or daytime drowsiness?  15. NO - Do you have any prosthetic heart valves?  16. NO - Do you have prosthetic joints?  17. NO - Is there any chance that you may be pregnant?      HPI:     HPI related to upcoming procedure: had bilateral mastectomy last year and now ready for reconstruction      See problem list for active medical problems.  Problems all longstanding and stable, except as noted/documented.  See ROS for pertinent symptoms related to these conditions.    HYPOTHYROIDISM - Patient has a longstanding history of chronic Hypothyroidism. Patient has been doing well, noting no tremor, insomnia, hair loss or changes in skin texture. Continues to take medications as directed, without adverse reactions or side effects. Last TSH   Lab Results   Component Value Date    TSH 0.21 (L) 07/24/2019   .        MEDICAL HISTORY:     Patient Active Problem List    Diagnosis Date Noted     History of breast cancer 10/09/2019     Priority: Medium     Invasive lobular carcinoma of right breast in female (H) 08/28/2019     Priority: Medium     5.12.2020- Review and distribute treatment summary- [facility]       Malignant neoplasm of right female breast, unspecified estrogen receptor status, unspecified site of breast (H) 08/22/2019     Priority: Medium     Cigarette nicotine dependence in remission 08/22/2019     Priority: Medium     Recurrent pneumonia 08/22/2019     Priority: Medium     ACP (advance care planning) 07/27/2017     Priority: Medium     Advance Care Planning 7/27/2017: ACP Review of Chart / Resources Provided:  Reviewed chart for advance care plan.  Denise Taylor has no plan or code status on file. Discussed available resources and provided with information.   Added by Marah Henry             Hypothyroidism, unspecified type 07/27/2017     Priority: Medium     Pneumonia 07/20/2015     Priority: Medium     History of basal cell carcinoma 05/18/2015     Priority: Medium      Hyperlipidemia with target LDL less than 130 05/18/2015     Priority: Medium     Diagnosis updated by automated process. Provider to review and confirm.       Actinic keratosis 12/01/2011     Priority: Medium     AC separation, type 5 06/27/2011     Priority: Medium     Overview:   IMO Update 10/11       Capillary disease 08/06/2007     Priority: Medium     Overview:   IMO Update 10/11       Other dyschromia 08/06/2007     Priority: Medium     Scar condition and fibrosis of skin 08/06/2007     Priority: Medium      Past Medical History:   Diagnosis Date     Cancer (H)      Pneumonia      Thyroid disease      Past Surgical History:   Procedure Laterality Date     ARTHROSCOPY SHOULDER ROTATOR CUFF REPAIR Left 3/7/2018    Procedure: ARTHROSCOPY SHOULDER ROTATOR CUFF REPAIR;  LEFT SHOULDER ARTHROSCOPY, REPAIR ROTATOR CUFF: subacromial Decompression and AC Joint Resection;  Surgeon: Baldev Lou DO;  Location: [facility]     ARTHROTOMY SHOULDER, ROTATOR CUFF REPAIR, COMBINED Right 11/14/2018    Procedure: RIGHT SHOULDER DEBRIDEMENT, EXCISION OF LIPOMA  RIGHT UPPER ARM, EXCISION SCAR TISSUE AC JOINT;  Surgeon: Baldev Lou DO;  Location: [facility] OR     BIOPSY BREAST NEEDLE LOCALIZATION, BIOPSY NODE SENTINEL, COMBINED Right 8/23/2019    Procedure: RIGHT WIRE LOCALIZED LUMPECTOMY WITH SENTINEL  LYMP NODE;  Surgeon: Michael Dupree MD;  Location: [facility] OR     COLONOSCOPY  2006    Dr Lara     COLONOSCOPY N/A 10/10/2016    Procedure: COLONOSCOPY;  Surgeon: Christine Cintron MD;  Location: [facility]     INSERT TISSUE EXPANDER BREAST BILATERAL Bilateral 10/9/2019    Procedure: BILATERAL TISSUE EXPANDERS (RAMIREZ);  Surgeon: Dale Paul MD;  Location:  OR     MASTECTOMY, NIPPLE SPARING Bilateral 10/9/2019    Procedure: BILATERAL SKIN SPARRING MASTECTOMY (CASS);  Surgeon: Opal Rubin MD;  Location:  OR     ORTHOPEDIC SURGERY      feet neuromas removed     SHOULDER SURGERY Right 2011/10/2012     Current  Outpatient Medications   Medication Sig Dispense Refill     fluticasone (ARNUITY ELLIPTA) 200 MCG/ACT inhaler Inhale 1 puff into the lungs daily       anastrozole (ARIMIDEX) 1 MG tablet Take 1 tablet (1 mg) by mouth daily 90 tablet 3     calcium carbonate-vitamin D (OS-STEVE) 600-400 MG-UNIT chewable tablet Take 1 chew tab by mouth 2 times daily (with meals) 180 tablet 3     eletriptan (RELPAX) 40 MG tablet Take 1 tablet (40 mg) by mouth once as needed for migraine (Patient not taking: Reported on 2/10/2020) 12 tablet 3     FLUoxetine HCl, PMDD, (SARAFEM) 10 MG TABS Take 10 mg by mouth daily 90 tablet 3     levocetirizine (XYZAL) 5 MG tablet   11     levothyroxine (SYNTHROID/LEVOTHROID) 88 MCG tablet Take 1 tablet (88 mcg) by mouth daily 60 tablet 3     rosuvastatin (CRESTOR) 10 MG tablet Take 1 tablet (10 mg) by mouth daily 90 tablet 2     OTC products: None, except as noted above    Allergies   Allergen Reactions     Penicillins Rash     Zithromax [Azithromycin] Rash      Latex Allergy: NO    Social History     Tobacco Use     Smoking status: Never Smoker     Smokeless tobacco: Never Used   Substance Use Topics     Alcohol use: Yes     Alcohol/week: 1.7 standard drinks     Types: 2 Glasses of wine per week     Comment: occasionally     History   Drug Use No       REVIEW OF SYSTEMS:   CONSTITUTIONAL: NEGATIVE for fever, chills, change in weight  CONSTITUTIONAL:POSITIVE  for fatigue  INTEGUMENTARY/SKIN: NEGATIVE for worrisome rashes, moles or lesions  EYES: NEGATIVE for vision changes or irritation  ENT/MOUTH: NEGATIVE for ear, mouth and throat problems  RESP: NEGATIVE for significant cough or SOB  BREAST: NEGATIVE for masses, tenderness or discharge  CV: NEGATIVE for chest pain, palpitations or peripheral edema  GI: NEGATIVE for nausea, abdominal pain, heartburn, or change in bowel habits  : NEGATIVE for frequency, dysuria, or hematuria  MUSCULOSKELETAL: NEGATIVE for significant arthralgias or myalgia  NEURO:  "NEGATIVE for weakness, dizziness or paresthesias  ENDOCRINE: NEGATIVE for temperature intolerance, skin/hair changes  HEME: NEGATIVE for bleeding problems  PSYCHIATRIC: NEGATIVE for changes in mood or affect    EXAM:   /78 (BP Location: Left arm, Patient Position: Sitting, Cuff Size: Adult Regular)   Pulse 50   Temp 98  F (36.7  C) (Tympanic)   Resp 15   Ht 1.651 m (5' 5\")   Wt 64.3 kg (141 lb 12.8 oz)   SpO2 99%   BMI 23.60 kg/m      GENERAL APPEARANCE: healthy, alert and no distress     EYES: EOMI, PERRL     HENT: ear canals and TM's normal and nose and mouth without ulcers or lesions     NECK: no adenopathy, no asymmetry, masses, or scars and thyroid normal to palpation     RESP: lungs clear to auscultation - no rales, rhonchi or wheezes     CV: regular rates and rhythm, normal S1 S2, no S3 or S4 and no murmur, click or rub     ABDOMEN:  soft, nontender, no HSM or masses and bowel sounds normal     MS: extremities normal- no gross deformities noted, no evidence of inflammation in joints, FROM in all extremities.     SKIN: no suspicious lesions or rashes     NEURO: Normal strength and tone, sensory exam grossly normal, mentation intact and speech normal     PSYCH: mentation appears normal. and affect normal/bright     LYMPHATICS: No cervical adenopathy    DIAGNOSTICS:     EKG: sinus bradycardia, normal axis, normal intervals, no acute ST/T changes c/w ischemia, no LVH by voltage criteria, there are no prior tracings available  Labs Resulted Today:   Results for orders placed or performed in visit on 06/01/20   CBC with platelets     Status: None   Result Value Ref Range    WBC 6.3 4.0 - 11.0 10e9/L    RBC Count 4.77 3.8 - 5.2 10e12/L    Hemoglobin 14.2 11.7 - 15.7 g/dL    Hematocrit 43.0 35.0 - 47.0 %    MCV 90 78 - 100 fl    MCH 29.8 26.5 - 33.0 pg    MCHC 33.0 31.5 - 36.5 g/dL    RDW 13.1 10.0 - 15.0 %    Platelet Count 170 150 - 450 10e9/L   Comprehensive metabolic panel     Status: Abnormal "   Result Value Ref Range    Sodium 138 133 - 144 mmol/L    Potassium 4.1 3.4 - 5.3 mmol/L    Chloride 105 94 - 109 mmol/L    Carbon Dioxide 29 20 - 32 mmol/L    Anion Gap 4 3 - 14 mmol/L    Glucose 88 70 - 99 mg/dL    Urea Nitrogen 14 7 - 30 mg/dL    Creatinine 0.77 0.52 - 1.04 mg/dL    GFR Estimate 81 >60 mL/min/[1.73_m2]    GFR Estimate If Black >90 >60 mL/min/[1.73_m2]    Calcium 9.3 8.5 - 10.1 mg/dL    Bilirubin Total 0.4 0.2 - 1.3 mg/dL    Albumin 3.9 3.4 - 5.0 g/dL    Protein Total 7.6 6.8 - 8.8 g/dL    Alkaline Phosphatase 64 40 - 150 U/L    ALT 45 0 - 50 U/L    AST 46 (H) 0 - 45 U/L       Recent Labs   Lab Test 06/01/20  1110 02/06/20  1231   HGB 14.2 13.0    149*    139   POTASSIUM 4.1 3.9   CR PENDING 0.77        IMPRESSION:   Reason for surgery/procedure: bilateral mastectomy and needs reconstruction bilateral  Diagnosis/reason for consult: cardiopulmonary clearance    The proposed surgical procedure is considered INTERMEDIATE risk.    REVISED CARDIAC RISK INDEX  The patient has the following serious cardiovascular risks for perioperative complications such as (MI, PE, VFib and 3  AV Block):  No serious cardiac risks  INTERPRETATION: 0 risks: Class I (very low risk - 0.4% complication rate)    The patient has the following additional risks for perioperative complications:  No identified additional risks  The 10-year ASCVD risk score ([place] DC Jr., et al., 2013) is: 3.4%    Values used to calculate the score:      Age: 64 years      Sex: Female      Is Non- : No      Diabetic: No      Tobacco smoker: No      Systolic Blood Pressure: 112 mmHg      Is BP treated: No      HDL Cholesterol: 56 mg/dL      Total Cholesterol: 161 mg/dL      ICD-10-CM    1. Preop general physical exam  Z01.818 CBC with platelets     Comprehensive metabolic panel     EKG 12-lead complete w/read - (Clinic Performed)     CBC with platelets     Comprehensive metabolic panel     EKG 12-lead complete  w/read - (Clinic Performed)       RECOMMENDATIONS:     --Patient is to take all scheduled medications on the day of surgery EXCEPT for modifications listed below.    APPROVAL GIVEN to proceed with proposed procedure, without further diagnostic evaluation       Signed Electronically by: Che Tapia MD    Copy of this evaluation report is provided to requesting physician.    [place] Preop Guidelines    Revised Cardiac Risk Index

## 2020-06-05 ENCOUNTER — OFFICE VISIT (OUTPATIENT)
Dept: FAMILY MEDICINE | Facility: OTHER | Age: 65
End: 2020-06-05
Attending: INTERNAL MEDICINE
Payer: COMMERCIAL

## 2020-06-05 DIAGNOSIS — C50.911 MALIGNANT NEOPLASM OF RIGHT FEMALE BREAST, UNSPECIFIED ESTROGEN RECEPTOR STATUS, UNSPECIFIED SITE OF BREAST (H): ICD-10-CM

## 2020-06-05 DIAGNOSIS — Z01.818 PREOPERATIVE EXAMINATION: Primary | ICD-10-CM

## 2020-06-05 LAB
ALBUMIN SERPL-MCNC: 4.1 G/DL (ref 3.4–5)
ALP SERPL-CCNC: 65 U/L (ref 40–150)
ALT SERPL W P-5'-P-CCNC: 46 U/L (ref 0–50)
ANION GAP SERPL CALCULATED.3IONS-SCNC: 4 MMOL/L (ref 3–14)
AST SERPL W P-5'-P-CCNC: 40 U/L (ref 0–45)
BASOPHILS # BLD AUTO: 0 10E9/L (ref 0–0.2)
BASOPHILS NFR BLD AUTO: 0.5 %
BILIRUB SERPL-MCNC: 0.4 MG/DL (ref 0.2–1.3)
BUN SERPL-MCNC: 16 MG/DL (ref 7–30)
CALCIUM SERPL-MCNC: 9 MG/DL (ref 8.5–10.1)
CHLORIDE SERPL-SCNC: 109 MMOL/L (ref 94–109)
CO2 SERPL-SCNC: 27 MMOL/L (ref 20–32)
CREAT SERPL-MCNC: 0.81 MG/DL (ref 0.52–1.04)
DIFFERENTIAL METHOD BLD: NORMAL
EOSINOPHIL # BLD AUTO: 0.2 10E9/L (ref 0–0.7)
EOSINOPHIL NFR BLD AUTO: 3.1 %
ERYTHROCYTE [DISTWIDTH] IN BLOOD BY AUTOMATED COUNT: 13 % (ref 10–15)
GFR SERPL CREATININE-BSD FRML MDRD: 77 ML/MIN/{1.73_M2}
GLUCOSE SERPL-MCNC: 83 MG/DL (ref 70–99)
HCT VFR BLD AUTO: 40.6 % (ref 35–47)
HGB BLD-MCNC: 13.4 G/DL (ref 11.7–15.7)
IMM GRANULOCYTES # BLD: 0 10E9/L (ref 0–0.4)
IMM GRANULOCYTES NFR BLD: 0.3 %
LDH SERPL L TO P-CCNC: 217 U/L (ref 81–234)
LYMPHOCYTES # BLD AUTO: 1.8 10E9/L (ref 0.8–5.3)
LYMPHOCYTES NFR BLD AUTO: 28.7 %
MCH RBC QN AUTO: 30.1 PG (ref 26.5–33)
MCHC RBC AUTO-ENTMCNC: 33 G/DL (ref 31.5–36.5)
MCV RBC AUTO: 91 FL (ref 78–100)
MONOCYTES # BLD AUTO: 0.5 10E9/L (ref 0–1.3)
MONOCYTES NFR BLD AUTO: 7.6 %
NEUTROPHILS # BLD AUTO: 3.7 10E9/L (ref 1.6–8.3)
NEUTROPHILS NFR BLD AUTO: 59.8 %
NRBC # BLD AUTO: 0 10*3/UL
NRBC BLD AUTO-RTO: 0 /100
PLATELET # BLD AUTO: 162 10E9/L (ref 150–450)
POTASSIUM SERPL-SCNC: 4 MMOL/L (ref 3.4–5.3)
PROT SERPL-MCNC: 7.5 G/DL (ref 6.8–8.8)
RBC # BLD AUTO: 4.45 10E12/L (ref 3.8–5.2)
SODIUM SERPL-SCNC: 140 MMOL/L (ref 133–144)
WBC # BLD AUTO: 6.2 10E9/L (ref 4–11)

## 2020-06-05 PROCEDURE — 99000 SPECIMEN HANDLING OFFICE-LAB: CPT | Performed by: FAMILY MEDICINE

## 2020-06-05 PROCEDURE — 87635 SARS-COV-2 COVID-19 AMP PRB: CPT | Mod: 90 | Performed by: FAMILY MEDICINE

## 2020-06-05 PROCEDURE — 85025 COMPLETE CBC W/AUTO DIFF WBC: CPT | Performed by: INTERNAL MEDICINE

## 2020-06-05 PROCEDURE — 86300 IMMUNOASSAY TUMOR CA 15-3: CPT | Performed by: INTERNAL MEDICINE

## 2020-06-05 PROCEDURE — 80053 COMPREHEN METABOLIC PANEL: CPT | Performed by: INTERNAL MEDICINE

## 2020-06-05 PROCEDURE — 83615 LACTATE (LD) (LDH) ENZYME: CPT | Performed by: INTERNAL MEDICINE

## 2020-06-05 PROCEDURE — 36415 COLL VENOUS BLD VENIPUNCTURE: CPT | Performed by: INTERNAL MEDICINE

## 2020-06-05 NOTE — LETTER
June 7, 2020        Denise Taylor  [address] [address]    This letter provides a written record that you were tested for COVID-19 on 6/6/20.   Your result was negative.    This means that we didn t find the virus that causes COVID-19 in your sample. A test may show negative when you do actually have the virus. This can happen when the virus is in the early stages of infection, before you feel illness symptoms.    Even if you don t have symptoms, they may still appear. For safety, it s very important to follow these rules.    Keep yourself away from others (self-isolation):      Stay home. Don t go to work, school or anywhere else.     Stay in your own room (and use your own bathroom), if you can.    Stay away from others in your home. No hugging, kissing or shaking hands. No visitors.    Clean  high touch  surfaces often (doorknobs, counters, handles, etc.). Use a household cleaning spray or wipes.    Cover your mouth and nose with a mask, tissue or washcloth to avoid spreading germs.    Wash your hands and face often with soap and water.    Stay in self-isolation until you meet ALL of the guidelines below:    1. You have had no fever for at least 72 hours (that is 3 full days of no fever without the use of medicine that reduces fevers), AND  2. other symptoms (such as cough, shortness of breath) have gotten better, AND  3. at least 10 days have passed since your symptoms first appeared.    Going back to work  Check with your employer for any guidelines to follow for going back to work.    Employers: This document serves as formal notice that your employee tested negative for COVID-19, as of the testing date shown above.    For questions regarding this letter or your Negative COVID-19 result, call [phone] between 8A to 6:30P (M-F) and 10A to 6:30P (weekends).

## 2020-06-06 ENCOUNTER — APPOINTMENT (OUTPATIENT)
Dept: GENERAL RADIOLOGY | Facility: HOSPITAL | Age: 65
End: 2020-06-06
Attending: EMERGENCY MEDICINE
Payer: COMMERCIAL

## 2020-06-06 ENCOUNTER — HOSPITAL ENCOUNTER (EMERGENCY)
Facility: HOSPITAL | Age: 65
Discharge: HOME | End: 2020-06-06
Attending: EMERGENCY MEDICINE | Admitting: EMERGENCY MEDICINE
Payer: COMMERCIAL

## 2020-06-06 VITALS
DIASTOLIC BLOOD PRESSURE: 82 MMHG | RESPIRATION RATE: 16 BRPM | TEMPERATURE: 98.3 F | SYSTOLIC BLOOD PRESSURE: 127 MMHG | OXYGEN SATURATION: 95 %

## 2020-06-06 DIAGNOSIS — S82.51XA CLOSED DISPLACED FRACTURE OF MEDIAL MALLEOLUS OF RIGHT TIBIA, INITIAL ENCOUNTER: Primary | ICD-10-CM

## 2020-06-06 LAB — CANCER AG27-29 SERPL-ACNC: 30 U/ML (ref 0–39)

## 2020-06-06 PROCEDURE — 99283 EMERGENCY DEPT VISIT LOW MDM: CPT | Mod: Z6 | Performed by: EMERGENCY MEDICINE

## 2020-06-06 PROCEDURE — 73610 X-RAY EXAM OF ANKLE: CPT | Mod: TC,RT

## 2020-06-06 PROCEDURE — 99283 EMERGENCY DEPT VISIT LOW MDM: CPT

## 2020-06-06 PROCEDURE — 25000132 ZZH RX MED GY IP 250 OP 250 PS 637: Performed by: EMERGENCY MEDICINE

## 2020-06-06 RX ORDER — HYDROCODONE BITARTRATE AND ACETAMINOPHEN 5; 325 MG/1; MG/1
1 TABLET ORAL ONCE
Status: COMPLETED | OUTPATIENT
Start: 2020-06-06 | End: 2020-06-06

## 2020-06-06 RX ORDER — HYDROCODONE BITARTRATE AND ACETAMINOPHEN 5; 325 MG/1; MG/1
1 TABLET ORAL EVERY 6 HOURS PRN
Qty: 18 TABLET | Refills: 0 | Status: SHIPPED | OUTPATIENT
Start: 2020-06-06 | End: 2020-07-15

## 2020-06-06 RX ADMIN — HYDROCODONE BITARTRATE AND ACETAMINOPHEN 1 TABLET: 5; 325 TABLET ORAL at 16:39

## 2020-06-06 ASSESSMENT — ENCOUNTER SYMPTOMS
SHORTNESS OF BREATH: 0
ABDOMINAL PAIN: 0
FEVER: 0

## 2020-06-06 NOTE — ED AVS SNAPSHOT
[facility]  [address] [address]  Phone:  [phone]                                    Denise Taylor   MRN: [id number]    Department:  [facility]   Date of Visit:  6/6/2020           After Visit Summary Signature Page    I have received my discharge instructions, and my questions have been answered. I have discussed any challenges I see with this plan with the nurse or doctor.    ..........................................................................................................................................  Patient/Patient Representative Signature      ..........................................................................................................................................  Patient Representative Print Name and Relationship to Patient    ..................................................               ................................................  Date                                   Time    ..........................................................................................................................................  Reviewed by Signature/Title    ...................................................              ..............................................  Date                                               Time          22EPIC Rev 08/18

## 2020-06-06 NOTE — ED TRIAGE NOTES
Patient arrives by self for evaluation of ankle pain. Reports standing on a ladder, cutting trees with a chain saw and fell off approximately 5-6 feet. Landed on her feet. Reporting right medial ankle pain.

## 2020-06-06 NOTE — ED PROVIDER NOTES
History     Chief Complaint   Patient presents with     Ankle Pain     HPI  Denise Taylor is a 64 year old female who presents to the ED after falling off a ladder approximately 6 feet high.  She landed on her left foot and since then has been unable to bear weight on it.  It swollen on the medial aspect of the right ankle.  Patient did not lose consciousness and did not hit her head or any other part of the body.    Allergies:  Allergies   Allergen Reactions     Penicillins Rash     Zithromax [Azithromycin] Rash       Problem List:    Patient Active Problem List    Diagnosis Date Noted     History of breast cancer 10/09/2019     Priority: Medium     Invasive lobular carcinoma of right breast in female (H) 08/28/2019     Priority: Medium     5.12.2020- Review and distribute treatment summary- [facility]       Malignant neoplasm of right female breast, unspecified estrogen receptor status, unspecified site of breast (H) 08/22/2019     Priority: Medium     Cigarette nicotine dependence in remission 08/22/2019     Priority: Medium     Recurrent pneumonia 08/22/2019     Priority: Medium     ACP (advance care planning) 07/27/2017     Priority: Medium     Advance Care Planning 7/27/2017: ACP Review of Chart / Resources Provided:  Reviewed chart for advance care plan.  Denise Taylor has no plan or code status on file. Discussed available resources and provided with information.   Added by Marah Henry             Hypothyroidism, unspecified type 07/27/2017     Priority: Medium     Pneumonia 07/20/2015     Priority: Medium     History of basal cell carcinoma 05/18/2015     Priority: Medium     Hyperlipidemia with target LDL less than 130 05/18/2015     Priority: Medium     Diagnosis updated by automated process. Provider to review and confirm.       Actinic keratosis 12/01/2011     Priority: Medium     AC separation, type 5 06/27/2011     Priority: Medium     Overview:   IMO Update 10/11        Capillary disease 08/06/2007     Priority: Medium     Overview:   IMO Update 10/11       Other dyschromia 08/06/2007     Priority: Medium     Scar condition and fibrosis of skin 08/06/2007     Priority: Medium        Past Medical History:    Past Medical History:   Diagnosis Date     Basal cell carcinoma      Breast cancer (H) 07/2019     Bronchiectasis (H) 2019     Dyslipidemia      BERTA (generalized anxiety disorder)      Squamous cell carcinoma of skin 05/2020     Thyroid disease        Past Surgical History:    Past Surgical History:   Procedure Laterality Date     ARTHROSCOPY SHOULDER ROTATOR CUFF REPAIR Left 3/7/2018    Procedure: ARTHROSCOPY SHOULDER ROTATOR CUFF REPAIR;  LEFT SHOULDER ARTHROSCOPY, REPAIR ROTATOR CUFF: subacromial Decompression and AC Joint Resection;  Surgeon: Baldev Lou DO;  Location: [facility]     ARTHROTOMY SHOULDER, ROTATOR CUFF REPAIR, COMBINED Right 11/14/2018    Procedure: RIGHT SHOULDER DEBRIDEMENT, EXCISION OF LIPOMA  RIGHT UPPER ARM, EXCISION SCAR TISSUE AC JOINT;  Surgeon: Baldev Lou DO;  Location: [facility]     BIOPSY BREAST NEEDLE LOCALIZATION, BIOPSY NODE SENTINEL, COMBINED Right 8/23/2019    Procedure: RIGHT WIRE LOCALIZED LUMPECTOMY WITH SENTINEL  LYMP NODE;  Surgeon: Michael Dupree MD;  Location: [facility]     COLONOSCOPY  2006    Dr Lara     COLONOSCOPY N/A 10/10/2016    Procedure: COLONOSCOPY;  Surgeon: Christine Cintron MD;  Location: [facility]     INSERT TISSUE EXPANDER BREAST BILATERAL Bilateral 10/9/2019    Procedure: BILATERAL TISSUE EXPANDERS (RAMIREZ);  Surgeon: Dale Paul MD;  Location:  OR     MASTECTOMY, NIPPLE SPARING Bilateral 10/9/2019    Procedure: BILATERAL SKIN SPARRING MASTECTOMY (CASS);  Surgeon: Opal Rubin MD;  Location:  OR     ORTHOPEDIC SURGERY Right     feet neuromas removed     SHOULDER SURGERY Right 2011/10/2012       Family History:    Family History   Problem Relation Age of Onset     Myocardial Infarction Mother       Hypertension Mother      Coronary Artery Disease Mother      Hyperlipidemia Mother      Thyroid Disease Mother      Stomach Cancer Father      Coronary Artery Disease Father      Hypertension Father      Colon Cancer Father      Diabetes Paternal Grandmother      Heart Disease Maternal Grandmother      Heart Disease Maternal Grandfather      Diabetes Paternal Grandfather      Cerebrovascular Disease No family hx of      Breast Cancer No family hx of      Prostate Cancer No family hx of      Anesthesia Reaction No family hx of      Asthma No family hx of      Genetic Disorder No family hx of        Social History:  Marital Status:   [2]  Social History     Tobacco Use     Smoking status: Never Smoker     Smokeless tobacco: Never Used   Substance Use Topics     Alcohol use: Yes     Alcohol/week: 1.7 standard drinks     Types: 2 Glasses of wine per week     Frequency: 2-4 times a month     Drinks per session: 1 or 2     Comment: occasionally     Drug use: No        Medications:    HYDROcodone-acetaminophen (NORCO) 5-325 MG tablet  anastrozole (ARIMIDEX) 1 MG tablet  calcium carbonate-vitamin D (OS-STEVE) 600-400 MG-UNIT chewable tablet  eletriptan (RELPAX) 40 MG tablet  FLUoxetine HCl, PMDD, (SARAFEM) 10 MG TABS  fluticasone (ARNUITY ELLIPTA) 200 MCG/ACT inhaler  levocetirizine (XYZAL) 5 MG tablet  levothyroxine (SYNTHROID/LEVOTHROID) 88 MCG tablet  rosuvastatin (CRESTOR) 10 MG tablet          Review of Systems   Constitutional: Negative for fever.   Respiratory: Negative for shortness of breath.    Cardiovascular: Negative for chest pain.   Gastrointestinal: Negative for abdominal pain.   Musculoskeletal:        Right ankle pain and swelling   All other systems reviewed and are negative.      Physical Exam   BP: 127/82  Heart Rate: 52  Temp: 98.3  F (36.8  C)  Resp: 16  SpO2: 95 %      Physical Exam  Vitals signs and nursing note reviewed.   Constitutional:       General: She is not in acute distress.      Appearance: Normal appearance. She is not diaphoretic.   HENT:      Head: Normocephalic and atraumatic.      Mouth/Throat:      Pharynx: No oropharyngeal exudate.   Eyes:      General: No scleral icterus.     Pupils: Pupils are equal, round, and reactive to light.   Cardiovascular:      Heart sounds: Normal heart sounds.   Pulmonary:      Effort: No respiratory distress.      Breath sounds: Normal breath sounds.   Abdominal:      General: Bowel sounds are normal.      Palpations: Abdomen is soft.      Tenderness: There is no abdominal tenderness.   Musculoskeletal:         General: No tenderness.        Feet:    Neurological:      Mental Status: She is alert.         ED Course        Procedures       No results found for this or any previous visit (from the past 24 hour(s)).    Medications   HYDROcodone-acetaminophen (NORCO) 5-325 MG per tablet 1 tablet (has no administration in time range)       Assessments & Plan (with Medical Decision Making)   Closed displaced fracture of medial malleolus of right tibia: CAM Walker applied and crutches given.  Norco given for pain control and prescription given for use as needed at home.  Orthopedic referral made.  Results discussed with patient and she was in agreement with treatment plan.  Discharged home in stable condition.    I have reviewed the nursing notes.    I have reviewed the findings, diagnosis, plan and need for follow up with the patient.    New Prescriptions    HYDROCODONE-ACETAMINOPHEN (NORCO) 5-325 MG TABLET    Take 1 tablet by mouth every 6 hours as needed for pain       Final diagnoses:   Closed displaced fracture of medial malleolus of right tibia, initial encounter       6/6/2020   [facility]     Chalino West MD  06/06/20 1962

## 2020-06-06 NOTE — ED NOTES
Patient states that she was cutting branches in her daughters yard when she lost her balance as a branch was swinging at her and she went backwards and fell off the ladder her daughter was holding. Patient states that she tucked and that she did land on her feet but the right ankle took the brunt of the fall. She states she was and is able to stand on it but it is painful. She states sitting here it's not to bad about a 4 out of 10.

## 2020-06-07 LAB
SARS-COV-2 RNA SPEC QL NAA+PROBE: NOT DETECTED
SPECIMEN SOURCE: NORMAL

## 2020-06-11 NOTE — PROGRESS NOTES
Oncology Follow-up Visit:  June 12, 2020    Reason for Visit:  Patient presents with:  RECHECK: Follow up Invasive lobular carcinoma of right breast in female   RECHECK: Survivorship     Nursing Note and documentation reviewed: yes    HPI: This is a 64-year-old female patient who presents to the oncology clinic today in follow-up of Stage Ia, invasive lobular carcinoma of the right breast diagnosed July 2019.  She has undergone bilateral mastectomies and she was placed on Arimidex in December 2019.    She presents to the clinic today stating that she is feeling pretty good and feels she is tolerating the Arimidex well.  She states she felt it did seem to make her little more tired so she started taking it at night and is no longer having this issue.  She recently had an injury where she fell off a ladder while using a chainsaw to trim branches and fractured her right ankle.  She underwent surgery on Monday by Dr. Torres.  She denies any hot flashes or increased joint pains since initiating the Arimidex.  She will be following up with her surgeon in October.  Note she did have a CT scan of the chest completed about a month ago through her pulmonary doctor and states there were no issues then.    Oncologic History:     7/24/2019 patient was seen by her PCP with concerns regarding a right breast mass  7/25/2019 patient underwent right diagnostic mammogram showing no abnormality      **She underwent ultrasound of the right breast with no discrete masses noted and recommendation for follow-up in 6 months  7/26/2019 patient was evaluated by Dr. Dupree with General Surgery with recommendation for biopsy   7/29/2019 Ultrasound guided biopsy showed invasive lobular carcinoma, grade 2, ER/IL positive and HER-2/jenn negative  8/12/2019  She underwent MRI of the both breasts with findings showing a small area of enhancement in the upper outer quadrant of the right breast and the left breast was unremarkable     **Ultrasound of the  right axilla was negative  8/23/2020  She went underwent lumpectomy with sentinel node procedure with pathology showing a 4.5 cm tumor that was consistent with invasive lobular carcinoma of the right breast, grade 2, ER/SC positive and HER-2/jenn negative; sentinel node was negative; she was staged pathologic T2N0.  Anterior and medial margin was positive on pathology.  9/19/2019  Patient was seen by Dr. Lulú Franz as well is Dr. Dale Paul with Plastic surgery to discuss reconstruction   10/9/2019 she underwent bilateral mastectomy with immediate construction and pathology showed no evidence of malignancy  11/5/2019  Patient was evaluated by Dr. Noyola  with Medical Oncology with Oncotype DX recurrence score of 12 consistent with low risk of breast cancer and recommendation for aromatase inhibitor therapy; PET and MRI brain were recommended  Patient was seen again by Dr. hair aviles with medical oncology with Oncotype DX 11/20/2019  She underwent PET scan:  IMPRESSION:   1.  Numerous new FDG avid groundglass and solid nodules throughout  both lungs concerning for recurrent disease. Additionally, there is  worsening atelectasis seen in both lungs. Infectious and noninfectious  granulomatous disease may have a similar appearance.  12/3/2019 she was seen again by Dr. Noyola with Medical Oncology to review PET scan results.  MRI was not obtained related to tissue expanders in place.  He recommended initiation of aromatase inhibitor with Arimidex 1 mg p.o. daily.  2/5/2020  Repeat PET scan:  IMPRESSION:   1.  No evidence of active disease. Nodules and areas of groundglass  opacification seen previously within the lungs have resolved.   2.  Bilateral breast implant revision. Leaking left breast implant is  first seen on the CT scan of the chest from 11/14/2019.   2/6/2020  She underwent MRI of the brain evidence of metastatic disease    Current Chemo Regime/TX: Arimidex 1 mg daily initiated  12/2019  Current Cycle:  n/a  # of completed cycles:  n/a    Previous treatment:  n/a    Past Medical History:   Diagnosis Date     Basal cell carcinoma      Breast cancer (H) 07/2019    right breast     Bronchiectasis (H) 2019     Dyslipidemia      BERTA (generalized anxiety disorder)      Squamous cell carcinoma of skin 05/2020    right lower leg     Thyroid disease        Past Surgical History:   Procedure Laterality Date     ARTHROSCOPY SHOULDER ROTATOR CUFF REPAIR Left 3/7/2018    Procedure: ARTHROSCOPY SHOULDER ROTATOR CUFF REPAIR;  LEFT SHOULDER ARTHROSCOPY, REPAIR ROTATOR CUFF: subacromial Decompression and AC Joint Resection;  Surgeon: Baldev Lou DO;  Location: [facility]     ARTHROTOMY SHOULDER, ROTATOR CUFF REPAIR, COMBINED Right 11/14/2018    Procedure: RIGHT SHOULDER DEBRIDEMENT, EXCISION OF LIPOMA  RIGHT UPPER ARM, EXCISION SCAR TISSUE AC JOINT;  Surgeon: Baldev Lou DO;  Location: [facility]     BIOPSY BREAST NEEDLE LOCALIZATION, BIOPSY NODE SENTINEL, COMBINED Right 8/23/2019    Procedure: RIGHT WIRE LOCALIZED LUMPECTOMY WITH SENTINEL  LYMP NODE;  Surgeon: Michael Dupree MD;  Location: [facility]     COLONOSCOPY  2006    Dr Lara     COLONOSCOPY N/A 10/10/2016    Procedure: COLONOSCOPY;  Surgeon: Christine Cintron MD;  Location: [facility]     INSERT TISSUE EXPANDER BREAST BILATERAL Bilateral 10/9/2019    Procedure: BILATERAL TISSUE EXPANDERS (RAMIREZ);  Surgeon: Dale Paul MD;  Location:  OR     MASTECTOMY, NIPPLE SPARING Bilateral 10/9/2019    Procedure: BILATERAL SKIN SPARRING MASTECTOMY (CASS);  Surgeon: Opal Rubin MD;  Location:  OR     ORTHOPEDIC SURGERY Right     feet neuromas removed     SHOULDER SURGERY Right 2011/10/2012       Family History   Problem Relation Age of Onset     Myocardial Infarction Mother      Hypertension Mother      Coronary Artery Disease Mother      Hyperlipidemia Mother      Thyroid Disease Mother      Stomach Cancer Father      Coronary Artery  Disease Father      Hypertension Father      Colon Cancer Father      Diabetes Paternal Grandmother      Heart Disease Maternal Grandmother      Heart Disease Maternal Grandfather      Diabetes Paternal Grandfather      Cerebrovascular Disease No family hx of      Breast Cancer No family hx of      Prostate Cancer No family hx of      Anesthesia Reaction No family hx of      Asthma No family hx of      Genetic Disorder No family hx of        Social History     Socioeconomic History     Marital status:      Spouse name: Not on file     Number of children: 2     Years of education: 16     Highest education level: Not on file   Occupational History     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   Social Needs     Financial resource strain: Not on file     Food insecurity     Worry: Not on file     Inability: Not on file     Transportation needs     Medical: Not on file     Non-medical: Not on file   Tobacco Use     Smoking status: Never Smoker     Smokeless tobacco: Never Used   Substance and Sexual Activity     Alcohol use: Yes     Alcohol/week: 1.7 standard drinks     Types: 2 Glasses of wine per week     Frequency: 2-4 times a month     Drinks per session: 1 or 2     Comment: occasionally     Drug use: No     Sexual activity: Not Currently   Lifestyle     Physical activity     Days per week: 5 days     Minutes per session: 20 min     Stress: Not on file   Relationships     Social connections     Talks on phone: Not on file     Gets together: Not on file     Attends Hoahaoism service: Not on file     Active member of club or organization: Not on file     Attends meetings of clubs or organizations: Not on file     Relationship status: Not on file     Intimate partner violence     Fear of current or ex partner: No     Emotionally abused: No     Physically abused: No     Forced sexual activity: No   Other Topics Concern     Parent/sibling w/ CABG, MI or angioplasty before 65F 55M? Not Asked   Social History Narrative     " -- n/a    Caffeine -- 2c/day    Concussion -- n/a       Current Outpatient Medications   Medication     anastrozole (ARIMIDEX) 1 MG tablet     calcium carbonate-vitamin D (OS-STEVE) 600-400 MG-UNIT chewable tablet     FLUoxetine HCl, PMDD, (SARAFEM) 10 MG TABS     fluticasone (ARNUITY ELLIPTA) 200 MCG/ACT inhaler     levocetirizine (XYZAL) 5 MG tablet     levothyroxine (SYNTHROID/LEVOTHROID) 88 MCG tablet     rosuvastatin (CRESTOR) 10 MG tablet     eletriptan (RELPAX) 40 MG tablet     No current facility-administered medications for this visit.         Allergies   Allergen Reactions     Penicillins Rash     Zithromax [Azithromycin] Rash       Review Of Systems:  Constitutional:    denies fever, weight changes, chills, and night sweats.  Eyes:    denies blurred or double vision  Ears/Nose/Throat:   denies ear pain, nose problems, difficulty swallowing  Respiratory:   denies shortness of breath, cough   Skin:   denies rash, lesions  Breast/Chest wall:   denies pain, lumps   Cardiovascular:   denies chest pain, palpitations, edema  Gastrointestinal:   denies abdominal pain, bloating, nausea, early satiety; no change in bowel habits or blood in stool  Genitourinary:   denies difficulty with urination, blood in urine  Musculoskeletal:    denies new muscle pain, bone pain  Neurologic:   denies lightheadedness, numbness or tingling other than surgical foot; has headaches on occasion  Psychiatric:   denies anxiety, depression  Hematologic/Lymphatic/Immunologic:   denies easy bruising, easy bleeding, lumps or bumps noted  Endocrine:   Denies increased thirst      Physical Exam:  /60   Pulse 75   Temp 96.9  F (36.1  C) (Tympanic)   Resp 18   Ht 1.651 m (5' 5\")   SpO2 99%   BMI 23.60 kg/m      GENERAL APPEARANCE: Healthy, alert and in no acute distress.  HEENT: Normocephalic, Sclerae anicteric. Oropharynx without ulcers, lesions, or thrush.  NECK:   No asymmetry or masses, no thyromegaly.  LYMPHATICS: No " palpable cervical, supraclavicular nodes   RESP: Lungs clear to auscultation bilaterally, respirations regular and easy  CARDIOVASCULAR: Regular rate and rhythm. Normal S1, S2; no murmur, gallop, or rub.  ABDOMEN: Soft, nontender. Bowel sounds auscultated all 4 quadrants. Examined in W/C  MUSCULOSKELETAL: Cast in place to RLE  NEURO: Alert and oriented x 3.   PSYCHIATRIC: Mentation and affect appear normal.  Mood appropriate.    Laboratory:  Component      Latest Ref Rng & Units 6/5/2020   WBC      4.0 - 11.0 10e9/L 6.2   RBC Count      3.8 - 5.2 10e12/L 4.45   Hemoglobin      11.7 - 15.7 g/dL 13.4   Hematocrit      35.0 - 47.0 % 40.6   MCV      78 - 100 fl 91   MCH      26.5 - 33.0 pg 30.1   MCHC      31.5 - 36.5 g/dL 33.0   RDW      10.0 - 15.0 % 13.0   Platelet Count      150 - 450 10e9/L 162   Diff Method       Automated Method   % Neutrophils      % 59.8   % Lymphocytes      % 28.7   % Monocytes      % 7.6   % Eosinophils      % 3.1   % Basophils      % 0.5   % Immature Granulocytes      % 0.3   Nucleated RBCs      0 /100 0   Absolute Neutrophil      1.6 - 8.3 10e9/L 3.7   Absolute Lymphocytes      0.8 - 5.3 10e9/L 1.8   Absolute Monocytes      0.0 - 1.3 10e9/L 0.5   Absolute Eosinophils      0.0 - 0.7 10e9/L 0.2   Absolute Basophils      0.0 - 0.2 10e9/L 0.0   Abs Immature Granulocytes      0 - 0.4 10e9/L 0.0   Absolute Nucleated RBC       0.0   Sodium      133 - 144 mmol/L 140   Potassium      3.4 - 5.3 mmol/L 4.0   Chloride      94 - 109 mmol/L 109   Carbon Dioxide      20 - 32 mmol/L 27   Anion Gap      3 - 14 mmol/L 4   Glucose      70 - 99 mg/dL 83   Urea Nitrogen      7 - 30 mg/dL 16   Creatinine      0.52 - 1.04 mg/dL 0.81   GFR Estimate      >60 mL/min/1.73:m2 77   GFR Estimate If Black      >60 mL/min/1.73:m2 89   Calcium      8.5 - 10.1 mg/dL 9.0   Bilirubin Total      0.2 - 1.3 mg/dL 0.4   Albumin      3.4 - 5.0 g/dL 4.1   Protein Total      6.8 - 8.8 g/dL 7.5   Alkaline Phosphatase      40 - 150  U/L 65   ALT      0 - 50 U/L 46   AST      0 - 45 U/L 40   CA 27-29      0 - 39 U/mL 30   Lactate Dehydrogenase      81 - 234 U/L 217     Imaging Studies: None completed for today's visit      ASSESSMENT/PLAN:    #1 Breast cancer:   Stage Ia, invasive lobular carcinoma of the right breast diagnosed August 2019.  She is currently on Arimidex 1 mg daily and will continue.  We will plan to follow-up again in 3 months with a CBC and CMP.    #2  On aromatase inhibitor therapy: DEXA scan completed in February 2020 was normal.  She will continue on the calcium with vitamin D twice a day and will repeat her DEXA scan in February 2022.    We discussed the following healthy lifestyle plan recommended by both NCCN and the American Cancer Society that can reduce the risk of breast cancer.      1.  Limit alcohol consumption to less than 1 drink per day (1 drink = 5 oz of wine, 12 oz of beer or 1.5 oz of 80 proof liquor).  2.  Exercise per American Cancer Society guidelines of at least 150 minutes of moderate intensity activity or 75 minutes of vigorous activity each week.  (Or a combination of both.)  Exercise should be spread out over the week.  Regular recreational exercise has been shown to reduce the risk of cancer by 30%.  3.  Maintain a healthy weight with a body mass index between 19-24.9.  A postmenopausal BMI of 30.7 has been shown to have increased the risk for breast cancer by 37% in some studies.    4.  Do not use tobacco products and limit exposure to passive smoke.  5.  Breast-feed if possible.  Research shows that 16+ months of breast-feeding, over a lifetime, can reduce a woman's risk of breast cancer by up to 27%.  (Data also from: Song GIFFORD.,  Juan Antonio CLAIRE.  Estrogen and the risk of breast cancer.  [place] Journal of Medicine 2001; 344: 276.).    I encouraged patient to call with any questions or concerns.     Approximately 30 minutesspent with the patient with >75% of this time spent in counseling and discussion  of her diagnosis of cancer along with the therapy she is receiving for this.  Time was spent in discussion of side effects and potential side effects and complications that could be encountered in the future.  Time was also spent in discussion of the need for and plan for future follow-up.  Patient's Survivorship careplan was reviewed in it's entirety.  A copy of patient's Survivorship Care Plan was given to patient, a copy was sent for scanning into her medical record, and a copy will be sent to her primary care provider.    Che Sesay NP  APRN, FNP-BC, AOCNP

## 2020-06-12 ENCOUNTER — ONCOLOGY VISIT (OUTPATIENT)
Dept: ONCOLOGY | Facility: OTHER | Age: 65
End: 2020-06-12
Attending: NURSE PRACTITIONER
Payer: COMMERCIAL

## 2020-06-12 VITALS
HEIGHT: 65 IN | OXYGEN SATURATION: 99 % | DIASTOLIC BLOOD PRESSURE: 60 MMHG | TEMPERATURE: 96.9 F | SYSTOLIC BLOOD PRESSURE: 100 MMHG | BODY MASS INDEX: 23.6 KG/M2 | HEART RATE: 75 BPM | RESPIRATION RATE: 18 BRPM

## 2020-06-12 DIAGNOSIS — Z17.0 MALIGNANT NEOPLASM OF UPPER-OUTER QUADRANT OF RIGHT BREAST IN FEMALE, ESTROGEN RECEPTOR POSITIVE (H): Primary | ICD-10-CM

## 2020-06-12 DIAGNOSIS — Z79.811 USE OF AROMATASE INHIBITORS: ICD-10-CM

## 2020-06-12 DIAGNOSIS — C50.411 MALIGNANT NEOPLASM OF UPPER-OUTER QUADRANT OF RIGHT BREAST IN FEMALE, ESTROGEN RECEPTOR POSITIVE (H): Primary | ICD-10-CM

## 2020-06-12 PROCEDURE — 99214 OFFICE O/P EST MOD 30 MIN: CPT | Performed by: NURSE PRACTITIONER

## 2020-06-12 ASSESSMENT — PAIN SCALES - GENERAL: PAINLEVEL: MODERATE PAIN (5)

## 2020-06-12 NOTE — LETTER
6/12/2020      Dear DASH Lagos:    Enclosed is a copy of the survivorship care plan (SCP) that has been completed on your patient Denise Taylor.  This is being sent for your review and has been included in your patient's medical record, under the EPIC media tab.    The SCP is to be used as a tool to facilitate your patient's care after their cancer treatment.  It includes diagnoses, treatment and potential physical and or psychosocial issues that may affect your patient after therapy has been completed.  Some issues may not develop until years after therapy.    Thank you in advance for taking the time to review your patient's survivorship care plan and for allowing our oncology team to participate in your patient's care.  We are happy to help facilitate any future care needs and look forward to continuing the patient's follow up in relation to their cancer diagnosis.    Feel free to contact our oncology team with any questions, suggestions or concerns.      Sincerely,        CESAR Medley, FNP-BC, AOCNP  Medical Oncology  Contact:  [phone]    Oncology Coordinator contact:  Jaqueline De Jesus RN, OCN  [phone]

## 2020-06-12 NOTE — NURSING NOTE
"Chief Complaint   Patient presents with     RECHECK     Follow up Invasive lobular carcinoma of right breast in female      RECHECK     Survivorship       Initial /60   Pulse 75   Temp 96.9  F (36.1  C) (Tympanic)   Resp 18   Ht 1.651 m (5' 5\")   SpO2 99%   BMI 23.60 kg/m   Estimated body mass index is 23.6 kg/m  as calculated from the following:    Height as of this encounter: 1.651 m (5' 5\").    Weight as of 6/1/20: 64.3 kg (141 lb 12.8 oz).  Medication Reconciliation: complete.  Immunizations and advance directives status reviewed. Pain scale =5 right ankle fracture , PHQ-2=0.            Leah Gómez LPN    "

## 2020-06-12 NOTE — PATIENT INSTRUCTIONS
We would like to see you back in 3 months. Please come 30 minutes prior for lab work.     When you are in need of a refill of your anastrozole, please call your pharmacy and they will send us a request.     If you have any questions please call [phone]    Other instructions:      We discussed the following healthy lifestyle plan recommended by both NCCN and the American Cancer Society that can reduce the risk of breast cancer.      1.  Limit alcohol consumption to less than 1 drink per day (1 drink = 5 oz of wine, 12 oz of beer or 1.5 oz of 80 proof liquor).  2.  Exercise per American Cancer Society guidelines of at least 150 minutes of moderate intensity activity or 75 minutes of vigorous activity each week.  (Or a combination of both.)  Exercise should be spread out over the week.  Regular recreational exercise has been shown to reduce the risk of cancer by 30%.  3.  Maintain a healthy weight with a body mass index between 19-24.9.  A postmenopausal BMI of 30.7 has been shown to have increased the risk for breast cancer by 37% in some studies.    4.  Do not use tobacco products and limit exposure to passive smoke.  5.  Breast-feed if possible.  Research shows that 16+ months of breast-feeding, over a lifetime, can reduce a woman's risk of breast cancer by up to 27%.  (Data also from: Song GIFFORD.,  Juan Antonio CLAIRE.  Estrogen and the risk of breast cancer.  [place] Journal of Medicine 2001; 344: 276.).

## 2020-06-18 ENCOUNTER — MYC MEDICAL ADVICE (OUTPATIENT)
Dept: FAMILY MEDICINE | Facility: OTHER | Age: 65
End: 2020-06-18

## 2020-06-18 ENCOUNTER — E-VISIT (OUTPATIENT)
Dept: FAMILY MEDICINE | Facility: OTHER | Age: 65
End: 2020-06-18
Payer: COMMERCIAL

## 2020-06-18 DIAGNOSIS — K59.03 DRUG-INDUCED CONSTIPATION: Primary | ICD-10-CM

## 2020-06-18 PROCEDURE — 99421 OL DIG E/M SVC 5-10 MIN: CPT | Performed by: PHYSICIAN ASSISTANT

## 2020-06-19 NOTE — TELEPHONE ENCOUNTER
Provider E-Visit time total (minutes): 8    1. Drug-induced constipation  We sent in Mag Citrate.  Had tried all fiber products and stool softeners. Short term use of Narcotics caused this.  Will try this to get things moving. If not better we will recheck. Message sent again today.

## 2020-06-22 NOTE — PATIENT INSTRUCTIONS
Thank you for choosing us for your care. I have placed an order for a prescription so that you can start treatment. View your full visit summary for details by clicking on the link below. Your pharmacist will able to address any questions you may have about the medication.     If you're not feeling better within 5-7 days, please schedule an appointment.  You can schedule an appointment right here in [facility], or call [phone]  If the visit is for the same symptoms as your e-visit, we'll refund the cost of your e-visit if seen within seven days.

## 2020-06-30 DIAGNOSIS — F33.0 MAJOR DEPRESSIVE DISORDER, RECURRENT EPISODE, MILD (H): ICD-10-CM

## 2020-06-30 NOTE — TELEPHONE ENCOUNTER
Fluoxetine       Last Written Prescription Date:  7/24/2019  Last Fill Quantity: 90,   # refills: 3  Last Office Visit: 6/05/2020  Future Office visit:    Next 5 appointments (look out 90 days)    Sep 11, 2020  2:15 PM CDT  (Arrive by 2:00 PM)  Return Visit with Che Sesay NP  [facility] ([facility] [address]  [phone]

## 2020-07-02 RX ORDER — FLUOXETINE 10 MG/1
10 TABLET ORAL DAILY
Qty: 90 TABLET | Refills: 3 | Status: SHIPPED | OUTPATIENT
Start: 2020-07-02 | End: 2020-12-21

## 2020-07-14 NOTE — PATIENT INSTRUCTIONS
Before Your Surgery      Call your surgeon if there is any change in your health. This includes signs of a cold or flu (such as a sore throat, runny nose, cough, rash or fever).    Do not smoke, drink alcohol or take over the counter medicine (unless your surgeon or primary care doctor tells you to) for the 24 hours before and after surgery.    If you take prescribed drugs: Follow your doctor s orders about which medicines to take and which to stop until after surgery.    Eating and drinking prior to surgery: follow the instructions from your surgeon    Take a shower or bath the night before surgery. Use the soap your surgeon gave you to gently clean your skin. If you do not have soap from your surgeon, use your regular soap. Do not shave or scrub the surgery site.  Wear clean pajamas and have clean sheets on your bed.   At your visit today, we discussed your risk for falls and preventive options.    Fall Prevention  Falls often occur due to slipping, tripping or losing your balance. Millions of people fall every year and injure themselves. Here are ways to reduce your risk of falling again.  Think about your fall, was there anything that caused your fall that can be fixed, removed, or replaced?  Make your home safe by keeping walkways clear of objects you may trip over, such as electric cords.  Use non-slip pads under rugs. Don't use area rugs or small throw rugs.  Use non-slip mats in bathtubs and showers.  Install handrails and lights on staircases. The handrails should be on both sides of the stairs.  Don't walk in poorly lit areas.  Don't stand on chairs or wobbly ladders.  Use caution when reaching overhead or looking upward. This position can cause a loss of balance.  Be sure your shoes fit properly, have non-slip bottoms and are in good condition.   Wear shoes both inside and out. Don't go barefoot or wear slippers.  Be cautious when going up and down stairs, curbs, and when walking on uneven  sidewalks.  If your balance is poor, consider using a cane or walker.  If your fall was related to alcohol use, stop or limit alcohol intake.   If your fall was related to use of sleeping medicines, talk to your healthcare provider about this. You may need to reduce your dosage at bedtime if you awaken during the night to go to the bathroom.    To reduce the need for nighttime bathroom trips:  Don't drink fluids for several hours before going to bed  Empty your bladder before going to bed  Men can keep a urinal at the bedside  Stay as active as you can. Balance, flexibility, strength, and endurance all come from exercise. They all play a role in preventing falls. Ask your healthcare provider which types of activity are right for you.  Get your vision checked on a regular basis.  If you have pets, know where they are before you stand up or walk so you don't trip over them.  Use night lights.  Go over all your medicines with a pharmacist or other healthcare provider to see if any of them could make you more likely to fall.  Date Last Reviewed: 4/1/2018 2000-2019 [employer] [address] [place] [address], [place] [address]. All rights reserved. This information is not intended as a substitute for professional medical care. Always follow your healthcare professional's instructions.

## 2020-07-14 NOTE — PROGRESS NOTES
[facility] [place]  [address]  [facility] [place] [address]  [phone]  Dept: [phone]    PRE-OP EVALUATION:  Today's date: 7/15/2020    Denise Taylor (: 1955) presents for pre-operative evaluation assessment as requested by Dr. Paul.  She requires evaluation and anesthesia risk assessment prior to undergoing surgery/procedure for treatment of reconstruction of both breast    Proposed Surgery/ Procedure:Reconstruction of both breast  Date of Surgery/ Procedure: 2020  Time of Surgery/ Procedure: TBD  Hospital/Surgical Facility: [facility]  Fax number for surgical facility:   Primary Physician: Mirna Roblero  Type of Anesthesia Anticipated: to be determined    Patient has a Health Care Directive or Living Will:  YES on file here    1. NO - Do you have a history of heart attack, stroke, stent, bypass or surgery on an artery in the head, neck, heart or legs?  2. NO - Do you ever have any pain or discomfort in your chest?  3. NO - Do you have a history of  Heart Failure?  4. NO - Are you troubled by shortness of breath when: walking on the level, up a slight hill or at night?  5. NO - Do you currently have a cold, bronchitis or other respiratory infection?  6. NO - Do you have a cough, shortness of breath or wheezing?  7. NO - Do you sometimes get pains in the calves of your legs when you walk?  8. NO - Do you or anyone in your family have previous history of blood clots?  9. NO - Do you or does anyone in your family have a serious bleeding problem such as prolonged bleeding following surgeries or cuts?  10. NO - Have you ever had problems with anemia or been told to take iron pills?  11. NO - Have you had any abnormal blood loss such as black, tarry or bloody stools, or abnormal vaginal bleeding?  12. NO - Have you ever had a blood transfusion?  13. NO - Have you or any of your relatives ever had problems with anesthesia?  14. NO - Do you have  sleep apnea, excessive snoring or daytime drowsiness?  15. NO - Do you have any prosthetic heart valves?  16. NO - Do you have prosthetic joints?  17. NO - Is there any chance that you may be pregnant?      HPI:     HPI related to upcoming procedure: breast reconstruction after breast cancer      See problem list for active medical problems.  Problems all longstanding and stable, except as noted/documented.  See ROS for pertinent symptoms related to these conditions.    HYPOTHYROIDISM - Patient has a longstanding history of chronic Hypothyroidism. Patient has been doing well, noting no tremor, insomnia, hair loss or changes in skin texture. Continues to take medications as directed, without adverse reactions or side effects. Last TSH   Lab Results   Component Value Date    TSH 3.24 06/01/2020   .        MEDICAL HISTORY:     Patient Active Problem List    Diagnosis Date Noted     History of breast cancer 10/09/2019     Priority: Medium     Invasive lobular carcinoma of right breast in female (H) 08/28/2019     Priority: Medium     5.12.2020- Review and distribute treatment summary- [facility]       Malignant neoplasm of right female breast, unspecified estrogen receptor status, unspecified site of breast (H) 08/22/2019     Priority: Medium     Cigarette nicotine dependence in remission 08/22/2019     Priority: Medium     Recurrent pneumonia 08/22/2019     Priority: Medium     ACP (advance care planning) 07/27/2017     Priority: Medium     Advance Care Planning 7/27/2017: ACP Review of Chart / Resources Provided:  Reviewed chart for advance care plan.  Denise Taylor has no plan or code status on file. Discussed available resources and provided with information.   Added by Mraah Henry             Hypothyroidism, unspecified type 07/27/2017     Priority: Medium     Pneumonia 07/20/2015     Priority: Medium     History of basal cell carcinoma 05/18/2015     Priority: Medium     Hyperlipidemia with target  LDL less than 130 05/18/2015     Priority: Medium     Diagnosis updated by automated process. Provider to review and confirm.       Actinic keratosis 12/01/2011     Priority: Medium     AC separation, type 5 06/27/2011     Priority: Medium     Overview:   IMO Update 10/11       Capillary disease 08/06/2007     Priority: Medium     Overview:   IMO Update 10/11       Other dyschromia 08/06/2007     Priority: Medium     Scar condition and fibrosis of skin 08/06/2007     Priority: Medium      Past Medical History:   Diagnosis Date     Basal cell carcinoma      Breast cancer (H) 07/2019    right breast     Bronchiectasis (H) 2019     Dyslipidemia      BERTA (generalized anxiety disorder)      Squamous cell carcinoma of skin 05/2020    right lower leg     Thyroid disease      Past Surgical History:   Procedure Laterality Date     ARTHROSCOPY SHOULDER ROTATOR CUFF REPAIR Left 3/7/2018    Procedure: ARTHROSCOPY SHOULDER ROTATOR CUFF REPAIR;  LEFT SHOULDER ARTHROSCOPY, REPAIR ROTATOR CUFF: subacromial Decompression and AC Joint Resection;  Surgeon: Baldev Lou DO;  Location: [facility] OR     ARTHROTOMY SHOULDER, ROTATOR CUFF REPAIR, COMBINED Right 11/14/2018    Procedure: RIGHT SHOULDER DEBRIDEMENT, EXCISION OF LIPOMA  RIGHT UPPER ARM, EXCISION SCAR TISSUE AC JOINT;  Surgeon: Baldev Lou DO;  Location: [facility] OR     BIOPSY BREAST NEEDLE LOCALIZATION, BIOPSY NODE SENTINEL, COMBINED Right 8/23/2019    Procedure: RIGHT WIRE LOCALIZED LUMPECTOMY WITH SENTINEL  LYMP NODE;  Surgeon: Michael Dupree MD;  Location: [facility] OR     COLONOSCOPY  2006    Dr Lara     COLONOSCOPY N/A 10/10/2016    Procedure: COLONOSCOPY;  Surgeon: Christine Cintron MD;  Location: [facility] OR     INSERT TISSUE EXPANDER BREAST BILATERAL Bilateral 10/9/2019    Procedure: BILATERAL TISSUE EXPANDERS (KOBIENIA);  Surgeon: Dale Paul MD;  Location:  OR     MASTECTOMY, NIPPLE SPARING Bilateral 10/9/2019    Procedure: BILATERAL SKIN SPARRING MASTECTOMY  (CASS);  Surgeon: Opal Rubin MD;  Location: [facility] OR     ORTHOPEDIC SURGERY Right     feet neuromas removed     SHOULDER SURGERY Right 2011/10/2012     Current Outpatient Medications   Medication Sig Dispense Refill     anastrozole (ARIMIDEX) 1 MG tablet Take 1 tablet (1 mg) by mouth daily 90 tablet 3     calcium carbonate-vitamin D (OS-STEVE) 600-400 MG-UNIT chewable tablet Take 1 chew tab by mouth 2 times daily (with meals) 180 tablet 3     eletriptan (RELPAX) 40 MG tablet Take 1 tablet (40 mg) by mouth once as needed for migraine 12 tablet 3     FLUoxetine HCl, PMDD, (SARAFEM) 10 MG TABS Take 10 mg by mouth daily 90 tablet 3     fluticasone (ARNUITY ELLIPTA) 200 MCG/ACT inhaler Inhale 1 puff into the lungs daily       levocetirizine (XYZAL) 5 MG tablet   11     levothyroxine (SYNTHROID/LEVOTHROID) 88 MCG tablet Take 1 tablet (88 mcg) by mouth daily 60 tablet 3     rosuvastatin (CRESTOR) 10 MG tablet Take 1 tablet (10 mg) by mouth daily 90 tablet 2     OTC products: no recent use of OTC ASA, NSAIDS or Steroids    Allergies   Allergen Reactions     Penicillins Rash     Zithromax [Azithromycin] Rash      Latex Allergy: NO    Social History     Tobacco Use     Smoking status: Never Smoker     Smokeless tobacco: Never Used   Substance Use Topics     Alcohol use: Yes     Alcohol/week: 1.7 standard drinks     Types: 2 Glasses of wine per week     Frequency: 2-4 times a month     Drinks per session: 1 or 2     Comment: occasionally     History   Drug Use No       REVIEW OF SYSTEMS:   CONSTITUTIONAL: NEGATIVE for fever, chills, change in weight  INTEGUMENTARY/SKIN: NEGATIVE for worrisome rashes, moles or lesions  EYES: NEGATIVE for vision changes or irritation  ENT/MOUTH: NEGATIVE for ear, mouth and throat problems  RESP: NEGATIVE for significant cough or SOB  BREAST: NEGATIVE for masses, tenderness or discharge  CV: NEGATIVE for chest pain, palpitations or peripheral edema  GI: NEGATIVE for nausea, abdominal  "pain, heartburn, or change in bowel habits  : NEGATIVE for frequency, dysuria, or hematuria  MUSCULOSKELETAL: NEGATIVE for significant arthralgias or myalgia  NEURO: NEGATIVE for weakness, dizziness or paresthesias  ENDOCRINE: NEGATIVE for temperature intolerance, skin/hair changes  HEME: NEGATIVE for bleeding problems  PSYCHIATRIC: NEGATIVE for changes in mood or affect    EXAM:   /72 (BP Location: Left arm, Patient Position: Sitting, Cuff Size: Adult Regular)   Pulse 52   Temp 98.2  F (36.8  C) (Tympanic)   Ht 1.651 m (5' 5\")   Wt 62.1 kg (137 lb)   SpO2 98%   BMI 22.80 kg/m      GENERAL APPEARANCE: healthy, alert and no distress     EYES: EOMI, PERRL     HENT: ear canals and TM's normal and nose and mouth without ulcers or lesions     NECK: no adenopathy, no asymmetry, masses, or scars and thyroid normal to palpation     RESP: lungs clear to auscultation - no rales, rhonchi or wheezes     CV: regular rates and rhythm, normal S1 S2, no S3 or S4 and no murmur, click or rub     ABDOMEN:  soft, nontender, no HSM or masses and bowel sounds normal     MS: extremities normal- no gross deformities noted, no evidence of inflammation in joints, FROM in all extremities.     SKIN: no suspicious lesions or rashes     NEURO: Normal strength and tone, sensory exam grossly normal, mentation intact and speech normal     PSYCH: mentation appears normal. and affect normal/bright     LYMPHATICS: No cervical adenopathy    DIAGNOSTICS:   EKG: sinus bradycardia, normal axis, normal intervals, no acute ST/T changes c/w ischemia, no LVH by voltage criteria, unchanged from previous tracings from 6/1/2020  Labs Resulted Today:   Results for orders placed or performed in visit on 07/15/20   Basic metabolic panel     Status: Abnormal   Result Value Ref Range    Sodium 139 133 - 144 mmol/L    Potassium 4.0 3.4 - 5.3 mmol/L    Chloride 106 94 - 109 mmol/L    Carbon Dioxide 29 20 - 32 mmol/L    Anion Gap 4 3 - 14 mmol/L    Glucose " 109 (H) 70 - 99 mg/dL    Urea Nitrogen 15 7 - 30 mg/dL    Creatinine 0.79 0.52 - 1.04 mg/dL    GFR Estimate 79 >60 mL/min/[1.73_m2]    GFR Estimate If Black >90 >60 mL/min/[1.73_m2]    Calcium 9.6 8.5 - 10.1 mg/dL   CBC with platelets and differential     Status: None   Result Value Ref Range    WBC 6.2 4.0 - 11.0 10e9/L    RBC Count 4.54 3.8 - 5.2 10e12/L    Hemoglobin 13.8 11.7 - 15.7 g/dL    Hematocrit 40.9 35.0 - 47.0 %    MCV 90 78 - 100 fl    MCH 30.4 26.5 - 33.0 pg    MCHC 33.7 31.5 - 36.5 g/dL    RDW 13.1 10.0 - 15.0 %    Platelet Count 176 150 - 450 10e9/L    Diff Method Automated Method     % Neutrophils 62.0 %    % Lymphocytes 27.2 %    % Monocytes 7.4 %    % Eosinophils 2.1 %    % Basophils 1.0 %    % Immature Granulocytes 0.3 %    Nucleated RBCs 0 0 /100    Absolute Neutrophil 3.9 1.6 - 8.3 10e9/L    Absolute Lymphocytes 1.7 0.8 - 5.3 10e9/L    Absolute Monocytes 0.5 0.0 - 1.3 10e9/L    Absolute Eosinophils 0.1 0.0 - 0.7 10e9/L    Absolute Basophils 0.1 0.0 - 0.2 10e9/L    Abs Immature Granulocytes 0.0 0 - 0.4 10e9/L    Absolute Nucleated RBC 0.0        Recent Labs   Lab Test 06/05/20  1327 06/01/20  1110   HGB 13.4 14.2    170    138   POTASSIUM 4.0 4.1   CR 0.81 0.77        IMPRESSION:   Reason for surgery/procedure: breast reconstruction   Diagnosis/reason for consult: pre-op    The proposed surgical procedure is considered INTERMEDIATE risk.    REVISED CARDIAC RISK INDEX  The patient has the following serious cardiovascular risks for perioperative complications such as (MI, PE, VFib and 3  AV Block):  No serious cardiac risks  INTERPRETATION: 1 risks: Class II (low risk - 0.9% complication rate)    The patient has the following additional risks for perioperative complications:  No identified additional risks  The 10-year ASCVD risk score (Patricia MIRELES Jr., et al., 2013) is: 3.2%    Values used to calculate the score:      Age: 64 years      Sex: Female      Is Non- :  No      Diabetic: No      Tobacco smoker: No      Systolic Blood Pressure: 108 mmHg      Is BP treated: No      HDL Cholesterol: 56 mg/dL      Total Cholesterol: 161 mg/dL      ICD-10-CM    1. Preop general physical exam  Z01.818 Basic metabolic panel     CBC with platelets and differential   2. Acquired hypothyroidism  E03.9 levothyroxine (SYNTHROID/LEVOTHROID) 88 MCG tablet       RECOMMENDATIONS:         --Patient is to take all scheduled medications on the day of surgery    APPROVAL GIVEN to proceed with proposed procedure, without further diagnostic evaluation       Signed Electronically by: CESAR Michelle CNP    Copy of this evaluation report is provided to requesting physician.    [facility] Preop Guidelines    Revised Cardiac Risk Index

## 2020-07-15 ENCOUNTER — OFFICE VISIT (OUTPATIENT)
Dept: FAMILY MEDICINE | Facility: OTHER | Age: 65
End: 2020-07-15
Attending: NURSE PRACTITIONER
Payer: COMMERCIAL

## 2020-07-15 VITALS
TEMPERATURE: 98.2 F | SYSTOLIC BLOOD PRESSURE: 108 MMHG | OXYGEN SATURATION: 98 % | DIASTOLIC BLOOD PRESSURE: 72 MMHG | BODY MASS INDEX: 22.82 KG/M2 | HEART RATE: 52 BPM | WEIGHT: 137 LBS | HEIGHT: 65 IN

## 2020-07-15 DIAGNOSIS — E03.9 ACQUIRED HYPOTHYROIDISM: ICD-10-CM

## 2020-07-15 DIAGNOSIS — C50.411 MALIGNANT NEOPLASM OF UPPER-OUTER QUADRANT OF RIGHT BREAST IN FEMALE, ESTROGEN RECEPTOR POSITIVE (H): ICD-10-CM

## 2020-07-15 DIAGNOSIS — Z01.818 PREOP GENERAL PHYSICAL EXAM: Primary | ICD-10-CM

## 2020-07-15 DIAGNOSIS — Z79.811 USE OF AROMATASE INHIBITORS: ICD-10-CM

## 2020-07-15 DIAGNOSIS — Z17.0 MALIGNANT NEOPLASM OF UPPER-OUTER QUADRANT OF RIGHT BREAST IN FEMALE, ESTROGEN RECEPTOR POSITIVE (H): ICD-10-CM

## 2020-07-15 LAB
ANION GAP SERPL CALCULATED.3IONS-SCNC: 4 MMOL/L (ref 3–14)
BASOPHILS # BLD AUTO: 0.1 10E9/L (ref 0–0.2)
BASOPHILS NFR BLD AUTO: 1 %
BUN SERPL-MCNC: 15 MG/DL (ref 7–30)
CALCIUM SERPL-MCNC: 9.6 MG/DL (ref 8.5–10.1)
CHLORIDE SERPL-SCNC: 106 MMOL/L (ref 94–109)
CO2 SERPL-SCNC: 29 MMOL/L (ref 20–32)
CREAT SERPL-MCNC: 0.79 MG/DL (ref 0.52–1.04)
DIFFERENTIAL METHOD BLD: NORMAL
EOSINOPHIL # BLD AUTO: 0.1 10E9/L (ref 0–0.7)
EOSINOPHIL NFR BLD AUTO: 2.1 %
ERYTHROCYTE [DISTWIDTH] IN BLOOD BY AUTOMATED COUNT: 13.1 % (ref 10–15)
GFR SERPL CREATININE-BSD FRML MDRD: 79 ML/MIN/{1.73_M2}
GLUCOSE SERPL-MCNC: 109 MG/DL (ref 70–99)
HCT VFR BLD AUTO: 40.9 % (ref 35–47)
HGB BLD-MCNC: 13.8 G/DL (ref 11.7–15.7)
IMM GRANULOCYTES # BLD: 0 10E9/L (ref 0–0.4)
IMM GRANULOCYTES NFR BLD: 0.3 %
LYMPHOCYTES # BLD AUTO: 1.7 10E9/L (ref 0.8–5.3)
LYMPHOCYTES NFR BLD AUTO: 27.2 %
MCH RBC QN AUTO: 30.4 PG (ref 26.5–33)
MCHC RBC AUTO-ENTMCNC: 33.7 G/DL (ref 31.5–36.5)
MCV RBC AUTO: 90 FL (ref 78–100)
MONOCYTES # BLD AUTO: 0.5 10E9/L (ref 0–1.3)
MONOCYTES NFR BLD AUTO: 7.4 %
NEUTROPHILS # BLD AUTO: 3.9 10E9/L (ref 1.6–8.3)
NEUTROPHILS NFR BLD AUTO: 62 %
NRBC # BLD AUTO: 0 10*3/UL
NRBC BLD AUTO-RTO: 0 /100
PLATELET # BLD AUTO: 176 10E9/L (ref 150–450)
POTASSIUM SERPL-SCNC: 4 MMOL/L (ref 3.4–5.3)
RBC # BLD AUTO: 4.54 10E12/L (ref 3.8–5.2)
SODIUM SERPL-SCNC: 139 MMOL/L (ref 133–144)
WBC # BLD AUTO: 6.2 10E9/L (ref 4–11)

## 2020-07-15 PROCEDURE — 99213 OFFICE O/P EST LOW 20 MIN: CPT | Performed by: NURSE PRACTITIONER

## 2020-07-15 PROCEDURE — 80048 BASIC METABOLIC PNL TOTAL CA: CPT | Performed by: NURSE PRACTITIONER

## 2020-07-15 PROCEDURE — 36415 COLL VENOUS BLD VENIPUNCTURE: CPT | Performed by: NURSE PRACTITIONER

## 2020-07-15 PROCEDURE — 85025 COMPLETE CBC W/AUTO DIFF WBC: CPT | Performed by: NURSE PRACTITIONER

## 2020-07-15 RX ORDER — LEVOTHYROXINE SODIUM 88 UG/1
88 TABLET ORAL DAILY
Qty: 60 TABLET | Refills: 3 | Status: SHIPPED | OUTPATIENT
Start: 2020-07-15 | End: 2020-10-09

## 2020-07-15 ASSESSMENT — ANXIETY QUESTIONNAIRES
3. WORRYING TOO MUCH ABOUT DIFFERENT THINGS: NOT AT ALL
1. FEELING NERVOUS, ANXIOUS, OR ON EDGE: NOT AT ALL
6. BECOMING EASILY ANNOYED OR IRRITABLE: NOT AT ALL
GAD7 TOTAL SCORE: 0
4. TROUBLE RELAXING: NOT AT ALL
2. NOT BEING ABLE TO STOP OR CONTROL WORRYING: NOT AT ALL
5. BEING SO RESTLESS THAT IT IS HARD TO SIT STILL: NOT AT ALL
7. FEELING AFRAID AS IF SOMETHING AWFUL MIGHT HAPPEN: NOT AT ALL

## 2020-07-15 ASSESSMENT — MIFFLIN-ST. JEOR: SCORE: 1172.31

## 2020-07-15 ASSESSMENT — PATIENT HEALTH QUESTIONNAIRE - PHQ9: SUM OF ALL RESPONSES TO PHQ QUESTIONS 1-9: 0

## 2020-07-15 ASSESSMENT — PAIN SCALES - GENERAL: PAINLEVEL: NO PAIN (0)

## 2020-07-16 ASSESSMENT — ANXIETY QUESTIONNAIRES: GAD7 TOTAL SCORE: 0

## 2020-08-13 DIAGNOSIS — E78.5 HYPERLIPIDEMIA WITH TARGET LDL LESS THAN 130: ICD-10-CM

## 2020-08-13 RX ORDER — ROSUVASTATIN CALCIUM 10 MG/1
10 TABLET, COATED ORAL DAILY
Qty: 90 TABLET | Refills: 3 | Status: SHIPPED | OUTPATIENT
Start: 2020-08-13 | End: 2021-08-13

## 2020-08-13 NOTE — TELEPHONE ENCOUNTER
rosuvastatin      Last Written Prescription Date:  7-  Last Fill Quantity: 90,   # refills: 2  Last Office Visit: 12-  Future Office visit:    Next 5 appointments (look out 90 days)    Sep 11, 2020  2:15 PM CDT  (Arrive by 2:00 PM)  Return Visit with Che Sesay NP  [facility] ([facility] ) [address] [address]  [phone]   Sep 17, 2020 10:45 AM CDT  (Arrive by 10:30 AM)  PHYSICAL with DASH Angelo  [facility] ([facility] ) [address] [address]  [phone]   Oct 22, 2020  1:30 PM CDT  Return Visit with Opal Rubin MD  [facility] [address] [place] [id number]  [phone]           Routing refill request to provider for review/approval because:

## 2020-08-25 ENCOUNTER — MEDICAL CORRESPONDENCE (OUTPATIENT)
Dept: MRI IMAGING | Facility: HOSPITAL | Age: 65
End: 2020-08-25

## 2020-09-01 ENCOUNTER — HOSPITAL ENCOUNTER (OUTPATIENT)
Dept: MRI IMAGING | Facility: HOSPITAL | Age: 65
Discharge: HOME | End: 2020-09-01
Attending: ORTHOPAEDIC SURGERY | Admitting: ORTHOPAEDIC SURGERY
Payer: MEDICARE

## 2020-09-01 DIAGNOSIS — M25.511 RIGHT SHOULDER PAIN: ICD-10-CM

## 2020-09-01 DIAGNOSIS — M25.611 DECREASED ROM OF RIGHT SHOULDER: ICD-10-CM

## 2020-09-01 PROCEDURE — 73221 MRI JOINT UPR EXTREM W/O DYE: CPT | Mod: TC,RT

## 2020-09-08 ENCOUNTER — TRANSFERRED RECORDS (OUTPATIENT)
Dept: HEALTH INFORMATION MANAGEMENT | Facility: CLINIC | Age: 65
End: 2020-09-08

## 2020-09-11 ENCOUNTER — ONCOLOGY VISIT (OUTPATIENT)
Dept: ONCOLOGY | Facility: OTHER | Age: 65
End: 2020-09-11
Attending: NURSE PRACTITIONER
Payer: MEDICARE

## 2020-09-11 VITALS
RESPIRATION RATE: 18 BRPM | WEIGHT: 144.18 LBS | OXYGEN SATURATION: 99 % | HEIGHT: 65 IN | HEART RATE: 57 BPM | SYSTOLIC BLOOD PRESSURE: 110 MMHG | TEMPERATURE: 98.1 F | DIASTOLIC BLOOD PRESSURE: 78 MMHG | BODY MASS INDEX: 24.02 KG/M2

## 2020-09-11 DIAGNOSIS — C50.411 MALIGNANT NEOPLASM OF UPPER-OUTER QUADRANT OF RIGHT BREAST IN FEMALE, ESTROGEN RECEPTOR POSITIVE (H): ICD-10-CM

## 2020-09-11 DIAGNOSIS — C50.411 MALIGNANT NEOPLASM OF UPPER-OUTER QUADRANT OF RIGHT BREAST IN FEMALE, ESTROGEN RECEPTOR POSITIVE (H): Primary | ICD-10-CM

## 2020-09-11 DIAGNOSIS — Z79.811 USE OF AROMATASE INHIBITORS: ICD-10-CM

## 2020-09-11 DIAGNOSIS — R79.89 ELEVATED LFTS: ICD-10-CM

## 2020-09-11 DIAGNOSIS — Z17.0 MALIGNANT NEOPLASM OF UPPER-OUTER QUADRANT OF RIGHT BREAST IN FEMALE, ESTROGEN RECEPTOR POSITIVE (H): ICD-10-CM

## 2020-09-11 DIAGNOSIS — Z17.0 MALIGNANT NEOPLASM OF UPPER-OUTER QUADRANT OF RIGHT BREAST IN FEMALE, ESTROGEN RECEPTOR POSITIVE (H): Primary | ICD-10-CM

## 2020-09-11 LAB
ALBUMIN SERPL-MCNC: 4 G/DL (ref 3.4–5)
ALP SERPL-CCNC: 88 U/L (ref 40–150)
ALT SERPL W P-5'-P-CCNC: 51 U/L (ref 0–50)
ANION GAP SERPL CALCULATED.3IONS-SCNC: 2 MMOL/L (ref 3–14)
AST SERPL W P-5'-P-CCNC: 49 U/L (ref 0–45)
BASOPHILS # BLD AUTO: 0.1 10E9/L (ref 0–0.2)
BASOPHILS NFR BLD AUTO: 0.8 %
BILIRUB SERPL-MCNC: 0.4 MG/DL (ref 0.2–1.3)
BUN SERPL-MCNC: 13 MG/DL (ref 7–30)
CALCIUM SERPL-MCNC: 9 MG/DL (ref 8.5–10.1)
CHLORIDE SERPL-SCNC: 108 MMOL/L (ref 94–109)
CO2 SERPL-SCNC: 30 MMOL/L (ref 20–32)
CREAT SERPL-MCNC: 0.79 MG/DL (ref 0.52–1.04)
DIFFERENTIAL METHOD BLD: NORMAL
EOSINOPHIL # BLD AUTO: 0.2 10E9/L (ref 0–0.7)
EOSINOPHIL NFR BLD AUTO: 3.3 %
ERYTHROCYTE [DISTWIDTH] IN BLOOD BY AUTOMATED COUNT: 12.5 % (ref 10–15)
GFR SERPL CREATININE-BSD FRML MDRD: 79 ML/MIN/{1.73_M2}
GLUCOSE SERPL-MCNC: 77 MG/DL (ref 70–99)
HCT VFR BLD AUTO: 40.2 % (ref 35–47)
HGB BLD-MCNC: 13.4 G/DL (ref 11.7–15.7)
IMM GRANULOCYTES # BLD: 0 10E9/L (ref 0–0.4)
IMM GRANULOCYTES NFR BLD: 0.3 %
LYMPHOCYTES # BLD AUTO: 1.6 10E9/L (ref 0.8–5.3)
LYMPHOCYTES NFR BLD AUTO: 26.9 %
MCH RBC QN AUTO: 30.2 PG (ref 26.5–33)
MCHC RBC AUTO-ENTMCNC: 33.3 G/DL (ref 31.5–36.5)
MCV RBC AUTO: 91 FL (ref 78–100)
MONOCYTES # BLD AUTO: 0.4 10E9/L (ref 0–1.3)
MONOCYTES NFR BLD AUTO: 7.1 %
NEUTROPHILS # BLD AUTO: 3.8 10E9/L (ref 1.6–8.3)
NEUTROPHILS NFR BLD AUTO: 61.6 %
NRBC # BLD AUTO: 0 10*3/UL
NRBC BLD AUTO-RTO: 0 /100
PLATELET # BLD AUTO: 158 10E9/L (ref 150–450)
POTASSIUM SERPL-SCNC: 4.2 MMOL/L (ref 3.4–5.3)
PROT SERPL-MCNC: 7.6 G/DL (ref 6.8–8.8)
RBC # BLD AUTO: 4.44 10E12/L (ref 3.8–5.2)
SODIUM SERPL-SCNC: 140 MMOL/L (ref 133–144)
WBC # BLD AUTO: 6.1 10E9/L (ref 4–11)

## 2020-09-11 PROCEDURE — G0463 HOSPITAL OUTPT CLINIC VISIT: HCPCS

## 2020-09-11 PROCEDURE — 80053 COMPREHEN METABOLIC PANEL: CPT | Mod: ZL | Performed by: NURSE PRACTITIONER

## 2020-09-11 PROCEDURE — 99213 OFFICE O/P EST LOW 20 MIN: CPT | Performed by: NURSE PRACTITIONER

## 2020-09-11 PROCEDURE — 85025 COMPLETE CBC W/AUTO DIFF WBC: CPT | Mod: ZL | Performed by: NURSE PRACTITIONER

## 2020-09-11 PROCEDURE — 36415 COLL VENOUS BLD VENIPUNCTURE: CPT | Mod: ZL | Performed by: NURSE PRACTITIONER

## 2020-09-11 ASSESSMENT — PAIN SCALES - GENERAL: PAINLEVEL: NO PAIN (0)

## 2020-09-11 ASSESSMENT — MIFFLIN-ST. JEOR: SCORE: 1204.88

## 2020-09-11 NOTE — NURSING NOTE
"Chief Complaint   Patient presents with     RECHECK     Follow up Invasive lobular carcinoma of right breast in female        Initial /78   Pulse 57   Temp 98.1  F (36.7  C) (Tympanic)   Resp 18   Ht 1.651 m (5' 5\")   Wt 65.4 kg (144 lb 2.9 oz)   SpO2 99%   BMI 23.99 kg/m   Estimated body mass index is 23.99 kg/m  as calculated from the following:    Height as of this encounter: 1.651 m (5' 5\").    Weight as of this encounter: 65.4 kg (144 lb 2.9 oz).  Medication Reconciliation: complete.  Immunizations and advance directives status reviewed. Pain scale =0 , PHQ-2=0.            Leah Gómez LPN    "

## 2020-09-11 NOTE — PATIENT INSTRUCTIONS
We would like to see you back in 3 months.  Please come 30 minutes prior for lab work.     When you are in need of a refill of your anastrozole, please call your pharmacy and they will send us a request.     If you have any questions please call [phone]    Other instructions:  none

## 2020-09-11 NOTE — PROGRESS NOTES
Oncology Follow-up Visit:  September 11, 2020    Reason for Visit:  Patient presents with:  RECHECK: Follow up Invasive lobular carcinoma of right breast in female      Nursing Note and documentation reviewed: yes    HPI:  This is a 64-year-old female patient who presents to the oncology clinic today in follow-up of Stage Ia, invasive lobular carcinoma of the right breast diagnosed July 2019.  She has undergone bilateral mastectomies and she was placed on Arimidex in December 2019.     She presents to the clinic today stating she is feeling really good.  She offers no new complaints.  She states the ankle that she broke is healing very well.  She continues on calcium with vitamin D twice a day and remains very active.  She will be following up with her PCP next week for annual physical exam.  She will be following up with her surgeon in October.    Oncologic History:     7/24/2019 patient was seen by her PCP with concerns regarding a right breast mass  7/25/2019 patient underwent right diagnostic mammogram showing no abnormality      **She underwent ultrasound of the right breast with no discrete masses noted and recommendation for follow-up in 6 months  7/26/2019 patient was evaluated by Dr. Dupree with General Surgery with recommendation for biopsy   7/29/2019 Ultrasound guided biopsy showed invasive lobular carcinoma, grade 2, ER/SC positive and HER-2/jenn negative  8/12/2019  She underwent MRI of the both breasts with findings showing a small area of enhancement in the upper outer quadrant of the right breast and the left breast was unremarkable     **Ultrasound of the right axilla was negative  8/23/2020  She went underwent lumpectomy with sentinel node procedure with pathology showing a 4.5 cm tumor that was consistent with invasive lobular carcinoma of the right breast, grade 2, ER/SC positive and HER-2/jenn negative; sentinel node was negative; she was staged pathologic T2N0.  Anterior and medial margin was  positive on pathology.  9/19/2019  Patient was seen by Dr. Lulú Franz as well is Dr. Dale Paul with Plastic surgery to discuss reconstruction   10/9/2019 she underwent bilateral mastectomy with immediate construction and pathology showed no evidence of malignancy  11/5/2019  Patient was evaluated by Dr. Noyola  with Medical Oncology with Oncotype DX recurrence score of 12 consistent with low risk of breast cancer and recommendation for aromatase inhibitor therapy; PET and MRI brain were recommended  Patient was seen again by Dr. hair aviles with medical oncology with Oncotype DX 11/20/2019  She underwent PET scan:  IMPRESSION:   1.  Numerous new FDG avid groundglass and solid nodules throughout  both lungs concerning for recurrent disease. Additionally, there is  worsening atelectasis seen in both lungs. Infectious and noninfectious  granulomatous disease may have a similar appearance.  12/3/2019 she was seen again by Dr. Noyola with Medical Oncology to review PET scan results.  MRI was not obtained related to tissue expanders in place.  He recommended initiation of aromatase inhibitor with Arimidex 1 mg p.o. daily.  2/5/2020  Repeat PET scan:  IMPRESSION:   1.  No evidence of active disease. Nodules and areas of groundglass  opacification seen previously within the lungs have resolved.   2.  Bilateral breast implant revision. Leaking left breast implant is  first seen on the CT scan of the chest from 11/14/2019.   2/6/2020  She underwent MRI of the brain evidence of metastatic disease     Current Chemo Regime/TX: Arimidex 1 mg daily initiated 12/2019  Current Cycle:  n/a  # of completed cycles:  n/a     Previous treatment:  n/a    Past Medical History:   Diagnosis Date     Basal cell carcinoma      Breast cancer (H) 07/2019    right breast     Bronchiectasis (H) 2019     Dyslipidemia      BERTA (generalized anxiety disorder)      Squamous cell carcinoma of skin 05/2020    right lower leg     Thyroid  disease        Past Surgical History:   Procedure Laterality Date     ARTHROSCOPY SHOULDER ROTATOR CUFF REPAIR Left 3/7/2018    Procedure: ARTHROSCOPY SHOULDER ROTATOR CUFF REPAIR;  LEFT SHOULDER ARTHROSCOPY, REPAIR ROTATOR CUFF: subacromial Decompression and AC Joint Resection;  Surgeon: Baldev Lou DO;  Location: [facility]     ARTHROTOMY SHOULDER, ROTATOR CUFF REPAIR, COMBINED Right 11/14/2018    Procedure: RIGHT SHOULDER DEBRIDEMENT, EXCISION OF LIPOMA  RIGHT UPPER ARM, EXCISION SCAR TISSUE AC JOINT;  Surgeon: Baldev Lou DO;  Location: [facility]     BIOPSY BREAST NEEDLE LOCALIZATION, BIOPSY NODE SENTINEL, COMBINED Right 8/23/2019    Procedure: RIGHT WIRE LOCALIZED LUMPECTOMY WITH SENTINEL  LYMP NODE;  Surgeon: Michael Dupree MD;  Location: [facility]     COLONOSCOPY  2006    Dr Lara     COLONOSCOPY N/A 10/10/2016    Procedure: COLONOSCOPY;  Surgeon: Christine Cintron MD;  Location: [facility]     INSERT TISSUE EXPANDER BREAST BILATERAL Bilateral 10/9/2019    Procedure: BILATERAL TISSUE EXPANDERS (RAMIREZ);  Surgeon: Dale Paul MD;  Location:  OR     MASTECTOMY, NIPPLE SPARING Bilateral 10/9/2019    Procedure: BILATERAL SKIN SPARRING MASTECTOMY (CASS);  Surgeon: Opal Rubin MD;  Location:  OR     ORTHOPEDIC SURGERY Right     feet neuromas removed     SHOULDER SURGERY Right 2011/10/2012       Family History   Problem Relation Age of Onset     Myocardial Infarction Mother      Hypertension Mother      Coronary Artery Disease Mother      Hyperlipidemia Mother      Thyroid Disease Mother      Stomach Cancer Father      Coronary Artery Disease Father      Hypertension Father      Colon Cancer Father      Diabetes Paternal Grandmother      Heart Disease Maternal Grandmother      Heart Disease Maternal Grandfather      Diabetes Paternal Grandfather      Cerebrovascular Disease No family hx of      Breast Cancer No family hx of      Prostate Cancer No family hx of      Anesthesia Reaction No  family hx of      Asthma No family hx of      Genetic Disorder No family hx of        Social History     Socioeconomic History     Marital status:      Spouse name: Not on file     Number of children: 2     Years of education: 16     Highest education level: Not on file   Occupational History     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   Social Needs     Financial resource strain: Not on file     Food insecurity     Worry: Not on file     Inability: Not on file     Transportation needs     Medical: Not on file     Non-medical: Not on file   Tobacco Use     Smoking status: Never Smoker     Smokeless tobacco: Never Used   Substance and Sexual Activity     Alcohol use: Yes     Alcohol/week: 1.7 standard drinks     Types: 2 Glasses of wine per week     Frequency: 2-4 times a month     Drinks per session: 1 or 2     Comment: occasionally     Drug use: No     Sexual activity: Not Currently   Lifestyle     Physical activity     Days per week: 5 days     Minutes per session: 20 min     Stress: Not on file   Relationships     Social connections     Talks on phone: Not on file     Gets together: Not on file     Attends Anabaptist service: Not on file     Active member of club or organization: Not on file     Attends meetings of clubs or organizations: Not on file     Relationship status: Not on file     Intimate partner violence     Fear of current or ex partner: No     Emotionally abused: No     Physically abused: No     Forced sexual activity: No   Other Topics Concern     Parent/sibling w/ CABG, MI or angioplasty before 65F 55M? Not Asked   Social History Narrative     -- n/a    Caffeine -- 2c/day    Concussion -- n/a       Current Outpatient Medications   Medication     anastrozole (ARIMIDEX) 1 MG tablet     calcium carbonate-vitamin D (OS-STEVE) 600-400 MG-UNIT chewable tablet     FLUoxetine HCl, PMDD, (SARAFEM) 10 MG TABS     fluticasone (ARNUITY ELLIPTA) 200 MCG/ACT inhaler     levocetirizine (XYZAL) 5 MG tablet  "    levothyroxine (SYNTHROID/LEVOTHROID) 88 MCG tablet     rosuvastatin (CRESTOR) 10 MG tablet     eletriptan (RELPAX) 40 MG tablet     No current facility-administered medications for this visit.         Allergies   Allergen Reactions     Penicillins Rash     Zithromax [Azithromycin] Rash       Review Of Systems:  Constitutional:    denies fever, weight changes, chills, and night sweats.  Eyes:    denies blurred or double vision  Ears/Nose/Throat:   denies ear pain, nose problems, difficulty swallowing  Respiratory:   denies shortness of breath, cough   Skin:   denies rash, lesions  Breast/Chest wall:   denies pain, lumps   Cardiovascular:   denies chest pain, palpitations, edema  Gastrointestinal:   denies abdominal pain, bloating, nausea, vomiting; no change in bowel habits or blood in stool  Genitourinary:   denies difficulty with urination, blood in urine  Musculoskeletal:    denies new muscle pain, bone pain-maybe a little increase in joint pains  Neurologic:   denies lightheadedness, headaches, numbness or tingling  Psychiatric:   denies anxiety, depression  Hematologic/Lymphatic/Immunologic:   denies easy bruising, easy bleeding, lumps or bumps noted  Endocrine:   Denies increased thirst      Physical Exam:  /78   Pulse 57   Temp 98.1  F (36.7  C) (Tympanic)   Resp 18   Ht 1.651 m (5' 5\")   Wt 65.4 kg (144 lb 2.9 oz)   SpO2 99%   BMI 23.99 kg/m      GENERAL APPEARANCE: Healthy, alert and in no acute distress.  HEENT: Normocephalic, Sclerae anicteric. Oropharynx without ulcers, lesions, or thrush.  NECK:   No asymmetry or masses, no thyromegaly.  LYMPHATICS: No palpable cervical, supraclavicular, axillary, or inguinal nodes   RESP: Lungs clear to auscultation bilaterally, respirations regular and easy  CARDIOVASCULAR: Regular rate and rhythm. Normal S1, S2; faint grade 1/6 systolic murmur  ABDOMEN: Soft, nontender. Bowel sounds auscultated all 4 quadrants. No palpable organomegaly or " masses.  MUSCULOSKELETAL: Extremities without gross deformities noted. No edema of bilateral lower extremities.  NEURO: Alert and oriented x 3.  Gait steady.  PSYCHIATRIC: Mentation and affect appear normal.  Mood appropriate.    Laboratory:  Results for orders placed or performed in visit on 09/11/20   Comprehensive metabolic panel     Status: Abnormal   Result Value Ref Range    Sodium 140 133 - 144 mmol/L    Potassium 4.2 3.4 - 5.3 mmol/L    Chloride 108 94 - 109 mmol/L    Carbon Dioxide 30 20 - 32 mmol/L    Anion Gap 2 (L) 3 - 14 mmol/L    Glucose 77 70 - 99 mg/dL    Urea Nitrogen 13 7 - 30 mg/dL    Creatinine 0.79 0.52 - 1.04 mg/dL    GFR Estimate 79 >60 mL/min/[1.73_m2]    GFR Estimate If Black >90 >60 mL/min/[1.73_m2]    Calcium 9.0 8.5 - 10.1 mg/dL    Bilirubin Total 0.4 0.2 - 1.3 mg/dL    Albumin 4.0 3.4 - 5.0 g/dL    Protein Total 7.6 6.8 - 8.8 g/dL    Alkaline Phosphatase 88 40 - 150 U/L    ALT 51 (H) 0 - 50 U/L    AST 49 (H) 0 - 45 U/L   CBC with platelets and differential     Status: None   Result Value Ref Range    WBC 6.1 4.0 - 11.0 10e9/L    RBC Count 4.44 3.8 - 5.2 10e12/L    Hemoglobin 13.4 11.7 - 15.7 g/dL    Hematocrit 40.2 35.0 - 47.0 %    MCV 91 78 - 100 fl    MCH 30.2 26.5 - 33.0 pg    MCHC 33.3 31.5 - 36.5 g/dL    RDW 12.5 10.0 - 15.0 %    Platelet Count 158 150 - 450 10e9/L    Diff Method Automated Method     % Neutrophils 61.6 %    % Lymphocytes 26.9 %    % Monocytes 7.1 %    % Eosinophils 3.3 %    % Basophils 0.8 %    % Immature Granulocytes 0.3 %    Nucleated RBCs 0 0 /100    Absolute Neutrophil 3.8 1.6 - 8.3 10e9/L    Absolute Lymphocytes 1.6 0.8 - 5.3 10e9/L    Absolute Monocytes 0.4 0.0 - 1.3 10e9/L    Absolute Eosinophils 0.2 0.0 - 0.7 10e9/L    Absolute Basophils 0.1 0.0 - 0.2 10e9/L    Abs Immature Granulocytes 0.0 0 - 0.4 10e9/L    Absolute Nucleated RBC 0.0        Imaging Studies: None completed for today's visit      ASSESSMENT/PLAN:    #1 Breast cancer:   Stage Ia, invasive  lobular carcinoma of the right breast diagnosed August 2019.  She is currently on Arimidex 1 mg daily and will continue.  We will plan to follow-up again in 3 months with a CBC and CMP.     #2  On aromatase inhibitor therapy: DEXA scan completed in February 2020 was normal.  She will continue on the calcium with vitamin D twice a day and will repeat her DEXA scan in February 2022.    #3 elevated liver functions: Admits to some alcohol use 2 nights ago.  We will repeat these in 3 months.    I encouraged patient to call with any questions or concerns.     Che Sesay NP  APRN, FNP-BC, AOCNP

## 2020-09-16 ASSESSMENT — ACTIVITIES OF DAILY LIVING (ADL): CURRENT_FUNCTION: NO ASSISTANCE NEEDED

## 2020-10-06 NOTE — PROGRESS NOTES
[facility] [place]  [address]  [place] [address]  [phone]  Dept: [phone]    PRE-OP EVALUATION:  Today's date: 10/9/2020    Denise Taylor (: 1955) presents for pre-operative evaluation assessment as requested by Dr. Vaca.  She requires evaluation and anesthesia risk assessment prior to undergoing surgery/procedure for treatment of eyeld .    Proposed Surgery/ Procedure: Blepharoplasty  Date of Surgery/ Procedure: 10/15/20  Time of Surgery/ Procedure: TBD  Hospital/Surgical Facility: [facility]  Surgery Fax Number: [phone]  Primary Physician: Mirna Roblero  Type of Anesthesia Anticipated: to be determined    Preoperative Questionnaire:   No - Have you ever had a heart attack or stroke?  No - Have you ever had surgery on your heart or blood vessels, such as a stent, coronary (heart) bypass, or surgery on an artery in the head, neck, heart, or legs?  No - Do you have chest pain when you are physically active?  No - Do you have a history of heart failure?  No - Do you currently have a cold, bronchitis, or symptoms of other respiratory (head and chest) infections?  No - Do you have a cough, shortness of breath, or wheezing?  No - Do you or anyone in your family have a history of blood clots?  No - Do you or anyone in your family have a serious bleeding problem, such as long-lasting bleeding after surgeries or cuts?  No - Have you ever had anemia or been told to take iron pills?  No - Have you had any abnormal blood loss such as black, tarry or bloody stools, or abnormal vaginal bleeding?  No - Have you ever had a blood transfusion?  Yes - Are you willing to have a blood transfusion if it is medically needed before, during, or after your surgery?  No - Have you or anyone in your family ever had problems with anesthesia (sedation for surgery)?  No - Do you have sleep apnea, excessive snoring, or daytime drowsiness?   No - Do you have any artifical heart  valves or other implanted medical devices, such as a pacemaker, defibrillator, or continuous glucose monitor?  No - Do you have any artifical joints?  No - Are you allergic to latex?  No - Is there any chance that you may be pregnant?    Patient has a Health Care Directive or Living Will:  YES On file    HPI:     HPI related to upcoming procedure: trouble with eyelids drooping      See problem list for active medical problems.  Problems all longstanding and stable, except as noted/documented.  See ROS for pertinent symptoms related to these conditions.    HYPERLIPIDEMIA - Patient has a long history of significant Hyperlipidemia requiring medication for treatment with recent good control. Patient reports no problems or side effects with the medication.     HYPOTHYROIDISM - Patient has a longstanding history of chronic Hypothyroidism. Patient has been doing well, noting no tremor, insomnia, hair loss or changes in skin texture. Continues to take medications as directed, without adverse reactions or side effects. Last TSH   Lab Results   Component Value Date    TSH 3.24 06/01/2020   .        MEDICAL HISTORY:     Patient Active Problem List    Diagnosis Date Noted     History of breast cancer 10/09/2019     Priority: Medium     Invasive lobular carcinoma of right breast in female (H) 08/28/2019     Priority: Medium     5.12.2020- Review and distribute treatment summary- [facility]       Malignant neoplasm of right female breast, unspecified estrogen receptor status, unspecified site of breast (H) 08/22/2019     Priority: Medium     Cigarette nicotine dependence in remission 08/22/2019     Priority: Medium     Recurrent pneumonia 08/22/2019     Priority: Medium     ACP (advance care planning) 07/27/2017     Priority: Medium     Advance Care Planning 7/27/2017: ACP Review of Chart / Resources Provided:  Reviewed chart for advance care plan.  Denise Taylor has no plan or code status on file. Discussed available  resources and provided with information.   Added by Marah Henry             Hypothyroidism, unspecified type 07/27/2017     Priority: Medium     Pneumonia 07/20/2015     Priority: Medium     History of basal cell carcinoma 05/18/2015     Priority: Medium     Hyperlipidemia with target LDL less than 130 05/18/2015     Priority: Medium     Diagnosis updated by automated process. Provider to review and confirm.       Actinic keratosis 12/01/2011     Priority: Medium     AC separation, type 5 06/27/2011     Priority: Medium     Overview:   IMO Update 10/11       Capillary disease 08/06/2007     Priority: Medium     Overview:   IMO Update 10/11       Other dyschromia 08/06/2007     Priority: Medium     Scar condition and fibrosis of skin 08/06/2007     Priority: Medium      Past Medical History:   Diagnosis Date     Basal cell carcinoma      Breast cancer (H) 07/2019    right breast     Bronchiectasis (H) 2019     Dyslipidemia      BERTA (generalized anxiety disorder)      Squamous cell carcinoma of skin 05/2020    right lower leg     Thyroid disease      Past Surgical History:   Procedure Laterality Date     ARTHROSCOPY SHOULDER ROTATOR CUFF REPAIR Left 3/7/2018    Procedure: ARTHROSCOPY SHOULDER ROTATOR CUFF REPAIR;  LEFT SHOULDER ARTHROSCOPY, REPAIR ROTATOR CUFF: subacromial Decompression and AC Joint Resection;  Surgeon: Baldev Lou DO;  Location: [facility] OR     ARTHROTOMY SHOULDER, ROTATOR CUFF REPAIR, COMBINED Right 11/14/2018    Procedure: RIGHT SHOULDER DEBRIDEMENT, EXCISION OF LIPOMA  RIGHT UPPER ARM, EXCISION SCAR TISSUE AC JOINT;  Surgeon: Baldev Lou DO;  Location: [facility] OR     BIOPSY BREAST NEEDLE LOCALIZATION, BIOPSY NODE SENTINEL, COMBINED Right 8/23/2019    Procedure: RIGHT WIRE LOCALIZED LUMPECTOMY WITH SENTINEL  LYMP NODE;  Surgeon: Michael Dupree MD;  Location: [facility] OR     COLONOSCOPY  2006    Dr Lara     COLONOSCOPY N/A 10/10/2016    Procedure: COLONOSCOPY;  Surgeon: Christine Cintron MD;   Location: [facility] OR     INSERT TISSUE EXPANDER BREAST BILATERAL Bilateral 10/9/2019    Procedure: BILATERAL TISSUE EXPANDERS (RAMIREZ);  Surgeon: Dale Paul MD;  Location: [facility] OR     MASTECTOMY, NIPPLE SPARING Bilateral 10/9/2019    Procedure: BILATERAL SKIN SPARRING MASTECTOMY (CASS);  Surgeon: Opal Rubin MD;  Location: [facility] OR     ORTHOPEDIC SURGERY Right     feet neuromas removed     SHOULDER SURGERY Right 2011/10/2012     Current Outpatient Medications   Medication Sig Dispense Refill     anastrozole (ARIMIDEX) 1 MG tablet Take 1 tablet (1 mg) by mouth daily 90 tablet 3     calcium carbonate-vitamin D (OS-STEVE) 600-400 MG-UNIT chewable tablet Take 1 chew tab by mouth 2 times daily (with meals) 180 tablet 3     eletriptan (RELPAX) 40 MG tablet Take 1 tablet (40 mg) by mouth once as needed for migraine (Patient not taking: Reported on 9/11/2020) 12 tablet 3     FLUoxetine HCl, PMDD, (SARAFEM) 10 MG TABS Take 10 mg by mouth daily 90 tablet 3     fluticasone (ARNUITY ELLIPTA) 200 MCG/ACT inhaler Inhale 1 puff into the lungs daily       levocetirizine (XYZAL) 5 MG tablet TAKE 1 TABLET(5 MG) BY MOUTH EVERY EVENING 30 tablet 2     levothyroxine (SYNTHROID/LEVOTHROID) 88 MCG tablet Take 1 tablet (88 mcg) by mouth daily 60 tablet 3     rosuvastatin (CRESTOR) 10 MG tablet Take 1 tablet (10 mg) by mouth daily 90 tablet 3     OTC products: None, except as noted above    Allergies   Allergen Reactions     Penicillins Rash     Zithromax [Azithromycin] Rash      Latex Allergy: NO    Social History     Tobacco Use     Smoking status: Never Smoker     Smokeless tobacco: Never Used   Substance Use Topics     Alcohol use: Yes     Alcohol/week: 1.7 standard drinks     Types: 2 Glasses of wine per week     Frequency: 2-4 times a month     Drinks per session: 1 or 2     Comment: occasionally     History   Drug Use No       REVIEW OF SYSTEMS:   CONSTITUTIONAL: NEGATIVE for fever, chills, change in  "weight  INTEGUMENTARY/SKIN: NEGATIVE for worrisome rashes, moles or lesions  EYES: NEGATIVE for vision changes or irritation  ENT/MOUTH: NEGATIVE for ear, mouth and throat problems  RESP: NEGATIVE for significant cough or SOB  BREAST: NEGATIVE for masses, tenderness or discharge  CV: NEGATIVE for chest pain, palpitations or peripheral edema  GI: NEGATIVE for nausea, abdominal pain, heartburn, or change in bowel habits  : NEGATIVE for frequency, dysuria, or hematuria  MUSCULOSKELETAL: NEGATIVE for significant arthralgias or myalgia  NEURO: NEGATIVE for weakness, dizziness or paresthesias  ENDOCRINE: NEGATIVE for temperature intolerance, skin/hair changes  HEME: NEGATIVE for bleeding problems  PSYCHIATRIC: NEGATIVE for changes in mood or affect    EXAM:   /78 (BP Location: Right arm, Patient Position: Sitting, Cuff Size: Adult Regular)   Pulse 57   Temp 97.8  F (36.6  C) (Tympanic)   Ht 1.651 m (5' 5\")   Wt 64.4 kg (142 lb)   SpO2 99%   BMI 23.63 kg/m      GENERAL APPEARANCE: healthy, alert and no distress     EYES: EOMI, PERRL     HENT: ear canals and TM's normal and nose and mouth without ulcers or lesions     NECK: no adenopathy, no asymmetry, masses, or scars and thyroid normal to palpation     RESP: lungs clear to auscultation - no rales, rhonchi or wheezes     CV: regular rates and rhythm, normal S1 S2, no S3 or S4 and no murmur, click or rub     ABDOMEN:  soft, nontender, no HSM or masses and bowel sounds normal     MS: extremities normal- no gross deformities noted, no evidence of inflammation in joints, FROM in all extremities.     SKIN: no suspicious lesions or rashes     NEURO: Normal strength and tone, sensory exam grossly normal, mentation intact and speech normal     PSYCH: mentation appears normal. and affect normal/bright     LYMPHATICS: No cervical adenopathy    DIAGNOSTICS:     Labs Resulted Today:   Results for orders placed or performed in visit on 10/09/20   Comprehensive metabolic " panel     Status: None   Result Value Ref Range    Sodium 138 133 - 144 mmol/L    Potassium 4.4 3.4 - 5.3 mmol/L    Chloride 105 94 - 109 mmol/L    Carbon Dioxide 29 20 - 32 mmol/L    Anion Gap 4 3 - 14 mmol/L    Glucose 88 70 - 99 mg/dL    Urea Nitrogen 12 7 - 30 mg/dL    Creatinine 0.80 0.52 - 1.04 mg/dL    GFR Estimate 78 >60 mL/min/[1.73_m2]    GFR Estimate If Black >90 >60 mL/min/[1.73_m2]    Calcium 9.5 8.5 - 10.1 mg/dL    Bilirubin Total 0.5 0.2 - 1.3 mg/dL    Albumin 4.2 3.4 - 5.0 g/dL    Protein Total 8.0 6.8 - 8.8 g/dL    Alkaline Phosphatase 69 40 - 150 U/L    ALT 40 0 - 50 U/L    AST 37 0 - 45 U/L   CBC with platelets differential     Status: None   Result Value Ref Range    WBC 6.4 4.0 - 11.0 10e9/L    RBC Count 4.73 3.8 - 5.2 10e12/L    Hemoglobin 14.2 11.7 - 15.7 g/dL    Hematocrit 42.2 35.0 - 47.0 %    MCV 89 78 - 100 fl    MCH 30.0 26.5 - 33.0 pg    MCHC 33.6 31.5 - 36.5 g/dL    RDW 12.6 10.0 - 15.0 %    Platelet Count 176 150 - 450 10e9/L    Diff Method Automated Method     % Neutrophils 62.2 %    % Lymphocytes 25.8 %    % Monocytes 8.4 %    % Eosinophils 2.5 %    % Basophils 0.8 %    % Immature Granulocytes 0.3 %    Nucleated RBCs 0 0 /100    Absolute Neutrophil 4.0 1.6 - 8.3 10e9/L    Absolute Lymphocytes 1.7 0.8 - 5.3 10e9/L    Absolute Monocytes 0.5 0.0 - 1.3 10e9/L    Absolute Eosinophils 0.2 0.0 - 0.7 10e9/L    Absolute Basophils 0.1 0.0 - 0.2 10e9/L    Abs Immature Granulocytes 0.0 0 - 0.4 10e9/L    Absolute Nucleated RBC 0.0        Recent Labs   Lab Test 09/11/20  1330 07/15/20  1305   HGB 13.4 13.8    176    139   POTASSIUM 4.2 4.0   CR 0.79 0.79        IMPRESSION:   Reason for surgery/procedure: blepharitis  Diagnosis/reason for consult: cardiopulmonary clearance    The proposed surgical procedure is considered LOW risk.    REVISED CARDIAC RISK INDEX  The patient has the following serious cardiovascular risks for perioperative complications such as (MI, PE, VFib and 3  AV  Block):  No serious cardiac risks  INTERPRETATION: 0 risks: Class I (very low risk - 0.4% complication rate)    The patient has the following additional risks for perioperative complications:  No identified additional risks  The 10-year ASCVD risk score ([place] Gardensmaurice MIRELES Jr., et al., 2013) is: 3.8%    Values used to calculate the score:      Age: 65 years      Sex: Female      Is Non- : No      Diabetic: No      Tobacco smoker: No      Systolic Blood Pressure: 112 mmHg      Is BP treated: No      HDL Cholesterol: 56 mg/dL      Total Cholesterol: 161 mg/dL      ICD-10-CM    1. Preop general physical exam  Z01.818        RECOMMENDATIONS:     --Patient is to take all scheduled medications on the day of surgery EXCEPT for modifications listed below.    APPROVAL GIVEN to proceed with proposed procedure, without further diagnostic evaluation       Signed Electronically by: Che Tapia MD    Copy of this evaluation report is provided to requesting physician.    [facility] Preop Guidelines    Revised Cardiac Risk Index

## 2020-10-06 NOTE — PATIENT INSTRUCTIONS

## 2020-10-09 ENCOUNTER — OFFICE VISIT (OUTPATIENT)
Dept: FAMILY MEDICINE | Facility: OTHER | Age: 65
End: 2020-10-09
Attending: FAMILY MEDICINE
Payer: MEDICARE

## 2020-10-09 VITALS
HEIGHT: 65 IN | DIASTOLIC BLOOD PRESSURE: 78 MMHG | OXYGEN SATURATION: 99 % | HEART RATE: 57 BPM | BODY MASS INDEX: 23.66 KG/M2 | WEIGHT: 142 LBS | TEMPERATURE: 97.8 F | SYSTOLIC BLOOD PRESSURE: 112 MMHG

## 2020-10-09 DIAGNOSIS — Z01.818 PREOP GENERAL PHYSICAL EXAM: Primary | ICD-10-CM

## 2020-10-09 DIAGNOSIS — Z23 NEED FOR INFLUENZA VACCINATION: ICD-10-CM

## 2020-10-09 DIAGNOSIS — E03.9 HYPOTHYROIDISM, UNSPECIFIED TYPE: ICD-10-CM

## 2020-10-09 DIAGNOSIS — Z01.818 PREOP GENERAL PHYSICAL EXAM: ICD-10-CM

## 2020-10-09 DIAGNOSIS — E78.5 HYPERLIPIDEMIA WITH TARGET LDL LESS THAN 130: ICD-10-CM

## 2020-10-09 DIAGNOSIS — E03.9 ACQUIRED HYPOTHYROIDISM: ICD-10-CM

## 2020-10-09 DIAGNOSIS — G43.909 MIGRAINE WITHOUT STATUS MIGRAINOSUS, NOT INTRACTABLE, UNSPECIFIED MIGRAINE TYPE: ICD-10-CM

## 2020-10-09 DIAGNOSIS — J30.2 SEASONAL ALLERGIES: ICD-10-CM

## 2020-10-09 LAB
ALBUMIN SERPL-MCNC: 4.2 G/DL (ref 3.4–5)
ALP SERPL-CCNC: 69 U/L (ref 40–150)
ALT SERPL W P-5'-P-CCNC: 40 U/L (ref 0–50)
ANION GAP SERPL CALCULATED.3IONS-SCNC: 4 MMOL/L (ref 3–14)
AST SERPL W P-5'-P-CCNC: 37 U/L (ref 0–45)
BASOPHILS # BLD AUTO: 0.1 10E9/L (ref 0–0.2)
BASOPHILS NFR BLD AUTO: 0.8 %
BILIRUB SERPL-MCNC: 0.5 MG/DL (ref 0.2–1.3)
BUN SERPL-MCNC: 12 MG/DL (ref 7–30)
CALCIUM SERPL-MCNC: 9.5 MG/DL (ref 8.5–10.1)
CHLORIDE SERPL-SCNC: 105 MMOL/L (ref 94–109)
CHOLEST SERPL-MCNC: 149 MG/DL
CO2 SERPL-SCNC: 29 MMOL/L (ref 20–32)
CREAT SERPL-MCNC: 0.8 MG/DL (ref 0.52–1.04)
DIFFERENTIAL METHOD BLD: NORMAL
EOSINOPHIL # BLD AUTO: 0.2 10E9/L (ref 0–0.7)
EOSINOPHIL NFR BLD AUTO: 2.5 %
ERYTHROCYTE [DISTWIDTH] IN BLOOD BY AUTOMATED COUNT: 12.6 % (ref 10–15)
GFR SERPL CREATININE-BSD FRML MDRD: 78 ML/MIN/{1.73_M2}
GLUCOSE SERPL-MCNC: 88 MG/DL (ref 70–99)
HCT VFR BLD AUTO: 42.2 % (ref 35–47)
HDLC SERPL-MCNC: 58 MG/DL
HGB BLD-MCNC: 14.2 G/DL (ref 11.7–15.7)
IMM GRANULOCYTES # BLD: 0 10E9/L (ref 0–0.4)
IMM GRANULOCYTES NFR BLD: 0.3 %
LDLC SERPL CALC-MCNC: 64 MG/DL
LYMPHOCYTES # BLD AUTO: 1.7 10E9/L (ref 0.8–5.3)
LYMPHOCYTES NFR BLD AUTO: 25.8 %
MCH RBC QN AUTO: 30 PG (ref 26.5–33)
MCHC RBC AUTO-ENTMCNC: 33.6 G/DL (ref 31.5–36.5)
MCV RBC AUTO: 89 FL (ref 78–100)
MONOCYTES # BLD AUTO: 0.5 10E9/L (ref 0–1.3)
MONOCYTES NFR BLD AUTO: 8.4 %
NEUTROPHILS # BLD AUTO: 4 10E9/L (ref 1.6–8.3)
NEUTROPHILS NFR BLD AUTO: 62.2 %
NONHDLC SERPL-MCNC: 91 MG/DL
NRBC # BLD AUTO: 0 10*3/UL
NRBC BLD AUTO-RTO: 0 /100
PLATELET # BLD AUTO: 176 10E9/L (ref 150–450)
POTASSIUM SERPL-SCNC: 4.4 MMOL/L (ref 3.4–5.3)
PROT SERPL-MCNC: 8 G/DL (ref 6.8–8.8)
RBC # BLD AUTO: 4.73 10E12/L (ref 3.8–5.2)
SODIUM SERPL-SCNC: 138 MMOL/L (ref 133–144)
TRIGL SERPL-MCNC: 134 MG/DL
WBC # BLD AUTO: 6.4 10E9/L (ref 4–11)

## 2020-10-09 PROCEDURE — 99214 OFFICE O/P EST MOD 30 MIN: CPT | Performed by: FAMILY MEDICINE

## 2020-10-09 PROCEDURE — 90662 IIV NO PRSV INCREASED AG IM: CPT

## 2020-10-09 PROCEDURE — 80053 COMPREHEN METABOLIC PANEL: CPT | Mod: ZL | Performed by: FAMILY MEDICINE

## 2020-10-09 PROCEDURE — 85025 COMPLETE CBC W/AUTO DIFF WBC: CPT | Mod: ZL | Performed by: FAMILY MEDICINE

## 2020-10-09 PROCEDURE — 80061 LIPID PANEL: CPT | Mod: ZL | Performed by: FAMILY MEDICINE

## 2020-10-09 PROCEDURE — G0463 HOSPITAL OUTPT CLINIC VISIT: HCPCS | Mod: 25

## 2020-10-09 RX ORDER — ELETRIPTAN HYDROBROMIDE 40 MG/1
40 TABLET, FILM COATED ORAL
Qty: 12 TABLET | Refills: 3 | Status: SHIPPED | OUTPATIENT
Start: 2020-10-09 | End: 2023-06-21

## 2020-10-09 RX ORDER — LEVOTHYROXINE SODIUM 88 UG/1
88 TABLET ORAL DAILY
Qty: 90 TABLET | Refills: 2 | Status: SHIPPED | OUTPATIENT
Start: 2020-10-09 | End: 2021-03-22

## 2020-10-09 RX ORDER — LEVOCETIRIZINE DIHYDROCHLORIDE 5 MG/1
TABLET, FILM COATED ORAL
Qty: 90 TABLET | Refills: 2 | Status: SHIPPED | OUTPATIENT
Start: 2020-10-09 | End: 2021-07-16

## 2020-10-09 ASSESSMENT — PAIN SCALES - GENERAL: PAINLEVEL: NO PAIN (0)

## 2020-10-09 ASSESSMENT — MIFFLIN-ST. JEOR: SCORE: 1189.99

## 2020-10-09 ASSESSMENT — ANXIETY QUESTIONNAIRES
5. BEING SO RESTLESS THAT IT IS HARD TO SIT STILL: NOT AT ALL
3. WORRYING TOO MUCH ABOUT DIFFERENT THINGS: NOT AT ALL
7. FEELING AFRAID AS IF SOMETHING AWFUL MIGHT HAPPEN: NOT AT ALL
6. BECOMING EASILY ANNOYED OR IRRITABLE: NOT AT ALL
GAD7 TOTAL SCORE: 0
1. FEELING NERVOUS, ANXIOUS, OR ON EDGE: NOT AT ALL
4. TROUBLE RELAXING: NOT AT ALL
2. NOT BEING ABLE TO STOP OR CONTROL WORRYING: NOT AT ALL

## 2020-10-09 ASSESSMENT — PATIENT HEALTH QUESTIONNAIRE - PHQ9: SUM OF ALL RESPONSES TO PHQ QUESTIONS 1-9: 0

## 2020-10-09 NOTE — NURSING NOTE
"Chief Complaint   Patient presents with     Pre-Op Exam       Initial /78 (BP Location: Right arm, Patient Position: Sitting, Cuff Size: Adult Regular)   Pulse 57   Temp 97.8  F (36.6  C) (Tympanic)   Ht 1.651 m (5' 5\")   Wt 64.4 kg (142 lb)   SpO2 99%   BMI 23.63 kg/m   Estimated body mass index is 23.63 kg/m  as calculated from the following:    Height as of this encounter: 1.651 m (5' 5\").    Weight as of this encounter: 64.4 kg (142 lb).  Medication Reconciliation: complete  Petra Bautista LPN  "

## 2020-10-10 ASSESSMENT — ANXIETY QUESTIONNAIRES: GAD7 TOTAL SCORE: 0

## 2020-10-28 DIAGNOSIS — F41.1 GAD (GENERALIZED ANXIETY DISORDER): Primary | ICD-10-CM

## 2020-10-28 NOTE — PROGRESS NOTES
New prescription pended for you to sign. Per patient she is on Medicare and the Fluoxetine tablets have to be changed to capsules. Lulú Mays LPN

## 2020-10-29 RX ORDER — FLUOXETINE 10 MG/1
10 CAPSULE ORAL DAILY
Qty: 90 CAPSULE | Refills: 1 | Status: SHIPPED | OUTPATIENT
Start: 2020-10-29 | End: 2021-05-05

## 2020-11-02 ENCOUNTER — MYC MEDICAL ADVICE (OUTPATIENT)
Dept: ONCOLOGY | Facility: OTHER | Age: 65
End: 2020-11-02

## 2020-11-09 DIAGNOSIS — C50.911 MALIGNANT NEOPLASM OF RIGHT FEMALE BREAST, UNSPECIFIED ESTROGEN RECEPTOR STATUS, UNSPECIFIED SITE OF BREAST (H): ICD-10-CM

## 2020-11-09 RX ORDER — ANASTROZOLE 1 MG/1
TABLET ORAL
Qty: 90 TABLET | Refills: 3 | Status: SHIPPED | OUTPATIENT
Start: 2020-11-09 | End: 2021-10-22

## 2020-11-21 ENCOUNTER — TELEPHONE (OUTPATIENT)
Dept: FAMILY MEDICINE | Facility: OTHER | Age: 65
End: 2020-11-21

## 2020-11-21 NOTE — TELEPHONE ENCOUNTER
Prior Authorization Retail Medication Request  [facility] KEY# [id number]    Medication/Dose: FLUOXETINE HCI 10MG CAPSULES  ICD code (if different than what is on RX):    Previously Tried and Failed:    Rationale:      Insurance Name:    Insurance ID:  [id number]      Pharmacy Information (if different than what is on RX)  Name:  [facility] [place]  Phone:  [phone]

## 2020-11-23 NOTE — TELEPHONE ENCOUNTER
Prior Authorization Not Needed per Insurance    Medication: FLUOXETINE HCI 10MG CAPSULES  Insurance Company: [employer] - Phone [phone] Fax [phone]  Expected CoPay:      Pharmacy Filling the Rx: [id number] - [place], [place] - [address] AT [facility] [address]  Pharmacy Notified: Yes  Patient Notified: Yes

## 2020-11-23 NOTE — TELEPHONE ENCOUNTER
PA Initiation    Medication: FLUOXETINE HCI 10MG CAPSULES  Insurance Company: [employer] - Phone [phone] Fax [phone]  Pharmacy Filling the Rx: [id number] - [place], [place] - [address] [place] [facility] [address]  Filling Pharmacy Phone: [phone]  Filling Pharmacy Fax: [phone]  Start Date: 11/23/2020

## 2020-12-06 ENCOUNTER — HEALTH MAINTENANCE LETTER (OUTPATIENT)
Age: 65
End: 2020-12-06

## 2020-12-21 ENCOUNTER — OFFICE VISIT (OUTPATIENT)
Dept: FAMILY MEDICINE | Facility: OTHER | Age: 65
DRG: 871 | End: 2020-12-21
Attending: FAMILY MEDICINE
Payer: MEDICARE

## 2020-12-21 ENCOUNTER — NURSE TRIAGE (OUTPATIENT)
Dept: FAMILY MEDICINE | Facility: OTHER | Age: 65
End: 2020-12-21

## 2020-12-21 VITALS
DIASTOLIC BLOOD PRESSURE: 64 MMHG | TEMPERATURE: 98.6 F | WEIGHT: 140 LBS | HEART RATE: 65 BPM | BODY MASS INDEX: 23.3 KG/M2 | SYSTOLIC BLOOD PRESSURE: 84 MMHG | OXYGEN SATURATION: 95 %

## 2020-12-21 DIAGNOSIS — R50.9 FEVER AND CHILLS: Primary | ICD-10-CM

## 2020-12-21 PROCEDURE — U0003 INFECTIOUS AGENT DETECTION BY NUCLEIC ACID (DNA OR RNA); SEVERE ACUTE RESPIRATORY SYNDROME CORONAVIRUS 2 (SARS-COV-2) (CORONAVIRUS DISEASE [COVID-19]), AMPLIFIED PROBE TECHNIQUE, MAKING USE OF HIGH THROUGHPUT TECHNOLOGIES AS DESCRIBED BY CMS-2020-01-R: HCPCS | Mod: ZL | Performed by: FAMILY MEDICINE

## 2020-12-21 PROCEDURE — G0463 HOSPITAL OUTPT CLINIC VISIT: HCPCS

## 2020-12-21 PROCEDURE — 99213 OFFICE O/P EST LOW 20 MIN: CPT | Performed by: FAMILY MEDICINE

## 2020-12-21 RX ORDER — TRAMADOL HYDROCHLORIDE 50 MG/1
TABLET ORAL
COMMUNITY
Start: 2020-10-15 | End: 2020-12-21

## 2020-12-21 ASSESSMENT — ENCOUNTER SYMPTOMS
COUGH: 0
PALPITATIONS: 0
FATIGUE: 1
CHEST TIGHTNESS: 1
FEVER: 1
CHILLS: 1
SHORTNESS OF BREATH: 0
HEADACHES: 1
SORE THROAT: 1
MYALGIAS: 1
ABDOMINAL PAIN: 0

## 2020-12-21 ASSESSMENT — PAIN SCALES - GENERAL: PAINLEVEL: NO PAIN (0)

## 2020-12-21 NOTE — TELEPHONE ENCOUNTER
Pt requesting to be seen.   Next 5 appointments (look out 90 days)    Dec 21, 2020  3:00 PM  (Arrive by 2:45 PM)  SHORT with Nikia Joyner MD,  EXAM ROOM 2223  [facility] [address] [address]  [phone]   Jan 13, 2021  1:30 PM  (Arrive by 1:15 PM)  Return Visit with Che Sesay NP  [facility] ([facility] ) [address] [address]  [phone]            Reason for Disposition    [1] Fever > 101 F (38.3 C) AND [2] age > 60    Additional Information    Negative: SEVERE difficulty breathing (e.g., struggling for each breath, speaks in single words)    Negative: Difficult to awaken or acting confused (e.g., disoriented, slurred speech)    Negative: Bluish (or gray) lips or face now    Negative: Shock suspected (e.g., cold/pale/clammy skin, too weak to stand, low BP, rapid pulse)    Negative: Sounds like a life-threatening emergency to the triager    Negative: [1] COVID-19 exposure AND [2] no symptoms    Negative: [1] Lives with someone known to have influenza (flu test positive) AND [2] flu-like symptoms (e.g., cough, runny nose, sore throat, SOB; with or without fever)    Negative: [1] Adult with possible COVID-19 symptoms AND [2] triager concerned about severity of symptoms or other causes    Negative: Immunization reaction suspected (e.g., fever, headache, muscle aches occurring during days 1-3 days after immunization)    Negative: COVID-19 and breastfeeding, questions about    Negative: SEVERE or constant chest pain or pressure (Exception: mild central chest pain, present only when coughing)    Negative: MODERATE difficulty breathing (e.g., speaks in phrases, SOB even at rest, pulse 100-120)    Negative: [1] Headache AND [2] stiff neck (can't touch chin to chest)    Negative: MILD difficulty breathing (e.g., minimal/no SOB at rest, SOB with walking, pulse <100)    Negative:  "Chest pain or pressure    Negative: Patient sounds very sick or weak to the triager    Negative: Fever > 103 F (39.4 C)    Answer Assessment - Initial Assessment Questions  1. COVID-19 DIAGNOSIS: \"Who made your Coronavirus (COVID-19) diagnosis?\" \"Was it confirmed by a positive lab test?\" If not diagnosed by a HCP, ask \"Are there lots of cases (community spread) where you live?\" (See public health department website, if unsure)        2. COVID-19 EXPOSURE: \"Was there any known exposure to COVID before the symptoms began?\" CDC Definition of close contact: within 6 feet (2 meters) for a total of 15 minutes or more over a 24-hour period.       no  3. ONSET: \"When did the COVID-19 symptoms start?\"       12/19/20  4. WORST SYMPTOM: \"What is your worst symptom?\" (e.g., cough, fever, shortness of breath, muscle aches)      T 101-102.7  5. COUGH: \"Do you have a cough?\" If so, ask: \"How bad is the cough?\"        Mild cough  6. FEVER: \"Do you have a fever?\" If so, ask: \"What is your temperature, how was it measured, and when did it start?\"      Yes   7. RESPIRATORY STATUS: \"Describe your breathing?\" (e.g., shortness of breath, wheezing, unable to speak)       no  8. BETTER-SAME-WORSE: \"Are you getting better, staying the same or getting worse compared to yesterday?\"  If getting worse, ask, \"In what way?\"      worse  9. HIGH RISK DISEASE: \"Do you have any chronic medical problems?\" (e.g., asthma, heart or lung disease, weak immune system, obesity, etc.)      Hx pneumonia   10. PREGNANCY: \"Is there any chance you are pregnant?\" \"When was your last menstrual period?\"        n/a  11. OTHER SYMPTOMS: \"Do you have any other symptoms?\"  (e.g., chills, fatigue, headache, loss of smell or taste, muscle pain, sore throat; new loss of smell or taste especially support the diagnosis of COVID-19)        Chills, fatigue, body aches    Protocols used: CORONAVIRUS (COVID-19) DIAGNOSED OR [id number] 12.1      "

## 2020-12-21 NOTE — PROGRESS NOTES
Subjective     Denise Taylor is a 65 year old female who presents to clinic today for the following health issues:    HPI           Concern for COVID-19  About how many days ago did these symptoms start? Saturday night 12/19/2020  Is this your first visit for this illness? Yes  In the 14 days before your symptoms started, have you had close contact with someone with COVID-19 (Coronavirus)? I do not know.  Do you have a fever or chills? Yes, the highest temperature was 102.7  Are you having new or worsening difficulty breathing? No  Do you have new or worsening cough? No  Have you had any new or unexplained body aches? YES    Have you experienced any of the following NEW symptoms? Weakness     Headache: YES    Sore throat: YES    Loss of taste or smell: No    Chest pain: tightness, not pain     Diarrhea: No    Rash: No  What treatments have you tried? Sleep, ibu, acetaminophen.    Who do you live with?    Are you, or a household member, a healthcare worker or a ? No  Do you live in a nursing home, group home, or shelter? No  Do you have a way to get food/medications if quarantined? Yes, I have a friend or family member who can help me.  - got her flu vaccine this year  - overall improving  - h/o recurrent pneumonias      Review of Systems   Constitutional: Positive for chills, fatigue and fever.   HENT: Positive for sore throat (improving). Negative for congestion.    Respiratory: Positive for chest tightness. Negative for cough and shortness of breath.    Cardiovascular: Negative for chest pain and palpitations.   Gastrointestinal: Negative for abdominal pain.   Musculoskeletal: Positive for myalgias.   Neurological: Positive for headaches.            Objective    BP (!) 84/64 (BP Location: Right arm, Patient Position: Sitting, Cuff Size: Adult Regular)   Pulse 65   Temp 98.6  F (37  C)   Wt 63.5 kg (140 lb)   SpO2 95%   BMI 23.30 kg/m    Body mass index is 23.3 kg/m .  Physical  Exam  Constitutional:       General: She is not in acute distress.     Appearance: She is not ill-appearing.   HENT:      Mouth/Throat:      Mouth: Mucous membranes are moist.      Pharynx: No oropharyngeal exudate or posterior oropharyngeal erythema.   Cardiovascular:      Rate and Rhythm: Normal rate and regular rhythm.      Heart sounds: No murmur.   Pulmonary:      Effort: Pulmonary effort is normal. No respiratory distress.      Breath sounds: No wheezing or rales.   Abdominal:      General: Bowel sounds are normal.      Tenderness: There is no abdominal tenderness.   Neurological:      Mental Status: She is alert.              Assessment & Plan     Fever and chills  S/p influenza vaccine this year making flu less likely. Sore throat is resolving, no concerning exam findings, making strep throat less likely. Presentation consistent with COVID. No concerning findings on lung exam.  - Symptomatic COVID-19 Virus (Coronavirus) by PCR; Future  - Symptomatic COVID-19 Virus (Coronavirus) by PCR  - c/w conservative cares of rest, hydration, IBU, APAP  - return to clinic/UC/ER if any concerning symptoms develop        See Patient Instructions    Return if symptoms worsen or fail to improve.    Nikia Joyner MD  [facility]

## 2020-12-21 NOTE — PATIENT INSTRUCTIONS
"Instructions for Patients ([facility] Specific)  Patient Education   After Your COVID-19 (Coronavirus) Test  You have been tested for COVID-19 (coronavirus).   If you'll have surgery in the next few days, we'll let you know ahead of time if you have the virus. Please call your surgeon's office with any questions.  For all other patients: Results are usually available in XTWIP within 2 to 3 days.   If you do not have a XTWIP account, you'll get a letter in the mail in about 7 to 10 days.   [employer] is often the fastest way to get test results. Please sign up if you do not already have a XTWIP account. See the handout Getting COVID-19 Test Results in GetAppt for help.  What if my test result is positive?  If your test is positive and you have not viewed your result in GetAppt, you'll get a phone call with your result. (A positive test means that you have the virus.)     Follow the tips under \"How do I self-isolate?\" below for 10 days (20 days if you have a weak immune system).    You don't need to be retested for COVID-19 before going back to school or work. As long as you're fever-free and feeling better, you can go back to school, work and other activities after waiting the 10 or 20 days.  What if I have questions after I get my results?  If you have questions about your results, please visit our testing website at [email or web address].   After 7 to 10 days, if you have not gotten your results:     Call [phone] ([phone]) and ask to speak with our COVID-19 results team.    If you're being treated at an infusion center: Call your infusion center directly.  What are the symptoms of COVID-19?  Cough, fever and trouble breathing are the most common signs of COVID-19.  Other symptoms can include new headaches, new muscle or body aches, new and unexplained fatigue (feeling very tired), chills, sore throat, congestion (stuffy or runny nose), diarrhea (loose poop), loss of taste or " "smell, belly pain, and nausea or vomiting (feeling sick to your stomach or throwing up).  You may already have symptoms of COVID-19, or they may show up later.  What should I do if I have symptoms?  If you're having surgery: Call your surgeon's office.  For all other patients: Stay home and away from others (self-isolate) until ...    You've had no fever--and no medicine that reduces fever--for 1 full day (24 hours), AND    Other symptoms have gotten better. For example, your cough or breathing has improved, AND    At least 10 days have passed since your symptoms first started.  How do I self-isolate?    Stay in your own room, even for meals. Use your own bathroom if you can.    Stay away from others in your home. No hugging, kissing or shaking hands. No visitors.    Don't go to work, school or anywhere else.    Clean \"high touch\" surfaces often (doorknobs, counters, handles). Use household cleaning spray or wipes. You'll find a full list of  on the EPA website: www.epa.gov/pesticide-registration/list-n-disinfectants-use-against-sars-cov-2.    Cover your mouth and nose with a mask or other face covering to avoid spreading germs.    Wash your hands and face often. Use soap and water.    Caregivers in these groups are at risk for severe illness due to COVID-19:  ? People 65 years and older  ? People who live in a nursing home or long-term care facility  ? People with chronic disease (lung, heart, cancer, diabetes, kidney, liver, immunologic)  ? People who have a weakened immune system, including those who:    Are in cancer treatment    Take medicine that weakens the immune system, such as corticosteroids    Had a bone marrow or organ transplant    Have an immune deficiency    Have poorly controlled HIV or AIDS    Are obese (body mass index of 40 or higher)    Smoke regularly    Caregivers should wear gloves while washing dishes, handling laundry and cleaning bedrooms and bathrooms.    Use caution when washing " and drying laundry: Don't shake dirty laundry and use the warmest water setting that you can.    For more tips on managing your health at home, go to www.cdc.gov/coronavirus/2019-ncov/downloads/10Things.pdf.  How can I take care of myself at home?  1. Get lots of rest. Drink extra fluids (unless a doctor has told you not to).  2. Take Tylenol (acetaminophen) for fever or pain. If you have liver or kidney problems, ask your family doctor if it's OK to take Tylenol.   Adults can take either:  ? 650 mg (two 325 mg pills) every 4 to 6 hours, or   ? 1,000 mg (two 500 mg pills) every 8 hours as needed.  ? Note: Don't take more than 3,000 mg in one day. Acetaminophen is found in many medicines (both prescribed and over-the-counter medicines). Read all labels to be sure you don't take too much.   For children, check the Tylenol bottle for the right dose. The dose is based on the child's age or weight.  3. If you have other health problems (like cancer, heart failure, an organ transplant or severe kidney disease): Call your specialty clinic if you don't feel better in the next 2 days.  4. Know when to call 911. Emergency warning signs include:  ? Trouble breathing or shortness of breath  ? Chest pain or pressure that doesn't go away  ? Feeling confused like you haven't felt before, or not being able to wake up  ? Bluish-colored lips or face  5. If your doctor prescribed a blood thinner medicine: Follow their instructions.  Where can I get more information?    [facility] - About COVID-19:   [email or web address]    CDC - If You're Sick: cdc.gov/coronavirus/2019-ncov/about/steps-when-sick.html    CDC - Ending Home Isolation: www.cdc.gov/coronavirus/2019-ncov/hcp/disposition-in-home-patients.html    CDC - Caring for Someone: www.cdc.gov/coronavirus/2019-ncov/if-you-are-sick/care-for-someone.html    [facility] - Interim Guidance for Hospital Discharge to Home:  www.health.[facility].[place]/diseases/coronavirus/hcp/hospdischarge.pdf    [facility] clinical trials (COVID-19 research studies): [email or web address].[email or web address]-clinical-trials    Below are the COVID-19 hotlines at the [place] Department of Health ([facility]). Interpreters are available.  ? For health questions: Call 036-585-0741 or 1-201.753.3924 (7 a.m. to 7 p.m.)  ? For questions about schools and childcare: Call 980-757-6768 or 1-643.795.5886 (7 a.m. to 7 p.m.)    For informational purposes only. Not to replace the advice of your health care provider. Clinically reviewed by Infection Prevention and the [facility] COVID-19 Clinical Team. Copyright   2020 [employer] [facility]. All rights reserved. [employer] 507126 - Rev 11/11/20.             Thank you for taking steps to prevent the spread of this virus.  o Limit your contact with others.  o Wear a simple mask to cover your cough.  o Wash your hands well and often.  o If you need medical care, go to [facility] or contact your health care provider.     For more about COVID-19 and caring for yourself at home, visit the CDC website at https://www.cdc.gov/coronavirus/2019-ncov/about/steps-when-sick.html.     To learn about care at [facility], please go to [email or web address].     [facility] clinical trials (COVID-19 research studies): clinicalaffairs.[facility]/n-clinical-trials.    Below are the COVID-19 hotlines at the [place] Department of [facility] ([facility]). Interpreters are available.     For health questions: Call 527-715-7126 or 1-335.808.5291 (7 a.m. to 7 p.m.)    For questions about schools and childcare: Call 794-074-3023 or 1-629.959.7867 (7 a.m. to 7 p.m.)

## 2020-12-22 ENCOUNTER — MYC MEDICAL ADVICE (OUTPATIENT)
Dept: FAMILY MEDICINE | Facility: OTHER | Age: 65
End: 2020-12-22

## 2020-12-22 ENCOUNTER — HOSPITAL ENCOUNTER (INPATIENT)
Facility: HOSPITAL | Age: 65
LOS: 10 days | Discharge: HOME | DRG: 871 | End: 2021-01-01
Attending: PHYSICIAN ASSISTANT | Admitting: INTERNAL MEDICINE
Payer: MEDICARE

## 2020-12-22 ENCOUNTER — APPOINTMENT (OUTPATIENT)
Dept: GENERAL RADIOLOGY | Facility: HOSPITAL | Age: 65
DRG: 871 | End: 2020-12-22
Attending: PHYSICIAN ASSISTANT
Payer: MEDICARE

## 2020-12-22 DIAGNOSIS — N17.9 ACUTE RENAL FAILURE, UNSPECIFIED ACUTE RENAL FAILURE TYPE (H): ICD-10-CM

## 2020-12-22 DIAGNOSIS — J18.9 PNEUMONIA OF LEFT LOWER LOBE DUE TO INFECTIOUS ORGANISM: ICD-10-CM

## 2020-12-22 DIAGNOSIS — J18.9 LLL PNEUMONIA: ICD-10-CM

## 2020-12-22 DIAGNOSIS — J90 PLEURAL EFFUSION EXUDATIVE: ICD-10-CM

## 2020-12-22 DIAGNOSIS — A41.9 SEPSIS (H): ICD-10-CM

## 2020-12-22 DIAGNOSIS — K59.09 OTHER CONSTIPATION: Primary | ICD-10-CM

## 2020-12-22 DIAGNOSIS — N17.9 ACUTE RENAL FAILURE (H): ICD-10-CM

## 2020-12-22 LAB
ALBUMIN SERPL-MCNC: 3.4 G/DL (ref 3.4–5)
ALBUMIN UR-MCNC: 30 MG/DL
ALP SERPL-CCNC: 76 U/L (ref 40–150)
ALT SERPL W P-5'-P-CCNC: 29 U/L (ref 0–50)
ANION GAP SERPL CALCULATED.3IONS-SCNC: 10 MMOL/L (ref 3–14)
ANION GAP SERPL CALCULATED.3IONS-SCNC: 10 MMOL/L (ref 3–14)
APPEARANCE UR: ABNORMAL
AST SERPL W P-5'-P-CCNC: 23 U/L (ref 0–45)
BACTERIA #/AREA URNS HPF: ABNORMAL /HPF
BASOPHILS # BLD AUTO: 0.1 10E9/L (ref 0–0.2)
BASOPHILS NFR BLD AUTO: 0.5 %
BILIRUB SERPL-MCNC: 0.5 MG/DL (ref 0.2–1.3)
BILIRUB UR QL STRIP: NEGATIVE
BUN SERPL-MCNC: 34 MG/DL (ref 7–30)
BUN SERPL-MCNC: 36 MG/DL (ref 7–30)
CALCIUM SERPL-MCNC: 8.4 MG/DL (ref 8.5–10.1)
CALCIUM SERPL-MCNC: 9.2 MG/DL (ref 8.5–10.1)
CHLORIDE SERPL-SCNC: 103 MMOL/L (ref 94–109)
CHLORIDE SERPL-SCNC: 107 MMOL/L (ref 94–109)
CO2 SERPL-SCNC: 23 MMOL/L (ref 20–32)
CO2 SERPL-SCNC: 25 MMOL/L (ref 20–32)
COLOR UR AUTO: YELLOW
CREAT SERPL-MCNC: 1.57 MG/DL (ref 0.52–1.04)
CREAT SERPL-MCNC: 1.68 MG/DL (ref 0.52–1.04)
D DIMER PPP FEU-MCNC: 1.2 UG/ML FEU (ref 0–0.5)
DIFFERENTIAL METHOD BLD: ABNORMAL
EOSINOPHIL # BLD AUTO: 0.1 10E9/L (ref 0–0.7)
EOSINOPHIL NFR BLD AUTO: 0.4 %
ERYTHROCYTE [DISTWIDTH] IN BLOOD BY AUTOMATED COUNT: 13.4 % (ref 10–15)
FLUABV+SARS-COV-2+RSV PNL RESP NAA+PROBE: NEGATIVE
FLUABV+SARS-COV-2+RSV PNL RESP NAA+PROBE: NEGATIVE
GFR SERPL CREATININE-BSD FRML MDRD: 31 ML/MIN/{1.73_M2}
GFR SERPL CREATININE-BSD FRML MDRD: 34 ML/MIN/{1.73_M2}
GLUCOSE SERPL-MCNC: 103 MG/DL (ref 70–99)
GLUCOSE SERPL-MCNC: 112 MG/DL (ref 70–99)
GLUCOSE UR STRIP-MCNC: 30 MG/DL
HCT VFR BLD AUTO: 40.1 % (ref 35–47)
HGB BLD-MCNC: 13.6 G/DL (ref 11.7–15.7)
HGB UR QL STRIP: ABNORMAL
HYALINE CASTS #/AREA URNS LPF: 11 /LPF
IMM GRANULOCYTES # BLD: 0.3 10E9/L (ref 0–0.4)
IMM GRANULOCYTES NFR BLD: 1.5 %
KETONES UR STRIP-MCNC: NEGATIVE MG/DL
LABORATORY COMMENT REPORT: NORMAL
LACTATE BLD-SCNC: 2 MMOL/L (ref 0.7–2)
LACTATE BLD-SCNC: 3.1 MMOL/L (ref 0.7–2)
LEUKOCYTE ESTERASE UR QL STRIP: NEGATIVE
LYMPHOCYTES # BLD AUTO: 0.5 10E9/L (ref 0.8–5.3)
LYMPHOCYTES NFR BLD AUTO: 3 %
MCH RBC QN AUTO: 30.2 PG (ref 26.5–33)
MCHC RBC AUTO-ENTMCNC: 33.9 G/DL (ref 31.5–36.5)
MCV RBC AUTO: 89 FL (ref 78–100)
MONOCYTES # BLD AUTO: 0.9 10E9/L (ref 0–1.3)
MONOCYTES NFR BLD AUTO: 5.4 %
MUCOUS THREADS #/AREA URNS LPF: PRESENT /LPF
NEUTROPHILS # BLD AUTO: 15.1 10E9/L (ref 1.6–8.3)
NEUTROPHILS NFR BLD AUTO: 89.2 %
NITRATE UR QL: NEGATIVE
NRBC # BLD AUTO: 0 10*3/UL
NRBC BLD AUTO-RTO: 0 /100
PH UR STRIP: 5.5 PH (ref 4.7–8)
PLATELET # BLD AUTO: 167 10E9/L (ref 150–450)
POTASSIUM SERPL-SCNC: 3.9 MMOL/L (ref 3.4–5.3)
POTASSIUM SERPL-SCNC: 3.9 MMOL/L (ref 3.4–5.3)
PROT SERPL-MCNC: 7.7 G/DL (ref 6.8–8.8)
RBC # BLD AUTO: 4.5 10E12/L (ref 3.8–5.2)
RBC #/AREA URNS AUTO: 2 /HPF (ref 0–2)
RSV RNA SPEC QL NAA+PROBE: NEGATIVE
SARS-COV-2 RNA SPEC QL NAA+PROBE: NEGATIVE
SODIUM SERPL-SCNC: 138 MMOL/L (ref 133–144)
SODIUM SERPL-SCNC: 140 MMOL/L (ref 133–144)
SOURCE: ABNORMAL
SP GR UR STRIP: 1.02 (ref 1–1.03)
SPECIMEN SOURCE: NORMAL
SQUAMOUS #/AREA URNS AUTO: <1 /HPF (ref 0–1)
TROPONIN I SERPL-MCNC: <0.015 UG/L (ref 0–0.04)
TROPONIN I SERPL-MCNC: <0.015 UG/L (ref 0–0.04)
UROBILINOGEN UR STRIP-MCNC: NORMAL MG/DL (ref 0–2)
WBC # BLD AUTO: 16.9 10E9/L (ref 4–11)
WBC #/AREA URNS AUTO: 62 /HPF (ref 0–5)
WBC CASTS #/AREA URNS LPF: 1 /LPF
WBC CLUMPS #/AREA URNS HPF: PRESENT /HPF

## 2020-12-22 PROCEDURE — 87077 CULTURE AEROBIC IDENTIFY: CPT | Performed by: NURSE PRACTITIONER

## 2020-12-22 PROCEDURE — 258N000003 HC RX IP 258 OP 636: Performed by: PHYSICIAN ASSISTANT

## 2020-12-22 PROCEDURE — 99214 OFFICE O/P EST MOD 30 MIN: CPT | Performed by: PHYSICIAN ASSISTANT

## 2020-12-22 PROCEDURE — 80048 BASIC METABOLIC PNL TOTAL CA: CPT | Performed by: PHYSICIAN ASSISTANT

## 2020-12-22 PROCEDURE — 250N000013 HC RX MED GY IP 250 OP 250 PS 637: Performed by: PHYSICIAN ASSISTANT

## 2020-12-22 PROCEDURE — 71045 X-RAY EXAM CHEST 1 VIEW: CPT

## 2020-12-22 PROCEDURE — 250N000013 HC RX MED GY IP 250 OP 250 PS 637: Performed by: INTERNAL MEDICINE

## 2020-12-22 PROCEDURE — 999N000104 HC STATISTIC NO CHARGE

## 2020-12-22 PROCEDURE — 250N000011 HC RX IP 250 OP 636: Performed by: PHYSICIAN ASSISTANT

## 2020-12-22 PROCEDURE — 120N000001 HC R&B MED SURG/OB

## 2020-12-22 PROCEDURE — 96365 THER/PROPH/DIAG IV INF INIT: CPT

## 2020-12-22 PROCEDURE — 80053 COMPREHEN METABOLIC PANEL: CPT | Performed by: PHYSICIAN ASSISTANT

## 2020-12-22 PROCEDURE — 93005 ELECTROCARDIOGRAM TRACING: CPT

## 2020-12-22 PROCEDURE — 87040 BLOOD CULTURE FOR BACTERIA: CPT | Performed by: NURSE PRACTITIONER

## 2020-12-22 PROCEDURE — 87636 SARSCOV2 & INF A&B AMP PRB: CPT | Performed by: INTERNAL MEDICINE

## 2020-12-22 PROCEDURE — G0463 HOSPITAL OUTPT CLINIC VISIT: HCPCS | Mod: 25

## 2020-12-22 PROCEDURE — 96361 HYDRATE IV INFUSION ADD-ON: CPT

## 2020-12-22 PROCEDURE — 93010 ELECTROCARDIOGRAM REPORT: CPT | Performed by: INTERNAL MEDICINE

## 2020-12-22 PROCEDURE — C9803 HOPD COVID-19 SPEC COLLECT: HCPCS

## 2020-12-22 PROCEDURE — 83605 ASSAY OF LACTIC ACID: CPT | Performed by: NURSE PRACTITIONER

## 2020-12-22 PROCEDURE — 87086 URINE CULTURE/COLONY COUNT: CPT | Performed by: INTERNAL MEDICINE

## 2020-12-22 PROCEDURE — 99223 1ST HOSP IP/OBS HIGH 75: CPT | Mod: AI | Performed by: INTERNAL MEDICINE

## 2020-12-22 PROCEDURE — 96376 TX/PRO/DX INJ SAME DRUG ADON: CPT

## 2020-12-22 PROCEDURE — 36415 COLL VENOUS BLD VENIPUNCTURE: CPT | Performed by: PHYSICIAN ASSISTANT

## 2020-12-22 PROCEDURE — 250N000011 HC RX IP 250 OP 636: Performed by: INTERNAL MEDICINE

## 2020-12-22 PROCEDURE — 83605 ASSAY OF LACTIC ACID: CPT | Performed by: PHYSICIAN ASSISTANT

## 2020-12-22 PROCEDURE — 84484 ASSAY OF TROPONIN QUANT: CPT | Performed by: NURSE PRACTITIONER

## 2020-12-22 PROCEDURE — 36415 COLL VENOUS BLD VENIPUNCTURE: CPT | Performed by: NURSE PRACTITIONER

## 2020-12-22 PROCEDURE — 87184 SC STD DISK METHOD PER PLATE: CPT | Performed by: NURSE PRACTITIONER

## 2020-12-22 PROCEDURE — 96375 TX/PRO/DX INJ NEW DRUG ADDON: CPT

## 2020-12-22 PROCEDURE — 81001 URINALYSIS AUTO W/SCOPE: CPT | Performed by: INTERNAL MEDICINE

## 2020-12-22 PROCEDURE — 84484 ASSAY OF TROPONIN QUANT: CPT | Performed by: PHYSICIAN ASSISTANT

## 2020-12-22 PROCEDURE — 258N000003 HC RX IP 258 OP 636: Performed by: INTERNAL MEDICINE

## 2020-12-22 PROCEDURE — 85025 COMPLETE CBC W/AUTO DIFF WBC: CPT | Performed by: PHYSICIAN ASSISTANT

## 2020-12-22 PROCEDURE — 85379 FIBRIN DEGRADATION QUANT: CPT | Performed by: PHYSICIAN ASSISTANT

## 2020-12-22 RX ORDER — AMOXICILLIN 250 MG
2 CAPSULE ORAL 2 TIMES DAILY
Status: DISCONTINUED | OUTPATIENT
Start: 2020-12-22 | End: 2020-12-31

## 2020-12-22 RX ORDER — HYDROMORPHONE HYDROCHLORIDE 1 MG/ML
0.5 INJECTION, SOLUTION INTRAMUSCULAR; INTRAVENOUS; SUBCUTANEOUS ONCE
Status: COMPLETED | OUTPATIENT
Start: 2020-12-22 | End: 2020-12-22

## 2020-12-22 RX ORDER — CALCIUM CARBONATE 500 MG/1
1000 TABLET, CHEWABLE ORAL 4 TIMES DAILY PRN
Status: DISCONTINUED | OUTPATIENT
Start: 2020-12-22 | End: 2021-01-01 | Stop reason: HOSPADM

## 2020-12-22 RX ORDER — NALOXONE HYDROCHLORIDE 0.4 MG/ML
0.2 INJECTION, SOLUTION INTRAMUSCULAR; INTRAVENOUS; SUBCUTANEOUS
Status: DISCONTINUED | OUTPATIENT
Start: 2020-12-22 | End: 2020-12-30

## 2020-12-22 RX ORDER — SODIUM CHLORIDE 9 MG/ML
INJECTION, SOLUTION INTRAVENOUS CONTINUOUS
Status: DISCONTINUED | OUTPATIENT
Start: 2020-12-22 | End: 2020-12-22

## 2020-12-22 RX ORDER — NALOXONE HYDROCHLORIDE 0.4 MG/ML
0.4 INJECTION, SOLUTION INTRAMUSCULAR; INTRAVENOUS; SUBCUTANEOUS
Status: DISCONTINUED | OUTPATIENT
Start: 2020-12-22 | End: 2020-12-30

## 2020-12-22 RX ORDER — CEFTRIAXONE SODIUM 1 G/50ML
1 INJECTION, SOLUTION INTRAVENOUS EVERY 24 HOURS
Status: DISCONTINUED | OUTPATIENT
Start: 2020-12-23 | End: 2020-12-25

## 2020-12-22 RX ORDER — OXYCODONE HYDROCHLORIDE 5 MG/1
5-10 TABLET ORAL
Status: DISCONTINUED | OUTPATIENT
Start: 2020-12-22 | End: 2020-12-29

## 2020-12-22 RX ORDER — LEVOTHYROXINE SODIUM 88 UG/1
88 TABLET ORAL DAILY
Status: DISCONTINUED | OUTPATIENT
Start: 2020-12-23 | End: 2021-01-01 | Stop reason: HOSPADM

## 2020-12-22 RX ORDER — ALBUTEROL SULFATE 90 UG/1
2 AEROSOL, METERED RESPIRATORY (INHALATION)
Status: DISCONTINUED | OUTPATIENT
Start: 2020-12-22 | End: 2021-01-01 | Stop reason: HOSPADM

## 2020-12-22 RX ORDER — LACTOBACILLUS RHAMNOSUS GG 10B CELL
1 CAPSULE ORAL 2 TIMES DAILY
Status: DISCONTINUED | OUTPATIENT
Start: 2020-12-22 | End: 2021-01-01 | Stop reason: HOSPADM

## 2020-12-22 RX ORDER — LEVOCETIRIZINE DIHYDROCHLORIDE 5 MG/1
5 TABLET, FILM COATED ORAL EVERY EVENING
Status: DISCONTINUED | OUTPATIENT
Start: 2020-12-22 | End: 2020-12-22

## 2020-12-22 RX ORDER — SODIUM CHLORIDE 9 MG/ML
INJECTION, SOLUTION INTRAVENOUS CONTINUOUS
Status: DISCONTINUED | OUTPATIENT
Start: 2020-12-22 | End: 2020-12-26

## 2020-12-22 RX ORDER — NALOXONE HYDROCHLORIDE 0.4 MG/ML
0.2 INJECTION, SOLUTION INTRAMUSCULAR; INTRAVENOUS; SUBCUTANEOUS
Status: DISCONTINUED | OUTPATIENT
Start: 2020-12-22 | End: 2021-01-01 | Stop reason: HOSPADM

## 2020-12-22 RX ORDER — LEVOCETIRIZINE DIHYDROCHLORIDE 5 MG/1
5 TABLET, FILM COATED ORAL EVERY EVENING
Status: DISCONTINUED | OUTPATIENT
Start: 2020-12-23 | End: 2021-01-01 | Stop reason: HOSPADM

## 2020-12-22 RX ORDER — KETOROLAC TROMETHAMINE 15 MG/ML
15 INJECTION, SOLUTION INTRAMUSCULAR; INTRAVENOUS ONCE
Status: COMPLETED | OUTPATIENT
Start: 2020-12-22 | End: 2020-12-22

## 2020-12-22 RX ORDER — LANOLIN ALCOHOL/MO/W.PET/CERES
3 CREAM (GRAM) TOPICAL
Status: DISCONTINUED | OUTPATIENT
Start: 2020-12-22 | End: 2021-01-01 | Stop reason: HOSPADM

## 2020-12-22 RX ORDER — CEFTRIAXONE SODIUM 2 G/50ML
2 INJECTION, SOLUTION INTRAVENOUS ONCE
Status: COMPLETED | OUTPATIENT
Start: 2020-12-22 | End: 2020-12-22

## 2020-12-22 RX ORDER — FLUOXETINE 10 MG/1
10 CAPSULE ORAL DAILY
Status: DISCONTINUED | OUTPATIENT
Start: 2020-12-23 | End: 2021-01-01 | Stop reason: HOSPADM

## 2020-12-22 RX ORDER — DEXAMETHASONE SODIUM PHOSPHATE 10 MG/ML
6 INJECTION, SOLUTION INTRAMUSCULAR; INTRAVENOUS ONCE
Status: COMPLETED | OUTPATIENT
Start: 2020-12-22 | End: 2020-12-22

## 2020-12-22 RX ORDER — DOXYCYCLINE 100 MG/1
100 CAPSULE ORAL EVERY 12 HOURS SCHEDULED
Status: COMPLETED | OUTPATIENT
Start: 2020-12-22 | End: 2021-01-01

## 2020-12-22 RX ORDER — ONDANSETRON 4 MG/1
4 TABLET, ORALLY DISINTEGRATING ORAL EVERY 6 HOURS PRN
Status: DISCONTINUED | OUTPATIENT
Start: 2020-12-22 | End: 2021-01-01 | Stop reason: HOSPADM

## 2020-12-22 RX ORDER — ANASTROZOLE 1 MG/1
1 TABLET ORAL DAILY
Status: DISCONTINUED | OUTPATIENT
Start: 2020-12-23 | End: 2021-01-01 | Stop reason: HOSPADM

## 2020-12-22 RX ORDER — HYDROCODONE BITARTRATE AND ACETAMINOPHEN 5; 325 MG/1; MG/1
1 TABLET ORAL ONCE
Status: COMPLETED | OUTPATIENT
Start: 2020-12-22 | End: 2020-12-22

## 2020-12-22 RX ORDER — NALOXONE HYDROCHLORIDE 0.4 MG/ML
0.4 INJECTION, SOLUTION INTRAMUSCULAR; INTRAVENOUS; SUBCUTANEOUS
Status: DISCONTINUED | OUTPATIENT
Start: 2020-12-22 | End: 2021-01-01 | Stop reason: HOSPADM

## 2020-12-22 RX ORDER — AMOXICILLIN 250 MG
1 CAPSULE ORAL 2 TIMES DAILY
Status: DISCONTINUED | OUTPATIENT
Start: 2020-12-22 | End: 2020-12-31

## 2020-12-22 RX ORDER — LIDOCAINE 40 MG/G
CREAM TOPICAL
Status: DISCONTINUED | OUTPATIENT
Start: 2020-12-22 | End: 2021-01-01 | Stop reason: HOSPADM

## 2020-12-22 RX ORDER — ONDANSETRON 2 MG/ML
4 INJECTION INTRAMUSCULAR; INTRAVENOUS EVERY 6 HOURS PRN
Status: DISCONTINUED | OUTPATIENT
Start: 2020-12-22 | End: 2021-01-01 | Stop reason: HOSPADM

## 2020-12-22 RX ORDER — ACETAMINOPHEN 325 MG/1
650 TABLET ORAL EVERY 4 HOURS PRN
Status: DISCONTINUED | OUTPATIENT
Start: 2020-12-22 | End: 2021-01-01 | Stop reason: HOSPADM

## 2020-12-22 RX ADMIN — HYDROCODONE BITARTRATE AND ACETAMINOPHEN 1 TABLET: 5; 325 TABLET ORAL at 16:53

## 2020-12-22 RX ADMIN — Medication 1 CAPSULE: at 22:11

## 2020-12-22 RX ADMIN — DEXAMETHASONE SODIUM PHOSPHATE 6 MG: 10 INJECTION, SOLUTION INTRAMUSCULAR; INTRAVENOUS at 22:10

## 2020-12-22 RX ADMIN — HYDROMORPHONE HYDROCHLORIDE 0.5 MG: 1 INJECTION, SOLUTION INTRAMUSCULAR; INTRAVENOUS; SUBCUTANEOUS at 20:38

## 2020-12-22 RX ADMIN — ENOXAPARIN SODIUM 100 MG: 100 INJECTION SUBCUTANEOUS at 22:17

## 2020-12-22 RX ADMIN — DOXYCYCLINE HYCLATE 100 MG: 100 CAPSULE ORAL at 22:11

## 2020-12-22 RX ADMIN — CEFTRIAXONE SODIUM 2 G: 2 INJECTION, SOLUTION INTRAVENOUS at 17:50

## 2020-12-22 RX ADMIN — SODIUM CHLORIDE 1000 ML: 9 INJECTION, SOLUTION INTRAVENOUS at 22:08

## 2020-12-22 RX ADMIN — SENNOSIDES AND DOCUSATE SODIUM 1 TABLET: 8.6; 5 TABLET ORAL at 22:11

## 2020-12-22 RX ADMIN — SODIUM CHLORIDE 1000 ML: 9 INJECTION, SOLUTION INTRAVENOUS at 16:17

## 2020-12-22 RX ADMIN — HYDROMORPHONE HYDROCHLORIDE 0.5 MG: 1 INJECTION, SOLUTION INTRAMUSCULAR; INTRAVENOUS; SUBCUTANEOUS at 18:25

## 2020-12-22 RX ADMIN — SODIUM CHLORIDE: 9 INJECTION, SOLUTION INTRAVENOUS at 18:26

## 2020-12-22 RX ADMIN — KETOROLAC TROMETHAMINE 15 MG: 15 INJECTION, SOLUTION INTRAMUSCULAR; INTRAVENOUS at 22:14

## 2020-12-22 ASSESSMENT — ENCOUNTER SYMPTOMS
SORE THROAT: 0
SHORTNESS OF BREATH: 1
NAUSEA: 0
CHILLS: 1
NUMBNESS: 0
DIARRHEA: 1
NECK PAIN: 1
DIZZINESS: 1
RHINORRHEA: 0
FEVER: 1
VOMITING: 1
FATIGUE: 1
COUGH: 0

## 2020-12-22 NOTE — ED NOTES
DATE:  12/22/2020   TIME OF RECEIPT FROM LAB:  1751  LAB TEST:  lactic  LAB VALUE:  3.1  RESULTS GIVEN WITH READ-BACK TO (PROVIDER):  Jhonatan Navarro PA-C  TIME LAB VALUE REPORTED TO PROVIDER:   6530

## 2020-12-22 NOTE — ED TRIAGE NOTES
Pt is here with c/o SOB, chest pain on the left side of body  Pain is not resolved with tylenol and ibu  Pt was tested for covid yesterday and awaiting results  Pt notes she has a lung issue   Pt has hx of getting pneumonia  Tylenol and ibu help bring down temps per pt

## 2020-12-22 NOTE — ED PROVIDER NOTES
History     Chief Complaint   Patient presents with     Chest Wall Pain     HPI  Denise Taylor is a 65 year old female who presents to urgent care for evaluation of chest wall pain.  Patient states that today she developed some left-sided neck, shoulder/scapula pain, and left-sided rib pain.  Since this past Sunday she has had fever, shortness of breath, chills, vomiting, diarrhea, dizziness.  Patient does have a pending Covid test with no results yet.  Patient denies any history of asthma, COPD, smoking, decrease in taste or smell, or known exposures to Covid 19.  His cardiac history patient states that October 2019 patient was diagnosed with breast cancer and had a double mastectomy with no chemo or radiation treatments.    Allergies:  Allergies   Allergen Reactions     Penicillins Rash     Zithromax [Azithromycin] Rash       Problem List:    Patient Active Problem List    Diagnosis Date Noted     Acute renal failure (H) 12/22/2020     Priority: Medium     LLL pneumonia 12/22/2020     Priority: Medium     Sepsis (H) 12/22/2020     Priority: Medium     Seasonal allergies 10/09/2020     Priority: Medium     Migraine without status migrainosus, not intractable, unspecified migraine type 10/09/2020     Priority: Medium     History of breast cancer 10/09/2019     Priority: Medium     Invasive lobular carcinoma of right breast in female (H) 08/28/2019     Priority: Medium     5.12.2020- Review and distribute treatment summary- [facility]       Malignant neoplasm of right female breast, unspecified estrogen receptor status, unspecified site of breast (H) 08/22/2019     Priority: Medium     Cigarette nicotine dependence in remission 08/22/2019     Priority: Medium     Recurrent pneumonia 08/22/2019     Priority: Medium     ACP (advance care planning) 07/27/2017     Priority: Medium     Advance Care Planning 7/27/2017: ACP Review of Chart / Resources Provided:  Reviewed chart for advance care plan.  Denise PEDRO  Brandon has no plan or code status on file. Discussed available resources and provided with information.   Added by Marah Henry             Acquired hypothyroidism 07/27/2017     Priority: Medium     Pneumonia 07/20/2015     Priority: Medium     History of basal cell carcinoma 05/18/2015     Priority: Medium     Hyperlipidemia with target LDL less than 130 05/18/2015     Priority: Medium     Diagnosis updated by automated process. Provider to review and confirm.       Actinic keratosis 12/01/2011     Priority: Medium     AC separation, type 5 06/27/2011     Priority: Medium     Overview:   IMO Update 10/11       Capillary disease 08/06/2007     Priority: Medium     Overview:   IMO Update 10/11       Other dyschromia 08/06/2007     Priority: Medium     Scar condition and fibrosis of skin 08/06/2007     Priority: Medium        Past Medical History:    Past Medical History:   Diagnosis Date     Basal cell carcinoma      Breast cancer (H) 07/2019     Bronchiectasis (H) 2019     Dyslipidemia      BERTA (generalized anxiety disorder)      Squamous cell carcinoma of skin 05/2020     Thyroid disease        Past Surgical History:    Past Surgical History:   Procedure Laterality Date     ARTHROSCOPY SHOULDER ROTATOR CUFF REPAIR Left 03/07/2018    Procedure: ARTHROSCOPY SHOULDER ROTATOR CUFF REPAIR;  LEFT SHOULDER ARTHROSCOPY, REPAIR ROTATOR CUFF: subacromial Decompression and AC Joint Resection;  Surgeon: Baldev Lou DO;  Location: [facility] OR     ARTHROTOMY SHOULDER, ROTATOR CUFF REPAIR, COMBINED Right 11/14/2018    Procedure: RIGHT SHOULDER DEBRIDEMENT, EXCISION OF LIPOMA  RIGHT UPPER ARM, EXCISION SCAR TISSUE AC JOINT;  Surgeon: Baldev Lou DO;  Location: [facility] OR     BIOPSY BREAST NEEDLE LOCALIZATION, BIOPSY NODE SENTINEL, COMBINED Right 08/23/2019    Procedure: RIGHT WIRE LOCALIZED LUMPECTOMY WITH SENTINEL  LYMP NODE;  Surgeon: Michael Dupree MD;  Location: [facility] OR     BREAST RECONSTRUCTION WITH DEEP INFERIOR  EPIGASTRIC  (AILYN) FLAP,  07/2020    done in [place]     COLONOSCOPY  2006    Dr Lara     COLONOSCOPY N/A 10/10/2016    Procedure: COLONOSCOPY;  Surgeon: Christine Cintron MD;  Location: [facility] OR     INSERT TISSUE EXPANDER BREAST BILATERAL Bilateral 10/09/2019    Procedure: BILATERAL TISSUE EXPANDERS (RAMIREZ);  Surgeon: Dale Paul MD;  Location: [facility] OR     MASTECTOMY, NIPPLE SPARING Bilateral 10/09/2019    Procedure: BILATERAL SKIN SPARRING MASTECTOMY (CASS);  Surgeon: Opal Rubin MD;  Location: [facility] OR     ORTHOPEDIC SURGERY Right     feet neuromas removed     right ankle ORIF  06/2020    Dr Torres     SHOULDER SURGERY Right 2011/10/2012       Family History:    Family History   Problem Relation Age of Onset     Myocardial Infarction Mother      Hypertension Mother      Coronary Artery Disease Mother      Hyperlipidemia Mother      Thyroid Disease Mother      Stomach Cancer Father      Coronary Artery Disease Father      Hypertension Father      Colon Cancer Father      Diabetes Paternal Grandmother      Heart Disease Maternal Grandmother      Heart Disease Maternal Grandfather      Diabetes Paternal Grandfather      Cerebrovascular Disease No family hx of      Breast Cancer No family hx of      Prostate Cancer No family hx of      Anesthesia Reaction No family hx of      Asthma No family hx of      Genetic Disorder No family hx of        Social History:  Marital Status:   [2]  Social History     Tobacco Use     Smoking status: Never Smoker     Smokeless tobacco: Never Used   Substance Use Topics     Alcohol use: Yes     Alcohol/week: 1.7 standard drinks     Types: 2 Glasses of wine per week     Frequency: 2-4 times a month     Drinks per session: 1 or 2     Comment: occasionally     Drug use: No        Medications:         anastrozole (ARIMIDEX) 1 MG tablet       calcium carbonate-vitamin D (OS-STEVE) 600-400 MG-UNIT chewable tablet       eletriptan (RELPAX) 40 MG tablet        FLUoxetine (PROZAC) 10 MG capsule       fluticasone (ARNUITY ELLIPTA) 200 MCG/ACT inhaler       levocetirizine (XYZAL) 5 MG tablet       levothyroxine (SYNTHROID/LEVOTHROID) 88 MCG tablet       rosuvastatin (CRESTOR) 10 MG tablet          Review of Systems   Constitutional: Positive for chills, fatigue and fever.   HENT: Negative for congestion, ear pain, rhinorrhea and sore throat.    Respiratory: Positive for shortness of breath. Negative for cough.    Cardiovascular: Positive for chest pain.   Gastrointestinal: Positive for diarrhea and vomiting. Negative for nausea.   Musculoskeletal: Positive for neck pain.   Neurological: Positive for dizziness. Negative for numbness.       Physical Exam   BP: 96/59  Pulse: 78  Temp: 99.7  F (37.6  C)  Resp: 16  SpO2: 95 %      Physical Exam  Vitals signs and nursing note reviewed.   Constitutional:       Appearance: She is ill-appearing.   HENT:      Right Ear: Tympanic membrane normal.      Left Ear: Tympanic membrane normal.      Mouth/Throat:      Pharynx: No oropharyngeal exudate or posterior oropharyngeal erythema.   Eyes:      Conjunctiva/sclera: Conjunctivae normal.      Pupils: Pupils are equal, round, and reactive to light.   Cardiovascular:      Rate and Rhythm: Regular rhythm.      Heart sounds: Normal heart sounds.   Pulmonary:      Effort: Accessory muscle usage present.      Breath sounds: Examination of the left-lower field reveals decreased breath sounds. Decreased breath sounds present.   Neurological:      Mental Status: She is alert and oriented to person, place, and time.         ED Course     ED Course as of Dec 22 1941   Tue Dec 22, 2020   1452 D-Dimer(!): 1.2     Procedures               EKG Interpretation:      Interpreted by Jhonatan Navarro PA-C  Time reviewed: 2:38pm  Symptoms at time of EKG: chest pain  Rhythm: normal sinus   Rate: normal  Axis: normal  Ectopy: none  Conduction: normal  ST Segments/ T Waves: Nonspecific ST-T wave abnormality  Q  Waves: none  Comparison to prior: Previous EKG(06/01/2020) showed Sinus Bradycardia, 45bpm    Clinical Impression: normal EKG    The Lactic acid level is elevated due to Sepsis, at this time there is no sign of severe sepsis or septic shock.   3 Hour Septic Shock Bundle Completion:  1. Initial Lactic Acid Result:   Recent Labs   Lab Test 12/22/20  1724 11/16/18  1225   LACT 3.1* 1.2     2. Blood Cultures before Antibiotics: Yes  3. Broad Spectrum Antibiotics Administered:  yes       Anti-infectives (From admission through now)    Start     Dose/Rate Route Frequency Ordered Stop    12/22/20 1540  cefTRIAXone IN D5W (ROCEPHIN) intermittent infusion 2 g      2 g  100 mL/hr over 30 Minutes Intravenous ONCE 12/22/20 1539 12/22/20 1826                 Results for orders placed or performed during the hospital encounter of 12/22/20 (from the past 24 hour(s))   CBC with platelets differential   Result Value Ref [facility]    WBC 16.9 (H) 4.0 - 11.0 10e9/L    RBC Count 4.50 3.8 - 5.2 10e12/L    Hemoglobin 13.6 11.7 - 15.7 g/dL    Hematocrit 40.1 35.0 - 47.0 %    MCV 89 78 - 100 fl    MCH 30.2 26.5 - 33.0 pg    MCHC 33.9 31.5 - 36.5 g/dL    RDW 13.4 10.0 - 15.0 %    Platelet Count 167 150 - 450 10e9/L    Diff Method Automated Method     % Neutrophils 89.2 %    % Lymphocytes 3.0 %    % Monocytes 5.4 %    % Eosinophils 0.4 %    % Basophils 0.5 %    % Immature Granulocytes 1.5 %    Nucleated RBCs 0 0 /100    Absolute Neutrophil 15.1 (H) 1.6 - 8.3 10e9/L    Absolute Lymphocytes 0.5 (L) 0.8 - 5.3 10e9/L    Absolute Monocytes 0.9 0.0 - 1.3 10e9/L    Absolute Eosinophils 0.1 0.0 - 0.7 10e9/L    Absolute Basophils 0.1 0.0 - 0.2 10e9/L    Abs Immature Granulocytes 0.3 0 - 0.4 10e9/L    Absolute Nucleated RBC 0.0    Troponin I   Result Value Ref [facility]    Troponin I ES <0.015 0.000 - 0.045 ug/L   Comprehensive metabolic panel   Result Value Ref Range    Sodium 138 133 - 144 mmol/L    Potassium 3.9 3.4 - 5.3 mmol/L    Chloride 103 94 - 109  mmol/L    Carbon Dioxide 25 20 - 32 mmol/L    Anion Gap 10 3 - 14 mmol/L    Glucose 112 (H) 70 - 99 mg/dL    Urea Nitrogen 34 (H) 7 - 30 mg/dL    Creatinine 1.57 (H) 0.52 - 1.04 mg/dL    GFR Estimate 34 (L) >60 mL/min/[1.73_m2]    GFR Estimate If Black 40 (L) >60 mL/min/[1.73_m2]    Calcium 9.2 8.5 - 10.1 mg/dL    Bilirubin Total 0.5 0.2 - 1.3 mg/dL    Albumin 3.4 3.4 - 5.0 g/dL    Protein Total 7.7 6.8 - 8.8 g/dL    Alkaline Phosphatase 76 40 - 150 U/L    ALT 29 0 - 50 U/L    AST 23 0 - 45 U/L   D dimer quantitative   Result Value Ref Range    D Dimer 1.2 (H) 0.0 - 0.50 ug/ml FEU   XR Chest Port 1 View    Narrative    Procedure:XR CHEST PORT 1 VW    Clinical history:Female, 65 years, SOB/ COVID pending    Technique: Single view was obtained.    Comparison: 10/2/2019    Findings: The cardiac silhouette is normal. The pulmonary vasculature  is normal.    The lungs demonstrate an area of increased density in the periphery of  the left hemithorax. Lung bases are clear as is the right lung. No  evidence of pleural effusion. Bony structures demonstrate  postoperative changes of the right shoulder consistent with  subacromial decompression.      Impression    Impression:   Peripheral density in the left hemithorax suggesting lingular  pneumonia versus pleural-based mass.    MYLES AVINA MD   Basic metabolic panel   Result Value Ref [facility]    Sodium 140 133 - 144 mmol/L    Potassium 3.9 3.4 - 5.3 mmol/L    Chloride 107 94 - 109 mmol/L    Carbon Dioxide 23 20 - 32 mmol/L    Anion Gap 10 3 - 14 mmol/L    Glucose 103 (H) 70 - 99 mg/dL    Urea Nitrogen 36 (H) 7 - 30 mg/dL    Creatinine 1.68 (H) 0.52 - 1.04 mg/dL    GFR Estimate 31 (L) >60 mL/min/[1.73_m2]    GFR Estimate If Black 36 (L) >60 mL/min/[1.73_m2]    Calcium 8.4 (L) 8.5 - 10.1 mg/dL   Lactic acid whole blood   Result Value Ref Range    Lactic Acid 3.1 (H) 0.7 - 2.0 mmol/L   Troponin I   Result Value Ref Range    Troponin I ES <0.015 0.000 - 0.045 ug/L        Medications   sodium chloride 0.9% infusion ( Intravenous New Bag 12/22/20 1826)   0.9% sodium chloride BOLUS (0 mLs Intravenous Stopped 12/22/20 1750)     Followed by   sodium chloride 0.9% infusion (has no administration in time range)   cefTRIAXone IN D5W (ROCEPHIN) intermittent infusion 2 g (0 g Intravenous Stopped 12/22/20 1826)   HYDROcodone-acetaminophen (NORCO) 5-325 MG per tablet 1 tablet (1 tablet Oral Given 12/22/20 1653)   HYDROmorphone (PF) (DILAUDID) injection 0.5 mg (0.5 mg Intravenous Given 12/22/20 1825)       Assessments & Plan (with Medical Decision Making)   #1.  LLL pneumonia  #2.  Sepsis  #3.  Acute kidney injury    GFR 31, creatinine 1.68, lactic acid 3.1, WBC 16.9, D-dimer 1.2  EKG normal, troponin x2 negative  Discussed exam findings as well as lab results and imaging results with patient.  Patient was admitted to hospital for left lower lobe pneumonia with sepsis and acute kidney injury.  Patient was accepted by hospitalist Dr. Sosa; report given.  Patient did note improvement of pain after IV Dilaudid.  Patient received ceftriaxone 2 g IV while in emergency department.  Patient verbalized understanding and agreement of plan.    I have reviewed the nursing notes.    I have reviewed the findings, diagnosis, plan and need for follow up with the patient.       ED to Inpatient Handoff:    Discussed with Dr Sosa at 7:15pm Patient accepted for Inpatient Stay  Pending studies include COVID-19, Blood Culture x2  Code Status: Not Addressed           New Prescriptions    No medications on file       Final diagnoses:   LLL pneumonia   Sepsis (H)   Acute renal failure (H)       12/22/2020   [facility]     Jhonatan Navarro PA-C  12/22/20 1941

## 2020-12-23 ENCOUNTER — APPOINTMENT (OUTPATIENT)
Dept: CT IMAGING | Facility: HOSPITAL | Age: 65
DRG: 871 | End: 2020-12-23
Attending: INTERNAL MEDICINE
Payer: MEDICARE

## 2020-12-23 LAB
ANION GAP SERPL CALCULATED.3IONS-SCNC: 9 MMOL/L (ref 3–14)
BUN SERPL-MCNC: 35 MG/DL (ref 7–30)
CALCIUM SERPL-MCNC: 7.7 MG/DL (ref 8.5–10.1)
CHLORIDE SERPL-SCNC: 109 MMOL/L (ref 94–109)
CO2 SERPL-SCNC: 21 MMOL/L (ref 20–32)
CREAT SERPL-MCNC: 1.42 MG/DL (ref 0.52–1.04)
ERYTHROCYTE [DISTWIDTH] IN BLOOD BY AUTOMATED COUNT: 13.6 % (ref 10–15)
GFR SERPL CREATININE-BSD FRML MDRD: 39 ML/MIN/{1.73_M2}
GLUCOSE SERPL-MCNC: 123 MG/DL (ref 70–99)
HCT VFR BLD AUTO: 33.8 % (ref 35–47)
HGB BLD-MCNC: 11.2 G/DL (ref 11.7–15.7)
LACTATE BLD-SCNC: 1.7 MMOL/L (ref 0.7–2)
MCH RBC QN AUTO: 29.9 PG (ref 26.5–33)
MCHC RBC AUTO-ENTMCNC: 33.1 G/DL (ref 31.5–36.5)
MCV RBC AUTO: 90 FL (ref 78–100)
PLATELET # BLD AUTO: 168 10E9/L (ref 150–450)
POTASSIUM SERPL-SCNC: 4 MMOL/L (ref 3.4–5.3)
RBC # BLD AUTO: 3.75 10E12/L (ref 3.8–5.2)
SARS-COV-2 RNA SPEC QL NAA+PROBE: NOT DETECTED
SODIUM SERPL-SCNC: 139 MMOL/L (ref 133–144)
SPECIMEN SOURCE: NORMAL
WBC # BLD AUTO: 21.5 10E9/L (ref 4–11)

## 2020-12-23 PROCEDURE — 250N000011 HC RX IP 250 OP 636: Performed by: INTERNAL MEDICINE

## 2020-12-23 PROCEDURE — 250N000011 HC RX IP 250 OP 636: Performed by: RADIOLOGY

## 2020-12-23 PROCEDURE — 83605 ASSAY OF LACTIC ACID: CPT | Performed by: INTERNAL MEDICINE

## 2020-12-23 PROCEDURE — 120N000001 HC R&B MED SURG/OB

## 2020-12-23 PROCEDURE — 36415 COLL VENOUS BLD VENIPUNCTURE: CPT | Performed by: INTERNAL MEDICINE

## 2020-12-23 PROCEDURE — 99232 SBSQ HOSP IP/OBS MODERATE 35: CPT | Performed by: INTERNAL MEDICINE

## 2020-12-23 PROCEDURE — 999N000157 HC STATISTIC RCP TIME EA 10 MIN

## 2020-12-23 PROCEDURE — 258N000003 HC RX IP 258 OP 636: Performed by: INTERNAL MEDICINE

## 2020-12-23 PROCEDURE — 85027 COMPLETE CBC AUTOMATED: CPT | Performed by: INTERNAL MEDICINE

## 2020-12-23 PROCEDURE — 71275 CT ANGIOGRAPHY CHEST: CPT

## 2020-12-23 PROCEDURE — 80048 BASIC METABOLIC PNL TOTAL CA: CPT | Performed by: INTERNAL MEDICINE

## 2020-12-23 PROCEDURE — 250N000013 HC RX MED GY IP 250 OP 250 PS 637: Performed by: INTERNAL MEDICINE

## 2020-12-23 PROCEDURE — 94640 AIRWAY INHALATION TREATMENT: CPT

## 2020-12-23 RX ORDER — CEFAZOLIN SODIUM 1 G/50ML
1250 SOLUTION INTRAVENOUS EVERY 24 HOURS
Status: DISCONTINUED | OUTPATIENT
Start: 2020-12-23 | End: 2020-12-24

## 2020-12-23 RX ORDER — IOPAMIDOL 755 MG/ML
100 INJECTION, SOLUTION INTRAVASCULAR ONCE
Status: COMPLETED | OUTPATIENT
Start: 2020-12-23 | End: 2020-12-23

## 2020-12-23 RX ADMIN — VANCOMYCIN HYDROCHLORIDE 1250 MG: 1 INJECTION, POWDER, LYOPHILIZED, FOR SOLUTION INTRAVENOUS at 10:01

## 2020-12-23 RX ADMIN — SODIUM CHLORIDE: 9 INJECTION, SOLUTION INTRAVENOUS at 01:22

## 2020-12-23 RX ADMIN — SENNOSIDES AND DOCUSATE SODIUM 1 TABLET: 8.6; 5 TABLET ORAL at 20:25

## 2020-12-23 RX ADMIN — FLUTICASONE FUROATE 1 PUFF: 200 POWDER RESPIRATORY (INHALATION) at 08:04

## 2020-12-23 RX ADMIN — LEVOTHYROXINE SODIUM 88 MCG: 0.09 TABLET ORAL at 09:12

## 2020-12-23 RX ADMIN — SENNOSIDES AND DOCUSATE SODIUM 1 TABLET: 8.6; 5 TABLET ORAL at 09:13

## 2020-12-23 RX ADMIN — OXYCODONE HYDROCHLORIDE 5 MG: 5 TABLET ORAL at 00:53

## 2020-12-23 RX ADMIN — OXYCODONE HYDROCHLORIDE 10 MG: 5 TABLET ORAL at 16:19

## 2020-12-23 RX ADMIN — CEFTRIAXONE SODIUM 1 G: 1 INJECTION, SOLUTION INTRAVENOUS at 18:18

## 2020-12-23 RX ADMIN — FLUOXETINE 10 MG: 10 CAPSULE ORAL at 09:12

## 2020-12-23 RX ADMIN — IOPAMIDOL 75 ML: 755 INJECTION, SOLUTION INTRAVENOUS at 09:46

## 2020-12-23 RX ADMIN — DOXYCYCLINE HYCLATE 100 MG: 100 CAPSULE ORAL at 09:13

## 2020-12-23 RX ADMIN — OXYCODONE HYDROCHLORIDE 5 MG: 5 TABLET ORAL at 01:30

## 2020-12-23 RX ADMIN — Medication 1 CAPSULE: at 09:13

## 2020-12-23 RX ADMIN — OXYCODONE HYDROCHLORIDE 10 MG: 5 TABLET ORAL at 20:24

## 2020-12-23 RX ADMIN — DOXYCYCLINE HYCLATE 100 MG: 100 CAPSULE ORAL at 20:25

## 2020-12-23 RX ADMIN — LEVOCETIRIZINE DIHYDROCHLORIDE 5 MG: 5 TABLET ORAL at 20:24

## 2020-12-23 RX ADMIN — ENOXAPARIN SODIUM 40 MG: 40 INJECTION SUBCUTANEOUS at 20:25

## 2020-12-23 RX ADMIN — Medication 1 CAPSULE: at 20:25

## 2020-12-23 RX ADMIN — SODIUM CHLORIDE: 9 INJECTION, SOLUTION INTRAVENOUS at 09:06

## 2020-12-23 RX ADMIN — SODIUM CHLORIDE: 9 INJECTION, SOLUTION INTRAVENOUS at 20:24

## 2020-12-23 RX ADMIN — ANASTROZOLE 1 MG: 1 TABLET ORAL at 09:14

## 2020-12-23 ASSESSMENT — ACTIVITIES OF DAILY LIVING (ADL)
ADLS_ACUITY_SCORE: 12
ADLS_ACUITY_SCORE: 10
ADLS_ACUITY_SCORE: 12

## 2020-12-23 NOTE — PROGRESS NOTES
Assessment completed by face to face with patient.    LOC: alert and oriented    Dx: Acute Renal failure, LLL pneumonia, sepsis  Chronic Disease Management: Hypothyroidism, Hx of basal cell carcinoma,  Malignant neoplasm of right female breast, recurrent pneumonia    Lives with: spouse/Sergio  Living at:  Home in [place]  Transportation: YES , drives independently, but will get a ride home from spouse/Sergio    Primary PCP: Mirna Roblero  Insurance:  Medicare, [facility]  Medicare IM letter reviewed with patient.    Support System:  family    : NO      How was the [facility] notification completed: N/A    Health Care Directive: On File  Primary Health Care Agent: Sergio Taylor/spouse    Pharmacy: [facility]  Meds management: Independently    Adequate Resources for needs (housing, utilities, food/med): YES  Household chores: Independent    ADLs: Independent  Ambulation:Independent  Falls: None in last six months  Nutrition: No concerns voiced  Sleep: Sleeps in a regular bed    Equipment used: None      Oxygen supplier: N/A      Does the supplier have valid oxygen orders: N/A    Mental health: No concerns voiced  Substance abuse: None, occasional drink    Stressors: illness, pandemic virus    Able to Return to Prior Living Arrangements: YES    Barriers: none anticipated    OG: Low    Plan: Return to home via spouse/Sergio to provide transportation.

## 2020-12-23 NOTE — H&P
[facility]    History and Physical - Hospitalist Service       Date of Admission:  12/22/2020    Assessment & Plan   Denise Taylor is a 65 year old female admitted on 12/22/2020.      Left ligular CAP: empiric antibiotics, blood cultures pending, RT treatments    Associated acute kidney injury (chronic stage 2 to acute stage 3): likely dehydration due to the illness. IV hydration, trend renal function    Elevated lactic acid: sepsis criteria not met. IV fluids and trend lactic acid    Elevated D-dimmer and abnormal chest film. Hydrate to improve renal performance, then CTA chest. One dose of treatment Lovenox    Check UA         Diet:   combination  DVT Prophylaxis: Enoxaparin (Lovenox) SQ  Madden Catheter: not present  Code Status:   Full  Rule Out COVID-19 Handoff:  unsure    Disposition Plan   Expected discharge: 2 - 3 days, recommended to prior living arrangement once discharge plan established.  Entered: Dhruv Sosa DO 12/22/2020, 8:17 PM     The patient's care was discussed with the Patient.    Dhruv Sosa DO  [facility]  Contact information available via [facility] Paging/Directory      ______________________________________________________________________    Chief Complaint   Shortness of breath, fatigue, left chest pain    History is obtained from the patient and UC Provider    History of Present Illness   Denise Taylor is a 65 year old female who has had left breast cancer with bilateral mastectomy as treatment; she is followed by Oncology    Last week, she had cold-like symptoms (sore throat, achy) and made arrangements for COVID testing when the left chest pain and shortness of breath developed yesterday (pending in this EHR 12/21/2020). Since the symptoms seemed to be worsening, she presented for evaluation.    UC Course: vitals were normal, with one episode of tachypnea due to the shallow breathing because of the chest pain with inhalation. She was in no distress  in [facility] and not moving. Lab diagnostics resulted an elevated WBC (16.9) with left shift. The chemistry panel was notable for the acute decrease in renal performance. Troponin was normal and D-dimmer was elevated (1.2), as was lactic acid (3.1) chest film was abnormal with possible left lingular CAP. She was given IV fluids and antibioitcs    Review of Systems  before last week, she was feeling at baseline     Constitutional: No fever or chills, some generalized weakness, loss of appetite  Ears, Nose & Throat: no sore throat, no nasal drainage, no congestion. No ear pain, no ear drainage, no particular change in hearing  Eyes: no particular change in vision, no redness, no drainage  Cardiovascular: left chest pain at rest, worsened with inhalation and position changes with radiation around to the left back left arm and face  no chest pain with familiar activities.  Pulmonary: No cough,  Increase  in work of breathing, more shortness of breath with position changes or familiar activites  Gastrointestinal: No abdominal pain, no nausea, no vomiting, no diarrhea. No particular change in bowel movement pattern, no black stools, no bloody stools  Genitourinary: No particular change in incontinence, no pain with urination, no particular change in stream, no particular change in amount urinated with urge, no discharge  Skin: No particular change in bruising, no rashes  Musculoskeletal: no particular change in strength, no particular change in muscle development  Neurological: no numbness and tingling, no headache, no particular change in balance, no dizziness  Psychologic: No particular change in depression and/or anxiety  Endocrine: No particular change in heat or cold intolerance        Past Medical History    I have reviewed this patient's medical history and updated it with pertinent information if needed.   Past Medical History:   Diagnosis Date     Basal cell carcinoma      Breast cancer (H) 07/2019    right breast      Bronchiectasis (H) 2019     Dyslipidemia      BERTA (generalized anxiety disorder)      Squamous cell carcinoma of skin 05/2020    right lower leg     Thyroid disease        Past Surgical History   I have reviewed this patient's surgical history and updated it with pertinent information if needed.  Past Surgical History:   Procedure Laterality Date     ARTHROSCOPY SHOULDER ROTATOR CUFF REPAIR Left 03/07/2018    Procedure: ARTHROSCOPY SHOULDER ROTATOR CUFF REPAIR;  LEFT SHOULDER ARTHROSCOPY, REPAIR ROTATOR CUFF: subacromial Decompression and AC Joint Resection;  Surgeon: Baldev Lou DO;  Location: [facility] OR     ARTHROTOMY SHOULDER, ROTATOR CUFF REPAIR, COMBINED Right 11/14/2018    Procedure: RIGHT SHOULDER DEBRIDEMENT, EXCISION OF LIPOMA  RIGHT UPPER ARM, EXCISION SCAR TISSUE AC JOINT;  Surgeon: Baldev Lou DO;  Location: [facility] OR     BIOPSY BREAST NEEDLE LOCALIZATION, BIOPSY NODE SENTINEL, COMBINED Right 08/23/2019    Procedure: RIGHT WIRE LOCALIZED LUMPECTOMY WITH SENTINEL  LYMP NODE;  Surgeon: Michael Dupree MD;  Location: [facility] OR     BREAST RECONSTRUCTION WITH DEEP INFERIOR EPIGASTRIC  (AILYN) FLAP,  07/2020    done in [place]     COLONOSCOPY  2006    Dr Lara     COLONOSCOPY N/A 10/10/2016    Procedure: COLONOSCOPY;  Surgeon: Christine Cintron MD;  Location: [facility] OR     INSERT TISSUE EXPANDER BREAST BILATERAL Bilateral 10/09/2019    Procedure: BILATERAL TISSUE EXPANDERS (RAMIREZ);  Surgeon: Dale Paul MD;  Location:  OR     MASTECTOMY, NIPPLE SPARING Bilateral 10/09/2019    Procedure: BILATERAL SKIN SPARRING MASTECTOMY (CASS);  Surgeon: Opal Rubin MD;  Location:  OR     ORTHOPEDIC SURGERY Right     feet neuromas removed     right ankle ORIF  06/2020    Dr Torres     SHOULDER SURGERY Right 2011/10/2012       Social History   I have reviewed this patient's social history and updated it with pertinent information if needed.  Social History     Tobacco Use     Smoking status:  Never Smoker     Smokeless tobacco: Never Used   Substance Use Topics     Alcohol use: Yes     Alcohol/week: 1.7 standard drinks     Types: 2 Glasses of wine per week     Frequency: 2-4 times a month     Drinks per session: 1 or 2     Comment: occasionally     Drug use: No       Family History   I have reviewed this patient's family history and updated it with pertinent information if needed.  Family History   Problem Relation Age of Onset     Myocardial Infarction Mother      Hypertension Mother      Coronary Artery Disease Mother      Hyperlipidemia Mother      Thyroid Disease Mother      Stomach Cancer Father      Coronary Artery Disease Father      Hypertension Father      Colon Cancer Father      Diabetes Paternal Grandmother      Heart Disease Maternal Grandmother      Heart Disease Maternal Grandfather      Diabetes Paternal Grandfather      Cerebrovascular Disease No family hx of      Breast Cancer No family hx of      Prostate Cancer No family hx of      Anesthesia Reaction No family hx of      Asthma No family hx of      Genetic Disorder No family hx of        Prior to Admission Medications   Prior to Admission Medications   Prescriptions Last Dose Informant Patient Reported? Taking?   FLUoxetine (PROZAC) 10 MG capsule  Self No No   Sig: Take 1 capsule (10 mg) by mouth daily   anastrozole (ARIMIDEX) 1 MG tablet  Self No No   Sig: TAKE 1 TABLET(1 MG) BY MOUTH DAILY   calcium carbonate-vitamin D (OS-STEVE) 600-400 MG-UNIT chewable tablet  Self No No   Sig: Take 1 chew tab by mouth 2 times daily (with meals)   eletriptan (RELPAX) 40 MG tablet  Self No No   Sig: Take 1 tablet (40 mg) by mouth once as needed for migraine   fluticasone (ARNUITY ELLIPTA) 200 MCG/ACT inhaler  Self Yes No   Sig: Inhale 1 puff into the lungs daily   levocetirizine (XYZAL) 5 MG tablet  Self No No   Sig: TAKE 1 TABLET(5 MG) BY MOUTH EVERY EVENING   levothyroxine (SYNTHROID/LEVOTHROID) 88 MCG tablet  Self No No   Sig: Take 1 tablet (88  "mcg) by mouth daily   rosuvastatin (CRESTOR) 10 MG tablet  Self No No   Sig: Take 1 tablet (10 mg) by mouth daily      Facility-Administered Medications: None     Allergies   Allergies   Allergen Reactions     Penicillins Rash     Zithromax [Azithromycin] Rash       Physical Exam   Vital Signs: Temp: 99.6  F (37.6  C) Temp src: Tympanic BP: (!) 95/42 Pulse: 64   Resp: (!) 37 SpO2: 93 % O2 Device: None (Room air)    Weight: 0 lbs 0 oz     Case reviewed with the  Provider, EHR reviewed; patient seen in  room 1    Vital signs:  Temp: 99.6  F (37.6  C) Temp src: Tympanic BP: (!) 95/42 Pulse: 64   Resp: (!) 37 SpO2: 93 % O2 Device: None (Room air)        Estimated body mass index is 23.3 kg/m  as calculated from the following:    Height as of 10/9/20: 1.651 m (5' 5\").    Weight as of 12/21/20: 63.5 kg (140 lb).      General: No distress, interactive. More short of breath with pain exacerbation (inhaling increases the most)  Head: normocephalic, no obvious trauma  Eyes: Gaze directed normally, sclera clear, no discharge, no abnormal ocular movements  Ears: Normal appearing age-related external ears, no discharge, stable hearing acuity loss  Nose: Normal age-related appearance  Mouth: Normal appearing oral mucosa, Gums and throat appear age-related normal  Neck: Normal age-related appearance, age-related range of motion, supple, no adenopathy  Pulmonary: shallow, normal work of breathing, no expiratory delay, no coarseness, no wheezing, diminished left, in general  Cardiovascular: Distant heart sounds, regular rhythm   Abdomen: No obvious distention, soft, bowel sounds present with normal frequency and pitch  Rectal: Deferred  Back: Age-related normal  Skin: Age-related normal, no rashes  Extremities: Not tender, no lower extremity edema. Moving upper and lower extremities  Neurological: Grossly in tact  Psychiatric: Mood is stable, appropriately interactive        Data   Data reviewed today: I reviewed all " medications, new labs and imaging results over the last 24 hours

## 2020-12-23 NOTE — PLAN OF CARE
VSS. Reports of pain in chest that radiates to left shoulder and back. Scheduled medications given for pain control. Alert and orientated. Walked from stretcher to bed as stand by assist. Call light within reach and makes needs known.     Face to face report given with opportunity to observe patient.    Report given to Nel WILL & Wanda Gupta RN   12/22/2020  11:28 PM

## 2020-12-23 NOTE — PHARMACY-VANCOMYCIN DOSING SERVICE
Pharmacy Vancomycin Initial Note  Date of Service 2020  Patient's  1955  65 year old, female    Indication: Bacteremia    Current estimated CrCl = Estimated Creatinine Clearance: 40.5 mL/min (A) (based on SCr of 1.42 mg/dL (H)).    Creatinine for last 3 days  2020:  2:33 PM Creatinine 1.57 mg/dL;  5:24 PM Creatinine 1.68 mg/dL  2020:  5:49 AM Creatinine 1.42 mg/dL    Recent Vancomycin Level(s) for last 3 days  No results found for requested labs within last 72 hours.      Vancomycin IV Administrations (past 72 hours)      No vancomycin orders with administrations in past 72 hours.                Nephrotoxins and other renal medications (From now, onward)    Start     Dose/Rate Route Frequency Ordered Stop    20 1000  vancomycin (VANCOCIN) 1,250 mg in sodium chloride 0.9 % 250 mL intermittent infusion      1,250 mg  over 90 Minutes Intravenous EVERY 24 HOURS 20 0911            Contrast Orders - past 72 hours (72h ago, onward)    Start     Dose/Rate Route Frequency Ordered Stop    20 0830  iopamidol (ISOVUE-370) solution 100 mL      100 mL Intravenous ONCE 20 0820 20 0946                Plan:  1.  Start vancomycin  1250 mg IV q24h.   2.  Goal Trough Level: 15-20 mg/L   3.  Pharmacy will check trough levels as appropriate in 3-5 Days.    4. Serum creatinine levels will be ordered daily for the first week of therapy and at least twice weekly for subsequent weeks.    5. [place] method utilized to dose vancomycin therapy: Method 2    Tashia Koo, [facility]

## 2020-12-23 NOTE — PROVIDER NOTIFICATION
DATE:  12/23/2020   TIME OF RECEIPT FROM LAB:  0627  LAB TEST:  Blood cultures  LAB VALUE:  1/2 gram + cocci in chains  RESULTS GIVEN WITH READ-BACK TO (PROVIDER):  Gerry Contreras MD  TIME LAB VALUE REPORTED TO PROVIDER:   0637

## 2020-12-23 NOTE — PLAN OF CARE
Face to face report given with opportunity to observe patient.    Report given to Mckenzie Armas RN   12/23/2020  3:16 PM

## 2020-12-23 NOTE — PLAN OF CARE
Most recent vitals: /59   Pulse 66   Temp 98.5  F (36.9  C) (Tympanic)   Resp 18   Wt 65 kg (143 lb 4.8 oz)   SpO2 93%   BMI 23.85 kg/m      Alert and oriented. Patient complains of 8/10 pain in her left chest that radiates to her left shoulder and back. Given oxycodone 5 mg, patient requests the other 5 mg about 30 minutes later. Lungs are expiratory wheezy with fine crackles in the middle and lower bases. Productive cough present. O2 was 86% on room air. 1L of oxygen applied, O2 sats in the low to mid 90s. Denies nausea and shortness of breath. States she hasn't had any loose stools but she did yesterday. NS running at 125 ml/hr. Patient ambulated to the bathroom and back into bed as a standby assist. Steady on her feet.     Face to face report given with opportunity to observe patient.    Report given to Fabiola Tanner RN   12/23/2020  7:16 AM

## 2020-12-23 NOTE — PROGRESS NOTES
[facility]    Medicine Progress Note - Hospitalist Service       Date of Admission:  12/22/2020  Assessment & Plan       Denise Taylor is a 65 year old female admitted on 12/22/2020.      Left ligular CAP:   Patient appears to be improving with hydration and antibiotics.  We will continue with the current regimen and follow patient's symptoms.    Bacteremia with gram-positive cocci in chains  Patient blood culture is growing gram-positive cocci in chains.  We will follow the identification and sensitivity and adjust antibiotics accordingly.    Lactic acidosis due to sepsis with GPC  Resolved with hydration and abx    Associated acute kidney injury (chronic stage 2 to acute stage 3):   Likely to be prerenal due to illness.  Renal function is improving this morning.    Elevated lactic acid:   Follow lactic acid with hydration    Elevated D-dimmer and abnormal chest film.   Patient will undergo CT angio of the chest to evaluate for PE.       Diet: Combination Diet Low Saturated Fat Na <2400mg Diet    DVT Prophylaxis: Enoxaparin (Lovenox) SQ  Madden Catheter: not present  Code Status: Full Code           Disposition Plan   Expected discharge: 2 - 3 days, recommended to prior living arrangement once antibiotic plan established, O2 use less than 1 liters/minute and SIRS/Sepsis treated.  Entered: Gerry Contreras MD 12/23/2020, 8:42 AM       The patient's care was discussed with the Patient.    Gerry Contreras MD  Hospitalist Service  [facility]  Contact information available via [facility] Paging/Directory    ______________________________________________________________________    Interval History   Patient reports that her cough is improving but pleuritic chest pain is continuing.  She also reports dyspnea but denies fever, chills, nausea, vomiting, abdominal pain, lower extremity edema.    Data reviewed today: I reviewed all medications, new labs and imaging results over the last 24 hours. I personally  reviewed the chest CT image(s) showing Pending.    Physical Exam   Vital Signs: Temp: 98.4  F (36.9  C) Temp src: Tympanic BP: 116/56 Pulse: 67   Resp: 18 SpO2: 93 % O2 Device: Nasal cannula Oxygen Delivery: 1 LPM  Weight: 143 lbs 4.78 oz  General Appearance: In no acute distress  Respiratory: Bibasilar crackles, no wheezing  Cardiovascular: S1-S2, regular rhythm and rate  GI: Soft, nontender, nondistended  Skin: Intact  Other: Extremity: No edema, neuro: Nonfocal    Data   Recent Labs   Lab 12/23/20  0549 12/22/20  1725 12/22/20  1724 12/22/20  1433   WBC 21.5*  --   --  16.9*   HGB 11.2*  --   --  13.6   MCV 90  --   --  89     --   --  167     --  140 138   POTASSIUM 4.0  --  3.9 3.9   CHLORIDE 109  --  107 103   CO2 21  --  23 25   BUN 35*  --  36* 34*   CR 1.42*  --  1.68* 1.57*   ANIONGAP 9  --  10 10   STEVE 7.7*  --  8.4* 9.2   *  --  103* 112*   ALBUMIN  --   --   --  3.4   PROTTOTAL  --   --   --  7.7   BILITOTAL  --   --   --  0.5   ALKPHOS  --   --   --  76   ALT  --   --   --  29   AST  --   --   --  23   TROPI  --  <0.015  --  <0.015     Recent Results (from the past 24 hour(s))   XR Chest Port 1 View    Narrative    Procedure:XR CHEST PORT 1 VW    Clinical history:Female, 65 years, SOB/ COVID pending    Technique: Single view was obtained.    Comparison: 10/2/2019    Findings: The cardiac silhouette is normal. The pulmonary vasculature  is normal.    The lungs demonstrate an area of increased density in the periphery of  the left hemithorax. Lung bases are clear as is the right lung. No  evidence of pleural effusion. Bony structures demonstrate  postoperative changes of the right shoulder consistent with  subacromial decompression.      Impression    Impression:   Peripheral density in the left hemithorax suggesting lingular  pneumonia versus pleural-based mass.    MYLES AVINA MD     Medications     sodium chloride 125 mL/hr at 12/23/20 0122       anastrozole  1 mg Oral Daily      cefTRIAXone  1 g Intravenous Q24H     doxycycline hyclate  100 mg Oral Q12H LETICIA     enoxaparin ANTICOAGULANT  40 mg Subcutaneous Q24H     FLUoxetine  10 mg Oral Daily     fluticasone  1 puff Inhalation Daily     iopamidol  100 mL Intravenous Once     lactobacillus rhamnosus (GG)  1 capsule Oral BID     levocetirizine  5 mg Oral QPM     levothyroxine  88 mcg Oral Daily     senna-docusate  1 tablet Oral BID    Or     senna-docusate  2 tablet Oral BID     sodium chloride (PF)  100 mL Intravenous Once     sodium chloride (PF)  3 mL Intracatheter Q8H

## 2020-12-23 NOTE — PLAN OF CARE
[facility] Inpatient Admission Note:    Patient admitted to 3222/3222-1 at approximately 2115 via wheel chair accompanied by transport tech from emergency room . Report received from ER staff in SBAR format at 2105via telephone. Patient ambulated to bed via self.. Patient is alert and oriented X 3, reports pain; rates at 6 on 0-10 scale.  Patient oriented to room, unit, hourly rounding, and plan of care. Explained admission packet and patient handbook with patient bill of rights brochure. Will continue to monitor and document as needed.     Inpatient Nursing criteria listed below was met:    Health care directives status obtained and documented: Yes    Care Everywhere authorization obtained No    MRSA swab completed for patient 65 years and older: N/A    Patient identifies a surrogate decision maker:yes If yes, who:see index Contact Information:see index     If initial lactic acid >2.0, repeat lactic acid drawn within one hour of arrival to unit: NA. If no, state reason: LA was 2.0-not >2.0    Vaccination assessment and education completed: Yes   Vaccinations received prior to admission: Pneumovax yes  Influenza(seasonal)  YES   Vaccination(s) ordered: not given today because up to date    Clergy visit ordered if patient requests: N/A    Skin issues/needs documented: N/A    Isolation Patient: no Education given, correct sign in place and documentation row added to PCS:  No    Fall Prevention N/A: Care plan updated, education given and documented, sticker and magnet in place: N/A    Care Plan initiated: Yes    Education Documented (including assessment): Yes    Patient has discharge needs : No If yes, please explain:

## 2020-12-23 NOTE — PROVIDER NOTIFICATION
DATE:  12/23/2020   TIME OF RECEIPT FROM LAB:  0840  LAB TEST:  Blood cultures  LAB VALUE:  2 more bottles Gram positive cocci in chains  RESULTS GIVEN WITH READ-BACK TO (PROVIDER): Dr Contreras  TIME LAB VALUE REPORTED TO PROVIDER:   0888

## 2020-12-23 NOTE — DISCHARGE INSTRUCTIONS
What to expect when you have contrast    During your exam, we will inject  contrast  into your vein or artery. (Contrast is a clear liquid with iodine in it. It shows up on X-rays.)    You may feel warm or hot. You may have a metal taste in your mouth and a slight upset stomach. You may also feel pressure near the kidneys and bladder. These effects will last about 1 to 3 minutes.    Please tell us if you have:    Sneezing     Itching    Hives     Swelling in the face    A hoarse voice    Breathing problems    Other new symptoms    Serious problems are rare.  They may include:    Irregular heartbeat     Seizures    Kidney failure              Tissue damage    Shock      Death    If you have any problems during the exam, we  will treat them right away.    When you get home    Call your hospital if you have any new symptoms in the next 2 days, like hives or swelling. (Phone numbers are at the bottom of this page.) Or call your family doctor.     If you have wheezing or trouble breathing, call 911.    Self-care  -Drink at least 4 extra glasses of water today.   This reduces the stress on your kidneys.  -Keep taking your regular medicines.    The contrast will pass out of your body in your  Urine(pee). This will happen in the next 24 hours. You  will not feel this. Your urine will not  change color.    If you have kidney problems or take metformin    Drink 4 to 8 large glasses of water for the next  2 days, if you are not on a fluid restriction.    ?If you take metformin (Glucophage or Glucovance) for diabetes, keep taking it.      ?Your kidney function tests are abnormal.  If you take Metformin, do not take it for 48 hours. Please go to your clinic for a blood test within 3 days after your exam before the restarting this medicine.     (Note to provider:please give patient prescription for lab tests.)    ?Special instructions: -    I have read and understand the above information.    Patient Sign  Here:______________________________________Date:________Time:______    Staff Sign Here:________________________________________Date:_______Time:______      Radiology Departments:     ?[facility]: [phone] ?[place]: [phone]     ?[place]: [phone] ?[place]:[phone]      ?[place]: [phone]  ?[place]: [phone]  ?[place]:[phone]    ?[facility] [place]:[phone]  ?[facility] [place]:[phone]

## 2020-12-24 ENCOUNTER — APPOINTMENT (OUTPATIENT)
Dept: CT IMAGING | Facility: HOSPITAL | Age: 65
DRG: 871 | End: 2020-12-24
Attending: INTERNAL MEDICINE
Payer: MEDICARE

## 2020-12-24 LAB
ALBUMIN SERPL-MCNC: 2.2 G/DL (ref 3.4–5)
ANION GAP SERPL CALCULATED.3IONS-SCNC: 9 MMOL/L (ref 3–14)
ANION GAP SERPL CALCULATED.3IONS-SCNC: 9 MMOL/L (ref 3–14)
BACTERIA SPEC CULT: NORMAL
BASOPHILS # BLD AUTO: 0.1 10E9/L (ref 0–0.2)
BASOPHILS # BLD AUTO: 0.1 10E9/L (ref 0–0.2)
BASOPHILS NFR BLD AUTO: 0.4 %
BASOPHILS NFR BLD AUTO: 0.6 %
BUN SERPL-MCNC: 16 MG/DL (ref 7–30)
BUN SERPL-MCNC: 24 MG/DL (ref 7–30)
CALCIUM SERPL-MCNC: 8.1 MG/DL (ref 8.5–10.1)
CALCIUM SERPL-MCNC: 8.6 MG/DL (ref 8.5–10.1)
CHLORIDE SERPL-SCNC: 109 MMOL/L (ref 94–109)
CHLORIDE SERPL-SCNC: 112 MMOL/L (ref 94–109)
CO2 SERPL-SCNC: 21 MMOL/L (ref 20–32)
CO2 SERPL-SCNC: 21 MMOL/L (ref 20–32)
CREAT SERPL-MCNC: 0.66 MG/DL (ref 0.52–1.04)
CREAT SERPL-MCNC: 0.71 MG/DL (ref 0.52–1.04)
DIFFERENTIAL METHOD BLD: ABNORMAL
DIFFERENTIAL METHOD BLD: ABNORMAL
EOSINOPHIL # BLD AUTO: 0 10E9/L (ref 0–0.7)
EOSINOPHIL # BLD AUTO: 0.1 10E9/L (ref 0–0.7)
EOSINOPHIL NFR BLD AUTO: 0 %
EOSINOPHIL NFR BLD AUTO: 0.3 %
ERYTHROCYTE [DISTWIDTH] IN BLOOD BY AUTOMATED COUNT: 14 % (ref 10–15)
ERYTHROCYTE [DISTWIDTH] IN BLOOD BY AUTOMATED COUNT: 14.1 % (ref 10–15)
GFR SERPL CREATININE-BSD FRML MDRD: 89 ML/MIN/{1.73_M2}
GFR SERPL CREATININE-BSD FRML MDRD: >90 ML/MIN/{1.73_M2}
GLUCOSE SERPL-MCNC: 110 MG/DL (ref 70–99)
GLUCOSE SERPL-MCNC: 135 MG/DL (ref 70–99)
HCT VFR BLD AUTO: 31 % (ref 35–47)
HCT VFR BLD AUTO: 33.1 % (ref 35–47)
HGB BLD-MCNC: 10.4 G/DL (ref 11.7–15.7)
HGB BLD-MCNC: 11.2 G/DL (ref 11.7–15.7)
IMM GRANULOCYTES # BLD: 0.2 10E9/L (ref 0–0.4)
IMM GRANULOCYTES # BLD: 0.4 10E9/L (ref 0–0.4)
IMM GRANULOCYTES NFR BLD: 1.2 %
IMM GRANULOCYTES NFR BLD: 1.7 %
LYMPHOCYTES # BLD AUTO: 1 10E9/L (ref 0.8–5.3)
LYMPHOCYTES # BLD AUTO: 1.4 10E9/L (ref 0.8–5.3)
LYMPHOCYTES NFR BLD AUTO: 5 %
LYMPHOCYTES NFR BLD AUTO: 6.2 %
MAGNESIUM SERPL-MCNC: 2.3 MG/DL (ref 1.6–2.3)
MCH RBC QN AUTO: 29.9 PG (ref 26.5–33)
MCH RBC QN AUTO: 30 PG (ref 26.5–33)
MCHC RBC AUTO-ENTMCNC: 33.5 G/DL (ref 31.5–36.5)
MCHC RBC AUTO-ENTMCNC: 33.8 G/DL (ref 31.5–36.5)
MCV RBC AUTO: 89 FL (ref 78–100)
MCV RBC AUTO: 89 FL (ref 78–100)
MONOCYTES # BLD AUTO: 0.7 10E9/L (ref 0–1.3)
MONOCYTES # BLD AUTO: 0.8 10E9/L (ref 0–1.3)
MONOCYTES NFR BLD AUTO: 3.5 %
MONOCYTES NFR BLD AUTO: 3.9 %
NEUTROPHILS # BLD AUTO: 17.1 10E9/L (ref 1.6–8.3)
NEUTROPHILS # BLD AUTO: 20.6 10E9/L (ref 1.6–8.3)
NEUTROPHILS NFR BLD AUTO: 88 %
NEUTROPHILS NFR BLD AUTO: 89.2 %
NRBC # BLD AUTO: 0 10*3/UL
NRBC # BLD AUTO: 0 10*3/UL
NRBC BLD AUTO-RTO: 0 /100
NRBC BLD AUTO-RTO: 0 /100
PHOSPHATE SERPL-MCNC: 2.4 MG/DL (ref 2.5–4.5)
PLATELET # BLD AUTO: 164 10E9/L (ref 150–450)
PLATELET # BLD AUTO: 166 10E9/L (ref 150–450)
POTASSIUM SERPL-SCNC: 3.8 MMOL/L (ref 3.4–5.3)
POTASSIUM SERPL-SCNC: 3.9 MMOL/L (ref 3.4–5.3)
RBC # BLD AUTO: 3.47 10E12/L (ref 3.8–5.2)
RBC # BLD AUTO: 3.74 10E12/L (ref 3.8–5.2)
SODIUM SERPL-SCNC: 139 MMOL/L (ref 133–144)
SODIUM SERPL-SCNC: 142 MMOL/L (ref 133–144)
SPECIMEN SOURCE: NORMAL
WBC # BLD AUTO: 19.2 10E9/L (ref 4–11)
WBC # BLD AUTO: 19.3 10E9/L (ref 4–11)
WBC # BLD AUTO: 23.3 10E9/L (ref 4–11)

## 2020-12-24 PROCEDURE — 36415 COLL VENOUS BLD VENIPUNCTURE: CPT | Performed by: INTERNAL MEDICINE

## 2020-12-24 PROCEDURE — 250N000011 HC RX IP 250 OP 636: Performed by: INTERNAL MEDICINE

## 2020-12-24 PROCEDURE — 85025 COMPLETE CBC W/AUTO DIFF WBC: CPT | Performed by: INTERNAL MEDICINE

## 2020-12-24 PROCEDURE — 80048 BASIC METABOLIC PNL TOTAL CA: CPT | Performed by: INTERNAL MEDICINE

## 2020-12-24 PROCEDURE — 85027 COMPLETE CBC AUTOMATED: CPT | Performed by: INTERNAL MEDICINE

## 2020-12-24 PROCEDURE — 120N000001 HC R&B MED SURG/OB

## 2020-12-24 PROCEDURE — 258N000003 HC RX IP 258 OP 636: Performed by: INTERNAL MEDICINE

## 2020-12-24 PROCEDURE — 999N000157 HC STATISTIC RCP TIME EA 10 MIN

## 2020-12-24 PROCEDURE — 71260 CT THORAX DX C+: CPT

## 2020-12-24 PROCEDURE — 85048 AUTOMATED LEUKOCYTE COUNT: CPT | Performed by: INTERNAL MEDICINE

## 2020-12-24 PROCEDURE — 85004 AUTOMATED DIFF WBC COUNT: CPT | Performed by: INTERNAL MEDICINE

## 2020-12-24 PROCEDURE — 250N000013 HC RX MED GY IP 250 OP 250 PS 637: Performed by: INTERNAL MEDICINE

## 2020-12-24 PROCEDURE — 99233 SBSQ HOSP IP/OBS HIGH 50: CPT | Performed by: INTERNAL MEDICINE

## 2020-12-24 PROCEDURE — 80069 RENAL FUNCTION PANEL: CPT | Performed by: INTERNAL MEDICINE

## 2020-12-24 PROCEDURE — 255N000002 HC RX 255 OP 636: Performed by: INTERNAL MEDICINE

## 2020-12-24 PROCEDURE — 83735 ASSAY OF MAGNESIUM: CPT | Performed by: INTERNAL MEDICINE

## 2020-12-24 RX ORDER — IOPAMIDOL 612 MG/ML
100 INJECTION, SOLUTION INTRAVASCULAR ONCE
Status: COMPLETED | OUTPATIENT
Start: 2020-12-24 | End: 2020-12-24

## 2020-12-24 RX ORDER — VANCOMYCIN HYDROCHLORIDE 1 G/200ML
1000 INJECTION, SOLUTION INTRAVENOUS EVERY 12 HOURS
Status: DISCONTINUED | OUTPATIENT
Start: 2020-12-24 | End: 2020-12-26

## 2020-12-24 RX ADMIN — OXYCODONE HYDROCHLORIDE 10 MG: 5 TABLET ORAL at 17:25

## 2020-12-24 RX ADMIN — SENNOSIDES AND DOCUSATE SODIUM 1 TABLET: 8.6; 5 TABLET ORAL at 07:54

## 2020-12-24 RX ADMIN — DOXYCYCLINE HYCLATE 100 MG: 100 CAPSULE ORAL at 20:17

## 2020-12-24 RX ADMIN — FLUOXETINE 10 MG: 10 CAPSULE ORAL at 07:57

## 2020-12-24 RX ADMIN — VANCOMYCIN HYDROCHLORIDE 1000 MG: 1 INJECTION, SOLUTION INTRAVENOUS at 09:58

## 2020-12-24 RX ADMIN — FLUTICASONE FUROATE 1 PUFF: 200 POWDER RESPIRATORY (INHALATION) at 08:00

## 2020-12-24 RX ADMIN — OXYCODONE HYDROCHLORIDE 10 MG: 5 TABLET ORAL at 13:49

## 2020-12-24 RX ADMIN — ENOXAPARIN SODIUM 40 MG: 40 INJECTION SUBCUTANEOUS at 20:18

## 2020-12-24 RX ADMIN — VANCOMYCIN HYDROCHLORIDE 1000 MG: 1 INJECTION, SOLUTION INTRAVENOUS at 20:23

## 2020-12-24 RX ADMIN — IOPAMIDOL 100 ML: 612 INJECTION, SOLUTION INTRAVENOUS at 23:12

## 2020-12-24 RX ADMIN — Medication 1 CAPSULE: at 07:54

## 2020-12-24 RX ADMIN — SODIUM CHLORIDE, PRESERVATIVE FREE 500 ML: 5 INJECTION INTRAVENOUS at 23:40

## 2020-12-24 RX ADMIN — METRONIDAZOLE 500 MG: 500 INJECTION, SOLUTION INTRAVENOUS at 21:41

## 2020-12-24 RX ADMIN — Medication 1 CAPSULE: at 20:17

## 2020-12-24 RX ADMIN — SODIUM CHLORIDE: 9 INJECTION, SOLUTION INTRAVENOUS at 04:24

## 2020-12-24 RX ADMIN — LEVOCETIRIZINE DIHYDROCHLORIDE 5 MG: 5 TABLET ORAL at 20:17

## 2020-12-24 RX ADMIN — DOXYCYCLINE HYCLATE 100 MG: 100 CAPSULE ORAL at 07:53

## 2020-12-24 RX ADMIN — ANASTROZOLE 1 MG: 1 TABLET ORAL at 07:55

## 2020-12-24 RX ADMIN — OXYCODONE HYDROCHLORIDE 10 MG: 5 TABLET ORAL at 20:16

## 2020-12-24 RX ADMIN — OXYCODONE HYDROCHLORIDE 10 MG: 5 TABLET ORAL at 04:24

## 2020-12-24 RX ADMIN — OXYCODONE HYDROCHLORIDE 10 MG: 5 TABLET ORAL at 09:27

## 2020-12-24 RX ADMIN — LEVOTHYROXINE SODIUM 88 MCG: 0.09 TABLET ORAL at 07:59

## 2020-12-24 RX ADMIN — SENNOSIDES AND DOCUSATE SODIUM 2 TABLET: 8.6; 5 TABLET ORAL at 20:16

## 2020-12-24 RX ADMIN — CEFTRIAXONE SODIUM 1 G: 1 INJECTION, SOLUTION INTRAVENOUS at 17:25

## 2020-12-24 ASSESSMENT — ACTIVITIES OF DAILY LIVING (ADL)
ADLS_ACUITY_SCORE: 12

## 2020-12-24 NOTE — PLAN OF CARE
/91   Pulse 72   Temp 97.9  F (36.6  C) (Tympanic)   Resp 20   Wt 65 kg (143 lb 4.8 oz)   SpO2 (!) 91%   BMI 23.85 kg/m        Pt A&O x4. Reports L sided chest/side pain that radiates to L arm/shoulder and back. Roxicodone administered x1. Up to bathroom with assistance with IV pole. Oxygen via NC increased to 3.5LPM d/t sats decreasing to 87-89% at times during the night. CASTILLO. Coarse crackles to BLL. TCDB encouraged.     IVF NS @ 125/hr. Bed in lowest position and call light in reach.     Face to face report given with opportunity to observe patient.    Report given to NICOLETTE Amor RN   12/24/2020  7:29 AM

## 2020-12-24 NOTE — PROGRESS NOTES
[facility]    Medicine Progress Note - Hospitalist Service       Date of Admission:  12/22/2020  Assessment & Plan             Denise Taylor is a 65 year old female admitted on 12/22/2020.      Left ligular CAP:   Patient appears to be improving with hydration and antibiotics.  We will continue with the current regimen and follow patient's symptoms.  December 24: Patient's left flank pain is concerning.  We will evaluate this with CT scan with IV contrast tomorrow.    Bacteremia with gram-positive cocci in chains  Patient: Blood culture is growing beta-hemolytic Streptococcus.  Vancomycin was added and we will follow the identification and sensitivity and adjust antibiotics accordingly.    Associated acute kidney injury (chronic stage 2 to acute stage 3):   Likely to be prerenal due to illness.  Renal function is now normal    Elevated lactic acid:   Follow lactic acid with hydration    Elevated D-dimmer and abnormal chest film.   Patient will undergo CT angio of the chest to evaluate for PE.       Diet: Combination Diet Low Saturated Fat Na <2400mg Diet    DVT Prophylaxis: Enoxaparin (Lovenox) SQ  Madden Catheter: not present  Code Status: Full Code         Disposition Plan   Expected discharge: 4 - 7 days, recommended to prior living arrangement once antibiotic plan established, renal function improved and SIRS/Sepsis treated.  Entered: Gerry Contreras MD 12/24/2020, 5:41 PM     The patient's care was discussed with the Patient.    Gerry Contreras MD  Hospitalist Service  [facility]  Contact information available via [facility] Paging/Directory  ______________________________________________________________________    Interval History   Patient reports feeling slightly better but left flank pain is persisting.  Persistent cough and pleuritic left-sided pain, still low appetite.  Patient denies fever, chills, nausea, vomiting, dysuria, diarrhea.      Data reviewed today: I reviewed all medications,  new labs and imaging results over the last 24 hours. I personally reviewed no images or EKG's today.    Physical Exam   Vital Signs: Temp: 99.3  F (37.4  C) Temp src: Temporal BP: 150/88 Pulse: 81   Resp: 18 SpO2: (!) 91 % O2 Device: Nasal cannula Oxygen Delivery: 3 LPM  Weight: 143 lbs 4.78 oz  General Appearance: In mild distress from the pain  Respiratory: Bibasilar crackles, no wheezing  Cardiovascular: S1-S2, regular rhythm and rate, no murmurs rubs or gallops  GI: Soft, left flank tenderness without guarding or rebound  Skin: Intact  Other: Neuro: Nonfocal    Data   Recent Labs   Lab 12/24/20  1813 12/24/20  0621 12/23/20  0549 12/22/20  1725 12/22/20  1433 12/22/20  1433   WBC 23.3* 19.2*  19.3* 21.5*  --   --  16.9*   HGB 11.2* 10.4* 11.2*  --   --  13.6   MCV 89 89 90  --   --  89    164 168  --   --  167    142 139  --    < > 138   POTASSIUM 3.8 3.9 4.0  --    < > 3.9   CHLORIDE 109 112* 109  --    < > 103   CO2 21 21 21  --    < > 25   BUN 16 24 35*  --    < > 34*   CR 0.66 0.71 1.42*  --    < > 1.57*   ANIONGAP 9 9 9  --    < > 10   STEVE 8.6 8.1* 7.7*  --    < > 9.2   * 110* 123*  --    < > 112*   ALBUMIN  --  2.2*  --   --   --  3.4   PROTTOTAL  --   --   --   --   --  7.7   BILITOTAL  --   --   --   --   --  0.5   ALKPHOS  --   --   --   --   --  76   ALT  --   --   --   --   --  29   AST  --   --   --   --   --  23   TROPI  --   --   --  <0.015  --  <0.015    < > = values in this interval not displayed.     No results found for this or any previous visit (from the past 24 hour(s)).  Medications     sodium chloride 75 mL/hr at 12/24/20 0746       anastrozole  1 mg Oral Daily     cefTRIAXone  1 g Intravenous Q24H     doxycycline hyclate  100 mg Oral Q12H LETICIA     enoxaparin ANTICOAGULANT  40 mg Subcutaneous Q24H     FLUoxetine  10 mg Oral Daily     fluticasone  1 puff Inhalation Daily     lactobacillus rhamnosus (GG)  1 capsule Oral BID     levocetirizine  5 mg Oral QPM      levothyroxine  88 mcg Oral Daily     senna-docusate  1 tablet Oral BID    Or     senna-docusate  2 tablet Oral BID     sodium chloride (PF)  3 mL Intracatheter Q8H     vancomycin (VANCOCIN) IV  1,000 mg Intravenous Q12H

## 2020-12-24 NOTE — PLAN OF CARE
Patient is alert and oriented, makes needs known. Up to bathroom with SBA. PRN Roxicodone given x2 for pain. IV and PO antibiotics given as ordered. Oxygen increased to 2L per patient request, satting low 90's.  /73   Pulse 70   Temp 98.7  F (37.1  C) (Temporal)   Resp 20   Wt 65 kg (143 lb 4.8 oz)   SpO2 95%   BMI 23.85 kg/m      Face to face report given with opportunity to observe patient.    Report given to NICOLETTE Luz RN   12/23/2020  11:08 PM

## 2020-12-24 NOTE — PHARMACY-VANCOMYCIN DOSING SERVICE
Increased vancomycin dose from 1250mg IV q24h to 1000mg IV q12h due to vastly improved renal function. Pharmacy will continue to follow.  Tashia Koo, PharmD  December 24, 2020

## 2020-12-24 NOTE — PROVIDER NOTIFICATION
DATE:  12/23/2020   TIME OF RECEIPT FROM LAB:  2020  LAB TEST:  Anarobic culture bottle 2 of 2  LAB VALUE:  Gram + cocci in chains  RESULTS GIVEN WITH READ-BACK TO (PROVIDER):  Dr. Sosa  TIME LAB VALUE REPORTED TO PROVIDER:   2030

## 2020-12-24 NOTE — PLAN OF CARE
Patient c/o left side chest pain 7/10 after ambulating to bathroom. Reduced to 3-4/10 with PO Roxicodone.  Patient alert and oriented, uses call light appropriately, makes needs known.  Lung  sounds coarse. Patient frustrated with lack of progress, requested MD visit to answer questions. MD aware.  Right arm IV saline locked; Left forearm IV infusing.  Vital signs and assessment as charted.  Call light within reach.    Face to face report given with opportunity to observe patient.    Report given to NICOLETTE Rincon RN   12/24/2020  4:02 PM

## 2020-12-25 LAB
ALBUMIN SERPL-MCNC: 2.2 G/DL (ref 3.4–5)
ANION GAP SERPL CALCULATED.3IONS-SCNC: 8 MMOL/L (ref 3–14)
BACTERIA SPEC CULT: ABNORMAL
BUN SERPL-MCNC: 12 MG/DL (ref 7–30)
CALCIUM SERPL-MCNC: 8.3 MG/DL (ref 8.5–10.1)
CHLORIDE SERPL-SCNC: 107 MMOL/L (ref 94–109)
CO2 SERPL-SCNC: 22 MMOL/L (ref 20–32)
CREAT SERPL-MCNC: 0.6 MG/DL (ref 0.52–1.04)
ERYTHROCYTE [DISTWIDTH] IN BLOOD BY AUTOMATED COUNT: 14 % (ref 10–15)
GFR SERPL CREATININE-BSD FRML MDRD: >90 ML/MIN/{1.73_M2}
GLUCOSE SERPL-MCNC: 102 MG/DL (ref 70–99)
HCT VFR BLD AUTO: 32.2 % (ref 35–47)
HGB BLD-MCNC: 10.9 G/DL (ref 11.7–15.7)
MAGNESIUM SERPL-MCNC: 1.8 MG/DL (ref 1.6–2.3)
MCH RBC QN AUTO: 30.1 PG (ref 26.5–33)
MCHC RBC AUTO-ENTMCNC: 33.9 G/DL (ref 31.5–36.5)
MCV RBC AUTO: 89 FL (ref 78–100)
NT-PROBNP SERPL-MCNC: ABNORMAL PG/ML (ref 0–900)
PHOSPHATE SERPL-MCNC: 2.4 MG/DL (ref 2.5–4.5)
PLATELET # BLD AUTO: 160 10E9/L (ref 150–450)
POTASSIUM SERPL-SCNC: 3.6 MMOL/L (ref 3.4–5.3)
RBC # BLD AUTO: 3.62 10E12/L (ref 3.8–5.2)
SODIUM SERPL-SCNC: 137 MMOL/L (ref 133–144)
SPECIMEN SOURCE: ABNORMAL
SPECIMEN SOURCE: ABNORMAL
WBC # BLD AUTO: 18.2 10E9/L (ref 4–11)

## 2020-12-25 PROCEDURE — 250N000011 HC RX IP 250 OP 636: Performed by: INTERNAL MEDICINE

## 2020-12-25 PROCEDURE — 36415 COLL VENOUS BLD VENIPUNCTURE: CPT | Performed by: INTERNAL MEDICINE

## 2020-12-25 PROCEDURE — 85027 COMPLETE CBC AUTOMATED: CPT | Performed by: INTERNAL MEDICINE

## 2020-12-25 PROCEDURE — 80069 RENAL FUNCTION PANEL: CPT | Performed by: INTERNAL MEDICINE

## 2020-12-25 PROCEDURE — 250N000013 HC RX MED GY IP 250 OP 250 PS 637: Performed by: INTERNAL MEDICINE

## 2020-12-25 PROCEDURE — 83735 ASSAY OF MAGNESIUM: CPT | Performed by: INTERNAL MEDICINE

## 2020-12-25 PROCEDURE — 83880 ASSAY OF NATRIURETIC PEPTIDE: CPT | Performed by: INTERNAL MEDICINE

## 2020-12-25 PROCEDURE — 258N000003 HC RX IP 258 OP 636: Performed by: INTERNAL MEDICINE

## 2020-12-25 PROCEDURE — 87040 BLOOD CULTURE FOR BACTERIA: CPT | Performed by: INTERNAL MEDICINE

## 2020-12-25 PROCEDURE — 120N000001 HC R&B MED SURG/OB

## 2020-12-25 PROCEDURE — 999N000157 HC STATISTIC RCP TIME EA 10 MIN

## 2020-12-25 PROCEDURE — 99233 SBSQ HOSP IP/OBS HIGH 50: CPT | Performed by: INTERNAL MEDICINE

## 2020-12-25 RX ORDER — CEFTRIAXONE SODIUM 2 G/50ML
2 INJECTION, SOLUTION INTRAVENOUS EVERY 24 HOURS
Status: DISCONTINUED | OUTPATIENT
Start: 2020-12-26 | End: 2020-12-28

## 2020-12-25 RX ORDER — CEFTRIAXONE SODIUM 1 G/50ML
1 INJECTION, SOLUTION INTRAVENOUS ONCE
Status: COMPLETED | OUTPATIENT
Start: 2020-12-25 | End: 2020-12-25

## 2020-12-25 RX ADMIN — Medication 1 CAPSULE: at 12:15

## 2020-12-25 RX ADMIN — SENNOSIDES AND DOCUSATE SODIUM 1 TABLET: 8.6; 5 TABLET ORAL at 08:58

## 2020-12-25 RX ADMIN — OXYCODONE HYDROCHLORIDE 10 MG: 5 TABLET ORAL at 17:03

## 2020-12-25 RX ADMIN — OXYCODONE HYDROCHLORIDE 10 MG: 5 TABLET ORAL at 09:18

## 2020-12-25 RX ADMIN — Medication 1 CAPSULE: at 19:46

## 2020-12-25 RX ADMIN — ENOXAPARIN SODIUM 40 MG: 40 INJECTION SUBCUTANEOUS at 20:51

## 2020-12-25 RX ADMIN — FLUTICASONE FUROATE 1 PUFF: 200 POWDER RESPIRATORY (INHALATION) at 09:01

## 2020-12-25 RX ADMIN — LEVOTHYROXINE SODIUM 88 MCG: 0.09 TABLET ORAL at 08:58

## 2020-12-25 RX ADMIN — VANCOMYCIN HYDROCHLORIDE 1000 MG: 1 INJECTION, SOLUTION INTRAVENOUS at 20:51

## 2020-12-25 RX ADMIN — DOXYCYCLINE HYCLATE 100 MG: 100 CAPSULE ORAL at 08:58

## 2020-12-25 RX ADMIN — OXYCODONE HYDROCHLORIDE 10 MG: 5 TABLET ORAL at 03:59

## 2020-12-25 RX ADMIN — METRONIDAZOLE 500 MG: 500 INJECTION, SOLUTION INTRAVENOUS at 09:19

## 2020-12-25 RX ADMIN — CEFTRIAXONE SODIUM 1 G: 1 INJECTION, SOLUTION INTRAVENOUS at 19:46

## 2020-12-25 RX ADMIN — CEFTRIAXONE SODIUM 1 G: 1 INJECTION, SOLUTION INTRAVENOUS at 17:51

## 2020-12-25 RX ADMIN — OXYCODONE HYDROCHLORIDE 10 MG: 5 TABLET ORAL at 22:11

## 2020-12-25 RX ADMIN — METRONIDAZOLE 500 MG: 500 INJECTION, SOLUTION INTRAVENOUS at 19:30

## 2020-12-25 RX ADMIN — OXYCODONE HYDROCHLORIDE 10 MG: 5 TABLET ORAL at 14:12

## 2020-12-25 RX ADMIN — LEVOCETIRIZINE DIHYDROCHLORIDE 5 MG: 5 TABLET ORAL at 20:51

## 2020-12-25 RX ADMIN — FLUOXETINE 10 MG: 10 CAPSULE ORAL at 08:58

## 2020-12-25 RX ADMIN — SENNOSIDES AND DOCUSATE SODIUM 1 TABLET: 8.6; 5 TABLET ORAL at 20:51

## 2020-12-25 RX ADMIN — ANASTROZOLE 1 MG: 1 TABLET ORAL at 08:59

## 2020-12-25 RX ADMIN — DOXYCYCLINE HYCLATE 100 MG: 100 CAPSULE ORAL at 19:46

## 2020-12-25 RX ADMIN — VANCOMYCIN HYDROCHLORIDE 1000 MG: 1 INJECTION, SOLUTION INTRAVENOUS at 10:39

## 2020-12-25 RX ADMIN — SODIUM CHLORIDE: 9 INJECTION, SOLUTION INTRAVENOUS at 14:13

## 2020-12-25 ASSESSMENT — ACTIVITIES OF DAILY LIVING (ADL)
ADLS_ACUITY_SCORE: 13
ADLS_ACUITY_SCORE: 12

## 2020-12-25 NOTE — PLAN OF CARE
Face to face report given with opportunity to observe patient.    Report given to NICOLETTE Amor   12/25/2020  7:26 AM

## 2020-12-25 NOTE — PLAN OF CARE
Pt A&O x4, VSS on 3L of O2 via NC. C/o pain in rt ribs, prn oxycodone in use. Pt has slept since 0300, no new changes.

## 2020-12-25 NOTE — PLAN OF CARE
Patient is alert and oriented, makes needs known. Up to bathroom with minimal assist. Pain controlled with PRN Roxicodone. Crackles bilaterally. CT scan done this afternoon. Bolus given after CT contrast. /94   Pulse 79   Temp 98.9  F (37.2  C) (Temporal)   Resp 20   Wt 65 kg (143 lb 4.8 oz)   SpO2 (!) 91%   BMI 23.85 kg/m      Face to face report given with opportunity to observe patient.    Report given to NICOLETTE Petersen RN   12/25/2020  3:03 AM

## 2020-12-25 NOTE — PLAN OF CARE
Patient c/o left side chest pain 6-7/10 twice on shift. Roxicodone 10 mg reduced to 4-5/10.  Continues on 3L O2 keeping sats above 92%. Fine crackles heard throughout lungs; coarse left upper.  Vital signs and assessment as charted.  Patient alert and oriented, uses call light appropriately, makes needs known. IV infusing left arm NS at 75 ml/hr. IV right arm saline locked.  Call light within reach.   Face to face report given with opportunity to observe patient.    Report given to NICOLETTE Reyez RN   12/25/2020  3:42 PM

## 2020-12-25 NOTE — PHARMACY
Pharmacy Antimicrobial Stewardship Assessment     Current Antimicrobial Therapy:  Anti-infectives (From now, onward)    Start     Dose/Rate Route Frequency Ordered Stop    12/24/20 2100  metroNIDAZOLE (FLAGYL) infusion 500 mg      500 mg  100 mL/hr over 60 Minutes Intravenous EVERY 12 HOURS 12/24/20 2046 12/24/20 0900  vancomycin (VANCOCIN) 1000 mg in dextrose 5% 200 mL PREMIX      1,000 mg  200 mL/hr over 1 Hours Intravenous EVERY 12 HOURS 12/24/20 0837      12/23/20 1800  cefTRIAXone in d5w (ROCEPHIN) intermittent infusion 1 g      1 g  over 30 Minutes Intravenous EVERY 24 HOURS 12/22/20 2136 12/22/20 2200  doxycycline hyclate (VIBRAMYCIN) capsule 100 mg      100 mg Oral EVERY 12 HOURS SCHEDULED 12/22/20 2136            Indication: Sepsis, CAP    Days of Therapy: 4 (Rocephin, doxycycline), 3 (vanco), 2 (Flagyl)     Pertinent Labs:  Creatinine   Date Value Ref Range Status   12/25/2020 0.60 0.52 - 1.04 mg/dL Final   12/24/2020 0.66 0.52 - 1.04 mg/dL Final   12/24/2020 0.71 0.52 - 1.04 mg/dL Final     WBC   Date Value Ref Range Status   12/25/2020 18.2 (H) 4.0 - 11.0 10e9/L Final   12/24/2020 23.3 (H) 4.0 - 11.0 10e9/L Final   12/24/2020 19.3 (H) 4.0 - 11.0 10e9/L Final   12/24/2020 19.2 (H) 4.0 - 11.0 10e9/L Final     No results found for: PCAL  CRP Inflammation   Date Value Ref Range Status   04/02/2018 37.6 (H) 0.0 - 8.0 mg/L Final   01/11/2018 36.0 (H) 0.0 - 8.0 mg/L Final   10/11/2017 7.1 0.0 - 8.0 mg/L Final       Culture Results: 12/22/20-COVID negative  12/22/20 (blood)-   12/22/20 (urine)-<10,000 colonies/mL     Recommendations/Interventions:  1. Strep pyogenes identified is sensitive to Rocephin, thus recommend discontinuing vancomycin if appropriate.    Antonio Karimi, Pharm D.  December 25, 2020

## 2020-12-26 LAB
ALBUMIN SERPL-MCNC: 2.3 G/DL (ref 3.4–5)
ANION GAP SERPL CALCULATED.3IONS-SCNC: 8 MMOL/L (ref 3–14)
BUN SERPL-MCNC: 7 MG/DL (ref 7–30)
CALCIUM SERPL-MCNC: 8.3 MG/DL (ref 8.5–10.1)
CHLORIDE SERPL-SCNC: 107 MMOL/L (ref 94–109)
CO2 SERPL-SCNC: 24 MMOL/L (ref 20–32)
CREAT SERPL-MCNC: 0.59 MG/DL (ref 0.52–1.04)
CRP SERPL-MCNC: 205 MG/L (ref 0–8)
ERYTHROCYTE [DISTWIDTH] IN BLOOD BY AUTOMATED COUNT: 14 % (ref 10–15)
GFR SERPL CREATININE-BSD FRML MDRD: >90 ML/MIN/{1.73_M2}
GLUCOSE SERPL-MCNC: 112 MG/DL (ref 70–99)
HCT VFR BLD AUTO: 37.8 % (ref 35–47)
HGB BLD-MCNC: 12 G/DL (ref 11.7–15.7)
LACTATE BLD-SCNC: 1 MMOL/L (ref 0.7–2)
MAGNESIUM SERPL-MCNC: 1.6 MG/DL (ref 1.6–2.3)
MCH RBC QN AUTO: 29.6 PG (ref 26.5–33)
MCHC RBC AUTO-ENTMCNC: 31.7 G/DL (ref 31.5–36.5)
MCV RBC AUTO: 93 FL (ref 78–100)
PHOSPHATE SERPL-MCNC: 2.5 MG/DL (ref 2.5–4.5)
PLATELET # BLD AUTO: 168 10E9/L (ref 150–450)
POTASSIUM SERPL-SCNC: 2.9 MMOL/L (ref 3.4–5.3)
POTASSIUM SERPL-SCNC: 3 MMOL/L (ref 3.4–5.3)
PROCALCITONIN SERPL-MCNC: 1.79 NG/ML
RBC # BLD AUTO: 4.06 10E12/L (ref 3.8–5.2)
SODIUM SERPL-SCNC: 139 MMOL/L (ref 133–144)
WBC # BLD AUTO: 13.5 10E9/L (ref 4–11)

## 2020-12-26 PROCEDURE — 36415 COLL VENOUS BLD VENIPUNCTURE: CPT | Performed by: INTERNAL MEDICINE

## 2020-12-26 PROCEDURE — 83735 ASSAY OF MAGNESIUM: CPT | Performed by: INTERNAL MEDICINE

## 2020-12-26 PROCEDURE — 80069 RENAL FUNCTION PANEL: CPT | Performed by: INTERNAL MEDICINE

## 2020-12-26 PROCEDURE — 250N000011 HC RX IP 250 OP 636: Performed by: INTERNAL MEDICINE

## 2020-12-26 PROCEDURE — 250N000013 HC RX MED GY IP 250 OP 250 PS 637: Performed by: INTERNAL MEDICINE

## 2020-12-26 PROCEDURE — 82565 ASSAY OF CREATININE: CPT | Performed by: INTERNAL MEDICINE

## 2020-12-26 PROCEDURE — 120N000001 HC R&B MED SURG/OB

## 2020-12-26 PROCEDURE — 85027 COMPLETE CBC AUTOMATED: CPT | Performed by: INTERNAL MEDICINE

## 2020-12-26 PROCEDURE — 84132 ASSAY OF SERUM POTASSIUM: CPT | Performed by: INTERNAL MEDICINE

## 2020-12-26 PROCEDURE — 84145 PROCALCITONIN (PCT): CPT | Performed by: INTERNAL MEDICINE

## 2020-12-26 PROCEDURE — 999N000157 HC STATISTIC RCP TIME EA 10 MIN

## 2020-12-26 PROCEDURE — 83605 ASSAY OF LACTIC ACID: CPT | Performed by: INTERNAL MEDICINE

## 2020-12-26 PROCEDURE — 86140 C-REACTIVE PROTEIN: CPT | Performed by: INTERNAL MEDICINE

## 2020-12-26 PROCEDURE — 99232 SBSQ HOSP IP/OBS MODERATE 35: CPT | Performed by: INTERNAL MEDICINE

## 2020-12-26 RX ORDER — POTASSIUM CHLORIDE 1500 MG/1
40 TABLET, EXTENDED RELEASE ORAL ONCE
Status: COMPLETED | OUTPATIENT
Start: 2020-12-26 | End: 2020-12-26

## 2020-12-26 RX ORDER — POTASSIUM CHLORIDE 1500 MG/1
20 TABLET, EXTENDED RELEASE ORAL ONCE
Status: COMPLETED | OUTPATIENT
Start: 2020-12-26 | End: 2020-12-26

## 2020-12-26 RX ADMIN — ENOXAPARIN SODIUM 40 MG: 40 INJECTION SUBCUTANEOUS at 21:02

## 2020-12-26 RX ADMIN — DOXYCYCLINE HYCLATE 100 MG: 100 CAPSULE ORAL at 07:59

## 2020-12-26 RX ADMIN — FLUOXETINE 10 MG: 10 CAPSULE ORAL at 08:06

## 2020-12-26 RX ADMIN — LEVOTHYROXINE SODIUM 88 MCG: 0.09 TABLET ORAL at 08:06

## 2020-12-26 RX ADMIN — Medication 3 MG: at 00:04

## 2020-12-26 RX ADMIN — Medication 1 CAPSULE: at 08:06

## 2020-12-26 RX ADMIN — SENNOSIDES AND DOCUSATE SODIUM 2 TABLET: 8.6; 5 TABLET ORAL at 08:07

## 2020-12-26 RX ADMIN — SENNOSIDES AND DOCUSATE SODIUM 2 TABLET: 8.6; 5 TABLET ORAL at 21:02

## 2020-12-26 RX ADMIN — ACETAMINOPHEN 650 MG: 325 TABLET, FILM COATED ORAL at 00:04

## 2020-12-26 RX ADMIN — LEVOCETIRIZINE DIHYDROCHLORIDE 5 MG: 5 TABLET ORAL at 21:02

## 2020-12-26 RX ADMIN — CEFTRIAXONE SODIUM 2 G: 2 INJECTION, SOLUTION INTRAVENOUS at 19:18

## 2020-12-26 RX ADMIN — ANASTROZOLE 1 MG: 1 TABLET ORAL at 08:06

## 2020-12-26 RX ADMIN — ONDANSETRON 4 MG: 2 INJECTION INTRAMUSCULAR; INTRAVENOUS at 13:06

## 2020-12-26 RX ADMIN — OXYCODONE HYDROCHLORIDE 10 MG: 5 TABLET ORAL at 03:45

## 2020-12-26 RX ADMIN — DOXYCYCLINE HYCLATE 100 MG: 100 CAPSULE ORAL at 21:01

## 2020-12-26 RX ADMIN — POTASSIUM CHLORIDE 20 MEQ: 1500 TABLET, EXTENDED RELEASE ORAL at 16:28

## 2020-12-26 RX ADMIN — ACETAMINOPHEN 650 MG: 325 TABLET, FILM COATED ORAL at 06:28

## 2020-12-26 RX ADMIN — POTASSIUM CHLORIDE 40 MEQ: 1500 TABLET, EXTENDED RELEASE ORAL at 14:02

## 2020-12-26 RX ADMIN — ACETAMINOPHEN 650 MG: 325 TABLET, FILM COATED ORAL at 16:29

## 2020-12-26 RX ADMIN — OXYCODONE HYDROCHLORIDE 10 MG: 5 TABLET ORAL at 16:29

## 2020-12-26 RX ADMIN — METRONIDAZOLE 500 MG: 500 INJECTION, SOLUTION INTRAVENOUS at 03:40

## 2020-12-26 RX ADMIN — ACETAMINOPHEN 650 MG: 325 TABLET, FILM COATED ORAL at 21:02

## 2020-12-26 RX ADMIN — FLUTICASONE FUROATE 1 PUFF: 200 POWDER RESPIRATORY (INHALATION) at 14:04

## 2020-12-26 RX ADMIN — VANCOMYCIN HYDROCHLORIDE 1000 MG: 1 INJECTION, SOLUTION INTRAVENOUS at 08:11

## 2020-12-26 RX ADMIN — OXYCODONE HYDROCHLORIDE 10 MG: 5 TABLET ORAL at 12:15

## 2020-12-26 RX ADMIN — MAGNESIUM HYDROXIDE 30 ML: 400 SUSPENSION ORAL at 14:02

## 2020-12-26 RX ADMIN — OXYCODONE HYDROCHLORIDE 10 MG: 5 TABLET ORAL at 21:02

## 2020-12-26 RX ADMIN — OXYCODONE HYDROCHLORIDE 10 MG: 5 TABLET ORAL at 06:28

## 2020-12-26 RX ADMIN — Medication 1 CAPSULE: at 19:23

## 2020-12-26 ASSESSMENT — ACTIVITIES OF DAILY LIVING (ADL)
ADLS_ACUITY_SCORE: 13

## 2020-12-26 NOTE — PLAN OF CARE
/92   Pulse 61   Temp 98  F (36.7  C) (Tympanic)   Resp 24   Wt 65 kg (143 lb 4.8 oz)   SpO2 (!) 91%   BMI 23.85 kg/m        Pt A&O x4. Continues to report L sided chest pain, sharp with movement. States it is radiating to the back less than before. Received oxycodone for pain management. Pt had emesis of 250 this morning with sudden onset. Denied further nausea until the afternoon and requested zofran.     Supplemental oxygen via NC weaned to 1.5LPM with sats 90-92%. CASTILLO. Crackles to BLL, upper lobes diminished.     Pt c/o abd fullness and distention today. Has not had a BM since admission. Administered 2 senna in AM and milk of mag this afternoon. No results yet at this time. Dr. Merlos notified of abd discomfort and distention as well as urinary frequency- although pt denies dysuria, incontinence, and inability to void. Occasional dribbling at times. Oral fluid intake is adequate. IVF discontinued.    Oral potassium replacement initiated this shift. Poor appetite. Independent in room, will call for assistance as needed.    Face to face report given with opportunity to observe patient.    Report given to NICOLETTE Alegre RN   12/26/2020  3:20 PM

## 2020-12-26 NOTE — PLAN OF CARE
Shift assessment complete as charted.  Oxygen at 3LPM with 92-96%.  CASTILLO when up to the bathroom.  Medicated 2 times tonight with Oxycodone 10mg for left upper side pain radiating around to left back.  Was able to meet pain goal with oxycodone.  Is up independently to bathroom.  Urine sample sent to lab for cytology.  Still need sputum sample.

## 2020-12-26 NOTE — PROVIDER NOTIFICATION
DATE:  12/26/2020   TIME OF RECEIPT FROM LAB:  2:08p  LAB TEST:  Potassium  LAB VALUE:  2.9  RESULTS GIVEN WITH READ-BACK TO (PROVIDER):  Anila Merlos MD  TIME LAB VALUE REPORTED TO PROVIDER:   2:17p

## 2020-12-26 NOTE — PLAN OF CARE
Face to face report given with opportunity to observe patient.    Report given to NICOLETTE Mccartney RN   12/25/2020  11:06 PM

## 2020-12-26 NOTE — PROGRESS NOTES
[facility]    Medicine Progress Note - Hospitalist Service       Date of Admission:  12/22/2020    Assessment & Plan               Denise Taylor is a 65 year old female admitted on 12/22/2020.      Left lingular CAP:   Patient appears to be improving with hydration and antibiotics.  We will continue with the current regimen and follow patient's symptoms.  December 25: CT scan with IV contrast showed dense infiltrate in the left lower lung field and no obvious lesions in the left upper quadrant.  Patient's left flank pain appears to be pleuritic in nature and this is probably extension of pleurisy from her pneumonia.  CT scan also showed incidental finding of sludge or stones but patient is completely asymptomatic in the right upper quadrant of abdomen.  Will not pursue this unless patient develops symptoms.  As patient is improving symptomatically, will continue with the current antibiotic regimen.  12/26: patient vomited this morning, but clinically much better afterward.  Still on 2L NC    Bacteremia with gram-positive cocci in chains  December 25: Blood culture is growing Streptococcus pyogenes.  In light of this, I spoke with Dr. Saunders, infectious disease specialist at [facility], who thought that antibiotics could be simplified to ceftriaxone 2 g IV every 24 hours and discontinue vancomycin.  If there is no skin involvement, addition of clindamycin is not necessary.  12/26: narrow abx to ceftriaxone and doxy, will d/c flagyl and vanco.  Surveillance cultures from 12/25 TD.  Echocardiogram ordered.    Associated acute kidney injury (chronic stage 2 to acute stage 3):   Likely to be prerenal due to illness.  Renal function is now normal    Elevated lactic acid: resolved    Elevated D-dimmer and abnormal chest film.   This was evaluated with CT chest angio which showed no evidence of PE.       Diet: Combination Diet Low Saturated Fat Na <2400mg Diet    DVT Prophylaxis: Enoxaparin (Lovenox)  SQ  Madden Catheter: not present  Code Status: Full Code           Disposition Plan   2-3 pending blood cultures.    Anila Merlos MD, MD  Hospitalist Service  [facility]  Contact information available via [facility] Paging/Directory    ___________________________________________________    Interval History    Patient seen in room.  She had 1 episode of emesis this morning, however afterwards she felt much better.  She is wanting to eat, has appetite.  She is still feeling weak, denies fevers or chills.    Data reviewed today: I reviewed all medications, new labs and imaging results over the last 24 hours. I personally reviewed the chest CT image(s) showing Left-sided infiltrate.    Physical Exam   Vital Signs: Temp: 98  F (36.7  C) Temp src: Tympanic BP: 150/92 Pulse: 61   Resp: 24 SpO2: 93 % O2 Device: Nasal cannula Oxygen Delivery: 2 LPM  Weight: 143 lbs 4.78 oz  General Appearance: Lying in bed in no acute distress  Respiratory: Bibasilar crackles louder on the left, no wheezing  Cardiovascular: S1-S2, regular rhythm and rate, no murmurs rubs or gallops  GI: Tenderness at the left flank is now gone, bowel sounds present, soft, nondistended  Skin: Intact, no obvious skin lesions only  Other: Extremity no edema, neuro nonfocal    Data   Recent Labs   Lab 12/26/20  0634 12/25/20  0607 12/24/20  1813 12/22/20  1725 12/22/20  1725 12/22/20  1433 12/22/20  1433   WBC 13.5* 18.2* 23.3*   < >  --   --  16.9*   HGB 12.0 10.9* 11.2*   < >  --   --  13.6   MCV 93 89 89   < >  --   --  89    160 166   < >  --   --  167    137 139   < >  --    < > 138   POTASSIUM 3.0* 3.6 3.8   < >  --    < > 3.9   CHLORIDE 107 107 109   < >  --    < > 103   CO2 24 22 21   < >  --    < > 25   BUN 7 12 16   < >  --    < > 34*   CR 0.59 0.60 0.66   < >  --    < > 1.57*   ANIONGAP 8 8 9   < >  --    < > 10   STEVE 8.3* 8.3* 8.6   < >  --    < > 9.2   * 102* 135*   < >  --    < > 112*   ALBUMIN 2.3* 2.2*  --    < >  --    --  3.4   PROTTOTAL  --   --   --   --   --   --  7.7   BILITOTAL  --   --   --   --   --   --  0.5   ALKPHOS  --   --   --   --   --   --  76   ALT  --   --   --   --   --   --  29   AST  --   --   --   --   --   --  23   TROPI  --   --   --   --  <0.015  --  <0.015    < > = values in this interval not displayed.     No results found for this or any previous visit (from the past 24 hour(s)).  Medications     sodium chloride 75 mL/hr at 12/26/20 0003       anastrozole  1 mg Oral Daily     cefTRIAXone  2 g Intravenous Q24H     doxycycline hyclate  100 mg Oral Q12H LETICIA     enoxaparin ANTICOAGULANT  40 mg Subcutaneous Q24H     FLUoxetine  10 mg Oral Daily     fluticasone  1 puff Inhalation Daily     lactobacillus rhamnosus (GG)  1 capsule Oral BID     levocetirizine  5 mg Oral QPM     levothyroxine  88 mcg Oral Daily     metroNIDAZOLE  500 mg Intravenous Q8H     senna-docusate  1 tablet Oral BID    Or     senna-docusate  2 tablet Oral BID     sodium chloride (PF)  3 mL Intracatheter Q8H     vancomycin (VANCOCIN) IV  1,000 mg Intravenous Q12H

## 2020-12-26 NOTE — PHARMACY
Pharmacy Antimicrobial Stewardship Assessment     Current Antimicrobial Therapy:  Anti-infectives (From now, onward)    Start     Dose/Rate Route Frequency Ordered Stop    12/26/20 1800  cefTRIAXone IN D5W (ROCEPHIN) intermittent infusion 2 g      2 g  100 mL/hr over 30 Minutes Intravenous EVERY 24 HOURS 12/25/20 1850      12/25/20 1930  metroNIDAZOLE (FLAGYL) infusion 500 mg      500 mg  100 mL/hr over 60 Minutes Intravenous EVERY 8 HOURS 12/25/20 1911      12/24/20 0900  vancomycin (VANCOCIN) 1000 mg in dextrose 5% 200 mL PREMIX      1,000 mg  200 mL/hr over 1 Hours Intravenous EVERY 12 HOURS 12/24/20 0837      12/22/20 2200  doxycycline hyclate (VIBRAMYCIN) capsule 100 mg      100 mg Oral EVERY 12 HOURS SCHEDULED 12/22/20 2136          Indication: CAP, sepsis, bacteremia     Days of Therapy: 5 (Rocephin, doxycycline), 4 (vanco), 3 (metronidazole)      Pertinent Labs:  Creatinine   Date Value Ref Range Status   12/26/2020 0.59 0.52 - 1.04 mg/dL Final   12/25/2020 0.60 0.52 - 1.04 mg/dL Final   12/24/2020 0.66 0.52 - 1.04 mg/dL Final     WBC   Date Value Ref Range Status   12/26/2020 13.5 (H) 4.0 - 11.0 10e9/L Final   12/25/2020 18.2 (H) 4.0 - 11.0 10e9/L Final   12/24/2020 23.3 (H) 4.0 - 11.0 10e9/L Final     Procalcitonin   Date Value Ref Range Status   12/26/2020 1.79 ng/ml Final     Comment:     0.50-1.99 ng/ml  Moderate risk of systemic infection.  Recommendation:   Recommend antibiotics. Evaluate culture results and clinical features to   target antibacterial therapy. Obtain blood cultures and other relevant   cultures if not done.  If empiric antibiotics were started, recheck PCT in: 2 days to guide   antibiotic de-escalation.  Discontinue or de-escalate antibiotics when PCT   concentration is <80% of peak or abs PCT <0.5. If empiric antibiotics were NOT   started, recheck PCT in 6-24 hours to re-evaluate need for antibiotics.       CRP Inflammation   Date Value Ref Range Status   12/26/2020 205.0 (H) 0.0 -  "8.0 mg/L Final   04/02/2018 37.6 (H) 0.0 - 8.0 mg/L Final   01/11/2018 36.0 (H) 0.0 - 8.0 mg/L Final       Culture Results:   12/25: Blood culture x 2, no growth after 1 day  12/22: Urine culture, < 10,000 colonies/mL   12/22: Influenza/COVID, negative  12/22: Blood culture, 2/2 bottles Strep pyogenes sero group A     12/22: Blood culture, 2/2 bottles beta hemolytic Strep        Recommendations/Interventions:  From provider note: \"December 25: Blood culture is growing Streptococcus pyogenes.  In light of this, I spoke with Dr. Saunders, infectious disease specialist at [facility], who thought that antibiotics could be simplified to ceftriaxone 2 g IV every 24 hours and discontinue vancomycin.  If there is no skin involvement, addition of clindamycin is not necessary.  I also ordered echocardiogram to evaluate for possible endocarditis and surveillance of blood culture was repeated this morning.  Dr. Saunders also recommended checking sputum cytology to see if any malignant cells could be seen since patient has had recurrent pneumonia\"    Recommend discontinuing vanco and metronidazole as the Rocephin will cover the Strep pyogenes in the blood and the CAP alongside the doxycycline.     Elma Reyes, [facility]  December 26, 2020    "

## 2020-12-26 NOTE — PROVIDER NOTIFICATION
MD notified of pt's adequate fluid intake w/ maintenance fluids running. LSs continue to have crackles and pt has been up to void multiple times tonight.    Currently awaiting MDs response.

## 2020-12-26 NOTE — PROGRESS NOTES
[facility]    Medicine Progress Note - Hospitalist Service       Date of Admission:  12/22/2020  Assessment & Plan                 Denise Taylor is a 65 year old female admitted on 12/22/2020.      Left ligular CAP:   Patient appears to be improving with hydration and antibiotics.  We will continue with the current regimen and follow patient's symptoms.  December 24: Patient's left flank pain is concerning.  We will evaluate this with CT scan with IV contrast tomorrow.  December 25: CT scan with IV contrast showed dense infiltrate in the left lower lung field and no obvious lesions in the left upper quadrant.  Patient's left flank pain appears to be pleuritic in nature and this is probably extension of pleurisy from her pneumonia.  CT scan also showed incidental finding of sludge or stones but patient is completely asymptomatic in the right upper quadrant of abdomen.  Will not pursue this unless patient develops symptoms.  As patient is improving symptomatically, will continue with the current antibiotic regimen.    Bacteremia with gram-positive cocci in chains  December 24: Blood culture is growing beta-hemolytic Streptococcus.  Vancomycin was added and we will follow the identification and sensitivity and adjust antibiotics accordingly.  December 25: Blood culture is growing Streptococcus pyogenes.  In light of this, I spoke with Dr. Saunders, infectious disease specialist at [facility], who thought that antibiotics could be simplified to ceftriaxone 2 g IV every 24 hours and discontinue vancomycin.  If there is no skin involvement, addition of clindamycin is not necessary.  I also ordered echocardiogram to evaluate for possible endocarditis and surveillance of blood culture was repeated this morning.  Dr. Saunders also recommended checking sputum cytology to see if any malignant cells could be seen since patient has had recurrent pneumonia.    Lactic acidosis due to sepsis with streptococcus  pyogenes  resolved    Associated acute kidney injury (chronic stage 2 to acute stage 3):   Likely to be prerenal due to illness.  Renal function is now normal    Elevated lactic acid:   Follow lactic acid with hydration    Elevated D-dimmer and abnormal chest film.   This was evaluated with CT chest angio which showed no evidence of PE.       Diet: Combination Diet Low Saturated Fat Na <2400mg Diet    DVT Prophylaxis: Enoxaparin (Lovenox) SQ  Madden Catheter: not present  Code Status: Full Code           Disposition Plan   Expected discharge: 4 - 7 days, recommended to prior living arrangement once antibiotic plan established, SIRS/Sepsis treated and Respiratory status is stabilized.  Entered: Gerry Contreras MD 12/25/2020, 6:19 PM     Total time 55 minutes, 40 minutes were spent on extensive discussion with infectious disease consult, meeting with patient, gathering data, and coordinating care.  The patient's care was discussed with the Patient.    Gerry Contreras MD  Hospitalist Service  [facility]  Contact information available via [facility] Paging/Directory    ______________________________________________________________________    Interval History   Patient had CT chest/abdomen/pelvis with IV contrast last night and there was no obvious and normalities noted in the left flank.  Blood culture is growing Streptococcus pyogenes.  Surveillance culture was done this morning.  Patient is feeling much better today and leukocytosis has improved.  Patient remained afebrile since yesterday.    Data reviewed today: I reviewed all medications, new labs and imaging results over the last 24 hours. I personally reviewed the chest CT image(s) showing Left-sided infiltrate.    Physical Exam   Vital Signs: Temp: 100.1  F (37.8  C) Temp src: Tympanic BP: 155/93 Pulse: 74   Resp: 18 SpO2: 94 % O2 Device: Nasal cannula Oxygen Delivery: 3 LPM  Weight: 143 lbs 4.78 oz  General Appearance: Lying in bed in no acute  distress  Respiratory: Bibasilar crackles louder on the left, no wheezing  Cardiovascular: S1-S2, regular rhythm and rate, no murmurs rubs or gallops  GI: Tenderness at the left flank is now gone, bowel sounds present, soft, nondistended  Skin: Intact, no obvious skin lesions only  Other: Extremity no edema, neuro nonfocal    Data   Recent Labs   Lab 12/25/20  0607 12/24/20  1813 12/24/20  0621 12/22/20  1725 12/22/20  1725 12/22/20  1433 12/22/20  1433   WBC 18.2* 23.3* 19.2*  19.3*   < >  --   --  16.9*   HGB 10.9* 11.2* 10.4*   < >  --   --  13.6   MCV 89 89 89   < >  --   --  89    166 164   < >  --   --  167    139 142   < >  --    < > 138   POTASSIUM 3.6 3.8 3.9   < >  --    < > 3.9   CHLORIDE 107 109 112*   < >  --    < > 103   CO2 22 21 21   < >  --    < > 25   BUN 12 16 24   < >  --    < > 34*   CR 0.60 0.66 0.71   < >  --    < > 1.57*   ANIONGAP 8 9 9   < >  --    < > 10   STEVE 8.3* 8.6 8.1*   < >  --    < > 9.2   * 135* 110*   < >  --    < > 112*   ALBUMIN 2.2*  --  2.2*  --   --   --  3.4   PROTTOTAL  --   --   --   --   --   --  7.7   BILITOTAL  --   --   --   --   --   --  0.5   ALKPHOS  --   --   --   --   --   --  76   ALT  --   --   --   --   --   --  29   AST  --   --   --   --   --   --  23   TROPI  --   --   --   --  <0.015  --  <0.015    < > = values in this interval not displayed.     Recent Results (from the past 24 hour(s))   CT Chest/Abdomen/Pelvis w Contrast    Narrative    CT CHEST/ABDOMEN/PELVIS W CONTRAST    CLINICAL HISTORY: Female, age 65 years, Enlarged lymph nodes,  pelvic/inguinal; history of breast cancer with bilateral mastectomy.  Now with difficult pneumonia;    Comparison:  Chest CT 12/23/2020 and CT scan of the chest 11/14/2019.  PET CT from 2/5/2020 at 11/20/2019 are not available for comparison.    TECHNIQUE:  CT was performed of the chest, abdomen and pelvis  after  the administration of IV  and oral  contrast.   Sagittal, coronal, axial and 3-D MIP  reconstructions were reviewed.     FINDINGS:  Chest CT:   Consolidative process seen previously within the left lung is not  significantly changed.    Moderate size bilateral pleural effusions have accumulated since the  prior examination. The heart and great vessels demonstrate no acute  abnormality. Lymph nodes throughout the mediastinum and elena are  unchanged.    Esophagus is mildly dilated and unchanged.    Abdomen/Pelvis CT:  The stomach is distended with contrast and is grossly normal. Duodenum  is also normal.    Liver: Mild periportal edema. Slight decrease in density of the lung  parenchyma adjacent to the falciform ligament suggesting focal fatty  infiltration.    Gallbladder: Radiodense intraluminal material suggesting  sludge/stones. Some of this radiodense material is also likely related  to vicarious excretion of contrast from the recent CT scan.     Spleen: Normal.    Pancreas: Normal.    Adrenal glands: Normal    Kidneys: Normal.  Ureters: Normal.  Urinary bladder: Normal.    Vasculature: There are a few accessory calcifications within the aorta  and iliac arteries. No acute abnormality. Inferior vena cava is  normal.    Lymph nodes: No evidence of pathologic lymph node enlargement    Large and small bowel: Diverticulosis of the distal colon. No  diverticulitis or other acute abnormality.    Appendix: Normal.    Small volume of free fluid is seen extending into the lower pelvis.    Bony structures: No acute normality. Degenerative changes are again  seen throughout the spine.      Impression    IMPRESSION:   Pneumonia/consolidation with the lungs is not significantly changed  when compared to the study from the previous day.    Moderate size bilateral pleural effusions have accumulated since the  previous day.    Radiodense material within the gallbladder and small volume of  pericholecystic fluid suggests the possibility of acute cholecystitis.  Ultrasound to better characterize.    Small volume of  free fluid extends into the lower pelvis.    This report is in agreement with the preliminary report.    MYLES AVINA MD     Medications     sodium chloride 75 mL/hr at 12/25/20 1413       anastrozole  1 mg Oral Daily     cefTRIAXone  1 g Intravenous Once     [START ON 12/26/2020] cefTRIAXone  2 g Intravenous Q24H     doxycycline hyclate  100 mg Oral Q12H LETICIA     enoxaparin ANTICOAGULANT  40 mg Subcutaneous Q24H     FLUoxetine  10 mg Oral Daily     fluticasone  1 puff Inhalation Daily     lactobacillus rhamnosus (GG)  1 capsule Oral BID     levocetirizine  5 mg Oral QPM     levothyroxine  88 mcg Oral Daily     metroNIDAZOLE  500 mg Intravenous Q8H     senna-docusate  1 tablet Oral BID    Or     senna-docusate  2 tablet Oral BID     sodium chloride (PF)  3 mL Intracatheter Q8H     vancomycin (VANCOCIN) IV  1,000 mg Intravenous Q12H

## 2020-12-26 NOTE — PLAN OF CARE
/86 (BP Location: Right arm)   Pulse 65   Temp 98.1  F (36.7  C) (Tympanic)   Resp 22   Wt 65 kg (143 lb 4.8 oz)   SpO2 92%   BMI 23.85 kg/m      IVs x2. NS running at 75 mL/hr. PO Doxycycline. IV Flagyl & Vancomycin. Elevated start of shift temperature of 100.4. Managed w/ PO tylenol. Final recheck of 98.1. Pain to left side of chest 5/10. Managed w/ PO roxicodone x2. Remains on 2 LPM satting 92-93% w/ CASTILLO & prolonged expiratory phase. Infrequent dry, nonproductive cough. LSs have crackles to RML & BLLs. BSs audible & normoactive. Remains on low saturated fat/2g Na+ diet. Reports no appetite. Brief worn dt dribble incontinence. Scattered scars dt removal of cancerous growths. Generalized weakness, but overall is steady on feet. Remains SBA to bathroom. Sepsis flagged w/ LA of 1.0. Bed in lowest position. Call light in reach. ID band in place. Makes needs known.     Face to face report given with opportunity to observe patient.    Report given to Alayna Cummins RN   12/26/2020  7:15 AM

## 2020-12-27 LAB
ALBUMIN SERPL-MCNC: 2.1 G/DL (ref 3.4–5)
ANION GAP SERPL CALCULATED.3IONS-SCNC: 8 MMOL/L (ref 3–14)
BUN SERPL-MCNC: 5 MG/DL (ref 7–30)
CALCIUM SERPL-MCNC: 8.6 MG/DL (ref 8.5–10.1)
CHLORIDE SERPL-SCNC: 105 MMOL/L (ref 94–109)
CO2 SERPL-SCNC: 27 MMOL/L (ref 20–32)
CREAT SERPL-MCNC: 0.5 MG/DL (ref 0.52–1.04)
ERYTHROCYTE [DISTWIDTH] IN BLOOD BY AUTOMATED COUNT: 13.8 % (ref 10–15)
GFR SERPL CREATININE-BSD FRML MDRD: >90 ML/MIN/{1.73_M2}
GLUCOSE SERPL-MCNC: 92 MG/DL (ref 70–99)
HCT VFR BLD AUTO: 33.9 % (ref 35–47)
HGB BLD-MCNC: 11.5 G/DL (ref 11.7–15.7)
MAGNESIUM SERPL-MCNC: 1.5 MG/DL (ref 1.6–2.3)
MCH RBC QN AUTO: 29.8 PG (ref 26.5–33)
MCHC RBC AUTO-ENTMCNC: 33.9 G/DL (ref 31.5–36.5)
MCV RBC AUTO: 88 FL (ref 78–100)
PHOSPHATE SERPL-MCNC: 3.1 MG/DL (ref 2.5–4.5)
PLATELET # BLD AUTO: 197 10E9/L (ref 150–450)
POTASSIUM SERPL-SCNC: 3.3 MMOL/L (ref 3.4–5.3)
POTASSIUM SERPL-SCNC: 3.7 MMOL/L (ref 3.4–5.3)
RBC # BLD AUTO: 3.86 10E12/L (ref 3.8–5.2)
SODIUM SERPL-SCNC: 140 MMOL/L (ref 133–144)
WBC # BLD AUTO: 13.5 10E9/L (ref 4–11)

## 2020-12-27 PROCEDURE — 999N000157 HC STATISTIC RCP TIME EA 10 MIN

## 2020-12-27 PROCEDURE — 80069 RENAL FUNCTION PANEL: CPT | Performed by: INTERNAL MEDICINE

## 2020-12-27 PROCEDURE — 250N000013 HC RX MED GY IP 250 OP 250 PS 637: Performed by: INTERNAL MEDICINE

## 2020-12-27 PROCEDURE — 120N000001 HC R&B MED SURG/OB

## 2020-12-27 PROCEDURE — 36415 COLL VENOUS BLD VENIPUNCTURE: CPT | Performed by: INTERNAL MEDICINE

## 2020-12-27 PROCEDURE — 250N000011 HC RX IP 250 OP 636: Performed by: INTERNAL MEDICINE

## 2020-12-27 PROCEDURE — 83735 ASSAY OF MAGNESIUM: CPT | Performed by: INTERNAL MEDICINE

## 2020-12-27 PROCEDURE — 99232 SBSQ HOSP IP/OBS MODERATE 35: CPT | Performed by: INTERNAL MEDICINE

## 2020-12-27 PROCEDURE — 84132 ASSAY OF SERUM POTASSIUM: CPT | Performed by: INTERNAL MEDICINE

## 2020-12-27 PROCEDURE — 85027 COMPLETE CBC AUTOMATED: CPT | Performed by: INTERNAL MEDICINE

## 2020-12-27 PROCEDURE — 82565 ASSAY OF CREATININE: CPT | Performed by: INTERNAL MEDICINE

## 2020-12-27 RX ORDER — POTASSIUM CHLORIDE 1500 MG/1
40 TABLET, EXTENDED RELEASE ORAL ONCE
Status: COMPLETED | OUTPATIENT
Start: 2020-12-27 | End: 2020-12-27

## 2020-12-27 RX ADMIN — FLUOXETINE 10 MG: 10 CAPSULE ORAL at 08:05

## 2020-12-27 RX ADMIN — ACETAMINOPHEN 650 MG: 325 TABLET, FILM COATED ORAL at 21:01

## 2020-12-27 RX ADMIN — FLUTICASONE FUROATE 1 PUFF: 200 POWDER RESPIRATORY (INHALATION) at 08:11

## 2020-12-27 RX ADMIN — Medication 1 CAPSULE: at 18:38

## 2020-12-27 RX ADMIN — DOXYCYCLINE HYCLATE 100 MG: 100 CAPSULE ORAL at 21:02

## 2020-12-27 RX ADMIN — LEVOTHYROXINE SODIUM 88 MCG: 0.09 TABLET ORAL at 08:05

## 2020-12-27 RX ADMIN — ANASTROZOLE 1 MG: 1 TABLET ORAL at 08:05

## 2020-12-27 RX ADMIN — OXYCODONE HYDROCHLORIDE 10 MG: 5 TABLET ORAL at 11:00

## 2020-12-27 RX ADMIN — DOXYCYCLINE HYCLATE 100 MG: 100 CAPSULE ORAL at 08:05

## 2020-12-27 RX ADMIN — OXYCODONE HYDROCHLORIDE 10 MG: 5 TABLET ORAL at 06:10

## 2020-12-27 RX ADMIN — ONDANSETRON 4 MG: 2 INJECTION INTRAMUSCULAR; INTRAVENOUS at 21:09

## 2020-12-27 RX ADMIN — POTASSIUM CHLORIDE 40 MEQ: 1500 TABLET, EXTENDED RELEASE ORAL at 08:05

## 2020-12-27 RX ADMIN — ENOXAPARIN SODIUM 40 MG: 40 INJECTION SUBCUTANEOUS at 21:03

## 2020-12-27 RX ADMIN — ACETAMINOPHEN 650 MG: 325 TABLET, FILM COATED ORAL at 11:00

## 2020-12-27 RX ADMIN — Medication 1 CAPSULE: at 08:06

## 2020-12-27 RX ADMIN — CEFTRIAXONE SODIUM 2 G: 2 INJECTION, SOLUTION INTRAVENOUS at 18:38

## 2020-12-27 RX ADMIN — SENNOSIDES AND DOCUSATE SODIUM 2 TABLET: 8.6; 5 TABLET ORAL at 21:01

## 2020-12-27 RX ADMIN — LEVOCETIRIZINE DIHYDROCHLORIDE 5 MG: 5 TABLET ORAL at 21:01

## 2020-12-27 RX ADMIN — OXYCODONE HYDROCHLORIDE 10 MG: 5 TABLET ORAL at 21:01

## 2020-12-27 RX ADMIN — ACETAMINOPHEN 650 MG: 325 TABLET, FILM COATED ORAL at 15:49

## 2020-12-27 RX ADMIN — SENNOSIDES AND DOCUSATE SODIUM 2 TABLET: 8.6; 5 TABLET ORAL at 08:06

## 2020-12-27 RX ADMIN — OXYCODONE HYDROCHLORIDE 10 MG: 5 TABLET ORAL at 15:49

## 2020-12-27 RX ADMIN — ACETAMINOPHEN 650 MG: 325 TABLET, FILM COATED ORAL at 06:10

## 2020-12-27 RX ADMIN — MAGNESIUM HYDROXIDE 30 ML: 400 SUSPENSION ORAL at 08:06

## 2020-12-27 ASSESSMENT — ACTIVITIES OF DAILY LIVING (ADL)
ADLS_ACUITY_SCORE: 12
ADLS_ACUITY_SCORE: 13

## 2020-12-27 NOTE — PROGRESS NOTES
[facility]    Medicine Progress Note - Hospitalist Service       Date of Admission:  12/22/2020    Assessment & Plan               Denise Taylor is a 65 year old female admitted on 12/22/2020.      Left lingular CAP:   Patient appears to be improving with hydration and antibiotics.  We will continue with the current regimen and follow patient's symptoms.  December 25: CT scan with IV contrast showed dense infiltrate in the left lower lung field and no obvious lesions in the left upper quadrant.  Patient's left flank pain appears to be pleuritic in nature and this is probably extension of pleurisy from her pneumonia.  CT scan also showed incidental finding of sludge or stones but patient is completely asymptomatic in the right upper quadrant of abdomen.  Will not pursue this unless patient develops symptoms.  As patient is improving symptomatically, will continue with the current antibiotic regimen.  12/26: patient vomited this morning, but clinically much better afterward.  Still on 2L NC  12/27: patient feeling better, now on 1L NC    Bacteremia with gram-positive cocci in chains  December 25: Blood culture is growing Streptococcus pyogenes.  If there is no skin involvement, addition of clindamycin is not necessary.  12/26: narrow abx to ceftriaxone and doxy, will d/c flagyl and vanco.  Surveillance cultures from 12/25 NGTD.  Echocardiogram ordered.  12/27: surveillance cultures still negative from 12/25.  Awaiting ECHO    Associated acute kidney injury (chronic stage 2 to acute stage 3):   Likely to be prerenal due to illness.  Renal function is now normal    Elevated lactic acid: resolved    Elevated D-dimmer and abnormal chest film.   This was evaluated with CT chest angio which showed no evidence of PE.       Diet: Combination Diet Low Saturated Fat Na <2400mg Diet    DVT Prophylaxis: Enoxaparin (Lovenox) SQ  Madden Catheter: not present  Code Status: Full Code           Disposition Plan   1-2  pending blood cultures and ECHO.    Anila Merlos MD, MD  Hospitalist Service  [facility]  Contact information available via [facility] Paging/Directory    ___________________________________________________    Interval History    Patient seen in room.  Patient is doing better overall, no more emesis.  She denies any additional subjective fevers or chills.    Data reviewed today: I reviewed all medications, new labs and imaging results over the last 24 hours. I personally reviewed the chest CT image(s) showing Left-sided infiltrate.    Physical Exam   Vital Signs: Temp: 98.5  F (36.9  C) Temp src: Tympanic BP: 149/79 Pulse: 63   Resp: 20 SpO2: 94 % O2 Device: Nasal cannula Oxygen Delivery: 2.5 LPM  Weight: 143 lbs 4.78 oz  General Appearance: Lying in bed in no acute distress  Respiratory: Bibasilar crackles louder on the left, no wheezing  Cardiovascular: S1-S2, regular rhythm and rate, no murmurs rubs or gallops  GI: Tenderness at the left flank is now gone, bowel sounds present, soft, nondistended  Skin: Intact, no obvious skin lesions only  Other: Extremity no edema, neuro nonfocal    Data   Recent Labs   Lab 12/27/20  0626 12/26/20  1353 12/26/20  0634 12/25/20  0607 12/22/20  1725 12/22/20  1725 12/22/20  1433 12/22/20  1433   WBC 13.5*  --  13.5* 18.2*   < >  --   --  16.9*   HGB 11.5*  --  12.0 10.9*   < >  --   --  13.6   MCV 88  --  93 89   < >  --   --  89     --  168 160   < >  --   --  167     --  139 137   < >  --    < > 138   POTASSIUM 3.3* 2.9* 3.0* 3.6   < >  --    < > 3.9   CHLORIDE 105  --  107 107   < >  --    < > 103   CO2 27  --  24 22   < >  --    < > 25   BUN 5*  --  7 12   < >  --    < > 34*   CR 0.50*  --  0.59 0.60   < >  --    < > 1.57*   ANIONGAP 8  --  8 8   < >  --    < > 10   STEVE 8.6  --  8.3* 8.3*   < >  --    < > 9.2   GLC 92  --  112* 102*   < >  --    < > 112*   ALBUMIN 2.1*  --  2.3* 2.2*   < >  --   --  3.4   PROTTOTAL  --   --   --   --   --   --   --  7.7    BILITOTAL  --   --   --   --   --   --   --  0.5   ALKPHOS  --   --   --   --   --   --   --  76   ALT  --   --   --   --   --   --   --  29   AST  --   --   --   --   --   --   --  23   TROPI  --   --   --   --   --  <0.015  --  <0.015    < > = values in this interval not displayed.     No results found for this or any previous visit (from the past 24 hour(s)).  Medications       anastrozole  1 mg Oral Daily     cefTRIAXone  2 g Intravenous Q24H     doxycycline hyclate  100 mg Oral Q12H LETICIA     enoxaparin ANTICOAGULANT  40 mg Subcutaneous Q24H     FLUoxetine  10 mg Oral Daily     fluticasone  1 puff Inhalation Daily     lactobacillus rhamnosus (GG)  1 capsule Oral BID     levocetirizine  5 mg Oral QPM     levothyroxine  88 mcg Oral Daily     senna-docusate  1 tablet Oral BID    Or     senna-docusate  2 tablet Oral BID     sodium chloride (PF)  3 mL Intracatheter Q8H

## 2020-12-27 NOTE — PHARMACY
Pharmacy Antimicrobial Stewardship Assessment     Current Antimicrobial Therapy:  Anti-infectives (From now, onward)    Start     Dose/Rate Route Frequency Ordered Stop    12/26/20 1800  cefTRIAXone IN D5W (ROCEPHIN) intermittent infusion 2 g      2 g  100 mL/hr over 30 Minutes Intravenous EVERY 24 HOURS 12/25/20 1850      12/22/20 2200  doxycycline hyclate (VIBRAMYCIN) capsule 100 mg      100 mg Oral EVERY 12 HOURS SCHEDULED 12/22/20 2136            Indication: CAP    Days of Therapy: 6     Pertinent Labs:  Creatinine   Date Value Ref Range Status   12/27/2020 0.50 (L) 0.52 - 1.04 mg/dL Final   12/26/2020 0.59 0.52 - 1.04 mg/dL Final   12/25/2020 0.60 0.52 - 1.04 mg/dL Final     WBC   Date Value Ref Range Status   12/27/2020 13.5 (H) 4.0 - 11.0 10e9/L Final   12/26/2020 13.5 (H) 4.0 - 11.0 10e9/L Final   12/25/2020 18.2 (H) 4.0 - 11.0 10e9/L Final     Procalcitonin   Date Value Ref Range Status   12/26/2020 1.79 ng/ml Final     Comment:     0.50-1.99 ng/ml  Moderate risk of systemic infection.  Recommendation:   Recommend antibiotics. Evaluate culture results and clinical features to   target antibacterial therapy. Obtain blood cultures and other relevant   cultures if not done.  If empiric antibiotics were started, recheck PCT in: 2 days to guide   antibiotic de-escalation.  Discontinue or de-escalate antibiotics when PCT   concentration is <80% of peak or abs PCT <0.5. If empiric antibiotics were NOT   started, recheck PCT in 6-24 hours to re-evaluate need for antibiotics.       CRP Inflammation   Date Value Ref Range Status   12/26/2020 205.0 (H) 0.0 - 8.0 mg/L Final   04/02/2018 37.6 (H) 0.0 - 8.0 mg/L Final   01/11/2018 36.0 (H) 0.0 - 8.0 mg/L Final       Culture Results:   12/25: Blood culture x 2, no growth after 2 days  12/22: Urine culture, < 10,000 colonies/mL   12/22: Influenza/COVID, negative  12/22: Blood culture, 2/2 bottles Strep pyogenes sero group A     12/22: Blood culture, 2/2 bottles beta  hemolytic Strep      Recommendations/Interventions:  1. Recommend re-drawing procalcitonin tomorrow. No other recommendations at this time. Will continue to monitor.     Elma Reyes RPH  December 27, 2020

## 2020-12-27 NOTE — PLAN OF CARE
/79 (BP Location: Left arm)   Pulse 63   Temp 98.5  F (36.9  C) (Tympanic)   Resp 18   Wt 65 kg (143 lb 4.8 oz)   SpO2 (!) 91%   BMI 23.85 kg/m      Pt A&Ox4. Continues to report L sided chest pain radiating to back. Tylenol and oxycodone administered for pain management. Remains on NC @ 1.5LPM with sats 90-92%. CASTILLO w/ sats 88-89% but recovers quickly. LS: Crackles to BLL and RML. Pt reports abdomen feels less distended but still some discomfort. Milk of mag and senna administered in AM. Reports passing flatus. Denies nausea. Little appetite. Fluids encouraged. Potassium replacement initiated. Bed in lowest position and call light in reach.     Face to face report given with opportunity to observe patient.    Report given to NICOLETTE Alegre RN   12/27/2020  3:20 PM

## 2020-12-27 NOTE — PLAN OF CARE
/85 (BP Location: Right arm)   Pulse 60   Temp 98.2  F (36.8  C) (Tympanic)   Resp 16   Wt 65 kg (143 lb 4.8 oz)   SpO2 94%   BMI 23.85 kg/m      Saline locked. IV Rocephin. PO Doxycyline. Elevated SBPs in the 150s. Afebrile. Remains on 2.5 LPM. Continues to be activity intolerant to bathroom and desats to 88-89%. However, once pt is back in bed her sats come back up quickly to 92-94%. Crackles to RML, BLLs. Pain to left chest 3-6/10. PO oxycodone & tylenol given this am. Appetite remains poor. Denies nausea. AM K+ of 3.3. PO replacement ordered and to be given by dayshift nurse. Bed in lowest position. Call light in reach. ID band in place. Makes needs known.    Face to face report given with opportunity to observe patient.    Report given to Alayna Cummins RN   12/27/2020  7:15 AM

## 2020-12-27 NOTE — PLAN OF CARE
Reason for hospital stay:  Pneumonia    Most recent vitals: /86   Pulse 66   Temp 98.7  F (37.1  C) (Tympanic)   Resp 24   Wt 65 kg (143 lb 4.8 oz)   SpO2 92%   BMI 23.85 kg/m      Pain Management:  Complains of left chest pain when coughing or deep breathing. Gave Tylenol and oxy with effective pain relief.     LOC:  A+O x4 - calls appropriately and makes needs known    Cardiac:  WDL    Respiratory:  Left side diminished, right lower and right mid lobes with crackles. Occasional weak dry cough. Short of breath with minimal activity and with conversation. O2 increased to 2.5 LPM to maintain sats 92%. Sats drop to 70% when up to bathroom without O2 and drop to 85% when up to bathroom with O2 in place. - Updated Dr. Sosa at approximately 8PM and he was in to see patient. Encouraged use of IS - patient able to get IS to approximately 500.     GI:  WDL    :  Continues to have urgency and frequency.     IVF:  SL    ABX:  Continues on IV Rocephin and PO Doxy    Activity: Up in room independently with steady gait      Face to face report given with opportunity to observe patient.    Report given to Dennys Yi RN   12/26/2020  11:48 PM

## 2020-12-28 ENCOUNTER — HOSPITAL ENCOUNTER (INPATIENT)
Dept: CARDIOLOGY | Facility: HOSPITAL | Age: 65
DRG: 871 | End: 2020-12-28
Attending: INTERNAL MEDICINE
Payer: MEDICARE

## 2020-12-28 LAB
ANION GAP SERPL CALCULATED.3IONS-SCNC: 7 MMOL/L (ref 3–14)
BUN SERPL-MCNC: 7 MG/DL (ref 7–30)
CALCIUM SERPL-MCNC: 8.4 MG/DL (ref 8.5–10.1)
CHLORIDE SERPL-SCNC: 101 MMOL/L (ref 94–109)
CO2 SERPL-SCNC: 28 MMOL/L (ref 20–32)
CREAT SERPL-MCNC: 0.55 MG/DL (ref 0.52–1.04)
ERYTHROCYTE [DISTWIDTH] IN BLOOD BY AUTOMATED COUNT: 13.5 % (ref 10–15)
GFR SERPL CREATININE-BSD FRML MDRD: >90 ML/MIN/{1.73_M2}
GLUCOSE SERPL-MCNC: 90 MG/DL (ref 70–99)
HCT VFR BLD AUTO: 33.5 % (ref 35–47)
HGB BLD-MCNC: 11.4 G/DL (ref 11.7–15.7)
MAGNESIUM SERPL-MCNC: 1.8 MG/DL (ref 1.6–2.3)
MCH RBC QN AUTO: 29.8 PG (ref 26.5–33)
MCHC RBC AUTO-ENTMCNC: 34 G/DL (ref 31.5–36.5)
MCV RBC AUTO: 88 FL (ref 78–100)
PLATELET # BLD AUTO: 254 10E9/L (ref 150–450)
POTASSIUM SERPL-SCNC: 3.6 MMOL/L (ref 3.4–5.3)
RBC # BLD AUTO: 3.83 10E12/L (ref 3.8–5.2)
SODIUM SERPL-SCNC: 136 MMOL/L (ref 133–144)
WBC # BLD AUTO: 13.2 10E9/L (ref 4–11)

## 2020-12-28 PROCEDURE — 85027 COMPLETE CBC AUTOMATED: CPT | Performed by: INTERNAL MEDICINE

## 2020-12-28 PROCEDURE — 250N000013 HC RX MED GY IP 250 OP 250 PS 637: Performed by: INTERNAL MEDICINE

## 2020-12-28 PROCEDURE — 94664 DEMO&/EVAL PT USE INHALER: CPT

## 2020-12-28 PROCEDURE — 83735 ASSAY OF MAGNESIUM: CPT | Performed by: INTERNAL MEDICINE

## 2020-12-28 PROCEDURE — 80048 BASIC METABOLIC PNL TOTAL CA: CPT | Performed by: INTERNAL MEDICINE

## 2020-12-28 PROCEDURE — 250N000011 HC RX IP 250 OP 636: Performed by: INTERNAL MEDICINE

## 2020-12-28 PROCEDURE — 120N000001 HC R&B MED SURG/OB

## 2020-12-28 PROCEDURE — 36415 COLL VENOUS BLD VENIPUNCTURE: CPT | Performed by: INTERNAL MEDICINE

## 2020-12-28 PROCEDURE — 93306 TTE W/DOPPLER COMPLETE: CPT

## 2020-12-28 PROCEDURE — 999N000157 HC STATISTIC RCP TIME EA 10 MIN

## 2020-12-28 PROCEDURE — 93306 TTE W/DOPPLER COMPLETE: CPT | Mod: 26 | Performed by: INTERNAL MEDICINE

## 2020-12-28 PROCEDURE — 99232 SBSQ HOSP IP/OBS MODERATE 35: CPT | Performed by: INTERNAL MEDICINE

## 2020-12-28 RX ORDER — CEFDINIR 300 MG/1
300 CAPSULE ORAL EVERY 12 HOURS SCHEDULED
Status: DISCONTINUED | OUTPATIENT
Start: 2020-12-28 | End: 2021-01-01 | Stop reason: HOSPADM

## 2020-12-28 RX ADMIN — OXYCODONE HYDROCHLORIDE 10 MG: 5 TABLET ORAL at 06:14

## 2020-12-28 RX ADMIN — DOXYCYCLINE HYCLATE 100 MG: 100 CAPSULE ORAL at 19:53

## 2020-12-28 RX ADMIN — DOXYCYCLINE HYCLATE 100 MG: 100 CAPSULE ORAL at 08:50

## 2020-12-28 RX ADMIN — Medication 1 CAPSULE: at 08:50

## 2020-12-28 RX ADMIN — SENNOSIDES AND DOCUSATE SODIUM 2 TABLET: 8.6; 5 TABLET ORAL at 08:50

## 2020-12-28 RX ADMIN — ALBUTEROL SULFATE 2 PUFF: 90 AEROSOL, METERED RESPIRATORY (INHALATION) at 18:31

## 2020-12-28 RX ADMIN — FLUTICASONE FUROATE 1 PUFF: 200 POWDER RESPIRATORY (INHALATION) at 08:49

## 2020-12-28 RX ADMIN — ACETAMINOPHEN 650 MG: 325 TABLET, FILM COATED ORAL at 03:02

## 2020-12-28 RX ADMIN — OXYCODONE HYDROCHLORIDE 10 MG: 5 TABLET ORAL at 12:18

## 2020-12-28 RX ADMIN — ENOXAPARIN SODIUM 40 MG: 40 INJECTION SUBCUTANEOUS at 20:00

## 2020-12-28 RX ADMIN — OXYCODONE HYDROCHLORIDE 10 MG: 5 TABLET ORAL at 03:01

## 2020-12-28 RX ADMIN — FLUOXETINE 10 MG: 10 CAPSULE ORAL at 08:50

## 2020-12-28 RX ADMIN — OXYCODONE HYDROCHLORIDE 10 MG: 5 TABLET ORAL at 09:10

## 2020-12-28 RX ADMIN — SENNOSIDES AND DOCUSATE SODIUM 2 TABLET: 8.6; 5 TABLET ORAL at 19:53

## 2020-12-28 RX ADMIN — ANASTROZOLE 1 MG: 1 TABLET ORAL at 08:50

## 2020-12-28 RX ADMIN — OXYCODONE HYDROCHLORIDE 5 MG: 5 TABLET ORAL at 21:06

## 2020-12-28 RX ADMIN — OXYCODONE HYDROCHLORIDE 10 MG: 5 TABLET ORAL at 00:23

## 2020-12-28 RX ADMIN — OXYCODONE HYDROCHLORIDE 5 MG: 5 TABLET ORAL at 19:00

## 2020-12-28 RX ADMIN — LEVOTHYROXINE SODIUM 88 MCG: 0.09 TABLET ORAL at 08:50

## 2020-12-28 RX ADMIN — CEFDINIR 300 MG: 300 CAPSULE ORAL at 09:09

## 2020-12-28 RX ADMIN — OXYCODONE HYDROCHLORIDE 10 MG: 5 TABLET ORAL at 15:29

## 2020-12-28 RX ADMIN — MAGNESIUM HYDROXIDE 30 ML: 400 SUSPENSION ORAL at 19:52

## 2020-12-28 RX ADMIN — CEFDINIR 300 MG: 300 CAPSULE ORAL at 19:53

## 2020-12-28 RX ADMIN — Medication 1 CAPSULE: at 19:53

## 2020-12-28 RX ADMIN — LEVOCETIRIZINE DIHYDROCHLORIDE 5 MG: 5 TABLET ORAL at 19:58

## 2020-12-28 RX ADMIN — ACETAMINOPHEN 650 MG: 325 TABLET, FILM COATED ORAL at 23:21

## 2020-12-28 ASSESSMENT — ACTIVITIES OF DAILY LIVING (ADL)
ADLS_ACUITY_SCORE: 12

## 2020-12-28 NOTE — PROGRESS NOTES
[facility]    Medicine Progress Note - Hospitalist Service       Date of Admission:  12/22/2020    Assessment & Plan               Denise Taylor is a 65 year old female admitted on 12/22/2020.      Left lingular CAP:   Patient appears to be improving with hydration and antibiotics.  We will continue with the current regimen and follow patient's symptoms.  December 25: CT scan with IV contrast showed dense infiltrate in the left lower lung field and no obvious lesions in the left upper quadrant.  Patient's left flank pain appears to be pleuritic in nature and this is probably extension of pleurisy from her pneumonia.  CT scan also showed incidental finding of sludge or stones but patient is completely asymptomatic in the right upper quadrant of abdomen.  Will not pursue this unless patient develops symptoms.  As patient is improving symptomatically, will continue with the current antibiotic regimen.  12/26: patient vomited this morning, but clinically much better afterward.  Still on 2L NC  12/27: patient feeling better, now on 1L NC  12/28: back up to 2L, feeling weak today.  Wean O2    Bacteremia with gram-positive cocci in chains  December 25: Blood culture is growing Streptococcus pyogenes.  If there is no skin involvement, addition of clindamycin is not necessary.  12/26: narrow abx to ceftriaxone and doxy, will d/c flagyl and vanco.  Surveillance cultures from 12/25 NGTD.  Echocardiogram ordered.  12/27: surveillance cultures still negative from 12/25.  Awaiting ECHO  12/28: surveillance cultures still negative from 12/25.  ECHO today.  Switch her to PO abx (cefdinir), monitor for response.    Associated acute kidney injury (chronic stage 2 to acute stage 3):   Likely to be prerenal due to illness.  Renal function is now normal    Elevated lactic acid: resolved    Elevated D-dimmer and abnormal chest film.   This was evaluated with CT chest angio which showed no evidence of PE.       Diet:  Combination Diet Low Saturated Fat Na <2400mg Diet    DVT Prophylaxis: Enoxaparin (Lovenox) SQ  Madden Catheter: not present  Code Status: Full Code           Disposition Plan   Pending weaning of O2, ECHO, blood cultures, tolerating PO abx.  Lives at [place] with .    Anila Merlos MD, MD  Hospitalist Service  [facility]  Contact information available via [facility] Paging/Directory    ___________________________________________________    Interval History    Patient seen in room.  Patient appears more tired today.  O2 increased back up to 2L from 1L.   She denies any additional subjective fevers or chills, denies sob.    Data reviewed today: I reviewed all medications, new labs and imaging results over the last 24 hours. I personally reviewed the chest CT image(s) showing Left-sided infiltrate.    Physical Exam   Vital Signs: Temp: 97.6  F (36.4  C) Temp src: Tympanic BP: 157/88 Pulse: 59   Resp: 20 SpO2: 94 % O2 Device: Nasal cannula Oxygen Delivery: 2 LPM  Weight: 143 lbs 4.78 oz  General Appearance: Lying in bed in no acute distress  Respiratory: Bibasilar crackles louder on the left, no wheezing  Cardiovascular: S1-S2, regular rhythm and rate, no murmurs rubs or gallops  GI: Tenderness at the left flank is now gone, bowel sounds present, soft, nondistended  Skin: Intact, no obvious skin lesions only  Other: Extremity no edema, neuro nonfocal    Data   Recent Labs   Lab 12/28/20  0604 12/27/20  1617 12/27/20  0626 12/26/20  0634 12/26/20  0634 12/22/20  1725 12/22/20  1725 12/22/20  1433 12/22/20  1433   WBC 13.2*  --  13.5*  --  13.5*   < >  --   --  16.9*   HGB 11.4*  --  11.5*  --  12.0   < >  --   --  13.6   MCV 88  --  88  --  93   < >  --   --  89     --  197  --  168   < >  --   --  167     --  140  --  139   < >  --    < > 138   POTASSIUM 3.6 3.7 3.3*   < > 3.0*   < >  --    < > 3.9   CHLORIDE 101  --  105  --  107   < >  --    < > 103   CO2 28  --  27  --  24   < >  --    <  > 25   BUN 7  --  5*  --  7   < >  --    < > 34*   CR 0.55  --  0.50*  --  0.59   < >  --    < > 1.57*   ANIONGAP 7  --  8  --  8   < >  --    < > 10   STEVE 8.4*  --  8.6  --  8.3*   < >  --    < > 9.2   GLC 90  --  92  --  112*   < >  --    < > 112*   ALBUMIN  --   --  2.1*  --  2.3*   < >  --   --  3.4   PROTTOTAL  --   --   --   --   --   --   --   --  7.7   BILITOTAL  --   --   --   --   --   --   --   --  0.5   ALKPHOS  --   --   --   --   --   --   --   --  76   ALT  --   --   --   --   --   --   --   --  29   AST  --   --   --   --   --   --   --   --  23   TROPI  --   --   --   --   --   --  <0.015  --  <0.015    < > = values in this interval not displayed.     No results found for this or any previous visit (from the past 24 hour(s)).  Medications       anastrozole  1 mg Oral Daily     cefTRIAXone  2 g Intravenous Q24H     doxycycline hyclate  100 mg Oral Q12H LETICIA     enoxaparin ANTICOAGULANT  40 mg Subcutaneous Q24H     FLUoxetine  10 mg Oral Daily     fluticasone  1 puff Inhalation Daily     lactobacillus rhamnosus (GG)  1 capsule Oral BID     levocetirizine  5 mg Oral QPM     levothyroxine  88 mcg Oral Daily     senna-docusate  1 tablet Oral BID    Or     senna-docusate  2 tablet Oral BID     sodium chloride (PF)  3 mL Intracatheter Q8H

## 2020-12-28 NOTE — PLAN OF CARE
"Pt transferred from [facility] and received into room 4202 at 11:22 AM. Pt transported via wheelchair and accompanied by [facility] RN. Pt alert and oriented and able to make needs known. Pt rated pain 2-3/10 in her (L) chest (pleuritic pain). Pt transferred from wheelchair to bed without assistance. CASTILLO noted. O2 at 2LPM via nasal cannula. O2 sats 87-90%. Pt reports that she is unable to cough anything up for requested sputum sample. Pt reports a decreased appetite. Is taking PO fluids well. Saline lock intact. Pt with a rash noted to back, no significant erythema, however. Pt reports \"mild\" itching. Call light within reach.    1155: O2 increased to 2.5 LPM due to continued O2 sats of 87-90% on 2 LPM. O2 sats increased to 90-92%.     1345: O2 increased to 3LPM. O2 sats consistently 90-91% on 2.5 LPM. Pt sleeping.    1545: O2 increased to 3.5 LPM due to O2 sats less than 92%.     1915: Pt has been resting quietly all evening. PRN inhaler given by RT at 1830. PRN Roxicodone 5 mg PO given at approximately 1900 for (L) sided pleuritic pain. O2 remains in place at 3.5 LPM via nasal cannula.    Face to face report given with opportunity to observe patient.    Report given to NICOLETTE Chino.    Gordon Tellez RN   12/28/2020  7:20 PM       "

## 2020-12-28 NOTE — PROGRESS NOTES
"CLINICAL NUTRITION SERVICES  -  ASSESSMENT NOTE    Denise Colemandirk : LOS    65 yof admitted for CAP. Pt has a hx of thyroid disease, breast cancer, squamous cell carcinoma of the skin, dyslipidemia and BERTA. Weight has been stable for the last year per chart review. Poor appetite/intake this admission.     Diet Order: Low saturated fat, <2400mg sodium  Intake: 4 meals %     Height: 5' 5\"  Weight: 143 lbs 4.78 oz  Body mass index is 23.85 kg/m .  Weight Status:  Normal BMI  IBW: 57 kg (125 lb 10.6 oz)  Weight History:   Wt Readings from Last 10 Encounters:   12/22/20 65 kg (143 lb 4.8 oz)   12/21/20 63.5 kg (140 lb)   10/09/20 64.4 kg (142 lb)   09/11/20 65.4 kg (144 lb 2.9 oz)   07/15/20 62.1 kg (137 lb)   06/01/20 64.3 kg (141 lb 12.8 oz)   02/07/20 61.3 kg (135 lb 2.3 oz)   12/11/19 62.1 kg (137 lb)   12/03/19 62.3 kg (137 lb 5.6 oz)   11/05/19 62.5 kg (137 lb 12.6 oz)     Estimated Energy Needs: 7238-7270 kcals (25-30 Kcal/Kg)   Estimated Protein Needs: 65-80 grams protein (1-1.2 g pro/Kg)    Malnutrition Diagnosis: Patient does not meet two of the criteria necessary for diagnosing malnutrition at this time    NUTRITION DIAGNOSIS:  Inadequate oral intake related to poor appetite as evidenced by documented poor appetite/intake during hospital stay    NUTRITION RECOMMENDATIONS  - Encourage small/frequent meals  - Suggest Ensure Enlive with meals    MONITORING AND EVALUATION:  RD will monitor intake, weight, labs    "

## 2020-12-28 NOTE — PLAN OF CARE
Reason for hospital stay:  PNA    Pain Management:  Complained of left chest pain with coughing or deep breathing. Gave Tylenol and oxy with effective pain relief.     LOC:  A+O x4 - calls appropriately and makes needs known.    Cardiac:  WDL    Respiratory:  Lungs diminished throughout with crackles to bilateral bases and RML. Shallow breathing. Occasional weak dry cough. Short of breath with minimal activity and with conversation. Maintaining sats 93% on 1.5 LPM. Encouraged use of IS - patient able to get IS to approximately 750.    GI:  Poor appetite - only ate approx 25% of fruit plate for supper. Encouraged PO fluids. Did complain of nausea after supper and received IV zofran with good relief of nausea.     : WDL - complains of urgency at times.     IVF:  SL    ABX:  Continues on IV Rocephin and PO doxy    Activity: Up in room independently with steady gait.     Comments: Potassium 3.7 this evening and no replacement indicated. Will be rechecked in AM per protocol.       Face to face report given with opportunity to observe patient.    Report given to Dennys Yi RN   12/27/2020  11:30 PM

## 2020-12-28 NOTE — PLAN OF CARE
/84 (BP Location: Left arm)   Pulse 55   Temp 98.2  F (36.8  C) (Tympanic)   Resp 20   Wt 65 kg (143 lb 4.8 oz)   SpO2 94%   BMI 23.85 kg/m      Saline locked. IV Rocephin. PO Doxycycline. Elevated SBPs in the 150s. Afebrile. Pain managed w/ PO tylenol and 10 mg roxicodone. LSs continue to have crackles to RML & BLLs w/ CASTILLO & SOB. Titrated up to 2 LPM dt sats being 88-89%. BSs hyperactive w/ abd fullness/discomfort reported. Received Milk of magnesia on dayshift and 2 senna on evenings. No BM this shift. Appetite remains poor. Denies nausea. AM K+ of 3.6. No replacement provided. Bed in lowest position. Call light in reach. ID band in place. Makes needs known.     Face to face report given with opportunity to observe patient.    Report given to Emmy Cummins RN   12/28/2020  7:15 AM

## 2020-12-29 ENCOUNTER — APPOINTMENT (OUTPATIENT)
Dept: GENERAL RADIOLOGY | Facility: HOSPITAL | Age: 65
DRG: 871 | End: 2020-12-29
Attending: NURSE PRACTITIONER
Payer: MEDICARE

## 2020-12-29 PROBLEM — J90 PLEURAL EFFUSION: Status: ACTIVE | Noted: 2020-12-29

## 2020-12-29 PROBLEM — J47.9 BRONCHIECTASIS (H): Status: ACTIVE | Noted: 2019-01-01

## 2020-12-29 LAB
ANION GAP SERPL CALCULATED.3IONS-SCNC: 6 MMOL/L (ref 3–14)
BUN SERPL-MCNC: 6 MG/DL (ref 7–30)
CALCIUM SERPL-MCNC: 8.4 MG/DL (ref 8.5–10.1)
CHLORIDE SERPL-SCNC: 98 MMOL/L (ref 94–109)
CO2 SERPL-SCNC: 33 MMOL/L (ref 20–32)
CREAT SERPL-MCNC: 0.58 MG/DL (ref 0.52–1.04)
ERYTHROCYTE [DISTWIDTH] IN BLOOD BY AUTOMATED COUNT: 13.3 % (ref 10–15)
GFR SERPL CREATININE-BSD FRML MDRD: >90 ML/MIN/{1.73_M2}
GLUCOSE SERPL-MCNC: 102 MG/DL (ref 70–99)
HCT VFR BLD AUTO: 35.8 % (ref 35–47)
HGB BLD-MCNC: 11.6 G/DL (ref 11.7–15.7)
LDH SERPL L TO P-CCNC: 199 U/L (ref 81–234)
MAGNESIUM SERPL-MCNC: 2 MG/DL (ref 1.6–2.3)
MCH RBC QN AUTO: 28.8 PG (ref 26.5–33)
MCHC RBC AUTO-ENTMCNC: 32.4 G/DL (ref 31.5–36.5)
MCV RBC AUTO: 89 FL (ref 78–100)
PLATELET # BLD AUTO: 312 10E9/L (ref 150–450)
POTASSIUM SERPL-SCNC: 3.5 MMOL/L (ref 3.4–5.3)
PROT SERPL-MCNC: 7.2 G/DL (ref 6.8–8.8)
RBC # BLD AUTO: 4.03 10E12/L (ref 3.8–5.2)
SODIUM SERPL-SCNC: 137 MMOL/L (ref 133–144)
WBC # BLD AUTO: 10.5 10E9/L (ref 4–11)

## 2020-12-29 PROCEDURE — 85027 COMPLETE CBC AUTOMATED: CPT | Performed by: INTERNAL MEDICINE

## 2020-12-29 PROCEDURE — 84155 ASSAY OF PROTEIN SERUM: CPT | Performed by: NURSE PRACTITIONER

## 2020-12-29 PROCEDURE — 94640 AIRWAY INHALATION TREATMENT: CPT

## 2020-12-29 PROCEDURE — 250N000012 HC RX MED GY IP 250 OP 636 PS 637: Performed by: NURSE PRACTITIONER

## 2020-12-29 PROCEDURE — 250N000013 HC RX MED GY IP 250 OP 250 PS 637: Performed by: NURSE PRACTITIONER

## 2020-12-29 PROCEDURE — 71046 X-RAY EXAM CHEST 2 VIEWS: CPT

## 2020-12-29 PROCEDURE — 99232 SBSQ HOSP IP/OBS MODERATE 35: CPT | Performed by: NURSE PRACTITIONER

## 2020-12-29 PROCEDURE — 120N000001 HC R&B MED SURG/OB

## 2020-12-29 PROCEDURE — 250N000009 HC RX 250: Performed by: NURSE PRACTITIONER

## 2020-12-29 PROCEDURE — 999N000157 HC STATISTIC RCP TIME EA 10 MIN

## 2020-12-29 PROCEDURE — 83735 ASSAY OF MAGNESIUM: CPT | Performed by: INTERNAL MEDICINE

## 2020-12-29 PROCEDURE — 250N000013 HC RX MED GY IP 250 OP 250 PS 637: Performed by: INTERNAL MEDICINE

## 2020-12-29 PROCEDURE — 36415 COLL VENOUS BLD VENIPUNCTURE: CPT | Performed by: INTERNAL MEDICINE

## 2020-12-29 PROCEDURE — 94799 UNLISTED PULMONARY SVC/PX: CPT

## 2020-12-29 PROCEDURE — 80048 BASIC METABOLIC PNL TOTAL CA: CPT | Performed by: INTERNAL MEDICINE

## 2020-12-29 PROCEDURE — 83615 LACTATE (LD) (LDH) ENZYME: CPT | Performed by: NURSE PRACTITIONER

## 2020-12-29 RX ORDER — OXYMETAZOLINE HYDROCHLORIDE 0.05 G/100ML
2 SPRAY NASAL 2 TIMES DAILY
Status: DISCONTINUED | OUTPATIENT
Start: 2020-12-29 | End: 2020-12-31

## 2020-12-29 RX ORDER — IPRATROPIUM BROMIDE AND ALBUTEROL SULFATE 2.5; .5 MG/3ML; MG/3ML
3 SOLUTION RESPIRATORY (INHALATION) EVERY 4 HOURS PRN
Status: DISCONTINUED | OUTPATIENT
Start: 2020-12-29 | End: 2021-01-01 | Stop reason: HOSPADM

## 2020-12-29 RX ORDER — FLUTICASONE PROPIONATE 50 MCG
1 SPRAY, SUSPENSION (ML) NASAL DAILY
Status: DISCONTINUED | OUTPATIENT
Start: 2020-12-29 | End: 2021-01-01 | Stop reason: HOSPADM

## 2020-12-29 RX ORDER — OXYCODONE AND ACETAMINOPHEN 5; 325 MG/1; MG/1
1 TABLET ORAL EVERY 4 HOURS PRN
Status: DISCONTINUED | OUTPATIENT
Start: 2020-12-29 | End: 2021-01-01 | Stop reason: HOSPADM

## 2020-12-29 RX ORDER — PREDNISONE 20 MG/1
40 TABLET ORAL DAILY
Status: DISCONTINUED | OUTPATIENT
Start: 2020-12-29 | End: 2020-12-31

## 2020-12-29 RX ADMIN — MAGNESIUM HYDROXIDE 30 ML: 400 SUSPENSION ORAL at 08:28

## 2020-12-29 RX ADMIN — IPRATROPIUM BROMIDE AND ALBUTEROL SULFATE 3 ML: .5; 3 SOLUTION RESPIRATORY (INHALATION) at 12:32

## 2020-12-29 RX ADMIN — PREDNISONE 40 MG: 20 TABLET ORAL at 09:41

## 2020-12-29 RX ADMIN — OXYCODONE HYDROCHLORIDE 10 MG: 5 TABLET ORAL at 00:11

## 2020-12-29 RX ADMIN — OXYCODONE HYDROCHLORIDE 10 MG: 5 TABLET ORAL at 03:38

## 2020-12-29 RX ADMIN — OXYCODONE HYDROCHLORIDE AND ACETAMINOPHEN 1 TABLET: 5; 325 TABLET ORAL at 20:48

## 2020-12-29 RX ADMIN — DOXYCYCLINE HYCLATE 100 MG: 100 CAPSULE ORAL at 20:45

## 2020-12-29 RX ADMIN — FLUOXETINE 10 MG: 10 CAPSULE ORAL at 08:24

## 2020-12-29 RX ADMIN — SENNOSIDES AND DOCUSATE SODIUM 2 TABLET: 8.6; 5 TABLET ORAL at 08:25

## 2020-12-29 RX ADMIN — FLUTICASONE FUROATE 1 PUFF: 200 POWDER RESPIRATORY (INHALATION) at 08:28

## 2020-12-29 RX ADMIN — ANASTROZOLE 1 MG: 1 TABLET ORAL at 08:24

## 2020-12-29 RX ADMIN — SENNOSIDES AND DOCUSATE SODIUM 2 TABLET: 8.6; 5 TABLET ORAL at 20:45

## 2020-12-29 RX ADMIN — LEVOTHYROXINE SODIUM 88 MCG: 0.09 TABLET ORAL at 08:24

## 2020-12-29 RX ADMIN — OXYMETAZOLINE HCL 2 SPRAY: 0.05 SPRAY NASAL at 12:39

## 2020-12-29 RX ADMIN — OXYCODONE HYDROCHLORIDE 10 MG: 5 TABLET ORAL at 06:32

## 2020-12-29 RX ADMIN — OXYMETAZOLINE HCL 2 SPRAY: 0.05 SPRAY NASAL at 20:45

## 2020-12-29 RX ADMIN — Medication 1 CAPSULE: at 18:18

## 2020-12-29 RX ADMIN — FLUTICASONE PROPIONATE 1 SPRAY: 50 SPRAY, METERED NASAL at 09:41

## 2020-12-29 RX ADMIN — LEVOCETIRIZINE DIHYDROCHLORIDE 5 MG: 5 TABLET ORAL at 20:45

## 2020-12-29 RX ADMIN — OXYCODONE HYDROCHLORIDE AND ACETAMINOPHEN 1 TABLET: 5; 325 TABLET ORAL at 12:40

## 2020-12-29 RX ADMIN — CEFDINIR 300 MG: 300 CAPSULE ORAL at 08:23

## 2020-12-29 RX ADMIN — OXYCODONE HYDROCHLORIDE AND ACETAMINOPHEN 1 TABLET: 5; 325 TABLET ORAL at 16:44

## 2020-12-29 RX ADMIN — DOXYCYCLINE HYCLATE 100 MG: 100 CAPSULE ORAL at 08:26

## 2020-12-29 RX ADMIN — CEFDINIR 300 MG: 300 CAPSULE ORAL at 20:45

## 2020-12-29 RX ADMIN — Medication 1 CAPSULE: at 10:42

## 2020-12-29 ASSESSMENT — ACTIVITIES OF DAILY LIVING (ADL)
ADLS_ACUITY_SCORE: 12
ADLS_ACUITY_SCORE: 12

## 2020-12-29 NOTE — PLAN OF CARE
Pt changed back to nasal canula with humidification after nasal spray was administered. Pt at 6 LPM sats at 88 %, Will be changing to high flow NC.

## 2020-12-29 NOTE — PLAN OF CARE
Telephone report received from NICOLETTE Barakat @ [id number].   Pt transferred down from OB to Room 3112 @ 1440, VSS. Pt on 3L O2, denies pain.     Face to face report given with opportunity to observe patient.    Report given to NICOLETTE Cruz and NICOLETTE Resendez RN   12/29/2020  3:58 PM

## 2020-12-29 NOTE — PLAN OF CARE
Patient is alert and orientated and makes needs known. Patient is up and about in the room independently. Patient does report dyspnea on exertion. Patient uses call light appropriately. Patient does reports pain in chest that is worse upon movement. Medication given with some relief. Patient is on 3.5 liters O2. Titrated up to 4 liter to maintain sats of 92% at 2209. SATS seem to dip when pt is asleep. Titrated up to 5 LPM at 2235 to maintain sats of 92%. Patient has fine crackles throughout all lungs fields and bilateral expiatory wheezing in bases. Patient still reports felling abd fullness. Patient is voiding with out difficulty. Patient does report passing flatus. IV is saline locked and flushed with no resistance. Patient took scheduled medications with no issues. Patient is tolerating PO food and fluid well.     0715 Patient maintained sat in the low 90's on five liters. Patient continues to use PRN Leigh 10 mg for left chest wall pain that radiated into her back. Patient reports medication helps. Patient in room. No needs at this time will continue to monitor.

## 2020-12-29 NOTE — PLAN OF CARE
Face to face report given with opportunity to observe patient.    Report given to Roshni Tran RN   12/29/2020  7:17 AM

## 2020-12-29 NOTE — PLAN OF CARE
Pt has been 87-91% on 2-3.5 LPM via nasal canula. Pt reports having a large BM at 1130. Reports decrease in abdominal pain.

## 2020-12-29 NOTE — PLAN OF CARE
Telephone report given at this time.     Report given to NICOLETTE Noyola RN   12/29/2020  2:24 PM

## 2020-12-29 NOTE — PROGRESS NOTES
[facility]    Hospitalist Progress Note    Date of Service (when I saw the patient): 12/29/2020    Assessment & Plan       Left middle lobe pneumonia: No fevers, cough unchanged-still nonproductive. Her hypoxia was nearly resolved yesterday but has increasing oxygen demands again. She has some expiratory wheezes, taking larger doses of oxy overnight compared to previous. Will start steroids to reduce inflammation and hopefully help with the pleuritic pain. She does feel overall the pain has improved compared to admission. Also start duonebs to see if that helps.     Acute respiratory failure with hypoxia: Due to left lingula pneumonia and bilateral pleural effusions. Hypoxia worsened overnight but later today with increased pulmonary toilet improved and down to 3L/nc. CTA was negative for PE, d-dimer was elevated.       Pleural effusion: Bilateral. Left greater that right. Right has increased substantially compared to 12/22. Plan thoracentesis tomorrow both diagnostic and therapeutic. Hold lovenox tonight. Most likely this is parapneumonic effusion but with her history of breast cancer and unusual location of presenting pneumonia cannot rule out possible malignancy.       Acute renal failure (H): Renal function steadily improving. Initially thought to be due solely to dehydration, however improvement has been slow though steady. Continue to monitor.       Bronchiectasis (H): Chronic, increased risk of respiratory failure and pneumonia.       DVT Prophylaxis: Enoxaparin (Lovenox) SQ  Code Status: Full Code    Disposition: Expected discharge in several days once respiratory status stable.    Isabela Bautista, CNP    Interval History   Pain stipp severe though overall improved compared to presentation. She denies any nausea/vomiting, abdominal pain. Has a feeling of bloating, no BM for several days despite multiple interventions.     -Data reviewed today: I reviewed all new labs and imaging results over  the last 24 hours.     Physical Exam   Temp: 99.3  F (37.4  C) Temp src: Tympanic BP: 139/73 Pulse: 59   Resp: 22 SpO2: 92 % O2 Device: Nasal cannula Oxygen Delivery: 4 LPM  Vitals:    12/22/20 2214   Weight: 65 kg (143 lb 4.8 oz)     Vital Signs with Ranges  Temp:  [97.8  F (36.6  C)-99.3  F (37.4  C)] 99.3  F (37.4  C)  Pulse:  [57-66] 59  Resp:  [14-22] 22  BP: (132-145)/(73-85) 139/73  SpO2:  [78 %-96 %] 92 %  I/O last 3 completed shifts:  In: 3 [I.V.:3]  Out: -     Peripheral IV 12/26/20 Right Lower forearm (Active)   Site Assessment WDL 12/28/20 1530   Line Status Saline locked 12/28/20 1530   Dressing Intervention New dressing  12/26/20 1300   Phlebitis Scale 0-->no symptoms 12/28/20 1530   Infiltration Scale 0 12/28/20 1530   Number of days: 3       Closed/Suction Drain 1 Left Chest Bulb 15 Somali (Active)   Number of days: 447       Closed/Suction Drain 2 Right Chest Bulb 15 Somali (Active)   Number of days: 447       Incision/Surgical Site 08/23/19 Right Breast (Active)   Number of days: 494       Incision/Surgical Site 08/23/19 Right Axillary (Active)   Number of days: 494       Incision/Surgical Site 10/09/19 Left Breast (Active)   Number of days: 447       Incision/Surgical Site 10/09/19 Right Breast (Active)   Number of days: 447     Line/device assessment(s) completed for medical necessity    Constitutional: Awake,alert, no acute distress  Respiratory: Very diminished throughout, some end expiratory wheezing noted.   Cardiovascular: HRR, no murmurs, rubs,thrills.  GI: Soft,nontender, bowel sounds positive throughout  Skin/Integumen: No rashes, unusual bruising or open areas.        Medications       anastrozole  1 mg Oral Daily     cefdinir  300 mg Oral Q12H LETICIA     doxycycline hyclate  100 mg Oral Q12H LETICIA     [Held by provider] enoxaparin ANTICOAGULANT  40 mg Subcutaneous Q24H     FLUoxetine  10 mg Oral Daily     fluticasone  1 puff Inhalation Daily     fluticasone  1 spray Both Nostrils Daily      lactobacillus rhamnosus (GG)  1 capsule Oral BID     levocetirizine  5 mg Oral QPM     levothyroxine  88 mcg Oral Daily     oxymetazoline  2 spray Both Nostrils BID     predniSONE  40 mg Oral Daily     senna-docusate  1 tablet Oral BID    Or     senna-docusate  2 tablet Oral BID     sodium chloride (PF)  3 mL Intracatheter Q8H       Data   Recent Labs   Lab 12/29/20  0602 12/28/20  0604 12/27/20  1617 12/27/20  0626 12/26/20  0634 12/26/20  0634 12/22/20  1725 12/22/20  1725   WBC 10.5 13.2*  --  13.5*  --  13.5*   < >  --    HGB 11.6* 11.4*  --  11.5*  --  12.0   < >  --    MCV 89 88  --  88  --  93   < >  --     254  --  197  --  168   < >  --     136  --  140  --  139   < >  --    POTASSIUM 3.5 3.6 3.7 3.3*   < > 3.0*   < >  --    CHLORIDE 98 101  --  105  --  107   < >  --    CO2 33* 28  --  27  --  24   < >  --    BUN 6* 7  --  5*  --  7   < >  --    CR 0.58 0.55  --  0.50*  --  0.59   < >  --    ANIONGAP 6 7  --  8  --  8   < >  --    STEVE 8.4* 8.4*  --  8.6  --  8.3*   < >  --    * 90  --  92  --  112*   < >  --    ALBUMIN  --   --   --  2.1*  --  2.3*   < >  --    TROPI  --   --   --   --   --   --   --  <0.015    < > = values in this interval not displayed.       Recent Results (from the past 24 hour(s))   XR Chest 2 Views    Narrative    PROCEDURE:  XR CHEST 2 VW    HISTORY:  recheck.     COMPARISON:  December 22, 2020    FINDINGS:   There is an enlarging left-sided pleural effusion as compared to  December 22, 2020. There is worsening compressive atelectasis in the  left lung. There is a small right-sided pleural effusion with blunting  of the right costophrenic angles. Mild basilar atelectasis is seen.      Impression    IMPRESSION:  Worsening bilateral pleural effusions as compared to  previous examination on December 22, 2020    HUMZA MCCORMACK MD

## 2020-12-29 NOTE — PLAN OF CARE
Pt reporting nasal stuffiness and O2 sats dropped down to 89%.  Switched pt from nasal canula to simple mask and pt sats are 92-93 on 5 LPM. Will continue to monitor.

## 2020-12-30 ENCOUNTER — APPOINTMENT (OUTPATIENT)
Dept: GENERAL RADIOLOGY | Facility: HOSPITAL | Age: 65
DRG: 871 | End: 2020-12-30
Attending: RADIOLOGY
Payer: MEDICARE

## 2020-12-30 ENCOUNTER — APPOINTMENT (OUTPATIENT)
Dept: ULTRASOUND IMAGING | Facility: HOSPITAL | Age: 65
DRG: 871 | End: 2020-12-30
Attending: NURSE PRACTITIONER
Payer: MEDICARE

## 2020-12-30 LAB
ALBUMIN SERPL-MCNC: 2.4 G/DL (ref 3.4–5)
ALP SERPL-CCNC: 68 U/L (ref 40–150)
ALT SERPL W P-5'-P-CCNC: 28 U/L (ref 0–50)
ANION GAP SERPL CALCULATED.3IONS-SCNC: 4 MMOL/L (ref 3–14)
APPEARANCE FLD: NORMAL
AST SERPL W P-5'-P-CCNC: 37 U/L (ref 0–45)
BILIRUB SERPL-MCNC: 0.3 MG/DL (ref 0.2–1.3)
BUN SERPL-MCNC: 7 MG/DL (ref 7–30)
CALCIUM SERPL-MCNC: 8.7 MG/DL (ref 8.5–10.1)
CHLORIDE SERPL-SCNC: 99 MMOL/L (ref 94–109)
CO2 SERPL-SCNC: 34 MMOL/L (ref 20–32)
COLOR FLD: YELLOW
CREAT SERPL-MCNC: 0.58 MG/DL (ref 0.52–1.04)
ERYTHROCYTE [DISTWIDTH] IN BLOOD BY AUTOMATED COUNT: 13.5 % (ref 10–15)
GFR SERPL CREATININE-BSD FRML MDRD: >90 ML/MIN/{1.73_M2}
GLUCOSE FLD-MCNC: 111 MG/DL
GLUCOSE SERPL-MCNC: 106 MG/DL (ref 70–99)
GLUCOSE SERPL-MCNC: 121 MG/DL (ref 70–99)
HCT VFR BLD AUTO: 35.3 % (ref 35–47)
HGB BLD-MCNC: 11.8 G/DL (ref 11.7–15.7)
INR PPP: 1.02 (ref 0.86–1.14)
LACTATE BLD-SCNC: 1.5 MMOL/L (ref 0.7–2)
LDH FLD L TO P-CCNC: 376 U/L
LDH SERPL L TO P-CCNC: 197 U/L (ref 81–234)
LDH SERPL L TO P-CCNC: 205 U/L (ref 81–234)
LYMPHOCYTES NFR FLD MANUAL: 78 %
MAGNESIUM SERPL-MCNC: 2.1 MG/DL (ref 1.6–2.3)
MCH RBC QN AUTO: 29.3 PG (ref 26.5–33)
MCHC RBC AUTO-ENTMCNC: 33.4 G/DL (ref 31.5–36.5)
MCV RBC AUTO: 88 FL (ref 78–100)
NEUTS BAND NFR FLD MANUAL: 22 %
NT-PROBNP SERPL-MCNC: 4476 PG/ML (ref 0–900)
PH FLD: 8 PH
PLATELET # BLD AUTO: 393 10E9/L (ref 150–450)
POTASSIUM SERPL-SCNC: 3.4 MMOL/L (ref 3.4–5.3)
PROT FLD-MCNC: 4.1 G/DL
PROT SERPL-MCNC: 7.2 G/DL (ref 6.8–8.8)
PROT SERPL-MCNC: 7.5 G/DL (ref 6.8–8.8)
RBC # BLD AUTO: 4.03 10E12/L (ref 3.8–5.2)
RBC # FLD: 6003 /UL
SODIUM SERPL-SCNC: 137 MMOL/L (ref 133–144)
SPECIMEN SOURCE FLD: NORMAL
WBC # BLD AUTO: 12.1 10E9/L (ref 4–11)
WBC # FLD AUTO: 367 /UL

## 2020-12-30 PROCEDURE — 82947 ASSAY GLUCOSE BLOOD QUANT: CPT | Performed by: NURSE PRACTITIONER

## 2020-12-30 PROCEDURE — 999N000065 XR CHEST 1 VW

## 2020-12-30 PROCEDURE — 87205 SMEAR GRAM STAIN: CPT | Performed by: NURSE PRACTITIONER

## 2020-12-30 PROCEDURE — 999N001014 HC STATISTIC CYTO WRIGHT STAIN TC: Performed by: NURSE PRACTITIONER

## 2020-12-30 PROCEDURE — 83735 ASSAY OF MAGNESIUM: CPT | Performed by: INTERNAL MEDICINE

## 2020-12-30 PROCEDURE — 120N000001 HC R&B MED SURG/OB

## 2020-12-30 PROCEDURE — 89051 BODY FLUID CELL COUNT: CPT | Performed by: NURSE PRACTITIONER

## 2020-12-30 PROCEDURE — 83615 LACTATE (LD) (LDH) ENZYME: CPT | Performed by: NURSE PRACTITIONER

## 2020-12-30 PROCEDURE — 88108 CYTOPATH CONCENTRATE TECH: CPT | Mod: TC | Performed by: NURSE PRACTITIONER

## 2020-12-30 PROCEDURE — 0W9B3ZX DRAINAGE OF LEFT PLEURAL CAVITY, PERCUTANEOUS APPROACH, DIAGNOSTIC: ICD-10-PCS | Performed by: RADIOLOGY

## 2020-12-30 PROCEDURE — 250N000013 HC RX MED GY IP 250 OP 250 PS 637: Performed by: INTERNAL MEDICINE

## 2020-12-30 PROCEDURE — 999N001018 HC STATISTIC H-CELL BLOCK W/STAIN: Performed by: NURSE PRACTITIONER

## 2020-12-30 PROCEDURE — 84157 ASSAY OF PROTEIN OTHER: CPT | Performed by: NURSE PRACTITIONER

## 2020-12-30 PROCEDURE — 36415 COLL VENOUS BLD VENIPUNCTURE: CPT | Performed by: INTERNAL MEDICINE

## 2020-12-30 PROCEDURE — 83880 ASSAY OF NATRIURETIC PEPTIDE: CPT | Performed by: NURSE PRACTITIONER

## 2020-12-30 PROCEDURE — 71045 X-RAY EXAM CHEST 1 VIEW: CPT

## 2020-12-30 PROCEDURE — 999N000157 HC STATISTIC RCP TIME EA 10 MIN

## 2020-12-30 PROCEDURE — 94640 AIRWAY INHALATION TREATMENT: CPT

## 2020-12-30 PROCEDURE — 36415 COLL VENOUS BLD VENIPUNCTURE: CPT | Performed by: NURSE PRACTITIONER

## 2020-12-30 PROCEDURE — 88305 TISSUE EXAM BY PATHOLOGIST: CPT | Mod: TC | Performed by: NURSE PRACTITIONER

## 2020-12-30 PROCEDURE — 250N000009 HC RX 250: Performed by: NURSE PRACTITIONER

## 2020-12-30 PROCEDURE — 83986 ASSAY PH BODY FLUID NOS: CPT | Performed by: NURSE PRACTITIONER

## 2020-12-30 PROCEDURE — 87070 CULTURE OTHR SPECIMN AEROBIC: CPT | Performed by: NURSE PRACTITIONER

## 2020-12-30 PROCEDURE — 250N000012 HC RX MED GY IP 250 OP 636 PS 637: Performed by: NURSE PRACTITIONER

## 2020-12-30 PROCEDURE — 250N000013 HC RX MED GY IP 250 OP 250 PS 637: Performed by: NURSE PRACTITIONER

## 2020-12-30 PROCEDURE — 272N000042 US THORACENTESIS

## 2020-12-30 PROCEDURE — 99232 SBSQ HOSP IP/OBS MODERATE 35: CPT | Performed by: NURSE PRACTITIONER

## 2020-12-30 PROCEDURE — 84155 ASSAY OF PROTEIN SERUM: CPT | Performed by: NURSE PRACTITIONER

## 2020-12-30 PROCEDURE — 85610 PROTHROMBIN TIME: CPT | Performed by: NURSE PRACTITIONER

## 2020-12-30 PROCEDURE — 85027 COMPLETE CBC AUTOMATED: CPT | Performed by: INTERNAL MEDICINE

## 2020-12-30 PROCEDURE — 87075 CULTR BACTERIA EXCEPT BLOOD: CPT | Performed by: NURSE PRACTITIONER

## 2020-12-30 PROCEDURE — 80053 COMPREHEN METABOLIC PANEL: CPT | Performed by: NURSE PRACTITIONER

## 2020-12-30 PROCEDURE — 82945 GLUCOSE OTHER FLUID: CPT | Performed by: NURSE PRACTITIONER

## 2020-12-30 PROCEDURE — 83605 ASSAY OF LACTIC ACID: CPT | Performed by: NURSE PRACTITIONER

## 2020-12-30 PROCEDURE — 250N000009 HC RX 250

## 2020-12-30 PROCEDURE — 94799 UNLISTED PULMONARY SVC/PX: CPT

## 2020-12-30 RX ORDER — LIDOCAINE HYDROCHLORIDE 10 MG/ML
INJECTION, SOLUTION EPIDURAL; INFILTRATION; INTRACAUDAL; PERINEURAL
Status: COMPLETED
Start: 2020-12-30 | End: 2020-12-30

## 2020-12-30 RX ADMIN — OXYCODONE HYDROCHLORIDE AND ACETAMINOPHEN 1 TABLET: 5; 325 TABLET ORAL at 18:42

## 2020-12-30 RX ADMIN — SENNOSIDES AND DOCUSATE SODIUM 1 TABLET: 8.6; 5 TABLET ORAL at 10:13

## 2020-12-30 RX ADMIN — OXYCODONE HYDROCHLORIDE AND ACETAMINOPHEN 1 TABLET: 5; 325 TABLET ORAL at 13:19

## 2020-12-30 RX ADMIN — OXYCODONE HYDROCHLORIDE AND ACETAMINOPHEN 1 TABLET: 5; 325 TABLET ORAL at 05:28

## 2020-12-30 RX ADMIN — FLUTICASONE PROPIONATE 1 SPRAY: 50 SPRAY, METERED NASAL at 10:15

## 2020-12-30 RX ADMIN — CEFDINIR 300 MG: 300 CAPSULE ORAL at 20:34

## 2020-12-30 RX ADMIN — Medication 1 CAPSULE: at 18:42

## 2020-12-30 RX ADMIN — OXYMETAZOLINE HCL 2 SPRAY: 0.05 SPRAY NASAL at 20:35

## 2020-12-30 RX ADMIN — LEVOCETIRIZINE DIHYDROCHLORIDE 5 MG: 5 TABLET ORAL at 20:34

## 2020-12-30 RX ADMIN — FLUOXETINE 10 MG: 10 CAPSULE ORAL at 10:14

## 2020-12-30 RX ADMIN — LIDOCAINE HYDROCHLORIDE 3 ML: 10 INJECTION, SOLUTION EPIDURAL; INFILTRATION; INTRACAUDAL; PERINEURAL at 12:59

## 2020-12-30 RX ADMIN — ANASTROZOLE 1 MG: 1 TABLET ORAL at 10:15

## 2020-12-30 RX ADMIN — SENNOSIDES AND DOCUSATE SODIUM 2 TABLET: 8.6; 5 TABLET ORAL at 20:34

## 2020-12-30 RX ADMIN — DOXYCYCLINE HYCLATE 100 MG: 100 CAPSULE ORAL at 10:12

## 2020-12-30 RX ADMIN — OXYMETAZOLINE HCL 2 SPRAY: 0.05 SPRAY NASAL at 10:18

## 2020-12-30 RX ADMIN — CEFDINIR 300 MG: 300 CAPSULE ORAL at 10:13

## 2020-12-30 RX ADMIN — OXYCODONE HYDROCHLORIDE AND ACETAMINOPHEN 1 TABLET: 5; 325 TABLET ORAL at 01:00

## 2020-12-30 RX ADMIN — IPRATROPIUM BROMIDE AND ALBUTEROL SULFATE 3 ML: .5; 3 SOLUTION RESPIRATORY (INHALATION) at 09:51

## 2020-12-30 RX ADMIN — PREDNISONE 40 MG: 20 TABLET ORAL at 10:14

## 2020-12-30 RX ADMIN — LEVOTHYROXINE SODIUM 88 MCG: 0.09 TABLET ORAL at 10:13

## 2020-12-30 RX ADMIN — DOXYCYCLINE HYCLATE 100 MG: 100 CAPSULE ORAL at 20:35

## 2020-12-30 RX ADMIN — FLUTICASONE FUROATE 1 PUFF: 200 POWDER RESPIRATORY (INHALATION) at 09:51

## 2020-12-30 RX ADMIN — Medication 1 CAPSULE: at 10:12

## 2020-12-30 ASSESSMENT — ACTIVITIES OF DAILY LIVING (ADL)
ADLS_ACUITY_SCORE: 12

## 2020-12-30 NOTE — PHARMACY
Pharmacy Antimicrobial Stewardship Assessment     Current Antimicrobial Therapy:  Anti-infectives (From now, onward)    Start     Dose/Rate Route Frequency Ordered Stop    12/28/20 0900  cefdinir (OMNICEF) capsule 300 mg      300 mg Oral EVERY 12 HOURS SCHEDULED 12/28/20 0858 01/05/21 0759    12/22/20 2200  doxycycline hyclate (VIBRAMYCIN) capsule 100 mg      100 mg Oral EVERY 12 HOURS SCHEDULED 12/22/20 2136          Indication: bacteremia (cefdinir), CAP (doxycycline)    Days of Therapy: 9 (doxycycline), 3 (cefdinir, received 6 doses Rocephin as well)      Pertinent Labs:  Creatinine   Date Value Ref Range Status   12/30/2020 0.58 0.52 - 1.04 mg/dL Final   12/29/2020 0.58 0.52 - 1.04 mg/dL Final   12/28/2020 0.55 0.52 - 1.04 mg/dL Final     WBC   Date Value Ref Range Status   12/30/2020 12.1 (H) 4.0 - 11.0 10e9/L Final   12/29/2020 10.5 4.0 - 11.0 10e9/L Final   12/28/2020 13.2 (H) 4.0 - 11.0 10e9/L Final     Procalcitonin   Date Value Ref Range Status   12/26/2020 1.79 ng/ml Final     Comment:     0.50-1.99 ng/ml  Moderate risk of systemic infection.  Recommendation:   Recommend antibiotics. Evaluate culture results and clinical features to   target antibacterial therapy. Obtain blood cultures and other relevant   cultures if not done.  If empiric antibiotics were started, recheck PCT in: 2 days to guide   antibiotic de-escalation.  Discontinue or de-escalate antibiotics when PCT   concentration is <80% of peak or abs PCT <0.5. If empiric antibiotics were NOT   started, recheck PCT in 6-24 hours to re-evaluate need for antibiotics.       CRP Inflammation   Date Value Ref Range Status   12/26/2020 205.0 (H) 0.0 - 8.0 mg/L Final   04/02/2018 37.6 (H) 0.0 - 8.0 mg/L Final   01/11/2018 36.0 (H) 0.0 - 8.0 mg/L Final       Culture Results:   12/25: Blood culture x 2, no growth after 5 days   12/22: Urine culture Aerobic bacterial, < 10,000 colonies/mL   12/22: Influenza/COVID, negative  12/22: Blood culture, 2/2  bottles strep pyogenes group A     12/22: Blood culture, 2/2 bottles beta hemolytic Strep group A     Recommendations/Interventions:  WBC increased, likely from the prednisone. Not having any fevers. No recommendations at this time. Will continue to monitor.     Elma Reyes RPH  December 30, 2020

## 2020-12-30 NOTE — PROVIDER NOTIFICATION
Text page sent   Abelardo upper 40's - 50's  Increased O2 to 5 LPM via NC to keep sats 90%  Pain 3-6/10 - percocet x 2

## 2020-12-30 NOTE — PROGRESS NOTES
[facility]    Hospitalist Progress Note    Date of Service (when I saw the patient): 12/30/2020    Assessment & Plan       Left middle lobe pneumonia: No fevers, cough unchanged-still nonproductive. Her hypoxia was nearly resolved yesterday but has increasing oxygen demands again. She has some expiratory wheezes, taking larger doses of oxy overnight compared to previous. Will start steroids to reduce inflammation and hopefully help with the pleuritic pain. She does feel overall the pain has improved compared to admission. Also start duonebs to see if that helps.   -12/30: Upper airway air movement improved today, thoracentesis scheduled for this afternoon.    Acute respiratory failure with hypoxia: Due to left lingula pneumonia and bilateral pleural effusions. Hypoxia worsened overnight but later today with increased pulmonary toilet improved and down to 3L/nc. CTA was negative for PE, d-dimer was elevated.  -12/30: Oxygen requirements stable at 3-4L/nc      Pleural effusion: Bilateral. Left greater that right. Right has increased substantially compared to 12/22. Plan thoracentesis tomorrow both diagnostic and therapeutic. Hold lovenox tonight. Most likely this is parapneumonic effusion but with her history of breast cancer and unusual location of presenting pneumonia cannot rule out possible malignancy.   -12/30: thora today, depending on findings will move forward for further management.       Acute renal failure (H): Renal function steadily improving. Initially thought to be due solely to dehydration, however improvement has been slow though steady. Continue to monitor.       Bronchiectasis (H): Chronic, increased risk of respiratory failure and pneumonia.       DVT Prophylaxis: Enoxaparin (Lovenox) SQ  Code Status: Full Code    Disposition: Expected discharge in several days once respiratory status stable.    Isabela Bautista, CNP    Interval History   Pain still severe though overall improved  compared to presentation. She denies any nausea/vomiting, abdominal pain. Has a feeling of bloating, though did have good size BM yesterday.     -Data reviewed today: I reviewed all new labs and imaging results over the last 24 hours.     Physical Exam   Temp: 100.3  F (37.9  C) Temp src: Tympanic BP: 143/77 Pulse: 54   Resp: 20 SpO2: 93 % O2 Device: Nasal cannula with humidification Oxygen Delivery: 6 LPM  Vitals:    12/22/20 2214   Weight: 65 kg (143 lb 4.8 oz)     Vital Signs with Ranges  Temp:  [97.4  F (36.3  C)-100.3  F (37.9  C)] 100.3  F (37.9  C)  Pulse:  [49-65] 54  Resp:  [16-22] 20  BP: (116-154)/(70-86) 143/77  SpO2:  [88 %-93 %] 93 %  I/O last 3 completed shifts:  In: 243 [P.O.:240; I.V.:3]  Out: 500 [Urine:500]    Peripheral IV 12/26/20 Right Lower forearm (Active)   Site Assessment WDL 12/30/20 1318   Line Status Saline locked 12/30/20 1318   Dressing Intervention New dressing  12/26/20 1300   Phlebitis Scale 0-->no symptoms 12/30/20 1318   Infiltration Scale 0 12/30/20 1318   Number of days: 4       Closed/Suction Drain 1 Left Chest Bulb 15 Tong[place] (Active)   Number of days: 448       Closed/Suction Drain 2 Right Chest Bulb 15 [place] (Active)   Number of days: 448       Incision/Surgical Site 08/23/19 Right Breast (Active)   Number of days: 495       Incision/Surgical Site 08/23/19 Right Axillary (Active)   Number of days: 495       Incision/Surgical Site 10/09/19 Left Breast (Active)   Number of days: 448       Incision/Surgical Site 10/09/19 Right Breast (Active)   Number of days: 448     Line/device assessment(s) completed for medical necessity    Constitutional: Awake,alert, no acute distress  Respiratory: Better air movement anterior and posterior upper lobes, still very diminished bilaterally, left is quite dense with some upper vesicular sounds, right mid to base has course crackles  Cardiovascular: HRR, no murmurs, rubs,thrills.  GI: Soft,nontender, bowel sounds positive  throughout  Skin/Integumen: No rashes, unusual bruising or open areas.        Medications     - MEDICATION INSTRUCTIONS -         anastrozole  1 mg Oral Daily     cefdinir  300 mg Oral Q12H LETICIA     doxycycline hyclate  100 mg Oral Q12H LETICIA     [Held by provider] enoxaparin ANTICOAGULANT  40 mg Subcutaneous Q24H     FLUoxetine  10 mg Oral Daily     fluticasone  1 puff Inhalation Daily     fluticasone  1 spray Both Nostrils Daily     lactobacillus rhamnosus (GG)  1 capsule Oral BID     levocetirizine  5 mg Oral QPM     levothyroxine  88 mcg Oral Daily     oxymetazoline  2 spray Both Nostrils BID     predniSONE  40 mg Oral Daily     senna-docusate  1 tablet Oral BID    Or     senna-docusate  2 tablet Oral BID     sodium chloride (PF)  3 mL Intracatheter Q8H       Data   Recent Labs   Lab 12/30/20  1022 12/30/20  0645 12/29/20  0602 12/29/20  0602 12/28/20  0604 12/27/20  0626 12/27/20  0626   WBC  --  12.1*  --  10.5 13.2*  --  13.5*   HGB  --  11.8  --  11.6* 11.4*  --  11.5*   MCV  --  88  --  89 88  --  88   PLT  --  393  --  312 254  --  197   INR  --  1.02  --   --   --   --   --    NA  --  137  --  137 136  --  140   POTASSIUM  --  3.4  --  3.5 3.6   < > 3.3*   CHLORIDE  --  99  --  98 101  --  105   CO2  --  34*  --  33* 28  --  27   BUN  --  7  --  6* 7  --  5*   CR  --  0.58  --  0.58 0.55  --  0.50*   ANIONGAP  --  4  --  6 7  --  8   STEVE  --  8.7  --  8.4* 8.4*  --  8.6   * 106*  --  102* 90  --  92   ALBUMIN  --  2.4*  --   --   --   --  2.1*   PROTTOTAL 7.2 7.5   < >  --   --   --   --    BILITOTAL  --  0.3  --   --   --   --   --    ALKPHOS  --  68  --   --   --   --   --    ALT  --  28  --   --   --   --   --    AST  --  37  --   --   --   --   --     < > = values in this interval not displayed.       Recent Results (from the past 24 hour(s))   XR Chest 2 Views    Narrative    PROCEDURE:  XR CHEST 2 VW    HISTORY:  recheck.     COMPARISON:  December 22, 2020    FINDINGS:   There is an enlarging  left-sided pleural effusion as compared to  December 22, 2020. There is worsening compressive atelectasis in the  left lung. There is a small right-sided pleural effusion with blunting  of the right costophrenic angles. Mild basilar atelectasis is seen.      Impression    IMPRESSION:  Worsening bilateral pleural effusions as compared to  previous examination on December 22, 2020    HUMZA MCCORMACK MD   XR Chest 1 View    Narrative    Procedure:XR CHEST 1 VW    Clinical history:Female, 65 years, post thoracentesis    Technique: Single view was obtained.    Comparison: 12/29/2020    Findings: The cardiac silhouette is obscured by consolidation in the  left lung and moderate-sized left effusion. The pulmonary vasculature  is normal.    The lungs demonstrate a consolidative process with atelectasis in the  left lower lobe. Moderate-sized left pleural effusion is decreased in  volume. There is no evidence of pneumothorax/hydropneumothorax.  Moderate size right effusion is similar.. Bony structures are  unremarkable.      Impression    Impression:   Status post ultrasound-guided thoracentesis of the left hemithorax  without evidence of pneumothorax or hydropneumothorax.    Left basilar atelectasis/consolidation.    Unchanged small/moderate right effusion.    MYLES AVINA MD

## 2020-12-30 NOTE — PLAN OF CARE
VSS & afebrile. Currently on 4 L saturating above 90%. Reports some SOB intermittently. Pain in upper abdomen 3-6/10 throughout the shift. Utilized PRN oral medications for pain control. No reports of nausea with oral intake. Up independently in room and up to bathroom. No reports of dizziness with ambulation. Utilizing call light appropriately.     Face to face report given with opportunity to observe patient.    Report given to Fabiola Gupta RN   12/29/2020  11:19 PM

## 2020-12-30 NOTE — PLAN OF CARE
Face to face report given with opportunity to observe patient.    Report given to Dennys Armas RN   12/30/2020  7:21 AM

## 2020-12-30 NOTE — PLAN OF CARE
VSS, afebrile, HRR, lungs are coarse throughout on the left side, with increased aeration since getting her Thoracentesis, coarse in the right base and diminished in the RUL and RML. Bowels are active. Pt has been voiding well. Pt did receive percocet for 7/10 pain in her left ribcage from the thoracentesis which has relieved her pain, and has been weaned from 6L to 3L of O2 to maintain her O2 sat >90%. Tegaderm and gauze, CDI to mid-left back. Pt is dyspneic on exertion but is up independently in her room, and makes her needs known. Pt is alert and oriented x 4, calls appropriately.     Face to face report given with opportunity to observe patient.    Report given to NICOLETTE Cruz and NICOLETTE Resendez RN   12/30/2020  2:58 PM

## 2020-12-30 NOTE — PROGRESS NOTES
Procedure: US Thoracentesis, left    There were  no complications and patient has no symptoms..      Tolerated procedure with increased amount of pain.    Patient to Procedure Room.  Sitting for procedure.  Left lung draining light cherry drainage fluid.  Total fluid 940 ml.  Fluid is to  be sent to lab.  Patient to X-ray for post procedure films.      Discharge to home once radiologist has reviewed the films and instructions given.    Radiologist:Dr. Sampson    Time Out: Prior to the start of the procedure and with procedural staff participation, I verbally confirmed the patient s identity using two indicators, relevant allergies, that the procedure was appropriate and matched the consent or emergent situation, and that the correct equipment/implants were available. Immediately prior to starting the procedure I conducted the Time Out with the procedural staff and re-confirmed the patient s name, procedure, and site/side. (The Joint Commission universal protocol was followed.)  Yes    Position:  sitting    Pain:  3 prior to procedure, 11 after procedure    Sedation:None. Local Anesthestic used  No sedation    Estimated Blood Loss: None     Condition: Stable    Comments: See dictated procedure note for full details     Jessica Torrez RN     Nurse to nurse report given to NICOLETTE Noyola. Site to left back has gauze covering and tegaderm covering.  Dressing is dry and intact. Patient had supplemental oxygen at 7LPM via high flow nasal cannula.  Patient had significant pain towards end of procedure.  Requesting pain medication once she returned to room.

## 2020-12-30 NOTE — PLAN OF CARE
Pt has been afebrile through the night, VS as charted.  Her O2 sats are in the low 90's on 4 LPM via NC-this morning increased up to 5 LPM NC, lung sounds are diminished with crackles, she does get CASTILLO/SOB.  She rates her pain/discomfort to left side of chest/diaphragm area 3-6/10 and has received PO pain medication with adequate pain control.  She is saline locked, continues on PO antibiotics and prednisone, good oral intake/output.  She has remained free of falls, call light within reach - does make her needs known - up independently in room, frequent rounding done to assess needs.

## 2020-12-31 ENCOUNTER — TELEPHONE (OUTPATIENT)
Dept: FAMILY MEDICINE | Facility: OTHER | Age: 65
End: 2020-12-31

## 2020-12-31 ENCOUNTER — APPOINTMENT (OUTPATIENT)
Dept: GENERAL RADIOLOGY | Facility: HOSPITAL | Age: 65
DRG: 871 | End: 2020-12-31
Attending: NURSE PRACTITIONER
Payer: MEDICARE

## 2020-12-31 LAB
ANION GAP SERPL CALCULATED.3IONS-SCNC: 7 MMOL/L (ref 3–14)
BACTERIA SPEC CULT: NORMAL
BACTERIA SPEC CULT: NORMAL
BUN SERPL-MCNC: 8 MG/DL (ref 7–30)
CALCIUM SERPL-MCNC: 8.7 MG/DL (ref 8.5–10.1)
CHLORIDE SERPL-SCNC: 103 MMOL/L (ref 94–109)
CO2 SERPL-SCNC: 30 MMOL/L (ref 20–32)
CREAT SERPL-MCNC: 0.67 MG/DL (ref 0.52–1.04)
ERYTHROCYTE [DISTWIDTH] IN BLOOD BY AUTOMATED COUNT: 13.6 % (ref 10–15)
GFR SERPL CREATININE-BSD FRML MDRD: >90 ML/MIN/{1.73_M2}
GLUCOSE SERPL-MCNC: 104 MG/DL (ref 70–99)
GRAM STN SPEC: NORMAL
HCT VFR BLD AUTO: 32.5 % (ref 35–47)
HGB BLD-MCNC: 10.8 G/DL (ref 11.7–15.7)
MAGNESIUM SERPL-MCNC: 2.2 MG/DL (ref 1.6–2.3)
MCH RBC QN AUTO: 29.5 PG (ref 26.5–33)
MCHC RBC AUTO-ENTMCNC: 33.2 G/DL (ref 31.5–36.5)
MCV RBC AUTO: 89 FL (ref 78–100)
PLATELET # BLD AUTO: 406 10E9/L (ref 150–450)
POTASSIUM SERPL-SCNC: 3.7 MMOL/L (ref 3.4–5.3)
RBC # BLD AUTO: 3.66 10E12/L (ref 3.8–5.2)
SODIUM SERPL-SCNC: 140 MMOL/L (ref 133–144)
SPECIMEN SOURCE: NORMAL
WBC # BLD AUTO: 11.4 10E9/L (ref 4–11)

## 2020-12-31 PROCEDURE — 94640 AIRWAY INHALATION TREATMENT: CPT

## 2020-12-31 PROCEDURE — 85027 COMPLETE CBC AUTOMATED: CPT | Performed by: INTERNAL MEDICINE

## 2020-12-31 PROCEDURE — 250N000013 HC RX MED GY IP 250 OP 250 PS 637: Performed by: INTERNAL MEDICINE

## 2020-12-31 PROCEDURE — 71046 X-RAY EXAM CHEST 2 VIEWS: CPT

## 2020-12-31 PROCEDURE — 250N000013 HC RX MED GY IP 250 OP 250 PS 637: Performed by: NURSE PRACTITIONER

## 2020-12-31 PROCEDURE — 120N000001 HC R&B MED SURG/OB

## 2020-12-31 PROCEDURE — 83735 ASSAY OF MAGNESIUM: CPT | Performed by: INTERNAL MEDICINE

## 2020-12-31 PROCEDURE — 99232 SBSQ HOSP IP/OBS MODERATE 35: CPT | Performed by: NURSE PRACTITIONER

## 2020-12-31 PROCEDURE — 80048 BASIC METABOLIC PNL TOTAL CA: CPT | Performed by: NURSE PRACTITIONER

## 2020-12-31 PROCEDURE — 250N000011 HC RX IP 250 OP 636: Performed by: INTERNAL MEDICINE

## 2020-12-31 PROCEDURE — 36415 COLL VENOUS BLD VENIPUNCTURE: CPT | Performed by: INTERNAL MEDICINE

## 2020-12-31 PROCEDURE — 999N000157 HC STATISTIC RCP TIME EA 10 MIN

## 2020-12-31 PROCEDURE — 250N000011 HC RX IP 250 OP 636: Performed by: NURSE PRACTITIONER

## 2020-12-31 RX ORDER — LACTULOSE 10 G/15ML
10 SOLUTION ORAL ONCE
Status: COMPLETED | OUTPATIENT
Start: 2020-12-31 | End: 2020-12-31

## 2020-12-31 RX ORDER — FUROSEMIDE 10 MG/ML
40 INJECTION INTRAMUSCULAR; INTRAVENOUS ONCE
Status: COMPLETED | OUTPATIENT
Start: 2020-12-31 | End: 2020-12-31

## 2020-12-31 RX ORDER — BISACODYL 10 MG
10 SUPPOSITORY, RECTAL RECTAL ONCE
Status: DISCONTINUED | OUTPATIENT
Start: 2020-12-31 | End: 2021-01-01

## 2020-12-31 RX ADMIN — Medication 1 CAPSULE: at 08:18

## 2020-12-31 RX ADMIN — OXYCODONE HYDROCHLORIDE AND ACETAMINOPHEN 1 TABLET: 5; 325 TABLET ORAL at 05:44

## 2020-12-31 RX ADMIN — DOXYCYCLINE HYCLATE 100 MG: 100 CAPSULE ORAL at 21:30

## 2020-12-31 RX ADMIN — FLUTICASONE FUROATE 1 PUFF: 200 POWDER RESPIRATORY (INHALATION) at 08:02

## 2020-12-31 RX ADMIN — LACTULOSE 10 G: 10 SOLUTION ORAL at 08:21

## 2020-12-31 RX ADMIN — LACTULOSE 10 G: 10 SOLUTION ORAL at 17:04

## 2020-12-31 RX ADMIN — ENOXAPARIN SODIUM 40 MG: 40 INJECTION SUBCUTANEOUS at 21:33

## 2020-12-31 RX ADMIN — FUROSEMIDE 40 MG: 10 INJECTION, SOLUTION INTRAMUSCULAR; INTRAVENOUS at 11:02

## 2020-12-31 RX ADMIN — FLUTICASONE PROPIONATE 1 SPRAY: 50 SPRAY, METERED NASAL at 08:18

## 2020-12-31 RX ADMIN — OXYMETAZOLINE HCL 2 SPRAY: 0.05 SPRAY NASAL at 08:18

## 2020-12-31 RX ADMIN — OXYCODONE HYDROCHLORIDE AND ACETAMINOPHEN 1 TABLET: 5; 325 TABLET ORAL at 00:01

## 2020-12-31 RX ADMIN — ANASTROZOLE 1 MG: 1 TABLET ORAL at 08:17

## 2020-12-31 RX ADMIN — DOXYCYCLINE HYCLATE 100 MG: 100 CAPSULE ORAL at 08:18

## 2020-12-31 RX ADMIN — LEVOCETIRIZINE DIHYDROCHLORIDE 5 MG: 5 TABLET ORAL at 21:30

## 2020-12-31 RX ADMIN — LEVOTHYROXINE SODIUM 88 MCG: 0.09 TABLET ORAL at 08:17

## 2020-12-31 RX ADMIN — Medication 1 CAPSULE: at 19:56

## 2020-12-31 RX ADMIN — SENNOSIDES AND DOCUSATE SODIUM 1 TABLET: 8.6; 5 TABLET ORAL at 08:18

## 2020-12-31 RX ADMIN — CEFDINIR 300 MG: 300 CAPSULE ORAL at 21:30

## 2020-12-31 RX ADMIN — CEFDINIR 300 MG: 300 CAPSULE ORAL at 08:18

## 2020-12-31 RX ADMIN — OXYCODONE HYDROCHLORIDE AND ACETAMINOPHEN 1 TABLET: 5; 325 TABLET ORAL at 16:28

## 2020-12-31 RX ADMIN — OXYCODONE HYDROCHLORIDE AND ACETAMINOPHEN 1 TABLET: 5; 325 TABLET ORAL at 19:59

## 2020-12-31 RX ADMIN — FLUOXETINE 10 MG: 10 CAPSULE ORAL at 08:18

## 2020-12-31 RX ADMIN — OXYCODONE HYDROCHLORIDE AND ACETAMINOPHEN 1 TABLET: 5; 325 TABLET ORAL at 11:55

## 2020-12-31 ASSESSMENT — ACTIVITIES OF DAILY LIVING (ADL)
ADLS_ACUITY_SCORE: 12
ADLS_ACUITY_SCORE: 13
ADLS_ACUITY_SCORE: 12
ADLS_ACUITY_SCORE: 12

## 2020-12-31 NOTE — PLAN OF CARE
Pt is alert and oriented VSS, afebrile, RA. C/o pain to left chest/back. Percocet given with adequate relief. Independent in room. Dressing to thoracentesis site CDI. Call light within reach, makes needs known.      Face to face report given with opportunity to observe patient.    Report given to NICOLETTE Cruz RN   12/31/2020  3:51 PM

## 2020-12-31 NOTE — TELEPHONE ENCOUNTER
10:14 AM    Reason for Call: OVERBOOK    Patient is having the following symptoms: follow up for hospital stay for phneumonia, Please call pt to advise/schedule. Pt is being discharged on 1/1/2021    The patient is requesting an appointment for 1/6/2021 with Mirna Roblero .    Was an appointment offered for this call? No  If yes : Appointment type              Date    Preferred method for responding to this message: Telephone Call  What is your phone number ?  [phone]    If we cannot reach you directly, may we leave a detailed response at the number you provided? Yes    Can this message wait until your PCP/provider returns, if unavailable today? Mayra Willis

## 2020-12-31 NOTE — PLAN OF CARE
Alert and orientated. VSS & afebrile. Remained on 2 L saturating above 90%. Pain in left ribcage 3-5/10 throughout the shift. Utilized PRN medications for pain control. Up independently in room throughout the shift. Denies dizziness and reports steady gait. Tolerated dinner well with no reports of nausea. Procedure site dressing left back remains clean, dry, & intact.     Face to face report given with opportunity to observe patient.    Report given to Linnette Gupta RN   12/30/2020  11:14 PM

## 2020-12-31 NOTE — PLAN OF CARE
Pt A&Ox3. She reports pain in left chest and received percocet with some relief. HR 40-50s and regular. /76. RR 20 and sating greater than 92% on 1.5 LPM NC. Lung sounds are diminished with fine crackles in the bases. Afebrile. Dressing intact to thoracentesis site with no drainage. Up independently in room. Call light within reach and pt calls appropriately.      Face to face report given with opportunity to observe patient.    Report given to NICOLETTE Freedman RN   12/31/2020  7:49 AM

## 2020-12-31 NOTE — PLAN OF CARE
Called to schedule hospital follow up and CXR with Primary MD early next week.. Clinic to call pt back.

## 2020-12-31 NOTE — PROGRESS NOTES
[facility]    Hospitalist Progress Note    Date of Service (when I saw the patient): 12/31/2020    Assessment & Plan       Left middle lobe pneumonia: No fevers, cough unchanged-still nonproductive. Her hypoxia was nearly resolved yesterday but has increasing oxygen demands again. She has some expiratory wheezes, taking larger doses of oxy overnight compared to previous. Will start steroids to reduce inflammation and hopefully help with the pleuritic pain. She does feel overall the pain has improved compared to admission. Also start duonebs to see if that helps.   -12/30: Upper airway air movement improved today, thoracentesis scheduled for this afternoon.  -12/31: CXR stable from yesterday, see below    Acute respiratory failure with hypoxia: Due to left lingula pneumonia and bilateral pleural effusions. Hypoxia worsened overnight but later today with increased pulmonary toilet improved and down to 3L/nc. CTA was negative for PE, d-dimer was elevated.  -12/30: Oxygen requirements stable at 3-4L/nc  -12/31: Tolerating room air since this morning      Pleural effusion: Bilateral. Left greater that right. Right has increased substantially compared to 12/22. Plan thoracentesis tomorrow both diagnostic and therapeutic. Hold lovenox tonight. Most likely this is parapneumonic effusion but with her history of breast cancer and unusual location of presenting pneumonia cannot rule out possible malignancy.   -12/30: thora today, depending on findings will move forward for further management.   -12/31: Thoracentesis completed at 970ml removed. Cytology and culture pending, gram stain negative.  Light's criteria showing exudative as expected. CXR is stable to slightly improved today compared to yesterday.      Acute diastolic CHF: ECHO shows grade 1 diastolic failure, she received large volume IVF on arrival due to pneumonia and possible bacteremia. As she has bilateral effusions and elevated BNP will give single  dose of lasix and check output.     Bacteremia: 4/4 bottles positive beta hemolytic strep on 12/22. Subsequent cultures drawn 12/25 negative to date. Presumed pulmonary source. Complete 14 day course cephalosporin.       Acute renal failure (H): Renal function steadily improving. Initially thought to be due solely to dehydration, however improvement has been slow though steady. Continue to monitor.   -12/31: Renal function continues to improve, now near normal.     Constipation: Episodic in the past, particularly during previous hospital stays though these involved surgery. She did have BM 12/29 but still feels distended and bloated and there is notable gas distention on CXR. Will give single dose lactulose.       Bronchiectasis (H): Chronic, increased risk of respiratory failure and pneumonia.       DVT Prophylaxis: Enoxaparin (Lovenox) SQ  Code Status: Full Code    Disposition: Expected discharge 1-2 once respiratory status stable.    Isabela Bautista, CNP    Interval History   Pain still present left chest wall though overall improved compared to presentation. She denies any nausea/vomiting, abdominal pain. Has a feeling of bloating, now noticing distention as well. Feels chest heaviness and dyspnea has greatly improved.     -Data reviewed today: I reviewed all new labs and imaging results over the last 24 hours.     Physical Exam   Temp: 97.9  F (36.6  C) Temp src: Tympanic BP: 158/79 Pulse: 52   Resp: 20 SpO2: 92 % O2 Device: None (Room air) Oxygen Delivery: 1 LPM(placed on room air)  Vitals:    12/22/20 2214   Weight: 65 kg (143 lb 4.8 oz)     Vital Signs with Ranges  Temp:  [97.2  F (36.2  C)-100.3  F (37.9  C)] 97.9  F (36.6  C)  Pulse:  [47-65] 52  Resp:  [20] 20  BP: (137-158)/(73-85) 158/79  SpO2:  [91 %-96 %] 92 %  I/O last 3 completed shifts:  In: 360 [P.O.:360]  Out: 900 [Urine:900]    Peripheral IV 12/26/20 Right Lower forearm (Active)   Site Assessment WDL 12/31/20 0812   Line Status Saline locked  12/31/20 0812   Dressing Intervention New dressing  12/26/20 1300   Phlebitis Scale 0-->no symptoms 12/31/20 0812   Infiltration Scale 0 12/31/20 0812   Number of days: 5       Closed/Suction Drain 1 Left Chest Bulb 15 [place] sor[place] (Active)   Number of days: 449       Closed/Suction Drain 2 Right Chest Bulb 15 [place] sor[place] (Active)   Number of days: 449       Incision/Surgical Site 08/23/19 Right Breast (Active)   Number of days: 496       Incision/Surgical Site 08/23/19 Right Axillary (Active)   Number of days: 496       Incision/Surgical Site 10/09/19 Left Breast (Active)   Number of days: 449       Incision/Surgical Site 10/09/19 Right Breast (Active)   Number of days: 449     Line/device assessment(s) completed for medical necessity    Constitutional: Awake,alert, no acute distress  Respiratory: Impressive improvement in air movement compared to prior days. Left lower lobe still quite diminished with some dense crackles, right lung clear, left upper lobe clear.   Cardiovascular: HRR, no murmurs, rubs,thrills.  GI: Soft,nontender, bowel sounds positive throughout  Skin/Integumen: No rashes, unusual bruising or open areas.        Medications     - MEDICATION INSTRUCTIONS -         anastrozole  1 mg Oral Daily     cefdinir  300 mg Oral Q12H LETICIA     doxycycline hyclate  100 mg Oral Q12H LETICIA     enoxaparin ANTICOAGULANT  40 mg Subcutaneous Q24H     FLUoxetine  10 mg Oral Daily     fluticasone  1 puff Inhalation Daily     fluticasone  1 spray Both Nostrils Daily     lactobacillus rhamnosus (GG)  1 capsule Oral BID     levocetirizine  5 mg Oral QPM     levothyroxine  88 mcg Oral Daily     oxymetazoline  2 spray Both Nostrils BID     senna-docusate  1 tablet Oral BID    Or     senna-docusate  2 tablet Oral BID     sodium chloride (PF)  3 mL Intracatheter Q8H       Data   Recent Labs   Lab 12/31/20  0537 12/30/20  1022 12/30/20  0645 12/29/20  0602 12/29/20  0602 12/27/20  0626 12/27/20  0626   WBC 11.4*  --  12.1*  --  10.5   < >  13.5*   HGB 10.8*  --  11.8  --  11.6*   < > 11.5*   MCV 89  --  88  --  89   < > 88     --  393  --  312   < > 197   INR  --   --  1.02  --   --   --   --      --  137  --  137   < > 140   POTASSIUM 3.7  --  3.4  --  3.5   < > 3.3*   CHLORIDE 103  --  99  --  98   < > 105   CO2 30  --  34*  --  33*   < > 27   BUN 8  --  7  --  6*   < > 5*   CR 0.67  --  0.58  --  0.58   < > 0.50*   ANIONGAP 7  --  4  --  6   < > 8   STEVE 8.7  --  8.7  --  8.4*   < > 8.6   * 121* 106*  --  102*   < > 92   ALBUMIN  --   --  2.4*  --   --   --  2.1*   PROTTOTAL  --  7.2 7.5   < >  --   --   --    BILITOTAL  --   --  0.3  --   --   --   --    ALKPHOS  --   --  68  --   --   --   --    ALT  --   --  28  --   --   --   --    AST  --   --  37  --   --   --   --     < > = values in this interval not displayed.       Recent Results (from the past 24 hour(s))   XR Chest 1 View    Narrative    Procedure:XR CHEST 1 VW    Clinical history:Female, 65 years, post thoracentesis    Technique: Single view was obtained.    Comparison: 12/29/2020    Findings: The cardiac silhouette is obscured by consolidation in the  left lung and moderate-sized left effusion. The pulmonary vasculature  is normal.    The lungs demonstrate a consolidative process with atelectasis in the  left lower lobe. Moderate-sized left pleural effusion is decreased in  volume. There is no evidence of pneumothorax/hydropneumothorax.  Moderate size right effusion is similar.. Bony structures are  unremarkable.      Impression    Impression:   Status post ultrasound-guided thoracentesis of the left hemithorax  without evidence of pneumothorax or hydropneumothorax.    Left basilar atelectasis/consolidation.    Unchanged small/moderate right effusion.    MYLES AVINA MD   US Thoracentesis    Narrative    Exam:US THORACENTESIS.    History:65 years Female large left pleural effusion    Comparison:  Chest x-ray 12/29/2020    Technique: After informed consent was  obtained, a timeout was  performed, satisfactorily identifying the patient and the site of the  procedure.    Ultrasound evaluation was performed, demonstrating a complex effusion  effusion with moderate volume of pleural fluid.    The patient was then placed in the upright position and prepped and  draped in usual sterile fashion. 4 mL of 1% lidocaine was then  instilled.    A 5 Greek paracentesis catheter was then positioned. Tubing was  placed to vacuum suction.    A total of 940 milliliters and was removed. The patient tolerated the  procedure well.           Impression    Impression:  Technically successful ultrasound guided left thoracentesis.    940 milliliters of fluid was removed. 50 mL mL of fluid was sent for  analysis.    No acute complication. Postprocedure x-ray: Results are pending.    MYLES AVINA MD   XR Chest 2 Views    Narrative    PROCEDURE:  XR CHEST 2 VW    HISTORY:  s/p thoracentesis.     COMPARISON:  December 30, 2020    FINDINGS:   The cardiac silhouette is normal in size. There is blunting of both  costophrenic angles stable from previous examination on 12:30. There  is some increased density at both lung bases stable from previous  examination.      Impression    IMPRESSION:  Stable appearance of the chest has compared to December 30, 2020      HUMZA MCCORMACK MD

## 2020-12-31 NOTE — PHARMACY
Pharmacy Antimicrobial Stewardship Assessment     Current Antimicrobial Therapy:  Anti-infectives (From now, onward)    Start     Dose/Rate Route Frequency Ordered Stop    12/28/20 0900  cefdinir (OMNICEF) capsule 300 mg      300 mg Oral EVERY 12 HOURS SCHEDULED 12/28/20 0858 01/05/21 0759    12/22/20 2200  doxycycline hyclate (VIBRAMYCIN) capsule 100 mg      100 mg Oral EVERY 12 HOURS SCHEDULED 12/22/20 2136 01/01/21 1959          Indication: bacteremia (cefdinir), CAP (doxycycline)     Days of Therapy: 10 (doxycycline), 4 (cefdinir, received 6 doses Rocephin as well)      Pertinent Labs:  Creatinine   Date Value Ref Range Status   12/31/2020 0.67 0.52 - 1.04 mg/dL Final   12/30/2020 0.58 0.52 - 1.04 mg/dL Final   12/29/2020 0.58 0.52 - 1.04 mg/dL Final     WBC   Date Value Ref Range Status   12/31/2020 11.4 (H) 4.0 - 11.0 10e9/L Final   12/30/2020 12.1 (H) 4.0 - 11.0 10e9/L Final   12/29/2020 10.5 4.0 - 11.0 10e9/L Final     Procalcitonin   Date Value Ref Range Status   12/26/2020 1.79 ng/ml Final     Comment:     0.50-1.99 ng/ml  Moderate risk of systemic infection.  Recommendation:   Recommend antibiotics. Evaluate culture results and clinical features to   target antibacterial therapy. Obtain blood cultures and other relevant   cultures if not done.  If empiric antibiotics were started, recheck PCT in: 2 days to guide   antibiotic de-escalation.  Discontinue or de-escalate antibiotics when PCT   concentration is <80% of peak or abs PCT <0.5. If empiric antibiotics were NOT   started, recheck PCT in 6-24 hours to re-evaluate need for antibiotics.       CRP Inflammation   Date Value Ref Range Status   12/26/2020 205.0 (H) 0.0 - 8.0 mg/L Final   04/02/2018 37.6 (H) 0.0 - 8.0 mg/L Final   01/11/2018 36.0 (H) 0.0 - 8.0 mg/L Final       Culture Results:   12/25: Blood culture x 2, no growth after 6 days   12/22: Urine culture Aerobic bacterial, < 10,000 colonies/mL   12/22: Influenza/COVID, negative  12/22: Blood  culture, 2/2 bottles strep pyogenes group A     12/22: Blood culture, 2/2 bottles beta hemolytic Strep group A     Recommendations/Interventions:  Doxycycline therapy ending after 20 doses (10 days). Cephalosporin therapy x 14 total days due to bacteremia. No recommendations at this time. Will continue to monitor.     Elma Reyes RPH  December 31, 2020

## 2021-01-01 ENCOUNTER — APPOINTMENT (OUTPATIENT)
Dept: GENERAL RADIOLOGY | Facility: HOSPITAL | Age: 66
DRG: 871 | End: 2021-01-01
Attending: NURSE PRACTITIONER
Payer: MEDICARE

## 2021-01-01 ENCOUNTER — APPOINTMENT (OUTPATIENT)
Dept: ULTRASOUND IMAGING | Facility: HOSPITAL | Age: 66
DRG: 871 | End: 2021-01-01
Attending: NURSE PRACTITIONER
Payer: MEDICARE

## 2021-01-01 VITALS
HEART RATE: 53 BPM | RESPIRATION RATE: 16 BRPM | WEIGHT: 143.3 LBS | BODY MASS INDEX: 23.88 KG/M2 | DIASTOLIC BLOOD PRESSURE: 58 MMHG | OXYGEN SATURATION: 93 % | HEIGHT: 65 IN | SYSTOLIC BLOOD PRESSURE: 119 MMHG | TEMPERATURE: 97.9 F

## 2021-01-01 LAB
ANION GAP SERPL CALCULATED.3IONS-SCNC: 5 MMOL/L (ref 3–14)
BUN SERPL-MCNC: 12 MG/DL (ref 7–30)
CALCIUM SERPL-MCNC: 8.6 MG/DL (ref 8.5–10.1)
CHLORIDE SERPL-SCNC: 101 MMOL/L (ref 94–109)
CO2 SERPL-SCNC: 31 MMOL/L (ref 20–32)
CREAT SERPL-MCNC: 0.76 MG/DL (ref 0.52–1.04)
CRP SERPL-MCNC: 65.6 MG/L (ref 0–8)
ERYTHROCYTE [DISTWIDTH] IN BLOOD BY AUTOMATED COUNT: 13.9 % (ref 10–15)
GFR SERPL CREATININE-BSD FRML MDRD: 82 ML/MIN/{1.73_M2}
GLUCOSE SERPL-MCNC: 98 MG/DL (ref 70–99)
HCT VFR BLD AUTO: 34.6 % (ref 35–47)
HGB BLD-MCNC: 11.6 G/DL (ref 11.7–15.7)
MAGNESIUM SERPL-MCNC: 2 MG/DL (ref 1.6–2.3)
MCH RBC QN AUTO: 29.7 PG (ref 26.5–33)
MCHC RBC AUTO-ENTMCNC: 33.5 G/DL (ref 31.5–36.5)
MCV RBC AUTO: 89 FL (ref 78–100)
PLATELET # BLD AUTO: 419 10E9/L (ref 150–450)
POTASSIUM SERPL-SCNC: 3.9 MMOL/L (ref 3.4–5.3)
RBC # BLD AUTO: 3.91 10E12/L (ref 3.8–5.2)
SODIUM SERPL-SCNC: 137 MMOL/L (ref 133–144)
WBC # BLD AUTO: 10.3 10E9/L (ref 4–11)

## 2021-01-01 PROCEDURE — 74018 RADEX ABDOMEN 1 VIEW: CPT

## 2021-01-01 PROCEDURE — 85027 COMPLETE CBC AUTOMATED: CPT | Performed by: INTERNAL MEDICINE

## 2021-01-01 PROCEDURE — 71045 X-RAY EXAM CHEST 1 VIEW: CPT

## 2021-01-01 PROCEDURE — 250N000013 HC RX MED GY IP 250 OP 250 PS 637: Performed by: INTERNAL MEDICINE

## 2021-01-01 PROCEDURE — 36415 COLL VENOUS BLD VENIPUNCTURE: CPT | Performed by: INTERNAL MEDICINE

## 2021-01-01 PROCEDURE — 83735 ASSAY OF MAGNESIUM: CPT | Performed by: NURSE PRACTITIONER

## 2021-01-01 PROCEDURE — 250N000013 HC RX MED GY IP 250 OP 250 PS 637: Performed by: NURSE PRACTITIONER

## 2021-01-01 PROCEDURE — 86140 C-REACTIVE PROTEIN: CPT | Performed by: NURSE PRACTITIONER

## 2021-01-01 PROCEDURE — 999N000157 HC STATISTIC RCP TIME EA 10 MIN

## 2021-01-01 PROCEDURE — 94640 AIRWAY INHALATION TREATMENT: CPT | Mod: 76

## 2021-01-01 PROCEDURE — 80048 BASIC METABOLIC PNL TOTAL CA: CPT | Performed by: NURSE PRACTITIONER

## 2021-01-01 PROCEDURE — 99239 HOSP IP/OBS DSCHRG MGMT >30: CPT | Performed by: NURSE PRACTITIONER

## 2021-01-01 PROCEDURE — 94640 AIRWAY INHALATION TREATMENT: CPT

## 2021-01-01 PROCEDURE — 93971 EXTREMITY STUDY: CPT | Mod: LT

## 2021-01-01 PROCEDURE — 36415 COLL VENOUS BLD VENIPUNCTURE: CPT | Performed by: NURSE PRACTITIONER

## 2021-01-01 RX ORDER — CEFDINIR 300 MG/1
300 CAPSULE ORAL EVERY 12 HOURS
Qty: 6 CAPSULE | Refills: 0 | Status: SHIPPED | OUTPATIENT
Start: 2021-01-01 | End: 2021-01-01

## 2021-01-01 RX ORDER — POLYETHYLENE GLYCOL 3350 17 G/17G
17 POWDER, FOR SOLUTION ORAL DAILY
Qty: 510 G | Refills: 0 | Status: SHIPPED | OUTPATIENT
Start: 2021-01-01 | End: 2021-01-01

## 2021-01-01 RX ORDER — LACTOBACILLUS RHAMNOSUS GG 10B CELL
1 CAPSULE ORAL 2 TIMES DAILY
Qty: 60 CAPSULE | Refills: 0 | Status: SHIPPED | OUTPATIENT
Start: 2021-01-01 | End: 2021-01-01

## 2021-01-01 RX ORDER — POLYETHYLENE GLYCOL 3350 17 G/17G
17 POWDER, FOR SOLUTION ORAL DAILY
Status: DISCONTINUED | OUTPATIENT
Start: 2021-01-01 | End: 2021-01-01 | Stop reason: HOSPADM

## 2021-01-01 RX ORDER — OXYCODONE AND ACETAMINOPHEN 5; 325 MG/1; MG/1
1 TABLET ORAL EVERY 4 HOURS PRN
Qty: 20 TABLET | Refills: 0 | Status: SHIPPED | OUTPATIENT
Start: 2021-01-01 | End: 2021-01-01

## 2021-01-01 RX ORDER — LACTOBACILLUS RHAMNOSUS GG 10B CELL
1 CAPSULE ORAL 2 TIMES DAILY
Qty: 60 CAPSULE | Refills: 0 | Status: SHIPPED | OUTPATIENT
Start: 2021-01-01 | End: 2021-01-25

## 2021-01-01 RX ORDER — POLYETHYLENE GLYCOL 3350 17 G/17G
17 POWDER, FOR SOLUTION ORAL DAILY
Qty: 510 G | Refills: 0 | Status: SHIPPED | OUTPATIENT
Start: 2021-01-01 | End: 2021-04-28

## 2021-01-01 RX ORDER — OXYCODONE AND ACETAMINOPHEN 5; 325 MG/1; MG/1
1 TABLET ORAL EVERY 4 HOURS PRN
Qty: 20 TABLET | Refills: 0 | Status: SHIPPED | OUTPATIENT
Start: 2021-01-01 | End: 2021-01-22

## 2021-01-01 RX ORDER — CEFDINIR 300 MG/1
300 CAPSULE ORAL EVERY 12 HOURS
Qty: 6 CAPSULE | Refills: 0 | Status: SHIPPED | OUTPATIENT
Start: 2021-01-01 | End: 2021-01-06

## 2021-01-01 RX ADMIN — OXYCODONE HYDROCHLORIDE AND ACETAMINOPHEN 1 TABLET: 5; 325 TABLET ORAL at 06:13

## 2021-01-01 RX ADMIN — FLUTICASONE FUROATE 1 PUFF: 200 POWDER RESPIRATORY (INHALATION) at 07:47

## 2021-01-01 RX ADMIN — OXYCODONE HYDROCHLORIDE AND ACETAMINOPHEN 1 TABLET: 5; 325 TABLET ORAL at 12:19

## 2021-01-01 RX ADMIN — FLUOXETINE 10 MG: 10 CAPSULE ORAL at 08:18

## 2021-01-01 RX ADMIN — POLYETHYLENE GLYCOL 3350 17 G: 17 POWDER, FOR SOLUTION ORAL at 08:19

## 2021-01-01 RX ADMIN — OXYCODONE HYDROCHLORIDE AND ACETAMINOPHEN 1 TABLET: 5; 325 TABLET ORAL at 00:48

## 2021-01-01 RX ADMIN — ANASTROZOLE 1 MG: 1 TABLET ORAL at 08:18

## 2021-01-01 RX ADMIN — LEVOTHYROXINE SODIUM 88 MCG: 0.09 TABLET ORAL at 08:18

## 2021-01-01 RX ADMIN — CEFDINIR 300 MG: 300 CAPSULE ORAL at 08:18

## 2021-01-01 RX ADMIN — DOXYCYCLINE HYCLATE 100 MG: 100 CAPSULE ORAL at 08:18

## 2021-01-01 RX ADMIN — FLUTICASONE PROPIONATE 1 SPRAY: 50 SPRAY, METERED NASAL at 08:19

## 2021-01-01 RX ADMIN — Medication 1 CAPSULE: at 08:19

## 2021-01-01 ASSESSMENT — ACTIVITIES OF DAILY LIVING (ADL)
ADLS_ACUITY_SCORE: 13
ADLS_ACUITY_SCORE: 12

## 2021-01-01 NOTE — DISCHARGE SUMMARY
[facility]    Discharge Summary  Hospitalist    Date of Admission:  12/22/2020  Date of Discharge:  1/1/2021  1:01 PM  Discharging Provider: Isabela Bautista CNP  Date of Service (when I saw the patient): 1/1/21    Discharge Diagnoses   Principal Problem:    LLL pneumonia  Active Problems:    Sepsis (H)    Pleural effusion exudative    Acute renal failure (H)    Bronchiectasis (H)    History of Present Illness   From admission:  Denise Taylor is a 65 year old female who has had left breast cancer with bilateral mastectomy as treatment; she is followed by Oncology.Last week, she had cold-like symptoms (sore throat, achy) and made arrangements for COVID testing when the left chest pain and shortness of breath developed yesterday (pending in this EHR 12/21/2020). Since the symptoms seemed to be worsening, she presented for evaluation.   UC Course: vitals were normal, with one episode of tachypnea due to the shallow breathing because of the chest pain with inhalation. She was in no distress in [facility] and not moving. Lab diagnostics resulted an elevated WBC (16.9) with left shift. The chemistry panel was notable for the acute decrease in renal performance. Troponin was normal and D-dimmer was elevated (1.2), as was lactic acid (3.1) chest film was abnormal with possible left lingular CAP. She was given IV fluids and antibioitcs      Hospital Course     Complicated Left middle lobe pneumonia due to gram positive bacteria (beta hemolytic strep) with parapneumonic effusion : No fevers, cough unchanged-still nonproductive. Her hypoxia was nearly resolved yesterday but has increasing oxygen demands again. She has some expiratory wheezes, taking larger doses of oxy overnight compared to previous. Will start steroids to reduce inflammation and hopefully help with the pleuritic pain. She does feel overall the pain has improved compared to admission. Also start duonebs to see if that helps.   -12/30: Upper  airway air movement improved today, thoracentesis scheduled for this afternoon.  -12/31: CXR stable from yesterday, see below  -1/1: She has completed course of doxycycline. She is on extended course of cephalosporin for bacteremia and will finish that at home. Recommend CXR follow-up in 2-4 weeks. Stopped prednisone after 3 doses as it did not have much affect on pain and upper airway wheezes resolved.      Acute respiratory failure with hypoxia: Due to left lingula pneumonia and bilateral pleural effusions. Hypoxia worsened overnight but later today with increased pulmonary toilet improved and down to 3L/nc. CTA was negative for PE, d-dimer was elevated.  -12/30: Oxygen requirements stable at 3-4L/nc  -12/31: Tolerating room air since this morning  -1/1: Has continued to maintain sats on room air. Feels breathing is much easier ambulating without much dyspnea.        Pleural effusion: Bilateral. Left greater that right. Right has increased substantially compared to 12/22. Plan thoracentesis tomorrow both diagnostic and therapeutic. Hold lovenox tonight. Most likely left effusion is parapneumonic effusion but with her history of breast cancer and unusual location of presenting pneumonia cannot rule out possible malignancy.   -12/30: thora today, depending on findings will move forward for further management.   -12/31: Thoracentesis completed at 970ml removed. Cytology and culture pending, gram stain negative.  Light's criteria showing exudative as expected. CXR is stable to slightly improved today compared to yesterday.   -1/1: CXR again stable, she is advised to follow-up with PCP and will have follow-up CXR to ensure clearing of the pneumonia as well as continued improvement in the left effusion. She is advised to return immediately if symptoms again worsen or if pleuritic pain worsens. Cytology is still pending and will need to be reviewed with her at her follow-up appointment.       Acute diastolic CHF: ECHO  shows grade 1 diastolic failure, she received large volume IVF on arrival due to pneumonia and possible bacteremia. As she has bilateral effusions and elevated BNP will give single dose of lasix and check output.  -1/1: She did have 1100 our just after lasix but ouput dropped off pretty quick after. Will not discharge on long term diuretics as I believe the effusions and BNP elevation largely due to acute illness, pneumonia and required IVF rescusitationand not due to her mild underlying diastolic dysfunction.       Bacteremia: 4/4 bottles positive beta hemolytic strep on 12/22. Subsequent cultures drawn 12/25 negative to date. Presumed from R lingual pneumonia. Complete 14 day course cephalosporin.        Acute renal failure (H): Renal function steadily improving. Initially thought to be due solely to dehydration, however improvement has been slow though steady. Continue to monitor.   -12/31: Renal function continues to improve, now near normal.      Constipation: Episodic in the past, particularly during previous hospital stays though these involved surgery. She did have BM 12/29 but still feels distended and bloated and there is notable gas distention on CXR. Will give single dose lactulose.   -1/1: required second dose of lactulose to adequately move stool. Still bloated and feeling full. Will have her continue on daily miralax, at ,least until no further narcotics.        Bronchiectasis (H): Chronic, increased risk of respiratory failure and pneumonia.     Isabela Bautista CNP        Pending Results     Unresulted Labs Ordered in the Past 30 Days of this Admission     Date and Time Order Name Status Description    12/29/2020 1610 Anaerobic bacterial culture Preliminary     12/29/2020 1610 Cytology non gyn In process     12/29/2020 1610 Fluid Culture Aerobic Bacterial Preliminary     12/25/2020 1900 Cytology Non Gyn In process           Code Status   Full Code       Primary Care Physician   Mirna NOEL  Annika    Discharge Disposition   Discharged to home  Condition at discharge: Stable    Consultations This Hospital Stay   PHARMACY TO DOSE VANCO    Time Spent on this Encounter   Isabela DE LEON NP, personally saw the patient today and spent greater than 30 minutes discharging this patient.    Discharge Orders      Reason for your hospital stay    Pneumonia, bacteremia, pleural effusion     Follow-up and recommended labs and tests     Follow up with primary care provider, Mirna Roblero, within 7 days for hospital follow- up.  The following labs/tests are recommended: CXR 1-2 weeks to recheck pneumonia/left effusion.     Activity    Your activity upon discharge: activity as tolerated     When to contact your care team    Call your primary doctor if you have any of the following: sudden worsening of pain, shortness of breath, fever.     Full Code     Diet    Follow this diet upon discharge: Orders Placed This Encounter      Combination Diet Regular Diet Adult     Discharge Medications   Current Discharge Medication List      START taking these medications    Details   cefdinir (OMNICEF) 300 MG capsule Take 1 capsule (300 mg) by mouth every 12 hours for 3 days  Qty: 6 capsule, Refills: 0    Associated Diagnoses: Pneumonia of left lower lobe due to infectious organism      lactobacillus rhamnosus, GG, (CULTURELL) capsule Take 1 capsule by mouth 2 times daily  Qty: 60 capsule, Refills: 0    Associated Diagnoses: Pneumonia of left lower lobe due to infectious organism      oxyCODONE-acetaminophen (PERCOCET) 5-325 MG tablet Take 1 tablet by mouth every 4 hours as needed for moderate to severe pain  Qty: 20 tablet, Refills: 0    Associated Diagnoses: Pleural effusion exudative      polyethylene glycol (MIRALAX) 17 GM/Dose powder Take 17 g by mouth daily  Qty: 510 g, Refills: 0    Associated Diagnoses: Other constipation         CONTINUE these medications which have NOT CHANGED    Details   anastrozole (ARIMIDEX)  1 MG tablet TAKE 1 TABLET(1 MG) BY MOUTH DAILY  Qty: 90 tablet, Refills: 3    Associated Diagnoses: Malignant neoplasm of right female breast, unspecified estrogen receptor status, unspecified site of breast (H)      calcium carbonate-vitamin D (OS-STEVE) 600-400 MG-UNIT chewable tablet Take 1 chew tab by mouth 2 times daily (with meals)  Qty: 180 tablet, Refills: 3    Associated Diagnoses: Malignant neoplasm of right female breast, unspecified estrogen receptor status, unspecified site of breast (H)      eletriptan (RELPAX) 40 MG tablet Take 1 tablet (40 mg) by mouth once as needed for migraine  Qty: 12 tablet, Refills: 3    Associated Diagnoses: Migraine without status migrainosus, not intractable, unspecified migraine type      FLUoxetine (PROZAC) 10 MG capsule Take 1 capsule (10 mg) by mouth daily  Qty: 90 capsule, Refills: 1    Associated Diagnoses: BERTA (generalized anxiety disorder)      fluticasone (ARNUITY ELLIPTA) 200 MCG/ACT inhaler Inhale 1 puff into the lungs daily  Qty:        levocetirizine (XYZAL) 5 MG tablet TAKE 1 TABLET(5 MG) BY MOUTH EVERY EVENING  Qty: 90 tablet, Refills: 2    Associated Diagnoses: Seasonal allergies      levothyroxine (SYNTHROID/LEVOTHROID) 88 MCG tablet Take 1 tablet (88 mcg) by mouth daily  Qty: 90 tablet, Refills: 2    Associated Diagnoses: Acquired hypothyroidism      rosuvastatin (CRESTOR) 10 MG tablet Take 1 tablet (10 mg) by mouth daily  Qty: 90 tablet, Refills: 3    Associated Diagnoses: Hyperlipidemia with target LDL less than 130           Allergies   Allergies   Allergen Reactions     Penicillins Rash     Zithromax [Azithromycin] Rash     Data   Most Recent 3 CBC's:  Recent Labs   Lab Test 01/01/21  0538 12/31/20  0537 12/30/20  0645   WBC 10.3 11.4* 12.1*   HGB 11.6* 10.8* 11.8   MCV 89 89 88    406 393      Most Recent 3 BMP's:  Recent Labs   Lab Test 01/01/21  0538 12/31/20  0537 12/30/20  1022 12/30/20  0645    140  --  137   POTASSIUM 3.9 3.7  --  3.4    CHLORIDE 101 103  --  99   CO2 31 30  --  34*   BUN 12 8  --  7   CR 0.76 0.67  --  0.58   ANIONGAP 5 7  --  4   STEVE 8.6 8.7  --  8.7   GLC 98 104* 121* 106*     Most Recent 2 LFT's:  Recent Labs   Lab Test 12/30/20  0645 12/22/20  1433   AST 37 23   ALT 28 29   ALKPHOS 68 76   BILITOTAL 0.3 0.5     Most Recent INR's and Anticoagulation Dosing History:  Anticoagulation Dose History     Recent Dosing and Labs Latest Ref Rng & Units 12/30/2020    INR 0.86 - 1.14 1.02        Most Recent 3 Troponin's:  Recent Labs   Lab Test 12/22/20  1725 12/22/20  1433 06/30/19  2235   TROPI <0.015 <0.015 <0.015     Most Recent Cholesterol Panel:  Recent Labs   Lab Test 10/09/20  1255   CHOL 149   LDL 64   HDL 58   TRIG 134     Most Recent 6 Bacteria Isolates From Any Culture (See EPIC Reports for Culture Details):  Recent Labs   Lab Test 12/30/20  1255 12/25/20  0651 12/25/20  0606 12/22/20  2130 12/22/20  1723 12/13/18  1608   CULT No anaerobes isolated after 3 days  No growth after 3 days No growth after 6 days No growth after 6 days <10,000 colonies/mL  No further identification or sensitivity done   2 of 2 Bottles  Beta hemolytic Streptococcus group A  see coinciding blood culture for ID and susceptibility results  *  Critical Value:  Gram stain results of Bottle 1 called to and read back by   Linnette Arreaga at 0627 on 12/23/20 by Saul Melton    Critical Value:  Gram stain results of bottle 2 called to and read back by   Sabina Serrato at 2022 on 12/23/20 by ERM    2 of 2 bottles  Streptococcus pyogenes sero group A  *  Critical Value called to and read back by  Marah Chow  0840 12/23/22 MRN  2 bottles called with gram stains.   No beta hemolytic Streptococcus Group A isolated     Most Recent TSH, T4 and A1c Labs:  Recent Labs   Lab Test 06/01/20  1110 07/24/19  1635   TSH 3.24 0.21*   T4  --  1.20     Results for orders placed or performed during the hospital encounter of 12/22/20   XR Chest Port 1 View    Narrative     Procedure:XR CHEST PORT 1 VW    Clinical history:Female, 65 years, SOB/ COVID pending    Technique: Single view was obtained.    Comparison: 10/2/2019    Findings: The cardiac silhouette is normal. The pulmonary vasculature  is normal.    The lungs demonstrate an area of increased density in the periphery of  the left hemithorax. Lung bases are clear as is the right lung. No  evidence of pleural effusion. Bony structures demonstrate  postoperative changes of the right shoulder consistent with  subacromial decompression.      Impression    Impression:   Peripheral density in the left hemithorax suggesting lingular  pneumonia versus pleural-based mass.    MYLES AVINA MD   CTA Chest with Contrast    Narrative    PROCEDURE: CTA CHEST WITH CONTRAST 12/23/2020 9:56 AM    HISTORY: PE suspected, high pretest prob    COMPARISONS: None.    Meds/Dose Given: ISOVUE 370 75ml    TECHNIQUE: CT angiogram of the chest with sagittal coronal mip  reconstructions    FINDINGS: CT angiogram revealed no pulmonary emboli. The thoracic  aorta is normal. The heart is normal in size.    This is a small left-sided pleural effusion. There is extensive  confluent opacity seen in the left upper and lower lobes suspicious  for pneumonia. Careful follow-up to exclude malignancy is recommended.  There is some increased density in the right lower lobe most likely  representing atelectasis however pneumonia cannot be excluded. The  hilar and mediastinal lymph nodes are normal in caliber. The axillary  and supraclavicular lymph nodes appear normal. The upper portion of  the liver spleen and pancreas appear normal. Mild degenerative changes  are present in the thoracic spine.         Impression    IMPRESSION: Extensive left lung opacity most likely pneumonia however  careful follow-up to exclude malignancy is recommended. Right lower  lobe opacity consistent with atelectasis or pneumonia.    Small left-sided pleural effusion.    No pulmonary  emboli.    HUMZA MCCORMACK MD   CT Chest/Abdomen/Pelvis w Contrast    Narrative    CT CHEST/ABDOMEN/PELVIS W CONTRAST    CLINICAL HISTORY: Female, age 65 years, Enlarged lymph nodes,  pelvic/inguinal; history of breast cancer with bilateral mastectomy.  Now with difficult pneumonia;    Comparison:  Chest CT 12/23/2020 and CT scan of the chest 11/14/2019.  PET CT from 2/5/2020 at 11/20/2019 are not available for comparison.    TECHNIQUE:  CT was performed of the chest, abdomen and pelvis  after  the administration of IV  and oral  contrast.   Sagittal, coronal, axial and 3-D MIP reconstructions were reviewed.     FINDINGS:  Chest CT:   Consolidative process seen previously within the left lung is not  significantly changed.    Moderate size bilateral pleural effusions have accumulated since the  prior examination. The heart and great vessels demonstrate no acute  abnormality. Lymph nodes throughout the mediastinum and elena are  unchanged.    Esophagus is mildly dilated and unchanged.    Abdomen/Pelvis CT:  The stomach is distended with contrast and is grossly normal. Duodenum  is also normal.    Liver: Mild periportal edema. Slight decrease in density of the lung  parenchyma adjacent to the falciform ligament suggesting focal fatty  infiltration.    Gallbladder: Radiodense intraluminal material suggesting  sludge/stones. Some of this radiodense material is also likely related  to vicarious excretion of contrast from the recent CT scan.     Spleen: Normal.    Pancreas: Normal.    Adrenal glands: Normal    Kidneys: Normal.  Ureters: Normal.  Urinary bladder: Normal.    Vasculature: There are a few accessory calcifications within the aorta  and iliac arteries. No acute abnormality. Inferior vena cava is  normal.    Lymph nodes: No evidence of pathologic lymph node enlargement    Large and small bowel: Diverticulosis of the distal colon. No  diverticulitis or other acute abnormality.    Appendix: Normal.    Small volume  of free fluid is seen extending into the lower pelvis.    Bony structures: No acute normality. Degenerative changes are again  seen throughout the spine.      Impression    IMPRESSION:   Pneumonia/consolidation with the lungs is not significantly changed  when compared to the study from the previous day.    Moderate size bilateral pleural effusions have accumulated since the  previous day.    Radiodense material within the gallbladder and small volume of  pericholecystic fluid suggests the possibility of acute cholecystitis.  Ultrasound to better characterize.    Small volume of free fluid extends into the lower pelvis.    This report is in agreement with the preliminary report.    MYLES AVINA MD   XR Chest 2 Views    Narrative    PROCEDURE:  XR CHEST 2 VW    HISTORY:  recheck.     COMPARISON:  December 22, 2020    FINDINGS:   There is an enlarging left-sided pleural effusion as compared to  December 22, 2020. There is worsening compressive atelectasis in the  left lung. There is a small right-sided pleural effusion with blunting  of the right costophrenic angles. Mild basilar atelectasis is seen.      Impression    IMPRESSION:  Worsening bilateral pleural effusions as compared to  previous examination on December 22, 2020    HUMZA MCCORMACK MD   XR Chest 1 View    Narrative    Procedure:XR CHEST 1 VW    Clinical history:Female, 65 years, post thoracentesis    Technique: Single view was obtained.    Comparison: 12/29/2020    Findings: The cardiac silhouette is obscured by consolidation in the  left lung and moderate-sized left effusion. The pulmonary vasculature  is normal.    The lungs demonstrate a consolidative process with atelectasis in the  left lower lobe. Moderate-sized left pleural effusion is decreased in  volume. There is no evidence of pneumothorax/hydropneumothorax.  Moderate size right effusion is similar.. Bony structures are  unremarkable.      Impression    Impression:   Status post  ultrasound-guided thoracentesis of the left hemithorax  without evidence of pneumothorax or hydropneumothorax.    Left basilar atelectasis/consolidation.    Unchanged small/moderate right effusion.    MYLES AVINA MD   US Thoracentesis    Narrative    Exam:US THORACENTESIS.    History:65 years Female large left pleural effusion    Comparison:  Chest x-ray 12/29/2020    Technique: After informed consent was obtained, a timeout was  performed, satisfactorily identifying the patient and the site of the  procedure.    Ultrasound evaluation was performed, demonstrating a complex effusion  effusion with moderate volume of pleural fluid.    The patient was then placed in the upright position and prepped and  draped in usual sterile fashion. 4 mL of 1% lidocaine was then  instilled.    A 5 Afghan paracentesis catheter was then positioned. Tubing was  placed to vacuum suction.    A total of 940 milliliters and was removed. The patient tolerated the  procedure well.           Impression    Impression:  Technically successful ultrasound guided left thoracentesis.    940 milliliters of fluid was removed. 50 mL mL of fluid was sent for  analysis.    No acute complication. Postprocedure x-ray: Results are pending.    MYLES AVINA MD   XR Chest 2 Views    Narrative    PROCEDURE:  XR CHEST 2 VW    HISTORY:  s/p thoracentesis.     COMPARISON:  December 30, 2020    FINDINGS:   The cardiac silhouette is normal in size. There is blunting of both  costophrenic angles stable from previous examination on 12:30. There  is some increased density at both lung bases stable from previous  examination.      Impression    IMPRESSION:  Stable appearance of the chest has compared to December 30, 2020      HUMZA MCCORMACK MD   XR Abdomen Port 1 View    Narrative    PROCEDURE: XR ABDOMEN PORT 1 VW 1/1/2021 7:47 AM    HISTORY: bloating    COMPARISONS: None.    TECHNIQUE:    FINDINGS: Gas is seen in nondilated large and small bowel loops.  There  is no extraluminal gas. No pathologic intra-abdominal calcifications  are noted.         Impression    IMPRESSION: Nonspecific abdominal gas pattern    HUMZA MCCORMACK MD   XR Chest Port 1 View    Narrative    PROCEDURE:  XR CHEST PORT 1 VW    HISTORY:  pleural effusion.     COMPARISON:  2020    FINDINGS:   The cardiac silhouette is normal in size. There is blunting of both  costophrenic angles without change from 2020. There is  left lung opacity without change      Impression    IMPRESSION:  No change from 2020     HUMZA MCCORMACK MD   US Lower Extremity Venous Duplex Left    Narrative    Exam:US LOWER EXTREMITY VENOUS DUPLEX LEFT    History: Left thigh pain    Comparisons: None    Technique: Venous duplex ultrasonography of the left lower extremity  was performed.     Findings: The common femoral vein, superficial femoral vein and  popliteal vein are fully compressible with spontaneous and augmentable  venous flow. Small Baker's cyst is seen.           Impression    Impression: No evidence of deep venous thrombosis within the lower  extremity.    HUMZA MCCORMACK MD   Echocardiogram Complete    Narrative    [id number]  [id number]  [id number]  [id number]^EDILMA^YASMEEN^KRISH           [facility]  Echocardiography Laboratory  [address] [place] [address]     Name: ILEANA LUNSFORD  MRN: [id number]  : 1955  Study Date: 2020 07:11 AM  Age: 65 yrs  Gender: Female  Patient Location: [facility]  Reason For Study: CHF  History: CHF  Ordering Physician: YASMEEN FRANCE  Referring Physician: YASMEEN FRANCE  Performed By: Marleni Hinton RDCS     BSA: 1.7 m2  Height: 65 in  Weight: 143 lb  _____________________________________________________________________________  __        Procedure  Echocardiogram with two-dimensional, color and spectral Doppler performed.  _____________________________________________________________________________  __         Interpretation Summary  No pericardial effusion is present.  Mild concentric wall thickening consistent with left ventricular hypertrophy  is present.  Global and regional left ventricular function is normal with an EF of 60-65%.  Grade I or early diastolic dysfunction.  The right ventricle is normal size.  Both atria appear normal.  Trace mitral insufficiency is present.  Aortic valve is normal in structure and function.  The aortic valve is tricuspid.  The mean gradient across the aortic valve is4.4 mmHg.  Trace tricuspid insufficiency is present.  Mild pulmonic insufficiency is present.  The aorta root is normal.  _____________________________________________________________________________  __        Left Ventricle  Global and regional left ventricular function is normal with an EF of 60-65%.  Mild concentric wall thickening consistent with left ventricular hypertrophy  is present. Grade I or early diastolic dysfunction.     Right Ventricle  The right ventricle is normal size.     Atria  Both atria appear normal.     Mitral Valve  Trace mitral insufficiency is present.     Aortic Valve  Aortic valve is normal in structure and function. The aortic valve is  tricuspid. The mean AoV pressure gradient is 4.4 mmHg. The peak AoV pressure  gradient is 9.3 mmHg. The mean gradient across the aortic valve is4.4 mmHg.        Tricuspid Valve  Trace tricuspid insufficiency is present.     Pulmonic Valve  Mild pulmonic insufficiency is present.     Vessels  The aorta root is normal.     Pericardium  No pericardial effusion is present.     _____________________________________________________________________________  __  MMode/2D Measurements & Calculations  IVSd: 1.3 cm  LVIDd: 4.7 cm  LVIDs: 3.2 cm  LVPWd: 1.2 cm  FS: 30.6 %  LV mass(C)d: 215.5 grams  LV mass(C)dI: 125.7 grams/m2  Ao root diam: 3.1 cm  LA dimension: 3.3 cm  asc Aorta Diam: 3.6 cm     LA/Ao: 1.1  LVOT diam: 2.1 cm  LVOT area: 3.4 cm2  RWT: 0.50     Time  Measurements  Aortic HR: 190.0 BPM  Pulm. HR: 187.6 BPM     Doppler Measurements & Calculations  MV E max guillaume: 75.8 cm/sec  MV A max guillaume: 98.5 cm/sec  MV E/A: 0.77  MV dec slope: 272.8 cm/sec2  MV dec time: 0.28 sec  Ao V2 max: 152.4 cm/sec  Ao max P.3 mmHg  Ao V2 mean: 97.9 cm/sec  Ao mean P.4 mmHg  Ao V2 VTI: 30.9 cm  LATANYA(I,D): 2.6 cm2  LATANYA(V,D): 2.4 cm2  LV V1 max P.9 mmHg  LV V1 max: 110.5 cm/sec  LV V1 VTI: 23.9 cm  CO(LVOT): 15.3 l/min  CI(LVOT): 8.9 l/min/m2  SV(LVOT): 80.5 ml  SI(LVOT): 46.9 ml/m2  PA V2 max: 87.4 cm/sec  PA max PG: 3.1 mmHg  PA mean P.7 mmHg  PA V2 VTI: 20.2 cm  AV Guillaume Ratio (DI): 0.73  LATANYA Index (cm2/m2): 1.5  E/E' av.7     Lateral E/e': 9.7  Medial E/e': 13.8     _____________________________________________________________________________  __           Report approved by: Mary Ann Palafox 2020 06:48 PM

## 2021-01-01 NOTE — PLAN OF CARE
No falls or injuries this shift. Did amb in halls this evening. Just fruit and light dinner this evening. Reports 3 stools after lactulose this evening. States she still feels a little bloated this evening. Declined Dulcolax tonight. Med with percocet X2 this shift. Pain continues to be 6/10 L) side.     Face to face report given with opportunity to observe patient.    Report given to     Nancy Ernst RN   12/31/2020  11:05 PM

## 2021-01-01 NOTE — PLAN OF CARE
Patient discharged at 12:20 PM via wheel chair accompanied by staff. Prescriptions sent to patients preferred pharmacy. All belongings sent with patient.     Discharge instructions reviewed with Denise. Listed belongings gathered and returned to patient. yes    Patient discharged to Home.     Core Measures and Vaccines  Core Measures applicable during stay: No. If yes, state diagnosis: n/a  Pneumonia and Influenza given prior to discharge, if indicated: N/A    Surgical Patient   Surgical Procedures during stay: yes  Did patient receive discharge instruction on wound care and recognition of infection symptoms? Yes    MISC  Follow up appointment made:  Yes  Home and hospital aquired medications returned to patient: Yes  Patient reports pain was well managed at discharge: Yes

## 2021-01-01 NOTE — PLAN OF CARE
"Reason for hospital stay: LLL pneumonia    Most recent vitals: /87 (BP Location: Left arm)   Pulse 50   Temp 98.2  F (36.8  C) (Tympanic)   Resp 19   Ht 1.651 m (5' 5\")   Wt 65 kg (143 lb 4.8 oz)   SpO2 92%   BMI 23.85 kg/m      A&O, makes needs known, uses call light appropriately, steady gait, independent in room. Reports pain to left chest and increase in pain with deep inhalation. Rates pain 6 of 10, reports relief with oxycodone. Oxygen 92-93% on RA. Lung sounds clear in upper lobes, dim in bases with crackles to LLL. Patient reports no SOB. Afebrile. Skin clean, dry and intact, with dressing to left chest intact. Regular diet. Patient asleep most of shift.     Face to face report given with opportunity to observe patient.    Report given to NICOLETTE Freedman RN   1/1/2021  7:19 AM    "

## 2021-01-06 ENCOUNTER — ANCILLARY PROCEDURE (OUTPATIENT)
Dept: GENERAL RADIOLOGY | Facility: OTHER | Age: 66
End: 2021-01-06
Attending: PHYSICIAN ASSISTANT
Payer: MEDICARE

## 2021-01-06 ENCOUNTER — OFFICE VISIT (OUTPATIENT)
Dept: FAMILY MEDICINE | Facility: OTHER | Age: 66
End: 2021-01-06
Attending: PHYSICIAN ASSISTANT
Payer: COMMERCIAL

## 2021-01-06 ENCOUNTER — TELEPHONE (OUTPATIENT)
Dept: CASE MANAGEMENT | Facility: HOSPITAL | Age: 66
End: 2021-01-06

## 2021-01-06 VITALS
OXYGEN SATURATION: 97 % | WEIGHT: 138.2 LBS | TEMPERATURE: 99.9 F | SYSTOLIC BLOOD PRESSURE: 130 MMHG | HEART RATE: 68 BPM | BODY MASS INDEX: 23 KG/M2 | DIASTOLIC BLOOD PRESSURE: 80 MMHG

## 2021-01-06 DIAGNOSIS — B37.0 ORAL THRUSH: ICD-10-CM

## 2021-01-06 DIAGNOSIS — J15.3 PNEUMONIA OF LEFT LOWER LOBE DUE TO GROUP B STREPTOCOCCUS (H): Primary | ICD-10-CM

## 2021-01-06 LAB
BACTERIA SPEC CULT: NORMAL
BACTERIA SPEC CULT: NORMAL
BASOPHILS # BLD AUTO: 0.1 10E9/L (ref 0–0.2)
BASOPHILS NFR BLD AUTO: 0.5 %
CRP SERPL-MCNC: 47.2 MG/L (ref 0–8)
DIFFERENTIAL METHOD BLD: ABNORMAL
EOSINOPHIL # BLD AUTO: 0.1 10E9/L (ref 0–0.7)
EOSINOPHIL NFR BLD AUTO: 0.9 %
ERYTHROCYTE [DISTWIDTH] IN BLOOD BY AUTOMATED COUNT: 13.4 % (ref 10–15)
HCT VFR BLD AUTO: 35.5 % (ref 35–47)
HGB BLD-MCNC: 11.7 G/DL (ref 11.7–15.7)
IMM GRANULOCYTES # BLD: 0 10E9/L (ref 0–0.4)
IMM GRANULOCYTES NFR BLD: 0.3 %
LYMPHOCYTES # BLD AUTO: 1.9 10E9/L (ref 0.8–5.3)
LYMPHOCYTES NFR BLD AUTO: 18.8 %
MCH RBC QN AUTO: 29.5 PG (ref 26.5–33)
MCHC RBC AUTO-ENTMCNC: 33 G/DL (ref 31.5–36.5)
MCV RBC AUTO: 90 FL (ref 78–100)
MONOCYTES # BLD AUTO: 1.2 10E9/L (ref 0–1.3)
MONOCYTES NFR BLD AUTO: 11.9 %
NEUTROPHILS # BLD AUTO: 6.7 10E9/L (ref 1.6–8.3)
NEUTROPHILS NFR BLD AUTO: 67.6 %
NRBC # BLD AUTO: 0 10*3/UL
NRBC BLD AUTO-RTO: 0 /100
PLATELET # BLD AUTO: 508 10E9/L (ref 150–450)
RBC # BLD AUTO: 3.96 10E12/L (ref 3.8–5.2)
SPECIMEN SOURCE: NORMAL
SPECIMEN SOURCE: NORMAL
WBC # BLD AUTO: 9.9 10E9/L (ref 4–11)

## 2021-01-06 PROCEDURE — G0463 HOSPITAL OUTPT CLINIC VISIT: HCPCS | Mod: 25

## 2021-01-06 PROCEDURE — 85025 COMPLETE CBC W/AUTO DIFF WBC: CPT | Mod: ZL | Performed by: PHYSICIAN ASSISTANT

## 2021-01-06 PROCEDURE — 86140 C-REACTIVE PROTEIN: CPT | Mod: ZL | Performed by: PHYSICIAN ASSISTANT

## 2021-01-06 PROCEDURE — 99495 TRANSJ CARE MGMT MOD F2F 14D: CPT | Performed by: PHYSICIAN ASSISTANT

## 2021-01-06 PROCEDURE — 71046 X-RAY EXAM CHEST 2 VIEWS: CPT | Mod: TC

## 2021-01-06 PROCEDURE — 99214 OFFICE O/P EST MOD 30 MIN: CPT | Performed by: PHYSICIAN ASSISTANT

## 2021-01-06 PROCEDURE — 36415 COLL VENOUS BLD VENIPUNCTURE: CPT | Mod: ZL | Performed by: PHYSICIAN ASSISTANT

## 2021-01-06 PROCEDURE — G0463 HOSPITAL OUTPT CLINIC VISIT: HCPCS

## 2021-01-06 RX ORDER — NYSTATIN 100000/ML
500000 SUSPENSION, ORAL (FINAL DOSE FORM) ORAL 4 TIMES DAILY
Qty: 200 ML | Refills: 4 | Status: SHIPPED | OUTPATIENT
Start: 2021-01-06 | End: 2021-01-22

## 2021-01-06 ASSESSMENT — PAIN SCALES - GENERAL: PAINLEVEL: MILD PAIN (3)

## 2021-01-06 NOTE — NURSING NOTE
"Chief Complaint   Patient presents with     Hospital F/U       Initial /80 (BP Location: Left arm, Patient Position: Chair, Cuff Size: Adult Regular)   Pulse 68   Temp 99.9  F (37.7  C) (Tympanic)   Wt 62.7 kg (138 lb 3.2 oz)   SpO2 97%   BMI 23.00 kg/m   Estimated body mass index is 23 kg/m  as calculated from the following:    Height as of 12/30/20: 1.651 m (5' 5\").    Weight as of this encounter: 62.7 kg (138 lb 3.2 oz).  Medication Reconciliation: complete  Dennys Romero LPN  "

## 2021-01-06 NOTE — PROGRESS NOTES
Subjective     Denise Taylor is a 65 year old who presents to clinic today for the following health issues Hospital discharge follow up.     [facility]         Hospital Follow-up Visit:    Hospital/Nursing Home/IP Rehab Facility: [facility]  Date of Admission: 12-  Date of Discharge: 1-1-2020  Reason(s) for Admission: LLL pneumonia and sepsis      Was your hospitalization related to COVID-19? No   Problems taking medications regularly:  None  Medication changes since discharge: finished antibiotics  Problems adhering to non-medication therapy:  None    Summary of hospitalization:  [facility] discharge summary reviewed  Diagnostic Tests/Treatments reviewed.  Follow up needed: none  Other Healthcare Providers Involved in Patient s Care:         None  Update since discharge: improved. Post Discharge Medication Reconciliation: discharge medications reconciled, continue medications without change.  Plan of care communicated with patient              Review of Systems   CONSTITUTIONAL: NEGATIVE for fever, chills, change in weight  INTEGUMENTARY/SKIN: NEGATIVE for worrisome rashes, moles or lesions  EYES: NEGATIVE for vision changes or irritation  ENT/MOUTH:positive for oral ulcerations.   RESP:short of breathe , oxygenation is helping. No oxygen at home.   CV: NEGATIVE for chest pain, palpitations or peripheral edema  GI: NEGATIVE for nausea, abdominal pain, heartburn, or change in bowel habits  : NEGATIVE for frequency, dysuria, or hematuria  MUSCULOSKELETAL:no pain in chest wall.   NEURO: NEGATIVE for weakness, dizziness or paresthesias  ENDOCRINE: NEGATIVE for temperature intolerance, skin/hair changes  HEME: NEGATIVE for bleeding problems  PSYCHIATRIC: emotionally exhausted. Tired from the hospitalization.       Objective    /80 (BP Location: Left arm, Patient Position: Chair, Cuff Size: Adult Regular)   Pulse 68   Temp 99.9  F (37.7  C) (Tympanic)   Wt 62.7 kg (138 lb 3.2  oz)   SpO2 97%   BMI 23.00 kg/m    Body mass index is 23 kg/m .  Physical Exam   GENERAL: healthy, alert and no distress  EYES: Eyes grossly normal to inspection, PERRL and conjunctivae and sclerae normal  HENT: ear canals and TM's normal, nose and mouth without ulcers or lesions  NECK: no adenopathy, no asymmetry, masses, or scars and thyroid normal to palpation  RESP: lungs clear to auscultation - no rales, rhonchi or wheezes  CV: regular rate and rhythm, normal S1 S2, no S3 or S4, no murmur, click or rub, no peripheral edema and peripheral pulses strong  MS: no gross musculoskeletal defects noted, no edema  SKIN: no suspicious lesions or rashes  PSYCH: mentation appears normal, affect normal/bright    CXR - Reviewed and interpreted by me her effusion is clearing. Fever continues.   Results for orders placed or performed in visit on 01/06/21   XR CHEST 2 VW (Clinic Performed)     Status: None    Narrative    PROCEDURE:  XR CHEST 2 VW    HISTORY:  Pneumonia of left lower lobe due to group B Streptococcus  (H).     COMPARISON:  January 1, 2020    FINDINGS:   The cardiac silhouette is normal in size. The pulmonary vasculature is  normal.  There is been interval improvement in the left lung opacity  from previous examination. There are some linear opacities in both  lungs representing atelectasis or scarring. There is mild left basilar  pleural thickening.      Impression    IMPRESSION:  Improving left lung opacity from previous examination on  January 1, 2021      HUMZA MCCORMACK MD   CBC with platelets differential     Status: Abnormal   Result Value Ref Range    WBC 9.9 4.0 - 11.0 10e9/L    RBC Count 3.96 3.8 - 5.2 10e12/L    Hemoglobin 11.7 11.7 - 15.7 g/dL    Hematocrit 35.5 35.0 - 47.0 %    MCV 90 78 - 100 fl    MCH 29.5 26.5 - 33.0 pg    MCHC 33.0 31.5 - 36.5 g/dL    RDW 13.4 10.0 - 15.0 %    Platelet Count 508 (H) 150 - 450 10e9/L    Diff Method Automated Method     % Neutrophils 67.6 %    % Lymphocytes  18.8 %    % Monocytes 11.9 %    % Eosinophils 0.9 %    % Basophils 0.5 %    % Immature Granulocytes 0.3 %    Nucleated RBCs 0 0 /100    Absolute Neutrophil 6.7 1.6 - 8.3 10e9/L    Absolute Lymphocytes 1.9 0.8 - 5.3 10e9/L    Absolute Monocytes 1.2 0.0 - 1.3 10e9/L    Absolute Eosinophils 0.1 0.0 - 0.7 10e9/L    Absolute Basophils 0.1 0.0 - 0.2 10e9/L    Abs Immature Granulocytes 0.0 0 - 0.4 10e9/L    Absolute Nucleated RBC 0.0        1. Pneumonia of left lower lobe due to group B Streptococcus (H)  Effusion is getting better. Still doesn't feel well. Low grade fever. WBC ok awaiting CRP and given medications for Thrush. Echo reviewed due to dyspnea and that was ok. Her CXR is getting better and Sats are better so at this point we will just watch her closely.  Negative for PE and other work up was done. We will call her tomorrow as still have no Cytology results and did call Teresa in Oncology see if they happened to get her results and they also have not heard. Denise is exhausted and might take a while. Has made an appointment to see her pulmonary specialist in end of the month. We dre need to get her in early. Possible boop. And may feel better on steroids.   - XR CHEST 2 VW (Clinic Performed); Future  - CBC with platelets differential  - CRP, inflammation  - XR CHEST 2 VW (Clinic Performed)    2. Oral thrush  Given treatment could be cause some issue in esophageal region of mid chest. But we will make sure she is getting better and see her again next week. Her tongue is cherry red and sores on mouth are many on tongue.   - nystatin (MYCOSTATIN) 292182 UNIT/ML suspension; Take 5 mLs (500,000 Units) by mouth 4 times daily  Dispense: 200 mL; Refill: 4

## 2021-01-06 NOTE — TELEPHONE ENCOUNTER
Denise PEDRO Jareddirk, was discharged to home on  from [facility]. I spoke today with her regarding the patient's discharge.   She indicates she has receive(d) sufficient information upon discharge. Medications were reviewed in full on discharge, including: Medications to be started; medications to be stopped; medications to be continued from preadmission and any side effects. Prescriptions were received at discharge and were able to be filled. Medications are being taken as prescribed.   She indicates they have an appointment scheduled for 1.6.2021 and have transportation available. They are able to confirm their appointment time and have it written down.       She did not have any questions regarding discharge instructions or condition.  Per their request, the following employee (s) can be recognized for their outstanding services delivered:  All services provided were excellent   Suggestions to improve service:   She was informed they may receive a survey in the mail from the hospital. Asked if they would kindly complete the survey in order for [facility] to know if services fully met patient needs.

## 2021-01-07 LAB — COPATH REPORT: NORMAL

## 2021-01-11 ENCOUNTER — MYC MEDICAL ADVICE (OUTPATIENT)
Dept: FAMILY MEDICINE | Facility: OTHER | Age: 66
End: 2021-01-11

## 2021-01-11 DIAGNOSIS — R50.9 FEVER AND CHILLS: Primary | ICD-10-CM

## 2021-01-11 DIAGNOSIS — J91.8 PLEURAL EFFUSION ASSOCIATED WITH PULMONARY INFECTION: ICD-10-CM

## 2021-01-11 DIAGNOSIS — J18.9 PLEURAL EFFUSION ASSOCIATED WITH PULMONARY INFECTION: ICD-10-CM

## 2021-01-13 DIAGNOSIS — R50.9 FEVER AND CHILLS: ICD-10-CM

## 2021-01-13 DIAGNOSIS — J91.8 PLEURAL EFFUSION ASSOCIATED WITH PULMONARY INFECTION: ICD-10-CM

## 2021-01-13 DIAGNOSIS — J18.9 PLEURAL EFFUSION ASSOCIATED WITH PULMONARY INFECTION: ICD-10-CM

## 2021-01-13 LAB
ALBUMIN UR-MCNC: 10 MG/DL
ANION GAP SERPL CALCULATED.3IONS-SCNC: 4 MMOL/L (ref 3–14)
APPEARANCE UR: CLEAR
BACTERIA #/AREA URNS HPF: ABNORMAL /HPF
BASOPHILS # BLD AUTO: 0.1 10E9/L (ref 0–0.2)
BASOPHILS NFR BLD AUTO: 0.6 %
BILIRUB UR QL STRIP: NEGATIVE
BUN SERPL-MCNC: 17 MG/DL (ref 7–30)
CALCIUM SERPL-MCNC: 9.5 MG/DL (ref 8.5–10.1)
CHLORIDE SERPL-SCNC: 105 MMOL/L (ref 94–109)
CO2 SERPL-SCNC: 29 MMOL/L (ref 20–32)
COLOR UR AUTO: YELLOW
CREAT SERPL-MCNC: 0.78 MG/DL (ref 0.52–1.04)
CRP SERPL-MCNC: 13.1 MG/L (ref 0–8)
DIFFERENTIAL METHOD BLD: ABNORMAL
EOSINOPHIL # BLD AUTO: 0.2 10E9/L (ref 0–0.7)
EOSINOPHIL NFR BLD AUTO: 1.7 %
ERYTHROCYTE [DISTWIDTH] IN BLOOD BY AUTOMATED COUNT: 13.2 % (ref 10–15)
GFR SERPL CREATININE-BSD FRML MDRD: 80 ML/MIN/{1.73_M2}
GLUCOSE SERPL-MCNC: 78 MG/DL (ref 70–99)
GLUCOSE UR STRIP-MCNC: NEGATIVE MG/DL
HCT VFR BLD AUTO: 34.9 % (ref 35–47)
HGB BLD-MCNC: 11.5 G/DL (ref 11.7–15.7)
HGB UR QL STRIP: ABNORMAL
HYALINE CASTS #/AREA URNS LPF: 2 /LPF
IMM GRANULOCYTES # BLD: 0 10E9/L (ref 0–0.4)
IMM GRANULOCYTES NFR BLD: 0.4 %
KETONES UR STRIP-MCNC: NEGATIVE MG/DL
LEUKOCYTE ESTERASE UR QL STRIP: NEGATIVE
LYMPHOCYTES # BLD AUTO: 2.3 10E9/L (ref 0.8–5.3)
LYMPHOCYTES NFR BLD AUTO: 22.2 %
MCH RBC QN AUTO: 29.7 PG (ref 26.5–33)
MCHC RBC AUTO-ENTMCNC: 33 G/DL (ref 31.5–36.5)
MCV RBC AUTO: 90 FL (ref 78–100)
MONOCYTES # BLD AUTO: 0.6 10E9/L (ref 0–1.3)
MONOCYTES NFR BLD AUTO: 5.7 %
MUCOUS THREADS #/AREA URNS LPF: PRESENT /LPF
NEUTROPHILS # BLD AUTO: 7.3 10E9/L (ref 1.6–8.3)
NEUTROPHILS NFR BLD AUTO: 69.4 %
NITRATE UR QL: NEGATIVE
NRBC # BLD AUTO: 0 10*3/UL
NRBC BLD AUTO-RTO: 0 /100
PH UR STRIP: 5.5 PH (ref 4.7–8)
PLATELET # BLD AUTO: 358 10E9/L (ref 150–450)
POTASSIUM SERPL-SCNC: 3.9 MMOL/L (ref 3.4–5.3)
RBC # BLD AUTO: 3.87 10E12/L (ref 3.8–5.2)
RBC #/AREA URNS AUTO: 1 /HPF (ref 0–2)
SODIUM SERPL-SCNC: 138 MMOL/L (ref 133–144)
SOURCE: ABNORMAL
SP GR UR STRIP: 1.02 (ref 1–1.03)
SQUAMOUS #/AREA URNS AUTO: 0 /HPF (ref 0–1)
UROBILINOGEN UR STRIP-MCNC: NORMAL MG/DL (ref 0–2)
WBC # BLD AUTO: 10.5 10E9/L (ref 4–11)
WBC #/AREA URNS AUTO: 1 /HPF (ref 0–5)

## 2021-01-13 PROCEDURE — 87040 BLOOD CULTURE FOR BACTERIA: CPT | Mod: ZL | Performed by: PHYSICIAN ASSISTANT

## 2021-01-13 PROCEDURE — 36415 COLL VENOUS BLD VENIPUNCTURE: CPT | Mod: ZL | Performed by: PHYSICIAN ASSISTANT

## 2021-01-13 PROCEDURE — 80048 BASIC METABOLIC PNL TOTAL CA: CPT | Mod: ZL | Performed by: PHYSICIAN ASSISTANT

## 2021-01-13 PROCEDURE — 81001 URINALYSIS AUTO W/SCOPE: CPT | Mod: ZL | Performed by: PHYSICIAN ASSISTANT

## 2021-01-13 PROCEDURE — 86140 C-REACTIVE PROTEIN: CPT | Mod: ZL | Performed by: PHYSICIAN ASSISTANT

## 2021-01-13 PROCEDURE — 85025 COMPLETE CBC W/AUTO DIFF WBC: CPT | Mod: ZL | Performed by: PHYSICIAN ASSISTANT

## 2021-01-13 RX ORDER — LEVOFLOXACIN 500 MG/1
500 TABLET, FILM COATED ORAL DAILY
Qty: 10 TABLET | Refills: 0 | Status: SHIPPED | OUTPATIENT
Start: 2021-01-13 | End: 2021-01-25

## 2021-01-13 NOTE — TELEPHONE ENCOUNTER
I called and spoke to pt, she has been scheduled for labs today and a follow up appt in 10 days.

## 2021-01-13 NOTE — TELEPHONE ENCOUNTER
Spoke  With Denise today. Getting in to see her pulmonary specialist not till end of month. Checking on her labs and cultures.  Having a little more pain in chest. Full work up done in hosptial. Still running fevers every single day. Recheck and start Levaquin . Recheck with us in office 10 days.

## 2021-01-19 ENCOUNTER — MYC MEDICAL ADVICE (OUTPATIENT)
Dept: FAMILY MEDICINE | Facility: OTHER | Age: 66
End: 2021-01-19

## 2021-01-19 LAB
BACTERIA SPEC CULT: NORMAL
SPECIMEN SOURCE: NORMAL

## 2021-01-21 ENCOUNTER — MYC MEDICAL ADVICE (OUTPATIENT)
Dept: FAMILY MEDICINE | Facility: OTHER | Age: 66
End: 2021-01-21

## 2021-01-21 DIAGNOSIS — J15.3: Primary | ICD-10-CM

## 2021-01-22 NOTE — PROGRESS NOTES
Subjective     Denise Taylor is a 65 year old female who presents to clinic today for the following health issues:    HPI         Concern - 10 day follow up chest pain  Onset: 12/22/2020  Description: pneumonia  Intensity: severe  Progression of Symptoms:  improving  Accompanying Signs & Symptoms: none  Previous history of similar problem: none  Precipitating factors:        Worsened by: none  Alleviating factors:        Improved by: none  Therapies tried and outcome: None        Patient Active Problem List   Diagnosis     History of basal cell carcinoma     Hyperlipidemia with target LDL less than 130     Pneumonia     ACP (advance care planning)     Acquired hypothyroidism     AC separation, type 5     Actinic keratosis     Capillary disease     Other dyschromia     Scar condition and fibrosis of skin     Malignant neoplasm of right female breast, unspecified estrogen receptor status, unspecified site of breast (H)     Cigarette nicotine dependence in remission     Recurrent pneumonia     Invasive lobular carcinoma of right breast in female (H)     History of breast cancer     Seasonal allergies     Migraine without status migrainosus, not intractable, unspecified migraine type     Acute renal failure (H)     LLL pneumonia     Sepsis (H)     Bronchiectasis (H)     Pleural effusion exudative     Past Surgical History:   Procedure Laterality Date     ARTHROSCOPY SHOULDER ROTATOR CUFF REPAIR Left 03/07/2018    Procedure: ARTHROSCOPY SHOULDER ROTATOR CUFF REPAIR;  LEFT SHOULDER ARTHROSCOPY, REPAIR ROTATOR CUFF: subacromial Decompression and AC Joint Resection;  Surgeon: Baldev Lou DO;  Location: [facility] OR     ARTHROTOMY SHOULDER, ROTATOR CUFF REPAIR, COMBINED Right 11/14/2018    Procedure: RIGHT SHOULDER DEBRIDEMENT, EXCISION OF LIPOMA  RIGHT UPPER ARM, EXCISION SCAR TISSUE AC JOINT;  Surgeon: Baldev Lou DO;  Location: [facility] OR     BIOPSY BREAST NEEDLE LOCALIZATION, BIOPSY NODE SENTINEL, COMBINED Right  08/23/2019    Procedure: RIGHT WIRE LOCALIZED LUMPECTOMY WITH SENTINEL  LYMP NODE;  Surgeon: Michael Durpee MD;  Location: [facility] OR     BREAST RECONSTRUCTION WITH DEEP INFERIOR EPIGASTRIC  (AILYN) FLAP,  07/2020    done in [place]     COLONOSCOPY  2006    Dr Lara     COLONOSCOPY N/A 10/10/2016    Procedure: COLONOSCOPY;  Surgeon: Christine Cintron MD;  Location: [facility] OR     INSERT TISSUE EXPANDER BREAST BILATERAL Bilateral 10/09/2019    Procedure: BILATERAL TISSUE EXPANDERS (RAMIREZ);  Surgeon: Dale Paul MD;  Location: [facility] OR     MASTECTOMY, NIPPLE SPARING Bilateral 10/09/2019    Procedure: BILATERAL SKIN SPARRING MASTECTOMY (CASS);  Surgeon: Opal Rubin MD;  Location: [facility] OR     ORTHOPEDIC SURGERY Right     feet neuromas removed     right ankle ORIF  06/2020    Dr Torres     SHOULDER SURGERY Right 2011/10/2012       Social History     Tobacco Use     Smoking status: Never Smoker     Smokeless tobacco: Never Used   Substance Use Topics     Alcohol use: Yes     Alcohol/week: 1.7 standard drinks     Types: 2 Glasses of wine per week     Frequency: 2-4 times a month     Drinks per session: 1 or 2     Comment: occasionally     Family History   Problem Relation Age of Onset     Myocardial Infarction Mother      Hypertension Mother      Coronary Artery Disease Mother      Hyperlipidemia Mother      Thyroid Disease Mother      Stomach Cancer Father      Coronary Artery Disease Father      Hypertension Father      Colon Cancer Father      Diabetes Paternal Grandmother      Heart Disease Maternal Grandmother      Heart Disease Maternal Grandfather      Diabetes Paternal Grandfather      Cerebrovascular Disease No family hx of      Breast Cancer No family hx of      Prostate Cancer No family hx of      Anesthesia Reaction No family hx of      Asthma No family hx of      Genetic Disorder No family hx of          Current Outpatient Medications   Medication Sig Dispense Refill     anastrozole  (ARIMIDEX) 1 MG tablet TAKE 1 TABLET(1 MG) BY MOUTH DAILY 90 tablet 3     calcium carbonate-vitamin D (OS-STEVE) 600-400 MG-UNIT chewable tablet Take 1 chew tab by mouth 2 times daily (with meals) 180 tablet 3     eletriptan (RELPAX) 40 MG tablet Take 1 tablet (40 mg) by mouth once as needed for migraine 12 tablet 3     FLUoxetine (PROZAC) 10 MG capsule Take 1 capsule (10 mg) by mouth daily 90 capsule 1     fluticasone (ARNUITY ELLIPTA) 200 MCG/ACT inhaler Inhale 1 puff into the lungs daily       levocetirizine (XYZAL) 5 MG tablet TAKE 1 TABLET(5 MG) BY MOUTH EVERY EVENING 90 tablet 2     levothyroxine (SYNTHROID/LEVOTHROID) 88 MCG tablet Take 1 tablet (88 mcg) by mouth daily 90 tablet 2     polyethylene glycol (MIRALAX) 17 GM/Dose powder Take 17 g by mouth daily 510 g 0     rosuvastatin (CRESTOR) 10 MG tablet Take 1 tablet (10 mg) by mouth daily 90 tablet 3     Allergies   Allergen Reactions     Penicillins Rash     Zithromax [Azithromycin] Rash     Recent Labs   Lab Test 01/13/21  1143 01/01/21  0538 12/30/20  0645 12/30/20  0645 12/22/20  1433 12/22/20  1433 10/09/20  1255 06/01/20  1110 06/01/20  1110 07/30/19  1247 07/30/19  1247 07/24/19  1635 09/27/18  0923 09/27/18  0923   LDL  --   --   --   --   --   --  64  --   --   --  84  --   --  63   HDL  --   --   --   --   --   --  58  --   --   --  56  --   --  52   TRIG  --   --   --   --   --   --  134  --   --   --  107  --   --  119   ALT  --   --   --  28  --  29 40   < > 45   < > 39  --    < >  --    CR 0.78 0.76   < > 0.58   < > 1.57* 0.80   < > 0.77   < > 0.76  --    < >  --    GFRESTIMATED 80 82   < > >90   < > 34* 78   < > 81   < > 83  --    < >  --    GFRESTBLACK >90 >90   < > >90   < > 40* >90   < > >90   < > >90  --    < >  --    POTASSIUM 3.9 3.9   < > 3.4   < > 3.9 4.4   < > 4.1   < > 4.2  --    < >  --    TSH  --   --   --   --   --   --   --   --  3.24  --   --  0.21*  --  0.28*    < > = values in this interval not displayed.      BP Readings from  Last 3 Encounters:   01/25/21 126/64   01/06/21 130/80   01/01/21 119/58    Wt Readings from Last 3 Encounters:   01/25/21 60.3 kg (133 lb)   01/06/21 62.7 kg (138 lb 3.2 oz)   12/22/20 65 kg (143 lb 4.8 oz)                    Reviewed and updated as needed this visit by Provider                 Review of Systems   Constitutional, HEENT, cardiovascular, pulmonary, gi and gu systems are negative, except as otherwise noted.      Objective    There were no vitals taken for this visit.  There is no height or weight on file to calculate BMI.  Physical Exam   GENERAL: healthy, alert and no distress  EYES: Eyes grossly normal to inspection, PERRL and conjunctivae and sclerae normal  HENT: ear canals and TM's normal, nose and mouth without ulcers or lesions  NECK: no adenopathy, no asymmetry, masses, or scars and thyroid normal to palpation  RESP: lungs clear to auscultation - no rales, rhonchi or wheezes  CV: regular rate and rhythm, normal S1 S2, no S3 or S4, no murmur, click or rub, no peripheral edema and peripheral pulses strong    Diagnostic Test Results:  Labs reviewed in Epic  Results for orders placed or performed in visit on 01/25/21 (from the past 24 hour(s))   XR CHEST 2 VW (Clinic Performed)    Narrative    PROCEDURE:  XR CHEST 2 VW    HISTORY:  Pneumonia of both lower lobes due to group B Streptococcus  (H).     COMPARISON:  None.    FINDINGS:   The cardiac silhouette is normal in size. The pulmonary vasculature is  normal.  There is interval improvement in the pulmonary opacities from  January 6, 2021. Small amount of residual opacity persists in the left  upper lobe. There is mild blunting of the left costophrenic angle.      Impression    IMPRESSION:  Improving bilateral pulmonary parenchymal opacities from  January 6, 2021      HUMZA MCCORMACK MD           Assessment & Plan     Pneumonia of both lower lobes due to group B Streptococcus (H)  She is sent on to see Pulmonary. Not sure why this is recurrent.  Taking longer and longer to recover. Has had 4 pneumonias in the last year. Has had breast cancer but not felt to be an issue in treatment .   - XR CHEST 2 VW (Clinic Performed)        See us back in 3 months[place]     No follow-ups on file.    DASH Vital  [facility]

## 2021-01-25 ENCOUNTER — ANCILLARY PROCEDURE (OUTPATIENT)
Dept: GENERAL RADIOLOGY | Facility: OTHER | Age: 66
End: 2021-01-25
Attending: PHYSICIAN ASSISTANT
Payer: MEDICARE

## 2021-01-25 ENCOUNTER — OFFICE VISIT (OUTPATIENT)
Dept: FAMILY MEDICINE | Facility: OTHER | Age: 66
End: 2021-01-25
Attending: PHYSICIAN ASSISTANT
Payer: MEDICARE

## 2021-01-25 VITALS
OXYGEN SATURATION: 97 % | BODY MASS INDEX: 22.16 KG/M2 | HEART RATE: 54 BPM | WEIGHT: 133 LBS | SYSTOLIC BLOOD PRESSURE: 126 MMHG | TEMPERATURE: 98.7 F | DIASTOLIC BLOOD PRESSURE: 64 MMHG | HEIGHT: 65 IN

## 2021-01-25 DIAGNOSIS — J15.3: ICD-10-CM

## 2021-01-25 PROCEDURE — G0463 HOSPITAL OUTPT CLINIC VISIT: HCPCS

## 2021-01-25 PROCEDURE — G0463 HOSPITAL OUTPT CLINIC VISIT: HCPCS | Mod: 25

## 2021-01-25 PROCEDURE — 99213 OFFICE O/P EST LOW 20 MIN: CPT | Performed by: PHYSICIAN ASSISTANT

## 2021-01-25 PROCEDURE — 71046 X-RAY EXAM CHEST 2 VIEWS: CPT | Mod: TC

## 2021-01-25 ASSESSMENT — MIFFLIN-ST. JEOR: SCORE: 1149.16

## 2021-01-25 NOTE — NURSING NOTE
"Chief Complaint   Patient presents with     10 day follow up on chest pain       Initial /64 (BP Location: Right arm, Patient Position: Sitting, Cuff Size: Adult Regular)   Pulse 54   Temp 98.7  F (37.1  C) (Tympanic)   Ht 1.651 m (5' 5\")   Wt 60.3 kg (133 lb)   SpO2 97%   BMI 22.13 kg/m   Estimated body mass index is 22.13 kg/m  as calculated from the following:    Height as of this encounter: 1.651 m (5' 5\").    Weight as of this encounter: 60.3 kg (133 lb).  Medication Reconciliation: complete  Lulú Mays LPN  "

## 2021-01-26 ENCOUNTER — ONCOLOGY VISIT (OUTPATIENT)
Dept: ONCOLOGY | Facility: OTHER | Age: 66
End: 2021-01-26
Attending: PHYSICIAN ASSISTANT
Payer: COMMERCIAL

## 2021-01-26 ENCOUNTER — APPOINTMENT (OUTPATIENT)
Dept: LAB | Facility: OTHER | Age: 66
End: 2021-01-26
Attending: PHYSICIAN ASSISTANT
Payer: MEDICARE

## 2021-01-26 VITALS
HEART RATE: 56 BPM | DIASTOLIC BLOOD PRESSURE: 80 MMHG | BODY MASS INDEX: 22.26 KG/M2 | SYSTOLIC BLOOD PRESSURE: 120 MMHG | WEIGHT: 133.6 LBS | RESPIRATION RATE: 20 BRPM | OXYGEN SATURATION: 98 % | TEMPERATURE: 98.2 F | HEIGHT: 65 IN

## 2021-01-26 DIAGNOSIS — C50.411 MALIGNANT NEOPLASM OF UPPER-OUTER QUADRANT OF RIGHT BREAST IN FEMALE, ESTROGEN RECEPTOR POSITIVE (H): Primary | ICD-10-CM

## 2021-01-26 DIAGNOSIS — Z17.0 MALIGNANT NEOPLASM OF UPPER-OUTER QUADRANT OF RIGHT BREAST IN FEMALE, ESTROGEN RECEPTOR POSITIVE (H): Primary | ICD-10-CM

## 2021-01-26 DIAGNOSIS — Z79.811 USE OF AROMATASE INHIBITORS: ICD-10-CM

## 2021-01-26 DIAGNOSIS — Z87.01 H/O RECURRENT PNEUMONIA: ICD-10-CM

## 2021-01-26 LAB
ALBUMIN SERPL-MCNC: 3.8 G/DL (ref 3.4–5)
ALP SERPL-CCNC: 74 U/L (ref 40–150)
ALT SERPL W P-5'-P-CCNC: 27 U/L (ref 0–50)
ANION GAP SERPL CALCULATED.3IONS-SCNC: 4 MMOL/L (ref 3–14)
AST SERPL W P-5'-P-CCNC: 26 U/L (ref 0–45)
BASOPHILS # BLD AUTO: 0 10E9/L (ref 0–0.2)
BASOPHILS NFR BLD AUTO: 0.4 %
BILIRUB SERPL-MCNC: 0.4 MG/DL (ref 0.2–1.3)
BUN SERPL-MCNC: 12 MG/DL (ref 7–30)
CALCIUM SERPL-MCNC: 9.3 MG/DL (ref 8.5–10.1)
CHLORIDE SERPL-SCNC: 107 MMOL/L (ref 94–109)
CO2 SERPL-SCNC: 29 MMOL/L (ref 20–32)
CREAT SERPL-MCNC: 0.7 MG/DL (ref 0.52–1.04)
DIFFERENTIAL METHOD BLD: NORMAL
EOSINOPHIL # BLD AUTO: 0.1 10E9/L (ref 0–0.7)
EOSINOPHIL NFR BLD AUTO: 1.1 %
ERYTHROCYTE [DISTWIDTH] IN BLOOD BY AUTOMATED COUNT: 13.4 % (ref 10–15)
GFR SERPL CREATININE-BSD FRML MDRD: >90 ML/MIN/{1.73_M2}
GLUCOSE SERPL-MCNC: 85 MG/DL (ref 70–99)
HCT VFR BLD AUTO: 38.2 % (ref 35–47)
HGB BLD-MCNC: 12.5 G/DL (ref 11.7–15.7)
IMM GRANULOCYTES # BLD: 0 10E9/L (ref 0–0.4)
IMM GRANULOCYTES NFR BLD: 0.3 %
LYMPHOCYTES # BLD AUTO: 2 10E9/L (ref 0.8–5.3)
LYMPHOCYTES NFR BLD AUTO: 28.3 %
MCH RBC QN AUTO: 29.8 PG (ref 26.5–33)
MCHC RBC AUTO-ENTMCNC: 32.7 G/DL (ref 31.5–36.5)
MCV RBC AUTO: 91 FL (ref 78–100)
MONOCYTES # BLD AUTO: 0.5 10E9/L (ref 0–1.3)
MONOCYTES NFR BLD AUTO: 6.6 %
NEUTROPHILS # BLD AUTO: 4.5 10E9/L (ref 1.6–8.3)
NEUTROPHILS NFR BLD AUTO: 63.3 %
NRBC # BLD AUTO: 0 10*3/UL
NRBC BLD AUTO-RTO: 0 /100
PLATELET # BLD AUTO: 213 10E9/L (ref 150–450)
POTASSIUM SERPL-SCNC: 4 MMOL/L (ref 3.4–5.3)
PROT SERPL-MCNC: 8.2 G/DL (ref 6.8–8.8)
RBC # BLD AUTO: 4.2 10E12/L (ref 3.8–5.2)
SODIUM SERPL-SCNC: 140 MMOL/L (ref 133–144)
WBC # BLD AUTO: 7.1 10E9/L (ref 4–11)

## 2021-01-26 PROCEDURE — G0463 HOSPITAL OUTPT CLINIC VISIT: HCPCS

## 2021-01-26 PROCEDURE — 99214 OFFICE O/P EST MOD 30 MIN: CPT | Performed by: NURSE PRACTITIONER

## 2021-01-26 PROCEDURE — 85025 COMPLETE CBC W/AUTO DIFF WBC: CPT | Mod: ZL | Performed by: NURSE PRACTITIONER

## 2021-01-26 PROCEDURE — 36415 COLL VENOUS BLD VENIPUNCTURE: CPT | Mod: ZL | Performed by: NURSE PRACTITIONER

## 2021-01-26 PROCEDURE — 80053 COMPREHEN METABOLIC PANEL: CPT | Mod: ZL | Performed by: NURSE PRACTITIONER

## 2021-01-26 ASSESSMENT — MIFFLIN-ST. JEOR: SCORE: 1151.88

## 2021-01-26 ASSESSMENT — PAIN SCALES - GENERAL: PAINLEVEL: MILD PAIN (3)

## 2021-01-26 NOTE — NURSING NOTE
"Chief Complaint   Patient presents with     RECHECK     Follow up Malignant neoplasm of upper-outer quadrant of right breast in female, estrogen receptor positive        Initial /80   Pulse 56   Temp 98.2  F (36.8  C) (Tympanic)   Resp 20   Ht 1.651 m (5' 5\")   Wt 60.6 kg (133 lb 9.6 oz)   SpO2 98%   BMI 22.23 kg/m   Estimated body mass index is 22.23 kg/m  as calculated from the following:    Height as of this encounter: 1.651 m (5' 5\").    Weight as of this encounter: 60.6 kg (133 lb 9.6 oz).  Medication Reconciliation: complete.  Immunizations and advance directives status reviewed. Pain scale =3 left lung upon inspiration , PHQ-2=0.            Leah Gómez LPN    "

## 2021-01-26 NOTE — PROGRESS NOTES
Oncology Follow-up Visit:  January 26, 2021    Reason for Visit:  Patient presents with:  RECHECK: Follow up Malignant neoplasm of upper-outer quadrant of right breast in female, estrogen receptor positive      Nursing Note and documentation reviewed: yes    HPI:  This is a 65-year-old female patient who presents to the oncology clinic today in follow-up of Stage Ia, invasive lobular carcinoma of the right breast diagnosed July 2019.  She has undergone bilateral mastectomies and she was placed on Arimidex in December 2019.     She presents to the clinic today stating she is doing pretty good.  She was hospitalized for 11 days in December for bilateral pneumonia.  Fluid had been sent for cytology after thoracentesis and this had come back negative for malignancy.  Patient states that she has had pneumonia 4 times over the past 2 to 3 years.  Last time she saw the pulmonologist was 1 year ago and she will be seeing him again, Dr. Ching with pulmonology at [facility], this week.  There is discussion of the possible bronchoscopy.  She denies any further fevers or cough or shortness of breath.  She states she still does have some discomfort when she takes a deep breath feeling this more in the left lung.    She denies any issues with the Arimidex.  She continues on calcium with vitamin D twice a day and remains very active.      Each winter [place] and is hoping she will be able to do this again this year.    Oncologic History:     7/24/2019 patient was seen by her PCP with concerns regarding a right breast mass  7/25/2019 patient underwent right diagnostic mammogram showing no abnormality      **She underwent ultrasound of the right breast with no discrete masses noted and recommendation for follow-up in 6 months  7/26/2019 patient was evaluated by Dr. Dupree with General Surgery with recommendation for biopsy   7/29/2019 Ultrasound guided biopsy showed invasive lobular  carcinoma, grade 2, ER/TX positive and HER-2/jenn negative  8/12/2019  She underwent MRI of the both breasts with findings showing a small area of enhancement in the upper outer quadrant of the right breast and the left breast was unremarkable     **Ultrasound of the right axilla was negative  8/23/2020  She went underwent lumpectomy with sentinel node procedure with pathology showing a 4.5 cm tumor that was consistent with invasive lobular carcinoma of the right breast, grade 2, ER/TX positive and HER-2/jenn negative; sentinel node was negative; she was staged pathologic T2N0.  Anterior and medial margin was positive on pathology.  9/19/2019  Patient was seen by Dr. Lulú Franz as well is Dr. Dale Paul with Plastic surgery to discuss reconstruction   10/9/2019 she underwent bilateral mastectomy with immediate construction and pathology showed no evidence of malignancy  11/5/2019  Patient was evaluated by Dr. Noyola  with Medical Oncology with Oncotype DX recurrence score of 12 consistent with low risk of breast cancer and recommendation for aromatase inhibitor therapy; PET and MRI brain were recommended  Patient was seen again by Dr. hair aviles with medical oncology with Oncotype DX 11/20/2019  She underwent PET scan:  IMPRESSION:   1.  Numerous new FDG avid groundglass and solid nodules throughout  both lungs concerning for recurrent disease. Additionally, there is  worsening atelectasis seen in both lungs. Infectious and noninfectious  granulomatous disease may have a similar appearance.  12/3/2019 she was seen again by Dr. Noyola with Medical Oncology to review PET scan results.  MRI was not obtained related to tissue expanders in place.  He recommended initiation of aromatase inhibitor with Arimidex 1 mg p.o. daily.  2/5/2020  Repeat PET scan:  IMPRESSION:   1.  No evidence of active disease. Nodules and areas of groundglass  opacification seen previously within the lungs have resolved.   2.   Bilateral breast implant revision. Leaking left breast implant is  first seen on the CT scan of the chest from 11/14/2019.   2/6/2020  She underwent MRI of the brain evidence of metastatic disease     Current Chemo Regime/TX: Arimidex 1 mg daily initiated 12/2019  Current Cycle:  n/a  # of completed cycles:  n/a     Previous treatment:  n/a     Past Medical History:   Diagnosis Date     Basal cell carcinoma      Breast cancer (H) 07/2019    right breast     Bronchiectasis (H) 2019     Dyslipidemia      BERTA (generalized anxiety disorder)      Squamous cell carcinoma of skin 05/2020    right lower leg     Thyroid disease        Past Surgical History:   Procedure Laterality Date     ARTHROSCOPY SHOULDER ROTATOR CUFF REPAIR Left 03/07/2018    Procedure: ARTHROSCOPY SHOULDER ROTATOR CUFF REPAIR;  LEFT SHOULDER ARTHROSCOPY, REPAIR ROTATOR CUFF: subacromial Decompression and AC Joint Resection;  Surgeon: Baldev Lou DO;  Location: [facility] OR     ARTHROTOMY SHOULDER, ROTATOR CUFF REPAIR, COMBINED Right 11/14/2018    Procedure: RIGHT SHOULDER DEBRIDEMENT, EXCISION OF LIPOMA  RIGHT UPPER ARM, EXCISION SCAR TISSUE AC JOINT;  Surgeon: Baldev Lou DO;  Location: [facility] OR     BIOPSY BREAST NEEDLE LOCALIZATION, BIOPSY NODE SENTINEL, COMBINED Right 08/23/2019    Procedure: RIGHT WIRE LOCALIZED LUMPECTOMY WITH SENTINEL  LYMP NODE;  Surgeon: Michael Dupree MD;  Location: [facility] OR     BREAST RECONSTRUCTION WITH DEEP INFERIOR EPIGASTRIC  (AILYN) FLAP,  07/2020    done in [place]     COLONOSCOPY  2006    Dr Lara     COLONOSCOPY N/A 10/10/2016    Procedure: COLONOSCOPY;  Surgeon: Christine Cintron MD;  Location: [facility] OR     INSERT TISSUE EXPANDER BREAST BILATERAL Bilateral 10/09/2019    Procedure: BILATERAL TISSUE EXPANDERS (RAMIREZ);  Surgeon: Dale Paul MD;  Location:  OR     MASTECTOMY, NIPPLE SPARING Bilateral 10/09/2019    Procedure: BILATERAL SKIN SPARRING MASTECTOMY (CASS);  Surgeon: Opal Rubin  MD;  Location: [facility] OR     ORTHOPEDIC SURGERY Right     feet neuromas removed     right ankle ORIF  06/2020    Dr Torres     SHOULDER SURGERY Right 2011/10/2012       Family History   Problem Relation Age of Onset     Myocardial Infarction Mother      Hypertension Mother      Coronary Artery Disease Mother      Hyperlipidemia Mother      Thyroid Disease Mother      Stomach Cancer Father      Coronary Artery Disease Father      Hypertension Father      Colon Cancer Father      Diabetes Paternal Grandmother      Heart Disease Maternal Grandmother      Heart Disease Maternal Grandfather      Diabetes Paternal Grandfather      Cerebrovascular Disease No family hx of      Breast Cancer No family hx of      Prostate Cancer No family hx of      Anesthesia Reaction No family hx of      Asthma No family hx of      Genetic Disorder No family hx of        Social History     Socioeconomic History     Marital status:      Spouse name: Not on file     Number of children: 2     Years of education: 16     Highest education level: Not on file   Occupational History     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   Social Needs     Financial resource strain: Not on file     Food insecurity     Worry: Not on file     Inability: Not on file     Transportation needs     Medical: Not on file     Non-medical: Not on file   Tobacco Use     Smoking status: Never Smoker     Smokeless tobacco: Never Used   Substance and Sexual Activity     Alcohol use: Yes     Alcohol/week: 1.7 standard drinks     Types: 2 Glasses of wine per week     Frequency: 2-4 times a month     Drinks per session: 1 or 2     Comment: occasionally     Drug use: No     Sexual activity: Not Currently   Lifestyle     Physical activity     Days per week: 5 days     Minutes per session: 20 min     Stress: Not on file   Relationships     Social connections     Talks on phone: Not on file     Gets together: Not on file     Attends Faith service: Not on file     Active member of  club or organization: Not on file     Attends meetings of clubs or organizations: Not on file     Relationship status: Not on file     Intimate partner violence     Fear of current or ex partner: No     Emotionally abused: No     Physically abused: No     Forced sexual activity: No   Other Topics Concern     Parent/sibling w/ CABG, MI or angioplasty before 65F 55M? Not Asked   Social History Narrative     -- n/a    Caffeine -- 2c/day    Concussion -- n/a       Current Outpatient Medications   Medication     anastrozole (ARIMIDEX) 1 MG tablet     calcium carbonate-vitamin D (OS-STEVE) 600-400 MG-UNIT chewable tablet     FLUoxetine (PROZAC) 10 MG capsule     fluticasone (ARNUITY ELLIPTA) 200 MCG/ACT inhaler     levocetirizine (XYZAL) 5 MG tablet     levothyroxine (SYNTHROID/LEVOTHROID) 88 MCG tablet     rosuvastatin (CRESTOR) 10 MG tablet     eletriptan (RELPAX) 40 MG tablet     polyethylene glycol (MIRALAX) 17 GM/Dose powder     No current facility-administered medications for this visit.         Allergies   Allergen Reactions     Penicillins Rash     Zithromax [Azithromycin] Rash       Review Of Systems:  Constitutional:    denies fever, weight changes, chills, and night sweats.  Eyes:    denies blurred or double vision  Ears/Nose/Throat:   denies ear pain, difficulty swallowing; has some chronic nasal congestion  Respiratory:   denies shortness of breath, cough   Skin:   denies rash, lesions  Breast/Chest wall:   denies pain, lumps   Cardiovascular:   denies chest pain, palpitations, edema  Gastrointestinal:   denies abdominal pain, bloating, nausea, early satiety; no change in bowel habits or blood in stool  Genitourinary:   denies difficulty with urination, blood in urine  Musculoskeletal:    denies new muscle pain, bone pain  Neurologic:   denies lightheadedness, headaches, numbness or tingling  Psychiatric:   denies anxiety, depression  Hematologic/Lymphatic/Immunologic:   denies easy bruising, easy  "bleeding, lumps or bumps noted  Endocrine:   Denies increased thirst      Physical Exam:  /80   Pulse 56   Temp 98.2  F (36.8  C) (Tympanic)   Resp 20   Ht 1.651 m (5' 5\")   Wt 60.6 kg (133 lb 9.6 oz)   SpO2 98%   BMI 22.23 kg/m      GENERAL APPEARANCE: Healthy, alert and in no acute distress.  HEENT: Normocephalic, Sclerae anicteric  NECK:   No asymmetry or masses, no thyromegaly.  LYMPHATICS: No palpable cervical, supraclavicular, axillary, or inguinal nodes   RESP: Lungs clear to auscultation bilaterally, respirations regular and easy  CARDIOVASCULAR: Regular rate and rhythm. Normal S1, S2; no murmur, gallop, or rub.  ABDOMEN: Soft, nontender. Bowel sounds auscultated all 4 quadrants. No palpable organomegaly or masses.  BREAST/Chest wall: Implants; no concerning lumps, masses or tenderness bilaterally  MUSCULOSKELETAL: Extremities without gross deformities noted. No edema of bilateral lower extremities.  NEURO: Alert and oriented x 3.  Gait steady.  PSYCHIATRIC: Mentation and affect appear normal.  Mood appropriate.    Laboratory:  Results for orders placed or performed in visit on 01/26/21   Comprehensive metabolic panel     Status: None   Result Value Ref Range    Sodium 140 133 - 144 mmol/L    Potassium 4.0 3.4 - 5.3 mmol/L    Chloride 107 94 - 109 mmol/L    Carbon Dioxide 29 20 - 32 mmol/L    Anion Gap 4 3 - 14 mmol/L    Glucose 85 70 - 99 mg/dL    Urea Nitrogen 12 7 - 30 mg/dL    Creatinine 0.70 0.52 - 1.04 mg/dL    GFR Estimate >90 >60 mL/min/[1.73_m2]    GFR Estimate If Black >90 >60 mL/min/[1.73_m2]    Calcium 9.3 8.5 - 10.1 mg/dL    Bilirubin Total 0.4 0.2 - 1.3 mg/dL    Albumin 3.8 3.4 - 5.0 g/dL    Protein Total 8.2 6.8 - 8.8 g/dL    Alkaline Phosphatase 74 40 - 150 U/L    ALT 27 0 - 50 U/L    AST 26 0 - 45 U/L   CBC with platelets differential     Status: None   Result Value Ref Range    WBC 7.1 4.0 - 11.0 10e9/L    RBC Count 4.20 3.8 - 5.2 10e12/L    Hemoglobin 12.5 11.7 - 15.7 g/dL    " Hematocrit 38.2 35.0 - 47.0 %    MCV 91 78 - 100 fl    MCH 29.8 26.5 - 33.0 pg    MCHC 32.7 31.5 - 36.5 g/dL    RDW 13.4 10.0 - 15.0 %    Platelet Count 213 150 - 450 10e9/L    Diff Method Automated Method     % Neutrophils 63.3 %    % Lymphocytes 28.3 %    % Monocytes 6.6 %    % Eosinophils 1.1 %    % Basophils 0.4 %    % Immature Granulocytes 0.3 %    Nucleated RBCs 0 0 /100    Absolute Neutrophil 4.5 1.6 - 8.3 10e9/L    Absolute Lymphocytes 2.0 0.8 - 5.3 10e9/L    Absolute Monocytes 0.5 0.0 - 1.3 10e9/L    Absolute Eosinophils 0.1 0.0 - 0.7 10e9/L    Absolute Basophils 0.0 0.0 - 0.2 10e9/L    Abs Immature Granulocytes 0.0 0 - 0.4 10e9/L    Absolute Nucleated RBC 0.0        Imaging Studies:  None completed for today's visit    Results for orders placed or performed in visit on 01/25/21   XR CHEST 2 VW (Clinic Performed)    Narrative    PROCEDURE:  XR CHEST 2 VW    HISTORY:  Pneumonia of both lower lobes due to group B Streptococcus  (H).     COMPARISON:  None.    FINDINGS:   The cardiac silhouette is normal in size. The pulmonary vasculature is  normal.  There is interval improvement in the pulmonary opacities from  January 6, 2021. Small amount of residual opacity persists in the left  upper lobe. There is mild blunting of the left costophrenic angle.      Impression    IMPRESSION:  Improving bilateral pulmonary parenchymal opacities from  January 6, 2021      HUMZA MCCORMACK MD       Completed during hospitalization:  CT CHEST/ABDOMEN/PELVIS W CONTRAST     CLINICAL HISTORY: Female, age 65 years, Enlarged lymph nodes,  pelvic/inguinal; history of breast cancer with bilateral mastectomy.  Now with difficult pneumonia;     Comparison:  Chest CT 12/23/2020 and CT scan of the chest 11/14/2019.  PET CT from 2/5/2020 at 11/20/2019 are not available for comparison.     TECHNIQUE:  CT was performed of the chest, abdomen and pelvis  after  the administration of IV  and oral  contrast.   Sagittal, coronal, axial and 3-D  MIP reconstructions were reviewed.      FINDINGS:  Chest CT:   Consolidative process seen previously within the left lung is not  significantly changed.     Moderate size bilateral pleural effusions have accumulated since the  prior examination. The heart and great vessels demonstrate no acute  abnormality. Lymph nodes throughout the mediastinum and elena are  unchanged.     Esophagus is mildly dilated and unchanged.     Abdomen/Pelvis CT:  The stomach is distended with contrast and is grossly normal. Duodenum  is also normal.     Liver: Mild periportal edema. Slight decrease in density of the lung  parenchyma adjacent to the falciform ligament suggesting focal fatty  infiltration.     Gallbladder: Radiodense intraluminal material suggesting  sludge/stones. Some of this radiodense material is also likely related  to vicarious excretion of contrast from the recent CT scan.      Spleen: Normal.     Pancreas: Normal.     Adrenal glands: Normal     Kidneys: Normal.  Ureters: Normal.  Urinary bladder: Normal.     Vasculature: There are a few accessory calcifications within the aorta  and iliac arteries. No acute abnormality. Inferior vena cava is  normal.     Lymph nodes: No evidence of pathologic lymph node enlargement     Large and small bowel: Diverticulosis of the distal colon. No  diverticulitis or other acute abnormality.     Appendix: Normal.     Small volume of free fluid is seen extending into the lower pelvis.     Bony structures: No acute normality. Degenerative changes are again  seen throughout the spine.                                                                      IMPRESSION:   Pneumonia/consolidation with the lungs is not significantly changed  when compared to the study from the previous day.     Moderate size bilateral pleural effusions have accumulated since the  previous day.     Radiodense material within the gallbladder and small volume of  pericholecystic fluid suggests the possibility of  acute cholecystitis.  Ultrasound to better characterize.     Small volume of free fluid extends into the lower pelvis.     This report is in agreement with the preliminary report.     MYLES AVINA MD    ASSESSMENT/PLAN:    #1 Breast cancer:   Stage Ia, invasive lobular carcinoma of the right breast diagnosed August 2019.  She is currently on Arimidex 1 mg daily and will continue.  We will plan to follow-up again in 3 months with a CBC and CMP.     #2  On aromatase inhibitor therapy: DEXA scan completed in February 2020 was normal.  She will continue on the calcium with vitamin D twice a day and will repeat her DEXA scan in February 2022.    #3 recurrent pneumonia: She will be following up with pulmonology this week.    I encouraged patient to call with any questions or concerns.     Che Sesay NP  APRN, FNP-BC, AOCNP

## 2021-01-26 NOTE — PATIENT INSTRUCTIONS
We would like to see you back in 3 months. Please come 30 minutes prior for lab work.     When you are in need of a refill of your anastrozole, please call your pharmacy and they will send us a request.     If you have any questions please call [phone]    Other instructions:  none

## 2021-01-28 ENCOUNTER — TRANSFERRED RECORDS (OUTPATIENT)
Dept: HEALTH INFORMATION MANAGEMENT | Facility: CLINIC | Age: 66
End: 2021-01-28

## 2021-03-21 DIAGNOSIS — E03.9 ACQUIRED HYPOTHYROIDISM: ICD-10-CM

## 2021-03-22 RX ORDER — LEVOTHYROXINE SODIUM 88 UG/1
TABLET ORAL
Qty: 90 TABLET | Refills: 2 | Status: SHIPPED | OUTPATIENT
Start: 2021-03-22 | End: 2022-01-03

## 2021-03-22 NOTE — TELEPHONE ENCOUNTER
levothyroxine (SYNTHROID/LEVOTHROID) 88 MCG tablet     Last Written Prescription Date:  10/9/20  Last Fill Quantity: 90,   # refills: 2  Last Office Visit: 1/25/21  Future Office visit:    Next 5 appointments (look out 90 days)    Apr 28, 2021  9:45 AM  (Arrive by 9:30 AM)  Return Visit with Che Sesay NP  [facility] ([facility] ) [address] [place] [id number]  [phone]

## 2021-04-25 NOTE — PROGRESS NOTES
"Oncology Follow-up Visit:  April 28, 2021    Reason for Visit:  Patient presents with:  RECHECK: Follow up Invasive lobular carcinoma of right breast in female      Nursing Note and documentation reviewed: yes    HPI:  This is a 65-year-old female patient who presents to the oncology clinic today in follow-up of Stage Ia, invasive lobular carcinoma of the right breast diagnosed July 2019.  She has undergone bilateral mastectomies and she was placed on Arimidex in December 2019.     She presents to the clinic today stating she is doing well.  She offers no new complaints.  She did see Dr. Ching with pulmonology and will be undergoing another CT scan and seeing him again in May.  She states she does still have some discomfort to the left side when she takes a deep breath.  She has no shortness of breath or coughing or fevers.  She states she did develop some low back pain about 5 days ago after bending over and states \"my back went out\".  She describes it as discomfort to the left lower mid back area and states is actually improved over the last couple days.  She has no other new areas of pain.  She continues on the Arimidex and states she is having no difficulty with the medication.  She is on calcium with vitamin D twice a day.  She has no concerns with the chest wall or area around her implants.    She states she spent about 2 months in [place] and had a wonderful time.    Oncologic History:     7/24/2019 patient was seen by her PCP with concerns regarding a right breast mass  7/25/2019 patient underwent right diagnostic mammogram showing no abnormality      **She underwent ultrasound of the right breast with no discrete masses noted and recommendation for follow-up in 6 months  7/26/2019 patient was evaluated by Dr. Dupree with General Surgery with recommendation for biopsy   7/29/2019 Ultrasound guided biopsy showed invasive lobular carcinoma, grade 2, ER/NJ positive and HER-2/jenn negative  8/12/2019  She " underwent MRI of the both breasts with findings showing a small area of enhancement in the upper outer quadrant of the right breast and the left breast was unremarkable     **Ultrasound of the right axilla was negative  8/23/2019  She went underwent lumpectomy with sentinel node procedure with pathology showing a 4.5 cm tumor that was consistent with invasive lobular carcinoma of the right breast, grade 2, ER/[place] positive and HER-2/jenn negative; sentinel node was negative; she was staged pathologic T2N0.  Anterior and medial margin was positive on pathology.  9/19/2019  Patient was seen by Dr. Lulú Franz as well is Dr. Dale Paul with Plastic surgery to discuss reconstruction   10/9/2019 she underwent bilateral mastectomy with immediate construction and pathology showed no evidence of malignancy  11/5/2019  Patient was evaluated by Dr. Noyola  with Medical Oncology with Oncotype DX recurrence score of 12 consistent with low risk of breast cancer and recommendation for aromatase inhibitor therapy; PET and MRI brain were recommended  Patient was seen again by Dr. hair aviles with medical oncology with Oncotype DX 11/20/2019  She underwent PET scan:  IMPRESSION:   1.  Numerous new FDG avid groundglass and solid nodules throughout  both lungs concerning for recurrent disease. Additionally, there is  worsening atelectasis seen in both lungs. Infectious and noninfectious  granulomatous disease may have a similar appearance.  12/3/2019 she was seen again by Dr. Noyola with Medical Oncology to review PET scan results.  MRI was not obtained related to tissue expanders in place.  He recommended initiation of aromatase inhibitor with Arimidex 1 mg p.o. daily.  2/5/2020  Repeat PET scan:  IMPRESSION:   1.  No evidence of active disease. Nodules and areas of groundglass  opacification seen previously within the lungs have resolved.   2.  Bilateral breast implant revision. Leaking left breast implant is  first seen  on the CT scan of the chest from 11/14/2019.   2/6/2020  She underwent MRI of the brain evidence of metastatic disease     Current Chemo Regime/TX: Arimidex 1 mg daily initiated 12/2019  Current Cycle:  n/a  # of completed cycles:  n/a     Previous treatment:  n/a    Past Medical History:   Diagnosis Date     Basal cell carcinoma      Breast cancer (H) 07/2019    right breast     Bronchiectasis (H) 2019     Dyslipidemia      BERTA (generalized anxiety disorder)      Squamous cell carcinoma of skin 05/2020    right lower leg     Thyroid disease        Past Surgical History:   Procedure Laterality Date     ARTHROSCOPY SHOULDER ROTATOR CUFF REPAIR Left 03/07/2018    Procedure: ARTHROSCOPY SHOULDER ROTATOR CUFF REPAIR;  LEFT SHOULDER ARTHROSCOPY, REPAIR ROTATOR CUFF: subacromial Decompression and AC Joint Resection;  Surgeon: Baldev Lou DO;  Location: [facility]     ARTHROTOMY SHOULDER, ROTATOR CUFF REPAIR, COMBINED Right 11/14/2018    Procedure: RIGHT SHOULDER DEBRIDEMENT, EXCISION OF LIPOMA  RIGHT UPPER ARM, EXCISION SCAR TISSUE AC JOINT;  Surgeon: Baldev Lou DO;  Location: [facility]     BIOPSY BREAST NEEDLE LOCALIZATION, BIOPSY NODE SENTINEL, COMBINED Right 08/23/2019    Procedure: RIGHT WIRE LOCALIZED LUMPECTOMY WITH SENTINEL  LYMP NODE;  Surgeon: Michael Dupree MD;  Location: [facility]     BREAST RECONSTRUCTION WITH DEEP INFERIOR EPIGASTRIC  (AILYN) FLAP,  07/2020    done in [place]     COLONOSCOPY  2006    Dr Lara     COLONOSCOPY N/A 10/10/2016    Procedure: COLONOSCOPY;  Surgeon: Christine Cintron MD;  Location: [facility]     INSERT TISSUE EXPANDER BREAST BILATERAL Bilateral 10/09/2019    Procedure: BILATERAL TISSUE EXPANDERS (RAMIREZ);  Surgeon: Dale Paul MD;  Location:  OR     MASTECTOMY, NIPPLE SPARING Bilateral 10/09/2019    Procedure: BILATERAL SKIN SPARRING MASTECTOMY (CASS);  Surgeon: Opal Rubin MD;  Location:  OR     ORTHOPEDIC SURGERY Right     feet neuromas removed      right ankle ORIF  06/2020    Dr Torres     SHOULDER SURGERY Right 2011/10/2012       Family History   Problem Relation Age of Onset     Myocardial Infarction Mother      Hypertension Mother      Coronary Artery Disease Mother      Hyperlipidemia Mother      Thyroid Disease Mother      Stomach Cancer Father      Coronary Artery Disease Father      Hypertension Father      Colon Cancer Father      Diabetes Paternal Grandmother      Heart Disease Maternal Grandmother      Heart Disease Maternal Grandfather      Diabetes Paternal Grandfather      Cerebrovascular Disease No family hx of      Breast Cancer No family hx of      Prostate Cancer No family hx of      Anesthesia Reaction No family hx of      Asthma No family hx of      Genetic Disorder No family hx of        Social History     Socioeconomic History     Marital status:      Spouse name: Not on file     Number of children: 2     Years of education: 16     Highest education level: Not on file   Occupational History     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   Social Needs     Financial resource strain: Not on file     Food insecurity     Worry: Not on file     Inability: Not on file     Transportation needs     Medical: Not on file     Non-medical: Not on file   Tobacco Use     Smoking status: Never Smoker     Smokeless tobacco: Never Used   Substance and Sexual Activity     Alcohol use: Yes     Alcohol/week: 1.7 standard drinks     Types: 2 Glasses of wine per week     Frequency: 2-4 times a month     Drinks per session: 1 or 2     Comment: occasionally     Drug use: No     Sexual activity: Not Currently   Lifestyle     Physical activity     Days per week: 5 days     Minutes per session: 20 min     Stress: Not on file   Relationships     Social connections     Talks on phone: Not on file     Gets together: Not on file     Attends Taoism service: Not on file     Active member of club or organization: Not on file     Attends meetings of clubs or organizations:  Not on file     Relationship status: Not on file     Intimate partner violence     Fear of current or ex partner: No     Emotionally abused: No     Physically abused: No     Forced sexual activity: No   Other Topics Concern     Parent/sibling w/ CABG, MI or angioplasty before 65F 55M? Not Asked   Social History Narrative     -- n/a    Caffeine -- 2c/day    Concussion -- n/a       Current Outpatient Medications   Medication     anastrozole (ARIMIDEX) 1 MG tablet     calcium carbonate-vitamin D (OS-STEVE) 600-400 MG-UNIT chewable tablet     FLUoxetine (PROZAC) 10 MG capsule     fluticasone (ARNUITY ELLIPTA) 200 MCG/ACT inhaler     levocetirizine (XYZAL) 5 MG tablet     levothyroxine (SYNTHROID/LEVOTHROID) 88 MCG tablet     rosuvastatin (CRESTOR) 10 MG tablet     eletriptan (RELPAX) 40 MG tablet     No current facility-administered medications for this visit.         Allergies   Allergen Reactions     Penicillins Rash     Zithromax [Azithromycin] Rash       Review Of Systems:  Constitutional:    denies fever, weight changes, chills, and night sweats.  Eyes:    denies blurred or double vision  Ears/Nose/Throat:   denies ear pain, nose problems, difficulty swallowing  Respiratory:   denies shortness of breath, cough   Skin:   denies rash, lesions  Breast/Chest wall:   denies pain, lumps   Cardiovascular:   denies palpitations, edema  Gastrointestinal:   denies abdominal pain, bloating, nausea, early satiety; no change in bowel habits or blood in stool  Genitourinary:   denies difficulty with urination, blood in urine  Musculoskeletal:    denies new muscle pain, bone pain  Neurologic:   denies lightheadedness, headaches, numbness or tingling  Psychiatric:   denies anxiety, depression  Hematologic/Lymphatic/Immunologic:   denies easy bruising, easy bleeding, lumps or bumps noted  Endocrine:   Denies increased thirst      Physical Exam:  /70   Pulse 55   Temp 96.6  F (35.9  C) (Tympanic)   Resp 20   Ht 1.651  "m (5' 5\")   Wt 62.1 kg (136 lb 14.5 oz)   SpO2 97%   BMI 22.78 kg/m      GENERAL APPEARANCE: Healthy, alert and in no acute distress.  HEENT: Normocephalic, Sclerae anicteric.   NECK:   No asymmetry or masses, no thyromegaly.  LYMPHATICS: No palpable cervical, supraclavicular, axillary, or inguinal nodes   RESP: Lungs clear to auscultation bilaterally, respirations regular and easy  CARDIOVASCULAR: Regular rate and rhythm. Normal S1, S2; no murmur, gallop, or rub.  ABDOMEN: Soft, nontender. Bowel sounds auscultated all 4 quadrants. No palpable organomegaly or masses.  BREAST/Chest wall: no concerning lumps, masses or tenderness bilaterally  MUSCULOSKELETAL: Extremities without gross deformities noted. No edema of bilateral lower extremities.  NEURO: Alert and oriented x 3.  Gait steady.  PSYCHIATRIC: Mentation and affect appear normal.  Mood appropriate.    Laboratory:  Results for orders placed or performed in visit on 04/28/21   CBC with platelets differential     Status: None   Result Value Ref Range    WBC 6.8 4.0 - 11.0 10e9/L    RBC Count 4.76 3.8 - 5.2 10e12/L    Hemoglobin 14.1 11.7 - 15.7 g/dL    Hematocrit 42.3 35.0 - 47.0 %    MCV 89 78 - 100 fl    MCH 29.6 26.5 - 33.0 pg    MCHC 33.3 31.5 - 36.5 g/dL    RDW 12.7 10.0 - 15.0 %    Platelet Count 181 150 - 450 10e9/L    Diff Method Automated Method     % Neutrophils 56.1 %    % Lymphocytes 30.9 %    % Monocytes 9.8 %    % Eosinophils 2.5 %    % Basophils 0.6 %    % Immature Granulocytes 0.1 %    Nucleated RBCs 0 0 /100    Absolute Neutrophil 3.8 1.6 - 8.3 10e9/L    Absolute Lymphocytes 2.1 0.8 - 5.3 10e9/L    Absolute Monocytes 0.7 0.0 - 1.3 10e9/L    Absolute Eosinophils 0.2 0.0 - 0.7 10e9/L    Absolute Basophils 0.0 0.0 - 0.2 10e9/L    Abs Immature Granulocytes 0.0 0 - 0.4 10e9/L    Absolute Nucleated RBC 0.0    Comprehensive metabolic panel     Status: Abnormal   Result Value Ref Range    Sodium 136 133 - 144 mmol/L    Potassium 4.0 3.4 - 5.3 mmol/L "    Chloride 104 94 - 109 mmol/L    Carbon Dioxide 30 20 - 32 mmol/L    Anion Gap 2 (L) 3 - 14 mmol/L    Glucose 92 70 - 99 mg/dL    Urea Nitrogen 17 7 - 30 mg/dL    Creatinine 0.73 0.52 - 1.04 mg/dL    GFR Estimate 86 >60 mL/min/[1.73_m2]    GFR Estimate If Black >90 >60 mL/min/[1.73_m2]    Calcium 9.6 8.5 - 10.1 mg/dL    Bilirubin Total 0.4 0.2 - 1.3 mg/dL    Albumin 3.9 3.4 - 5.0 g/dL    Protein Total 7.5 6.8 - 8.8 g/dL    Alkaline Phosphatase 59 40 - 150 U/L    ALT 49 0 - 50 U/L    AST 43 0 - 45 U/L       Imaging Studies: None completed for today's visit      ASSESSMENT/PLAN:    #1 Breast cancer:   Stage Ia, invasive lobular carcinoma of the right breast diagnosed August 2019.  She is currently on Arimidex 1 mg daily and will continue.  We will plan to follow-up again in 3 months with a CBC and CMP.     #2  On aromatase inhibitor therapy: DEXA scan completed in February 2020 was normal.  She will continue on the calcium with vitamin D twice a day and will repeat her DEXA scan in February 2022.    #3 recurrent pneumonia: We will obtain immunoglobulins today.  She is aware we will call her with those results.  She will follow up with pulmonology in May.    #4  Low back pain: I asked that she call me with no resolution of the discomfort in the next week or so.  May need to do some imaging.     I encouraged patient to call with any questions or concerns.      Che Sesay, NP  APRN, FNP-BC, AOCNP

## 2021-04-28 ENCOUNTER — ONCOLOGY VISIT (OUTPATIENT)
Dept: ONCOLOGY | Facility: OTHER | Age: 66
End: 2021-04-28
Attending: NURSE PRACTITIONER
Payer: COMMERCIAL

## 2021-04-28 VITALS
TEMPERATURE: 96.6 F | DIASTOLIC BLOOD PRESSURE: 70 MMHG | SYSTOLIC BLOOD PRESSURE: 110 MMHG | RESPIRATION RATE: 20 BRPM | WEIGHT: 136.91 LBS | BODY MASS INDEX: 22.81 KG/M2 | OXYGEN SATURATION: 97 % | HEART RATE: 55 BPM | HEIGHT: 65 IN

## 2021-04-28 DIAGNOSIS — Z17.0 MALIGNANT NEOPLASM OF UPPER-OUTER QUADRANT OF RIGHT BREAST IN FEMALE, ESTROGEN RECEPTOR POSITIVE (H): Primary | ICD-10-CM

## 2021-04-28 DIAGNOSIS — Z79.811 USE OF AROMATASE INHIBITORS: ICD-10-CM

## 2021-04-28 DIAGNOSIS — Z87.01 H/O RECURRENT PNEUMONIA: ICD-10-CM

## 2021-04-28 DIAGNOSIS — M54.50 ACUTE MIDLINE LOW BACK PAIN WITHOUT SCIATICA: ICD-10-CM

## 2021-04-28 DIAGNOSIS — C50.411 MALIGNANT NEOPLASM OF UPPER-OUTER QUADRANT OF RIGHT BREAST IN FEMALE, ESTROGEN RECEPTOR POSITIVE (H): Primary | ICD-10-CM

## 2021-04-28 DIAGNOSIS — Z17.0 MALIGNANT NEOPLASM OF UPPER-OUTER QUADRANT OF RIGHT BREAST IN FEMALE, ESTROGEN RECEPTOR POSITIVE (H): ICD-10-CM

## 2021-04-28 DIAGNOSIS — C50.411 MALIGNANT NEOPLASM OF UPPER-OUTER QUADRANT OF RIGHT BREAST IN FEMALE, ESTROGEN RECEPTOR POSITIVE (H): ICD-10-CM

## 2021-04-28 DIAGNOSIS — Z86.19 FREQUENT INFECTIONS: ICD-10-CM

## 2021-04-28 LAB
ALBUMIN SERPL-MCNC: 3.9 G/DL (ref 3.4–5)
ALP SERPL-CCNC: 59 U/L (ref 40–150)
ALT SERPL W P-5'-P-CCNC: 49 U/L (ref 0–50)
ANION GAP SERPL CALCULATED.3IONS-SCNC: 2 MMOL/L (ref 3–14)
AST SERPL W P-5'-P-CCNC: 43 U/L (ref 0–45)
BASOPHILS # BLD AUTO: 0 10E9/L (ref 0–0.2)
BASOPHILS NFR BLD AUTO: 0.6 %
BILIRUB SERPL-MCNC: 0.4 MG/DL (ref 0.2–1.3)
BUN SERPL-MCNC: 17 MG/DL (ref 7–30)
CALCIUM SERPL-MCNC: 9.6 MG/DL (ref 8.5–10.1)
CHLORIDE SERPL-SCNC: 104 MMOL/L (ref 94–109)
CO2 SERPL-SCNC: 30 MMOL/L (ref 20–32)
CREAT SERPL-MCNC: 0.73 MG/DL (ref 0.52–1.04)
DIFFERENTIAL METHOD BLD: NORMAL
EOSINOPHIL # BLD AUTO: 0.2 10E9/L (ref 0–0.7)
EOSINOPHIL NFR BLD AUTO: 2.5 %
ERYTHROCYTE [DISTWIDTH] IN BLOOD BY AUTOMATED COUNT: 12.7 % (ref 10–15)
GFR SERPL CREATININE-BSD FRML MDRD: 86 ML/MIN/{1.73_M2}
GLUCOSE SERPL-MCNC: 92 MG/DL (ref 70–99)
HCT VFR BLD AUTO: 42.3 % (ref 35–47)
HGB BLD-MCNC: 14.1 G/DL (ref 11.7–15.7)
IMM GRANULOCYTES # BLD: 0 10E9/L (ref 0–0.4)
IMM GRANULOCYTES NFR BLD: 0.1 %
LYMPHOCYTES # BLD AUTO: 2.1 10E9/L (ref 0.8–5.3)
LYMPHOCYTES NFR BLD AUTO: 30.9 %
MCH RBC QN AUTO: 29.6 PG (ref 26.5–33)
MCHC RBC AUTO-ENTMCNC: 33.3 G/DL (ref 31.5–36.5)
MCV RBC AUTO: 89 FL (ref 78–100)
MONOCYTES # BLD AUTO: 0.7 10E9/L (ref 0–1.3)
MONOCYTES NFR BLD AUTO: 9.8 %
NEUTROPHILS # BLD AUTO: 3.8 10E9/L (ref 1.6–8.3)
NEUTROPHILS NFR BLD AUTO: 56.1 %
NRBC # BLD AUTO: 0 10*3/UL
NRBC BLD AUTO-RTO: 0 /100
PLATELET # BLD AUTO: 181 10E9/L (ref 150–450)
POTASSIUM SERPL-SCNC: 4 MMOL/L (ref 3.4–5.3)
PROT SERPL-MCNC: 7.5 G/DL (ref 6.8–8.8)
RBC # BLD AUTO: 4.76 10E12/L (ref 3.8–5.2)
SODIUM SERPL-SCNC: 136 MMOL/L (ref 133–144)
WBC # BLD AUTO: 6.8 10E9/L (ref 4–11)

## 2021-04-28 PROCEDURE — 82784 ASSAY IGA/IGD/IGG/IGM EACH: CPT | Mod: ZL | Performed by: NURSE PRACTITIONER

## 2021-04-28 PROCEDURE — G0463 HOSPITAL OUTPT CLINIC VISIT: HCPCS

## 2021-04-28 PROCEDURE — 80053 COMPREHEN METABOLIC PANEL: CPT | Mod: ZL | Performed by: NURSE PRACTITIONER

## 2021-04-28 PROCEDURE — 99214 OFFICE O/P EST MOD 30 MIN: CPT | Performed by: NURSE PRACTITIONER

## 2021-04-28 PROCEDURE — 36415 COLL VENOUS BLD VENIPUNCTURE: CPT | Mod: ZL | Performed by: NURSE PRACTITIONER

## 2021-04-28 PROCEDURE — 85025 COMPLETE CBC W/AUTO DIFF WBC: CPT | Mod: ZL | Performed by: NURSE PRACTITIONER

## 2021-04-28 ASSESSMENT — PAIN SCALES - GENERAL: PAINLEVEL: NO PAIN (0)

## 2021-04-28 ASSESSMENT — MIFFLIN-ST. JEOR: SCORE: 1166.88

## 2021-04-28 NOTE — NURSING NOTE
"Chief Complaint   Patient presents with     RECHECK     Follow up Invasive lobular carcinoma of right breast in female        Initial /70   Pulse 55   Temp 96.6  F (35.9  C) (Tympanic)   Resp 20   Ht 1.651 m (5' 5\")   Wt 62.1 kg (136 lb 14.5 oz)   SpO2 97%   BMI 22.78 kg/m   Estimated body mass index is 22.78 kg/m  as calculated from the following:    Height as of this encounter: 1.651 m (5' 5\").    Weight as of this encounter: 62.1 kg (136 lb 14.5 oz).  Medication Reconciliation: complete.  Immunizations and advance directives status reviewed. Pain scale =0 , PHQ-2=0.            Leah Gómez LPN    "

## 2021-04-28 NOTE — PATIENT INSTRUCTIONS
We would like to see you back in 3 months.  No labs.     When you are in need of a refill of your anastrozole, please call your pharmacy and they will send us a request.     If you have any questions please call [phone]    Other instructions:  none

## 2021-04-29 LAB
IGA SERPL-MCNC: 242 MG/DL (ref 84–499)
IGG SERPL-MCNC: 1212 MG/DL (ref 610–1616)
IGM SERPL-MCNC: 90 MG/DL (ref 35–242)

## 2021-05-04 DIAGNOSIS — F41.1 GAD (GENERALIZED ANXIETY DISORDER): ICD-10-CM

## 2021-05-04 NOTE — TELEPHONE ENCOUNTER
prozac      Last Written Prescription Date:  10/29/20  Last Fill Quantity: 90,   # refills: 1  Last Office Visit: 4/28/21  Future Office visit:    Next 5 appointments (look out 90 days)    Jul 28, 2021  9:00 AM  (Arrive by 8:45 AM)  Return Visit with Che Sesay NP  [facility] ([facility] [place] ) [address] [facility] [address]  [phone]

## 2021-05-05 RX ORDER — FLUOXETINE 10 MG/1
10 CAPSULE ORAL DAILY
Qty: 90 CAPSULE | Refills: 1 | Status: SHIPPED | OUTPATIENT
Start: 2021-05-05 | End: 2021-08-09

## 2021-07-16 DIAGNOSIS — J30.2 SEASONAL ALLERGIES: ICD-10-CM

## 2021-07-16 RX ORDER — LEVOCETIRIZINE DIHYDROCHLORIDE 5 MG/1
TABLET, FILM COATED ORAL
Qty: 90 TABLET | Refills: 2 | Status: SHIPPED | OUTPATIENT
Start: 2021-07-16 | End: 2022-05-04

## 2021-07-16 NOTE — TELEPHONE ENCOUNTER
levocetirizine (XYZAL) 5 MG tablet   Last Written Prescription Date:  10/9/20  Last Fill Quantity: 90,   # refills: 2  Last Office Visit: 1/25/21  Future Office visit:    Next 5 appointments (look out 90 days)    Jul 30, 2021  2:15 PM  (Arrive by 2:00 PM)  Return Visit with Che Sesay NP  [facility] ([facility] ) [address] [place] [address] AVE  [facility] [address]  [phone]           Routing refill request to provider for review/approval

## 2021-08-07 DIAGNOSIS — F41.1 GAD (GENERALIZED ANXIETY DISORDER): ICD-10-CM

## 2021-08-09 RX ORDER — FLUOXETINE 10 MG/1
CAPSULE ORAL
Qty: 90 CAPSULE | Refills: 1 | Status: SHIPPED | OUTPATIENT
Start: 2021-08-09 | End: 2022-05-09

## 2021-08-09 NOTE — TELEPHONE ENCOUNTER
Prozac      Last Written Prescription Date:  5/5/21  Last Fill Quantity: 90,   # refills: 1  Last Office Visit: 1/25/21  Future Office visit:    Next 5 appointments (look out 90 days)    Aug 31, 2021  3:00 PM  (Arrive by 2:45 PM)  Return Visit with Che Sesay NP  [facility] ([facility] [place] ) [address] [place] [id number]  [phone]           Routing refill request to provider for review/approval because:

## 2021-08-13 DIAGNOSIS — E78.5 HYPERLIPIDEMIA WITH TARGET LDL LESS THAN 130: ICD-10-CM

## 2021-08-13 RX ORDER — ROSUVASTATIN CALCIUM 10 MG/1
TABLET, COATED ORAL
Qty: 90 TABLET | Refills: 3 | Status: SHIPPED | OUTPATIENT
Start: 2021-08-13 | End: 2022-07-07

## 2021-08-13 NOTE — TELEPHONE ENCOUNTER
Crestor      Last Written Prescription Date:  8/13/20  Last Fill Quantity: 90,   # refills: 3  Last Office Visit: 1/25/21  Future Office visit:    Next 5 appointments (look out 90 days)    Aug 31, 2021  3:00 PM  (Arrive by 2:45 PM)  Return Visit with Che Sesay NP  [facility] ([facility] ) [facility] [facility] [address]  [phone]           Routing refill request to provider for review/approval because:

## 2021-08-23 ENCOUNTER — TRANSFERRED RECORDS (OUTPATIENT)
Dept: HEALTH INFORMATION MANAGEMENT | Facility: CLINIC | Age: 66
End: 2021-08-23

## 2021-09-22 ENCOUNTER — ONCOLOGY VISIT (OUTPATIENT)
Dept: ONCOLOGY | Facility: OTHER | Age: 66
End: 2021-09-22
Attending: NURSE PRACTITIONER
Payer: MEDICARE

## 2021-09-22 VITALS
HEART RATE: 52 BPM | BODY MASS INDEX: 23.25 KG/M2 | DIASTOLIC BLOOD PRESSURE: 80 MMHG | OXYGEN SATURATION: 99 % | HEIGHT: 65 IN | TEMPERATURE: 97.4 F | WEIGHT: 139.55 LBS | RESPIRATION RATE: 20 BRPM | SYSTOLIC BLOOD PRESSURE: 124 MMHG

## 2021-09-22 DIAGNOSIS — Z79.811 USE OF AROMATASE INHIBITORS: ICD-10-CM

## 2021-09-22 DIAGNOSIS — Z85.3 PERSONAL HISTORY OF MALIGNANT NEOPLASM OF BREAST: Primary | ICD-10-CM

## 2021-09-22 PROBLEM — A41.9 SEPSIS (H): Status: RESOLVED | Noted: 2020-12-22 | Resolved: 2021-09-22

## 2021-09-22 PROBLEM — N17.9 ACUTE RENAL FAILURE (H): Status: RESOLVED | Noted: 2020-12-22 | Resolved: 2021-09-22

## 2021-09-22 PROBLEM — C50.411 MALIGNANT NEOPLASM OF UPPER-OUTER QUADRANT OF RIGHT BREAST IN FEMALE, ESTROGEN RECEPTOR POSITIVE (H): Status: RESOLVED | Noted: 2019-08-22 | Resolved: 2021-09-22

## 2021-09-22 PROBLEM — J18.9 LLL PNEUMONIA: Status: RESOLVED | Noted: 2020-12-22 | Resolved: 2021-09-22

## 2021-09-22 PROBLEM — J90 PLEURAL EFFUSION EXUDATIVE: Status: RESOLVED | Noted: 2020-12-29 | Resolved: 2021-09-22

## 2021-09-22 PROBLEM — J47.9 BRONCHIECTASIS (H): Status: RESOLVED | Noted: 2019-01-01 | Resolved: 2021-09-22

## 2021-09-22 PROBLEM — Z17.0 MALIGNANT NEOPLASM OF UPPER-OUTER QUADRANT OF RIGHT BREAST IN FEMALE, ESTROGEN RECEPTOR POSITIVE (H): Status: RESOLVED | Noted: 2019-08-22 | Resolved: 2021-09-22

## 2021-09-22 PROBLEM — J18.9 RECURRENT PNEUMONIA: Status: RESOLVED | Noted: 2019-08-22 | Resolved: 2021-09-22

## 2021-09-22 LAB
ALBUMIN SERPL-MCNC: 4.2 G/DL (ref 3.4–5)
ALP SERPL-CCNC: 69 U/L (ref 40–150)
ALT SERPL W P-5'-P-CCNC: 52 U/L (ref 0–50)
ANION GAP SERPL CALCULATED.3IONS-SCNC: 3 MMOL/L (ref 3–14)
AST SERPL W P-5'-P-CCNC: 39 U/L (ref 0–45)
BASOPHILS # BLD AUTO: 0 10E3/UL (ref 0–0.2)
BASOPHILS NFR BLD AUTO: 1 %
BILIRUB SERPL-MCNC: 0.4 MG/DL (ref 0.2–1.3)
BUN SERPL-MCNC: 12 MG/DL (ref 7–30)
CALCIUM SERPL-MCNC: 9.6 MG/DL (ref 8.5–10.1)
CHLORIDE BLD-SCNC: 107 MMOL/L (ref 94–109)
CO2 SERPL-SCNC: 29 MMOL/L (ref 20–32)
CREAT SERPL-MCNC: 0.76 MG/DL (ref 0.52–1.04)
EOSINOPHIL # BLD AUTO: 0.1 10E3/UL (ref 0–0.7)
EOSINOPHIL NFR BLD AUTO: 1 %
ERYTHROCYTE [DISTWIDTH] IN BLOOD BY AUTOMATED COUNT: 13.1 % (ref 10–15)
GFR SERPL CREATININE-BSD FRML MDRD: 82 ML/MIN/1.73M2
GLUCOSE BLD-MCNC: 88 MG/DL (ref 70–99)
HCT VFR BLD AUTO: 41.3 % (ref 35–47)
HGB BLD-MCNC: 13.7 G/DL (ref 11.7–15.7)
IMM GRANULOCYTES # BLD: 0 10E3/UL
IMM GRANULOCYTES NFR BLD: 0 %
LYMPHOCYTES # BLD AUTO: 1.8 10E3/UL (ref 0.8–5.3)
LYMPHOCYTES NFR BLD AUTO: 25 %
MCH RBC QN AUTO: 30.1 PG (ref 26.5–33)
MCHC RBC AUTO-ENTMCNC: 33.2 G/DL (ref 31.5–36.5)
MCV RBC AUTO: 91 FL (ref 78–100)
MONOCYTES # BLD AUTO: 0.6 10E3/UL (ref 0–1.3)
MONOCYTES NFR BLD AUTO: 8 %
NEUTROPHILS # BLD AUTO: 4.6 10E3/UL (ref 1.6–8.3)
NEUTROPHILS NFR BLD AUTO: 65 %
NRBC # BLD AUTO: 0 10E3/UL
NRBC BLD AUTO-RTO: 0 /100
PLATELET # BLD AUTO: 180 10E3/UL (ref 150–450)
POTASSIUM BLD-SCNC: 4.5 MMOL/L (ref 3.4–5.3)
PROT SERPL-MCNC: 7.8 G/DL (ref 6.8–8.8)
RBC # BLD AUTO: 4.55 10E6/UL (ref 3.8–5.2)
SODIUM SERPL-SCNC: 139 MMOL/L (ref 133–144)
WBC # BLD AUTO: 7.1 10E3/UL (ref 4–11)

## 2021-09-22 PROCEDURE — 85025 COMPLETE CBC W/AUTO DIFF WBC: CPT | Mod: ZL | Performed by: NURSE PRACTITIONER

## 2021-09-22 PROCEDURE — G0463 HOSPITAL OUTPT CLINIC VISIT: HCPCS

## 2021-09-22 PROCEDURE — 99213 OFFICE O/P EST LOW 20 MIN: CPT | Performed by: NURSE PRACTITIONER

## 2021-09-22 PROCEDURE — 36415 COLL VENOUS BLD VENIPUNCTURE: CPT | Mod: ZL | Performed by: NURSE PRACTITIONER

## 2021-09-22 PROCEDURE — 80053 COMPREHEN METABOLIC PANEL: CPT | Mod: ZL | Performed by: NURSE PRACTITIONER

## 2021-09-22 ASSESSMENT — MIFFLIN-ST. JEOR: SCORE: 1173.88

## 2021-09-22 ASSESSMENT — PAIN SCALES - GENERAL: PAINLEVEL: NO PAIN (0)

## 2021-09-22 NOTE — PATIENT INSTRUCTIONS
We would like to see you back in January. Please come 30 minutes prior for lab work.     When you are in need of a refill, please call your pharmacy and they will send us a request.     If you have any questions please call [phone]    Other instructions:  none

## 2021-09-22 NOTE — PROGRESS NOTES
Oncology Follow-up Visit:  September 22, 2021    Reason for Visit:  Patient presents with:  Oncology Clinic Visit: Follow up History of breast cancer     Nursing Note and documentation reviewed: yes    HPI:   This is a 66-year-old female patient who presents to the oncology clinic today in follow-up of Stage Ia, invasive lobular carcinoma of the right breast diagnosed July 2019.  She has undergone bilateral mastectomies and she was placed on Arimidex in December 2019.     She presents to the clinic today stating she is doing well and feeling great.  She offers no new complaints.  She continues to have some discomfort when she takes deep breaths to the left side of her chest ever since she had her last bout of pneumonia.  She did see Dr. Ching in May with a CT scan that he felt was essentially stable.  She has not been ill since I saw her last.  She denies any coughing or shortness of breath now and no fevers.  She states she is taking her calcium with vitamin D twice a day and she remains very active.  She is tolerating the anastrozole fairly well but does admit to some increased joint pains.    Oncologic History:     7/24/2019 patient was seen by her PCP with concerns regarding a right breast mass  7/25/2019 patient underwent right diagnostic mammogram showing no abnormality      **She underwent ultrasound of the right breast with no discrete masses noted and recommendation for follow-up in 6 months  7/26/2019 patient was evaluated by Dr. Dupree with General Surgery with recommendation for biopsy   7/29/2019 Ultrasound guided biopsy showed invasive lobular carcinoma, grade 2, ER/SC positive and HER-2/jenn negative  8/12/2019  She underwent MRI of the both breasts with findings showing a small area of enhancement in the upper outer quadrant of the right breast and the left breast was unremarkable     **Ultrasound of the right axilla was negative  8/23/2019  She went underwent lumpectomy with sentinel node procedure  with pathology showing a 4.5 cm tumor that was consistent with invasive lobular carcinoma of the right breast, grade 2, ER/SC positive and HER-2/jenn negative; sentinel node was negative; she was staged pathologic T2N0.  Anterior and medial margin was positive on pathology.  9/19/2019  Patient was seen by Dr. Lulú Franz as well is Dr. Dale Paul with Plastic surgery to discuss reconstruction   10/9/2019 she underwent bilateral mastectomy with immediate construction and pathology showed no evidence of malignancy  11/5/2019  Patient was evaluated by Dr. Noyola  with Medical Oncology with Oncotype DX recurrence score of 12 consistent with low risk of breast cancer and recommendation for aromatase inhibitor therapy; PET and MRI brain were recommended  Patient was seen again by Dr. hair aviles with medical oncology with Oncotype DX 11/20/2019  She underwent PET scan:  IMPRESSION:   1.  Numerous new FDG avid groundglass and solid nodules throughout  both lungs concerning for recurrent disease. Additionally, there is  worsening atelectasis seen in both lungs. Infectious and noninfectious  granulomatous disease may have a similar appearance.  12/3/2019 she was seen again by Dr. Noyola with Medical Oncology to review PET scan results.  MRI was not obtained related to tissue expanders in place.  He recommended initiation of aromatase inhibitor with Arimidex 1 mg p.o. daily.  2/5/2020  Repeat PET scan:  IMPRESSION:   1.  No evidence of active disease. Nodules and areas of groundglass  opacification seen previously within the lungs have resolved.   2.  Bilateral breast implant revision. Leaking left breast implant is  first seen on the CT scan of the chest from 11/14/2019.   2/6/2020  She underwent MRI of the brain evidence of metastatic disease     Current Chemo Regime/TX: Arimidex 1 mg daily initiated 12/2019  Current Cycle:  n/a  # of completed cycles:  n/a     Previous treatment:  n/a     Past Medical  History:   Diagnosis Date     Basal cell carcinoma      Breast cancer (H) 07/2019    right breast     Bronchiectasis (H) 2019     Dyslipidemia      BERTA (generalized anxiety disorder)      Squamous cell carcinoma of skin 05/2020    right lower leg     Thyroid disease        Past Surgical History:   Procedure Laterality Date     ARTHROSCOPY SHOULDER ROTATOR CUFF REPAIR Left 03/07/2018    Procedure: ARTHROSCOPY SHOULDER ROTATOR CUFF REPAIR;  LEFT SHOULDER ARTHROSCOPY, REPAIR ROTATOR CUFF: subacromial Decompression and AC Joint Resection;  Surgeon: Baldev Lou DO;  Location: [facility]     ARTHROTOMY SHOULDER, ROTATOR CUFF REPAIR, COMBINED Right 11/14/2018    Procedure: RIGHT SHOULDER DEBRIDEMENT, EXCISION OF LIPOMA  RIGHT UPPER ARM, EXCISION SCAR TISSUE AC JOINT;  Surgeon: Baldev Lou DO;  Location: [facility]     BIOPSY BREAST NEEDLE LOCALIZATION, BIOPSY NODE SENTINEL, COMBINED Right 08/23/2019    Procedure: RIGHT WIRE LOCALIZED LUMPECTOMY WITH SENTINEL  LYMP NODE;  Surgeon: Michael Dupree MD;  Location: [facility]     BREAST RECONSTRUCTION WITH DEEP INFERIOR EPIGASTRIC  (AILYN) FLAP,  07/2020    done in [place]     COLONOSCOPY  2006    Dr Lara     COLONOSCOPY N/A 10/10/2016    Procedure: COLONOSCOPY;  Surgeon: Christine Cintron MD;  Location: [facility]     INSERT TISSUE EXPANDER BREAST BILATERAL Bilateral 10/09/2019    Procedure: BILATERAL TISSUE EXPANDERS (RAMIREZ);  Surgeon: Dale Paul MD;  Location:  OR     MASTECTOMY, NIPPLE SPARING Bilateral 10/09/2019    Procedure: BILATERAL SKIN SPARRING MASTECTOMY (CASS);  Surgeon: Opal Rubin MD;  Location:  OR     ORTHOPEDIC SURGERY Right     feet neuromas removed     right ankle ORIF  06/2020    Dr Torres     SHOULDER SURGERY Right 2011/10/2012       Family History   Problem Relation Age of Onset     Myocardial Infarction Mother      Hypertension Mother      Coronary Artery Disease Mother      Hyperlipidemia Mother      Thyroid Disease Mother       Stomach Cancer Father      Coronary Artery Disease Father      Hypertension Father      Colon Cancer Father      Diabetes Paternal Grandmother      Heart Disease Maternal Grandmother      Heart Disease Maternal Grandfather      Diabetes Paternal Grandfather      Cerebrovascular Disease No family hx of      Breast Cancer No family hx of      Prostate Cancer No family hx of      Anesthesia Reaction No family hx of      Asthma No family hx of      Genetic Disorder No family hx of        Social History     Socioeconomic History     Marital status:      Spouse name: Not on file     Number of children: 2     Years of education: 16     Highest education level: Not on file   Occupational History     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 Not Asked   Social History Narrative     -- n/a    Caffeine -- 2c/day    Concussion -- n/a     Social Determinants of Health     Financial Resource Strain:      Difficulty of Paying Living Expenses:    Food Insecurity:      Worried About Running Out of Food in the Last Year:      Ran Out of Food in the Last Year:    Transportation Needs:      Lack of Transportation (Medical):      Lack of Transportation (Non-Medical):    Physical Activity:      Days of Exercise per Week:      Minutes of Exercise per Session:    Stress:      Feeling of Stress :    Social Connections:      Frequency of Communication with Friends and Family:      Frequency of Social Gatherings with Friends and Family:      Attends [employer] Services:      Active Member of Clubs or Organizations:      Attends Club or Organization Meetings:       "Marital Status:    Intimate Partner Violence: Not At Risk     Fear of Current or Ex-Partner: No     Emotionally Abused: No     Physically Abused: No     Sexually Abused: No       Current Outpatient Medications   Medication     anastrozole (ARIMIDEX) 1 MG tablet     calcium carbonate-vitamin D (OS-STEVE) 600-400 MG-UNIT chewable tablet     FLUoxetine (PROZAC) 10 MG capsule     fluticasone (ARNUITY ELLIPTA) 200 MCG/ACT inhaler     levocetirizine (XYZAL) 5 MG tablet     levothyroxine (SYNTHROID/LEVOTHROID) 88 MCG tablet     rosuvastatin (CRESTOR) 10 MG tablet     eletriptan (RELPAX) 40 MG tablet     No current facility-administered medications for this visit.        Allergies   Allergen Reactions     Pcn [Penicillins] Rash     Zithromax [Azithromycin] Rash       Review Of Systems:  Constitutional:    denies fever, weight changes, chills, and night sweats.  Eyes:    denies blurred or double vision  Ears/Nose/Throat:   denies ear pain, nose problems, difficulty swallowing  Respiratory:  See hPI  Skin:   denies rash, lesions  Breast/Chest wall:   denies pain, lumps   Cardiovascular:   denies chest pain, palpitations, edema  Gastrointestinal:   denies abdominal pain, bloating, nausea, early satiety; no change in bowel habits or blood in stool  Genitourinary:   denies difficulty with urination, blood in urine  Musculoskeletal:    denies new muscle pain, bone pain-has had increased joint pains  Neurologic:   denies lightheadedness, headaches, numbness or tingling  Psychiatric:   denies anxiety, depression  Hematologic/Lymphatic/Immunologic:   denies easy bruising, easy bleeding, lumps or bumps noted  Endocrine:   Denies increased thirst      Physical Exam:  /80   Pulse 52   Temp 97.4  F (36.3  C) (Tympanic)   Resp 20   Ht 1.651 m (5' 5\")   Wt 63.3 kg (139 lb 8.8 oz)   SpO2 99%   BMI 23.22 kg/m      GENERAL APPEARANCE: Healthy, alert and in no acute distress.  HEENT: Normocephalic, Sclerae anicteric.   NECK:   No " asymmetry or masses, no thyromegaly.  LYMPHATICS: No palpable cervical, supraclavicular, axillary, or inguinal nodes   RESP: Lungs clear to auscultation bilaterally, respirations regular and easy  CARDIOVASCULAR: Regular rate and rhythm. Normal S1, S2; no murmur, gallop, or rub.  ABDOMEN: Soft, nontender. Bowel sounds auscultated all 4 quadrants. No palpable organomegaly or masses.  BREAST/Chest wall: no concerning lumps, masses or tenderness bilaterally  MUSCULOSKELETAL: Extremities without gross deformities noted. No edema of bilateral lower extremities.  NEURO: Alert and oriented x 3.  Gait steady.  PSYCHIATRIC: Mentation and affect appear normal.  Mood appropriate.    Laboratory:  Pending    Imaging Studies:   None completed for today's visit      ASSESSMENT/PLAN:    #1 Breast cancer:   Stage Ia, invasive lobular carcinoma of the right breast diagnosed August 2019.  She is currently on Arimidex 1 mg daily and will continue.  We will plan to follow-up again in 4 months with a CBC and CMP.     #2  On aromatase inhibitor therapy: DEXA scan completed in February 2020 was normal.  She will continue on the calcium with vitamin D twice a day and she will be due for a repeat DEXA scan in February 2022.     I encouraged patient to call with any questions or concerns.      Che Sesay NP  APRN, FNP-BC, AOCNP

## 2021-09-22 NOTE — NURSING NOTE
"Oncology Rooming Note    September 22, 2021 3:41 PM   Denise Taylor is a 66 year old female who presents for:    Chief Complaint   Patient presents with     Oncology Clinic Visit     Follow up History of breast cancer     Initial Vitals: /80   Pulse 52   Temp 97.4  F (36.3  C) (Tympanic)   Resp 20   Ht 1.651 m (5' 5\")   Wt 63.3 kg (139 lb 8.8 oz)   SpO2 99%   BMI 23.22 kg/m   Estimated body mass index is 23.22 kg/m  as calculated from the following:    Height as of this encounter: 1.651 m (5' 5\").    Weight as of this encounter: 63.3 kg (139 lb 8.8 oz). Body surface area is 1.7 meters squared.  No Pain (0) Comment: Data Unavailable   No LMP recorded. Patient is postmenopausal.  Allergies reviewed: Yes  Medications reviewed: Yes    Medications: Medication refills not needed today.  Pharmacy name entered into [facility]:    [id number] - [place], [place] - [address]  AT [place] [facility] [address]  [id number] - [place] - [address]          Leah Gómez LPN            "

## 2021-09-23 DIAGNOSIS — R74.01 ELEVATED ALT MEASUREMENT: Primary | ICD-10-CM

## 2021-09-23 DIAGNOSIS — R74.01 ELEVATED AST (SGOT): ICD-10-CM

## 2021-09-25 ENCOUNTER — HEALTH MAINTENANCE LETTER (OUTPATIENT)
Age: 66
End: 2021-09-25

## 2021-10-22 DIAGNOSIS — C50.911 MALIGNANT NEOPLASM OF RIGHT FEMALE BREAST, UNSPECIFIED ESTROGEN RECEPTOR STATUS, UNSPECIFIED SITE OF BREAST (H): ICD-10-CM

## 2021-10-22 RX ORDER — ANASTROZOLE 1 MG/1
TABLET ORAL
Qty: 90 TABLET | Refills: 3 | Status: SHIPPED | OUTPATIENT
Start: 2021-10-22 | End: 2022-09-06

## 2021-10-25 ENCOUNTER — LAB (OUTPATIENT)
Dept: LAB | Facility: OTHER | Age: 66
End: 2021-10-25
Payer: MEDICARE

## 2021-10-25 ENCOUNTER — MYC MEDICAL ADVICE (OUTPATIENT)
Dept: ONCOLOGY | Facility: OTHER | Age: 66
End: 2021-10-25

## 2021-10-25 DIAGNOSIS — R74.01 ELEVATED ALT MEASUREMENT: ICD-10-CM

## 2021-10-25 LAB
ALBUMIN SERPL-MCNC: 4 G/DL (ref 3.4–5)
ALP SERPL-CCNC: 74 U/L (ref 40–150)
ALT SERPL W P-5'-P-CCNC: 42 U/L (ref 0–50)
AST SERPL W P-5'-P-CCNC: 37 U/L (ref 0–45)
BILIRUB DIRECT SERPL-MCNC: 0.2 MG/DL (ref 0–0.2)
BILIRUB SERPL-MCNC: 0.5 MG/DL (ref 0.2–1.3)
PROT SERPL-MCNC: 8.2 G/DL (ref 6.8–8.8)

## 2021-10-25 PROCEDURE — 36415 COLL VENOUS BLD VENIPUNCTURE: CPT | Mod: ZL

## 2021-10-25 PROCEDURE — 80076 HEPATIC FUNCTION PANEL: CPT | Mod: ZL

## 2021-11-10 ENCOUNTER — IMMUNIZATION (OUTPATIENT)
Dept: FAMILY MEDICINE | Facility: OTHER | Age: 66
End: 2021-11-10
Attending: PHYSICIAN ASSISTANT
Payer: MEDICARE

## 2021-11-10 PROCEDURE — 91300 PR COVID VAC PFIZER DIL RECON 30 MCG/0.3 ML IM: CPT

## 2021-12-31 DIAGNOSIS — E03.9 ACQUIRED HYPOTHYROIDISM: ICD-10-CM

## 2022-01-03 RX ORDER — LEVOTHYROXINE SODIUM 88 UG/1
TABLET ORAL
Qty: 90 TABLET | Refills: 2 | Status: SHIPPED | OUTPATIENT
Start: 2022-01-03 | End: 2022-05-09

## 2022-01-05 ENCOUNTER — LAB (OUTPATIENT)
Dept: LAB | Facility: OTHER | Age: 67
End: 2022-01-05
Attending: NURSE PRACTITIONER
Payer: COMMERCIAL

## 2022-01-05 ENCOUNTER — ONCOLOGY VISIT (OUTPATIENT)
Dept: ONCOLOGY | Facility: OTHER | Age: 67
End: 2022-01-05
Attending: NURSE PRACTITIONER
Payer: MEDICARE

## 2022-01-05 VITALS
DIASTOLIC BLOOD PRESSURE: 64 MMHG | SYSTOLIC BLOOD PRESSURE: 100 MMHG | BODY MASS INDEX: 23.32 KG/M2 | OXYGEN SATURATION: 97 % | WEIGHT: 139.99 LBS | TEMPERATURE: 96.5 F | HEART RATE: 60 BPM | RESPIRATION RATE: 20 BRPM | HEIGHT: 65 IN

## 2022-01-05 DIAGNOSIS — Z85.3 PERSONAL HISTORY OF MALIGNANT NEOPLASM OF BREAST: Primary | ICD-10-CM

## 2022-01-05 DIAGNOSIS — Z85.3 PERSONAL HISTORY OF MALIGNANT NEOPLASM OF BREAST: ICD-10-CM

## 2022-01-05 DIAGNOSIS — Z79.811 USE OF AROMATASE INHIBITORS: ICD-10-CM

## 2022-01-05 DIAGNOSIS — R07.81 RIB PAIN ON LEFT SIDE: ICD-10-CM

## 2022-01-05 DIAGNOSIS — Z78.0 POSTMENOPAUSAL STATUS: ICD-10-CM

## 2022-01-05 LAB
ALBUMIN SERPL-MCNC: 4.2 G/DL (ref 3.4–5)
ALP SERPL-CCNC: 64 U/L (ref 40–150)
ALT SERPL W P-5'-P-CCNC: 37 U/L (ref 0–50)
ANION GAP SERPL CALCULATED.3IONS-SCNC: 5 MMOL/L (ref 3–14)
AST SERPL W P-5'-P-CCNC: 31 U/L (ref 0–45)
BASOPHILS # BLD AUTO: 0 10E3/UL (ref 0–0.2)
BASOPHILS NFR BLD AUTO: 1 %
BILIRUB SERPL-MCNC: 0.5 MG/DL (ref 0.2–1.3)
BUN SERPL-MCNC: 18 MG/DL (ref 7–30)
CALCIUM SERPL-MCNC: 9.7 MG/DL (ref 8.5–10.1)
CHLORIDE BLD-SCNC: 106 MMOL/L (ref 94–109)
CO2 SERPL-SCNC: 27 MMOL/L (ref 20–32)
CREAT SERPL-MCNC: 0.82 MG/DL (ref 0.52–1.04)
EOSINOPHIL # BLD AUTO: 0.1 10E3/UL (ref 0–0.7)
EOSINOPHIL NFR BLD AUTO: 1 %
ERYTHROCYTE [DISTWIDTH] IN BLOOD BY AUTOMATED COUNT: 12.6 % (ref 10–15)
GFR SERPL CREATININE-BSD FRML MDRD: 78 ML/MIN/1.73M2
GLUCOSE BLD-MCNC: 85 MG/DL (ref 70–99)
HCT VFR BLD AUTO: 42 % (ref 35–47)
HGB BLD-MCNC: 14.2 G/DL (ref 11.7–15.7)
IMM GRANULOCYTES # BLD: 0 10E3/UL
IMM GRANULOCYTES NFR BLD: 0 %
LYMPHOCYTES # BLD AUTO: 2 10E3/UL (ref 0.8–5.3)
LYMPHOCYTES NFR BLD AUTO: 24 %
MCH RBC QN AUTO: 30 PG (ref 26.5–33)
MCHC RBC AUTO-ENTMCNC: 33.8 G/DL (ref 31.5–36.5)
MCV RBC AUTO: 89 FL (ref 78–100)
MONOCYTES # BLD AUTO: 0.6 10E3/UL (ref 0–1.3)
MONOCYTES NFR BLD AUTO: 8 %
NEUTROPHILS # BLD AUTO: 5.4 10E3/UL (ref 1.6–8.3)
NEUTROPHILS NFR BLD AUTO: 66 %
NRBC # BLD AUTO: 0 10E3/UL
NRBC BLD AUTO-RTO: 0 /100
PLATELET # BLD AUTO: 210 10E3/UL (ref 150–450)
POTASSIUM BLD-SCNC: 4.1 MMOL/L (ref 3.4–5.3)
PROT SERPL-MCNC: 7.6 G/DL (ref 6.8–8.8)
RBC # BLD AUTO: 4.73 10E6/UL (ref 3.8–5.2)
SODIUM SERPL-SCNC: 138 MMOL/L (ref 133–144)
WBC # BLD AUTO: 8.2 10E3/UL (ref 4–11)

## 2022-01-05 PROCEDURE — 85025 COMPLETE CBC W/AUTO DIFF WBC: CPT | Mod: ZL

## 2022-01-05 PROCEDURE — 80053 COMPREHEN METABOLIC PANEL: CPT | Mod: ZL

## 2022-01-05 PROCEDURE — G0463 HOSPITAL OUTPT CLINIC VISIT: HCPCS

## 2022-01-05 PROCEDURE — 36415 COLL VENOUS BLD VENIPUNCTURE: CPT | Mod: ZL

## 2022-01-05 PROCEDURE — 99214 OFFICE O/P EST MOD 30 MIN: CPT | Performed by: NURSE PRACTITIONER

## 2022-01-05 ASSESSMENT — PAIN SCALES - GENERAL: PAINLEVEL: MODERATE PAIN (4)

## 2022-01-05 ASSESSMENT — MIFFLIN-ST. JEOR: SCORE: 1175.88

## 2022-01-05 NOTE — PROGRESS NOTES
Oncology Follow-up Visit:  January 5, 2022    Reason for Visit:  Patient presents with:  Oncology Clinic Visit: Follow up Personal history of malignant neoplasm of breast     Nursing Note and documentation reviewed: yes    HPI:   This is a 66-year-old female patient who presents to the oncology clinic today in follow-up of Stage Ia, invasive lobular carcinoma of the right breast diagnosed July 2019.  She has undergone bilateral mastectomies and she was placed on Arimidex in December 2019.     She presents to the clinic today stating she is doing well.  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  She will be seeing infectious disease for further recommendations and management.  She denies any issues with shortness of breath or coughing.    She feels she is tolerating the anastrozole fairly well and is not experiencing any hot flashes.  She does complain of some joint discomfort to her hands that has somewhat increased since starting the medication.  She does complain of some tenderness to the lower left rib area for the past week and is unsure if she injured it.  She states the pain is not constant but is more when she moves a certain way.    She was seen by dermatology  recently with a few areas removed with one being precancerous to the left ear.    Oncologic History:     7/24/2019 patient was seen by her PCP with concerns regarding a right breast mass  7/25/2019 patient underwent right diagnostic mammogram showing no abnormality      **She underwent ultrasound of the right breast with no discrete masses noted and recommendation for follow-up in 6 months  7/26/2019 patient was evaluated by Dr. Dupree with General Surgery with recommendation for biopsy   7/29/2019 Ultrasound guided biopsy showed invasive lobular carcinoma, grade 2, ER/MA positive and HER-2/jenn negative  8/12/2019  She underwent MRI of the both breasts with findings showing a small  area of enhancement in the upper outer quadrant of the right breast and the left breast was unremarkable     **Ultrasound of the right axilla was negative  8/23/2019  She went underwent lumpectomy with sentinel node procedure with pathology showing a 4.5 cm tumor that was consistent with invasive lobular carcinoma of the right breast, grade 2, ER/NE positive and HER-2/jenn negative; sentinel node was negative; she was staged pathologic T2N0.  Anterior and medial margin was positive on pathology.  9/19/2019  Patient was seen by Dr. Lulú Franz as well is Dr. Dale Paul with Plastic surgery to discuss reconstruction   10/9/2019 she underwent bilateral mastectomy with immediate construction and pathology showed no evidence of malignancy  11/5/2019  Patient was evaluated by Dr. Noyola  with Medical Oncology with Oncotype DX recurrence score of 12 consistent with low risk of breast cancer and recommendation for aromatase inhibitor therapy; PET and MRI brain were recommended  Patient was seen again by Dr. hair aviles with medical oncology with Oncotype DX 11/20/2019  She underwent PET scan:  IMPRESSION:   1.  Numerous new FDG avid groundglass and solid nodules throughout  both lungs concerning for recurrent disease. Additionally, there is  worsening atelectasis seen in both lungs. Infectious and noninfectious  granulomatous disease may have a similar appearance.  12/3/2019 she was seen again by Dr. Noyola with Medical Oncology to review PET scan results.  MRI was not obtained related to tissue expanders in place.  He recommended initiation of aromatase inhibitor with Arimidex 1 mg p.o. daily.  2/5/2020  Repeat PET scan:  IMPRESSION:   1.  No evidence of active disease. Nodules and areas of groundglass  opacification seen previously within the lungs have resolved.   2.  Bilateral breast implant revision. Leaking left breast implant is  first seen on the CT scan of the chest from 11/14/2019.   2/6/2020  She  underwent MRI of the brain evidence of metastatic disease     Current Chemo Regime/TX: Arimidex 1 mg daily initiated 12/2019  Current Cycle:  n/a  # of completed cycles:  n/a     Previous treatment:  n/a     Past Medical History:   Diagnosis Date     Basal cell carcinoma      Breast cancer (H) 07/2019    right breast     Bronchiectasis (H) 2019     Dyslipidemia      BERAT (generalized anxiety disorder)      Invasive lobular carcinoma of right breast in female (H) 8/28/2019 5.12.2020- Review and distribute treatment summary- [facility]     Malignant neoplasm of upper-outer quadrant of right breast in female, estrogen receptor positive (H) 8/22/2019     Recurrent pneumonia 8/22/2019     Squamous cell carcinoma of skin 05/2020    right lower leg     Thyroid disease        Past Surgical History:   Procedure Laterality Date     ARTHROSCOPY SHOULDER ROTATOR CUFF REPAIR Left 03/07/2018    Procedure: ARTHROSCOPY SHOULDER ROTATOR CUFF REPAIR;  LEFT SHOULDER ARTHROSCOPY, REPAIR ROTATOR CUFF: subacromial Decompression and AC Joint Resection;  Surgeon: Baldev Lou DO;  Location: [facility] OR     ARTHROTOMY SHOULDER, ROTATOR CUFF REPAIR, COMBINED Right 11/14/2018    Procedure: RIGHT SHOULDER DEBRIDEMENT, EXCISION OF LIPOMA  RIGHT UPPER ARM, EXCISION SCAR TISSUE AC JOINT;  Surgeon: Baldev Lou DO;  Location: [facility] OR     BIOPSY BREAST NEEDLE LOCALIZATION, BIOPSY NODE SENTINEL, COMBINED Right 08/23/2019    Procedure: RIGHT WIRE LOCALIZED LUMPECTOMY WITH SENTINEL  LYMP NODE;  Surgeon: Michael Dupree MD;  Location: [facility] OR     BREAST RECONSTRUCTION WITH DEEP INFERIOR EPIGASTRIC  (AILYN) FLAP,  07/2020    done in [place]     COLONOSCOPY  Michael Lara     COLONOSCOPY N/A 10/10/2016    Procedure: COLONOSCOPY;  Surgeon: Christine Cintron MD;  Location: [facility] OR     INSERT TISSUE EXPANDER BREAST BILATERAL Bilateral 10/09/2019    Procedure: BILATERAL TISSUE EXPANDERS (RAMIREZ);  Surgeon: Dale Paul MD;   Location: [facility]     MASTECTOMY, NIPPLE SPARING Bilateral 10/09/2019    Procedure: BILATERAL SKIN SPARRING MASTECTOMY (CASS);  Surgeon: Opal Rubin MD;  Location: [facility] OR     ORTHOPEDIC SURGERY Right     feet neuromas removed     right ankle ORIF  06/2020    Dr Torres     SHOULDER SURGERY Right 2011/10/2012       Family History   Problem Relation Age of Onset     Myocardial Infarction Mother      Hypertension Mother      Coronary Artery Disease Mother      Hyperlipidemia Mother      Thyroid Disease Mother      Stomach Cancer Father      Coronary Artery Disease Father      Hypertension Father      Colon Cancer Father      Diabetes Paternal Grandmother      Heart Disease Maternal Grandmother      Heart Disease Maternal Grandfather      Diabetes Paternal Grandfather      Cerebrovascular Disease No family hx of      Breast Cancer No family hx of      Prostate Cancer No family hx of      Anesthesia Reaction No family hx of      Asthma No family hx of      Genetic Disorder No family hx of        Social History     Socioeconomic History     Marital status:      Spouse name: Not on file     Number of children: 2     Years of education: 16     Highest education level: Not on file   Occupational History     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 Not Asked   Social History Narrative     -- n/a    Caffeine -- 2c/day    Concussion -- n/a     Social Determinants of Health     Financial Resource Strain: Not on file   Food Insecurity: Not on file   Transportation Needs: Not on file   Physical Activity: Not on file   Stress: Not on file   Social  "Connections: Not on file   Intimate Partner Violence: Not At Risk     Fear of Current or Ex-Partner: No     Emotionally Abused: No     Physically Abused: No     Sexually Abused: No   Housing Stability: Not on file       Current Outpatient Medications   Medication     anastrozole (ARIMIDEX) 1 MG tablet     calcium carbonate-vitamin D (OS-STEVE) 600-400 MG-UNIT chewable tablet     FLUoxetine (PROZAC) 10 MG capsule     fluticasone (ARNUITY ELLIPTA) 200 MCG/ACT inhaler     levocetirizine (XYZAL) 5 MG tablet     levothyroxine (SYNTHROID/LEVOTHROID) 88 MCG tablet     rosuvastatin (CRESTOR) 10 MG tablet     eletriptan (RELPAX) 40 MG tablet     No current facility-administered medications for this visit.        Allergies   Allergen Reactions     Pcn [Penicillins] Rash     Zithromax [Azithromycin] Rash       Review Of Systems:  Constitutional:    denies fever, weight changes, chills, and night sweats.  Eyes:    denies blurred or double vision  Ears/Nose/Throat:   denies ear pain, nose problems, difficulty swallowing  Respiratory:   denies shortness of breath, cough   Skin:   denies rash, lesions  Breast/Chest wall:   denies pain, lumps  Cardiovascular:   denies chest pain, palpitations, edema  Gastrointestinal:   denies abdominal pain, bloating, nausea, vomiting, early satiety; no change in bowel habits or blood in stool  Genitourinary:   denies difficulty with urination, blood in urine  Musculoskeletal:  See HPI  Neurologic:   denies lightheadedness, headaches, numbness or tingling  Psychiatric:   denies anxiety, depression  Hematologic/Lymphatic/Immunologic:   denies easy bruising, easy bleeding, lumps or bumps noted  Endocrine:   Denies increased thirst      Physical Exam:  /64   Pulse 60   Temp (!) 96.5  F (35.8  C) (Tympanic)   Resp 20   Ht 1.651 m (5' 5\")   Wt 63.5 kg (139 lb 15.9 oz)   SpO2 97%   BMI 23.30 kg/m      GENERAL APPEARANCE: Healthy, alert and in no acute distress.  HEENT: Normocephalic, Sclerae " anicteric. No oral lesions or thrush.  NECK:   No asymmetry or masses, no thyromegaly.  LYMPHATICS: No palpable cervical, supraclavicular, axillary, or inguinal nodes   RESP: Lungs clear to auscultation bilaterally, respirations regular and easy  CARDIOVASCULAR: Regular rate and rhythm. Normal S1, S2; no murmur, gallop, or rub.  ABDOMEN: Soft, nontender. Bowel sounds auscultated all 4 quadrants. No palpable organomegaly or masses.  BREAST/Chest wall:  No concerning masses or skin changes  MUSCULOSKELETAL: Extremities without gross deformities noted. No edema of bilateral lower extremities.  NEURO: Alert and oriented x 3.  Gait steady.  PSYCHIATRIC: Mentation and affect appear normal.  Mood appropriate.    Laboratory:  Results for orders placed or performed in visit on 01/05/22   Comprehensive metabolic panel     Status: Normal   Result Value Ref Range    Sodium 138 133 - 144 mmol/L    Potassium 4.1 3.4 - 5.3 mmol/L    Chloride 106 94 - 109 mmol/L    Carbon Dioxide (CO2) 27 20 - 32 mmol/L    Anion Gap 5 3 - 14 mmol/L    Urea Nitrogen 18 7 - 30 mg/dL    Creatinine 0.82 0.52 - 1.04 mg/dL    Calcium 9.7 8.5 - 10.1 mg/dL    Glucose 85 70 - 99 mg/dL    Alkaline Phosphatase 64 40 - 150 U/L    AST 31 0 - 45 U/L    ALT 37 0 - 50 U/L    Protein Total 7.6 6.8 - 8.8 g/dL    Albumin 4.2 3.4 - 5.0 g/dL    Bilirubin Total 0.5 0.2 - 1.3 mg/dL    GFR Estimate 78 >60 mL/min/1.73m2   CBC with platelets and differential     Status: None   Result Value Ref Range    WBC Count 8.2 4.0 - 11.0 10e3/uL    RBC Count 4.73 3.80 - 5.20 10e6/uL    Hemoglobin 14.2 11.7 - 15.7 g/dL    Hematocrit 42.0 35.0 - 47.0 %    MCV 89 78 - 100 fL    MCH 30.0 26.5 - 33.0 pg    MCHC 33.8 31.5 - 36.5 g/dL    RDW 12.6 10.0 - 15.0 %    Platelet Count 210 150 - 450 10e3/uL    % Neutrophils 66 %    % Lymphocytes 24 %    % Monocytes 8 %    % Eosinophils 1 %    % Basophils 1 %    % Immature Granulocytes 0 %    NRBCs per 100 WBC 0 <1 /100    Absolute Neutrophils 5.4  1.6 - 8.3 10e3/uL    Absolute Lymphocytes 2.0 0.8 - 5.3 10e3/uL    Absolute Monocytes 0.6 0.0 - 1.3 10e3/uL    Absolute Eosinophils 0.1 0.0 - 0.7 10e3/uL    Absolute Basophils 0.0 0.0 - 0.2 10e3/uL    Absolute Immature Granulocytes 0.0 <=0.4 10e3/uL    Absolute NRBCs 0.0 10e3/uL   CBC with Platelets & Differential     Status: None    Narrative    The following orders were created for panel order CBC with Platelets & Differential.  Procedure                               Abnormality         Status                     ---------                               -----------         ------                     CBC with platelets and d...[[id number]]                      Final result                 Please view results for these tests on the individual orders.       Imaging Studies: None completed for today's visit      ASSESSMENT/PLAN:    #1 Breast cancer:   Stage Ia, invasive lobular carcinoma of the right breast diagnosed August 2019.  She is currently on Arimidex 1 mg daily and will continue.  We will plan to follow-up again in 6 months with a CBC and CMP.     #2  On aromatase inhibitor therapy: DEXA scan completed in February 2020 was normal.  She will continue on the calcium with vitamin D twice a day and will repeat the DEXA scan in February.  Discussed weightbearing exercises.    #3 rib pain: We will follow up with the patient in about a week and if she continues with the discomfort we will need to obtain imaging.    I encouraged patient to call with any questions or concerns.    30 minutes spent in the patient's encounter today with time spent in review of the chart along with in chart preparation.  Time was also spent obtaining a review of systems and performing a physical exam along with review of his lab work with the patient.  Time was also spent in discussion of the need for weightbearing exercises.  Time was also spent in planning her next follow-up, placing future orders and in chart documentation.    Che  AMBROSE Sesay, NP  APRN, FNP-BC, AOCNP

## 2022-01-05 NOTE — PATIENT INSTRUCTIONS
We will schedule you for DEXA scan in February.  We will call you with that result.    We would like to see you back in 6 months. Please come 30 minutes prior for lab work.    When you are in need of a refill of your anastrozole, please call your pharmacy and they will send us a request.     If you have any questions please call [phone]    Other instructions:  none

## 2022-01-14 ENCOUNTER — TELEPHONE (OUTPATIENT)
Dept: ONCOLOGY | Facility: OTHER | Age: 67
End: 2022-01-14
Payer: COMMERCIAL

## 2022-01-14 NOTE — TELEPHONE ENCOUNTER
Please call and see if patient's rib pain is any better.  She has pain to the left lower rib when I saw her on 1/5.  If better no further studies but if not improved, she needs a rib xray.

## 2022-01-15 ENCOUNTER — HEALTH MAINTENANCE LETTER (OUTPATIENT)
Age: 67
End: 2022-01-15

## 2022-02-02 ASSESSMENT — ACTIVITIES OF DAILY LIVING (ADL): CURRENT_FUNCTION: NO ASSISTANCE NEEDED

## 2022-02-03 ENCOUNTER — OFFICE VISIT (OUTPATIENT)
Dept: FAMILY MEDICINE | Facility: OTHER | Age: 67
End: 2022-02-03
Attending: PHYSICIAN ASSISTANT
Payer: COMMERCIAL

## 2022-02-03 VITALS
RESPIRATION RATE: 18 BRPM | TEMPERATURE: 97.6 F | OXYGEN SATURATION: 99 % | SYSTOLIC BLOOD PRESSURE: 130 MMHG | WEIGHT: 137 LBS | BODY MASS INDEX: 24.27 KG/M2 | DIASTOLIC BLOOD PRESSURE: 88 MMHG | HEART RATE: 57 BPM | HEIGHT: 63 IN

## 2022-02-03 DIAGNOSIS — E03.9 HYPOTHYROIDISM, UNSPECIFIED TYPE: ICD-10-CM

## 2022-02-03 DIAGNOSIS — Z00.00 ENCOUNTER FOR MEDICARE ANNUAL WELLNESS EXAM: Primary | ICD-10-CM

## 2022-02-03 DIAGNOSIS — E78.5 HYPERLIPIDEMIA WITH TARGET LDL LESS THAN 130: ICD-10-CM

## 2022-02-03 DIAGNOSIS — Z23 NEED FOR VACCINATION: ICD-10-CM

## 2022-02-03 PROBLEM — R91.1 LUNG NODULE: Status: ACTIVE | Noted: 2021-12-01

## 2022-02-03 PROCEDURE — G0438 PPPS, INITIAL VISIT: HCPCS | Performed by: PHYSICIAN ASSISTANT

## 2022-02-03 PROCEDURE — G0463 HOSPITAL OUTPT CLINIC VISIT: HCPCS

## 2022-02-03 PROCEDURE — 90750 HZV VACC RECOMBINANT IM: CPT | Mod: GY

## 2022-02-03 ASSESSMENT — PAIN SCALES - GENERAL: PAINLEVEL: NO PAIN (0)

## 2022-02-03 ASSESSMENT — ANXIETY QUESTIONNAIRES
2. NOT BEING ABLE TO STOP OR CONTROL WORRYING: NOT AT ALL
7. FEELING AFRAID AS IF SOMETHING AWFUL MIGHT HAPPEN: NOT AT ALL
6. BECOMING EASILY ANNOYED OR IRRITABLE: NOT AT ALL
GAD7 TOTAL SCORE: 0
1. FEELING NERVOUS, ANXIOUS, OR ON EDGE: NOT AT ALL
5. BEING SO RESTLESS THAT IT IS HARD TO SIT STILL: NOT AT ALL
4. TROUBLE RELAXING: NOT AT ALL
3. WORRYING TOO MUCH ABOUT DIFFERENT THINGS: NOT AT ALL

## 2022-02-03 ASSESSMENT — MIFFLIN-ST. JEOR: SCORE: 1126.59

## 2022-02-03 ASSESSMENT — ACTIVITIES OF DAILY LIVING (ADL): CURRENT_FUNCTION: NO ASSISTANCE NEEDED

## 2022-02-03 ASSESSMENT — PATIENT HEALTH QUESTIONNAIRE - PHQ9: SUM OF ALL RESPONSES TO PHQ QUESTIONS 1-9: 0

## 2022-02-03 NOTE — NURSING NOTE
"Chief Complaint   Patient presents with     Wellness Visit       Initial /88   Pulse 57   Temp 97.6  F (36.4  C) (Tympanic)   Resp 18   Ht 1.594 m (5' 2.75\")   Wt 62.1 kg (137 lb)   SpO2 99%   BMI 24.46 kg/m   Estimated body mass index is 24.46 kg/m  as calculated from the following:    Height as of this encounter: 1.594 m (5' 2.75\").    Weight as of this encounter: 62.1 kg (137 lb).  Medication Reconciliation: complete  Randa Villasenor LPN    "

## 2022-02-03 NOTE — PROGRESS NOTES
"SUBJECTIVE:   Denise Taylor is a 66 year old female who presents for Preventive Visit.    Patient has been advised of split billing requirements and indicates understanding: Yes  Are you in the first 12 months of your Medicare coverage?  No    Healthy Habits:     In general, how would you rate your overall health?  Good    Frequency of exercise:  1 day/week    Duration of exercise:  Less than 15 minutes    Do you usually eat at least 4 servings of fruit and vegetables a day, include whole grains    & fiber and avoid regularly eating high fat or \"junk\" foods?  No    Taking medications regularly:  Yes    Barriers to taking medications:  None    Medication side effects:  Muscle aches    Ability to successfully perform activities of daily living:  No assistance needed    Home Safety:  No safety concerns identified    Hearing Impairment:  No hearing concerns    In the past 6 months, have you been bothered by leaking of urine?  No    In general, how would you rate your overall mental or emotional health?  Good      PHQ-2 Total Score: 0    Additional concerns today:  No    Do you feel safe in your environment? Yes    Have you ever done Advance Care Planning? (For example, a Health Directive, POLST, or a discussion with a medical provider or your loved ones about your wishes): Yes, advance care planning is on file.    Fall risk  Fallen 2 or more times in the past year?: No  Any fall with injury in the past year?: No    Cognitive Screening   1) Repeat 3 items (Leader, Season, Table)    2) Clock draw: NORMAL  3) 3 item recall: Recalls 3 objects  Results: perfect    Do you have sleep apnea, excessive snoring or daytime drowsiness?: no    Reviewed and updated as needed this visit by clinical staff  Tobacco  Allergies              Reviewed and updated as needed this visit by Provider               Social History     Tobacco Use     Smoking status: Never Smoker     Smokeless tobacco: Never Used   Substance Use Topics     " Alcohol use: Yes     Alcohol/week: 1.7 standard drinks     Types: 2 Glasses of wine per week     Comment: occasionally     If you drink alcohol do you typically have >3 drinks per day or >7 drinks per week? No    Alcohol Use 2/3/2022   Prescreen: >3 drinks/day or >7 drinks/week? -   Prescreen: >3 drinks/day or >7 drinks/week? No       Current providers sharing in care for this patient include:   Patient Care Team:  Mirna Roblero PA as PCP - General (Family Practice)  Mirna Roblero PA as Assigned PCP  Che Sesay NP as Assigned Cancer Care Provider  Lizzie Murdock RN (Inactive) as Specialty Care Coordinator (Hematology & Oncology)    The following health maintenance items are reviewed in Epic and correct as of today:  Health Maintenance Due   Topic Date Due     ZOSTER IMMUNIZATION (2 of 2) 02/05/2020     BERTA ASSESSMENT  04/09/2021     TSH W/FREE T4 REFLEX  06/01/2021     LIPID  10/09/2021     Lab work is in process  Labs reviewed in EPIC    Any new diagnosis of family breast, ovarian, or bowel cancer? No hx changes in chart per patient.     FHS-7: No flowsheet data found.     Mammogram Screening - Alternate mammogram schedule due to breast cancer history  Pertinent mammograms are reviewed under the imaging tab.    Review of Systems  CONSTITUTIONAL: NEGATIVE for fever, chills, change in weight  INTEGUMENTARY/SKIN: NEGATIVE for worrisome rashes, moles or lesions  EYES: NEGATIVE for vision changes or irritation  ENT/MOUTH: NEGATIVE for ear, mouth and throat problems  RESP: seeing ID for pulmonary infiltrate    BREAST: hx of breast cancer, follows oncology   CV: NEGATIVE for chest pain, palpitations or peripheral edema  GI: NEGATIVE for nausea, abdominal pain, heartburn, or change in bowel habits  : NEGATIVE for frequency, dysuria, or hematuria  MUSCULOSKELETAL: NEGATIVE for significant arthralgias or myalgia  NEURO: NEGATIVE for weakness, dizziness or paresthesias  ENDOCRINE: NEGATIVE for  "temperature intolerance, skin/hair changes  HEME: NEGATIVE for bleeding problems  PSYCHIATRIC: NEGATIVE for changes in mood or affect    OBJECTIVE:   /88   Pulse 57   Temp 97.6  F (36.4  C) (Tympanic)   Resp 18   Ht 1.594 m (5' 2.75\")   Wt 62.1 kg (137 lb)   SpO2 99%   BMI 24.46 kg/m   Estimated body mass index is 24.46 kg/m  as calculated from the following:    Height as of this encounter: 1.594 m (5' 2.75\").    Weight as of this encounter: 62.1 kg (137 lb).  Physical Exam  GENERAL: healthy, alert and no distress  EYES: Eyes grossly normal to inspection, PERRL and conjunctivae and sclerae normal  RESP: lungs clear to auscultation - no rales, rhonchi or wheezes  CV: regular rate and rhythm, normal S1 S2, no S3 or S4, no murmur, click or rub, no peripheral edema and peripheral pulses strong  ABDOMEN: soft, nontender, no hepatosplenomegaly, no masses and bowel sounds normal  MS: no gross musculoskeletal defects noted, no edema  SKIN: no suspicious lesions or rashes  NEURO: Normal strength and tone, mentation intact and speech normal  PSYCH: mentation appears normal, affect normal/bright    Diagnostic Test Results:  Labs reviewed in Epic    ASSESSMENT / PLAN:   (Z00.00) Encounter for Medicare annual wellness exam  (primary encounter diagnosis)  Plan: Denise is a very pleasant 66 year old female. She is following oncology and infectious disease. Screening up to date besides labs which will be obtained while fasting tomorrow.     (E03.9) Hypothyroidism, unspecified type  Plan: No new complaints of symptoms. We will recheck her TSH and intervene accordingly.   - TSH with free T4 reflex    (E78.5) Hyperlipidemia with target LDL less than 130  Plan: She is due for cholesterol screening. Currently on Crestor 10 mg with good tolerance. We will check her labs tomorrow when she is fasting and intervene accordingly.   - Lipid Profile (Chol, Trig, HDL, LDL calc)    (Z23) Need for vaccination  Plan: She would like her " "shingles vaccine today.   - SHINGRIX [[id number]]      Patient has been advised of split billing requirements and indicates understanding: Yes    COUNSELING:  Reviewed preventive health counseling, as reflected in patient instructions       Vision screening       Immunizations    Vaccinated for: Zoster         Osteoporosis prevention/bone health    Estimated body mass index is 24.46 kg/m  as calculated from the following:    Height as of this encounter: 1.594 m (5' 2.75\").    Weight as of this encounter: 62.1 kg (137 lb).    She reports that she has never smoked. She has never used smokeless tobacco.      Appropriate preventive services were discussed with this patient, including applicable screening as appropriate for cardiovascular disease, diabetes, osteopenia/osteoporosis, and glaucoma.  As appropriate for age/gender, discussed screening for colorectal cancer, prostate cancer, breast cancer, and cervical cancer. Checklist reviewing preventive services available has been given to the patient.    Reviewed patients plan of care and provided an AVS. The Intermediate Care Plan ( asthma action plan, low back pain action plan, and migraine action plan) for Denise meets the Care Plan requirement. This Care Plan has been established and reviewed with the Patient.      DASH [employer]    DASH Vital  [facility]  I saw and examined this patient.  I have read through the note and made appropriate changes and agree with the  assessment and plan.   "

## 2022-02-03 NOTE — PATIENT INSTRUCTIONS
Thank you for choosing [facility].   I have office hours 8:00 am to 4:30 pm on Monday's, Wednesday's, Thursday's and Friday's. My nurse and I are out of the office every Tuesday.    Following your visit, when your labs and diagnostic testing have returned, I will review then and you will be contacted by my nurse.  If you are on My Chart, you can also view results there.    For refills, notify your pharmacy regarding what you need and the pharmacy will generate a refill request. Do not call my nurse as she is unable to process refill request. Please plan ahead and allow 3-5 days for refill requests.    You will generally receive a reminder call the day prior to your appointment.  If you cannot attend your appointment, please cancel your appointment with as much notice as possible.  If there is a pattern of failure to present for your appointments, I cannot provide consistent, meaningful, ongoing care for you. It is very important to me that you come in for your care, so we can best assist you with your health care needs.    IMPORTANT:  Please note that it is my standard of practice to NOT participate in prescribing ongoing requested Narcotic Analgesic therapy, and/or participate in the prescribing of other controlled substances.  My nurse and I am happy to assist you with the process of referral for alternative pain management as needed, and other treatment modalities including but not limited to:  Physical Therapy, Physical Medicine and Rehab, Counseling, Chiropractic Care, Orthopedic Care, and non-narcotic medication management.     In the event that you need to be seen for emergent concerns and I am out of office,  please see one of my colleagues for acute concerns.  You may also present to  or ER.  I appreciate the opportunity to serve you and look forward to supporting your healthcare needs in the future. Please contact me with any questions or concerns that you may  have.    Sincerely,      Mirna Roblero RN, PA-C       Patient Education   Personalized Prevention Plan  You are due for the preventive services outlined below.  Your care team is available to assist you in scheduling these services.  If you have already completed any of these items, please share that information with your care team to update in your medical record.  Health Maintenance Due   Topic Date Due     Zoster (Shingles) Vaccine (2 of 2) 02/05/2020     Anxiety Assessment  04/09/2021     Thyroid Function Lab  06/01/2021     Cholesterol Lab  10/09/2021       Exercise for a Healthier Heart  You may wonder how you can improve the health of your heart. If you re thinking about exercise, you re on the right track. You don t need to become an athlete. But you do need a certain amount of brisk exercise to help strengthen your heart. If you have been diagnosed with a heart condition, your healthcare provider may advise exercise to help stabilize your condition. To help make exercise a habit, choose safe, fun activities.      Exercise with a friend. When activity is fun, you're more likely to stick with it.   Before you start  Check with your healthcare provider before starting an exercise program. This is especially important if you have not been active for a while. It's also important if you have a long-term (chronic) health problem such as heart disease, diabetes, or obesity. Or if you are at high risk for having these problems.   Why exercise?  Exercising regularly offers many healthy rewards. It can help you do all of the following:     Improve your blood cholesterol level to help prevent further heart trouble    Lower your blood pressure to help prevent a stroke or heart attack    Control diabetes, or reduce your risk of getting this disease    Improve your heart and lung function    Reach and stay at a healthy weight    Make your muscles stronger so you can stay active    Prevent falls and fractures by slowing  the loss of bone mass (osteoporosis)    Manage stress better    Reduce your blood pressure    Improve your sense of self and your body image  Exercise tips      Ease into your routine. Set small goals. Then build on them. If you are not sure what your activity level should be, talk with your healthcare provider first before starting an exercise routine.    Exercise on most days. Aim for a total of 150 minutes (2 hours and 30 minutes) or more of moderate-intensity aerobic activity each week. Or 75 minutes (1 hour and 15 minutes) or more of vigorous-intensity aerobic activity each week. Or try for a combination of both. Moderate activity means that you breathe heavier and your heart rate increases but you can still talk. Think about doing 40 minutes of moderate exercise, 3 to 4 times a week. For best results, activity should last for about 40 minutes to lower blood pressure and cholesterol. It's OK to work up to the 40-minute period over time. Examples of moderate-intensity activity are walking 1 mile in 15 minutes. Or doing 30 to 45 minutes of yard work.    Step up your daily activity level.  Along with your exercise program, try being more active the whole day. Walk instead of drive. Or park further away so that you take more steps each day. Do more household tasks or yard work. You may not be able to meet the advised mount of physical activity. But doing some moderate- or vigorous-intensity aerobic activity can help reduce your risk for heart disease. Your healthcare provider can help you figure out what is best for you.    Choose 1 or more activities you enjoy.  Walking is one of the easiest things you can do. You can also try swimming, riding a bike, dancing, or taking an exercise class.    When to call your healthcare provider  Call your healthcare provider if you have any of these:     Chest pain or feel dizzy or lightheaded    Burning, tightness, pressure, or heaviness in your chest, neck, shoulders, back, or  arms    Abnormal shortness of breath    More joint or muscle pain    A very fast or irregular heartbeat (palpitations)  [employer] last reviewed this educational content on 7/1/2019 2000-2021 [employer]. All rights reserved. This information is not intended as a substitute for professional medical care. Always follow your healthcare professional's instructions.          Understanding USDA MyPlate  The USDA has guidelines to help you make healthy food choices. These are called MyPlate. MyPlate shows the food groups that make up healthy meals using the image of a place setting. Before you eat, think about the healthiest choices for what to put on your plate or in your cup or bowl. To learn more about building a healthy plate, visit www.choosemyplate.gov.    The food groups    Fruits. Any fruit or 100% fruit juice counts as part of the Fruit Group. Fruits may be fresh, canned, frozen, or dried, and may be whole, cut-up, or pureed. Make 1/2 of your plate fruits and vegetables.    Vegetables. Any vegetable or 100% vegetable juice counts as a member of the Vegetable Group. Vegetables may be fresh, frozen, canned, or dried. They can be served raw or cooked and may be whole, cut-up, or mashed. Make 1/2 of your plate fruits and vegetables.    Grains. All foods made from grains are part of the Grains Group. These include wheat, rice, oats, cornmeal, and barley. Grains are often used to make foods such as bread, pasta, oatmeal, cereal, tortillas, and grits. Grains should be no more than 1/4 of your plate. At least half of your grains should be whole grains.    Protein. This group includes meat, poultry, seafood, beans and peas, eggs, processed soy products (such as tofu), nuts (including nut butters), and seeds. Make protein choices no more than 1/4 of your plate. Meat and poultry choices should be lean or low fat.    Dairy. The Dairy Group includes all fluid milk products and foods made from milk that contain  calcium, such as yogurt and cheese. (Foods that have little calcium, such as cream, butter, and cream cheese, are not part of this group.) Most dairy choices should be low-fat or fat-free.    Oils. Oils aren't a food group, but they do contain essential nutrients. However it's important to watch your intake of oils. These are fats that are liquid at room temperature. They include canola, corn, olive, soybean, vegetable, and sunflower oil. Foods that are mainly oil include mayonnaise, certain salad dressings, and soft margarines. You likely already get your daily oil allowance from the foods you eat.  Things to limit  Eating healthy also means limiting these things in your diet:       Salt (sodium). Many processed foods have a lot of sodium. To keep sodium intake down, eat fresh vegetables, meats, poultry, and seafood when possible. Purchase low-sodium, reduced-sodium, or no-salt-added food products at the store. And don't add salt to your meals at home. Instead, season them with herbs and spices such as dill, oregano, cumin, and paprika. Or try adding flavor with lemon or lime zest and juice.    Saturated fat. Saturated fats are most often found in animal products such as beef, pork, and chicken. They are often solid at room temperature, such as butter. To reduce your saturated fat intake, choose leaner cuts of meat and poultry. And try healthier cooking methods such as grilling, broiling, roasting, or baking. For a simple lower-fat swap, use plain nonfat yogurt instead of mayonnaise when making potato salad or macaroni salad.    Added sugars. These are sugars added to foods. They are in foods such as ice cream, candy, soda, fruit drinks, sports drinks, energy drinks, cookies, pastries, jams, and syrups. Cut down on added sugars by sharing sweet treats with a family member or friend. You can also choose fruit for dessert, and drink water or other unsweetened beverages.     [employer] last reviewed this educational  content on 6/1/2020 2000-2021 [employer]. All rights reserved. This information is not intended as a substitute for professional medical care. Always follow your healthcare professional's instructions.

## 2022-02-04 ENCOUNTER — LAB (OUTPATIENT)
Dept: LAB | Facility: OTHER | Age: 67
End: 2022-02-04
Payer: MEDICARE

## 2022-02-04 DIAGNOSIS — E78.5 HYPERLIPIDEMIA WITH TARGET LDL LESS THAN 130: ICD-10-CM

## 2022-02-04 DIAGNOSIS — E03.9 HYPOTHYROIDISM, UNSPECIFIED TYPE: ICD-10-CM

## 2022-02-04 LAB
CHOLEST SERPL-MCNC: 157 MG/DL
FASTING STATUS PATIENT QL REPORTED: YES
HDLC SERPL-MCNC: 67 MG/DL
LDLC SERPL CALC-MCNC: 65 MG/DL
NONHDLC SERPL-MCNC: 90 MG/DL
TRIGL SERPL-MCNC: 123 MG/DL
TSH SERPL DL<=0.005 MIU/L-ACNC: 1.83 MU/L (ref 0.4–4)

## 2022-02-04 PROCEDURE — 84443 ASSAY THYROID STIM HORMONE: CPT | Mod: ZL

## 2022-02-04 PROCEDURE — 80061 LIPID PANEL: CPT | Mod: ZL

## 2022-02-04 PROCEDURE — 36415 COLL VENOUS BLD VENIPUNCTURE: CPT | Mod: ZL

## 2022-02-04 ASSESSMENT — ANXIETY QUESTIONNAIRES: GAD7 TOTAL SCORE: 0

## 2022-02-07 ENCOUNTER — MYC MEDICAL ADVICE (OUTPATIENT)
Dept: FAMILY MEDICINE | Facility: OTHER | Age: 67
End: 2022-02-07
Payer: COMMERCIAL

## 2022-02-07 DIAGNOSIS — J20.9 ACUTE BRONCHITIS, UNSPECIFIED ORGANISM: Primary | ICD-10-CM

## 2022-02-07 RX ORDER — LEVOFLOXACIN 500 MG/1
500 TABLET, FILM COATED ORAL DAILY
Qty: 10 TABLET | Refills: 3 | Status: SHIPPED | OUTPATIENT
Start: 2022-02-07 | End: 2022-07-06

## 2022-02-07 NOTE — TELEPHONE ENCOUNTER
Patient returned call. Awaiting for a response from Mirna Roblero.     Please advise.     Patient can be reached at [phone]

## 2022-02-11 ENCOUNTER — HOSPITAL ENCOUNTER (OUTPATIENT)
Dept: BONE DENSITY | Facility: HOSPITAL | Age: 67
Discharge: HOME | End: 2022-02-11
Attending: NURSE PRACTITIONER | Admitting: NURSE PRACTITIONER
Payer: MEDICARE

## 2022-02-11 DIAGNOSIS — Z85.3 PERSONAL HISTORY OF MALIGNANT NEOPLASM OF BREAST: ICD-10-CM

## 2022-02-11 DIAGNOSIS — Z78.0 POSTMENOPAUSAL STATUS: ICD-10-CM

## 2022-02-11 PROCEDURE — 77080 DXA BONE DENSITY AXIAL: CPT

## 2022-05-02 DIAGNOSIS — E03.9 ACQUIRED HYPOTHYROIDISM: ICD-10-CM

## 2022-05-02 DIAGNOSIS — J30.2 SEASONAL ALLERGIES: ICD-10-CM

## 2022-05-04 RX ORDER — LEVOCETIRIZINE DIHYDROCHLORIDE 5 MG/1
TABLET, FILM COATED ORAL
Qty: 90 TABLET | Refills: 3 | Status: SHIPPED | OUTPATIENT
Start: 2022-05-04 | End: 2023-01-18

## 2022-05-04 RX ORDER — LEVOTHYROXINE SODIUM 88 UG/1
TABLET ORAL
Qty: 270 TABLET | Refills: 0 | OUTPATIENT
Start: 2022-05-04

## 2022-05-04 NOTE — TELEPHONE ENCOUNTER
Xyzal       Last Written Prescription Date:  7-16-21  Last Fill Quantity: 90,   # refills: 2  Last Office Visit: 2-3-22  Future Office visit:    Next 5 appointments (look out 90 days)    Jul 06, 2022  2:15 PM  (Arrive by 2:00 PM)  Return Visit with Che Sesay NP  [facility] ([facility] ) [facility] [facility] [address]  [phone]

## 2022-05-07 DIAGNOSIS — E03.9 ACQUIRED HYPOTHYROIDISM: ICD-10-CM

## 2022-05-09 DIAGNOSIS — F41.1 GAD (GENERALIZED ANXIETY DISORDER): ICD-10-CM

## 2022-05-09 RX ORDER — LEVOTHYROXINE SODIUM 88 UG/1
TABLET ORAL
Qty: 90 TABLET | Refills: 2 | Status: SHIPPED | OUTPATIENT
Start: 2022-05-09 | End: 2022-10-26

## 2022-05-09 RX ORDER — FLUOXETINE 10 MG/1
CAPSULE ORAL
Qty: 90 CAPSULE | Refills: 2 | Status: SHIPPED | OUTPATIENT
Start: 2022-05-09 | End: 2022-11-09

## 2022-05-22 ENCOUNTER — MYC MEDICAL ADVICE (OUTPATIENT)
Dept: FAMILY MEDICINE | Facility: OTHER | Age: 67
End: 2022-05-22
Payer: COMMERCIAL

## 2022-05-22 DIAGNOSIS — B37.0 THRUSH: Primary | ICD-10-CM

## 2022-05-23 RX ORDER — NYSTATIN 100000/ML
500000 SUSPENSION, ORAL (FINAL DOSE FORM) ORAL 4 TIMES DAILY
Qty: 400 ML | Refills: 0 | Status: SHIPPED | OUTPATIENT
Start: 2022-05-23 | End: 2022-07-06

## 2022-07-05 DIAGNOSIS — E78.5 HYPERLIPIDEMIA WITH TARGET LDL LESS THAN 130: ICD-10-CM

## 2022-07-06 ENCOUNTER — ONCOLOGY VISIT (OUTPATIENT)
Dept: ONCOLOGY | Facility: OTHER | Age: 67
End: 2022-07-06
Attending: NURSE PRACTITIONER
Payer: MEDICARE

## 2022-07-06 ENCOUNTER — LAB (OUTPATIENT)
Dept: LAB | Facility: OTHER | Age: 67
End: 2022-07-06
Attending: NURSE PRACTITIONER
Payer: COMMERCIAL

## 2022-07-06 VITALS
RESPIRATION RATE: 19 BRPM | TEMPERATURE: 98.1 F | WEIGHT: 133.6 LBS | HEART RATE: 60 BPM | SYSTOLIC BLOOD PRESSURE: 118 MMHG | DIASTOLIC BLOOD PRESSURE: 72 MMHG | HEIGHT: 65 IN | BODY MASS INDEX: 22.26 KG/M2 | OXYGEN SATURATION: 99 %

## 2022-07-06 DIAGNOSIS — Z85.3 PERSONAL HISTORY OF MALIGNANT NEOPLASM OF BREAST: Primary | ICD-10-CM

## 2022-07-06 DIAGNOSIS — R26.89 POOR BALANCE: ICD-10-CM

## 2022-07-06 DIAGNOSIS — Z85.3 PERSONAL HISTORY OF MALIGNANT NEOPLASM OF BREAST: ICD-10-CM

## 2022-07-06 LAB
ALBUMIN SERPL-MCNC: 4.2 G/DL (ref 3.4–5)
ALP SERPL-CCNC: 57 U/L (ref 40–150)
ALT SERPL W P-5'-P-CCNC: 45 U/L (ref 0–50)
ANION GAP SERPL CALCULATED.3IONS-SCNC: 6 MMOL/L (ref 3–14)
AST SERPL W P-5'-P-CCNC: 44 U/L (ref 0–45)
BASOPHILS # BLD AUTO: 0 10E3/UL (ref 0–0.2)
BASOPHILS NFR BLD AUTO: 1 %
BILIRUB SERPL-MCNC: 0.6 MG/DL (ref 0.2–1.3)
BUN SERPL-MCNC: 20 MG/DL (ref 7–30)
CALCIUM SERPL-MCNC: 9.4 MG/DL (ref 8.5–10.1)
CHLORIDE BLD-SCNC: 102 MMOL/L (ref 94–109)
CO2 SERPL-SCNC: 28 MMOL/L (ref 20–32)
CREAT SERPL-MCNC: 0.76 MG/DL (ref 0.52–1.04)
EOSINOPHIL # BLD AUTO: 0.1 10E3/UL (ref 0–0.7)
EOSINOPHIL NFR BLD AUTO: 1 %
ERYTHROCYTE [DISTWIDTH] IN BLOOD BY AUTOMATED COUNT: 13.1 % (ref 10–15)
GFR SERPL CREATININE-BSD FRML MDRD: 86 ML/MIN/1.73M2
GLUCOSE BLD-MCNC: 87 MG/DL (ref 70–99)
HCT VFR BLD AUTO: 41.8 % (ref 35–47)
HGB BLD-MCNC: 14.1 G/DL (ref 11.7–15.7)
IMM GRANULOCYTES # BLD: 0 10E3/UL
IMM GRANULOCYTES NFR BLD: 0 %
LYMPHOCYTES # BLD AUTO: 1.8 10E3/UL (ref 0.8–5.3)
LYMPHOCYTES NFR BLD AUTO: 27 %
MCH RBC QN AUTO: 30.5 PG (ref 26.5–33)
MCHC RBC AUTO-ENTMCNC: 33.7 G/DL (ref 31.5–36.5)
MCV RBC AUTO: 91 FL (ref 78–100)
MONOCYTES # BLD AUTO: 0.6 10E3/UL (ref 0–1.3)
MONOCYTES NFR BLD AUTO: 8 %
NEUTROPHILS # BLD AUTO: 4.2 10E3/UL (ref 1.6–8.3)
NEUTROPHILS NFR BLD AUTO: 63 %
NRBC # BLD AUTO: 0 10E3/UL
NRBC BLD AUTO-RTO: 0 /100
PLATELET # BLD AUTO: 188 10E3/UL (ref 150–450)
POTASSIUM BLD-SCNC: 3.8 MMOL/L (ref 3.4–5.3)
PROT SERPL-MCNC: 7.9 G/DL (ref 6.8–8.8)
RBC # BLD AUTO: 4.62 10E6/UL (ref 3.8–5.2)
SODIUM SERPL-SCNC: 136 MMOL/L (ref 133–144)
WBC # BLD AUTO: 6.8 10E3/UL (ref 4–11)

## 2022-07-06 PROCEDURE — 36415 COLL VENOUS BLD VENIPUNCTURE: CPT | Mod: ZL

## 2022-07-06 PROCEDURE — 80053 COMPREHEN METABOLIC PANEL: CPT | Mod: ZL

## 2022-07-06 PROCEDURE — 99213 OFFICE O/P EST LOW 20 MIN: CPT | Performed by: NURSE PRACTITIONER

## 2022-07-06 PROCEDURE — G0463 HOSPITAL OUTPT CLINIC VISIT: HCPCS

## 2022-07-06 PROCEDURE — 85004 AUTOMATED DIFF WBC COUNT: CPT | Mod: ZL

## 2022-07-06 ASSESSMENT — PAIN SCALES - GENERAL: PAINLEVEL: NO PAIN (0)

## 2022-07-06 NOTE — NURSING NOTE
"Oncology Rooming Note    July 6, 2022 2:11 PM   Denise Taylor is a 66 year old female who presents for:    Chief Complaint   Patient presents with     Oncology Clinic Visit     Follow up. Invasive lobular carcinoma of right breast in female      Initial Vitals: /72   Pulse 60   Temp 98.1  F (36.7  C) (Tympanic)   Resp 19   Ht 1.651 m (5' 5\")   Wt 60.6 kg (133 lb 9.6 oz)   SpO2 99%   BMI 22.23 kg/m   Estimated body mass index is 22.23 kg/m  as calculated from the following:    Height as of this encounter: 1.651 m (5' 5\").    Weight as of this encounter: 60.6 kg (133 lb 9.6 oz). Body surface area is 1.67 meters squared.  No Pain (0) Comment: Data Unavailable   No LMP recorded. Patient is postmenopausal.  Allergies reviewed: Yes  Medications reviewed: Yes    Medications: Medication refills not needed today.  Pharmacy name entered into EPIC: [facility] [address] [facility]          Lizzie Weber LPN              "

## 2022-07-06 NOTE — PATIENT INSTRUCTIONS
We will schedule you for an MRI of your brain and I will call you with this result. (Not on 7/12 or 7/15).    Please make an appointment to see your primary care provider to discuss your low pulse and symptoms.    We would like to see you back in 6 months.  Please come 30 minutes prior for lab work.     When you are in need of a refill of your anastrozole, please call your pharmacy and they will send us a request.     If you have any questions please call [phone]    Other instructions:  none

## 2022-07-06 NOTE — PROGRESS NOTES
"Oncology Follow-up Visit:  July 6, 2022    Reason for Visit:  Patient presents with:  Oncology Clinic Visit: Follow up. Invasive lobular carcinoma of right breast in female      Nursing Note and documentation reviewed: yes    HPI:   This is a 66-year-old female patient who presents to the oncology clinic today in follow-up of Stage Ia, invasive lobular carcinoma of the right breast diagnosed July 2019.  She has undergone bilateral mastectomies and she was placed on Arimidex in December 2019.      She presents to the clinic today stating overall she is doing okay.  She does have some complaints today though.  She questions why she may not feel as steady as she used to and she states this has been going on for about 9 months and seems to be a little worse.  She describes it as feeling \"off balance.  She does also at times feel dizzy.  She states she was outside last week and had to go in due to feeling dizzy and her pulse was at 40.  Her oxygen saturation at that time was good.  She does have mild fatigue but continues to overall be very active.  She states the low pulse is not new but the occasional dizziness is.  She denies any chest pain.  She denies any headaches or nausea.    She states she will undergo another CT scan of her chest July 15, 2022 in follow-up of previous upper respiratory infection.  She had been following with Dr. Ching with pulmonology at [facility] but now has been following with Dr. Saunders with infectious disease related to her recurrent respiratory issues.  She tells me that she may be initiated on a medication in the near future but states that the infectious disease doctor wants to be sure that her heart is okay and may order an echocardiogram in the future.    She also states she feels she has been bruising easier over the past year.    She denies any changes to the chest wall including any skin changes or new lumps around her implants.  She continues on calcium with vitamin D and next DEXA " scan is due in February 2024.    Oncologic History:     7/24/2019 patient was seen by her PCP with concerns regarding a right breast mass  7/25/2019 patient underwent right diagnostic mammogram showing no abnormality      **She underwent ultrasound of the right breast with no discrete masses noted and recommendation for follow-up in 6 months  7/26/2019 patient was evaluated by Dr. Dupree with General Surgery with recommendation for biopsy   7/29/2019 Ultrasound guided biopsy showed invasive lobular carcinoma, grade 2, ER/AR positive and HER-2/jenn negative  8/12/2019  She underwent MRI of the both breasts with findings showing a small area of enhancement in the upper outer quadrant of the right breast and the left breast was unremarkable     **Ultrasound of the right axilla was negative  8/23/2019  She went underwent lumpectomy with sentinel node procedure with pathology showing a 4.5 cm tumor that was consistent with invasive lobular carcinoma of the right breast, grade 2, ER/AR positive and HER-2/jenn negative; sentinel node was negative; she was staged pathologic T2N0.  Anterior and medial margin was positive on pathology.  9/19/2019  Patient was seen by Dr. Lulú Franz as well is Dr. Dale Paul with Plastic surgery to discuss reconstruction   10/9/2019 she underwent bilateral mastectomy with immediate construction and pathology showed no evidence of malignancy  11/5/2019  Patient was evaluated by Dr. Noyola  with Medical Oncology with Oncotype DX recurrence score of 12 consistent with low risk of breast cancer and recommendation for aromatase inhibitor therapy; PET and MRI brain were recommended  Patient was seen again by Dr. hair aviles with medical oncology with Oncotype DX 11/20/2019  She underwent PET scan:  IMPRESSION:   1.  Numerous new FDG avid groundglass and solid nodules throughout  both lungs concerning for recurrent disease. Additionally, there is  worsening atelectasis seen in both lungs.  Infectious and noninfectious  granulomatous disease may have a similar appearance.  12/3/2019 she was seen again by Dr. Noyola with Medical Oncology to review PET scan results.  MRI was not obtained related to tissue expanders in place.  He recommended initiation of aromatase inhibitor with Arimidex 1 mg p.o. daily.  2/5/2020  Repeat PET scan:  IMPRESSION:   1.  No evidence of active disease. Nodules and areas of groundglass  opacification seen previously within the lungs have resolved.   2.  Bilateral breast implant revision. Leaking left breast implant is  first seen on the CT scan of the chest from 11/14/2019.   2/6/2020  She underwent MRI of the brain evidence of metastatic disease     Current Chemo Regime/TX: Arimidex 1 mg daily initiated 12/2019  Current Cycle:  n/a  # of completed cycles:  n/a     Previous treatment:  n/a     Past Medical History:   Diagnosis Date     Basal cell carcinoma      Breast cancer (H) 07/2019    right breast     Bronchiectasis (H) 2019     Dyslipidemia      BERTA (generalized anxiety disorder)      Invasive lobular carcinoma of right breast in female (H) 8/28/2019 5.12.2020- Review and distribute treatment summary- [facility]- [facility]     Malignant neoplasm of upper-outer quadrant of right breast in female, estrogen receptor positive (H) 8/22/2019     Recurrent pneumonia 8/22/2019     Squamous cell carcinoma of skin 05/2020    right lower leg     Thyroid disease        Past Surgical History:   Procedure Laterality Date     ARTHROSCOPY SHOULDER ROTATOR CUFF REPAIR Left 03/07/2018    Procedure: ARTHROSCOPY SHOULDER ROTATOR CUFF REPAIR;  LEFT SHOULDER ARTHROSCOPY, REPAIR ROTATOR CUFF: subacromial Decompression and AC Joint Resection;  Surgeon: Baldev Lou DO;  Location: [facility] OR     ARTHROTOMY SHOULDER, ROTATOR CUFF REPAIR, COMBINED Right 11/14/2018    Procedure: RIGHT SHOULDER DEBRIDEMENT, EXCISION OF LIPOMA  RIGHT UPPER ARM, EXCISION SCAR TISSUE AC JOINT;  Surgeon: Baldev Lou  DO;  Location: [facility] OR     BIOPSY BREAST NEEDLE LOCALIZATION, BIOPSY NODE SENTINEL, COMBINED Right 08/23/2019    Procedure: RIGHT WIRE LOCALIZED LUMPECTOMY WITH SENTINEL  LYMP NODE;  Surgeon: Michael Dupree MD;  Location: [facility] OR     BREAST RECONSTRUCTION WITH DEEP INFERIOR EPIGASTRIC  (AILYN) FLAP,  07/2020    done in [place]     COLONOSCOPY  2006    Dr Lara     COLONOSCOPY N/A 10/10/2016    Procedure: COLONOSCOPY;  Surgeon: Christine Cintron MD;  Location: [facility] OR     INSERT TISSUE EXPANDER BREAST BILATERAL Bilateral 10/09/2019    Procedure: BILATERAL TISSUE EXPANDERS (RAMIREZ);  Surgeon: Dale Paul MD;  Location: [facility]     MASTECTOMY, NIPPLE SPARING Bilateral 10/09/2019    Procedure: BILATERAL SKIN SPARRING MASTECTOMY (CASS);  Surgeon: Opal Rubin MD;  Location: [facility] OR     ORTHOPEDIC SURGERY Right     feet neuromas removed     right ankle ORIF  06/2020    Dr Torres     SHOULDER SURGERY Right 2011/10/2012       Family History   Problem Relation Age of Onset     Myocardial Infarction Mother      Hypertension Mother      Coronary Artery Disease Mother      Hyperlipidemia Mother      Thyroid Disease Mother      Stomach Cancer Father      Coronary Artery Disease Father      Hypertension Father      Colon Cancer Father      Diabetes Paternal Grandmother      Heart Disease Maternal Grandmother      Heart Disease Maternal Grandfather      Diabetes Paternal Grandfather      Cerebrovascular Disease No family hx of      Breast Cancer No family hx of      Prostate Cancer No family hx of      Anesthesia Reaction No family hx of      Asthma No family hx of      Genetic Disorder No family hx of        Social History     Socioeconomic History     Marital status:      Spouse name: Not on file     Number of children: 2     Years of education: 16     Highest education level: Not on file   Occupational History     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 Not Asked   Social History Narrative     -- n/a    Caffeine -- 2c/day    Concussion -- n/a     Social Determinants of Health     Financial Resource Strain: Not on file   Food Insecurity: Not on file   Transportation Needs: Not on file   Physical Activity: Not on file   Stress: Not on file   Social Connections: Not on file   Intimate Partner Violence: Not on file   Housing Stability: Not on file       Current Outpatient Medications   Medication     anastrozole (ARIMIDEX) 1 MG tablet     calcium carbonate-vitamin D (OS-STEVE) 600-400 MG-UNIT chewable tablet     eletriptan (RELPAX) 40 MG tablet     FLUoxetine (PROZAC) 10 MG capsule     fluticasone (ARNUITY ELLIPTA) 200 MCG/ACT inhaler     levocetirizine (XYZAL) 5 MG tablet     levofloxacin (LEVAQUIN) 500 MG tablet     levothyroxine (SYNTHROID/LEVOTHROID) 88 MCG tablet     rosuvastatin (CRESTOR) 10 MG tablet     No current facility-administered medications for this visit.        Allergies   Allergen Reactions     Pcn [Penicillins] Rash     Zithromax [Azithromycin] Rash       Review Of Systems:  Constitutional:    denies fever, weight changes, chills, and night sweats.  Eyes:    denies blurred or double vision  Ears/Nose/Throat:   denies ear pain, nose problems, difficulty swallowing  Respiratory:   denies shortness of breath, cough   Skin:   denies rash, lesions  Breast/Chest wall:   denies pain, lumps   Cardiovascular:   denies chest pain, palpitations, edema  Gastrointestinal:   denies abdominal pain, bloating, nausea, vomiting, early satiety; no change in bowel habits or blood in stool  Genitourinary:   denies difficulty with urination, blood in  "urine  Musculoskeletal:    denies new muscle pain, bone pain  Neurologic:   denies headaches, numbness or tingling-see HPI  Psychiatric:   denies anxiety, depression  Hematologic/Lymphatic/Immunologic:   denies easy bleeding, lumps or bumps noted; has some new easy bruising in the past year  Endocrine:   Denies increased thirst     Physical Exam:  /72   Pulse 60   Temp 98.1  F (36.7  C) (Tympanic)   Resp 19   Ht 1.651 m (5' 5\")   Wt 60.6 kg (133 lb 9.6 oz)   SpO2 99%   BMI 22.23 kg/m      GENERAL APPEARANCE: Healthy, alert and in no acute distress.  HEENT: Normocephalic, Sclerae anicteric.  No oral lesions or thrush  NECK:   No asymmetry or masses, no thyromegaly.  LYMPHATICS: No palpable cervical, supraclavicular, axillary, or inguinal nodes   RESP: Lungs clear to auscultation bilaterally, respirations regular and easy  CARDIOVASCULAR: Regular rate and rhythm. Normal S1, S2; no murmur, gallop, or rub.  ABDOMEN: Soft, nontender. Bowel sounds auscultated all 4 quadrants. No palpable organomegaly or masses.  BREAST/Chest wall: No concerning skin changes or masses   MUSCULOSKELETAL: Extremities without gross deformities noted. No edema of bilateral lower extremities.  SKIN: No suspicious lesions or rashes to exposed skin  NEURO: Alert and oriented x 3.  Gait steady.  PSYCHIATRIC: Mentation and affect appear normal.  Mood appropriate.    Laboratory:  Results for orders placed or performed in visit on 07/06/22   Comprehensive metabolic panel     Status: Normal   Result Value Ref Range    Sodium 136 133 - 144 mmol/L    Potassium 3.8 3.4 - 5.3 mmol/L    Chloride 102 94 - 109 mmol/L    Carbon Dioxide (CO2) 28 20 - 32 mmol/L    Anion Gap 6 3 - 14 mmol/L    Urea Nitrogen 20 7 - 30 mg/dL    Creatinine 0.76 0.52 - 1.04 mg/dL    Calcium 9.4 8.5 - 10.1 mg/dL    Glucose 87 70 - 99 mg/dL    Alkaline Phosphatase 57 40 - 150 U/L    AST 44 0 - 45 U/L    ALT 45 0 - 50 U/L    Protein Total 7.9 6.8 - 8.8 g/dL    Albumin 4.2 " 3.4 - 5.0 g/dL    Bilirubin Total 0.6 0.2 - 1.3 mg/dL    GFR Estimate 86 >60 mL/min/1.73m2   CBC with platelets and differential     Status: None   Result Value Ref Range    WBC Count 6.8 4.0 - 11.0 10e3/uL    RBC Count 4.62 3.80 - 5.20 10e6/uL    Hemoglobin 14.1 11.7 - 15.7 g/dL    Hematocrit 41.8 35.0 - 47.0 %    MCV 91 78 - 100 fL    MCH 30.5 26.5 - 33.0 pg    MCHC 33.7 31.5 - 36.5 g/dL    RDW 13.1 10.0 - 15.0 %    Platelet Count 188 150 - 450 10e3/uL    % Neutrophils 63 %    % Lymphocytes 27 %    % Monocytes 8 %    % Eosinophils 1 %    % Basophils 1 %    % Immature Granulocytes 0 %    NRBCs per 100 WBC 0 <1 /100    Absolute Neutrophils 4.2 1.6 - 8.3 10e3/uL    Absolute Lymphocytes 1.8 0.8 - 5.3 10e3/uL    Absolute Monocytes 0.6 0.0 - 1.3 10e3/uL    Absolute Eosinophils 0.1 0.0 - 0.7 10e3/uL    Absolute Basophils 0.0 0.0 - 0.2 10e3/uL    Absolute Immature Granulocytes 0.0 <=0.4 10e3/uL    Absolute NRBCs 0.0 10e3/uL   CBC with Platelets & Differential     Status: None    Narrative    The following orders were created for panel order CBC with Platelets & Differential.  Procedure                               Abnormality         Status                     ---------                               -----------         ------                     CBC with platelets and d...[[id number]]                      Final result                 Please view results for these tests on the individual orders.       Imaging Studies:      Narrative & Impression   Procedure:  DX HIP/PELVIS/SPINE     Clinical History:66 years Female . Personal history of malignant  neoplasm of breast; Postmenopausal status     Comparison:  2/10/2020     Interpretation:  Normal bone mineral density     Lowest Category Site:  Left femoral neck     Lowest Category T-Score:  -0.7     Lowest Category % Change from most recent:  -0.6   %                                                                      Impression: WHO classification compatible with normal bone  mineral  density.     Only the lowest category is reported for each patient per guidelines  of the International Society for Clinical Densitometry.     See attached DXA images and FRAX report for further details.     WHO DIAGNOSTIC GUIDELINES FOR BONE MASS MEASUREMENT:     Normal                        T-Score at or above -1.0     Osteopenia                T-Score between -1.0 and -2.5     Osteoporosis            T-Score at or below -2.5     Providers should consider medical therapies when 10 year probability  of hip fracture is greater than or equal to 3%, or 10 year probability  of major osteoporosis related fracture is greater than or equal to  20%.     ** Exam performed on GE Lunar Prodigy Advance     ** Exam performed on GE Lunar Prodigy Advance     JOEL ANNE MD           ASSESSMENT/PLAN:    #1 Breast cancer:   Stage Ia, invasive lobular carcinoma of the right breast diagnosed August 2019.  She is currently on Arimidex 1 mg daily and will continue.  We will plan to follow-up again in 6 months with a CBC and CMP.     #2  On aromatase inhibitor therapy: DEXA scan completed in February was normal.  She will continue on the calcium with vitamin D twice a day.  We will repeat her DEXA scan in February 2024.    #3  Bradycardia: Sounds chronic but seems to be more symptomatic.  I did recommend that she get in with her PCP as soon as he can for further evaluation.    #4  Poor balance: We will obtain an MRI of the brain and call her with that result.    I encouraged patient to call with any questions or concerns.      Che Sesay NP  APRN, FNP-BC, AOCNP

## 2022-07-07 RX ORDER — ROSUVASTATIN CALCIUM 10 MG/1
TABLET, COATED ORAL
Qty: 90 TABLET | Refills: 0 | Status: SHIPPED | OUTPATIENT
Start: 2022-07-07 | End: 2022-12-27

## 2022-07-07 NOTE — TELEPHONE ENCOUNTER
Crestor       Last Written Prescription Date:  8/13/2021  Last Fill Quantity: 90,   # refills: 3  Last Office Visit: 2/03/2022  Future Office visit:    Next 5 appointments (look out 90 days)    Aug 12, 2022 11:15 AM  (Arrive by 11:00 AM)  SHORT with DASH Angelo  [facility] [place] ([facility] [place] ) [address]  [place] [address]  [phone]

## 2022-07-13 NOTE — IP AVS SNAPSHOT
MRN:[id number]                      After Visit Summary   3/7/2018    Denise Taylor    MRN: [id number]           Thank you!     Thank you for choosing [facility] for your care. Our goal is always to provide you with excellent care. Hearing back from our patients is one way we can continue to improve our services. Please take a few minutes to complete the written survey that you may receive in the mail after you visit with us. Thank you!        Patient Information     Date Of Birth          1955        About your hospital stay     You were admitted on:  March 7, 2018 You last received care in the:  [facility]    You were discharged on:  March 7, 2018       Who to Call     For medical emergencies, please call 911.  For non-urgent questions about your medical care, please call your primary care provider or clinic, [phone]  For questions related to your surgery, please call your surgery clinic        Attending Provider     Provider Baldev Bustillo DO Orthopedics       Primary Care Provider Office Phone # Fax #    DASH Angelo [phone]      After Care Instructions      Diet as Tolerated       Return to diet before surgery, unless instructed otherwise.            Discharge Instructions       Review outpatient procedure discharge instructions with patient as directed by Provider            Dressing Change       Change dressing on third day after surgery.            Ice to affected area       Ice pack to surgical site every 15 minutes per hour for 24 hours            Remove dressing - at 72 hours                  Your next 10 appointments already scheduled     Mar 20, 2018  9:40 AM CDT   (Arrive by 9:20 AM)   Post Op with Baldev Lou DO    ORTHOPEDICS ([facility] )    [address] [facility] [id number] [phone]              Further instructions from your care team       Remove the scopolamine patch behind your right  ear         POST OPERATIVE PATIENT INFORMATION  Shoulder Surgery    DIET  No restrictions unless specifically ordered by your physician.  DISCOMFORT  You should have only minimal discomfort.  There may be some tenderness around your incision.  If you have a prescribed medication and it is not controlling your pain, please notify your doctor.  You may use ice to your shoulder for comfort as needed.  CARE OF INCISION  Staples/Stitches: If the staples/stitches were removed during your hospital stay and steri-strips (thin strips of tape) were applied to the incision, leave them on until you see your doctor or they loosen on their own.  If your doctor sends you home with the staples/stitches intact, keep the dressing dry.  If a clear plastic covering has been applied to the incision prior to discharge, leave it on until you return to see your doctor.  You may shower as instructed by your physician.  ACTIVITY  Sling/Immobilizer: Continue to wear your sling or immobilizer at all times unless instructed otherwise.  Be sure the sling/immobilizer is properly positioned and elevates your arm.  Positioning: Position the head of your bed up with pillows.  A small pillow positioned behind your affected shoulder will also keep your shoulder from falling back and this decreases pain.  Keep your arm close to your side.  Exercises: Wiggle your fingers frequently to aid circulation.  Practice other exercises only if you were instructed in physical therapy or by your physician.  STOP EXERCISING IMMEDIATELY IF YOU HAVE SEVERE SHOULDER PAIN OR DISCOMFORT.  ACTIVITIES TO AVOID  Do not move your affected arm/shoulder away from your side unless instructed by your doctor.  This motion places stress on the surgical area.  Do not lie on your affected side unless your doctor says you can.  CONTACT YOUR DOCTOR  Contact your doctor if any of the following conditions occur:    Have loss of movement or sensation to your fingers or hand.    Notice  redness, swelling or warmth around your incision.    Have more than a small amount of drainage.    Develop a fever of 100  or higher, or start having chills.    Have severe pain, unrelieved by the medication prescribed for you.    Fingers/hand become cold, blue or swollen  If you have any questions, call your doctor s office.       CODMAN S EXERCISE    GENTLE PASSIVE RANGE OF MOTION EXERCISE    Rock your body back and forth or in circles.    Let your arm hang at your side and swing like a pendulum.    Don t use arm muscles to move your arm, rather use the sway of your body.    Do this 3 times a day for 3 minutes.    *WHEN NOT EXERCISING KEEP ARM IN SLING! ear after 24 hours after application.   After removing the patch, wash your hands and the area behind your ear thoroughly with soap and water.   The patch will still contain some medicine after use.   To avoid accidental contact or ingestion by children or pets, fold the used patch in half with the sticky side together and throw away in the trash out of the reach of children and pets.         Post-Anesthesia Patient Instructions    IMMEDIATELY FOLLOWING SURGERY:  Do not drive or operate machinery for the first twenty four hours after surgery.  Do not make any important decisions for twenty four hours after surgery or while taking narcotic pain medications or sedatives.  If you develop intractable nausea and vomiting or a severe headache please notify your doctor immediately.    FOLLOW-UP:  Please make an appointment with your surgeon as instructed. You do not need to follow up with anesthesia unless specifically instructed to do so.    WOUND CARE INSTRUCTIONS (if applicable):  Keep a dry clean dressing on the anesthesia/puncture wound site if there is drainage.  Once the wound has quit draining you may leave it open to air.  Generally you should leave the bandage intact for twenty four hours unless there is drainage.  If the epidural site drains for more than 36-48  "hours please call the anesthesia department.    QUESTIONS?:  Please feel free to call your physician or the hospital  if you have any questions, and they will be happy to assist you.       Pending Results     No orders found from 3/5/2018 to 3/8/2018.            Admission Information     Date & Time Provider Department Dept. Phone    3/7/2018 Baldev Lou DO [facility] [phone]      Your Vitals Were     Blood Pressure Temperature Respirations Height Weight Pulse Oximetry    103/66 96.8  F (36  C) (Temporal) 17 1.651 m (5' 5\") 62.4 kg (137 lb 9.6 oz) 94%    BMI (Body Mass Index)                   22.9 kg/m2           MyChart Information     [employer] lets you send messages to your doctor, view your test results, renew your prescriptions, schedule appointments and more. To sign up, go to [email or web address] . Click on \"Log in\" on the left side of the screen, which will take you to the Welcome page. Then click on \"Sign up Now\" on the right side of the page.     You will be asked to enter the access code listed below, as well as some personal information. Please follow the directions to create your username and password.     Your access code is: [email or web address]  Expires: 3/18/2018 11:04 AM     Your access code will  in 90 days. If you need help or a new code, please call your [facility] clinic or [phone].        Care EveryWhere ID     This is your Care EveryWhere ID. This could be used by other organizations to access your [facility] medical records  [id number]        Equal Access to Services     [facility]: Hadmorris Gonzalez, waaxda luqadaha, qaybamadeo marquezalarabella gómez. So [facility] [phone].    ATENCIÓN: Si habla español, tiene a mayo disposición servicios gratuitos de asistencia lingüística. Llame al [phone].    We comply with applicable federal civil rights laws and [place] laws. We do not discriminate on the basis of race, color, " Detail Level: Simple national origin, age, disability, sex, sexual orientation, or gender identity.               Review of your medicines      START taking        Dose / Directions    HYDROcodone-acetaminophen 5-325 MG per tablet   Commonly known as:  NORCO   Used for:  History of arthroscopic surgery of shoulder        Dose:  1-2 tablet   Take 1-2 tablets by mouth every 4 hours as needed for other (Moderate to Severe Pain)   Quantity:  30 tablet   Refills:  0         CONTINUE these medicines which have NOT CHANGED        Dose / Directions    ASPIRIN PO        Dose:  81 mg   Take 81 mg by mouth daily   Refills:  0       levothyroxine 75 MCG tablet   Commonly known as:  SYNTHROID/LEVOTHROID   Used for:  Hypothyroidism, unspecified type        Dose:  75 mcg   Take 1 tablet (75 mcg) by mouth daily   Quantity:  90 tablet   Refills:  3       RELPAX PO        Dose:  40 mg   Take 40 mg by mouth once as needed for migraine   Refills:  0       rosuvastatin 10 MG tablet   Commonly known as:  CRESTOR   Used for:  Hyperlipidemia with target LDL less than 130        Dose:  10 mg   Take 1 tablet (10 mg) by mouth daily   Quantity:  30 tablet   Refills:  1       SARAFEM 10 MG Tabs   Generic drug:  FLUoxetine HCl (PMDD)        Dose:  10 mg   Take 10 mg by mouth daily   Refills:  0            Where to get your medicines      Some of these will need a paper prescription and others can be bought over the counter. Ask your nurse if you have questions.     Bring a paper prescription for each of these medications     HYDROcodone-acetaminophen 5-325 MG per tablet                Protect others around you: Learn how to safely use, store and throw away your medicines at www.disposemymeds.org.        Information about OPIOIDS     PRESCRIPTION OPIOIDS: WHAT YOU NEED TO KNOW    Prescription opioids can be used to help relieve moderate to severe pain and are often prescribed following a surgery or injury, or for certain health conditions. These medications can be an  Quality 130: Documentation Of Current Medications In The Medical Record: Current Medications Documented important part of treatment but also come with serious risks. It is important to work with your health care provider to make sure you are getting the safest, most effective care.    WHAT ARE THE RISKS AND SIDE EFFECTS OF OPIOID USE?  Prescription opioids carry serious risks of addiction and overdose, especially with prolonged use. An opioid overdose, often marked by slowed breathing can cause sudden death. The use of prescription opioids can have a number of side effects as well, even when taken as directed:      Tolerance - meaning you might need to take more of a medication for the same pain relief    Physical dependence - meaning you have symptoms of withdrawal when a medication is stopped    Increased sensitivity to pain    Constipation    Nausea, vomiting, and dry mouth    Sleepiness and dizziness    Confusion    Depression    Low levels of testosterone that can result in lower sex drive, energy, and strength    Itching and sweating    RISKS ARE GREATER WITH:    History of drug misuse, substance use disorder, or overdose    Mental health conditions (such as depression or anxiety)    Sleep apnea    Older age (65 years or older)    Pregnancy    Avoid alcohol while taking prescription opioids.   Also, unless specifically advised by your health care provider, medications to avoid include:    Benzodiazepines (such as Xanax or Valium)    Muscle relaxants (such as Soma or Flexeril)    Hypnotics (such as Ambien or Lunesta)    Other prescription opioids    KNOW YOUR OPTIONS:  Talk to your health care provider about ways to manage your pain that do not involve prescription opioids. Some of these options may actually work better and have fewer risks and side effects:    Pain relievers such as acetaminophen, ibuprofen, and naproxen    Some medications that are also used for depression or seizures    Physical therapy and exercise    Cognitive behavioral therapy, a psychological, goal-directed approach, in which patients  learn how to modify physical, behavioral, and emotional triggers of pain and stress    IF YOU ARE PRESCRIBED OPIOIDS FOR PAIN:    Never take opioids in greater amounts or more often than prescribed    Follow up with your primary health care provider and work together to create a plan on how to manage your pain.    Talk about ways to help manage your pain that do not involve prescription opioids    Talk about all concerns and side effects    Help prevent misuse and abuse    Never sell or share prescription opioids    Never use another person's prescription opioids    Store prescription opioids in a secure place and out of reach of others (this may include visitors, children, friends, and family)    Visit www.cdc.gov/drugoverdose to learn about risks of opioid abuse and overdose    If you believe you may be struggling with addiction, tell your health care provider and ask for guidance or call [facility]'s National Helpline at 3-714-267-HELP    LEARN MORE / www.cdc.gov/drugoverdose/prescribing/guideline.html    Safely dispose of unused prescription opioids: Find your local drug take-back programs and more information about the importance of safe disposal at www.doseofreality.[place].gov             Medication List: This is a list of all your medications and when to take them. Check marks below indicate your daily home schedule. Keep this list as a reference.      Medications           Morning Afternoon Evening Bedtime As Needed    ASPIRIN PO   Take 81 mg by mouth daily                                HYDROcodone-acetaminophen 5-325 MG per tablet   Commonly known as:  NORCO   Take 1-2 tablets by mouth every 4 hours as needed for other (Moderate to Severe Pain)                                levothyroxine 75 MCG tablet   Commonly known as:  SYNTHROID/LEVOTHROID   Take 1 tablet (75 mcg) by mouth daily                                RELPAX PO   Take 40 mg by mouth once as needed for migraine                                 rosuvastatin 10 MG tablet   Commonly known as:  CRESTOR   Take 1 tablet (10 mg) by mouth daily                                SARAFEM 10 MG Tabs   Take 10 mg by mouth daily   Generic drug:  FLUoxetine HCl (PMDD)

## 2022-07-18 NOTE — TELEPHONE ENCOUNTER
Akbar Ocampo MD Beck, Andrea, RN; P [facility] Ep Support [place] [facility]  Reece,   Thanks for the update.   Just reinforce need for compliance. No need for DEDE as long as he doesn't miss further doses.   Thanks   S             Previous Messages       ----- Message -----   From: Reece Encarnacion RN   Sent: 7/13/2022   2:47 PM CDT   To: Akbar Ocampo MD     Hi Dr. Ocampo!     FYI: Pt has scheduled PVI with you on 7-20. He has a [id number] score of 0 and on Eliquis since 6-22-22. Pt stated today that he missed one dose of his Eliquis yesterday 7-12. No afib noted by pt since he started Eliquis, EKG at his pre-op physical today shows SR.     Thanks,   Reece                       Pre-op was faxed to Dr. Dietrich at [place] [phone].  Surgery is 2-27-17.

## 2022-07-21 ENCOUNTER — HOSPITAL ENCOUNTER (OUTPATIENT)
Dept: MRI IMAGING | Facility: HOSPITAL | Age: 67
Discharge: HOME | End: 2022-07-21
Attending: NURSE PRACTITIONER | Admitting: NURSE PRACTITIONER
Payer: MEDICARE

## 2022-07-21 DIAGNOSIS — Z85.3 PERSONAL HISTORY OF MALIGNANT NEOPLASM OF BREAST: ICD-10-CM

## 2022-07-21 DIAGNOSIS — R26.89 POOR BALANCE: ICD-10-CM

## 2022-07-21 PROCEDURE — A9585 GADOBUTROL INJECTION: HCPCS | Performed by: RADIOLOGY

## 2022-07-21 PROCEDURE — G1010 CDSM STANSON: HCPCS

## 2022-07-21 PROCEDURE — 255N000002 HC RX 255 OP 636: Performed by: RADIOLOGY

## 2022-07-21 RX ORDER — GADOBUTROL 604.72 MG/ML
2 INJECTION INTRAVENOUS ONCE
Status: COMPLETED | OUTPATIENT
Start: 2022-07-21 | End: 2022-07-21

## 2022-07-21 RX ADMIN — GADOBUTROL 2 ML: 604.72 INJECTION INTRAVENOUS at 11:53

## 2022-08-11 NOTE — PROGRESS NOTES
Answers for HPI/ROS submitted by the patient on 8/12/2022  If you checked off any problems, how difficult have these problems made it for you to do your work, take care of things at home, or get along with other people?: Not difficult at all  PHQ9 TOTAL SCORE: 0  BERTA 7 TOTAL SCORE: 0      Assessment & Plan     Bradycardia  She is going to be given scan.  She has many sx with her slow heart rate . Always is active and busy then feels faint.  We will get Zio and have her see Cardiology.   - Adult Leadless EKG Monitor 3 to 7 Days; Future  - Adult Cardiology Eval  Referral    Malignant neoplasm of upper-outer quadrant of right breast in female, estrogen receptor positive (H)  Seeing oncology recent diagnosis with Malignant Myeloma.       Review of external notes as documented elsewhere in note  Ordering of each unique test  Prescription drug management  10 minutes spent on the date of the encounter doing chart review, history and exam, documentation and further activities per the note       See Patient Instructions    No follow-ups on file.    DASH Vital  [facility] - EYAD Walker is a 66 year old, presenting for the following health issues:  Heart Problem      HPI       Concern - Low heart rate  Onset: Ongoing  Description: Heart rate low  Intensity: moderate  Progression of Symptoms:  same  Accompanying Signs & Symptoms: Oncology recommended she see PCP to determine why HR is low  Previous history of similar problem: na  Precipitating factors:        Worsened by: na  Alleviating factors:        Improved by: na  Therapies tried and outcome:  none       Review of Systems   Constitutional, HEENT, cardiovascular, pulmonary, gi and gu systems are negative, except as otherwise noted.      Objective    BP (!) 122/90   Pulse 51   Temp 98  F (36.7  C) (Tympanic)   Resp 18   Wt 58.5 kg (129 lb)   SpO2 99%   BMI 21.47 kg/m    Body mass index is 21.47 kg/m .  Physical Exam    GENERAL: healthy, alert and no distress  EYES: Eyes grossly normal to inspection, PERRL and conjunctivae and sclerae normal  HENT: ear canals and TM's normal, nose and mouth without ulcers or lesions  NECK: no adenopathy, no asymmetry, masses, or scars and thyroid normal to palpation  RESP: lungs clear to auscultation - no rales, rhonchi or wheezes  CV: regular rate and rhythm, normal S1 S2, no S3 or S4, no murmur, click or rub, no peripheral edema and peripheral pulses strong  ABDOMEN: soft, nontender, no hepatosplenomegaly, no masses and bowel sounds normal  MS: no gross musculoskeletal defects noted, no edema    Lab on 07/06/2022   Component Date Value Ref Range Status     Sodium 07/06/2022 136  133 - 144 mmol/L Final     Potassium 07/06/2022 3.8  3.4 - 5.3 mmol/L Final     Chloride 07/06/2022 102  94 - 109 mmol/L Final     Carbon Dioxide (CO2) 07/06/2022 28  20 - 32 mmol/L Final     Anion Gap 07/06/2022 6  3 - 14 mmol/L Final     Urea Nitrogen 07/06/2022 20  7 - 30 mg/dL Final     Creatinine 07/06/2022 0.76  0.52 - 1.04 mg/dL Final     Calcium 07/06/2022 9.4  8.5 - 10.1 mg/dL Final     Glucose 07/06/2022 87  70 - 99 mg/dL Final     Alkaline Phosphatase 07/06/2022 57  40 - 150 U/L Final     AST 07/06/2022 44  0 - 45 U/L Final     ALT 07/06/2022 45  0 - 50 U/L Final     Protein Total 07/06/2022 7.9  6.8 - 8.8 g/dL Final     Albumin 07/06/2022 4.2  3.4 - 5.0 g/dL Final     Bilirubin Total 07/06/2022 0.6  0.2 - 1.3 mg/dL Final     GFR Estimate 07/06/2022 86  >60 mL/min/1.73m2 Final    Effective December 21, 2021 eGFRcr in adults is calculated using the 2021 CKD-EPI creatinine equation which includes age and gender (Rosita et al., NEJM, DOI: 10.1056/VQSFek4697391)     WBC Count 07/06/2022 6.8  4.0 - 11.0 10e3/uL Final     RBC Count 07/06/2022 4.62  3.80 - 5.20 10e6/uL Final     Hemoglobin 07/06/2022 14.1  11.7 - 15.7 g/dL Final     Hematocrit 07/06/2022 41.8  35.0 - 47.0 % Final     MCV 07/06/2022 91  78 - 100 fL  Final     MCH 07/06/2022 30.5  26.5 - 33.0 pg Final     MCHC 07/06/2022 33.7  31.5 - 36.5 g/dL Final     RDW 07/06/2022 13.1  10.0 - 15.0 % Final     Platelet Count 07/06/2022 188  150 - 450 10e3/uL Final     % Neutrophils 07/06/2022 63  % Final     % Lymphocytes 07/06/2022 27  % Final     % Monocytes 07/06/2022 8  % Final     % Eosinophils 07/06/2022 1  % Final     % Basophils 07/06/2022 1  % Final     % Immature Granulocytes 07/06/2022 0  % Final     NRBCs per 100 WBC 07/06/2022 0  <1 /100 Final     Absolute Neutrophils 07/06/2022 4.2  1.6 - 8.3 10e3/uL Final     Absolute Lymphocytes 07/06/2022 1.8  0.8 - 5.3 10e3/uL Final     Absolute Monocytes 07/06/2022 0.6  0.0 - 1.3 10e3/uL Final     Absolute Eosinophils 07/06/2022 0.1  0.0 - 0.7 10e3/uL Final     Absolute Basophils 07/06/2022 0.0  0.0 - 0.2 10e3/uL Final     Absolute Immature Granulocytes 07/06/2022 0.0  <=0.4 10e3/uL Final     Absolute NRBCs 07/06/2022 0.0  10e3/uL Final                   .  ..

## 2022-08-12 ENCOUNTER — OFFICE VISIT (OUTPATIENT)
Dept: FAMILY MEDICINE | Facility: OTHER | Age: 67
End: 2022-08-12
Attending: PHYSICIAN ASSISTANT
Payer: COMMERCIAL

## 2022-08-12 VITALS
TEMPERATURE: 98 F | WEIGHT: 129 LBS | DIASTOLIC BLOOD PRESSURE: 90 MMHG | BODY MASS INDEX: 21.47 KG/M2 | RESPIRATION RATE: 18 BRPM | OXYGEN SATURATION: 99 % | SYSTOLIC BLOOD PRESSURE: 122 MMHG | HEART RATE: 51 BPM

## 2022-08-12 DIAGNOSIS — C50.411 MALIGNANT NEOPLASM OF UPPER-OUTER QUADRANT OF RIGHT BREAST IN FEMALE, ESTROGEN RECEPTOR POSITIVE (H): ICD-10-CM

## 2022-08-12 DIAGNOSIS — Z17.0 MALIGNANT NEOPLASM OF UPPER-OUTER QUADRANT OF RIGHT BREAST IN FEMALE, ESTROGEN RECEPTOR POSITIVE (H): ICD-10-CM

## 2022-08-12 DIAGNOSIS — R00.1 BRADYCARDIA: Primary | ICD-10-CM

## 2022-08-12 PROBLEM — I50.9 CHF (CONGESTIVE HEART FAILURE) (H): Status: ACTIVE | Noted: 2022-08-12

## 2022-08-12 PROCEDURE — G0463 HOSPITAL OUTPT CLINIC VISIT: HCPCS

## 2022-08-12 PROCEDURE — 99214 OFFICE O/P EST MOD 30 MIN: CPT | Performed by: PHYSICIAN ASSISTANT

## 2022-08-12 PROCEDURE — G0463 HOSPITAL OUTPT CLINIC VISIT: HCPCS | Mod: 25

## 2022-08-12 ASSESSMENT — ANXIETY QUESTIONNAIRES
7. FEELING AFRAID AS IF SOMETHING AWFUL MIGHT HAPPEN: NOT AT ALL
GAD7 TOTAL SCORE: 0
5. BEING SO RESTLESS THAT IT IS HARD TO SIT STILL: NOT AT ALL
2. NOT BEING ABLE TO STOP OR CONTROL WORRYING: NOT AT ALL
8. IF YOU CHECKED OFF ANY PROBLEMS, HOW DIFFICULT HAVE THESE MADE IT FOR YOU TO DO YOUR WORK, TAKE CARE OF THINGS AT HOME, OR GET ALONG WITH OTHER PEOPLE?: NOT DIFFICULT AT ALL
6. BECOMING EASILY ANNOYED OR IRRITABLE: NOT AT ALL
1. FEELING NERVOUS, ANXIOUS, OR ON EDGE: NOT AT ALL
3. WORRYING TOO MUCH ABOUT DIFFERENT THINGS: NOT AT ALL
GAD7 TOTAL SCORE: 0
GAD7 TOTAL SCORE: 0
7. FEELING AFRAID AS IF SOMETHING AWFUL MIGHT HAPPEN: NOT AT ALL
IF YOU CHECKED OFF ANY PROBLEMS ON THIS QUESTIONNAIRE, HOW DIFFICULT HAVE THESE PROBLEMS MADE IT FOR YOU TO DO YOUR WORK, TAKE CARE OF THINGS AT HOME, OR GET ALONG WITH OTHER PEOPLE: NOT DIFFICULT AT ALL
4. TROUBLE RELAXING: NOT AT ALL

## 2022-08-12 ASSESSMENT — PATIENT HEALTH QUESTIONNAIRE - PHQ9
SUM OF ALL RESPONSES TO PHQ QUESTIONS 1-9: 0
SUM OF ALL RESPONSES TO PHQ QUESTIONS 1-9: 0
10. IF YOU CHECKED OFF ANY PROBLEMS, HOW DIFFICULT HAVE THESE PROBLEMS MADE IT FOR YOU TO DO YOUR WORK, TAKE CARE OF THINGS AT HOME, OR GET ALONG WITH OTHER PEOPLE: NOT DIFFICULT AT ALL

## 2022-08-12 NOTE — NURSING NOTE
"Chief Complaint   Patient presents with     Heart Problem       Initial BP (!) 122/90   Pulse 51   Temp 98  F (36.7  C) (Tympanic)   Resp 18   Wt 58.5 kg (129 lb)   SpO2 99%   BMI 21.47 kg/m   Estimated body mass index is 21.47 kg/m  as calculated from the following:    Height as of 7/6/22: 1.651 m (5' 5\").    Weight as of this encounter: 58.5 kg (129 lb).  Medication Reconciliation: complete  Nola Mueller LPN  "

## 2022-08-15 ENCOUNTER — HOSPITAL ENCOUNTER (OUTPATIENT)
Dept: CARDIOLOGY | Facility: HOSPITAL | Age: 67
Discharge: HOME | End: 2022-08-15
Attending: PHYSICIAN ASSISTANT | Admitting: INTERNAL MEDICINE
Payer: MEDICARE

## 2022-08-15 DIAGNOSIS — R00.1 BRADYCARDIA: ICD-10-CM

## 2022-08-15 PROCEDURE — 93242 EXT ECG>48HR<7D RECORDING: CPT

## 2022-08-15 PROCEDURE — 93244 EXT ECG>48HR<7D REV&INTERPJ: CPT | Performed by: INTERNAL MEDICINE

## 2022-09-02 DIAGNOSIS — C50.911 MALIGNANT NEOPLASM OF RIGHT FEMALE BREAST, UNSPECIFIED ESTROGEN RECEPTOR STATUS, UNSPECIFIED SITE OF BREAST (H): ICD-10-CM

## 2022-09-06 RX ORDER — ANASTROZOLE 1 MG/1
TABLET ORAL
Qty: 90 TABLET | Refills: 2 | Status: SHIPPED | OUTPATIENT
Start: 2022-09-06 | End: 2022-12-06

## 2022-09-26 ENCOUNTER — OFFICE VISIT (OUTPATIENT)
Dept: CARDIOLOGY | Facility: OTHER | Age: 67
End: 2022-09-26
Attending: INTERNAL MEDICINE
Payer: COMMERCIAL

## 2022-09-26 ENCOUNTER — PATIENT OUTREACH (OUTPATIENT)
Dept: CARE COORDINATION | Facility: OTHER | Age: 67
End: 2022-09-26

## 2022-09-26 VITALS
OXYGEN SATURATION: 99 % | BODY MASS INDEX: 21.09 KG/M2 | SYSTOLIC BLOOD PRESSURE: 161 MMHG | DIASTOLIC BLOOD PRESSURE: 92 MMHG | HEIGHT: 65 IN | HEART RATE: 50 BPM | WEIGHT: 126.6 LBS | TEMPERATURE: 97.7 F

## 2022-09-26 DIAGNOSIS — I50.9 CHF (CONGESTIVE HEART FAILURE) (H): ICD-10-CM

## 2022-09-26 DIAGNOSIS — E78.5 HYPERLIPIDEMIA WITH TARGET LDL LESS THAN 130: ICD-10-CM

## 2022-09-26 DIAGNOSIS — I50.9 CHF (CONGESTIVE HEART FAILURE) (H): Primary | ICD-10-CM

## 2022-09-26 DIAGNOSIS — R00.1 SINUS BRADYCARDIA: ICD-10-CM

## 2022-09-26 PROCEDURE — 99204 OFFICE O/P NEW MOD 45 MIN: CPT | Performed by: INTERNAL MEDICINE

## 2022-09-26 PROCEDURE — G0463 HOSPITAL OUTPT CLINIC VISIT: HCPCS

## 2022-09-26 ASSESSMENT — PAIN SCALES - GENERAL: PAINLEVEL: NO PAIN (0)

## 2022-09-26 NOTE — NURSING NOTE
"Chief Complaint   Patient presents with     New Patient       Initial BP (!) 161/92 (BP Location: Right arm)   Pulse 50   Temp 97.7  F (36.5  C) (Tympanic)   Ht 1.638 m (5' 4.5\")   Wt 57.4 kg (126 lb 9.6 oz)   SpO2 99%   BMI 21.40 kg/m   Estimated body mass index is 21.4 kg/m  as calculated from the following:    Height as of this encounter: 1.638 m (5' 4.5\").    Weight as of this encounter: 57.4 kg (126 lb 9.6 oz).  Medication Reconciliation: complete  Megan Huber LPN    "

## 2022-09-26 NOTE — PATIENT INSTRUCTIONS
Thank you for allowing Dr. Melo and our  team to participate in your care. Please call our office at [phone] with scheduling questions or if you need to cancel or change your appointment. With any other questions or concerns you may call Megan cardiology nurse at [phone].       If you experience chest pain, chest pressure, chest tightness, shortness of breath, fainting, lightheadedness, nausea, vomiting, or other concerning symptoms, please report to the Emergency Department or call 911. These symptoms may be emergent, and best treated in the Emergency Department.

## 2022-09-26 NOTE — PROGRESS NOTES
[facility] HEART CARE   CARDIOLOGY CONSULT     Denise Taylor   1955  [id number]    Mirna Roblero     Chief Complaint   Patient presents with     New Patient          HPI:   Mrs. Taylor is a 67-year-old female who is being seen by cardiology for asymptomatic bradycardia.  Has been treated for chemotherapy and having occasional dizziness once a month.  Heart rate is typically in the 50s and times in the 40s.  As it result, she was referred to cardiology.  Her slow heart rates have been going on for many years.    Denise states she has been treated with chemotherapy.  She has had a bilateral mastectomy.  She is seen multiple providers with slow heart rates.  Heart rates have been in the 50s and even the 40s.  EKG today shows sinus rhythm with a heart rate of 45 bpm.  Zio patch from 8/18/2022 showed an average heart rate of 54 bpm with the slowest heart rate of 36 bpm at 6:26 AM, described as being during sleeping hours.  She has largely been asymptomatic.  She describes an occasional episode of dizziness with standing approximately once a month.  Otherwise, she has had no issues.  Denies chest pain, chest tightness, or chest discomfort.  Has not had frequent dizziness, lightheadedness, near-syncope, or syncope.  She describes herself as being active but not intentional with fitness.  She has a history of hypothyroidism on levothyroxine with relatively good control.  She is not on any rate controlling medications.      IMAGING RESULTS:   Zio patch on 8/15/22:  Zio XT patch report on Denise Taylor.  Ordered secondary to bradycardia.  Worn for 5 days and 4 hr's.  After removing artifact, total time was 4 days and 21 hr's. Placed on 8/15/2022 at 1:58 PM and completed on 8/20/2022 at 5:44 PM.  Underlying rhythm was sinus.  Hrt rate ranged from 36 bpm at 6:26 AM on 8/20/2022, maximum heart rate of 148 bmp, averaging 54 bmp.  No significant pauses, Mobitz type II or 3rd degree heart block.  No atrial  fibrillation on this study.  x0 triggered events and x0 diary entries.  x0 runs of VT.    x4 runs of SVT lasting up to 19 beats with a maximum heart rate of 148 bmp.  Rare, <1% of PAC's, atrial couplets, atrial triplets, and PVC's.  0 episodes of ventricular bigeminy.  0 episodes of ventricular trigeminy.    ECHO on 12/22/20:  No pericardial effusion is present.  Mild concentric wall thickening consistent with left ventricular hypertrophy  is present.  Global and regional left ventricular function is normal with an EF of 60-65%.  Grade I or early diastolic dysfunction.  The right ventricle is normal size.  Both atria appear normal.  Trace mitral insufficiency is present.  Aortic valve is normal in structure and function.  The aortic valve is tricuspid.  The mean gradient across the aortic valve is4.4 mmHg.  Trace tricuspid insufficiency is present.  Mild pulmonic insufficiency is present.  The aorta root is normal.          CURRENT MEDICATIONS:   Prior to Admission medications    Medication Sig Start Date End Date Taking? Authorizing Provider   anastrozole (ARIMIDEX) 1 MG tablet TAKE 1 TABLET BY MOUTH DAILY 9/6/22   Mirna Roblero PA   calcium carbonate-vitamin D (OS-STEVE) 600-400 MG-UNIT chewable tablet Take 1 chew tab by mouth 2 times daily (with meals) 12/3/19   Marge Noyola MD   eletriptan (RELPAX) 40 MG tablet Take 1 tablet (40 mg) by mouth once as needed for migraine 10/9/20   Che Tapia MD   FLUoxetine (PROZAC) 10 MG capsule TAKE ONE CAPSULE BY MOUTH EVERY DAY 5/9/22   Mirna Roblero PA   fluticasone (ARNUITY ELLIPTA) 200 MCG/ACT inhaler Inhale 1 puff into the lungs daily 6/1/20   Che Tapia MD   levocetirizine (XYZAL) 5 MG tablet TAKE ONE TABLET BY MOUTH EVERY EVENING 5/4/22   Mirna Roblero PA   levothyroxine (SYNTHROID/LEVOTHROID) 88 MCG tablet TAKE 1 TABLET BY MOUTH DAILY 5/9/22   Mirna Roblero PA   rosuvastatin (CRESTOR) 10 MG tablet TAKE 1 TABLET BY MOUTH DAILY  7/7/22   Mirna Roblero PA       ALLERGIES:   Allergies   Allergen Reactions     Pcn [Penicillins] Rash     Zithromax [Azithromycin] Rash        PAST MEDICAL HISTORY:   Past Medical History:   Diagnosis Date     Basal cell carcinoma      Breast cancer (H) 07/2019    right breast     Bronchiectasis (H) 2019     CHF (congestive heart failure) (H) 8/12/2022     Dyslipidemia      BERTA (generalized anxiety disorder)      Invasive lobular carcinoma of right breast in female (H) 08/28/2019 [facility]12[facility]     Malignant melanoma of skin of face (H) 08/11/2022    Removed melanoma     Malignant neoplasm of upper-outer quadrant of right breast in female, estrogen receptor positive (H) 08/22/2019     Recurrent pneumonia 08/22/2019     Squamous cell carcinoma of skin 05/2020    right lower leg     Thyroid disease         PAST SURGICAL HISTORY:   Past Surgical History:   Procedure Laterality Date     ARTHROSCOPY SHOULDER ROTATOR CUFF REPAIR Left 03/07/2018    Procedure: ARTHROSCOPY SHOULDER ROTATOR CUFF REPAIR;  LEFT SHOULDER ARTHROSCOPY, REPAIR ROTATOR CUFF: subacromial Decompression and AC Joint Resection;  Surgeon: Baldev Lou DO;  Location: [facility] OR     ARTHROTOMY SHOULDER, ROTATOR CUFF REPAIR, COMBINED Right 11/14/2018    Procedure: RIGHT SHOULDER DEBRIDEMENT, EXCISION OF LIPOMA  RIGHT UPPER ARM, EXCISION SCAR TISSUE AC JOINT;  Surgeon: Baldev Lou DO;  Location: [facility] OR     BIOPSY BREAST NEEDLE LOCALIZATION, BIOPSY NODE SENTINEL, COMBINED Right 08/23/2019    Procedure: RIGHT WIRE LOCALIZED LUMPECTOMY WITH SENTINEL  LYMP NODE;  Surgeon: Michael Dupree MD;  Location: [facility] OR     BREAST RECONSTRUCTION WITH DEEP INFERIOR EPIGASTRIC  (AILYN) FLAP,  07/2020    done in [place]     COLONOSCOPY  2006    Dr Lara     COLONOSCOPY N/A 10/10/2016    Procedure: COLONOSCOPY;  Surgeon: Christine Cintron MD;  Location: [facility] OR     INSERT TISSUE EXPANDER BREAST BILATERAL  Bilateral 10/09/2019    Procedure: BILATERAL TISSUE EXPANDERS (RAMIREZ);  Surgeon: Dale Paul MD;  Location: [facility] OR     MASTECTOMY, NIPPLE SPARING Bilateral 10/09/2019    Procedure: BILATERAL SKIN SPARRING MASTECTOMY (CASS);  Surgeon: Opal Rubin MD;  Location:  OR     ORTHOPEDIC SURGERY Right     feet neuromas removed     right ankle ORIF  06/2020    Dr Torres     SHOULDER SURGERY Right 2011/10/2012        FAMILY HISTORY:   Family History   Problem Relation Age of Onset     Myocardial Infarction Mother      Hypertension Mother      Coronary Artery Disease Mother      Hyperlipidemia Mother      Thyroid Disease Mother      Stomach Cancer Father      Coronary Artery Disease Father      Hypertension Father      Colon Cancer Father      Diabetes Paternal Grandmother      Heart Disease Maternal Grandmother      Heart Disease Maternal Grandfather      Diabetes Paternal Grandfather      Cerebrovascular Disease No family hx of      Breast Cancer No family hx of      Prostate Cancer No family hx of      Anesthesia Reaction No family hx of      Asthma No family hx of      Genetic Disorder No family hx of         SOCIAL HISTORY:   Social History     Socioeconomic History     Marital status:      Number of children: 2     Years of education: 16   Occupational History     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   HEENT: Eyes: No visual changes. Ears, Nose, Throat: No hearing loss, congestion or difficulty swallowing.   CARDIOVASCULAR: No chest pain,  chest pressure or chest discomfort. No palpitations or lower extremity edema.   RESPIRATORY: No shortness of breath, dyspnea upon exertion, cough or sputum production.   GASTROINTESTINAL: No abdominal pain. No anorexia, nausea, vomiting or diarrhea.   NEUROLOGICAL: No headache, lightheadedness, syncope, ataxia or weakness.  Has occasional dizziness.  HEMATOLOGIC: No anemia, bleeding or bruising.   PSYCHIATRIC: No history of depression or anxiety.   ENDOCRINOLOGIC: No reports of sweating, cold or heat intolerance. No polyuria or polydipsia.   SKIN: No abnormal rashes or itching.       PHYSICAL EXAM:   GENERAL: The patient is a well-developed, well-nourished, in no apparent distress. Alert and oriented x3.   HEENT: Head is normocephalic and atraumatic. Eyes are symmetrical with normal visual tracking.  HEART: Regular rate and rhythm, S1S2 present without murmur, rub or gallop.   LUNGS: Respirations regular and unlabored. Clear to auscultation.   EXTREMITIES: No peripheral edema present.   NEUROLOGIC: Alert and oriented X3.    SKIN: No jaundice. No rashes or visible skin lesions present.        LAB RESULTS:   No visits with results within 2 Month(s) from this visit.   Latest known visit with results is:   Lab on 07/06/2022   Component Date Value Ref Range Status     Sodium 07/06/2022 136  133 - 144 mmol/L Final     Potassium 07/06/2022 3.8  3.4 - 5.3 mmol/L Final     Chloride 07/06/2022 102  94 - 109 mmol/L Final     Carbon Dioxide (CO2) 07/06/2022 28  20 - 32 mmol/L Final     Anion Gap 07/06/2022 6  3 - 14 mmol/L Final     Urea Nitrogen 07/06/2022 20  7 - 30 mg/dL Final     Creatinine 07/06/2022 0.76  0.52 - 1.04 mg/dL Final     Calcium 07/06/2022 9.4  8.5 - 10.1 mg/dL Final     Glucose 07/06/2022 87  70 - 99 mg/dL Final     Alkaline Phosphatase 07/06/2022 57  40 - 150 U/L Final     AST 07/06/2022 44  0 - 45 U/L Final     ALT 07/06/2022 45  0 - 50 U/L Final     Protein Total 07/06/2022 7.9  6.8 - 8.8 g/dL Final      Albumin 07/06/2022 4.2  3.4 - 5.0 g/dL Final     Bilirubin Total 07/06/2022 0.6  0.2 - 1.3 mg/dL Final     GFR Estimate 07/06/2022 86  >60 mL/min/1.73m2 Final     WBC Count 07/06/2022 6.8  4.0 - 11.0 10e3/uL Final     RBC Count 07/06/2022 4.62  3.80 - 5.20 10e6/uL Final     Hemoglobin 07/06/2022 14.1  11.7 - 15.7 g/dL Final     Hematocrit 07/06/2022 41.8  35.0 - 47.0 % Final     MCV 07/06/2022 91  78 - 100 fL Final     MCH 07/06/2022 30.5  26.5 - 33.0 pg Final     MCHC 07/06/2022 33.7  31.5 - 36.5 g/dL Final     RDW 07/06/2022 13.1  10.0 - 15.0 % Final     Platelet Count 07/06/2022 188  150 - 450 10e3/uL Final     % Neutrophils 07/06/2022 63  % Final     % Lymphocytes 07/06/2022 27  % Final     % Monocytes 07/06/2022 8  % Final     % Eosinophils 07/06/2022 1  % Final     % Basophils 07/06/2022 1  % Final     % Immature Granulocytes 07/06/2022 0  % Final     NRBCs per 100 WBC 07/06/2022 0  <1 /100 Final     Absolute Neutrophils 07/06/2022 4.2  1.6 - 8.3 10e3/uL Final     Absolute Lymphocytes 07/06/2022 1.8  0.8 - 5.3 10e3/uL Final     Absolute Monocytes 07/06/2022 0.6  0.0 - 1.3 10e3/uL Final     Absolute Eosinophils 07/06/2022 0.1  0.0 - 0.7 10e3/uL Final     Absolute Basophils 07/06/2022 0.0  0.0 - 0.2 10e3/uL Final     Absolute Immature Granulocytes 07/06/2022 0.0  <=0.4 10e3/uL Final     Absolute NRBCs 07/06/2022 0.0  10e3/uL Final          ASSESSMENT:       ICD-10-CM    1. Sinus bradycardia  R00.1          PLAN:   1.  Bradycardia: Asymptomatic.  Heart rates typically in the 50s and at times in the 40s.  Being that she is asymptomatic and not on rate controlling medications, treatment not indicated at this time.  She understands her heart rate could decline which may require a pacemaker in the future if becomes slow with symptoms of dizziness, lightheadedness, near-syncope, or syncope.  We will follow for now.  2.  Follow-up in future on as-needed basis.    Total time spent on day of visit, including review of  tests, obtaining/reviewing separately obtained history, ordering medications/tests/procedures, communicating with PCP/consultants, and documenting in electronic medical record: 50 minutes.         Thank you for allowing me to participate in the care of your patient. Please do not hesitate to contact me if you have any questions.     Brooks Melo, DO

## 2022-10-24 DIAGNOSIS — E03.9 ACQUIRED HYPOTHYROIDISM: ICD-10-CM

## 2022-10-26 RX ORDER — LEVOTHYROXINE SODIUM 88 UG/1
TABLET ORAL
Qty: 90 TABLET | Refills: 2 | Status: SHIPPED | OUTPATIENT
Start: 2022-10-26 | End: 2023-06-30

## 2022-10-26 NOTE — TELEPHONE ENCOUNTER
levothyroxine      Last Written Prescription Date:  5/9/22  Last Fill Quantity: 90,   # refills: 2  Last Office Visit: 8/12/22  Future Office visit:    Next 5 appointments (look out 90 days)    Jan 06, 2023  3:30 PM  (Arrive by 3:15 PM)  Return Visit with Che Sesay NP  [facility] ([facility] ) [address] [place] [id number]  [phone]

## 2022-10-31 ENCOUNTER — MYC MEDICAL ADVICE (OUTPATIENT)
Dept: ONCOLOGY | Facility: OTHER | Age: 67
End: 2022-10-31

## 2022-11-08 DIAGNOSIS — F41.1 GAD (GENERALIZED ANXIETY DISORDER): ICD-10-CM

## 2022-11-09 RX ORDER — FLUOXETINE 10 MG/1
CAPSULE ORAL
Qty: 90 CAPSULE | Refills: 2 | Status: SHIPPED | OUTPATIENT
Start: 2022-11-09 | End: 2023-09-08

## 2022-11-09 NOTE — TELEPHONE ENCOUNTER
FLUoxetine (PROZAC) 10 MG capsule  Last Written Prescription Date:  5-9-2022  Last Fill Quantity: 90,   # refills: 2  Last Office Visit: 8-  Future Office visit:    Next 5 appointments (look out 90 days)    Jan 06, 2023  3:30 PM  (Arrive by 3:15 PM)  Return Visit with Che Sesay NP  [facility] ([facility] ) [address]  [facility] [address]  [phone]           Routing refill request to provider for review/approval because:

## 2022-11-16 ENCOUNTER — MYC MEDICAL ADVICE (OUTPATIENT)
Dept: ONCOLOGY | Facility: OTHER | Age: 67
End: 2022-11-16

## 2022-12-06 ENCOUNTER — MYC MEDICAL ADVICE (OUTPATIENT)
Dept: ONCOLOGY | Facility: OTHER | Age: 67
End: 2022-12-06

## 2022-12-06 DIAGNOSIS — C50.911 MALIGNANT NEOPLASM OF RIGHT FEMALE BREAST, UNSPECIFIED ESTROGEN RECEPTOR STATUS, UNSPECIFIED SITE OF BREAST (H): ICD-10-CM

## 2022-12-06 RX ORDER — ANASTROZOLE 1 MG/1
1 TABLET ORAL DAILY
Qty: 90 TABLET | Refills: 2 | Status: SHIPPED | OUTPATIENT
Start: 2022-12-06 | End: 2023-09-08

## 2022-12-26 DIAGNOSIS — E78.5 HYPERLIPIDEMIA WITH TARGET LDL LESS THAN 130: ICD-10-CM

## 2022-12-27 RX ORDER — ROSUVASTATIN CALCIUM 10 MG/1
TABLET, COATED ORAL
Qty: 90 TABLET | Refills: 0 | Status: SHIPPED | OUTPATIENT
Start: 2022-12-27 | End: 2023-04-12

## 2023-01-06 ENCOUNTER — ONCOLOGY VISIT (OUTPATIENT)
Dept: ONCOLOGY | Facility: OTHER | Age: 68
End: 2023-01-06
Attending: NURSE PRACTITIONER
Payer: COMMERCIAL

## 2023-01-06 VITALS
HEART RATE: 65 BPM | OXYGEN SATURATION: 99 % | SYSTOLIC BLOOD PRESSURE: 100 MMHG | RESPIRATION RATE: 18 BRPM | WEIGHT: 123.9 LBS | DIASTOLIC BLOOD PRESSURE: 70 MMHG | TEMPERATURE: 98.3 F | HEIGHT: 65 IN | BODY MASS INDEX: 20.64 KG/M2

## 2023-01-06 DIAGNOSIS — Z79.811 USE OF AROMATASE INHIBITORS: ICD-10-CM

## 2023-01-06 DIAGNOSIS — Z85.3 PERSONAL HISTORY OF MALIGNANT NEOPLASM OF BREAST: Primary | ICD-10-CM

## 2023-01-06 DIAGNOSIS — Z86.19 HISTORY OF RECURRENT PULMONARY INFECTION: ICD-10-CM

## 2023-01-06 PROCEDURE — G0463 HOSPITAL OUTPT CLINIC VISIT: HCPCS | Performed by: NURSE PRACTITIONER

## 2023-01-06 PROCEDURE — 99213 OFFICE O/P EST LOW 20 MIN: CPT | Performed by: NURSE PRACTITIONER

## 2023-01-06 ASSESSMENT — PAIN SCALES - GENERAL: PAINLEVEL: NO PAIN (0)

## 2023-01-06 NOTE — PATIENT INSTRUCTIONS
We would like to see you back in 6 months. Please come 30 minutes prior for lab work.     When you are in need of a refill of your anastrozole, please call your pharmacy and they will send us a request.     If you have any questions please call [phone]    Other instructions:  none

## 2023-01-06 NOTE — NURSING NOTE
"Oncology Rooming Note    January 6, 2023 3:12 PM   Denise Taylor is a 67 year old female who presents for:    Chief Complaint   Patient presents with     Oncology Clinic Visit     Invasive lobular carcinoma  of right breast     Initial Vitals: /70   Pulse 65   Temp 98.3  F (36.8  C)   Resp 18   Ht 1.651 m (5' 5\")   Wt 56.2 kg (123 lb 14.4 oz)   SpO2 99%   BMI 20.62 kg/m   Estimated body mass index is 20.62 kg/m  as calculated from the following:    Height as of this encounter: 1.651 m (5' 5\").    Weight as of this encounter: 56.2 kg (123 lb 14.4 oz). Body surface area is 1.61 meters squared.  No Pain (0) Comment: Data Unavailable   No LMP recorded. Patient is postmenopausal.  Allergies reviewed: Yes  Medications reviewed: Yes    Medications: Medication refills not needed today.  [place] [place] [facility] [facility] [place] [address] [facility]    Clinical concerns: No concerns Teresa Sesay was notified.      Jaqueline Lopez RN            "

## 2023-01-06 NOTE — PROGRESS NOTES
Oncology Follow-up Visit:  January 6, 2023    Reason for Visit:  Patient presents with:  Oncology Clinic Visit: Invasive lobular carcinoma  of right breast     Nursing Note and documentation reviewed: yes    HPI:  This is a 67-year-old female patient who presents to the oncology clinic today in follow-up of Stage Ia, invasive lobular carcinoma of the right breast diagnosed July 2019.  She has undergone bilateral mastectomies and she was placed on Arimidex in December 2019.     She presents to the clinic today stating she is doing okay.  She was recently ill again with H influenza and is now following with in infectious disease physician.  Note she was diagnosed with a resistant Mycobacterium abscessus.  She states she may end up going on a long-term treatment at some point.  She has lost about 15 pounds since being ill.  She denies any shortness of breath but states she does have a very slight cough.  She did have about a 2-week holiday off of the aromatase inhibitor as she was dealing with some increased fatigue.  She did go back on this though as she felt the fatigue was likely more related to her recent illness.  She does have some discomfort in her hands but otherwise no increased joint pains.  She tells me today she will be leaving for the [place] in 2 weeks and will be staying down there till April.    She denies any changes to the chest wall including any skin changes or new lumps around her implants.    Note she states she does have difficulty sleeping at night due to the discomfort that is caused from the breast implants.  She continues on calcium with vitamin D and next DEXA scan is due in February 2024.    Oncologic History:     7/24/2019 patient was seen by her PCP with concerns regarding a right breast mass  7/25/2019 patient underwent right diagnostic mammogram showing no abnormality      **She underwent ultrasound of the right breast with no discrete masses noted and recommendation for follow-up in 6  months  7/26/2019 patient was evaluated by Dr. Dupree with General Surgery with recommendation for biopsy   7/29/2019 Ultrasound guided biopsy showed invasive lobular carcinoma, grade 2, ER/RI positive and HER-2/jenn negative  8/12/2019  She underwent MRI of the both breasts with findings showing a small area of enhancement in the upper outer quadrant of the right breast and the left breast was unremarkable     **Ultrasound of the right axilla was negative  8/23/2019  She went underwent lumpectomy with sentinel node procedure with pathology showing a 4.5 cm tumor that was consistent with invasive lobular carcinoma of the right breast, grade 2, ER/RI positive and HER-2/jenn negative; sentinel node was negative; she was staged pathologic T2N0.  Anterior and medial margin was positive on pathology.  9/19/2019  Patient was seen by Dr. Lulú Franz as well is Dr. Dale Paul with Plastic surgery to discuss reconstruction   10/9/2019 she underwent bilateral mastectomy with immediate construction and pathology showed no evidence of malignancy  11/5/2019  Patient was evaluated by Dr. Noyola  with Medical Oncology with Oncotype DX recurrence score of 12 consistent with low risk of breast cancer and recommendation for aromatase inhibitor therapy; PET and MRI brain were recommended  Patient was seen again by Dr. hair aviles with medical oncology with Oncotype DX 11/20/2019  She underwent PET scan:  IMPRESSION:   1.  Numerous new FDG avid groundglass and solid nodules throughout  both lungs concerning for recurrent disease. Additionally, there is  worsening atelectasis seen in both lungs. Infectious and noninfectious  granulomatous disease may have a similar appearance.  12/3/2019 she was seen again by Dr. Noyola with Medical Oncology to review PET scan results.  MRI was not obtained related to tissue expanders in place.  He recommended initiation of aromatase inhibitor with Arimidex 1 mg p.o. daily.  2/5/2020  Repeat  PET scan:  IMPRESSION:   1.  No evidence of active disease. Nodules and areas of groundglass  opacification seen previously within the lungs have resolved.   2.  Bilateral breast implant revision. Leaking left breast implant is  first seen on the CT scan of the chest from 11/14/2019.   2/6/2020  She underwent MRI of the brain evidence of metastatic disease     Current Chemo Regime/TX: Arimidex 1 mg daily initiated 12/2019  Current Cycle:  n/a  # of completed cycles:  n/a     Previous treatment:  n/a    Past Medical History:   Diagnosis Date     Basal cell carcinoma      Breast cancer (H) 07/2019    right breast     Bronchiectasis (H) 2019     CHF (congestive heart failure) (H) 8/12/2022     Dyslipidemia      BERTA (generalized anxiety disorder)      Invasive lobular carcinoma of right breast in female (H) 08/28/2019 5.12.2020- Review and distribute treatment summary- [facility]     Malignant melanoma of skin of face (H) 08/11/2022    Removed melanoma     Malignant neoplasm of upper-outer quadrant of right breast in female, estrogen receptor positive (H) 08/22/2019     Recurrent pneumonia 08/22/2019     Squamous cell carcinoma of skin 05/2020    right lower leg     Thyroid disease        Past Surgical History:   Procedure Laterality Date     ARTHROSCOPY SHOULDER ROTATOR CUFF REPAIR Left 03/07/2018    Procedure: ARTHROSCOPY SHOULDER ROTATOR CUFF REPAIR;  LEFT SHOULDER ARTHROSCOPY, REPAIR ROTATOR CUFF: subacromial Decompression and AC Joint Resection;  Surgeon: Baldev Lou DO;  Location: [facility] OR     ARTHROTOMY SHOULDER, ROTATOR CUFF REPAIR, COMBINED Right 11/14/2018    Procedure: RIGHT SHOULDER DEBRIDEMENT, EXCISION OF LIPOMA  RIGHT UPPER ARM, EXCISION SCAR TISSUE AC JOINT;  Surgeon: Baldev Lou DO;  Location: [facility] OR     BIOPSY BREAST NEEDLE LOCALIZATION, BIOPSY NODE SENTINEL, COMBINED Right 08/23/2019    Procedure: RIGHT WIRE LOCALIZED LUMPECTOMY WITH SENTINEL  LYMP NODE;  Surgeon: Michael Dupree MD;   Location: [facility] OR     BREAST RECONSTRUCTION WITH DEEP INFERIOR EPIGASTRIC  (AILYN) FLAP,  07/2020    done in [place]     COLONOSCOPY  2006    Dr Lara     COLONOSCOPY N/A 10/10/2016    Procedure: COLONOSCOPY;  Surgeon: Christine Cintron MD;  Location: [facility] OR     INSERT TISSUE EXPANDER BREAST BILATERAL Bilateral 10/09/2019    Procedure: BILATERAL TISSUE EXPANDERS (RAMIREZ);  Surgeon: Dale Paul MD;  Location: [facility]     MASTECTOMY, NIPPLE SPARING Bilateral 10/09/2019    Procedure: BILATERAL SKIN SPARRING MASTECTOMY (CASS);  Surgeon: Opal Rubin MD;  Location: [facility] OR     ORTHOPEDIC SURGERY Right     feet neuromas removed     right ankle ORIF  06/2020    Dr Torres     SHOULDER SURGERY Right 2011/10/2012       Family History   Problem Relation Age of Onset     Myocardial Infarction Mother      Hypertension Mother      Coronary Artery Disease Mother      Hyperlipidemia Mother      Thyroid Disease Mother      Stomach Cancer Father      Coronary Artery Disease Father      Hypertension Father      Colon Cancer Father      Diabetes Paternal Grandmother      Heart Disease Maternal Grandmother      Heart Disease Maternal Grandfather      Diabetes Paternal Grandfather      Cerebrovascular Disease No family hx of      Breast Cancer No family hx of      Prostate Cancer No family hx of      Anesthesia Reaction No family hx of      Asthma No family hx of      Genetic Disorder No family hx of        Social History     Socioeconomic History     Marital status:      Spouse name: Not on file     Number of children: 2     Years of education: 16     Highest education level: Not on file   Occupational History     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 Not Asked   Social History Narrative     -- n/a    Caffeine -- 2c/day    Concussion -- n/a     Social Determinants of Health     Financial Resource Strain: Not on file   Food Insecurity: Not on file   Transportation Needs: Not on file   Physical Activity: Not on file   Stress: Not on file   Social Connections: Not on file   Intimate Partner Violence: Not on file   Housing Stability: Not on file       Current Outpatient Medications   Medication     anastrozole (ARIMIDEX) 1 MG tablet     calcium carbonate-vitamin D (OS-STEVE) 600-400 MG-UNIT chewable tablet     FLUoxetine (PROZAC) 10 MG capsule     fluticasone (ARNUITY ELLIPTA) 200 MCG/ACT inhaler     levocetirizine (XYZAL) 5 MG tablet     levothyroxine (SYNTHROID/LEVOTHROID) 88 MCG tablet     rosuvastatin (CRESTOR) 10 MG tablet     eletriptan (RELPAX) 40 MG tablet     No current facility-administered medications for this visit.        Allergies   Allergen Reactions     Pcn [Penicillins] Rash     Zithromax [Azithromycin] Rash       Review Of Systems:  Constitutional:    denies fever, weight changes, chills, and night sweats.  Eyes:    denies double vision; vision not as clear-states has cataracts  Ears/Nose/Throat:   denies ear pain, nose problems, difficulty swallowing  Respiratory:  See HPI  Skin:   denies rash, lesions  Breast/Chest wall:   denies pain, lumps or skin changes  Cardiovascular:   denies chest pain, palpitations, edema  Gastrointestinal:   denies abdominal pain, bloating, nausea, early satiety; no change in bowel habits or blood in stool  Genitourinary:   denies difficulty with urination, blood in urine  Musculoskeletal:    denies new muscle pain, bone pain  Neurologic:   denies lightheadedness, headaches, numbness or tingling  Psychiatric:   denies anxiety, depression  Hematologic/Lymphatic/Immunologic:   denies easy bruising, easy bleeding, lumps  "or bumps noted  Endocrine:   Denies increased thirst      Physical Exam:  /70   Pulse 65   Temp 98.3  F (36.8  C)   Resp 18   Ht 1.651 m (5' 5\")   Wt 56.2 kg (123 lb 14.4 oz)   SpO2 99%   BMI 20.62 kg/m      GENERAL APPEARANCE: Healthy, alert and in no acute distress.  HEENT: Normocephalic, Sclerae anicteric.   NECK:   No asymmetry or masses, no thyromegaly.  LYMPHATICS: No palpable cervical, supraclavicular, axillary, or inguinal nodes   RESP: Lungs clear to auscultation bilaterally, respirations regular and easy  CARDIOVASCULAR: Regular rate and rhythm. Normal S1, S2; no murmur, gallop, or rub.  ABDOMEN: Soft, nontender. Bowel sounds auscultated all 4 quadrants. No palpable organomegaly or masses.  BREAST/Chest wall: No concerning masses or skin changes   MUSCULOSKELETAL: Extremities without gross deformities noted. No edema of bilateral lower extremities.  SKIN: No suspicious lesions or rashes to exposed skin  NEURO: Alert and oriented x 3.  Gait steady.  PSYCHIATRIC: Mentation and affect appear normal.  Mood appropriate.    Laboratory:  None completed for today's visit    Imaging Studies: None completed for today's visit      ASSESSMENT/PLAN:    #1 Breast cancer:   Stage Ia, invasive lobular carcinoma of the right breast diagnosed August 2019.  She is currently on Arimidex 1 mg daily and will continue.  We will plan to follow-up again in 6 months with a CBC and CMP.     #2  On aromatase inhibitor therapy: DEXA scan completed in February 2022 was normal.  She will continue on the calcium with vitamin D twice a day.  We will repeat her DEXA scan in February 2024.    I encouraged patient to call with any questions or concerns.    Che Sesay, NP  APRN, FNP-BC, AOCNP  "

## 2023-01-16 DIAGNOSIS — J30.2 SEASONAL ALLERGIES: ICD-10-CM

## 2023-01-17 NOTE — TELEPHONE ENCOUNTER
Xyzal       Last Written Prescription Date:  05/04/2022  Last Fill Quantity: 90,   # refills: 3  Last Office Visit: 08/12/2022  Future Office visit:       Routing refill request to provider for review/approval because:

## 2023-01-18 RX ORDER — LEVOCETIRIZINE DIHYDROCHLORIDE 5 MG/1
TABLET, FILM COATED ORAL
Qty: 90 TABLET | Refills: 3 | Status: SHIPPED | OUTPATIENT
Start: 2023-01-18 | End: 2024-03-05

## 2023-04-11 DIAGNOSIS — E78.5 HYPERLIPIDEMIA WITH TARGET LDL LESS THAN 130: ICD-10-CM

## 2023-04-12 RX ORDER — ROSUVASTATIN CALCIUM 10 MG/1
TABLET, COATED ORAL
Qty: 90 TABLET | Refills: 0 | Status: SHIPPED | OUTPATIENT
Start: 2023-04-12 | End: 2023-06-21

## 2023-04-12 NOTE — TELEPHONE ENCOUNTER
Crestor       Last Written Prescription Date:  12/27/22  Last Fill Quantity: 90,   # refills: 0  Last Office Visit: 8/12/22  Future Office visit:    Next 5 appointments (look out 90 days)    Jun 21, 2023  2:00 PM  (Arrive by 1:45 PM)  PHYSICAL with DASH Angelo  [facility] ([facility] ) [address] [address]  [phone]   Jul 07, 2023  2:40 PM  (Arrive by 2:25 PM)  Return Visit with Che Sesay NP  [facility] ([facility] ) [address] [address]  [phone]

## 2023-04-22 ENCOUNTER — HEALTH MAINTENANCE LETTER (OUTPATIENT)
Age: 68
End: 2023-04-22

## 2023-06-14 ASSESSMENT — ENCOUNTER SYMPTOMS: BREAST MASS: 0

## 2023-06-14 ASSESSMENT — ACTIVITIES OF DAILY LIVING (ADL): CURRENT_FUNCTION: NO ASSISTANCE NEEDED

## 2023-06-20 ASSESSMENT — ANXIETY QUESTIONNAIRES
5. BEING SO RESTLESS THAT IT IS HARD TO SIT STILL: NOT AT ALL
IF YOU CHECKED OFF ANY PROBLEMS ON THIS QUESTIONNAIRE, HOW DIFFICULT HAVE THESE PROBLEMS MADE IT FOR YOU TO DO YOUR WORK, TAKE CARE OF THINGS AT HOME, OR GET ALONG WITH OTHER PEOPLE: NOT DIFFICULT AT ALL
1. FEELING NERVOUS, ANXIOUS, OR ON EDGE: NOT AT ALL
4. TROUBLE RELAXING: NOT AT ALL
GAD7 TOTAL SCORE: 0
7. FEELING AFRAID AS IF SOMETHING AWFUL MIGHT HAPPEN: NOT AT ALL
3. WORRYING TOO MUCH ABOUT DIFFERENT THINGS: NOT AT ALL
2. NOT BEING ABLE TO STOP OR CONTROL WORRYING: NOT AT ALL
7. FEELING AFRAID AS IF SOMETHING AWFUL MIGHT HAPPEN: NOT AT ALL
GAD7 TOTAL SCORE: 0
8. IF YOU CHECKED OFF ANY PROBLEMS, HOW DIFFICULT HAVE THESE MADE IT FOR YOU TO DO YOUR WORK, TAKE CARE OF THINGS AT HOME, OR GET ALONG WITH OTHER PEOPLE?: NOT DIFFICULT AT ALL
GAD7 TOTAL SCORE: 0
6. BECOMING EASILY ANNOYED OR IRRITABLE: NOT AT ALL

## 2023-06-21 ENCOUNTER — TELEPHONE (OUTPATIENT)
Dept: FAMILY MEDICINE | Facility: OTHER | Age: 68
End: 2023-06-21

## 2023-06-21 ENCOUNTER — OFFICE VISIT (OUTPATIENT)
Dept: FAMILY MEDICINE | Facility: OTHER | Age: 68
End: 2023-06-21
Attending: PHYSICIAN ASSISTANT
Payer: COMMERCIAL

## 2023-06-21 VITALS
WEIGHT: 128 LBS | SYSTOLIC BLOOD PRESSURE: 102 MMHG | HEART RATE: 50 BPM | OXYGEN SATURATION: 100 % | TEMPERATURE: 97.9 F | RESPIRATION RATE: 16 BRPM | DIASTOLIC BLOOD PRESSURE: 82 MMHG | BODY MASS INDEX: 21.3 KG/M2

## 2023-06-21 DIAGNOSIS — G43.909 MIGRAINE WITHOUT STATUS MIGRAINOSUS, NOT INTRACTABLE, UNSPECIFIED MIGRAINE TYPE: ICD-10-CM

## 2023-06-21 DIAGNOSIS — E03.9 ACQUIRED HYPOTHYROIDISM: ICD-10-CM

## 2023-06-21 DIAGNOSIS — K13.0 ANGULAR CHEILOSIS: ICD-10-CM

## 2023-06-21 DIAGNOSIS — Z00.00 MEDICARE ANNUAL WELLNESS VISIT, SUBSEQUENT: Primary | ICD-10-CM

## 2023-06-21 DIAGNOSIS — E78.5 HYPERLIPIDEMIA WITH TARGET LDL LESS THAN 130: ICD-10-CM

## 2023-06-21 PROCEDURE — G0463 HOSPITAL OUTPT CLINIC VISIT: HCPCS

## 2023-06-21 PROCEDURE — G0439 PPPS, SUBSEQ VISIT: HCPCS | Performed by: PHYSICIAN ASSISTANT

## 2023-06-21 RX ORDER — DOXYCYCLINE 100 MG/1
CAPSULE ORAL
COMMUNITY
Start: 2023-01-20 | End: 2023-07-14

## 2023-06-21 RX ORDER — ROSUVASTATIN CALCIUM 10 MG/1
10 TABLET, COATED ORAL DAILY
Qty: 90 TABLET | Refills: 0 | Status: SHIPPED | OUTPATIENT
Start: 2023-06-21 | End: 2023-10-12

## 2023-06-21 RX ORDER — VORICONAZOLE 50 MG/1
TABLET, FILM COATED ORAL
COMMUNITY
Start: 2023-06-08 | End: 2024-01-04

## 2023-06-21 RX ORDER — VORICONAZOLE 200 MG/1
TABLET, FILM COATED ORAL
COMMUNITY
Start: 2023-05-17 | End: 2024-01-04

## 2023-06-21 RX ORDER — ELETRIPTAN HYDROBROMIDE 40 MG/1
40 TABLET, FILM COATED ORAL
Qty: 12 TABLET | Refills: 3 | Status: SHIPPED | OUTPATIENT
Start: 2023-06-21 | End: 2023-07-14

## 2023-06-21 RX ORDER — VIT C/HESPERIDIN/BIOFLAVONOIDS 500-100 MG
TABLET ORAL
COMMUNITY
End: 2023-07-14

## 2023-06-21 RX ORDER — CEFUROXIME AXETIL 500 MG/1
TABLET ORAL
COMMUNITY
Start: 2023-05-16 | End: 2023-07-14

## 2023-06-21 ASSESSMENT — PAIN SCALES - GENERAL: PAINLEVEL: NO PAIN (0)

## 2023-06-21 ASSESSMENT — ACTIVITIES OF DAILY LIVING (ADL): CURRENT_FUNCTION: NO ASSISTANCE NEEDED

## 2023-06-21 ASSESSMENT — ENCOUNTER SYMPTOMS: BREAST MASS: 0

## 2023-06-21 NOTE — PROGRESS NOTES
"SUBJECTIVE:   Denise is a 67 year old who presents for Preventive Visit.      6/21/2023     2:06 PM   Additional Questions   Roomed by Manuel Moreno LPN   Accompanied by self         6/21/2023     2:06 PM   Patient Reported Additional Medications   Patient reports taking the following new medications none     Are you in the first 12 months of your Medicare coverage?  No    Healthy Habits:     In general, how would you rate your overall health?  Good    Frequency of exercise:  1 day/week    Duration of exercise:  Less than 15 minutes    Do you usually eat at least 4 servings of fruit and vegetables a day, include whole grains    & fiber and avoid regularly eating high fat or \"junk\" foods?  No    Taking medications regularly:  Yes    Ability to successfully perform activities of daily living:  No assistance needed    Home Safety:  No safety concerns identified    Hearing Impairment:  No hearing concerns    In the past 6 months, have you been bothered by leaking of urine?  No    In general, how would you rate your overall mental or emotional health?  Good      PHQ-2 Total Score: 0    Additional concerns today:  No        Have you ever done Advance Care Planning? (For example, a Health Directive, POLST, or a discussion with a medical provider or your loved ones about your wishes): Yes, advance care planning is on file.       Fall risk  Fallen 2 or more times in the past year?: No  Any fall with injury in the past year?: No    Cognitive Screening   1) Repeat 3 items (Leader, Season, Table)    2) Clock draw: NORMAL  3) 3 item recall: Recalls 3 objects  Results: 3 items recalled: COGNITIVE IMPAIRMENT LESS LIKELY    Mini-CogTM Copyright JADA Martínez. Licensed by the author for use in [facility]; reprinted with permission ([email or web address]). All rights reserved.      Do you have sleep apnea, excessive snoring or daytime drowsiness?: no    Reviewed and updated as needed this visit by clinical staff   Tobacco  " Allergies  Meds              Reviewed and updated as needed this visit by Provider                 Social History     Tobacco Use     Smoking status: Never     Smokeless tobacco: Never   Substance Use Topics     Alcohol use: Yes     Alcohol/week: 1.7 standard drinks of alcohol     Types: 2 Glasses of wine per week     Comment: occasionally             6/14/2023     9:08 AM   Alcohol Use   Prescreen: >3 drinks/day or >7 drinks/week? No     Do you have a current opioid prescription? No  Do you use any other controlled substances or medications that are not prescribed by a provider? None                Current providers sharing in care for this patient include:   Patient Care Team:  Mirna Roblero PA as PCP - General (Family Practice)  Mirna Roblero PA as Assigned PCP  Che Sesay NP as Assigned Cancer Care Provider  Lizzie Murdock RN (Inactive) as Specialty Care Coordinator (Hematology & Oncology)  Brooks Melo DO as Assigned Heart and Vascular Provider    The following health maintenance items are reviewed in Epic and correct as of today:  Health Maintenance   Topic Date Due     HF ACTION PLAN  Never done     MEDICARE ANNUAL WELLNESS VISIT  09/12/2020     DTAP/TDAP/TD IMMUNIZATION (4 - Td or Tdap) 10/01/2022     BMP  01/06/2023     LIPID  02/04/2023     TSH W/FREE T4 REFLEX  02/04/2023     COVID-19 Vaccine (6 - Pfizer series) 02/11/2023     ALT  07/06/2023     CBC  07/06/2023     BERTA ASSESSMENT  12/21/2023     PHQ-9  12/21/2023     FALL RISK ASSESSMENT  06/21/2024     Pneumococcal Vaccine: 65+ Years (3 - PPSV23 if available, else PCV20) 12/11/2024     COLORECTAL CANCER SCREENING  10/10/2026     ADVANCE CARE PLANNING  02/16/2027     DEXA  02/11/2037     HEPATITIS C SCREENING  Completed     DEPRESSION ACTION PLAN  Completed     INFLUENZA VACCINE  Completed     ZOSTER IMMUNIZATION  Completed     IPV IMMUNIZATION  Aged Out     MENINGITIS IMMUNIZATION  Aged Out     Lab work is in process  Labs  reviewed in EPIC  BP Readings from Last 3 Encounters:   06/21/23 102/82   01/06/23 100/70   09/26/22 (!) 161/92    Wt Readings from Last 3 Encounters:   06/21/23 58.1 kg (128 lb)   01/06/23 56.2 kg (123 lb 14.4 oz)   09/26/22 57.4 kg (126 lb 9.6 oz)                  Patient Active Problem List   Diagnosis     History of basal cell carcinoma     Hyperlipidemia with target LDL less than 130     ACP (advance care planning)     Acquired hypothyroidism     AC separation, type 5     Actinic keratosis     Capillary disease     Other dyschromia     Scar condition and fibrosis of skin     Cigarette nicotine dependence in remission     Invasive lobular carcinoma of right breast in female (H)     Personal history of malignant neoplasm of breast     Seasonal allergies     Migraine without status migrainosus, not intractable, unspecified migraine type     Use of aromatase inhibitors     Lung nodule     CHF (congestive heart failure) (H)     Sinus bradycardia     History of recurrent pulmonary infection     Past Surgical History:   Procedure Laterality Date     ARTHROSCOPY SHOULDER ROTATOR CUFF REPAIR Left 03/07/2018    Procedure: ARTHROSCOPY SHOULDER ROTATOR CUFF REPAIR;  LEFT SHOULDER ARTHROSCOPY, REPAIR ROTATOR CUFF: subacromial Decompression and AC Joint Resection;  Surgeon: Baldev Lou DO;  Location: [facility]     ARTHROTOMY SHOULDER, ROTATOR CUFF REPAIR, COMBINED Right 11/14/2018    Procedure: RIGHT SHOULDER DEBRIDEMENT, EXCISION OF LIPOMA  RIGHT UPPER ARM, EXCISION SCAR TISSUE AC JOINT;  Surgeon: Baldev Lou DO;  Location: [facility] OR     BIOPSY BREAST NEEDLE LOCALIZATION, BIOPSY NODE SENTINEL, COMBINED Right 08/23/2019    Procedure: RIGHT WIRE LOCALIZED LUMPECTOMY WITH SENTINEL  LYMP NODE;  Surgeon: Michael Dupree MD;  Location: [facility] OR     BREAST RECONSTRUCTION WITH DEEP INFERIOR EPIGASTRIC  (AILYN) FLAP,  07/2020    done in [place]     COLONOSCOPY  2006    Dr Lara     COLONOSCOPY N/A 10/10/2016    Procedure:  COLONOSCOPY;  Surgeon: Christine Cintron MD;  Location: [facility] OR     INSERT TISSUE EXPANDER BREAST BILATERAL Bilateral 10/09/2019    Procedure: BILATERAL TISSUE EXPANDERS (RAMIREZ);  Surgeon: Dale Paul MD;  Location: [facility]     MASTECTOMY, NIPPLE SPARING Bilateral 10/09/2019    Procedure: BILATERAL SKIN SPARRING MASTECTOMY (CASS);  Surgeon: Opal Rubin MD;  Location: [facility] OR     ORTHOPEDIC SURGERY Right     feet neuromas removed     right ankle ORIF  06/2020    Dr Torres     SHOULDER SURGERY Right 2011/10/2012       Social History     Tobacco Use     Smoking status: Never     Smokeless tobacco: Never   Substance Use Topics     Alcohol use: Yes     Alcohol/week: 1.7 standard drinks of alcohol     Types: 2 Glasses of wine per week     Comment: occasionally     Family History   Problem Relation Age of Onset     Myocardial Infarction Mother      Hypertension Mother      Coronary Artery Disease Mother      Hyperlipidemia Mother      Thyroid Disease Mother      Stomach Cancer Father      Coronary Artery Disease Father      Hypertension Father      Colon Cancer Father      Diabetes Paternal Grandmother      Heart Disease Maternal Grandmother      Heart Disease Maternal Grandfather      Diabetes Paternal Grandfather      Cerebrovascular Disease No family hx of      Breast Cancer No family hx of      Prostate Cancer No family hx of      Anesthesia Reaction No family hx of      Asthma No family hx of      Genetic Disorder No family hx of          Current Outpatient Medications   Medication Sig Dispense Refill     anastrozole (ARIMIDEX) 1 MG tablet Take 1 tablet (1 mg) by mouth daily 90 tablet 2     calcium carbonate-vitamin D (OS-STEVE) 600-400 MG-UNIT chewable tablet Take 1 chew tab by mouth 2 times daily (with meals) 180 tablet 3     Chelated Calcium 200 MG TABS        eletriptan (RELPAX) 40 MG tablet Take 1 tablet (40 mg) by mouth once as needed for migraine 12 tablet 3     FLUoxetine (PROZAC) 10 MG capsule  TAKE ONE CAPSULE BY MOUTH EVERY DAY 90 capsule 2     levocetirizine (XYZAL) 5 MG tablet TAKE ONE TABLET BY MOUTH EVERY EVENING 90 tablet 3     levothyroxine (SYNTHROID/LEVOTHROID) 88 MCG tablet TAKE 1 TABLET BY MOUTH DAILY 90 tablet 2     rosuvastatin (CRESTOR) 10 MG tablet Take 1 tablet (10 mg) by mouth daily 90 tablet 0     voriconazole (VFEND) 200 MG tablet TAKE 1 AND 1/2 TABLETS BY MOUTH TWICE DAILY FOR TWO doses, THEN ONE TABLET TWICE DAILY --Administer 1 hour before or after meals       voriconazole (VFEND) 50 MG tablet Administer 1 hour before or after meals.  Take two tablets in the morning (100mg) and three tablets at night (150mg).  Indications: Infection       cefuroxime (CEFTIN) 500 MG tablet Take 1 Tablet by mouth two times a day. Do not crush. Indications Infection (Patient not taking: Reported on 6/21/2023)       doxycycline monohydrate (MONODOX) 100 MG capsule Take 1 Capsule by mouth every 12 hours. Indications Infection (Patient not taking: Reported on 6/21/2023)       voriconazole (VFEND) 50 MG tablet Administer 1 hour before or after meals. Take two tablets in the morning (100mg) and three tablets at night (150mg). Indications Infection (Patient not taking: Reported on 6/21/2023)       Allergies   Allergen Reactions     Pcn [Penicillins] Rash     Zithromax [Azithromycin] Rash     Recent Labs   Lab Test 07/06/22  1331 02/04/22  1257 01/05/22  1347 10/25/21  1219 09/22/21  1614 04/28/21  0859 01/26/21  0920 12/22/20  1433 10/09/20  1255 06/05/20  1327 06/01/20  1110 10/02/19  1430 07/30/19  1247   LDL  --  65  --   --   --   --   --   --  64  --   --   --  84   HDL  --  67  --   --   --   --   --   --  58  --   --   --  56   TRIG  --  123  --   --   --   --   --   --  134  --   --   --  107   ALT 45  --  37 42   < > 49 27   < > 40   < > 45   < > 39   CR 0.76  --  0.82  --    < > 0.73 0.70   < > 0.80   < > 0.77   < > 0.76   GFRESTIMATED 86  --  78  --    < > 86 >90   < > 78   < > 81   < > 83  "  GFRESTBLACK  --   --   --   --   --  >90 >90   < > >90   < > >90   < > >90   POTASSIUM 3.8  --  4.1  --    < > 4.0 4.0   < > 4.4   < > 4.1   < > 4.2   TSH  --  1.83  --   --   --   --   --   --   --   --  3.24  --   --     < > = values in this interval not displayed.      Last 3 Pap and HPV Results:        FHS-7:        No data to display                Mammogram Screening: Recommended mammography every 1-2 years with patient discussion and risk factor consideration  Pertinent mammograms are reviewed under the imaging tab.    Review of Systems   Eyes: Positive for visual disturbance.   Breasts:  Negative for tenderness, breast mass and discharge.   Genitourinary: Negative for pelvic pain, vaginal bleeding and vaginal discharge.     CONSTITUTIONAL: NEGATIVE for fever, chills, change in weight  INTEGUMENTARY/SKIN: rash on her skin from her   EYES: NEGATIVE for vision changes or irritation  ENT/MOUTH: NEGATIVE for ear, mouth and throat problems  RESP: NEGATIVE for significant cough or SOB  BREAST: both absent and replaced.   CV: NEGATIVE for chest pain, palpitations or peripheral edema  GI: NEGATIVE for nausea, abdominal pain, heartburn, or change in bowel habits  : NEGATIVE for frequency, dysuria, or hematuria  MUSCULOSKELETAL: NEGATIVE for significant arthralgias or myalgia  NEURO: NEGATIVE for weakness, dizziness or paresthesias  ENDOCRINE: NEGATIVE for temperature intolerance, skin/hair changes  HEME: NEGATIVE for bleeding problems  PSYCHIATRIC: NEGATIVE for changes in mood or affect    OBJECTIVE:   /82 (BP Location: Left arm, Patient Position: Sitting, Cuff Size: Adult Regular)   Pulse 50   Temp 97.9  F (36.6  C) (Tympanic)   Resp 16   Wt 58.1 kg (128 lb)   SpO2 100%   BMI 21.30 kg/m   Estimated body mass index is 21.3 kg/m  as calculated from the following:    Height as of 1/6/23: 1.651 m (5' 5\").    Weight as of this encounter: 58.1 kg (128 lb).  Physical Exam  GENERAL: healthy, alert and no " distress  EYES: Eyes grossly normal to inspection, PERRL and conjunctivae and sclerae normal  HENT: ear canals and TM's normal, nose and mouth without ulcers or lesions  NECK: no adenopathy, no asymmetry, masses, or scars and thyroid normal to palpation  RESP: lungs clear to auscultation - no rales, rhonchi or wheezes  CV: regular rate and rhythm, normal S1 S2, no S3 or S4, no murmur, click or rub, no peripheral edema and peripheral pulses strong  ABDOMEN: soft, nontender, no hepatosplenomegaly, no masses and bowel sounds normal  MS: no gross musculoskeletal defects noted, no edema  SKIN: no suspicious lesions or rashes  NEURO: Normal strength and tone, mentation intact and speech normal  BACK: no CVA tenderness, no paralumbar tenderness  PSYCH: mentation appears normal, affect normal/bright    Diagnostic Test Results:  Labs reviewed in Epic  No results found for this or any previous visit (from the past 24 hour(s)).    ASSESSMENT / PLAN:   1. Medicare annual wellness visit, subsequent  Here for wellness. Has seen many Dr in the last year. Working on imminently status. Taking antifungal and antibiotics.     2. Migraine without status migrainosus, not intractable, unspecified migraine type  Refill is given.   - eletriptan (RELPAX) 40 MG tablet; Take 1 tablet (40 mg) by mouth once as needed for migraine  Dispense: 12 tablet; Refill: 3    3. Hyperlipidemia with target LDL less than 130  Refill is given will be getting labs done.   - rosuvastatin (CRESTOR) 10 MG tablet; Take 1 tablet (10 mg) by mouth daily  Dispense: 90 tablet; Refill: 0  - Lipid Profile (Chol, Trig, HDL, LDL calc); Future    4. Acquired hypothyroidism  Due for labs and refill..  We will see her back in a year if no changes.   - TSH with free T4 reflex; Future              COUNSELING:  Reviewed preventive health counseling, as reflected in patient instructions       Regular exercise       Healthy diet/nutrition       Vision screening       Hearing  screening       Dental care       Bladder control       Fall risk prevention       Immunizations    She is up to date. Tdap will get at a pharmacy.               Osteoporosis prevention/bone health        She reports that she has never smoked. She has never used smokeless tobacco.      Appropriate preventive services were discussed with this patient, including applicable screening as appropriate for cardiovascular disease, diabetes, osteopenia/osteoporosis, and glaucoma.  As appropriate for age/gender, discussed screening for colorectal cancer, prostate cancer, breast cancer, and cervical cancer. Checklist reviewing preventive services available has been given to the patient.    Reviewed patients plan of care and provided an AVS. The Intermediate Care Plan ( asthma action plan, low back pain action plan, and migraine action plan) for Denise meets the Care Plan requirement. This Care Plan has been established and reviewed with the Patient.      [facility]  [facility] [place]    Identified Health Risks:    I have reviewed Opioid Use Disorder and Substance Use Disorder risk factors and made any needed referrals.     Answers for HPI/ROS submitted by the patient on 6/20/2023  If you checked off any problems, how difficult have these problems made it for you to do your work, take care of things at home, or get along with other people?: Not difficult at all  PHQ9 TOTAL SCORE: 0  BERTA 7 TOTAL SCORE: 0

## 2023-06-22 NOTE — TELEPHONE ENCOUNTER
Received a DENIAL from [employer] for eletriptan (RELPAX) 40 MG tablet.         Scanned in Epic.

## 2023-06-23 ENCOUNTER — LAB (OUTPATIENT)
Dept: LAB | Facility: OTHER | Age: 68
End: 2023-06-23
Payer: MEDICARE

## 2023-06-23 DIAGNOSIS — K13.0 ANGULAR CHEILOSIS: ICD-10-CM

## 2023-06-23 DIAGNOSIS — E78.5 HYPERLIPIDEMIA WITH TARGET LDL LESS THAN 130: ICD-10-CM

## 2023-06-23 DIAGNOSIS — E03.9 ACQUIRED HYPOTHYROIDISM: ICD-10-CM

## 2023-06-23 LAB
CHOLEST SERPL-MCNC: 153 MG/DL
HDLC SERPL-MCNC: 56 MG/DL
LDLC SERPL CALC-MCNC: 73 MG/DL
NONHDLC SERPL-MCNC: 97 MG/DL
TRIGL SERPL-MCNC: 119 MG/DL
TSH SERPL DL<=0.005 MIU/L-ACNC: 1.14 UIU/ML (ref 0.3–4.2)
VIT B12 SERPL-MCNC: 684 PG/ML (ref 232–1245)

## 2023-06-23 PROCEDURE — 82607 VITAMIN B-12: CPT | Mod: ZL

## 2023-06-23 PROCEDURE — 84443 ASSAY THYROID STIM HORMONE: CPT | Mod: ZL

## 2023-06-23 PROCEDURE — 36415 COLL VENOUS BLD VENIPUNCTURE: CPT | Mod: ZL

## 2023-06-23 PROCEDURE — 80061 LIPID PANEL: CPT | Mod: ZL

## 2023-06-26 NOTE — TELEPHONE ENCOUNTER
Per Radha Gill: I am not sure if the patient's insurance or drug formulary changed, but they are denying this med as not having tried 2 of the other options listed. If Mirna would like to do a letter of medical necessity, I can appeal this decision. Thank you!

## 2023-06-27 ENCOUNTER — TELEPHONE (OUTPATIENT)
Dept: FAMILY MEDICINE | Facility: OTHER | Age: 68
End: 2023-06-27

## 2023-06-27 ENCOUNTER — MYC MEDICAL ADVICE (OUTPATIENT)
Dept: FAMILY MEDICINE | Facility: OTHER | Age: 68
End: 2023-06-27

## 2023-06-27 DIAGNOSIS — G43.909 MIGRAINE WITHOUT STATUS MIGRAINOSUS, NOT INTRACTABLE, UNSPECIFIED MIGRAINE TYPE: Primary | ICD-10-CM

## 2023-06-27 DIAGNOSIS — B99.9 ACUTE INFECTION: Primary | ICD-10-CM

## 2023-06-29 ENCOUNTER — LAB (OUTPATIENT)
Dept: LAB | Facility: OTHER | Age: 68
End: 2023-06-29
Payer: MEDICARE

## 2023-06-29 DIAGNOSIS — B99.9 ACUTE INFECTION: ICD-10-CM

## 2023-06-29 LAB
BASOPHILS # BLD AUTO: 0 10E3/UL (ref 0–0.2)
BASOPHILS NFR BLD AUTO: 1 %
EOSINOPHIL # BLD AUTO: 0.1 10E3/UL (ref 0–0.7)
EOSINOPHIL NFR BLD AUTO: 2 %
ERYTHROCYTE [DISTWIDTH] IN BLOOD BY AUTOMATED COUNT: 13.1 % (ref 10–15)
HCT VFR BLD AUTO: 40.4 % (ref 35–47)
HGB BLD-MCNC: 13.3 G/DL (ref 11.7–15.7)
IMM GRANULOCYTES # BLD: 0 10E3/UL
IMM GRANULOCYTES NFR BLD: 0 %
LYMPHOCYTES # BLD AUTO: 2.6 10E3/UL (ref 0.8–5.3)
LYMPHOCYTES NFR BLD AUTO: 45 %
MCH RBC QN AUTO: 29.9 PG (ref 26.5–33)
MCHC RBC AUTO-ENTMCNC: 32.9 G/DL (ref 31.5–36.5)
MCV RBC AUTO: 91 FL (ref 78–100)
MONOCYTES # BLD AUTO: 0.5 10E3/UL (ref 0–1.3)
MONOCYTES NFR BLD AUTO: 8 %
NEUTROPHILS # BLD AUTO: 2.5 10E3/UL (ref 1.6–8.3)
NEUTROPHILS NFR BLD AUTO: 44 %
NRBC # BLD AUTO: 0 10E3/UL
NRBC BLD AUTO-RTO: 0 /100
PLATELET # BLD AUTO: 184 10E3/UL (ref 150–450)
RBC # BLD AUTO: 4.45 10E6/UL (ref 3.8–5.2)
WBC # BLD AUTO: 5.7 10E3/UL (ref 4–11)

## 2023-06-29 PROCEDURE — 82784 ASSAY IGA/IGD/IGG/IGM EACH: CPT | Mod: ZL

## 2023-06-29 PROCEDURE — 86787 VARICELLA-ZOSTER ANTIBODY: CPT | Mod: ZL

## 2023-06-29 PROCEDURE — 84165 PROTEIN E-PHORESIS SERUM: CPT | Mod: 26 | Performed by: PATHOLOGY

## 2023-06-29 PROCEDURE — 86355 B CELLS TOTAL COUNT: CPT | Mod: ZL

## 2023-06-29 PROCEDURE — 84165 PROTEIN E-PHORESIS SERUM: CPT | Mod: ZL | Performed by: PATHOLOGY

## 2023-06-29 PROCEDURE — 84155 ASSAY OF PROTEIN SERUM: CPT | Mod: ZL

## 2023-06-29 PROCEDURE — 36415 COLL VENOUS BLD VENIPUNCTURE: CPT | Mod: ZL

## 2023-06-29 PROCEDURE — 85025 COMPLETE CBC W/AUTO DIFF WBC: CPT | Mod: ZL

## 2023-06-30 DIAGNOSIS — E03.9 ACQUIRED HYPOTHYROIDISM: ICD-10-CM

## 2023-06-30 LAB
CD19 CELLS # BLD: 236 CELLS/UL (ref 107–698)
CD19 CELLS NFR BLD: 10 % (ref 6–27)
CD3 CELLS # BLD: 1993 CELLS/UL (ref 603–2990)
CD3 CELLS NFR BLD: 80 % (ref 49–84)
CD3+CD4+ CELLS # BLD: 1303 CELLS/UL (ref 441–2156)
CD3+CD4+ CELLS NFR BLD: 53 % (ref 28–63)
CD3+CD4+ CELLS/CD3+CD8+ CLL BLD: 1.93 % (ref 1.4–2.6)
CD3+CD8+ CELLS # BLD: 677 CELLS/UL (ref 125–1312)
CD3+CD8+ CELLS NFR BLD: 27 % (ref 10–40)
CD3-CD16+CD56+ CELLS # BLD: 233 CELLS/UL (ref 95–640)
CD3-CD16+CD56+ CELLS NFR BLD: 9 % (ref 4–25)
T CELL EXTENDED COMMENT: NORMAL
TOTAL PROTEIN SERUM FOR ELP: 7.3 G/DL (ref 6.4–8.3)

## 2023-06-30 RX ORDER — LEVOTHYROXINE SODIUM 88 UG/1
TABLET ORAL
Qty: 90 TABLET | Refills: 2 | Status: SHIPPED | OUTPATIENT
Start: 2023-06-30 | End: 2024-04-11

## 2023-06-30 NOTE — TELEPHONE ENCOUNTER
Levothyroxine (Synthroid/Levothroid) 88 MCG tablet  Last Written Prescription Date:  10/26/2022  Last Fill Quantity: 90,   # refills: 2  Last Office Visit: 06/21/2023

## 2023-07-03 LAB
ALBUMIN SERPL ELPH-MCNC: 4.6 G/DL (ref 3.7–5.1)
ALPHA1 GLOB SERPL ELPH-MCNC: 0.2 G/DL (ref 0.2–0.4)
ALPHA2 GLOB SERPL ELPH-MCNC: 0.6 G/DL (ref 0.5–0.9)
B-GLOBULIN SERPL ELPH-MCNC: 0.7 G/DL (ref 0.6–1)
GAMMA GLOB SERPL ELPH-MCNC: 1.1 G/DL (ref 0.7–1.6)
M PROTEIN SERPL ELPH-MCNC: 0 G/DL
PROT PATTERN SERPL ELPH-IMP: NORMAL
VZV IGG SER QL IA: >4000 INDEX
VZV IGG SER QL IA: POSITIVE

## 2023-07-03 RX ORDER — SUMATRIPTAN 50 MG/1
50 TABLET, FILM COATED ORAL
Qty: 18 TABLET | Refills: 4 | Status: SHIPPED | OUTPATIENT
Start: 2023-07-03 | End: 2024-01-04

## 2023-07-05 LAB
IGA SERPL-MCNC: 243 MG/DL (ref 84–499)
IGG SERPL-MCNC: 1151 MG/DL (ref 610–1616)
IGG SERPL-MCNC: 1151 MG/DL (ref 610–1616)
IGG1 SER-MCNC: 869 MG/DL (ref 382–929)
IGG2 SER-MCNC: 87 MG/DL (ref 242–700)
IGG3 SER-MCNC: 58 MG/DL (ref 22–176)
IGG4 SER-MCNC: 10 MG/DL (ref 4–86)
IGM SERPL-MCNC: 88 MG/DL (ref 35–242)
SUBCLASSES, PERCENT: 89 %

## 2023-07-14 ENCOUNTER — ONCOLOGY VISIT (OUTPATIENT)
Dept: ONCOLOGY | Facility: OTHER | Age: 68
End: 2023-07-14
Attending: NURSE PRACTITIONER
Payer: MEDICARE

## 2023-07-14 ENCOUNTER — LAB (OUTPATIENT)
Dept: LAB | Facility: OTHER | Age: 68
End: 2023-07-14
Attending: NURSE PRACTITIONER
Payer: COMMERCIAL

## 2023-07-14 VITALS
RESPIRATION RATE: 20 BRPM | TEMPERATURE: 98.9 F | OXYGEN SATURATION: 98 % | BODY MASS INDEX: 21.52 KG/M2 | HEIGHT: 65 IN | HEART RATE: 54 BPM | SYSTOLIC BLOOD PRESSURE: 110 MMHG | DIASTOLIC BLOOD PRESSURE: 78 MMHG | WEIGHT: 129.19 LBS

## 2023-07-14 DIAGNOSIS — Z85.3 PERSONAL HISTORY OF MALIGNANT NEOPLASM OF BREAST: ICD-10-CM

## 2023-07-14 DIAGNOSIS — Z79.811 USE OF AROMATASE INHIBITORS: ICD-10-CM

## 2023-07-14 DIAGNOSIS — Z85.3 PERSONAL HISTORY OF MALIGNANT NEOPLASM OF BREAST: Primary | ICD-10-CM

## 2023-07-14 LAB
ALBUMIN SERPL BCG-MCNC: 4.1 G/DL (ref 3.5–5.2)
ALP SERPL-CCNC: 74 U/L (ref 35–104)
ALT SERPL W P-5'-P-CCNC: 37 U/L (ref 0–50)
ANION GAP SERPL CALCULATED.3IONS-SCNC: 10 MMOL/L (ref 7–15)
AST SERPL W P-5'-P-CCNC: 59 U/L (ref 0–45)
BASOPHILS # BLD AUTO: 0 10E3/UL (ref 0–0.2)
BASOPHILS NFR BLD AUTO: 1 %
BILIRUB SERPL-MCNC: 0.2 MG/DL
BUN SERPL-MCNC: 13.1 MG/DL (ref 8–23)
CALCIUM SERPL-MCNC: 9.3 MG/DL (ref 8.8–10.2)
CHLORIDE SERPL-SCNC: 104 MMOL/L (ref 98–107)
CREAT SERPL-MCNC: 0.76 MG/DL (ref 0.51–0.95)
DEPRECATED HCO3 PLAS-SCNC: 26 MMOL/L (ref 22–29)
EOSINOPHIL # BLD AUTO: 0.1 10E3/UL (ref 0–0.7)
EOSINOPHIL NFR BLD AUTO: 2 %
ERYTHROCYTE [DISTWIDTH] IN BLOOD BY AUTOMATED COUNT: 13.1 % (ref 10–15)
GFR SERPL CREATININE-BSD FRML MDRD: 85 ML/MIN/1.73M2
GLUCOSE SERPL-MCNC: 93 MG/DL (ref 70–99)
HCT VFR BLD AUTO: 38.6 % (ref 35–47)
HGB BLD-MCNC: 12.8 G/DL (ref 11.7–15.7)
IMM GRANULOCYTES # BLD: 0 10E3/UL
IMM GRANULOCYTES NFR BLD: 0 %
LYMPHOCYTES # BLD AUTO: 2.2 10E3/UL (ref 0.8–5.3)
LYMPHOCYTES NFR BLD AUTO: 43 %
MCH RBC QN AUTO: 30 PG (ref 26.5–33)
MCHC RBC AUTO-ENTMCNC: 33.2 G/DL (ref 31.5–36.5)
MCV RBC AUTO: 91 FL (ref 78–100)
MONOCYTES # BLD AUTO: 0.4 10E3/UL (ref 0–1.3)
MONOCYTES NFR BLD AUTO: 8 %
NEUTROPHILS # BLD AUTO: 2.3 10E3/UL (ref 1.6–8.3)
NEUTROPHILS NFR BLD AUTO: 46 %
NRBC # BLD AUTO: 0 10E3/UL
NRBC BLD AUTO-RTO: 0 /100
PLATELET # BLD AUTO: 158 10E3/UL (ref 150–450)
POTASSIUM SERPL-SCNC: 4.1 MMOL/L (ref 3.4–5.3)
PROT SERPL-MCNC: 6.7 G/DL (ref 6.4–8.3)
RBC # BLD AUTO: 4.26 10E6/UL (ref 3.8–5.2)
SODIUM SERPL-SCNC: 140 MMOL/L (ref 136–145)
WBC # BLD AUTO: 5 10E3/UL (ref 4–11)

## 2023-07-14 PROCEDURE — 36415 COLL VENOUS BLD VENIPUNCTURE: CPT | Mod: ZL

## 2023-07-14 PROCEDURE — G0463 HOSPITAL OUTPT CLINIC VISIT: HCPCS

## 2023-07-14 PROCEDURE — 85025 COMPLETE CBC W/AUTO DIFF WBC: CPT | Mod: ZL

## 2023-07-14 PROCEDURE — 80053 COMPREHEN METABOLIC PANEL: CPT | Mod: ZL

## 2023-07-14 PROCEDURE — 99213 OFFICE O/P EST LOW 20 MIN: CPT | Performed by: NURSE PRACTITIONER

## 2023-07-14 ASSESSMENT — PAIN SCALES - GENERAL: PAINLEVEL: NO PAIN (0)

## 2023-07-14 NOTE — PATIENT INSTRUCTIONS
We would like to see you back in 6 months. Please come 30 minutes prior for lab work.     When you are in need of a refill, please call your pharmacy and they will send us a request.     If you have any questions please call [phone]    Other instructions:  none

## 2023-07-14 NOTE — PROGRESS NOTES
Oncology Follow-up Visit:  July 14, 2023    Reason for Visit:  Patient presents with:  Oncology Clinic Visit: Follow up Personal history of malignant neoplasm of breast       Nursing Note and documentation reviewed: yes    HPI: This is a 67-year-old female patient who presents to the oncology clinic today in follow-up of Stage Ia, invasive lobular carcinoma of the right breast diagnosed July 2019.  She has undergone bilateral mastectomies and she was placed on Arimidex in December 2019.     She presents to the clinic today stating she is feeling excellent.  Her energy level has been great for the past 3 weeks.  She was placed on a fungal medicine for what was felt to be a fungal infection in her lungs and since this she has been feeling good.  She did have a reaction though to the sun just after she started the medication and does have continued redness and rash to her arms and chest.  The rash was itchy and blistery.  It has improved.  She denies any changes to the chest wall or any skin changes.  She continues on her calcium with vitamin D and she is due for her DEXA scan February of next year.    Oncologic History:     7/24/2019 patient was seen by her PCP with concerns regarding a right breast mass  7/25/2019 patient underwent right diagnostic mammogram showing no abnormality      **She underwent ultrasound of the right breast with no discrete masses noted and recommendation for follow-up in 6 months  7/26/2019 patient was evaluated by Dr. Dupree with General Surgery with recommendation for biopsy   7/29/2019 Ultrasound guided biopsy showed invasive lobular carcinoma, grade 2, ER/OH positive and HER-2/jenn negative  8/12/2019  She underwent MRI of the both breasts with findings showing a small area of enhancement in the upper outer quadrant of the right breast and the left breast was unremarkable     **Ultrasound of the right axilla was negative  8/23/2019  She went underwent lumpectomy with sentinel node  procedure with pathology showing a 4.5 cm tumor that was consistent with invasive lobular carcinoma of the right breast, grade 2, ER/VA positive and HER-2/jenn negative; sentinel node was negative; she was staged pathologic T2N0.  Anterior and medial margin was positive on pathology.  9/19/2019  Patient was seen by Dr. Lulú Franz as well is Dr. Dale Paul with Plastic surgery to discuss reconstruction   10/9/2019 she underwent bilateral mastectomy with immediate construction and pathology showed no evidence of malignancy  11/5/2019  Patient was evaluated by Dr. Noyola  with Medical Oncology with Oncotype DX recurrence score of 12 consistent with low risk of breast cancer and recommendation for aromatase inhibitor therapy; PET and MRI brain were recommended  Patient was seen again by Dr. hair aviles with medical oncology with Oncotype DX 11/20/2019  She underwent PET scan:  IMPRESSION:   1.  Numerous new FDG avid groundglass and solid nodules throughout  both lungs concerning for recurrent disease. Additionally, there is  worsening atelectasis seen in both lungs. Infectious and noninfectious  granulomatous disease may have a similar appearance.  12/3/2019 she was seen again by Dr. Noyola with Medical Oncology to review PET scan results.  MRI was not obtained related to tissue expanders in place.  He recommended initiation of aromatase inhibitor with Arimidex 1 mg p.o. daily.  2/5/2020  Repeat PET scan:  IMPRESSION:   1.  No evidence of active disease. Nodules and areas of groundglass  opacification seen previously within the lungs have resolved.   2.  Bilateral breast implant revision. Leaking left breast implant is  first seen on the CT scan of the chest from 11/14/2019.   2/6/2020  She underwent MRI of the brain evidence of metastatic disease     Current Chemo Regime/TX: Arimidex 1 mg daily initiated 12/2019  Current Cycle:  n/a  # of completed cycles:  n/a     Previous treatment:  n/a     Past  Medical History:   Diagnosis Date     Basal cell carcinoma      Breast cancer (H) 07/2019    right breast     Bronchiectasis (H) 2019     CHF (congestive heart failure) (H) 8/12/2022     Dyslipidemia      BERTA (generalized anxiety disorder)      Invasive lobular carcinoma of right breast in female (H) 08/28/2019 5.12.2020- Review and distribute treatment summary- [facility]     Malignant melanoma of skin of face (H) 08/11/2022    Removed melanoma     Malignant neoplasm of upper-outer quadrant of right breast in female, estrogen receptor positive (H) 08/22/2019     Recurrent pneumonia 08/22/2019     Squamous cell carcinoma of skin 05/2020    right lower leg     Thyroid disease        Past Surgical History:   Procedure Laterality Date     ARTHROSCOPY SHOULDER ROTATOR CUFF REPAIR Left 03/07/2018    Procedure: ARTHROSCOPY SHOULDER ROTATOR CUFF REPAIR;  LEFT SHOULDER ARTHROSCOPY, REPAIR ROTATOR CUFF: subacromial Decompression and AC Joint Resection;  Surgeon: Baldev Lou DO;  Location: [facility] OR     ARTHROTOMY SHOULDER, ROTATOR CUFF REPAIR, COMBINED Right 11/14/2018    Procedure: RIGHT SHOULDER DEBRIDEMENT, EXCISION OF LIPOMA  RIGHT UPPER ARM, EXCISION SCAR TISSUE AC JOINT;  Surgeon: Baldev Lou DO;  Location: [facility] OR     BIOPSY BREAST NEEDLE LOCALIZATION, BIOPSY NODE SENTINEL, COMBINED Right 08/23/2019    Procedure: RIGHT WIRE LOCALIZED LUMPECTOMY WITH SENTINEL  LYMP NODE;  Surgeon: Michael Dupree MD;  Location: [facility] OR     BREAST RECONSTRUCTION WITH DEEP INFERIOR EPIGASTRIC  (AILYN) FLAP,  07/2020    done in [place]     COLONOSCOPY  2006    Dr Lara     COLONOSCOPY N/A 10/10/2016    Procedure: COLONOSCOPY;  Surgeon: Christine Cintron MD;  Location: [facility] OR     INSERT TISSUE EXPANDER BREAST BILATERAL Bilateral 10/09/2019    Procedure: BILATERAL TISSUE EXPANDERS (KOBIENIA);  Surgeon: Dale Paul MD;  Location: [facility]     MASTECTOMY, NIPPLE SPARING Bilateral 10/09/2019    Procedure: BILATERAL  SKIN SPARRING MASTECTOMY (CASS);  Surgeon: Opal Rubin MD;  Location: [facility] OR     ORTHOPEDIC SURGERY Right     feet neuromas removed     right ankle ORIF  06/2020    Dr Torres     SHOULDER SURGERY Right 2011/10/2012       Family History   Problem Relation Age of Onset     Myocardial Infarction Mother      Hypertension Mother      Coronary Artery Disease Mother      Hyperlipidemia Mother      Thyroid Disease Mother      Stomach Cancer Father      Coronary Artery Disease Father      Hypertension Father      Colon Cancer Father      Diabetes Paternal Grandmother      Heart Disease Maternal Grandmother      Heart Disease Maternal Grandfather      Diabetes Paternal Grandfather      Cerebrovascular Disease No family hx of      Breast Cancer No family hx of      Prostate Cancer No family hx of      Anesthesia Reaction No family hx of      Asthma No family hx of      Genetic Disorder No family hx of        Social History     Socioeconomic History     Marital status:      Spouse name: Not on file     Number of children: 2     Years of education: 16     Highest education level: Not on file   Occupational History     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 Not Asked   Social History Narrative     -- n/a    Caffeine -- 2c/day    Concussion -- n/a     Social Determinants of Health     Financial Resource Strain: Not on file   Food Insecurity: Not on file   Transportation Needs: Not on file   Physical Activity: Insufficiently Active (6/1/2020)    Exercise Vital Sign      Days of Exercise per Week: 5 days       Minutes of Exercise per Session: 20 min   Stress: Not on file   Social Connections: Not on file   Intimate Partner Violence: Not At Risk (6/1/2020)    Humiliation, Afraid, Rape, and Kick questionnaire      Fear of Current or Ex-Partner: No      Emotionally Abused: No      Physically Abused: No      Sexually Abused: No   Housing Stability: Not on file       Current Outpatient Medications   Medication     anastrozole (ARIMIDEX) 1 MG tablet     calcium carbonate-vitamin D (OS-STEVE) 600-400 MG-UNIT chewable tablet     FLUoxetine (PROZAC) 10 MG capsule     levocetirizine (XYZAL) 5 MG tablet     levothyroxine (SYNTHROID/LEVOTHROID) 88 MCG tablet     rosuvastatin (CRESTOR) 10 MG tablet     voriconazole (VFEND) 200 MG tablet     voriconazole (VFEND) 50 MG tablet     voriconazole (VFEND) 50 MG tablet     SUMAtriptan (IMITREX) 50 MG tablet     No current facility-administered medications for this visit.        Allergies   Allergen Reactions     Pcn [Penicillins] Rash     Zithromax [Azithromycin] Rash       Review Of Systems:  Constitutional:    denies fever, weight changes, chills, and night sweats.  Eyes:    denies blurred or double vision  Ears/Nose/Throat:   denies ear pain, nose problems, difficulty swallowing  Respiratory:   denies SOB and has ongoing cough at times when she lays down  Skin:  See hPI  Breast/Chest wall:   denies pain, lumps  Cardiovascular:   denies chest pain, palpitations, edema  Gastrointestinal:   denies abdominal pain, bloating, nausea, early satiety; no change in bowel habits or blood in stool  Genitourinary:   denies difficulty with urination, blood in urine  Musculoskeletal:    denies new muscle pain, bone pain  Neurologic:   denies lightheadedness, headaches, numbness or tingling  Psychiatric:   denies anxiety, depression  Hematologic/Lymphatic/Immunologic:   denies easy bleeding, lumps or bumps noted; has easy bruising  Endocrine:   Denies increased thirst      Physical Exam:  /78   Pulse  "54   Temp 98.9  F (37.2  C) (Tympanic)   Resp 20   Ht 1.651 m (5' 5\")   Wt 58.6 kg (129 lb 3 oz)   SpO2 98%   BMI 21.50 kg/m      GENERAL APPEARANCE: Healthy, alert and in no acute distress.  HEENT: Normocephalic, Sclerae anicteric.  No obvious oral lesions or thrush.  NECK:   No asymmetry or masses, no thyromegaly.  LYMPHATICS: No palpable cervical, supraclavicular, axillary, or inguinal nodes   RESP: Lungs clear to auscultation bilaterally, respirations regular and easy  CARDIOVASCULAR: Regular rate and rhythm. Normal S1, S2; no murmur, gallop, or rub.  ABDOMEN: Soft, nontender. Bowel sounds auscultated all 4 quadrants. No palpable organomegaly or masses.  BREAST/Chest wall: No concerning masses or skin changes  MUSCULOSKELETAL: Extremities without gross deformities noted. No edema of bilateral lower extremities.  NEURO: Alert and oriented x 3.  Gait steady.  PSYCHIATRIC: Mentation and affect appear normal.  Mood appropriate.    Laboratory:  Results for orders placed or performed in visit on 07/14/23   Comprehensive metabolic panel     Status: Abnormal   Result Value Ref [place]    Sodium 140 136 - 145 mmol/L    Potassium 4.1 3.4 - 5.3 mmol/L    Chloride 104 98 - 107 mmol/L    Carbon Dioxide (CO2) 26 22 - 29 mmol/L    Anion Gap 10 7 - 15 mmol/L    Urea Nitrogen 13.1 8.0 - 23.0 mg/dL    Creatinine 0.76 0.51 - 0.95 mg/dL    Calcium 9.3 8.8 - 10.2 mg/dL    Glucose 93 70 - 99 mg/dL    Alkaline Phosphatase 74 35 - 104 U/L    AST 59 (H) 0 - 45 U/L    ALT 37 0 - 50 U/L    Protein Total 6.7 6.4 - 8.3 g/dL    Albumin 4.1 3.5 - 5.2 g/dL    Bilirubin Total 0.2 <=1.2 mg/dL    GFR Estimate 85 >60 mL/min/1.73m2   CBC with platelets and differential     Status: None   Result Value Ref [place]    WBC Count 5.0 4.0 - 11.0 10e3/uL    RBC Count 4.26 3.80 - 5.20 10e6/uL    Hemoglobin 12.8 11.7 - 15.7 g/dL    Hematocrit 38.6 35.0 - 47.0 %    MCV 91 78 - 100 fL    MCH 30.0 26.5 - 33.0 pg    MCHC 33.2 31.5 - 36.5 g/dL    RDW 13.1 10.0 " - 15.0 %    Platelet Count 158 150 - 450 10e3/uL    % Neutrophils 46 %    % Lymphocytes 43 %    % Monocytes 8 %    % Eosinophils 2 %    % Basophils 1 %    % Immature Granulocytes 0 %    NRBCs per 100 WBC 0 <1 /100    Absolute Neutrophils 2.3 1.6 - 8.3 10e3/uL    Absolute Lymphocytes 2.2 0.8 - 5.3 10e3/uL    Absolute Monocytes 0.4 0.0 - 1.3 10e3/uL    Absolute Eosinophils 0.1 0.0 - 0.7 10e3/uL    Absolute Basophils 0.0 0.0 - 0.2 10e3/uL    Absolute Immature Granulocytes 0.0 <=0.4 10e3/uL    Absolute NRBCs 0.0 10e3/uL   CBC with Platelets & Differential     Status: None    Narrative    The following orders were created for panel order CBC with Platelets & Differential.  Procedure                               Abnormality         Status                     ---------                               -----------         ------                     CBC with platelets and d...[[id number]]                      Final result                 Please view results for these tests on the individual orders.       Imaging Studies: None completed for today's visit      ASSESSMENT/PLAN:    #1 Breast cancer:   Stage Ia, invasive lobular carcinoma of the right breast diagnosed August 2019.  She is currently on Arimidex 1 mg daily and will continue.  We will plan to follow-up again in 6 months with a CBC and CMP.     #2  On aromatase inhibitor therapy: DEXA scan completed in February 2022 was normal.  She will continue on the calcium with vitamin D twice a day.  We will repeat her DEXA scan in February 2024.    I encouraged patient to call with any questions or concerns.      Che Sesay, NP  APRN, FNP-BC, AOCNP

## 2023-07-25 NOTE — TELEPHONE ENCOUNTER
Spoke to [facility] [employer] about appeal for  eletriptan (RELPAX) 40 MG tablet. She did not see anything on file so she was doing a redetermination.

## 2023-07-31 RX ORDER — SUMATRIPTAN 50 MG/1
50 TABLET, FILM COATED ORAL
Qty: 18 TABLET | Refills: 3 | Status: SHIPPED | OUTPATIENT
Start: 2023-07-31

## 2023-09-07 DIAGNOSIS — F41.1 GAD (GENERALIZED ANXIETY DISORDER): ICD-10-CM

## 2023-09-07 DIAGNOSIS — C50.911 MALIGNANT NEOPLASM OF RIGHT FEMALE BREAST, UNSPECIFIED ESTROGEN RECEPTOR STATUS, UNSPECIFIED SITE OF BREAST (H): ICD-10-CM

## 2023-09-08 RX ORDER — ANASTROZOLE 1 MG/1
1 TABLET ORAL DAILY
Qty: 90 TABLET | Refills: 2 | Status: SHIPPED | OUTPATIENT
Start: 2023-09-08 | End: 2024-07-17

## 2023-09-08 RX ORDER — FLUOXETINE 10 MG/1
CAPSULE ORAL
Qty: 90 CAPSULE | Refills: 2 | Status: SHIPPED | OUTPATIENT
Start: 2023-09-08 | End: 2024-08-21

## 2023-09-08 NOTE — TELEPHONE ENCOUNTER
Prozac      Last Written Prescription Date:  11/09/22  Last Fill Quantity: 90,   # refills: 2  Last Office Visit: 06/21/23  Future Office visit:

## 2023-10-02 ENCOUNTER — TRANSFERRED RECORDS (OUTPATIENT)
Dept: HEALTH INFORMATION MANAGEMENT | Facility: CLINIC | Age: 68
End: 2023-10-02

## 2023-10-24 ENCOUNTER — MYC MEDICAL ADVICE (OUTPATIENT)
Dept: FAMILY MEDICINE | Facility: OTHER | Age: 68
End: 2023-10-24

## 2023-10-25 ENCOUNTER — HOSPITAL ENCOUNTER (EMERGENCY)
Facility: HOSPITAL | Age: 68
Discharge: HOME | End: 2023-10-25
Attending: STUDENT IN AN ORGANIZED HEALTH CARE EDUCATION/TRAINING PROGRAM | Admitting: STUDENT IN AN ORGANIZED HEALTH CARE EDUCATION/TRAINING PROGRAM
Payer: MEDICARE

## 2023-10-25 ENCOUNTER — NURSE TRIAGE (OUTPATIENT)
Dept: FAMILY MEDICINE | Facility: OTHER | Age: 68
End: 2023-10-25

## 2023-10-25 ENCOUNTER — APPOINTMENT (OUTPATIENT)
Dept: CT IMAGING | Facility: HOSPITAL | Age: 68
End: 2023-10-25
Attending: STUDENT IN AN ORGANIZED HEALTH CARE EDUCATION/TRAINING PROGRAM
Payer: MEDICARE

## 2023-10-25 ENCOUNTER — TELEPHONE (OUTPATIENT)
Dept: FAMILY MEDICINE | Facility: OTHER | Age: 68
End: 2023-10-25

## 2023-10-25 VITALS
WEIGHT: 128.6 LBS | BODY MASS INDEX: 21.4 KG/M2 | OXYGEN SATURATION: 100 % | TEMPERATURE: 97.9 F | DIASTOLIC BLOOD PRESSURE: 78 MMHG | HEART RATE: 46 BPM | SYSTOLIC BLOOD PRESSURE: 140 MMHG | RESPIRATION RATE: 14 BRPM

## 2023-10-25 DIAGNOSIS — R11.2 NAUSEA AND VOMITING, UNSPECIFIED VOMITING TYPE: ICD-10-CM

## 2023-10-25 DIAGNOSIS — R10.9 ABDOMINAL PAIN, UNSPECIFIED ABDOMINAL LOCATION: ICD-10-CM

## 2023-10-25 DIAGNOSIS — K52.9 COLITIS: ICD-10-CM

## 2023-10-25 LAB
ALBUMIN SERPL BCG-MCNC: 4.5 G/DL (ref 3.5–5.2)
ALBUMIN UR-MCNC: 20 MG/DL
ALP SERPL-CCNC: 85 U/L (ref 35–104)
ALT SERPL W P-5'-P-CCNC: 29 U/L (ref 0–50)
ANION GAP SERPL CALCULATED.3IONS-SCNC: 9 MMOL/L (ref 7–15)
APPEARANCE UR: CLEAR
AST SERPL W P-5'-P-CCNC: 36 U/L (ref 0–45)
BILIRUB DIRECT SERPL-MCNC: <0.2 MG/DL (ref 0–0.3)
BILIRUB SERPL-MCNC: 0.4 MG/DL
BILIRUB UR QL STRIP: NEGATIVE
BUN SERPL-MCNC: 18.7 MG/DL (ref 8–23)
CALCIUM SERPL-MCNC: 9.9 MG/DL (ref 8.8–10.2)
CHLORIDE SERPL-SCNC: 102 MMOL/L (ref 98–107)
COLOR UR AUTO: YELLOW
CREAT SERPL-MCNC: 0.79 MG/DL (ref 0.51–0.95)
DEPRECATED HCO3 PLAS-SCNC: 28 MMOL/L (ref 22–29)
EGFRCR SERPLBLD CKD-EPI 2021: 81 ML/MIN/1.73M2
ERYTHROCYTE [DISTWIDTH] IN BLOOD BY AUTOMATED COUNT: 12.3 % (ref 10–15)
GLUCOSE SERPL-MCNC: 105 MG/DL (ref 70–99)
GLUCOSE UR STRIP-MCNC: NEGATIVE MG/DL
HCT VFR BLD AUTO: 42.7 % (ref 35–47)
HGB BLD-MCNC: 14 G/DL (ref 11.7–15.7)
HGB UR QL STRIP: NEGATIVE
HOLD SPECIMEN: NORMAL
HYALINE CASTS: 1 /LPF
KETONES UR STRIP-MCNC: NEGATIVE MG/DL
LEUKOCYTE ESTERASE UR QL STRIP: ABNORMAL
LIPASE SERPL-CCNC: 31 U/L (ref 13–60)
MCH RBC QN AUTO: 28.7 PG (ref 26.5–33)
MCHC RBC AUTO-ENTMCNC: 32.8 G/DL (ref 31.5–36.5)
MCV RBC AUTO: 88 FL (ref 78–100)
MUCOUS THREADS #/AREA URNS LPF: PRESENT /LPF
NITRATE UR QL: NEGATIVE
PH UR STRIP: 6 [PH] (ref 4.7–8)
PLATELET # BLD AUTO: 161 10E3/UL (ref 150–450)
POTASSIUM SERPL-SCNC: 4.2 MMOL/L (ref 3.4–5.3)
PROT SERPL-MCNC: 7.7 G/DL (ref 6.4–8.3)
RBC # BLD AUTO: 4.87 10E6/UL (ref 3.8–5.2)
RBC URINE: 10 /HPF
SODIUM SERPL-SCNC: 139 MMOL/L (ref 135–145)
SP GR UR STRIP: 1.03 (ref 1–1.03)
SQUAMOUS EPITHELIAL: 3 /HPF
UROBILINOGEN UR STRIP-MCNC: NORMAL MG/DL
WBC # BLD AUTO: 7.1 10E3/UL (ref 4–11)
WBC URINE: 6 /HPF

## 2023-10-25 PROCEDURE — 74177 CT ABD & PELVIS W/CONTRAST: CPT | Mod: MG

## 2023-10-25 PROCEDURE — 96374 THER/PROPH/DIAG INJ IV PUSH: CPT | Mod: XU

## 2023-10-25 PROCEDURE — 99285 EMERGENCY DEPT VISIT HI MDM: CPT | Mod: 25

## 2023-10-25 PROCEDURE — 250N000011 HC RX IP 250 OP 636: Performed by: RADIOLOGY

## 2023-10-25 PROCEDURE — 82248 BILIRUBIN DIRECT: CPT | Performed by: STUDENT IN AN ORGANIZED HEALTH CARE EDUCATION/TRAINING PROGRAM

## 2023-10-25 PROCEDURE — 85027 COMPLETE CBC AUTOMATED: CPT | Performed by: STUDENT IN AN ORGANIZED HEALTH CARE EDUCATION/TRAINING PROGRAM

## 2023-10-25 PROCEDURE — 87086 URINE CULTURE/COLONY COUNT: CPT | Performed by: STUDENT IN AN ORGANIZED HEALTH CARE EDUCATION/TRAINING PROGRAM

## 2023-10-25 PROCEDURE — 96361 HYDRATE IV INFUSION ADD-ON: CPT

## 2023-10-25 PROCEDURE — 80053 COMPREHEN METABOLIC PANEL: CPT | Performed by: STUDENT IN AN ORGANIZED HEALTH CARE EDUCATION/TRAINING PROGRAM

## 2023-10-25 PROCEDURE — 81001 URINALYSIS AUTO W/SCOPE: CPT | Performed by: STUDENT IN AN ORGANIZED HEALTH CARE EDUCATION/TRAINING PROGRAM

## 2023-10-25 PROCEDURE — 250N000011 HC RX IP 250 OP 636: Mod: JZ | Performed by: STUDENT IN AN ORGANIZED HEALTH CARE EDUCATION/TRAINING PROGRAM

## 2023-10-25 PROCEDURE — 99284 EMERGENCY DEPT VISIT MOD MDM: CPT | Performed by: STUDENT IN AN ORGANIZED HEALTH CARE EDUCATION/TRAINING PROGRAM

## 2023-10-25 PROCEDURE — 96375 TX/PRO/DX INJ NEW DRUG ADDON: CPT

## 2023-10-25 PROCEDURE — 258N000003 HC RX IP 258 OP 636: Performed by: STUDENT IN AN ORGANIZED HEALTH CARE EDUCATION/TRAINING PROGRAM

## 2023-10-25 PROCEDURE — 36415 COLL VENOUS BLD VENIPUNCTURE: CPT | Performed by: STUDENT IN AN ORGANIZED HEALTH CARE EDUCATION/TRAINING PROGRAM

## 2023-10-25 PROCEDURE — 83690 ASSAY OF LIPASE: CPT | Performed by: STUDENT IN AN ORGANIZED HEALTH CARE EDUCATION/TRAINING PROGRAM

## 2023-10-25 RX ORDER — ONDANSETRON 4 MG/1
4 TABLET, ORALLY DISINTEGRATING ORAL EVERY 8 HOURS PRN
Qty: 10 TABLET | Refills: 0 | Status: SHIPPED | OUTPATIENT
Start: 2023-10-25 | End: 2023-10-28

## 2023-10-25 RX ORDER — HYDROMORPHONE HYDROCHLORIDE 1 MG/ML
0.5 INJECTION, SOLUTION INTRAMUSCULAR; INTRAVENOUS; SUBCUTANEOUS EVERY 30 MIN PRN
Status: DISCONTINUED | OUTPATIENT
Start: 2023-10-25 | End: 2023-10-25 | Stop reason: HOSPADM

## 2023-10-25 RX ORDER — ONDANSETRON 2 MG/ML
4 INJECTION INTRAMUSCULAR; INTRAVENOUS ONCE
Status: COMPLETED | OUTPATIENT
Start: 2023-10-25 | End: 2023-10-25

## 2023-10-25 RX ORDER — IOPAMIDOL 755 MG/ML
63 INJECTION, SOLUTION INTRAVASCULAR ONCE
Status: COMPLETED | OUTPATIENT
Start: 2023-10-25 | End: 2023-10-25

## 2023-10-25 RX ADMIN — IOPAMIDOL 63 ML: 755 INJECTION, SOLUTION INTRAVENOUS at 10:14

## 2023-10-25 RX ADMIN — SODIUM CHLORIDE 1000 ML: 9 INJECTION, SOLUTION INTRAVENOUS at 09:21

## 2023-10-25 RX ADMIN — ONDANSETRON 4 MG: 2 INJECTION INTRAMUSCULAR; INTRAVENOUS at 09:21

## 2023-10-25 RX ADMIN — HYDROMORPHONE HYDROCHLORIDE 0.5 MG: 1 INJECTION, SOLUTION INTRAMUSCULAR; INTRAVENOUS; SUBCUTANEOUS at 09:56

## 2023-10-25 ASSESSMENT — ACTIVITIES OF DAILY LIVING (ADL): ADLS_ACUITY_SCORE: 33

## 2023-10-25 NOTE — DISCHARGE INSTRUCTIONS
- On your work-up today, we found that you have colitis. Most colitis is caused by a virus and simply needs time to resolve.  - Please take Tylenol and Ibuprofen for pain and the Zofran as needed for nausea  - Please return to the Emergency Room if you do not improve, feel worse, or have any new or concerning symptoms. We would especially want to see you back if you experience a fever over 100.4 degrees fahrenheit  - Please follow up with a primary care physician in 4-5 days as if it is not improving, they may consider trialing antibiotics    What to expect when you have contrast    During your exam, we will inject  contrast  into your vein or artery. (Contrast is a clear liquid with iodine in it. It shows up on X-rays.)    You may feel warm or hot. You may have a metal taste in your mouth and a slight upset stomach. You may also feel pressure near the kidneys and bladder. These effects will last about 1 to 3 minutes.    Please tell us if you have:   Sneezing    Itching   Hives    Swelling in the face   A hoarse voice   Breathing problems   Other new symptoms    Serious problems are rare.  They may include:   Irregular heartbeat    Seizures   Kidney failure             Tissue damage   Shock     Death    If you have any problems during the exam, we  will treat them right away.    When you get home    Call your hospital if you have any new symptoms in the next 2 days, like hives or swelling. (Phone numbers are at the bottom of this page.) Or call your family doctor.     If you have wheezing or trouble breathing, call 911.    Self-care  -Drink at least 4 extra glasses of water today.   This reduces the stress on your kidneys.  -Keep taking your regular medicines.    The contrast will pass out of your body in your  Urine(pee). This will happen in the next 24 hours. You  will not feel this. Your urine will not  change color.    If you have kidney problems or take metformin    Drink 4 to 8 large glasses of water for the  next  2 days, if you are not on a fluid restriction.    ?If you take metformin (Glucophage or Glucovance) for diabetes, keep taking it.      ?Your kidney function tests are abnormal.  If you take Metformin, do not take it for 48 hours. Please go to your clinic for a blood test within 3 days after your exam before the restarting this medicine.     (Note to provider:please give patient prescription for lab tests.)    ?Special instructions: -    I have read and understand the above information.    Patient Sign Here:______________________________________Date:________Time:______    Staff Sign Here:________________________________________Date:_______Time:______      Radiology Departments:     ?[facility]: [phone] ?[place]: [phone]     ?[place]: [phone] ?[facility]:[phone]      ?[place]: [phone]  ?[facility]: [phone]  ?[place]:[phone]    ?[facility]:[phone]  ?[facility]:[phone]

## 2023-10-25 NOTE — TELEPHONE ENCOUNTER
10/25/2023 9:36 AM  Writer called pt. Writer left  regarding my chart message noted pt already advised to ER and in ER now. Pt will need ER follow-up. Writer holding appt at this time for pt. Will send my chart message to pt.     Future Appointments 10/25/2023 - 4/22/2024        Date Visit Type Length Department Provider     11/6/2023  1:30 PM SHORT 30 min [facility] Mirna Roblero, PA    Location Instructions:     From [facility]: Take [address]. Turn left at [address]. Turn left at the first stoplight on [address]. At the first stop sign, take a right onto [address]. The upper level parking lot will be on the left. East Entrance Door number 10.   From [place]: Take [address]. Take a right at [address]. At the first stop sign, take a right onto [address]. The upper level parking lot will be on the left. East Entrance Door number 10.   From [place]: Take [address]. Take the [address] ramp towards [place]. Turn left onto [address]. Take a slight right onto [address]. Turn left at the first stoplight on [address]. At the first stop sign, take a right onto [address]. The upper level parking lot will be on the left. East Entrance Door number 10.              1/17/2024  9:30 AM LAB 15 min [facility]    Location Instructions:     From [facility]: Take [address]. Turn left at [address] [facility] [address]. Turn left at the first stoplight on [address]. At the first stop sign, take a right onto [address]. The upper level parking lot will be on the left. East Entrance Door number 10.   From [place]: Take [address]. Take a right at [address]. At the first stop sign, take a right onto [address]. The upper level parking lot will be on the left. East Entrance Door number 10.               1/17/2024 10:00 AM RETURN 50 min [facility] Che Sesay,  NP    Location Instructions:     From [facility]: Take [address]. Turn left at [address]. Turn left at the first stoplight on [address]. At the first stop sign, take a right onto [address]. Take a left into the parking lot and continue through until you reach the [facility] entrance of the building, door number 19.   From [place]: Take [address]. Take the [address] ramp towards [place]. Turn left onto [address]. Take a slight right onto [address]. Turn left at the first stoplight on [address]. At the first stop sign, take a right onto [address]. Take a left into the parking lot and continue through until you reach the [facility] entrance of the building, door number 19.   From [place]: Take [address]. Take a right at [address]. At the first stop sign, take a right onto [address]. Take a left into the parking lot and continue through until you reach the [facility] entrance of the building, door number 19 .

## 2023-10-25 NOTE — ED TRIAGE NOTES
Patient presents to the emergency room with complaints of abdominal pain and vomiting. Symptoms started 6 days ago. Pt reports not feeling well today. Abdominal pain is worsening. Denies diarrhea.

## 2023-10-25 NOTE — ED PROVIDER NOTES
History     Chief Complaint   Patient presents with    Abdominal Pain    Vomiting     HPI  Denise Taylor is a 68 year old female with PMH CHF presenting with nausea, vomiting, abdominal pain intermittently for 5-6 days. Pain is in bilateral lower abdomen. No diarrhea. Has been having bowel movements but feels like they have been a little harder than normal. Most recent was earlier today. No hx of abdominal surgery. No CP. No SOB. Had a temperature around 99 yesterday. No fevers. Has not tried anything for pain. No vaginal bleeding or discharge. No urinary symptoms.    Allergies:  Allergies   Allergen Reactions    Pcn [Penicillins] Rash    Zithromax [Azithromycin] Rash       Problem List:    Patient Active Problem List    Diagnosis Date Noted    History of recurrent pulmonary infection 01/06/2023     Priority: Medium    Sinus bradycardia 09/26/2022     Priority: Medium    CHF (congestive heart failure) (H) 08/12/2022     Priority: Medium    Lung nodule 12/01/2021     Priority: Medium     Formatting of this note might be different from the original.  Added automatically from request for surgery [id number]      Use of aromatase inhibitors 01/26/2021     Priority: Medium    Seasonal allergies 10/09/2020     Priority: Medium    Migraine without status migrainosus, not intractable, unspecified migraine type 10/09/2020     Priority: Medium    Personal history of malignant neoplasm of breast 10/09/2019     Priority: Medium    Invasive lobular carcinoma of right breast in female (H) 08/28/2019     Priority: Medium     5.12.2020- Review and distribute treatment summary- [facility]      Cigarette nicotine dependence in remission 08/22/2019     Priority: Medium    ACP (advance care planning) 07/27/2017     Priority: Medium     Advance Care Planning 7/27/2017: ACP Review of Chart / Resources Provided:  Reviewed chart for advance care plan.  Denise Taylor has no plan or code status on file. Discussed available  resources and provided with information.   Added by Marah Henry            Acquired hypothyroidism 07/27/2017     Priority: Medium    History of basal cell carcinoma 05/18/2015     Priority: Medium    Hyperlipidemia with target LDL less than 130 05/18/2015     Priority: Medium     Diagnosis updated by automated process. Provider to review and confirm.      Actinic keratosis 12/01/2011     Priority: Medium    AC separation, type 5 06/27/2011     Priority: Medium     Overview:   IMO Update 10/11      Capillary disease 08/06/2007     Priority: Medium     Overview:   IMO Update 10/11      Other dyschromia 08/06/2007     Priority: Medium    Scar condition and fibrosis of skin 08/06/2007     Priority: Medium        Past Medical History:    Past Medical History:   Diagnosis Date    Basal cell carcinoma     Breast cancer (H) 07/2019    Bronchiectasis (H) 2019    CHF (congestive heart failure) (H) 8/12/2022    Dyslipidemia     BERTA (generalized anxiety disorder)     Invasive lobular carcinoma of right breast in female (H) 08/28/2019    Malignant melanoma of skin of face (H) 08/11/2022    Malignant neoplasm of upper-outer quadrant of right breast in female, estrogen receptor positive (H) 08/22/2019    Recurrent pneumonia 08/22/2019    Squamous cell carcinoma of skin 05/2020    Thyroid disease        Past Surgical History:    Past Surgical History:   Procedure Laterality Date    ARTHROSCOPY SHOULDER ROTATOR CUFF REPAIR Left 03/07/2018    Procedure: ARTHROSCOPY SHOULDER ROTATOR CUFF REPAIR;  LEFT SHOULDER ARTHROSCOPY, REPAIR ROTATOR CUFF: subacromial Decompression and AC Joint Resection;  Surgeon: Baldev Lou DO;  Location: [facility]    ARTHROTOMY SHOULDER, ROTATOR CUFF REPAIR, COMBINED Right 11/14/2018    Procedure: RIGHT SHOULDER DEBRIDEMENT, EXCISION OF LIPOMA  RIGHT UPPER ARM, EXCISION SCAR TISSUE AC JOINT;  Surgeon: Baldev Lou DO;  Location: [facility] OR    BIOPSY BREAST NEEDLE LOCALIZATION, BIOPSY NODE SENTINEL,  COMBINED Right 08/23/2019    Procedure: RIGHT WIRE LOCALIZED LUMPECTOMY WITH SENTINEL  LYMP NODE;  Surgeon: Michael Dupree MD;  Location: [facility]    BREAST RECONSTRUCTION WITH DEEP INFERIOR EPIGASTRIC  (AILYN) FLAP,  07/2020    done in [place]    COLONOSCOPY  2006    Dr Lara    COLONOSCOPY N/A 10/10/2016    Procedure: COLONOSCOPY;  Surgeon: Christine Cintron MD;  Location: [facility] OR    INSERT TISSUE EXPANDER BREAST BILATERAL Bilateral 10/09/2019    Procedure: BILATERAL TISSUE EXPANDERS (RAMIREZ);  Surgeon: Dale Paul MD;  Location: [facility]    MASTECTOMY, NIPPLE SPARING Bilateral 10/09/2019    Procedure: BILATERAL SKIN SPARRING MASTECTOMY (CASS);  Surgeon: Opal Rubin MD;  Location: [facility] OR    ORTHOPEDIC SURGERY Right     feet neuromas removed    right ankle ORIF  06/2020    Dr Torres    SHOULDER SURGERY Right 2011/10/2012       Family History:    Family History   Problem Relation Age of Onset    Myocardial Infarction Mother     Hypertension Mother     Coronary Artery Disease Mother     Hyperlipidemia Mother     Thyroid Disease Mother     Stomach Cancer Father     Coronary Artery Disease Father     Hypertension Father     Colon Cancer Father     Diabetes Paternal Grandmother     Heart Disease Maternal Grandmother     Heart Disease Maternal Grandfather     Diabetes Paternal Grandfather     Cerebrovascular Disease No family hx of     Breast Cancer No family hx of     Prostate Cancer No family hx of     Anesthesia Reaction No family hx of     Asthma No family hx of     Genetic Disorder No family hx of        Social History:  Marital Status:   [2]  Social History     Tobacco Use    Smoking status: Never    Smokeless tobacco: Never   Vaping Use    Vaping Use: Never used   Substance Use Topics    Alcohol use: Yes     Alcohol/week: 1.7 standard drinks of alcohol     Types: 2 Glasses of wine per week     Comment: occasionally    Drug use: No        Medications:    ondansetron (ZOFRAN ODT) 4 MG  ODT tab  anastrozole (ARIMIDEX) 1 MG tablet  calcium carbonate-vitamin D (OS-STEVE) 600-400 MG-UNIT chewable tablet  FLUoxetine (PROZAC) 10 MG capsule  levocetirizine (XYZAL) 5 MG tablet  levothyroxine (SYNTHROID/LEVOTHROID) 88 MCG tablet  rosuvastatin (CRESTOR) 10 MG tablet  SUMAtriptan (IMITREX) 50 MG tablet  SUMAtriptan (IMITREX) 50 MG tablet  voriconazole (VFEND) 200 MG tablet  voriconazole (VFEND) 50 MG tablet  voriconazole (VFEND) 50 MG tablet          Review of Systems   All other systems reviewed and are negative.      Physical Exam   BP: (!) 82/54  Pulse: 52  Temp: 97.9  F (36.6  C)  Resp: 14  Weight: 58.3 kg (128 lb 9.6 oz)  SpO2: 98 %      Physical Exam  Vitals and nursing note reviewed.   Constitutional:       General: She is not in acute distress.     Appearance: She is well-developed. She is not ill-appearing.   HENT:      Head: Normocephalic.      Mouth/Throat:      Mouth: Mucous membranes are moist.      Pharynx: Oropharynx is clear.   Eyes:      Extraocular Movements: Extraocular movements intact.      Pupils: Pupils are equal, round, and reactive to light.   Cardiovascular:      Rate and Rhythm: Normal rate and regular rhythm.      Heart sounds: Normal heart sounds.   Pulmonary:      Effort: Pulmonary effort is normal.      Breath sounds: Normal breath sounds.   Abdominal:      General: Abdomen is flat. Bowel sounds are normal.      Palpations: Abdomen is soft.      Tenderness: There is abdominal tenderness in the right lower quadrant, suprapubic area and left lower quadrant. There is no guarding or rebound. Negative signs include Norwood's sign, Rovsing's sign, McBurney's sign and psoas sign.   Skin:     General: Skin is warm and dry.      Capillary Refill: Capillary refill takes less than 2 seconds.   Neurological:      General: No focal deficit present.      Mental Status: She is alert.   Psychiatric:         Mood and Affect: Mood normal.         ED Course              ED Course as of 10/25/23 1040    Wed Oct 25, 2023   0954 BP(!): 82/54  Obtained while laying on her side.   1031 UA with Microscopic reflex to Culture(!)  No dysuria on hx. Will plan on culture but hold treatment given the contamination. She is otherwise well appearing.   1040 CT Abdomen Pelvis w Contrast  CT shows colitis. Her presentation is most consistent with a viral etiology. Not consistent with ischemic colitis.   1040 Findings were discussed with the patient including non-emergent imaging/lab results. Additional verbal instructions were discussed with the patient as well. Instructed to follow up with a primary care provider within 4-5 days. Also discussed specific warning signs and instructed to return to the ED if there are any concerns. Patient voiced understanding of instructions, questions were answered and the patient was discharged home in stable condition.     Procedures              Results for orders placed or performed during the hospital encounter of 10/25/23 (from the past 24 hour(s))   CBC with platelets   Result Value Ref Range    WBC Count 7.1 4.0 - 11.0 10e3/uL    RBC Count 4.87 3.80 - 5.20 10e6/uL    Hemoglobin 14.0 11.7 - 15.7 g/dL    Hematocrit 42.7 35.0 - 47.0 %    MCV 88 78 - 100 fL    MCH 28.7 26.5 - 33.0 pg    MCHC 32.8 31.5 - 36.5 g/dL    RDW 12.3 10.0 - 15.0 %    Platelet Count 161 150 - 450 10e3/uL   Basic metabolic panel   Result Value Ref Range    Sodium 139 135 - 145 mmol/L    Potassium 4.2 3.4 - 5.3 mmol/L    Chloride 102 98 - 107 mmol/L    Carbon Dioxide (CO2) 28 22 - 29 mmol/L    Anion Gap 9 7 - 15 mmol/L    Urea Nitrogen 18.7 8.0 - 23.0 mg/dL    Creatinine 0.79 0.51 - 0.95 mg/dL    GFR Estimate 81 >60 mL/min/1.73m2    Calcium 9.9 8.8 - 10.2 mg/dL    Glucose 105 (H) 70 - 99 mg/dL   Hepatic panel   Result Value Ref Range    Protein Total 7.7 6.4 - 8.3 g/dL    Albumin 4.5 3.5 - 5.2 g/dL    Bilirubin Total 0.4 <=1.2 mg/dL    Alkaline Phosphatase 85 35 - 104 U/L    AST 36 0 - 45 U/L    ALT 29 0 - 50 U/L     Bilirubin Direct <0.20 0.00 - 0.30 mg/dL   Lipase   Result Value Ref Range    Lipase 31 13 - 60 U/L   Extra Tube    Narrative    The following orders were created for panel order Extra Tube.  Procedure                               Abnormality         Status                     ---------                               -----------         ------                     Extra Blue Top Tube[[id number]]                                                         Extra Red Top Tube[[id number]]                               Final result                 Please view results for these tests on the individual orders.   Extra Red Top Tube   Result Value Ref Range    Hold Specimen JIC    UA with Microscopic reflex to Culture    Specimen: Urine, Midstream   Result Value Ref Range    Color Urine Yellow Colorless, Straw, Light Yellow, Yellow    Appearance Urine Clear Clear    Glucose Urine Negative Negative mg/dL    Bilirubin Urine Negative Negative    Ketones Urine Negative Negative mg/dL    Specific Gravity Urine 1.034 1.003 - 1.035    Blood Urine Negative Negative    pH Urine 6.0 4.7 - 8.0    Protein Albumin Urine 20 (A) Negative mg/dL    Urobilinogen Urine Normal Normal, 2.0 mg/dL    Nitrite Urine Negative Negative    Leukocyte Esterase Urine Moderate (A) Negative    Mucus Urine Present (A) None Seen /LPF    RBC Urine 10 (H) <=2 /HPF    WBC Urine 6 (H) <=5 /HPF    Squamous Epithelials Urine 3 (H) <=1 /HPF    Hyaline Casts Urine 1 <=2 /LPF    Narrative    Urine Culture ordered based on laboratory criteria   CT Abdomen Pelvis w Contrast    Narrative    Exam: CT ABDOMEN PELVIS W CONTRAST    Exam reason: Abdominal pain, nausea, vomiting x 5 days    Technique: Using helical CT technique, axial images of the abdomen and  pelvis were obtained with IV contrast.  Coronal and sagittal  reconstructions also performed. This CT was performed using one or  more of the following dose reduction techniques: automated exposure  control, adjustment of the  mA and/or kV according to patient size,  and/or use of iterative reconstruction technique.    Meds/Contrast: ISOVUE 370  63mL    Comparison: 12/24/2020, 2/5/2020     FINDINGS:    ABDOMEN:    Liver: No mass or any significant abnormality.  Gallbladder: No calcified gallstones.   Bile Ducts: No biliary ductal dilation.   Spleen:  No splenomegaly or focal lesion.  Pancreas: No mass, ductal dilatation, or inflammatory changes.  Kidneys: No solid mass, hydronephrosis, or any definite calculi.   Adrenals:  No nodules.   Lymph Nodes: No adenopathy.   Vascular: No aortic aneurysm.   Abdominal Wall: No acute findings.     PELVIS:   No mass or adenopathy.     Bowel/Mesentery/Peritoneum:   -No bowel obstruction.   -Normal appendix.  -There is mild colonic wall thickening and mild pericolonic fat  stranding of the descending and sigmoid colon. No rim-enhancing fluid  collection to suggest abscess. No free air or pneumatosis.   -Trace free fluid in the pelvis.    Visualized portions of the Chest: No consolidation or pleural  effusion.  Musculoskeletal: No acute osseous abnormalities.       Impression    IMPRESSION:  Mild colonic wall thickening of the descending and sigmoid colon,  likely due to an infectious or inflammatory colitis. No rim-enhancing  fluid collection to suggest abscess. No free air or pneumatosis.    NILA VIVAR MD         SYSTEM ID:  [id number]       Medications   HYDROmorphone (PF) (DILAUDID) injection 0.5 mg (0.5 mg Intravenous $Given 10/25/23 0956)   sodium chloride 0.9% BOLUS 1,000 mL (0 mLs Intravenous Stopped 10/25/23 1008)   ondansetron (ZOFRAN) injection 4 mg (4 mg Intravenous $Given 10/25/23 0921)   sodium chloride (PF) 0.9% PF flush 50 mL (50 mLs Intravenous $Given 10/25/23 1014)   iopamidol (ISOVUE-370) solution 63 mL (63 mLs Intravenous $Given 10/25/23 1014)       Assessments & Plan (with Medical Decision Making)     I have reviewed the nursing notes.    Abdominal pain/vomiting for 5 days  intermittently. Most likely gastroenteritis but could potentially be bowel obstruction vs other. Could be appendicitis or diverticulitis as well. Plan on CT, labs, zofran/IV fluids. Disposition pending work-up. Do not suspect gynecologic etiology given lack of bleeding/discharge and age and not consistent with AAA or aortic dissection.    See ED Course.    I have reviewed the findings, diagnosis, plan and need for follow up with the patient.      New Prescriptions    ONDANSETRON (ZOFRAN ODT) 4 MG ODT TAB    Take 1 tablet (4 mg) by mouth every 8 hours as needed for nausea       Final diagnoses:   Abdominal pain, unspecified abdominal location   Nausea and vomiting, unspecified vomiting type   Colitis       10/25/2023   [facility]       Mina Maya MD  10/25/23 2065

## 2023-10-25 NOTE — TELEPHONE ENCOUNTER
"Disposition: Go to ED/ C    Patient called with 6 days of intermittent abdominal pain and nausea and vomiting. Patient rates pain as moderate-severe. Patient reports temp of 99.4. Per protocol patient advised to be seen in ED.    Reason for Disposition   MILD TO MODERATE constant pain lasting > 2 hours    Additional Information   Negative: Passed out (i.e., fainted, collapsed and was not responding)   Negative: Shock suspected (e.g., cold/pale/clammy skin, too weak to stand, low BP, rapid pulse)   Negative: Sounds like a life-threatening emergency to the triager   Negative: Followed an abdomen (stomach) injury   Negative: Chest pain   Negative: Abdominal pain and pregnant < 20 weeks   Negative: Abdominal pain and pregnant 20 or more weeks   Negative: Pain is mainly in upper abdomen (if needed ask: 'is it mainly above the belly button?')   Negative: Abdomen bloating or swelling are main symptoms   Negative: SEVERE abdominal pain (e.g., excruciating)   Negative: Vomiting red blood or black (coffee ground) material   Negative: Blood in bowel movements  (Exception: Blood on surface of BM with constipation.)   Negative: Black or tarry bowel movements  (Exception: Chronic-unchanged black-grey BMs AND is taking iron pills or Pepto-Bismol.)    Answer Assessment - Initial Assessment Questions  1. LOCATION: \"Where does it hurt?\"       Lower abdomen  2. RADIATION: \"Does the pain shoot anywhere else?\" (e.g., chest, back)      no  3. ONSET: \"When did the pain begin?\" (e.g., minutes, hours or days ago)       6 days ago  4. SUDDEN: \"Gradual or sudden onset?\"      sudden  5. PATTERN \"Does the pain come and go, or is it constant?\"     - If it comes and goes: \"How long does it last?\" \"Do you have pain now?\"      (Note: Comes and goes means the pain is intermittent. It goes away completely between bouts.)     - If constant: \"Is it getting better, staying the same, or getting worse?\"       (Note: Constant means the pain never goes " "away completely; most serious pain is constant and gets worse.)       intermittent  6. SEVERITY: \"How bad is the pain?\"  (e.g., Scale 1-10; mild, moderate, or severe)     - MILD (1-3): Doesn't interfere with normal activities, abdomen soft and not tender to touch.      - MODERATE (4-7): Interferes with normal activities or awakens from sleep, abdomen tender to touch.      - SEVERE (8-10): Excruciating pain, doubled over, unable to do any normal activities.        Moderate- severe  7. RECURRENT SYMPTOM: \"Have you ever had this type of stomach pain before?\" If Yes, ask: \"When was the last time?\" and \"What happened that time?\"       no  8. CAUSE: \"What do you think is causing the stomach pain?\"      unsure  9. RELIEVING/AGGRAVATING FACTORS: \"What makes it better or worse?\" (e.g., antacids, bending or twisting motion, bowel movement)      no  10. OTHER SYMPTOMS: \"Do you have any other symptoms?\" (e.g., back pain, diarrhea, fever, urination pain, vomiting)        Vomiting, temp of 99.4  11. PREGNANCY: \"Is there any chance you are pregnant?\" \"When was your last menstrual period?\"        no    Protocols used: Abdominal Pain - Female-A-[place]    "

## 2023-10-25 NOTE — TELEPHONE ENCOUNTER
8:13 AM    Reason for Call: OVERBOOK    Patient is having the following symptoms: Patient is having vomiting and extreme cramping for 6 days.    The patient is requesting an appointment for today  with Mirna Roblero or a care team member.    Patient was also forward to nurse triage     Was an appointment offered for this call? No  If yes : Appointment type              Date    Preferred method for responding to this message: Telephone Call  What is your phone number ? [phone]    If we cannot reach you directly, may we leave a detailed response at the number you provided? Yes    Can this message wait until your PCP/provider returns, if unavailable today? No,     Honey Gutiérrez

## 2023-10-27 ENCOUNTER — MYC MEDICAL ADVICE (OUTPATIENT)
Dept: FAMILY MEDICINE | Facility: OTHER | Age: 68
End: 2023-10-27

## 2023-10-27 ENCOUNTER — TELEPHONE (OUTPATIENT)
Dept: FAMILY MEDICINE | Facility: OTHER | Age: 68
End: 2023-10-27

## 2023-10-27 DIAGNOSIS — N39.0 URINARY TRACT INFECTION: Primary | ICD-10-CM

## 2023-10-27 LAB — BACTERIA UR CULT: NORMAL

## 2023-10-27 RX ORDER — CIPROFLOXACIN 250 MG/1
250 TABLET, FILM COATED ORAL 2 TIMES DAILY
Qty: 14 TABLET | Refills: 0 | Status: SHIPPED | OUTPATIENT
Start: 2023-10-27 | End: 2024-01-04

## 2023-10-27 NOTE — TELEPHONE ENCOUNTER
8:04 AM    Reason for Call: OVERBOOK    Patient is having the following symptoms: Patient was in the ER on 10/25/23 she is calling to speak to the nurse regarding her visit. Her symptoms have not gotten better and getting worse from when she was in the ER    The patient is requesting an appointment for today with Annika.    Was an appointment offered for this call? No  If yes : Appointment type              Date    Preferred method for responding to this message: Telephone Call  What is your phone number ? [phone]    If we cannot reach you directly, may we leave a detailed response at the number you provided? Yes    Can this message wait until your PCP/provider returns, if unavailable today? No,

## 2023-10-27 NOTE — TELEPHONE ENCOUNTER
10/27/2023 9:07 AM  Pt notified Cipro signed. Encouraged pt to call back with any further questions or concerns. Pt verbalizes understanding.   Jessenia Geronimo RN

## 2023-10-27 NOTE — TELEPHONE ENCOUNTER
10/27/2023 8:29 AM  Writer called pt. Pt believes she has a UTI and requesting abx.   Pt reports last BM 3-4 days. Pt reports she did take miralax last night. Writer encouraged to increase fiber, increase water, OTC stool softeners. Pt will take another dose of Miralax this morning and will continue once a day until BM.     Pt reports urinary frequency, T 100, urinary urgency. Again pt would prefer to have PCP order abx. Pt leaving town later today. ER follow-up scheduled with PCP on 11/8/23.    Plan to discuss in person with PCP.     Future Appointments 10/27/2023 - 4/24/2024        Date Visit Type Length Department Provider     11/8/2023  1:00 PM SHORT 30 min [facility] Mirna Roblero, PA    Location Instructions:     From [facility]: Take [address]. Turn left at [address]. Turn left at the first stoplight on [address]. At the first stop sign, take a right onto [facility] [address] [facility]. The upper level parking lot will be on the left. East Entrance Door number 10.   From [place]: Take [address]. Take a right at [address]. At the first stop sign, take a right onto [address]. The upper level parking lot will be on the left. East Entrance Door number 10.   From [place]: Take [address]. Take the [address] ramp towards [place]. Turn left onto [address]. Take a slight right onto [address]. Turn left at the first stoplight on [address]. At the first stop sign, take a right onto [address]. The upper level parking lot will be on the left. East Entrance Door number 10.              1/17/2024  9:30 AM LAB 15 min [facility]    Location Instructions:     From [facility]: Take [address]. Turn left at [address]. Turn left at the first stoplight on [address]. At the first stop sign, take a right onto [address]. The upper level parking lot will be on the left. East Entrance  Door number 10.   From [place]: [address] Take a right at [address]. At the first stop sign, take a right onto [address]. The upper level parking lot will be on the left. East Entrance Door number 10.               1/17/2024 10:00 AM RETURN 50 min  [facility] Che Sesay NP    Location Instructions:     From [place]: Take [address]. Turn left at [address]. Turn left at the first stoplight on [address]. At the first stop sign, take a right onto [address]. Take a left into the parking lot and continue through until you reach the [facility] entrance of the building, door number 19.   From [place]: Take [address]. Take the [address] ramp towards [place]. Turn left onto [address]. Take a slight right onto [address]. Turn left at the first stoplight on [address]. At the first stop sign, take a right onto [address]. Take a left into the parking lot and continue through until you reach the [facility] entrance of the building, door number 19.   From [place]: Take [address]. Take a right at [address] [facility] [address] [facility] [address]. At the first stop sign, take a right onto [address]. Take a left into the parking lot and continue through until you reach the [facility] entrance of the building, door number 19 .

## 2023-12-02 ENCOUNTER — HEALTH MAINTENANCE LETTER (OUTPATIENT)
Age: 68
End: 2023-12-02

## 2024-01-04 ENCOUNTER — ANESTHESIA EVENT (OUTPATIENT)
Dept: SURGERY | Facility: HOSPITAL | Age: 69
End: 2024-01-04
Payer: MEDICARE

## 2024-01-04 ENCOUNTER — OFFICE VISIT (OUTPATIENT)
Dept: FAMILY MEDICINE | Facility: OTHER | Age: 69
End: 2024-01-04
Attending: FAMILY MEDICINE
Payer: COMMERCIAL

## 2024-01-04 VITALS
SYSTOLIC BLOOD PRESSURE: 145 MMHG | HEART RATE: 49 BPM | OXYGEN SATURATION: 96 % | BODY MASS INDEX: 22.11 KG/M2 | DIASTOLIC BLOOD PRESSURE: 88 MMHG | WEIGHT: 132.7 LBS | TEMPERATURE: 97.8 F | HEIGHT: 65 IN

## 2024-01-04 DIAGNOSIS — E03.9 ACQUIRED HYPOTHYROIDISM: ICD-10-CM

## 2024-01-04 DIAGNOSIS — E78.5 HYPERLIPIDEMIA WITH TARGET LDL LESS THAN 130: ICD-10-CM

## 2024-01-04 DIAGNOSIS — H26.9 CATARACT OF RIGHT EYE, UNSPECIFIED CATARACT TYPE: ICD-10-CM

## 2024-01-04 DIAGNOSIS — F41.1 GAD (GENERALIZED ANXIETY DISORDER): ICD-10-CM

## 2024-01-04 DIAGNOSIS — Z01.818 PREOPERATIVE EXAMINATION: Primary | ICD-10-CM

## 2024-01-04 PROCEDURE — G0463 HOSPITAL OUTPT CLINIC VISIT: HCPCS

## 2024-01-04 PROCEDURE — 99213 OFFICE O/P EST LOW 20 MIN: CPT | Performed by: FAMILY MEDICINE

## 2024-01-04 NOTE — H&P (VIEW-ONLY)
[facility] [place]  [address]  [place] [address]  Phone: [phone]  Primary Provider: Mirna Roblero  Pre-op Performing Provider: YEFRI TOWNSEND      PREOPERATIVE EVALUATION:  Today's date: 1/4/2024    Denise is a 68 year old, presenting for the following:  Pre-Op Exam        1/4/2024    12:49 PM   Additional Questions   Roomed by Tia Mary   Accompanied by none         1/4/2024    12:49 PM   Patient Reported Additional Medications   Patient reports taking the following new medications none       Surgical Information:  Surgery/Procedure: Cataract R eye  [place] [facility] [place]   Surgery Date: 1/9  Time of Surgery: TBD  Where patient plans to recover: At home with family  Fax number for surgical facility: Note does not need to be faxed, will be available electronically in Epic.    Assessment & Plan     The proposed surgical procedure is considered LOW risk.    Preoperative examination  Cataract of right eye, unspecified cataract type  Hyperlipidemia with target LDL less than 130  Acquired hypothyroidism  BERTA (generalized anxiety disorder)          - No identified additional risk factors other than previously addressed    Antiplatelet or Anticoagulation Medication Instructions:   - Patient is on no antiplatelet or anticoagulation medications.    Additional Medication Instructions:  Patient is to take all scheduled medications on the day of surgery    RECOMMENDATION:  APPROVAL GIVEN to proceed with proposed procedure, without further diagnostic evaluation.        Subjective       HPI related to upcoming procedure: preop right cataract        12/29/2023    10:55 AM   Preop Questions   1. Have you ever had a heart attack or stroke? No   2. Have you ever had surgery on your heart or blood vessels, such as a stent placement, a coronary artery bypass, or surgery on an artery in your head, neck, heart, or legs? No   3. Do you have chest pain with activity? No   4. Do you have a  history of  heart failure? No   5. Do you currently have a cold, bronchitis or symptoms of other infection? No   6. Do you have a cough, shortness of breath, or wheezing? No   7. Do you or anyone in your family have previous history of blood clots? No   8. Do you or does anyone in your family have a serious bleeding problem such as prolonged bleeding following surgeries or cuts? No   9. Have you ever had problems with anemia or been told to take iron pills? No   10. Have you had any abnormal blood loss such as black, tarry or bloody stools, or abnormal vaginal bleeding? No   11. Have you ever had a blood transfusion? No   12. Are you willing to have a blood transfusion if it is medically needed before, during, or after your surgery? Yes   13. Have you or any of your relatives ever had problems with anesthesia? No   14. Do you have sleep apnea, excessive snoring or daytime drowsiness? No   15. Do you have any artifical heart valves or other implanted medical devices like a pacemaker, defibrillator, or continuous glucose monitor? No   16. Do you have artificial joints? No - pins in jaw and right ankle   17. Are you allergic to latex? No       Health Care Directive:  Patient has a Health Care Directive on file      Preoperative Review of :   reviewed - no record of controlled substances prescribed.      Status of Chronic Conditions:  History of Breast Cancer s/p mastectomy reported 2019, no chemo/radiation  HLP  Hypothyroid  Seasonal Allergies  Migraines, Imitrex maybe twice a year  Quit smoking approx age 30  Echo 12/22/20 - EF 60-65%, g1dd.  Anxiety - on Prozac      Review of Systems   Constitutional:  Negative for fever.   Respiratory:  Negative for shortness of breath.    Cardiovascular:  Negative for chest pain, palpitations and peripheral edema.   Gastrointestinal:  Negative for abdominal pain.   Neurological:  Negative for light-headedness and headaches.         Patient Active Problem List    Diagnosis  Date Noted    History of recurrent pulmonary infection 01/06/2023     Priority: Medium    Sinus bradycardia 09/26/2022     Priority: Medium    CHF (congestive heart failure) (H) 08/12/2022     Priority: Medium    Lung nodule 12/01/2021     Priority: Medium     Formatting of this note might be different from the original.  Added automatically from request for surgery [id number]      Use of aromatase inhibitors 01/26/2021     Priority: Medium    Seasonal allergies 10/09/2020     Priority: Medium    Migraine without status migrainosus, not intractable, unspecified migraine type 10/09/2020     Priority: Medium    Personal history of malignant neoplasm of breast 10/09/2019     Priority: Medium    Invasive lobular carcinoma of right breast in female (H) 08/28/2019     Priority: Medium     5.12.2020- Review and distribute treatment summary- [facility]      Cigarette nicotine dependence in remission 08/22/2019     Priority: Medium    ACP (advance care planning) 07/27/2017     Priority: Medium     Advance Care Planning 7/27/2017: ACP Review of Chart / Resources Provided:  Reviewed chart for advance care plan.  Denise Taylor has no plan or code status on file. Discussed available resources and provided with information.   Added by Marah Henry            Acquired hypothyroidism 07/27/2017     Priority: Medium    History of basal cell carcinoma 05/18/2015     Priority: Medium    Hyperlipidemia with target LDL less than 130 05/18/2015     Priority: Medium     Diagnosis updated by automated process. Provider to review and confirm.      Actinic keratosis 12/01/2011     Priority: Medium    AC separation, type 5 06/27/2011     Priority: Medium     Overview:   IMO Update 10/11      Capillary disease 08/06/2007     Priority: Medium     Overview:   IMO Update 10/11      Other dyschromia 08/06/2007     Priority: Medium    Scar condition and fibrosis of skin 08/06/2007     Priority: Medium      Past Medical History:    Diagnosis Date    Basal cell carcinoma     Breast cancer (H) 07/2019    right breast    Bronchiectasis (H) 2019    CHF (congestive heart failure) (H) 8/12/2022    Dyslipidemia     BERTA (generalized anxiety disorder)     Invasive lobular carcinoma of right breast in female (H) 08/28/2019 5.12.2020- Review and distribute treatment summary- [facility]    Malignant melanoma of skin of face (H) 08/11/2022    Removed melanoma    Malignant neoplasm of upper-outer quadrant of right breast in female, estrogen receptor positive (H) 08/22/2019    Recurrent pneumonia 08/22/2019    Squamous cell carcinoma of skin 05/2020    right lower leg    Thyroid disease      Past Surgical History:   Procedure Laterality Date    ARTHROSCOPY SHOULDER ROTATOR CUFF REPAIR Left 03/07/2018    Procedure: ARTHROSCOPY SHOULDER ROTATOR CUFF REPAIR;  LEFT SHOULDER ARTHROSCOPY, REPAIR ROTATOR CUFF: subacromial Decompression and AC Joint Resection;  Surgeon: Baldev Lou DO;  Location: [facility]    ARTHROTOMY SHOULDER, ROTATOR CUFF REPAIR, COMBINED Right 11/14/2018    Procedure: RIGHT SHOULDER DEBRIDEMENT, EXCISION OF LIPOMA  RIGHT UPPER ARM, EXCISION SCAR TISSUE AC JOINT;  Surgeon: Baldev Lou DO;  Location: [facility] OR    BIOPSY BREAST NEEDLE LOCALIZATION, BIOPSY NODE SENTINEL, COMBINED Right 08/23/2019    Procedure: RIGHT WIRE LOCALIZED LUMPECTOMY WITH SENTINEL  LYMP NODE;  Surgeon: Michael Dupree MD;  Location: [facility] OR    BREAST RECONSTRUCTION WITH DEEP INFERIOR EPIGASTRIC  (AILYN) FLAP,  07/2020    done in [place]    COLONOSCOPY  2006    Dr Lara    COLONOSCOPY N/A 10/10/2016    Procedure: COLONOSCOPY;  Surgeon: Christine Cintron MD;  Location: [facility] OR    EYE SURGERY  1995    Lasik    INSERT TISSUE EXPANDER BREAST BILATERAL Bilateral 10/09/2019    Procedure: BILATERAL TISSUE EXPANDERS (KOBIENIA);  Surgeon: Dale Paul MD;  Location:  OR    MASTECTOMY, NIPPLE SPARING Bilateral 10/09/2019    Procedure: BILATERAL SKIN  SPARRING MASTECTOMY (CASS);  Surgeon: Opal Rubin MD;  Location:  OR    ORTHOPEDIC SURGERY Right     feet neuromas removed    right ankle ORIF  06/2020    Dr Torres    SHOULDER SURGERY Right 2011/10/2012     Current Outpatient Medications   Medication Sig Dispense Refill    anastrozole (ARIMIDEX) 1 MG tablet Take 1 tablet (1 mg) by mouth daily 90 tablet 2    calcium carbonate-vitamin D (OS-STEVE) 600-400 MG-UNIT chewable tablet Take 1 chew tab by mouth 2 times daily (with meals) 180 tablet 3    ciprofloxacin (CIPRO) 250 MG tablet Take 1 tablet (250 mg) by mouth 2 times daily For 7 days 14 tablet 0    FLUoxetine (PROZAC) 10 MG capsule TAKE ONE CAPSULE BY MOUTH EVERY DAY 90 capsule 2    levocetirizine (XYZAL) 5 MG tablet TAKE ONE TABLET BY MOUTH EVERY EVENING 90 tablet 3    levothyroxine (SYNTHROID/LEVOTHROID) 88 MCG tablet TAKE 1 TABLET BY MOUTH DAILY 90 tablet 2    rosuvastatin (CRESTOR) 10 MG tablet Take 1 tablet (10 mg) by mouth daily 90 tablet 2    SUMAtriptan (IMITREX) 50 MG tablet Take 1 tablet (50 mg) by mouth at onset of headache for migraine May repeat in 2 hours. Max 4 tablets/24 hours. 18 tablet 3    SUMAtriptan (IMITREX) 50 MG tablet Take 1 tablet (50 mg) by mouth at onset of headache for migraine May repeat in 2 hours. Max 4 tablets/24 hours. 18 tablet 4    voriconazole (VFEND) 200 MG tablet TAKE 1 AND 1/2 TABLETS BY MOUTH TWICE DAILY FOR TWO doses, THEN ONE TABLET TWICE DAILY --Administer 1 hour before or after meals      voriconazole (VFEND) 50 MG tablet       voriconazole (VFEND) 50 MG tablet Administer 1 hour before or after meals.  Take two tablets in the morning (100mg) and three tablets at night (150mg).  Indications: Infection         Allergies   Allergen Reactions    Pcn [Penicillins] Rash    Zithromax [Azithromycin] Rash        Social History     Tobacco Use    Smoking status: Former     Packs/day: 0.50     Years: 10.00     Additional pack years: 0.00     Total pack years: 5.00      "Types: Cigarettes     Start date: 10/10/1974     Quit date: 10/10/1984     Years since quittin.2    Smokeless tobacco: Former     Quit date: 10/10/1984   Substance Use Topics    Alcohol use: Yes     Alcohol/week: 1.7 standard drinks of alcohol     Types: 2 Glasses of wine per week     Comment: occasionally       History   Drug Use Unknown         Objective     BP (!) 145/88 (BP Location: Left arm, Patient Position: Sitting, Cuff Size: Adult Regular)   Pulse (!) 49   Temp 97.8  F (36.6  C) (Tympanic)   Ht 1.651 m (5' 5\")   Wt 60.2 kg (132 lb 11.2 oz)   SpO2 96%   BMI 22.08 kg/m      Physical Exam  Constitutional:       General: She is not in acute distress.     Appearance: Normal appearance.   HENT:      Head: Normocephalic and atraumatic.      Right Ear: Tympanic membrane normal.      Left Ear: Tympanic membrane normal.      Mouth/Throat:      Mouth: Mucous membranes are moist.      Pharynx: Oropharynx is clear.   Eyes:      Conjunctiva/sclera: Conjunctivae normal.      Pupils: Pupils are equal, round, and reactive to light.   Neck:      Vascular: No carotid bruit.   Cardiovascular:      Rate and Rhythm: Normal rate and regular rhythm.      Heart sounds: Normal heart sounds. No murmur heard.  Pulmonary:      Effort: Pulmonary effort is normal.      Breath sounds: Normal breath sounds.   Abdominal:      General: Bowel sounds are normal.      Palpations: Abdomen is soft.   Musculoskeletal:      Cervical back: Normal range of motion.      Right lower leg: No edema.      Left lower leg: No edema.   Lymphadenopathy:      Cervical: No cervical adenopathy.   Neurological:      Mental Status: She is alert and oriented to person, place, and time.           Recent Labs   Lab Test 10/25/23  0915 23  1322   HGB 14.0 12.8    158    140   POTASSIUM 4.2 4.1   CR 0.79 0.76        Diagnostics:  CBC, BMP, Hepatic panel from 10/25/23 are acceptable    No EKG required for low risk surgery (cataract, skin " procedure, breast biopsy, etc).         Signed Electronically by: YEFRI TOWNSEND DO  Copy of this evaluation report is provided to requesting physician.

## 2024-01-04 NOTE — ANESTHESIA PREPROCEDURE EVALUATION
Anesthesia Pre-Procedure Evaluation    Patient: Denise Taylor   MRN: [id number] : 1955        Procedure : Procedure(s):  Phacoemulsification Cataract Extraction Posterior Chamber Lens Right Eye          Past Medical History:   Diagnosis Date     Basal cell carcinoma      Breast cancer (H) 2019    right breast     Bronchiectasis (H)      CHF (congestive heart failure) (H) 2022     Dyslipidemia      BERTA (generalized anxiety disorder)      Invasive lobular carcinoma of right breast in female (H) 2019- Review and distribute treatment summary- [facility]     Malignant melanoma of skin of face (H) 2022    Removed melanoma     Malignant neoplasm of upper-outer quadrant of right breast in female, estrogen receptor positive (H) 2019     Recurrent pneumonia 2019     Squamous cell carcinoma of skin 2020    right lower leg     Thyroid disease       Past Surgical History:   Procedure Laterality Date     ARTHROSCOPY SHOULDER ROTATOR CUFF REPAIR Left 2018    Procedure: ARTHROSCOPY SHOULDER ROTATOR CUFF REPAIR;  LEFT SHOULDER ARTHROSCOPY, REPAIR ROTATOR CUFF: subacromial Decompression and AC Joint Resection;  Surgeon: Baldev Lou DO;  [facility]: [facility]     ARTHROTOMY SHOULDER, ROTATOR CUFF REPAIR, COMBINED Right 2018    Procedure: RIGHT SHOULDER DEBRIDEMENT, EXCISION OF LIPOMA  RIGHT UPPER ARM, EXCISION SCAR TISSUE AC JOINT;  Surgeon: Baldev Lou DO;  Location: [facility]     BIOPSY BREAST NEEDLE LOCALIZATION, BIOPSY NODE SENTINEL, COMBINED Right 2019    Procedure: RIGHT WIRE LOCALIZED LUMPECTOMY WITH SENTINEL  LYMP NODE;  Surgeon: Michael Dupree MD;  Location: [facility]     BREAST RECONSTRUCTION WITH DEEP INFERIOR EPIGASTRIC  (AILYN) FLAP,  2020    done in [place]     COLONOSCOPY  2006    Dr Lara     COLONOSCOPY N/A 10/10/2016    Procedure: COLONOSCOPY;  Surgeon: Christine Cintron MD;  Location: [facility]     INSERT TISSUE EXPANDER  BREAST BILATERAL Bilateral 10/09/2019    Procedure: BILATERAL TISSUE EXPANDERS (RAMIREZ);  Surgeon: Dale Paul MD;  Location: [facility]     MASTECTOMY, NIPPLE SPARING Bilateral 10/09/2019    Procedure: BILATERAL SKIN SPARRING MASTECTOMY (CASS);  Surgeon: Opal Rubin MD;  Location:  OR     ORTHOPEDIC SURGERY Right     feet neuromas removed     right ankle ORIF  06/2020    Dr Torres     SHOULDER SURGERY Right 2011/10/2012      Allergies   Allergen Reactions     Pcn [Penicillins] Rash     Zithromax [Azithromycin] Rash      Social History     Tobacco Use     Smoking status: Never     Smokeless tobacco: Never   Substance Use Topics     Alcohol use: Yes     Alcohol/week: 1.7 standard drinks of alcohol     Types: 2 Glasses of wine per week     Comment: occasionally      Wt Readings from Last 1 Encounters:   10/25/23 58.3 kg (128 lb 9.6 oz)        Anesthesia Evaluation   Pt has had prior anesthetic. Type: MAC and General.        ROS/MED HX  ENT/Pulmonary:     (+)           allergic rhinitis,     tobacco use, Past use,                       Neurologic:     (+)      migraines,                          Cardiovascular:     (+) Dyslipidemia - -   -  - -      CHF                  dysrhythmias, Other,             METS/Exercise Tolerance:     Hematologic:       Musculoskeletal:       GI/Hepatic:  - neg GI/hepatic ROS     Renal/Genitourinary:  - neg Renal ROS     Endo:     (+)          thyroid problem, hypothyroidism,           Psychiatric/Substance Use:     (+) psychiatric history anxiety       Infectious Disease:  - neg infectious disease ROS     Malignancy:   (+) Malignancy, History of Skin, Lung and Breast.    Other:  - neg other ROS          Physical Exam    Airway  airway exam normal      Mallampati: I   TM distance: > 3 FB   Neck ROM: full   Mouth opening: > 3 cm    Respiratory Devices and Support         Dental       (+) Minor Abnormalities - some fillings, tiny chips      Cardiovascular   cardiovascular  "exam normal       Rhythm and rate: regular and normal     Pulmonary   pulmonary exam normal        breath sounds clear to auscultation       OUTSIDE LABS:  CBC:   Lab Results   Component Value Date    WBC 7.1 10/25/2023    WBC 5.0 07/14/2023    HGB 14.0 10/25/2023    HGB 12.8 07/14/2023    HCT 42.7 10/25/2023    HCT 38.6 07/14/2023     10/25/2023     07/14/2023     BMP:   Lab Results   Component Value Date     10/25/2023     07/14/2023    POTASSIUM 4.2 10/25/2023    POTASSIUM 4.1 07/14/2023    CHLORIDE 102 10/25/2023    CHLORIDE 104 07/14/2023    CO2 28 10/25/2023    CO2 26 07/14/2023    BUN 18.7 10/25/2023    BUN 13.1 07/14/2023    CR 0.79 10/25/2023    CR 0.76 07/14/2023     (H) 10/25/2023    GLC 93 07/14/2023     COAGS:   Lab Results   Component Value Date    INR 1.02 12/30/2020     POC: No results found for: \"BGM\", \"HCG\", \"HCGS\"  HEPATIC:   Lab Results   Component Value Date    ALBUMIN 4.5 10/25/2023    PROTTOTAL 7.7 10/25/2023    ALT 29 10/25/2023    AST 36 10/25/2023    ALKPHOS 85 10/25/2023    BILITOTAL 0.4 10/25/2023     OTHER:   Lab Results   Component Value Date    LACT 1.7 12/23/2020    STEVE 9.9 10/25/2023    PHOS 3.1 12/27/2020    MAG 2.0 01/01/2021    LIPASE 31 10/25/2023    TSH 1.14 06/23/2023    T4 1.20 07/24/2019    CRP 13.1 (H) 01/13/2021    SED 18 07/24/2019       Anesthesia Plan    ASA Status:  3    NPO Status:  NPO Appropriate    Anesthesia Type: MAC.              Consents    Anesthesia Plan(s) and associated risks, benefits, and realistic alternatives discussed. Questions answered and patient/representative(s) expressed understanding.     - Discussed: Risks, Benefits and Alternatives for BOTH SEDATION and the PROCEDURE were discussed     - Discussed with:  Patient      - Extended Intubation/Ventilatory Support Discussed: No.      - Patient is DNR/DNI Status: No     Use of blood products discussed: No .     Postoperative Care            Comments:    Other Comments: " Hp 1/4/24 CESAR Bowen CRNA    I have reviewed the pertinent notes and labs in the chart from the past 30 days and (re)examined the patient.  Any updates or changes from those notes are reflected in this note.

## 2024-01-04 NOTE — PROGRESS NOTES
[facility] [place]  [address]  [place] [address]  Phone: [phone]  Primary Provider: Mirna Roblero  Pre-op Performing Provider: YEFRI TOWNSEND      PREOPERATIVE EVALUATION:  Today's date: 1/4/2024    Denise is a 68 year old, presenting for the following:  Pre-Op Exam        1/4/2024    12:49 PM   Additional Questions   Roomed by Tia Mary   Accompanied by none         1/4/2024    12:49 PM   Patient Reported Additional Medications   Patient reports taking the following new medications none       Surgical Information:  Surgery/Procedure: Cataract R eye  Surgery Location: [facility] [place]  Surgeon:   Surgery Date: 1/9  Time of Surgery: TBD  Where patient plans to recover: At home with family  Fax number for surgical facility: Note does not need to be faxed, will be available electronically in Epic.    Assessment & Plan     The proposed surgical procedure is considered LOW risk.    Preoperative examination  Cataract of right eye, unspecified cataract type  Hyperlipidemia with target LDL less than 130  Acquired hypothyroidism  BERTA (generalized anxiety disorder)          - No identified additional risk factors other than previously addressed    Antiplatelet or Anticoagulation Medication Instructions:   - Patient is on no antiplatelet or anticoagulation medications.    Additional Medication Instructions:  Patient is to take all scheduled medications on the day of surgery    RECOMMENDATION:  APPROVAL GIVEN to proceed with proposed procedure, without further diagnostic evaluation.        Subjective       HPI related to upcoming procedure: preop right cataract        12/29/2023    10:55 AM   Preop Questions   1. Have you ever had a heart attack or stroke? No   2. Have you ever had surgery on your heart or blood vessels, such as a stent placement, a coronary artery bypass, or surgery on an artery in your head, neck, heart, or legs? No   3. Do you have chest pain with activity? No   4. Do you have a  history of  heart failure? No   5. Do you currently have a cold, bronchitis or symptoms of other infection? No   6. Do you have a cough, shortness of breath, or wheezing? No   7. Do you or anyone in your family have previous history of blood clots? No   8. Do you or does anyone in your family have a serious bleeding problem such as prolonged bleeding following surgeries or cuts? No   9. Have you ever had problems with anemia or been told to take iron pills? No   10. Have you had any abnormal blood loss such as black, tarry or bloody stools, or abnormal vaginal bleeding? No   11. Have you ever had a blood transfusion? No   12. Are you willing to have a blood transfusion if it is medically needed before, during, or after your surgery? Yes   13. Have you or any of your relatives ever had problems with anesthesia? No   14. Do you have sleep apnea, excessive snoring or daytime drowsiness? No   15. Do you have any artifical heart valves or other implanted medical devices like a pacemaker, defibrillator, or continuous glucose monitor? No   16. Do you have artificial joints? No - pins in jaw and right ankle   17. Are you allergic to latex? No       Health Care Directive:  Patient has a Health Care Directive on file      Preoperative Review of :   reviewed - no record of controlled substances prescribed.      Status of Chronic Conditions:  History of Breast Cancer s/p mastectomy reported 2019, no chemo/radiation  HLP  Hypothyroid  Seasonal Allergies  Migraines, Imitrex maybe twice a year  Quit smoking approx age 30  Echo 12/22/20 - EF 60-65%, g1dd.  Anxiety - on Prozac      Review of Systems   Constitutional:  Negative for fever.   Respiratory:  Negative for shortness of breath.    Cardiovascular:  Negative for chest pain, palpitations and peripheral edema.   Gastrointestinal:  Negative for abdominal pain.   Neurological:  Negative for light-headedness and headaches.         Patient Active Problem List    Diagnosis  Date Noted    History of recurrent pulmonary infection 01/06/2023     Priority: Medium    Sinus bradycardia 09/26/2022     Priority: Medium    CHF (congestive heart failure) (H) 08/12/2022     Priority: Medium    Lung nodule 12/01/2021     Priority: Medium     Formatting of this note might be different from the original.  Added automatically from request for surgery [id number]      Use of aromatase inhibitors 01/26/2021     Priority: Medium    Seasonal allergies 10/09/2020     Priority: Medium    Migraine without status migrainosus, not intractable, unspecified migraine type 10/09/2020     Priority: Medium    Personal history of malignant neoplasm of breast 10/09/2019     Priority: Medium    Invasive lobular carcinoma of right breast in female (H) 08/28/2019     Priority: Medium     5.12.2020- Review and distribute treatment summary- [facility]      Cigarette nicotine dependence in remission 08/22/2019     Priority: Medium    ACP (advance care planning) 07/27/2017     Priority: Medium     Advance Care Planning 7/27/2017: ACP Review of Chart / Resources Provided:  Reviewed chart for advance care plan.  Denise Taylor has no plan or code status on file. Discussed available resources and provided with information.   Added by Marah Henry            Acquired hypothyroidism 07/27/2017     Priority: Medium    History of basal cell carcinoma 05/18/2015     Priority: Medium    Hyperlipidemia with target LDL less than 130 05/18/2015     Priority: Medium     Diagnosis updated by automated process. Provider to review and confirm.      Actinic keratosis 12/01/2011     Priority: Medium    AC separation, type 5 06/27/2011     Priority: Medium     Overview:   IMO Update 10/11      Capillary disease 08/06/2007     Priority: Medium     Overview:   IMO Update 10/11      Other dyschromia 08/06/2007     Priority: Medium    Scar condition and fibrosis of skin 08/06/2007     Priority: Medium      Past Medical History:    Diagnosis Date    Basal cell carcinoma     Breast cancer (H) 07/2019    right breast    Bronchiectasis (H) 2019    CHF (congestive heart failure) (H) 8/12/2022    Dyslipidemia     BERTA (generalized anxiety disorder)     Invasive lobular carcinoma of right breast in female (H) 08/28/2019 5.12.2020- Review and distribute treatment summary- [facility]    Malignant melanoma of skin of face (H) 08/11/2022    Removed melanoma    Malignant neoplasm of upper-outer quadrant of right breast in female, estrogen receptor positive (H) 08/22/2019    Recurrent pneumonia 08/22/2019    Squamous cell carcinoma of skin 05/2020    right lower leg    Thyroid disease      Past Surgical History:   Procedure Laterality Date    ARTHROSCOPY SHOULDER ROTATOR CUFF REPAIR Left 03/07/2018    Procedure: ARTHROSCOPY SHOULDER ROTATOR CUFF REPAIR;  LEFT SHOULDER ARTHROSCOPY, REPAIR ROTATOR CUFF: subacromial Decompression and AC Joint Resection;  Surgeon: Baldev Lou DO;  Location: [facility] OR    ARTHROTOMY SHOULDER, ROTATOR CUFF REPAIR, COMBINED Right 11/14/2018    Procedure: RIGHT SHOULDER DEBRIDEMENT, EXCISION OF LIPOMA  RIGHT UPPER ARM, EXCISION SCAR TISSUE AC JOINT;  Surgeon: Baldev Lou DO;  Location: [facility] OR    BIOPSY BREAST NEEDLE LOCALIZATION, BIOPSY NODE SENTINEL, COMBINED Right 08/23/2019    Procedure: RIGHT WIRE LOCALIZED LUMPECTOMY WITH SENTINEL  LYMP NODE;  Surgeon: Michael Dupree MD;  Location: [facility] OR    BREAST RECONSTRUCTION WITH DEEP INFERIOR EPIGASTRIC  (AILYN) FLAP,  07/2020    done in [place]    COLONOSCOPY  2006    Dr Lara    COLONOSCOPY N/A 10/10/2016    Procedure: COLONOSCOPY;  Surgeon: Christine Cintron MD;  Location: [facility] OR    EYE SURGERY  1995    Lasik    INSERT TISSUE EXPANDER BREAST BILATERAL Bilateral 10/09/2019    Procedure: BILATERAL TISSUE EXPANDERS (KOBIENIA);  Surgeon: Dale Paul MD;  Location:  OR    MASTECTOMY, NIPPLE SPARING Bilateral 10/09/2019    Procedure: BILATERAL SKIN  SPARRING MASTECTOMY (CASS);  Surgeon: Opal Rubin MD;  Location:  OR    ORTHOPEDIC SURGERY Right     feet neuromas removed    right ankle ORIF  06/2020    Dr Torres    SHOULDER SURGERY Right 2011/10/2012     Current Outpatient Medications   Medication Sig Dispense Refill    anastrozole (ARIMIDEX) 1 MG tablet Take 1 tablet (1 mg) by mouth daily 90 tablet 2    calcium carbonate-vitamin D (OS-STEVE) 600-400 MG-UNIT chewable tablet Take 1 chew tab by mouth 2 times daily (with meals) 180 tablet 3    ciprofloxacin (CIPRO) 250 MG tablet Take 1 tablet (250 mg) by mouth 2 times daily For 7 days 14 tablet 0    FLUoxetine (PROZAC) 10 MG capsule TAKE ONE CAPSULE BY MOUTH EVERY DAY 90 capsule 2    levocetirizine (XYZAL) 5 MG tablet TAKE ONE TABLET BY MOUTH EVERY EVENING 90 tablet 3    levothyroxine (SYNTHROID/LEVOTHROID) 88 MCG tablet TAKE 1 TABLET BY MOUTH DAILY 90 tablet 2    rosuvastatin (CRESTOR) 10 MG tablet Take 1 tablet (10 mg) by mouth daily 90 tablet 2    SUMAtriptan (IMITREX) 50 MG tablet Take 1 tablet (50 mg) by mouth at onset of headache for migraine May repeat in 2 hours. Max 4 tablets/24 hours. 18 tablet 3    SUMAtriptan (IMITREX) 50 MG tablet Take 1 tablet (50 mg) by mouth at onset of headache for migraine May repeat in 2 hours. Max 4 tablets/24 hours. 18 tablet 4    voriconazole (VFEND) 200 MG tablet TAKE 1 AND 1/2 TABLETS BY MOUTH TWICE DAILY FOR TWO doses, THEN ONE TABLET TWICE DAILY --Administer 1 hour before or after meals      voriconazole (VFEND) 50 MG tablet       voriconazole (VFEND) 50 MG tablet Administer 1 hour before or after meals.  Take two tablets in the morning (100mg) and three tablets at night (150mg).  Indications: Infection         Allergies   Allergen Reactions    Pcn [Penicillins] Rash    Zithromax [Azithromycin] Rash        Social History     Tobacco Use    Smoking status: Former     Packs/day: 0.50     Years: 10.00     Additional pack years: 0.00     Total pack years: 5.00      "Types: Cigarettes     Start date: 10/10/1974     Quit date: 10/10/1984     Years since quittin.2    Smokeless tobacco: Former     Quit date: 10/10/1984   Substance Use Topics    Alcohol use: Yes     Alcohol/week: 1.7 standard drinks of alcohol     Types: 2 Glasses of wine per week     Comment: occasionally       History   Drug Use Unknown         Objective     BP (!) 145/88 (BP Location: Left arm, Patient Position: Sitting, Cuff Size: Adult Regular)   Pulse (!) 49   Temp 97.8  F (36.6  C) (Tympanic)   Ht 1.651 m (5' 5\")   Wt 60.2 kg (132 lb 11.2 oz)   SpO2 96%   BMI 22.08 kg/m      Physical Exam  Constitutional:       General: She is not in acute distress.     Appearance: Normal appearance.   HENT:      Head: Normocephalic and atraumatic.      Right Ear: Tympanic membrane normal.      Left Ear: Tympanic membrane normal.      Mouth/Throat:      Mouth: Mucous membranes are moist.      Pharynx: Oropharynx is clear.   Eyes:      Conjunctiva/sclera: Conjunctivae normal.      Pupils: Pupils are equal, round, and reactive to light.   Neck:      Vascular: No carotid bruit.   Cardiovascular:      Rate and Rhythm: Normal rate and regular rhythm.      Heart sounds: Normal heart sounds. No murmur heard.  Pulmonary:      Effort: Pulmonary effort is normal.      Breath sounds: Normal breath sounds.   Abdominal:      General: Bowel sounds are normal.      Palpations: Abdomen is soft.   Musculoskeletal:      Cervical back: Normal range of motion.      Right lower leg: No edema.      Left lower leg: No edema.   Lymphadenopathy:      Cervical: No cervical adenopathy.   Neurological:      Mental Status: She is alert and oriented to person, place, and time.           Recent Labs   Lab Test 10/25/23  0915 23  1322   HGB 14.0 12.8    158    140   POTASSIUM 4.2 4.1   CR 0.79 0.76        Diagnostics:  CBC, BMP, Hepatic panel from 10/25/23 are acceptable    No EKG required for low risk surgery (cataract, skin " procedure, breast biopsy, etc).         Signed Electronically by: YEFRI TOWNSEND DO  Copy of this evaluation report is provided to requesting physician.

## 2024-01-05 ASSESSMENT — LIFESTYLE VARIABLES: TOBACCO_USE: 1

## 2024-01-05 ASSESSMENT — ENCOUNTER SYMPTOMS: DYSRHYTHMIAS: 1

## 2024-01-07 ASSESSMENT — ENCOUNTER SYMPTOMS
LIGHT-HEADEDNESS: 0
PALPITATIONS: 0
HEADACHES: 0
SHORTNESS OF BREATH: 0
FEVER: 0
ABDOMINAL PAIN: 0

## 2024-01-09 ENCOUNTER — HOSPITAL ENCOUNTER (OUTPATIENT)
Facility: HOSPITAL | Age: 69
Discharge: HOME | End: 2024-01-09
Attending: OPHTHALMOLOGY | Admitting: OPHTHALMOLOGY
Payer: MEDICARE

## 2024-01-09 ENCOUNTER — ANESTHESIA (OUTPATIENT)
Dept: SURGERY | Facility: HOSPITAL | Age: 69
End: 2024-01-09
Payer: MEDICARE

## 2024-01-09 VITALS
OXYGEN SATURATION: 99 % | TEMPERATURE: 97.6 F | HEART RATE: 44 BPM | WEIGHT: 131.8 LBS | RESPIRATION RATE: 16 BRPM | SYSTOLIC BLOOD PRESSURE: 121 MMHG | BODY MASS INDEX: 21.96 KG/M2 | DIASTOLIC BLOOD PRESSURE: 79 MMHG | HEIGHT: 65 IN

## 2024-01-09 PROCEDURE — 250N000009 HC RX 250: Performed by: OPHTHALMOLOGY

## 2024-01-09 PROCEDURE — 272N000001 HC OR GENERAL SUPPLY STERILE: Performed by: OPHTHALMOLOGY

## 2024-01-09 PROCEDURE — V2788 PRESBYOPIA-CORRECT FUNCTION: HCPCS | Performed by: OPHTHALMOLOGY

## 2024-01-09 PROCEDURE — 999N000141 HC STATISTIC PRE-PROCEDURE NURSING ASSESSMENT: Performed by: OPHTHALMOLOGY

## 2024-01-09 PROCEDURE — 250N000013 HC RX MED GY IP 250 OP 250 PS 637: Performed by: OPHTHALMOLOGY

## 2024-01-09 PROCEDURE — 250N000011 HC RX IP 250 OP 636: Performed by: NURSE ANESTHETIST, CERTIFIED REGISTERED

## 2024-01-09 PROCEDURE — 370N000017 HC ANESTHESIA TECHNICAL FEE, PER MIN: Performed by: OPHTHALMOLOGY

## 2024-01-09 PROCEDURE — 250N000011 HC RX IP 250 OP 636: Performed by: OPHTHALMOLOGY

## 2024-01-09 PROCEDURE — 710N000012 HC RECOVERY PHASE 2, PER MINUTE: Performed by: OPHTHALMOLOGY

## 2024-01-09 PROCEDURE — 66984 XCAPSL CTRC RMVL W/O ECP: CPT | Performed by: NURSE ANESTHETIST, CERTIFIED REGISTERED

## 2024-01-09 PROCEDURE — 360N000076 HC SURGERY LEVEL 3, PER MIN: Performed by: OPHTHALMOLOGY

## 2024-01-09 DEVICE — IMPLANTABLE DEVICE: Type: IMPLANTABLE DEVICE | Site: EYE | Status: FUNCTIONAL

## 2024-01-09 RX ORDER — ONDANSETRON 2 MG/ML
4 INJECTION INTRAMUSCULAR; INTRAVENOUS EVERY 30 MIN PRN
Status: DISCONTINUED | OUTPATIENT
Start: 2024-01-09 | End: 2024-01-09 | Stop reason: HOSPADM

## 2024-01-09 RX ORDER — HYDRALAZINE HYDROCHLORIDE 20 MG/ML
2.5-5 INJECTION INTRAMUSCULAR; INTRAVENOUS EVERY 10 MIN PRN
Status: DISCONTINUED | OUTPATIENT
Start: 2024-01-09 | End: 2024-01-09 | Stop reason: HOSPADM

## 2024-01-09 RX ORDER — PROPARACAINE HYDROCHLORIDE 5 MG/ML
1 SOLUTION/ DROPS OPHTHALMIC ONCE
Status: COMPLETED | OUTPATIENT
Start: 2024-01-09 | End: 2024-01-09

## 2024-01-09 RX ORDER — LIDOCAINE HYDROCHLORIDE 10 MG/ML
INJECTION, SOLUTION EPIDURAL; INFILTRATION; INTRACAUDAL; PERINEURAL PRN
Status: DISCONTINUED | OUTPATIENT
Start: 2024-01-09 | End: 2024-01-09 | Stop reason: HOSPADM

## 2024-01-09 RX ORDER — PROPARACAINE HYDROCHLORIDE 5 MG/ML
1 SOLUTION/ DROPS OPHTHALMIC
Status: COMPLETED | OUTPATIENT
Start: 2024-01-09 | End: 2024-01-09

## 2024-01-09 RX ORDER — ONDANSETRON 4 MG/1
4 TABLET, ORALLY DISINTEGRATING ORAL EVERY 30 MIN PRN
Status: DISCONTINUED | OUTPATIENT
Start: 2024-01-09 | End: 2024-01-09 | Stop reason: HOSPADM

## 2024-01-09 RX ORDER — CYCLOPENTOLATE HYDROCHLORIDE 20 MG/ML
1 SOLUTION/ DROPS OPHTHALMIC ONCE
Status: COMPLETED | OUTPATIENT
Start: 2024-01-09 | End: 2024-01-09

## 2024-01-09 RX ORDER — PHENYLEPHRINE HYDROCHLORIDE 100 MG/ML
1 SOLUTION/ DROPS OPHTHALMIC
Status: DISCONTINUED | OUTPATIENT
Start: 2024-01-09 | End: 2024-01-09 | Stop reason: HOSPADM

## 2024-01-09 RX ORDER — ACETAMINOPHEN 325 MG/1
650 TABLET ORAL ONCE
Status: COMPLETED | OUTPATIENT
Start: 2024-01-09 | End: 2024-01-09

## 2024-01-09 RX ORDER — SODIUM CHLORIDE, SODIUM LACTATE, POTASSIUM CHLORIDE, CALCIUM CHLORIDE 600; 310; 30; 20 MG/100ML; MG/100ML; MG/100ML; MG/100ML
INJECTION, SOLUTION INTRAVENOUS CONTINUOUS
Status: DISCONTINUED | OUTPATIENT
Start: 2024-01-09 | End: 2024-01-09 | Stop reason: HOSPADM

## 2024-01-09 RX ORDER — ACETAMINOPHEN 325 MG/1
650 TABLET ORAL ONCE
Status: CANCELLED | OUTPATIENT
Start: 2024-01-09 | End: 2024-01-09

## 2024-01-09 RX ORDER — LEVOBUNOLOL HYDROCHLORIDE 5 MG/ML
SOLUTION/ DROPS OPHTHALMIC PRN
Status: DISCONTINUED | OUTPATIENT
Start: 2024-01-09 | End: 2024-01-09 | Stop reason: HOSPADM

## 2024-01-09 RX ORDER — LIDOCAINE HYDROCHLORIDE 20 MG/ML
JELLY TOPICAL ONCE
Status: COMPLETED | OUTPATIENT
Start: 2024-01-09 | End: 2024-01-09

## 2024-01-09 RX ORDER — MOXIFLOXACIN 5 MG/ML
1 SOLUTION/ DROPS OPHTHALMIC
Status: COMPLETED | OUTPATIENT
Start: 2024-01-09 | End: 2024-01-09

## 2024-01-09 RX ORDER — PILOCARPINE HYDROCHLORIDE 10 MG/ML
SOLUTION/ DROPS OPHTHALMIC PRN
Status: DISCONTINUED | OUTPATIENT
Start: 2024-01-09 | End: 2024-01-09 | Stop reason: HOSPADM

## 2024-01-09 RX ORDER — LABETALOL 20 MG/4 ML (5 MG/ML) INTRAVENOUS SYRINGE
10
Status: DISCONTINUED | OUTPATIENT
Start: 2024-01-09 | End: 2024-01-09 | Stop reason: HOSPADM

## 2024-01-09 RX ORDER — MOXIFLOXACIN 5 MG/ML
SOLUTION/ DROPS OPHTHALMIC PRN
Status: DISCONTINUED | OUTPATIENT
Start: 2024-01-09 | End: 2024-01-09 | Stop reason: HOSPADM

## 2024-01-09 RX ORDER — LIDOCAINE 40 MG/G
CREAM TOPICAL
Status: DISCONTINUED | OUTPATIENT
Start: 2024-01-09 | End: 2024-01-09 | Stop reason: HOSPADM

## 2024-01-09 RX ORDER — CYCLOPENTOLAT/TROPIC/PHENYLEPH 1%-1%-2.5%
1 DROPS (EA) OPHTHALMIC (EYE)
Status: COMPLETED | OUTPATIENT
Start: 2024-01-09 | End: 2024-01-09

## 2024-01-09 RX ORDER — TETRACAINE HYDROCHLORIDE 5 MG/ML
SOLUTION OPHTHALMIC PRN
Status: DISCONTINUED | OUTPATIENT
Start: 2024-01-09 | End: 2024-01-09 | Stop reason: HOSPADM

## 2024-01-09 RX ADMIN — PROPARACAINE HYDROCHLORIDE 1 DROP: 5 SOLUTION/ DROPS OPHTHALMIC at 10:01

## 2024-01-09 RX ADMIN — Medication 1 DROP: at 09:44

## 2024-01-09 RX ADMIN — Medication 1 DROP: at 10:02

## 2024-01-09 RX ADMIN — ACETAMINOPHEN 650 MG: 325 TABLET, FILM COATED ORAL at 09:37

## 2024-01-09 RX ADMIN — MIDAZOLAM 1 MG: 1 INJECTION INTRAMUSCULAR; INTRAVENOUS at 11:30

## 2024-01-09 RX ADMIN — MOXIFLOXACIN OPHTHALMIC SOLUTION 1 DROP: 5 SOLUTION/ DROPS OPHTHALMIC at 09:47

## 2024-01-09 RX ADMIN — MIDAZOLAM 1 MG: 1 INJECTION INTRAMUSCULAR; INTRAVENOUS at 11:24

## 2024-01-09 RX ADMIN — PROPARACAINE HYDROCHLORIDE 1 DROP: 5 SOLUTION/ DROPS OPHTHALMIC at 09:38

## 2024-01-09 RX ADMIN — CYCLOPENTOLATE HYDROCHLORIDE 1 DROP: 20 SOLUTION/ DROPS OPHTHALMIC at 09:39

## 2024-01-09 RX ADMIN — MOXIFLOXACIN OPHTHALMIC SOLUTION 1 DROP: 5 SOLUTION/ DROPS OPHTHALMIC at 09:57

## 2024-01-09 RX ADMIN — LIDOCAINE HYDROCHLORIDE: 20 JELLY TOPICAL at 10:03

## 2024-01-09 RX ADMIN — MOXIFLOXACIN OPHTHALMIC SOLUTION 1 DROP: 5 SOLUTION/ DROPS OPHTHALMIC at 09:52

## 2024-01-09 ASSESSMENT — ACTIVITIES OF DAILY LIVING (ADL)
ADLS_ACUITY_SCORE: 16
ADLS_ACUITY_SCORE: 18

## 2024-01-09 NOTE — OR NURSING
Patient and responsible adult given discharge instructions with no questions regarding instructions. Diana score 20/20. Pain level 0/10.  Discharged from unit via ambulation. Patient discharged to home with spouse.

## 2024-01-09 NOTE — INTERVAL H&P NOTE
I have reviewed the surgical (or preoperative) H&P that is linked to this encounter, and examined the patient. There are no significant changes.  Glen Shirley MD      Clinical Conditions Present on Arrival:  Clinically Significant Risk Factors Present on Admission

## 2024-01-09 NOTE — ANESTHESIA CARE TRANSFER NOTE
Patient: Denise Taylor    Procedure: Procedure(s):  Phacoemulsification Cataract Extraction Posterior Chamber Lens Right Eye       Diagnosis: Combined form of age-related cataract, right eye [H25.811]  Presbyopia [H52.4]  Diagnosis Additional Information: No value filed.    Anesthesia Type:   MAC     Note:    Oropharynx: oropharynx clear of all foreign objects and spontaneously breathing  Level of Consciousness: awake  Oxygen Supplementation: room air    Independent Airway: airway patency satisfactory and stable  Dentition: dentition unchanged  Vital Signs Stable: post-procedure vital signs reviewed and stable  Report to RN Given: handoff report given  Patient transferred to: Phase II    Handoff Report: Identifed the Patient, Identified the Reponsible Provider, Reviewed the pertinent medical history, Discussed the surgical course, Reviewed Intra-OP anesthesia mangement and issues during anesthesia, Set expectations for post-procedure period and Allowed opportunity for questions and acknowledgement of understanding  Vitals:  Vitals Value Taken Time   BP     Temp     Pulse     Resp     SpO2         Electronically Signed By: CESAR Jacobs CRNA  January 9, 2024  11:56 AM

## 2024-01-09 NOTE — DISCHARGE INSTRUCTIONS
After Anesthesia (Sleep Medicine)  What should I do after anesthesia?  You should rest and relax for the next 24 hours. Avoid risky or difficult (strenuous) activity. A responsible adult should stay with you overnight.  Don't drive or use any heavy equipment for 24 hours. Even if you feel normal, your reactions may be affected by the sleep medicine given to you.  Don't drink alcohol or make any important decisions for 24 hours.  Slowly get back to your regular diet, as you feel able.  How should I expect to feel?  It's normal to feel dizzy, light-headed, or faint for up to a full day after anesthesia or while taking pain medicine. If this happens:   Sit down for a few minutes before standing.  Have someone help you when you get up to walk or use the bathroom.  If you have nausea (feel sick to your stomach) or vomit (throw up):   Drink clear liquids (such as apple juice, ginger ale, broth, or 7UP) until you feel better.  If you feel sick to your stomach, or you keep vomiting for 24 hours, please call the doctor.  What else should I know?  You might have a dry mouth, sore throat, muscle aches, or trouble sleeping. These should go away after 24 hours.  Please contact your doctor if you have any other symptoms that concern you, such as fever, pain, bleeding, fluid drainage, swelling, or headache, or if it's been over 8 to 10 hours and you still aren't able to pee (urinate).  If you have a history of sleep apnea, it's very important to use your CPAP machine for the next 24 hours when you nap or sleep.   For informational purposes only. Not to replace the advice of your health care provider. Copyright   2023 [employer]. All rights reserved. Clinically reviewed by Ildefonso Larios MD. [employer] [id number] - REV 09/23.

## 2024-01-09 NOTE — OP NOTE
[facility]  Ophthalmology Full Operative Note    Pre-operative diagnosis: Combined form of age-related cataract, right eye [H25.811]  Presbyopia [H52.4]   Post-operative diagnosis Same   Procedure: Procedure(s):  Phacoemulsification Cataract Extraction Posterior Chamber Lens Right Eye   Surgeon: Glen Shirley MD   Assistants(s):    Anesthesia: MAC with Local    Estimated blood loss: None    Total IV fluids: (See anesthesia record)   Specimens: None   Implants: 17 CCWET0 [place] Vivity   Findings:    Complications: None   Condition: Stable   Comments:       PROCEDURAL ANESTHESIA:     Topical/MAC.  Lidocaine 2% jelly topically and Lidocaine 1% preservative-free intracamerally.     PROCEDURE:  The patient was brought to the Operating Room and prepped and draped in a sterile manner.  A wire lid speculum was placed.  A paracentesis was created and 1% Lidocaine was instilled in the anterior chamber.  The anterior chamber was then filled with Amvisc viscoelastic.  A clear cornea temporal wound was created using a 2.8 mm keratome.  A cystotome was used to initiate a flap in the anterior capsule and a Utrata forceps was used to create a continuous tear capsulorhexis.  Hydrodissection was performed.  The phacoemulsification tip was inserted into the eye and the nucleus and epinucleus were removed using a divide and conquer technique.  The residual cortex was removed using the I/A handpiece. Used a capsule polisher. The anterior chamber was then refilled with viscoelastic and the wound was enlarged with the keratome.  The intraocular lens, 17 diopter, Model CCWET0, was placed into the injector and injected into the capsular bag. It was checked to make sure that it was central and stable.  Residual viscoelastic was removed using the I/A.  The anterior chamber was refilled with BSS.  The wounds were hydrated with BSS and were noted to be watertight with no suture necessary.  Topical pilocarpine 1%, Betagan, and  Vigamox was applied.  A hard shield was placed.     The patient tolerated the procedure well and was sent to the Recovery Room in satisfactory condition.

## 2024-01-09 NOTE — ANESTHESIA POSTPROCEDURE EVALUATION
Patient: Denise Taylor    Procedure: Procedure(s):  Phacoemulsification Cataract Extraction Posterior Chamber Lens Right Eye       Anesthesia Type:  MAC    Note:  Disposition: Outpatient   Postop Pain Control: Uneventful            Sign Out: Well controlled pain   PONV: No   Neuro/Psych: Uneventful            Sign Out: Acceptable/Baseline neuro status   Airway/Respiratory: Uneventful            Sign Out: Acceptable/Baseline resp. status   CV/Hemodynamics: Uneventful            Sign Out: Acceptable CV status; No obvious hypovolemia; No obvious fluid overload   Other NRE: NONE   DID A NON-ROUTINE EVENT OCCUR? No         Last vitals:  Vitals Value Taken Time   /79 01/09/24 1208   Temp 97.6  F (36.4  C) 01/09/24 1156   Pulse 44 01/09/24 1208   Resp 16 01/09/24 1208   SpO2 98 % 01/09/24 1211   Vitals shown include unfiled device data.    Electronically Signed By: CESAR Jacobs CRNA  January 9, 2024  1:04 PM

## 2024-01-24 ENCOUNTER — ONCOLOGY VISIT (OUTPATIENT)
Dept: ONCOLOGY | Facility: OTHER | Age: 69
End: 2024-01-24
Attending: NURSE PRACTITIONER
Payer: MEDICARE

## 2024-01-24 ENCOUNTER — LAB (OUTPATIENT)
Dept: LAB | Facility: OTHER | Age: 69
End: 2024-01-24
Payer: COMMERCIAL

## 2024-01-24 VITALS
OXYGEN SATURATION: 100 % | HEIGHT: 65 IN | TEMPERATURE: 97.7 F | HEART RATE: 73 BPM | SYSTOLIC BLOOD PRESSURE: 118 MMHG | BODY MASS INDEX: 22.26 KG/M2 | DIASTOLIC BLOOD PRESSURE: 80 MMHG | WEIGHT: 133.6 LBS | RESPIRATION RATE: 20 BRPM

## 2024-01-24 DIAGNOSIS — Z85.3 PERSONAL HISTORY OF MALIGNANT NEOPLASM OF BREAST: Primary | ICD-10-CM

## 2024-01-24 DIAGNOSIS — Z85.3 PERSONAL HISTORY OF MALIGNANT NEOPLASM OF BREAST: ICD-10-CM

## 2024-01-24 DIAGNOSIS — Z79.811 USE OF AROMATASE INHIBITORS: ICD-10-CM

## 2024-01-24 DIAGNOSIS — Z78.0 POST-MENOPAUSAL: ICD-10-CM

## 2024-01-24 LAB
ALBUMIN SERPL BCG-MCNC: 4.1 G/DL (ref 3.5–5.2)
ALP SERPL-CCNC: 73 U/L (ref 40–150)
ALT SERPL W P-5'-P-CCNC: 30 U/L (ref 0–50)
ANION GAP SERPL CALCULATED.3IONS-SCNC: 8 MMOL/L (ref 7–15)
AST SERPL W P-5'-P-CCNC: 39 U/L (ref 0–45)
BASOPHILS # BLD AUTO: 0 10E3/UL (ref 0–0.2)
BASOPHILS NFR BLD AUTO: 1 %
BILIRUB SERPL-MCNC: 0.5 MG/DL
BUN SERPL-MCNC: 15.1 MG/DL (ref 8–23)
CALCIUM SERPL-MCNC: 9.4 MG/DL (ref 8.8–10.2)
CHLORIDE SERPL-SCNC: 102 MMOL/L (ref 98–107)
CREAT SERPL-MCNC: 0.65 MG/DL (ref 0.51–0.95)
DEPRECATED HCO3 PLAS-SCNC: 28 MMOL/L (ref 22–29)
EGFRCR SERPLBLD CKD-EPI 2021: >90 ML/MIN/1.73M2
EOSINOPHIL # BLD AUTO: 0.2 10E3/UL (ref 0–0.7)
EOSINOPHIL NFR BLD AUTO: 2 %
ERYTHROCYTE [DISTWIDTH] IN BLOOD BY AUTOMATED COUNT: 13.9 % (ref 10–15)
GLUCOSE SERPL-MCNC: 85 MG/DL (ref 70–99)
HCT VFR BLD AUTO: 38.9 % (ref 35–47)
HGB BLD-MCNC: 12.8 G/DL (ref 11.7–15.7)
IMM GRANULOCYTES # BLD: 0 10E3/UL
IMM GRANULOCYTES NFR BLD: 0 %
LYMPHOCYTES # BLD AUTO: 3.2 10E3/UL (ref 0.8–5.3)
LYMPHOCYTES NFR BLD AUTO: 39 %
MCH RBC QN AUTO: 29.3 PG (ref 26.5–33)
MCHC RBC AUTO-ENTMCNC: 32.9 G/DL (ref 31.5–36.5)
MCV RBC AUTO: 89 FL (ref 78–100)
MONOCYTES # BLD AUTO: 0.7 10E3/UL (ref 0–1.3)
MONOCYTES NFR BLD AUTO: 9 %
NEUTROPHILS # BLD AUTO: 4.1 10E3/UL (ref 1.6–8.3)
NEUTROPHILS NFR BLD AUTO: 49 %
NRBC # BLD AUTO: 0 10E3/UL
NRBC BLD AUTO-RTO: 0 /100
PLATELET # BLD AUTO: 156 10E3/UL (ref 150–450)
POTASSIUM SERPL-SCNC: 4.1 MMOL/L (ref 3.4–5.3)
PROT SERPL-MCNC: 7.1 G/DL (ref 6.4–8.3)
RBC # BLD AUTO: 4.37 10E6/UL (ref 3.8–5.2)
SODIUM SERPL-SCNC: 138 MMOL/L (ref 135–145)
WBC # BLD AUTO: 8.3 10E3/UL (ref 4–11)

## 2024-01-24 PROCEDURE — 85004 AUTOMATED DIFF WBC COUNT: CPT | Mod: ZL

## 2024-01-24 PROCEDURE — 36415 COLL VENOUS BLD VENIPUNCTURE: CPT | Mod: ZL

## 2024-01-24 PROCEDURE — 80053 COMPREHEN METABOLIC PANEL: CPT | Mod: ZL

## 2024-01-24 PROCEDURE — G0463 HOSPITAL OUTPT CLINIC VISIT: HCPCS

## 2024-01-24 PROCEDURE — 99213 OFFICE O/P EST LOW 20 MIN: CPT | Mod: 24 | Performed by: NURSE PRACTITIONER

## 2024-01-24 RX ORDER — DIFLUPREDNATE OPHTHALMIC 0.5 MG/ML
1 EMULSION OPHTHALMIC 2 TIMES DAILY
COMMUNITY
Start: 2023-11-22 | End: 2024-07-17

## 2024-01-24 ASSESSMENT — PAIN SCALES - GENERAL: PAINLEVEL: NO PAIN (0)

## 2024-01-24 NOTE — NURSING NOTE
"Oncology Rooming Note    January 24, 2024 9:14 AM   Denise Taylor is a 68 year old female who presents for:    Chief Complaint   Patient presents with    Oncology Clinic Visit     Follow up Personal history of malignant neoplasm of breast     Initial Vitals: /80   Pulse 73   Temp 97.7  F (36.5  C) (Tympanic)   Resp 20   Ht 1.651 m (5' 5\")   Wt 60.6 kg (133 lb 9.6 oz)   SpO2 100%   BMI 22.23 kg/m   Estimated body mass index is 22.23 kg/m  as calculated from the following:    Height as of this encounter: 1.651 m (5' 5\").    Weight as of this encounter: 60.6 kg (133 lb 9.6 oz). Body surface area is 1.67 meters squared.  No Pain (0) Comment: Data Unavailable   No LMP recorded. Patient is postmenopausal.  Allergies reviewed: Yes  Medications reviewed: Yes    Medications: Medication refills not needed today.  Pharmacy name entered into EPIC: [facility] [place] - [address] [facility]    Frailty Screening:   Is the patient here for a new oncology consult visit in cancer care? 2. No      Leaving for 2 months to the [facility]      Leah Gómez LPN             "

## 2024-01-24 NOTE — PROGRESS NOTES
Oncology Follow-up Visit:  January 24, 2024    Reason for Visit:  Patient presents with:  Oncology Clinic Visit: Follow up Personal history of malignant neoplasm of breast     Nursing Note and documentation reviewed: yes    HPI:   This is a 68-year-old female patient who presents to the oncology clinic today in follow-up of Stage Ia, invasive lobular carcinoma of the right breast diagnosed July 2019.  She has undergone bilateral mastectomies and she was placed on Arimidex in December 2019.      She presents to the clinic today stating she is doing well.  She did experience an upper respiratory infection again about 3 weeks ago but did see her infectious disease specialist and it was felt this was viral.  She did recover well.  She overall is feeling pretty good and her weight is up a little bit.  Note she will be heading to the [place] on Friday and will be gone about 2 months returning in April.  She denies any issues with the implants or tissue around the implants.  She denies any new masses or any skin changes.  She continues on her calcium with vitamin D.  DEXA scan is due in February.    Oncologic History:     7/24/2019 patient was seen by her PCP with concerns regarding a right breast mass  7/25/2019 patient underwent right diagnostic mammogram showing no abnormality      **She underwent ultrasound of the right breast with no discrete masses noted and recommendation for follow-up in 6 months  7/26/2019 patient was evaluated by Dr. Dupree with General Surgery with recommendation for biopsy   7/29/2019 Ultrasound guided biopsy showed invasive lobular carcinoma, grade 2, ER/VT positive and HER-2/jenn negative  8/12/2019  She underwent MRI of the both breasts with findings showing a small area of enhancement in the upper outer quadrant of the right breast and the left breast was unremarkable     **Ultrasound of the right axilla was negative  8/23/2019  She went underwent lumpectomy with sentinel node procedure with  pathology showing a 4.5 cm tumor that was consistent with invasive lobular carcinoma of the right breast, grade 2, ER/VA positive and HER-2/jenn negative; sentinel node was negative; she was staged pathologic T2N0.  Anterior and medial margin was positive on pathology.  9/19/2019  Patient was seen by Dr. Lulú Franz as well is Dr. Dale Paul with Plastic surgery to discuss reconstruction   10/9/2019 she underwent bilateral mastectomy with immediate construction and pathology showed no evidence of malignancy  11/5/2019  Patient was evaluated by Dr. Noyola  with Medical Oncology with Oncotype DX recurrence score of 12 consistent with low risk of breast cancer and recommendation for aromatase inhibitor therapy; PET and MRI brain were recommended  Patient was seen again by Dr. hair aviles with medical oncology with Oncotype DX 11/20/2019  She underwent PET scan:  IMPRESSION:   1.  Numerous new FDG avid groundglass and solid nodules throughout  both lungs concerning for recurrent disease. Additionally, there is  worsening atelectasis seen in both lungs. Infectious and noninfectious  granulomatous disease may have a similar appearance.  12/3/2019 she was seen again by Dr. Noyola with Medical Oncology to review PET scan results.  MRI was not obtained related to tissue expanders in place.  He recommended initiation of aromatase inhibitor with Arimidex 1 mg p.o. daily.  2/5/2020  Repeat PET scan:  IMPRESSION:   1.  No evidence of active disease. Nodules and areas of groundglass  opacification seen previously within the lungs have resolved.   2.  Bilateral breast implant revision. Leaking left breast implant is  first seen on the CT scan of the chest from 11/14/2019.   2/6/2020  She underwent MRI of the brain evidence of metastatic disease     Current Chemo Regime/TX: Arimidex 1 mg daily initiated 12/2019  Current Cycle:  n/a  # of completed cycles:  n/a     Previous treatment:  n/a    Past Medical History:    Diagnosis Date    Basal cell carcinoma     Breast cancer (H) 07/2019    right breast    Bronchiectasis (H) 2019    CHF (congestive heart failure) (H) 8/12/2022    Dyslipidemia     BERTA (generalized anxiety disorder)     Invasive lobular carcinoma of right breast in female (H) 08/28/2019 5.12.2020- Review and distribute treatment summary- [facility]    Malignant melanoma of skin of face (H) 08/11/2022    Removed melanoma    Malignant neoplasm of upper-outer quadrant of right breast in female, estrogen receptor positive (H) 08/22/2019    Recurrent pneumonia 08/22/2019    Squamous cell carcinoma of skin 05/2020    right lower leg    Thyroid disease        Past Surgical History:   Procedure Laterality Date    ARTHROSCOPY SHOULDER ROTATOR CUFF REPAIR Left 03/07/2018    Procedure: ARTHROSCOPY SHOULDER ROTATOR CUFF REPAIR;  LEFT SHOULDER ARTHROSCOPY, REPAIR ROTATOR CUFF: subacromial Decompression and AC Joint Resection;  Surgeon: Baldev Lou DO;  Location: [facility] OR    ARTHROTOMY SHOULDER, ROTATOR CUFF REPAIR, COMBINED Right 11/14/2018    Procedure: RIGHT SHOULDER DEBRIDEMENT, EXCISION OF LIPOMA  RIGHT UPPER ARM, EXCISION SCAR TISSUE AC JOINT;  Surgeon: Baldev Lou DO;  Location: [facility] OR    BIOPSY BREAST NEEDLE LOCALIZATION, BIOPSY NODE SENTINEL, COMBINED Right 08/23/2019    Procedure: RIGHT WIRE LOCALIZED LUMPECTOMY WITH SENTINEL  LYMP NODE;  Surgeon: Michael Dupree MD;  Location: [facility] OR    BREAST RECONSTRUCTION WITH DEEP INFERIOR EPIGASTRIC  (AILYN) FLAP,  07/2020    done in [place]    COLONOSCOPY  2006    Dr Lara    COLONOSCOPY N/A 10/10/2016    Procedure: COLONOSCOPY;  Surgeon: Christine Cintron MD;  Location: [facility] OR    EYE SURGERY  1995    Lasik    INSERT TISSUE EXPANDER BREAST BILATERAL Bilateral 10/09/2019    Procedure: BILATERAL TISSUE EXPANDERS (KOBIENIA);  Surgeon: Dale Paul MD;  Location:  OR    MASTECTOMY, NIPPLE SPARING Bilateral 10/09/2019    Procedure: BILATERAL SKIN  SPARRING MASTECTOMY (CASS);  Surgeon: Opal Rubin MD;  Location: [facility] OR    ORTHOPEDIC SURGERY Right     feet neuromas removed    PHACOEMULSIFICATION WITH STANDARD INTRAOCULAR LENS IMPLANT Right 2024    Procedure: Phacoemulsification Cataract Extraction Posterior Chamber Lens Right Eye;  Surgeon: Glen Shirley MD;  Location: [facility] OR    right ankle ORIF  2020    Dr Torres    SHOULDER SURGERY Right 2011/10/2012       Family History   Problem Relation Age of Onset    Myocardial Infarction Mother     Hypertension Mother     Coronary Artery Disease Mother     Hyperlipidemia Mother     Thyroid Disease Mother     Depression Mother     Stomach Cancer Father     Coronary Artery Disease Father     Hypertension Father     Colon Cancer Father     Diabetes Paternal Grandmother     Heart Disease Maternal Grandmother     Heart Disease Maternal Grandfather     Diabetes Paternal Grandfather     Cerebrovascular Disease No family hx of     Breast Cancer No family hx of     Prostate Cancer No family hx of     Anesthesia Reaction No family hx of     Asthma No family hx of     Genetic Disorder No family hx of        Social History     Socioeconomic History    Marital status:      Spouse name: Not on file    Number of children: 2    Years of education: 16    Highest education level: Not on file   Occupational History    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   Tobacco Use    Smoking status: Former     Packs/day: 0.50     Years: 10.00     Additional pack years: 0.00     Total pack years: 5.00     Types: Cigarettes     Start date: 10/10/1974     Quit date: 10/10/1984     Years since quittin.3    Smokeless tobacco: Former     Quit date: 10/10/1984   Vaping Use    Vaping Use: Never used   Substance and Sexual Activity    Alcohol use: Yes     Alcohol/week: 1.7 standard drinks of alcohol     Types: 2 Glasses of wine per week     Comment: occasionally    Drug use: Never    Sexual activity: Not Currently     Partners:  Female     Birth control/protection: Post-menopausal     Comment: I m old.   Other Topics Concern    Parent/sibling w/ CABG, MI or angioplasty before 65F 55M? Yes     Comment: Mother 1990   Social History Narrative     -- n/a    Caffeine -- 2c/day    Concussion -- n/a     Social Determinants of Health     Financial Resource Strain: Low Risk  (12/22/2023)    Financial Resource Strain     Within the past 12 months, have you or your family members you live with been unable to get utilities (heat, electricity) when it was really needed?: No   Food Insecurity: Low Risk  (12/22/2023)    Food Insecurity     Within the past 12 months, did you worry that your food would run out before you got money to buy more?: No     Within the past 12 months, did the food you bought just not last and you didn t have money to get more?: No   Transportation Needs: Low Risk  (12/22/2023)    Transportation Needs     Within the past 12 months, has lack of transportation kept you from medical appointments, getting your medicines, non-medical meetings or appointments, work, or from getting things that you need?: No   Physical Activity: Insufficiently Active (6/1/2020)    Exercise Vital Sign     Days of Exercise per Week: 5 days     Minutes of Exercise per Session: 20 min   Stress: Not on file   Social Connections: Not on file   Interpersonal Safety: Low Risk  (1/4/2024)    Interpersonal Safety     Do you feel physically and emotionally safe where you currently live?: Not on file     Within the past 12 months, have you been hit, slapped, kicked or otherwise physically hurt by someone?: No     Within the past 12 months, have you been humiliated or emotionally abused in other ways by your partner or ex-partner?: No   Housing Stability: Low Risk  (12/22/2023)    Housing Stability     Do you have housing? : Yes     Are you worried about losing your housing?: No       Current Outpatient Medications   Medication    anastrozole (ARIMIDEX) 1 MG  "tablet    calcium carbonate-vitamin D (OS-STEVE) 600-400 MG-UNIT chewable tablet    difluprednate (DUREZOL) 0.05 % ophthalmic emulsion    FLUoxetine (PROZAC) 10 MG capsule    ILEVRO 0.3 % ophthalmic suspension    levocetirizine (XYZAL) 5 MG tablet    levothyroxine (SYNTHROID/LEVOTHROID) 88 MCG tablet    rosuvastatin (CRESTOR) 10 MG tablet    SUMAtriptan (IMITREX) 50 MG tablet     No current facility-administered medications for this visit.        Allergies   Allergen Reactions    Pcn [Penicillins] Rash    Zithromax [Azithromycin] Rash       Review Of Systems:  Constitutional:    denies fever, chills, and night sweats.  Eyes:    denies blurred or double vision  Ears/Nose/Throat:   denies ear pain, nose problems, difficulty swallowing  Respiratory:   denies shortness of breath, cough   Skin:  see hPI  Breast/Chest wall:   denies pain, lumps or discharge  Cardiovascular:   denies chest pain, palpitations, edema  Gastrointestinal:   denies abdominal pain, bloating, nausea, early satiety; no change in bowel habits or blood in stool  Genitourinary:   denies difficulty with urination, blood in urine  Musculoskeletal:    denies new muscle pain, bone pain  Neurologic:   denies lightheadedness, headaches, numbness or tingling  Psychiatric:   denies anxiety, depression  Hematologic/Lymphatic/Immunologic:   denies easy bruising, easy bleeding, lumps or bumps noted  Endocrine:   Denies increased thirst      Physical Exam:  /80   Pulse 73   Temp 97.7  F (36.5  C) (Tympanic)   Resp 20   Ht 1.651 m (5' 5\")   Wt 60.6 kg (133 lb 9.6 oz)   SpO2 100%   BMI 22.23 kg/m      GENERAL APPEARANCE: Healthy, alert and in no acute distress.  HEENT: Normocephalic, Sclerae anicteric.  No obvious oral lesions or thrush  NECK:   No asymmetry or masses, no thyromegaly.  LYMPHATICS: No palpable cervical, supraclavicular, axillary, or inguinal nodes   RESP: Lungs clear to auscultation bilaterally, respirations regular and " easy  CARDIOVASCULAR: Regular rate and rhythm. Normal S1, S2; no murmur, gallop, or rub.  ABDOMEN: Soft, nontender. Bowel sounds auscultated all 4 quadrants. No palpable organomegaly or masses.  BREAST/Chest wall: No concerning masses or skin changes  MUSCULOSKELETAL: Extremities without gross deformities noted. No edema of bilateral lower extremities.  SKIN: No suspicious lesions or rashes to exposed skin  NEURO: Alert and oriented x 3.  Gait steady.  PSYCHIATRIC: Mentation and affect appear normal.  Mood appropriate.    Laboratory:  Results for orders placed or performed in visit on 01/24/24   Comprehensive metabolic panel     Status: Normal   Result Value Ref Range    Sodium 138 135 - 145 mmol/L    Potassium 4.1 3.4 - 5.3 mmol/L    Carbon Dioxide (CO2) 28 22 - 29 mmol/L    Anion Gap 8 7 - 15 mmol/L    Urea Nitrogen 15.1 8.0 - 23.0 mg/dL    Creatinine 0.65 0.51 - 0.95 mg/dL    GFR Estimate >90 >60 mL/min/1.73m2    Calcium 9.4 8.8 - 10.2 mg/dL    Chloride 102 98 - 107 mmol/L    Glucose 85 70 - 99 mg/dL    Alkaline Phosphatase 73 40 - 150 U/L    AST 39 0 - 45 U/L    ALT 30 0 - 50 U/L    Protein Total 7.1 6.4 - 8.3 g/dL    Albumin 4.1 3.5 - 5.2 g/dL    Bilirubin Total 0.5 <=1.2 mg/dL   CBC with platelets and differential     Status: None   Result Value Ref Range    WBC Count 8.3 4.0 - 11.0 10e3/uL    RBC Count 4.37 3.80 - 5.20 10e6/uL    Hemoglobin 12.8 11.7 - 15.7 g/dL    Hematocrit 38.9 35.0 - 47.0 %    MCV 89 78 - 100 fL    MCH 29.3 26.5 - 33.0 pg    MCHC 32.9 31.5 - 36.5 g/dL    RDW 13.9 10.0 - 15.0 %    Platelet Count 156 150 - 450 10e3/uL    % Neutrophils 49 %    % Lymphocytes 39 %    % Monocytes 9 %    % Eosinophils 2 %    % Basophils 1 %    % Immature Granulocytes 0 %    NRBCs per 100 WBC 0 <1 /100    Absolute Neutrophils 4.1 1.6 - 8.3 10e3/uL    Absolute Lymphocytes 3.2 0.8 - 5.3 10e3/uL    Absolute Monocytes 0.7 0.0 - 1.3 10e3/uL    Absolute Eosinophils 0.2 0.0 - 0.7 10e3/uL    Absolute Basophils 0.0 0.0 -  0.2 10e3/uL    Absolute Immature Granulocytes 0.0 <=0.4 10e3/uL    Absolute NRBCs 0.0 10e3/uL   CBC with Platelets & Differential     Status: None    Narrative    The following orders were created for panel order CBC with Platelets & Differential.  Procedure                               Abnormality         Status                     ---------                               -----------         ------                     CBC with platelets and d...[[id number]]                      Final result                 Please view results for these tests on the individual orders.       Imaging Studies:  None completed for today's visit       ASSESSMENT/PLAN:    #1 Breast cancer:   Stage Ia, invasive lobular carcinoma of the right breast diagnosed August 2019.  She is currently on Arimidex 1 mg daily and will continue.  We will plan to follow-up again in 6 months with a CBC and CMP.     #2  On aromatase inhibitor therapy: DEXA scan completed in February 2022 was normal.  She will continue on the calcium with vitamin D twice a day.  We will repeat her DEXA scan when she returns in July.    I encouraged patient to call with any questions or concerns.      Che Sesay, NP  APRN, FNP-BC, AOCNP

## 2024-01-24 NOTE — PATIENT INSTRUCTIONS
Follow up in 6 months with labs and DEXA scan the week prior.    When you are in need of a refill, please call your pharmacy and they will send us a request.     If you have any questions please call [phone]    Other instructions:  none

## 2024-01-26 ENCOUNTER — MYC MEDICAL ADVICE (OUTPATIENT)
Dept: FAMILY MEDICINE | Facility: OTHER | Age: 69
End: 2024-01-26

## 2024-03-05 ENCOUNTER — MYC REFILL (OUTPATIENT)
Dept: FAMILY MEDICINE | Facility: OTHER | Age: 69
End: 2024-03-05

## 2024-03-05 DIAGNOSIS — J30.2 SEASONAL ALLERGIES: ICD-10-CM

## 2024-03-05 RX ORDER — LEVOCETIRIZINE DIHYDROCHLORIDE 5 MG/1
TABLET, FILM COATED ORAL
Qty: 90 TABLET | Refills: 3 | Status: SHIPPED | OUTPATIENT
Start: 2024-03-05

## 2024-03-05 NOTE — TELEPHONE ENCOUNTER
levocetirizine (XYZAL) 5 MG tablet   Last Written Prescription Date:  1/18/23  Last Fill Quantity: 90,   # refills: 3  Last Office Visit: 6/21/23  Future Office visit:       Routing refill request to provider for review/approval because:

## 2024-04-05 DIAGNOSIS — E78.5 HYPERLIPIDEMIA WITH TARGET LDL LESS THAN 130: ICD-10-CM

## 2024-04-05 RX ORDER — ROSUVASTATIN CALCIUM 10 MG/1
10 TABLET, COATED ORAL DAILY
Qty: 90 TABLET | Refills: 2 | Status: SHIPPED | OUTPATIENT
Start: 2024-04-05

## 2024-04-11 DIAGNOSIS — E03.9 ACQUIRED HYPOTHYROIDISM: ICD-10-CM

## 2024-04-11 RX ORDER — LEVOTHYROXINE SODIUM 88 UG/1
TABLET ORAL
Qty: 90 TABLET | Refills: 2 | Status: SHIPPED | OUTPATIENT
Start: 2024-04-11 | End: 2024-09-04

## 2024-04-11 NOTE — TELEPHONE ENCOUNTER
Synthroid      Last Written Prescription Date:  6/30/23  Last Fill Quantity: 90,   # refills: 2  Last Office Visit: 1/4/24  Future Office visit:       Routing refill request to provider for review/approval because:

## 2024-04-23 ENCOUNTER — TRANSFERRED RECORDS (OUTPATIENT)
Dept: HEALTH INFORMATION MANAGEMENT | Facility: CLINIC | Age: 69
End: 2024-04-23

## 2024-05-08 ENCOUNTER — MYC MEDICAL ADVICE (OUTPATIENT)
Dept: ONCOLOGY | Facility: OTHER | Age: 69
End: 2024-05-08

## 2024-05-08 DIAGNOSIS — Z85.3 PERSONAL HISTORY OF MALIGNANT NEOPLASM OF BREAST: Primary | ICD-10-CM

## 2024-05-08 RX ORDER — LETROZOLE 2.5 MG/1
2.5 TABLET, FILM COATED ORAL DAILY
Qty: 90 TABLET | Refills: 1 | Status: SHIPPED | OUTPATIENT
Start: 2024-05-08 | End: 2024-08-23

## 2024-05-16 NOTE — TELEPHONE ENCOUNTER
SYNTHROID      Last Written Prescription Date:  3-  Last Fill Quantity: 90,   # refills: 2  Last Office Visit: 1-  Future Office visit:    Next 5 appointments (look out 90 days)    Jan 05, 2022  2:15 PM  (Arrive by 2:00 PM)  Return Visit with Che Sesay NP  [facility] ([facility] ) [facility] [facility] [address]  [phone]           Routing refill request to provider for review/approval because:       Discharged

## 2024-05-31 ENCOUNTER — OFFICE VISIT (OUTPATIENT)
Dept: FAMILY MEDICINE | Facility: OTHER | Age: 69
End: 2024-05-31
Attending: PHYSICIAN ASSISTANT
Payer: COMMERCIAL

## 2024-05-31 VITALS
TEMPERATURE: 98.5 F | BODY MASS INDEX: 22.88 KG/M2 | DIASTOLIC BLOOD PRESSURE: 78 MMHG | SYSTOLIC BLOOD PRESSURE: 136 MMHG | HEIGHT: 65 IN | RESPIRATION RATE: 18 BRPM | HEART RATE: 53 BPM | WEIGHT: 137.3 LBS | OXYGEN SATURATION: 95 %

## 2024-05-31 DIAGNOSIS — Z01.818 PREOP EXAMINATION: Primary | ICD-10-CM

## 2024-05-31 DIAGNOSIS — R00.1 SINUS BRADYCARDIA: ICD-10-CM

## 2024-05-31 LAB
ANION GAP SERPL CALCULATED.3IONS-SCNC: 11 MMOL/L (ref 7–15)
BASOPHILS # BLD AUTO: 0.1 10E3/UL (ref 0–0.2)
BASOPHILS NFR BLD AUTO: 1 %
BUN SERPL-MCNC: 13.9 MG/DL (ref 8–23)
CALCIUM SERPL-MCNC: 9.7 MG/DL (ref 8.8–10.2)
CHLORIDE SERPL-SCNC: 103 MMOL/L (ref 98–107)
CREAT SERPL-MCNC: 0.74 MG/DL (ref 0.51–0.95)
DEPRECATED HCO3 PLAS-SCNC: 26 MMOL/L (ref 22–29)
EGFRCR SERPLBLD CKD-EPI 2021: 88 ML/MIN/1.73M2
EOSINOPHIL # BLD AUTO: 0.2 10E3/UL (ref 0–0.7)
EOSINOPHIL NFR BLD AUTO: 3 %
ERYTHROCYTE [DISTWIDTH] IN BLOOD BY AUTOMATED COUNT: 12.9 % (ref 10–15)
GLUCOSE SERPL-MCNC: 88 MG/DL (ref 70–99)
HCT VFR BLD AUTO: 41.3 % (ref 35–47)
HGB BLD-MCNC: 13.9 G/DL (ref 11.7–15.7)
IMM GRANULOCYTES # BLD: 0 10E3/UL
IMM GRANULOCYTES NFR BLD: 0 %
LYMPHOCYTES # BLD AUTO: 2.7 10E3/UL (ref 0.8–5.3)
LYMPHOCYTES NFR BLD AUTO: 46 %
MCH RBC QN AUTO: 29.8 PG (ref 26.5–33)
MCHC RBC AUTO-ENTMCNC: 33.7 G/DL (ref 31.5–36.5)
MCV RBC AUTO: 88 FL (ref 78–100)
MONOCYTES # BLD AUTO: 0.5 10E3/UL (ref 0–1.3)
MONOCYTES NFR BLD AUTO: 9 %
NEUTROPHILS # BLD AUTO: 2.5 10E3/UL (ref 1.6–8.3)
NEUTROPHILS NFR BLD AUTO: 42 %
NRBC # BLD AUTO: 0 10E3/UL
NRBC BLD AUTO-RTO: 0 /100
PLATELET # BLD AUTO: 159 10E3/UL (ref 150–450)
POTASSIUM SERPL-SCNC: 4.4 MMOL/L (ref 3.4–5.3)
RBC # BLD AUTO: 4.67 10E6/UL (ref 3.8–5.2)
SODIUM SERPL-SCNC: 140 MMOL/L (ref 135–145)
T4 FREE SERPL-MCNC: 1.65 NG/DL (ref 0.9–1.7)
TSH SERPL DL<=0.005 MIU/L-ACNC: 0.22 UIU/ML (ref 0.3–4.2)
WBC # BLD AUTO: 6 10E3/UL (ref 4–11)

## 2024-05-31 PROCEDURE — 93005 ELECTROCARDIOGRAM TRACING: CPT | Performed by: PHYSICIAN ASSISTANT

## 2024-05-31 PROCEDURE — 84439 ASSAY OF FREE THYROXINE: CPT | Mod: ZL | Performed by: PHYSICIAN ASSISTANT

## 2024-05-31 PROCEDURE — 85025 COMPLETE CBC W/AUTO DIFF WBC: CPT | Mod: ZL | Performed by: PHYSICIAN ASSISTANT

## 2024-05-31 PROCEDURE — 99214 OFFICE O/P EST MOD 30 MIN: CPT | Performed by: PHYSICIAN ASSISTANT

## 2024-05-31 PROCEDURE — 80048 BASIC METABOLIC PNL TOTAL CA: CPT | Mod: ZL | Performed by: PHYSICIAN ASSISTANT

## 2024-05-31 PROCEDURE — 36415 COLL VENOUS BLD VENIPUNCTURE: CPT | Mod: ZL | Performed by: PHYSICIAN ASSISTANT

## 2024-05-31 PROCEDURE — G0463 HOSPITAL OUTPT CLINIC VISIT: HCPCS

## 2024-05-31 PROCEDURE — 93010 ELECTROCARDIOGRAM REPORT: CPT | Performed by: INTERNAL MEDICINE

## 2024-05-31 PROCEDURE — 84443 ASSAY THYROID STIM HORMONE: CPT | Mod: ZL | Performed by: PHYSICIAN ASSISTANT

## 2024-05-31 NOTE — H&P (VIEW-ONLY)
Preoperative Evaluation  [facility] [place]  [address]  [place] [address]  Phone: [phone]  Primary Provider: DASH Vital  Pre-op Performing Provider: DASH Vital  May 31, 2024             5/28/2024   Surgical Information   What procedure is being done? Cataract surgery   Facility or Hospital where procedure/surgery will be performed: [facility]   Who is doing the procedure / surgery? Rolando Shirley   Date of surgery / procedure: 6/11/24   Time of surgery / procedure: AM   Where do you plan to recover after surgery? at home with family     Fax number for surgical facility: Note does not need to be faxed, will be available electronically in Epic.    Assessment & Plan     The proposed surgical procedure is considered LOW risk.    Preop examination  Having her cataract replaced on her left eye by Dr. Shirley on June 11, 2023.        Possible Sleep Apnea: no         No data to display               Implanted Device         - No identified additional risk factors other than previously addressed         Recommendation  Approval given to proceed with proposed procedure, without further diagnostic evaluation.        Tanya Walker is a 68 year old, presenting for the following:  Pre-Op Exam          5/31/2024    11:10 AM   Additional Questions   Roomed by bronson spencer   Accompanied by none         5/31/2024    11:10 AM   Patient Reported Additional Medications   Patient reports taking the following new medications none     HPI related to upcoming procedure: needing her Cataract replaced on her left eye .           5/28/2024   Pre-Op Questionnaire   Have you ever had a heart attack or stroke? No   Have you ever had surgery on your heart or blood vessels, such as a stent placement, a coronary artery bypass, or surgery on an artery in your head, neck, heart, or legs? No   Do you have chest pain with activity? No   Do you have a history of heart failure? No   Do you currently have a  cold, bronchitis or symptoms of other infection? No   Do you have a cough, shortness of breath, or wheezing? No   Do you or anyone in your family have previous history of blood clots? No   Do you or does anyone in your family have a serious bleeding problem such as prolonged bleeding following surgeries or cuts? No   Have you ever had problems with anemia or been told to take iron pills? No   Have you had any abnormal blood loss such as black, tarry or bloody stools, or abnormal vaginal bleeding? No   Have you ever had a blood transfusion? No   Are you willing to have a blood transfusion if it is medically needed before, during, or after your surgery? Yes   Have you or any of your relatives ever had problems with anesthesia? No   Do you have sleep apnea, excessive snoring or daytime drowsiness? No   Do you have any artifical heart valves or other implanted medical devices like a pacemaker, defibrillator, or continuous glucose monitor? No   Do you have artificial joints? No   Are you allergic to latex? No     Health Care Directive  Patient has a Health Care Directive on file      Preoperative Review of    reviewed - no record of controlled substances prescribed.      Status of Chronic Conditions:  See problem list for active medical problems.  Problems all longstanding and stable, except as noted/documented.  See ROS for pertinent symptoms related to these conditions.    Patient Active Problem List    Diagnosis Date Noted    History of recurrent pulmonary infection 01/06/2023     Priority: Medium    Sinus bradycardia 09/26/2022     Priority: Medium    CHF (congestive heart failure) (H) 08/12/2022     Priority: Medium    Lung nodule 12/01/2021     Priority: Medium     Formatting of this note might be different from the original.  Added automatically from request for surgery [id number]      Use of aromatase inhibitors 01/26/2021     Priority: Medium    Seasonal allergies 10/09/2020     Priority: Medium    Migraine  without status migrainosus, not intractable, unspecified migraine type 10/09/2020     Priority: Medium    Personal history of malignant neoplasm of breast 10/09/2019     Priority: Medium    Invasive lobular carcinoma of right breast in female (H) 08/28/2019     Priority: Medium     5.12.2020- Review and distribute treatment summary- [facility]      Cigarette nicotine dependence in remission 08/22/2019     Priority: Medium    ACP (advance care planning) 07/27/2017     Priority: Medium     Advance Care Planning 7/27/2017: ACP Review of Chart / Resources Provided:  Reviewed chart for advance care plan.  Denise MAYELA Taylor has no plan or code status on file. Discussed available resources and provided with information.   Added by Marah Henry            Acquired hypothyroidism 07/27/2017     Priority: Medium    History of basal cell carcinoma 05/18/2015     Priority: Medium    Hyperlipidemia with target LDL less than 130 05/18/2015     Priority: Medium     Diagnosis updated by automated process. Provider to review and confirm.      Actinic keratosis 12/01/2011     Priority: Medium    AC separation, type 5 06/27/2011     Priority: Medium     Overview:   IMO Update 10/11      Capillary disease 08/06/2007     Priority: Medium     Overview:   IMO Update 10/11      Other dyschromia 08/06/2007     Priority: Medium    Scar condition and fibrosis of skin 08/06/2007     Priority: Medium      Past Medical History:   Diagnosis Date    Basal cell carcinoma     Breast cancer (H) 07/2019    right breast    Bronchiectasis (H) 2019    CHF (congestive heart failure) (H) 8/12/2022    Dyslipidemia     BERTA (generalized anxiety disorder)     Invasive lobular carcinoma of right breast in female (H) 08/28/2019    5.12.2020- Review and distribute treatment summary- [facility]    Malignant melanoma of skin of face (H) 08/11/2022    Removed melanoma    Malignant neoplasm of upper-outer quadrant of right breast in female, estrogen  receptor positive (H) 08/22/2019    Recurrent pneumonia 08/22/2019    Squamous cell carcinoma of skin 05/2020    right lower leg    Thyroid disease      Past Surgical History:   Procedure Laterality Date    ARTHROSCOPY SHOULDER ROTATOR CUFF REPAIR Left 03/07/2018    Procedure: ARTHROSCOPY SHOULDER ROTATOR CUFF REPAIR;  LEFT SHOULDER ARTHROSCOPY, REPAIR ROTATOR CUFF: subacromial Decompression and AC Joint Resection;  Surgeon: Baldev Lou DO;  Location: [facility] OR    ARTHROTOMY SHOULDER, ROTATOR CUFF REPAIR, COMBINED Right 11/14/2018    Procedure: RIGHT SHOULDER DEBRIDEMENT, EXCISION OF LIPOMA  RIGHT UPPER ARM, EXCISION SCAR TISSUE AC JOINT;  Surgeon: Baldev Lou DO;  Location: [facility] OR    BIOPSY BREAST NEEDLE LOCALIZATION, BIOPSY NODE SENTINEL, COMBINED Right 08/23/2019    Procedure: RIGHT WIRE LOCALIZED LUMPECTOMY WITH SENTINEL  LYMP NODE;  Surgeon: Michael Dupree MD;  Location: [facility] OR    BREAST RECONSTRUCTION WITH DEEP INFERIOR EPIGASTRIC  (AILYN) FLAP,  07/2020    done in [place]    COLONOSCOPY  2006    Dr Lara    COLONOSCOPY N/A 10/10/2016    Procedure: COLONOSCOPY;  Surgeon: Christine Cintron MD;  Location: [facility] OR    EYE SURGERY  1995    Lasik    INSERT TISSUE EXPANDER BREAST BILATERAL Bilateral 10/09/2019    Procedure: BILATERAL TISSUE EXPANDERS (RAMIREZ);  Surgeon: Dale Paul MD;  Location:  OR    MASTECTOMY, NIPPLE SPARING Bilateral 10/09/2019    Procedure: BILATERAL SKIN SPARRING MASTECTOMY (CASS);  Surgeon: Opal Rubin MD;  Location:  OR    ORTHOPEDIC SURGERY Right     feet neuromas removed    PHACOEMULSIFICATION WITH STANDARD INTRAOCULAR LENS IMPLANT Right 1/9/2024    Procedure: Phacoemulsification Cataract Extraction Posterior Chamber Lens Right Eye;  Surgeon: Glen Shirley MD;  Location: [facility] OR    right ankle ORIF  06/2020    Dr Torres    SHOULDER SURGERY Right 2011/10/2012     Current Outpatient Medications   Medication Sig Dispense Refill    calcium  carbonate-vitamin D (OS-STEVE) 600-400 MG-UNIT chewable tablet Take 1 chew tab by mouth 2 times daily (with meals) 180 tablet 3    difluprednate (DUREZOL) 0.05 % ophthalmic emulsion Place 1 drop into the right eye 2 times daily      FLUoxetine (PROZAC) 10 MG capsule TAKE ONE CAPSULE BY MOUTH EVERY DAY 90 capsule 2    ILEVRO 0.3 % ophthalmic suspension Place 1 drop into the right eye daily      letrozole (FEMARA) 2.5 MG tablet Take 1 tablet (2.5 mg) by mouth daily 90 tablet 1    levocetirizine (XYZAL) 5 MG tablet TAKE ONE TABLET BY MOUTH EVERY EVENING 90 tablet 3    levothyroxine (SYNTHROID/LEVOTHROID) 88 MCG tablet TAKE 1 TABLET BY MOUTH DAILY 90 tablet 2    rosuvastatin (CRESTOR) 10 MG tablet Take 1 tablet (10 mg) by mouth daily 90 tablet 2    SUMAtriptan (IMITREX) 50 MG tablet Take 1 tablet (50 mg) by mouth at onset of headache for migraine May repeat in 2 hours. Max 4 tablets/24 hours. 18 tablet 3    anastrozole (ARIMIDEX) 1 MG tablet Take 1 tablet (1 mg) by mouth daily (Patient not taking: Reported on 2024) 90 tablet 2       Allergies   Allergen Reactions    Pcn [Penicillins] Rash    Zithromax [Azithromycin] Rash        Social History     Tobacco Use    Smoking status: Former     Current packs/day: 0.00     Average packs/day: 0.5 packs/day for 10.0 years (5.0 ttl pk-yrs)     Types: Cigarettes     Start date: 10/10/1974     Quit date: 10/10/1984     Years since quittin.6    Smokeless tobacco: Former     Quit date: 10/10/1984   Substance Use Topics    Alcohol use: Yes     Alcohol/week: 1.7 standard drinks of alcohol     Types: 2 Glasses of wine per week     Comment: occasionally     Family History   Problem Relation Age of Onset    Myocardial Infarction Mother     Hypertension Mother     Coronary Artery Disease Mother     Hyperlipidemia Mother     Thyroid Disease Mother     Depression Mother     Stomach Cancer Father     Coronary Artery Disease Father     Hypertension Father     Colon Cancer Father      "Diabetes Paternal Grandmother     Heart Disease Maternal Grandmother     Heart Disease Maternal Grandfather     Diabetes Paternal Grandfather     Cerebrovascular Disease No family hx of     Breast Cancer No family hx of     Prostate Cancer No family hx of     Anesthesia Reaction No family hx of     Asthma No family hx of     Genetic Disorder No family hx of      History   Drug Use Unknown             Review of Systems  Constitutional, HEENT, cardiovascular, pulmonary, gi and gu systems are negative, except as otherwise noted.    Objective    BP (!) 150/86 (BP Location: Right arm, Patient Position: Sitting, Cuff Size: Adult Regular)   Pulse 53   Temp 98.5  F (36.9  C) (Tympanic)   Resp 18   Ht 1.651 m (5' 5\")   Wt 62.3 kg (137 lb 4.8 oz)   SpO2 95%   BMI 22.85 kg/m     Estimated body mass index is 22.85 kg/m  as calculated from the following:    Height as of this encounter: 1.651 m (5' 5\").    Weight as of this encounter: 62.3 kg (137 lb 4.8 oz).  Physical Exam  GENERAL: alert and no distress  EYES: Eyes grossly normal to inspection, PERRL and conjunctivae and sclerae normal  HENT: ear canals and TM's normal, nose and mouth without ulcers or lesions  NECK: no adenopathy, no asymmetry, masses, or scars  RESP: lungs clear to auscultation - no rales, rhonchi or wheezes  CV: regular rate and rhythm, normal S1 S2, no S3 or S4, no murmur, click or rub, no peripheral edema  ABDOMEN: soft, nontender, no hepatosplenomegaly, no masses and bowel sounds normal  MS: no gross musculoskeletal defects noted, no edema  SKIN: no suspicious lesions or rashes  NEURO: Normal strength and tone, mentation intact and speech normal  PSYCH: mentation appears normal, affect normal/bright    Recent Labs   Lab Test 01/24/24  0833 10/25/23  0915   HGB 12.8 14.0    161    139   POTASSIUM 4.1 4.2   CR 0.65 0.79        Diagnostics  No results found for this or any previous visit (from the past 24 hour(s)).   EKG: appears normal, " NSR, normal axis, normal intervals, no acute ST/T changes c/w ischemia, no LVH by voltage criteria, unchanged from previous tracings    Revised Cardiac Risk Index (RCRI)  The patient has the following serious cardiovascular risks for perioperative complications:   - No serious cardiac risks = 0 points     RCRI Interpretation: 0 points: Class I (very low risk - 0.4% complication rate)         Signed Electronically by: DASH Vital  Copy of this evaluation report is provided to requesting physician.

## 2024-05-31 NOTE — PROGRESS NOTES
Preoperative Evaluation  [facility] [place]  [address]  [place] [address]  Phone: [phone]  Primary Provider: DASH Vital  Pre-op Performing Provider: DASH Vital  May 31, 2024             5/28/2024   Surgical Information   What procedure is being done? Cataract surgery   Facility or Hospital where procedure/surgery will be performed: [facility]   Who is doing the procedure / surgery? Rolando Shirley   Date of surgery / procedure: 6/11/24   Time of surgery / procedure: AM   Where do you plan to recover after surgery? at home with family     Fax number for surgical facility: Note does not need to be faxed, will be available electronically in Epic.    Assessment & Plan     The proposed surgical procedure is considered LOW risk.    Preop examination  Having her cataract replaced on her left eye by Dr. Shirley on June 11, 2023.        Possible Sleep Apnea: no         No data to display               Implanted Device         - No identified additional risk factors other than previously addressed         Recommendation  Approval given to proceed with proposed procedure, without further diagnostic evaluation.        Tanya Walker is a 68 year old, presenting for the following:  Pre-Op Exam          5/31/2024    11:10 AM   Additional Questions   Roomed by bronson spencer   Accompanied by none         5/31/2024    11:10 AM   Patient Reported Additional Medications   Patient reports taking the following new medications none     HPI related to upcoming procedure: needing her Cataract replaced on her left eye .           5/28/2024   Pre-Op Questionnaire   Have you ever had a heart attack or stroke? No   Have you ever had surgery on your heart or blood vessels, such as a stent placement, a coronary artery bypass, or surgery on an artery in your head, neck, heart, or legs? No   Do you have chest pain with activity? No   Do you have a history of heart failure? No   Do you currently have a  cold, bronchitis or symptoms of other infection? No   Do you have a cough, shortness of breath, or wheezing? No   Do you or anyone in your family have previous history of blood clots? No   Do you or does anyone in your family have a serious bleeding problem such as prolonged bleeding following surgeries or cuts? No   Have you ever had problems with anemia or been told to take iron pills? No   Have you had any abnormal blood loss such as black, tarry or bloody stools, or abnormal vaginal bleeding? No   Have you ever had a blood transfusion? No   Are you willing to have a blood transfusion if it is medically needed before, during, or after your surgery? Yes   Have you or any of your relatives ever had problems with anesthesia? No   Do you have sleep apnea, excessive snoring or daytime drowsiness? No   Do you have any artifical heart valves or other implanted medical devices like a pacemaker, defibrillator, or continuous glucose monitor? No   Do you have artificial joints? No   Are you allergic to latex? No     Health Care Directive  Patient has a Health Care Directive on file      Preoperative Review of    reviewed - no record of controlled substances prescribed.      Status of Chronic Conditions:  See problem list for active medical problems.  Problems all longstanding and stable, except as noted/documented.  See ROS for pertinent symptoms related to these conditions.    Patient Active Problem List    Diagnosis Date Noted    History of recurrent pulmonary infection 01/06/2023     Priority: Medium    Sinus bradycardia 09/26/2022     Priority: Medium    CHF (congestive heart failure) (H) 08/12/2022     Priority: Medium    Lung nodule 12/01/2021     Priority: Medium     Formatting of this note might be different from the original.  Added automatically from request for surgery [id number]      Use of aromatase inhibitors 01/26/2021     Priority: Medium    Seasonal allergies 10/09/2020     Priority: Medium    Migraine  without status migrainosus, not intractable, unspecified migraine type 10/09/2020     Priority: Medium    Personal history of malignant neoplasm of breast 10/09/2019     Priority: Medium    Invasive lobular carcinoma of right breast in female (H) 08/28/2019     Priority: Medium     5.12.2020- Review and distribute treatment summary- [facility]      Cigarette nicotine dependence in remission 08/22/2019     Priority: Medium    ACP (advance care planning) 07/27/2017     Priority: Medium     Advance Care Planning 7/27/2017: ACP Review of Chart / Resources Provided:  Reviewed chart for advance care plan.  Denise MAYELA Taylor has no plan or code status on file. Discussed available resources and provided with information.   Added by Marah Henry            Acquired hypothyroidism 07/27/2017     Priority: Medium    History of basal cell carcinoma 05/18/2015     Priority: Medium    Hyperlipidemia with target LDL less than 130 05/18/2015     Priority: Medium     Diagnosis updated by automated process. Provider to review and confirm.      Actinic keratosis 12/01/2011     Priority: Medium    AC separation, type 5 06/27/2011     Priority: Medium     Overview:   IMO Update 10/11      Capillary disease 08/06/2007     Priority: Medium     Overview:   IMO Update 10/11      Other dyschromia 08/06/2007     Priority: Medium    Scar condition and fibrosis of skin 08/06/2007     Priority: Medium      Past Medical History:   Diagnosis Date    Basal cell carcinoma     Breast cancer (H) 07/2019    right breast    Bronchiectasis (H) 2019    CHF (congestive heart failure) (H) 8/12/2022    Dyslipidemia     BERTA (generalized anxiety disorder)     Invasive lobular carcinoma of right breast in female (H) 08/28/2019    5.12.2020- Review and distribute treatment summary- [facility]    Malignant melanoma of skin of face (H) 08/11/2022    Removed melanoma    Malignant neoplasm of upper-outer quadrant of right breast in female, estrogen  receptor positive (H) 08/22/2019    Recurrent pneumonia 08/22/2019    Squamous cell carcinoma of skin 05/2020    right lower leg    Thyroid disease      Past Surgical History:   Procedure Laterality Date    ARTHROSCOPY SHOULDER ROTATOR CUFF REPAIR Left 03/07/2018    Procedure: ARTHROSCOPY SHOULDER ROTATOR CUFF REPAIR;  LEFT SHOULDER ARTHROSCOPY, REPAIR ROTATOR CUFF: subacromial Decompression and AC Joint Resection;  Surgeon: Baldev Lou DO;  Location: [facility]    ARTHROTOMY SHOULDER, ROTATOR CUFF REPAIR, COMBINED Right 11/14/2018    Procedure: RIGHT SHOULDER DEBRIDEMENT, EXCISION OF LIPOMA  RIGHT UPPER ARM, EXCISION SCAR TISSUE AC JOINT;  Surgeon: Baldev Lou DO;  Location: [facility] OR    BIOPSY BREAST NEEDLE LOCALIZATION, BIOPSY NODE SENTINEL, COMBINED Right 08/23/2019    Procedure: RIGHT WIRE LOCALIZED LUMPECTOMY WITH SENTINEL  LYMP NODE;  Surgeon: Michael Dupree MD;  Location: [facility] OR    BREAST RECONSTRUCTION WITH DEEP INFERIOR EPIGASTRIC  (AILYN) FLAP,  07/2020    done in [place]    COLONOSCOPY  2006    Dr Lara    COLONOSCOPY N/A 10/10/2016    Procedure: COLONOSCOPY;  Surgeon: Christine Cintron MD;  Location: [facility] OR    EYE SURGERY  1995    Lasik    INSERT TISSUE EXPANDER BREAST BILATERAL Bilateral 10/09/2019    Procedure: BILATERAL TISSUE EXPANDERS (RAMIREZ);  Surgeon: Dale Paul MD;  Location:  OR    MASTECTOMY, NIPPLE SPARING Bilateral 10/09/2019    Procedure: BILATERAL SKIN SPARRING MASTECTOMY (CASS);  Surgeon: Opal Rubin MD;  Location:  OR    ORTHOPEDIC SURGERY Right     feet neuromas removed    PHACOEMULSIFICATION WITH STANDARD INTRAOCULAR LENS IMPLANT Right 1/9/2024    Procedure: Phacoemulsification Cataract Extraction Posterior Chamber Lens Right Eye;  Surgeon: Glen Shirley MD;  Location: [facility] OR    right ankle ORIF  06/2020    Dr Torres    SHOULDER SURGERY Right 2011/10/2012     Current Outpatient Medications   Medication Sig Dispense Refill    calcium  carbonate-vitamin D (OS-STEVE) 600-400 MG-UNIT chewable tablet Take 1 chew tab by mouth 2 times daily (with meals) 180 tablet 3    difluprednate (DUREZOL) 0.05 % ophthalmic emulsion Place 1 drop into the right eye 2 times daily      FLUoxetine (PROZAC) 10 MG capsule TAKE ONE CAPSULE BY MOUTH EVERY DAY 90 capsule 2    ILEVRO 0.3 % ophthalmic suspension Place 1 drop into the right eye daily      letrozole (FEMARA) 2.5 MG tablet Take 1 tablet (2.5 mg) by mouth daily 90 tablet 1    levocetirizine (XYZAL) 5 MG tablet TAKE ONE TABLET BY MOUTH EVERY EVENING 90 tablet 3    levothyroxine (SYNTHROID/LEVOTHROID) 88 MCG tablet TAKE 1 TABLET BY MOUTH DAILY 90 tablet 2    rosuvastatin (CRESTOR) 10 MG tablet Take 1 tablet (10 mg) by mouth daily 90 tablet 2    SUMAtriptan (IMITREX) 50 MG tablet Take 1 tablet (50 mg) by mouth at onset of headache for migraine May repeat in 2 hours. Max 4 tablets/24 hours. 18 tablet 3    anastrozole (ARIMIDEX) 1 MG tablet Take 1 tablet (1 mg) by mouth daily (Patient not taking: Reported on 2024) 90 tablet 2       Allergies   Allergen Reactions    Pcn [Penicillins] Rash    Zithromax [Azithromycin] Rash        Social History     Tobacco Use    Smoking status: Former     Current packs/day: 0.00     Average packs/day: 0.5 packs/day for 10.0 years (5.0 ttl pk-yrs)     Types: Cigarettes     Start date: 10/10/1974     Quit date: 10/10/1984     Years since quittin.6    Smokeless tobacco: Former     Quit date: 10/10/1984   Substance Use Topics    Alcohol use: Yes     Alcohol/week: 1.7 standard drinks of alcohol     Types: 2 Glasses of wine per week     Comment: occasionally     Family History   Problem Relation Age of Onset    Myocardial Infarction Mother     Hypertension Mother     Coronary Artery Disease Mother     Hyperlipidemia Mother     Thyroid Disease Mother     Depression Mother     Stomach Cancer Father     Coronary Artery Disease Father     Hypertension Father     Colon Cancer Father      "Diabetes Paternal Grandmother     Heart Disease Maternal Grandmother     Heart Disease Maternal Grandfather     Diabetes Paternal Grandfather     Cerebrovascular Disease No family hx of     Breast Cancer No family hx of     Prostate Cancer No family hx of     Anesthesia Reaction No family hx of     Asthma No family hx of     Genetic Disorder No family hx of      History   Drug Use Unknown             Review of Systems  Constitutional, HEENT, cardiovascular, pulmonary, gi and gu systems are negative, except as otherwise noted.    Objective    BP (!) 150/86 (BP Location: Right arm, Patient Position: Sitting, Cuff Size: Adult Regular)   Pulse 53   Temp 98.5  F (36.9  C) (Tympanic)   Resp 18   Ht 1.651 m (5' 5\")   Wt 62.3 kg (137 lb 4.8 oz)   SpO2 95%   BMI 22.85 kg/m     Estimated body mass index is 22.85 kg/m  as calculated from the following:    Height as of this encounter: 1.651 m (5' 5\").    Weight as of this encounter: 62.3 kg (137 lb 4.8 oz).  Physical Exam  GENERAL: alert and no distress  EYES: Eyes grossly normal to inspection, PERRL and conjunctivae and sclerae normal  HENT: ear canals and TM's normal, nose and mouth without ulcers or lesions  NECK: no adenopathy, no asymmetry, masses, or scars  RESP: lungs clear to auscultation - no rales, rhonchi or wheezes  CV: regular rate and rhythm, normal S1 S2, no S3 or S4, no murmur, click or rub, no peripheral edema  ABDOMEN: soft, nontender, no hepatosplenomegaly, no masses and bowel sounds normal  MS: no gross musculoskeletal defects noted, no edema  SKIN: no suspicious lesions or rashes  NEURO: Normal strength and tone, mentation intact and speech normal  PSYCH: mentation appears normal, affect normal/bright    Recent Labs   Lab Test 01/24/24  0833 10/25/23  0915   HGB 12.8 14.0    161    139   POTASSIUM 4.1 4.2   CR 0.65 0.79        Diagnostics  No results found for this or any previous visit (from the past 24 hour(s)).   EKG: appears normal, " NSR, normal axis, normal intervals, no acute ST/T changes c/w ischemia, no LVH by voltage criteria, unchanged from previous tracings    Revised Cardiac Risk Index (RCRI)  The patient has the following serious cardiovascular risks for perioperative complications:   - No serious cardiac risks = 0 points     RCRI Interpretation: 0 points: Class I (very low risk - 0.4% complication rate)         Signed Electronically by: DASH Vital  Copy of this evaluation report is provided to requesting physician.

## 2024-06-02 LAB
ATRIAL RATE - MUSE: 45 BPM
DIASTOLIC BLOOD PRESSURE - MUSE: NORMAL MMHG
INTERPRETATION ECG - MUSE: NORMAL
P AXIS - MUSE: 34 DEGREES
PR INTERVAL - MUSE: 152 MS
QRS DURATION - MUSE: 76 MS
QT - MUSE: 508 MS
QTC - MUSE: 439 MS
R AXIS - MUSE: 51 DEGREES
SYSTOLIC BLOOD PRESSURE - MUSE: NORMAL MMHG
T AXIS - MUSE: 21 DEGREES
VENTRICULAR RATE- MUSE: 45 BPM

## 2024-06-04 ENCOUNTER — ANESTHESIA EVENT (OUTPATIENT)
Dept: SURGERY | Facility: HOSPITAL | Age: 69
End: 2024-06-04
Payer: MEDICARE

## 2024-06-04 ASSESSMENT — ENCOUNTER SYMPTOMS: DYSRHYTHMIAS: 1

## 2024-06-04 ASSESSMENT — LIFESTYLE VARIABLES: TOBACCO_USE: 1

## 2024-06-04 NOTE — ANESTHESIA PREPROCEDURE EVALUATION
Anesthesia Pre-Procedure Evaluation    Patient: Denise Taylor   MRN: [id number] : 1955        Procedure : Procedure(s):  Phacoemulsification Cataract Extraction Posterior Chamber Lens Left Eye          Past Medical History:   Diagnosis Date     Basal cell carcinoma      Breast cancer (H) 2019    right breast     Bronchiectasis (H)      CHF (congestive heart failure) (H) 2022     Dyslipidemia      BERTA (generalized anxiety disorder)      Invasive lobular carcinoma of right breast in female (H) 2019- Review and distribute treatment summary- [facility]     Malignant melanoma of skin of face (H) 2022    Removed melanoma     Malignant neoplasm of upper-outer quadrant of right breast in female, estrogen receptor positive (H) 2019     Recurrent pneumonia 2019     Squamous cell carcinoma of skin 2020    right lower leg     Thyroid disease       Past Surgical History:   Procedure Laterality Date     ARTHROSCOPY SHOULDER ROTATOR CUFF REPAIR Left 2018    Procedure: ARTHROSCOPY SHOULDER ROTATOR CUFF REPAIR;  LEFT SHOULDER ARTHROSCOPY, REPAIR ROTATOR CUFF: subacromial Decompression and AC Joint Resection;  Surgeon: Baldev Lou DO;  [facility]: [facility]     ARTHROTOMY SHOULDER, ROTATOR CUFF REPAIR, COMBINED Right 2018    Procedure: RIGHT SHOULDER DEBRIDEMENT, EXCISION OF LIPOMA  RIGHT UPPER ARM, EXCISION SCAR TISSUE AC JOINT;  Surgeon: Baldev Lou DO;  Location: [facility]     BIOPSY BREAST NEEDLE LOCALIZATION, BIOPSY NODE SENTINEL, COMBINED Right 2019    Procedure: RIGHT WIRE LOCALIZED LUMPECTOMY WITH SENTINEL  LYMP NODE;  Surgeon: Michael Dupree MD;  [facility]: [facility]     BREAST RECONSTRUCTION WITH DEEP INFERIOR EPIGASTRIC  (AILYN) FLAP,  2020    done in [place]     COLONOSCOPY  2006    Dr Lara     COLONOSCOPY N/A 10/10/2016    Procedure: COLONOSCOPY;  Surgeon: Christine Cintron MD;  Location: [facility]     EYE SURGERY       Lasik     INSERT TISSUE EXPANDER BREAST BILATERAL Bilateral 10/09/2019    Procedure: BILATERAL TISSUE EXPANDERS (RAMIREZ);  Surgeon: Dale Paul MD;  Location: [facility]     MASTECTOMY, NIPPLE SPARING Bilateral 10/09/2019    Procedure: BILATERAL SKIN SPARRING MASTECTOMY (CASS);  Surgeon: Opal Rubin MD;  Location:  OR     ORTHOPEDIC SURGERY Right     feet neuromas removed     PHACOEMULSIFICATION WITH STANDARD INTRAOCULAR LENS IMPLANT Right 2024    Procedure: Phacoemulsification Cataract Extraction Posterior Chamber Lens Right Eye;  Surgeon: Glen Shirley MD;  Location: [facility]     right ankle ORIF  2020    Dr Torres     SHOULDER SURGERY Right 2011/10/2012      Allergies   Allergen Reactions     Pcn [Penicillins] Rash     Zithromax [Azithromycin] Rash      Social History     Tobacco Use     Smoking status: Former     Current packs/day: 0.00     Average packs/day: 0.5 packs/day for 10.0 years (5.0 ttl pk-yrs)     Types: Cigarettes     Start date: 10/10/1974     Quit date: 10/10/1984     Years since quittin.6     Smokeless tobacco: Former     Quit date: 10/10/1984   Substance Use Topics     Alcohol use: Yes     Alcohol/week: 1.7 standard drinks of alcohol     Types: 2 Glasses of wine per week     Comment: occasionally      Wt Readings from Last 1 Encounters:   24 62.3 kg (137 lb 4.8 oz)        Anesthesia Evaluation   Pt has had prior anesthetic. Type: MAC and General.        ROS/MED HX  ENT/Pulmonary: Comment: Recurrent pulmonary infection 2024    (+)           allergic rhinitis,     tobacco use, Past use,                       Neurologic:     (+)      migraines,                          Cardiovascular:     (+) Dyslipidemia - -   -  - -      CHF                  dysrhythmias, Other,        Previous cardiac testing   Echo: Date: Results:    Stress Test:  Date: Results:    ECG Reviewed:  Date: 24 Results:  Sinus bradycardia  Otherwise normal ECG  No previous ECGs  "available  Confirmed by MD Stanley Anthony ([id number]) on 6/2/2024 9:29:54 AM      Cath:  Date: Results:      METS/Exercise Tolerance:     Hematologic:  - neg hematologic  ROS     Musculoskeletal:  - neg musculoskeletal ROS     GI/Hepatic:  - neg GI/hepatic ROS     Renal/Genitourinary:  - neg Renal ROS     Endo:     (+)          thyroid problem, hypothyroidism,           Psychiatric/Substance Use:     (+) psychiatric history anxiety       Infectious Disease:  - neg infectious disease ROS     Malignancy:   (+) Malignancy, History of Skin, Lung and Breast.    Other:  - neg other ROS          Physical Exam    Airway  airway exam normal      Mallampati: I   TM distance: > 3 FB   Neck ROM: full   Mouth opening: > 3 cm    Respiratory Devices and Support         Dental       (+) Minor Abnormalities - some fillings, tiny chips      Cardiovascular   cardiovascular exam normal       Rhythm and rate: regular and normal     Pulmonary   pulmonary exam normal        breath sounds clear to auscultation       OUTSIDE LABS:  CBC:   Lab Results   Component Value Date    WBC 6.0 05/31/2024    WBC 8.3 01/24/2024    HGB 13.9 05/31/2024    HGB 12.8 01/24/2024    HCT 41.3 05/31/2024    HCT 38.9 01/24/2024     05/31/2024     01/24/2024     BMP:   Lab Results   Component Value Date     05/31/2024     01/24/2024    POTASSIUM 4.4 05/31/2024    POTASSIUM 4.1 01/24/2024    CHLORIDE 103 05/31/2024    CHLORIDE 102 01/24/2024    CO2 26 05/31/2024    CO2 28 01/24/2024    BUN 13.9 05/31/2024    BUN 15.1 01/24/2024    CR 0.74 05/31/2024    CR 0.65 01/24/2024    GLC 88 05/31/2024    GLC 85 01/24/2024     COAGS:   Lab Results   Component Value Date    INR 1.02 12/30/2020     POC: No results found for: \"BGM\", \"HCG\", \"HCGS\"  HEPATIC:   Lab Results   Component Value Date    ALBUMIN 4.1 01/24/2024    PROTTOTAL 7.1 01/24/2024    ALT 30 01/24/2024    AST 39 01/24/2024    ALKPHOS 73 01/24/2024    BILITOTAL 0.5 01/24/2024     OTHER: "   Lab Results   Component Value Date    LACT 1.7 12/23/2020    STEVE 9.7 05/31/2024    PHOS 3.1 12/27/2020    MAG 2.0 01/01/2021    LIPASE 31 10/25/2023    TSH 0.22 (L) 05/31/2024    T4 1.65 05/31/2024    CRP 13.1 (H) 01/13/2021    SED 18 07/24/2019       Anesthesia Plan    ASA Status:  3    NPO Status:  NPO Appropriate    Anesthesia Type: MAC.              Consents    Anesthesia Plan(s) and associated risks, benefits, and realistic alternatives discussed. Questions answered and patient/representative(s) expressed understanding.     - Discussed: Risks, Benefits and Alternatives for BOTH SEDATION and the PROCEDURE were discussed     - Discussed with:  Patient      - Extended Intubation/Ventilatory Support Discussed: No.      - Patient is DNR/DNI Status: No     Use of blood products discussed: No .     Postoperative Care            Comments:    Other Comments: Annika 5/31/24            CESAR Jacobs CRNA    I have reviewed the pertinent notes and labs in the chart from the past 30 days and (re)examined the patient.  Any updates or changes from those notes are reflected in this note.

## 2024-06-11 ENCOUNTER — HOSPITAL ENCOUNTER (OUTPATIENT)
Facility: HOSPITAL | Age: 69
Discharge: HOME | End: 2024-06-11
Attending: OPHTHALMOLOGY | Admitting: OPHTHALMOLOGY
Payer: MEDICARE

## 2024-06-11 ENCOUNTER — ANESTHESIA (OUTPATIENT)
Dept: SURGERY | Facility: HOSPITAL | Age: 69
End: 2024-06-11
Payer: MEDICARE

## 2024-06-11 ENCOUNTER — APPOINTMENT (OUTPATIENT)
Dept: LAB | Facility: HOSPITAL | Age: 69
End: 2024-06-11
Attending: OPHTHALMOLOGY
Payer: MEDICARE

## 2024-06-11 VITALS
OXYGEN SATURATION: 97 % | HEIGHT: 65 IN | SYSTOLIC BLOOD PRESSURE: 153 MMHG | HEART RATE: 44 BPM | RESPIRATION RATE: 18 BRPM | DIASTOLIC BLOOD PRESSURE: 88 MMHG | TEMPERATURE: 97.6 F | BODY MASS INDEX: 22.49 KG/M2 | WEIGHT: 135 LBS

## 2024-06-11 PROCEDURE — 66984 XCAPSL CTRC RMVL W/O ECP: CPT | Performed by: NURSE ANESTHETIST, CERTIFIED REGISTERED

## 2024-06-11 PROCEDURE — 250N000011 HC RX IP 250 OP 636: Mod: JZ | Performed by: OPHTHALMOLOGY

## 2024-06-11 PROCEDURE — 710N000012 HC RECOVERY PHASE 2, PER MINUTE: Performed by: OPHTHALMOLOGY

## 2024-06-11 PROCEDURE — 999N000141 HC STATISTIC PRE-PROCEDURE NURSING ASSESSMENT: Performed by: OPHTHALMOLOGY

## 2024-06-11 PROCEDURE — 272N000001 HC OR GENERAL SUPPLY STERILE: Performed by: OPHTHALMOLOGY

## 2024-06-11 PROCEDURE — V2788 PRESBYOPIA-CORRECT FUNCTION: HCPCS | Performed by: OPHTHALMOLOGY

## 2024-06-11 PROCEDURE — 360N000076 HC SURGERY LEVEL 3, PER MIN: Performed by: OPHTHALMOLOGY

## 2024-06-11 PROCEDURE — 250N000009 HC RX 250: Performed by: OPHTHALMOLOGY

## 2024-06-11 PROCEDURE — 370N000017 HC ANESTHESIA TECHNICAL FEE, PER MIN: Performed by: OPHTHALMOLOGY

## 2024-06-11 DEVICE — IMPLANTABLE DEVICE: Type: IMPLANTABLE DEVICE | Site: EYE | Status: FUNCTIONAL

## 2024-06-11 RX ORDER — CYCLOPENTOLATE HYDROCHLORIDE 20 MG/ML
1 SOLUTION/ DROPS OPHTHALMIC ONCE
Status: COMPLETED | OUTPATIENT
Start: 2024-06-11 | End: 2024-06-11

## 2024-06-11 RX ORDER — PHENYLEPHRINE HYDROCHLORIDE 100 MG/ML
1 SOLUTION/ DROPS OPHTHALMIC
Status: DISCONTINUED | OUTPATIENT
Start: 2024-06-11 | End: 2024-06-11 | Stop reason: HOSPADM

## 2024-06-11 RX ORDER — ONDANSETRON 4 MG/1
4 TABLET, ORALLY DISINTEGRATING ORAL EVERY 30 MIN PRN
Status: DISCONTINUED | OUTPATIENT
Start: 2024-06-11 | End: 2024-06-11 | Stop reason: HOSPADM

## 2024-06-11 RX ORDER — MOXIFLOXACIN 5 MG/ML
1 SOLUTION/ DROPS OPHTHALMIC
Status: COMPLETED | OUTPATIENT
Start: 2024-06-11 | End: 2024-06-11

## 2024-06-11 RX ORDER — LEVOBUNOLOL HYDROCHLORIDE 5 MG/ML
SOLUTION/ DROPS OPHTHALMIC PRN
Status: DISCONTINUED | OUTPATIENT
Start: 2024-06-11 | End: 2024-06-11 | Stop reason: HOSPADM

## 2024-06-11 RX ORDER — ONDANSETRON 2 MG/ML
4 INJECTION INTRAMUSCULAR; INTRAVENOUS EVERY 30 MIN PRN
Status: DISCONTINUED | OUTPATIENT
Start: 2024-06-11 | End: 2024-06-11 | Stop reason: HOSPADM

## 2024-06-11 RX ORDER — PROPARACAINE HYDROCHLORIDE 5 MG/ML
1 SOLUTION/ DROPS OPHTHALMIC ONCE
Status: COMPLETED | OUTPATIENT
Start: 2024-06-11 | End: 2024-06-11

## 2024-06-11 RX ORDER — PROPARACAINE HYDROCHLORIDE 5 MG/ML
1 SOLUTION/ DROPS OPHTHALMIC
Status: COMPLETED | OUTPATIENT
Start: 2024-06-11 | End: 2024-06-11

## 2024-06-11 RX ORDER — DEXAMETHASONE SODIUM PHOSPHATE 10 MG/ML
4 INJECTION, SOLUTION INTRAMUSCULAR; INTRAVENOUS
Status: DISCONTINUED | OUTPATIENT
Start: 2024-06-11 | End: 2024-06-11 | Stop reason: HOSPADM

## 2024-06-11 RX ORDER — ACETAMINOPHEN 325 MG/1
650 TABLET ORAL
Status: COMPLETED | OUTPATIENT
Start: 2024-06-11 | End: 2024-06-11

## 2024-06-11 RX ORDER — LIDOCAINE HYDROCHLORIDE 20 MG/ML
JELLY TOPICAL ONCE
Status: COMPLETED | OUTPATIENT
Start: 2024-06-11 | End: 2024-06-11

## 2024-06-11 RX ORDER — CYCLOPENTOLAT/TROPIC/PHENYLEPH 1%-1%-2.5%
1 DROPS (EA) OPHTHALMIC (EYE)
Status: COMPLETED | OUTPATIENT
Start: 2024-06-11 | End: 2024-06-11

## 2024-06-11 RX ORDER — MOXIFLOXACIN 5 MG/ML
SOLUTION/ DROPS OPHTHALMIC PRN
Status: DISCONTINUED | OUTPATIENT
Start: 2024-06-11 | End: 2024-06-11 | Stop reason: HOSPADM

## 2024-06-11 RX ORDER — NALOXONE HYDROCHLORIDE 0.4 MG/ML
0.1 INJECTION, SOLUTION INTRAMUSCULAR; INTRAVENOUS; SUBCUTANEOUS
Status: DISCONTINUED | OUTPATIENT
Start: 2024-06-11 | End: 2024-06-11 | Stop reason: HOSPADM

## 2024-06-11 RX ORDER — PILOCARPINE HYDROCHLORIDE 10 MG/ML
SOLUTION/ DROPS OPHTHALMIC PRN
Status: DISCONTINUED | OUTPATIENT
Start: 2024-06-11 | End: 2024-06-11 | Stop reason: HOSPADM

## 2024-06-11 RX ORDER — LIDOCAINE HYDROCHLORIDE 10 MG/ML
INJECTION, SOLUTION EPIDURAL; INFILTRATION; INTRACAUDAL; PERINEURAL PRN
Status: DISCONTINUED | OUTPATIENT
Start: 2024-06-11 | End: 2024-06-11 | Stop reason: HOSPADM

## 2024-06-11 RX ORDER — LIDOCAINE 40 MG/G
CREAM TOPICAL
Status: DISCONTINUED | OUTPATIENT
Start: 2024-06-11 | End: 2024-06-11 | Stop reason: HOSPADM

## 2024-06-11 RX ORDER — TETRACAINE HYDROCHLORIDE 5 MG/ML
SOLUTION OPHTHALMIC PRN
Status: DISCONTINUED | OUTPATIENT
Start: 2024-06-11 | End: 2024-06-11 | Stop reason: HOSPADM

## 2024-06-11 RX ADMIN — MOXIFLOXACIN OPHTHALMIC SOLUTION 1 DROP: 5 SOLUTION/ DROPS OPHTHALMIC at 10:42

## 2024-06-11 RX ADMIN — CYCLOPENTOLATE HYDROCHLORIDE 1 DROP: 20 SOLUTION/ DROPS OPHTHALMIC at 10:35

## 2024-06-11 RX ADMIN — Medication 1 DROP: at 10:41

## 2024-06-11 RX ADMIN — MOXIFLOXACIN OPHTHALMIC SOLUTION 1 DROP: 5 SOLUTION/ DROPS OPHTHALMIC at 10:53

## 2024-06-11 RX ADMIN — LIDOCAINE HYDROCHLORIDE: 20 JELLY TOPICAL at 11:00

## 2024-06-11 RX ADMIN — MOXIFLOXACIN OPHTHALMIC SOLUTION 1 DROP: 5 SOLUTION/ DROPS OPHTHALMIC at 10:48

## 2024-06-11 RX ADMIN — PROPARACAINE HYDROCHLORIDE 1 DROP: 5 SOLUTION/ DROPS OPHTHALMIC at 11:00

## 2024-06-11 RX ADMIN — PROPARACAINE HYDROCHLORIDE 1 DROP: 5 SOLUTION/ DROPS OPHTHALMIC at 10:35

## 2024-06-11 RX ADMIN — Medication 1 DROP: at 11:00

## 2024-06-11 ASSESSMENT — ACTIVITIES OF DAILY LIVING (ADL)
ADLS_ACUITY_SCORE: 18
ADLS_ACUITY_SCORE: 20

## 2024-06-11 NOTE — ANESTHESIA CARE TRANSFER NOTE
Patient: Densie Taylor    Procedure: Procedure(s):  Phacoemulsification Cataract Extraction Posterior Chamber Lens Left Eye       Diagnosis: Combined form of age-related cataract, left eye [H25.812]  Presbyopia [H52.4]  Diagnosis Additional Information: No value filed.    Anesthesia Type:   MAC     Note:    Oropharynx: spontaneously breathing  Level of Consciousness: awake  Oxygen Supplementation: room air    Independent Airway: airway patency satisfactory and stable  Dentition: dentition unchanged  Vital Signs Stable: post-procedure vital signs reviewed and stable  Report to RN Given: handoff report given  Patient transferred to: Phase II    Handoff Report: Identifed the Patient, Identified the Reponsible Provider, Reviewed the pertinent medical history, Discussed the surgical course, Reviewed Intra-OP anesthesia mangement and issues during anesthesia, Set expectations for post-procedure period and Allowed opportunity for questions and acknowledgement of understanding    Vitals:  Vitals Value Taken Time   BP     Temp     Pulse     Resp     SpO2         Electronically Signed By: CESAR Jacobs CRNA  June 11, 2024  1:12 PM

## 2024-06-11 NOTE — OR NURSING
Patient and responsible adult given discharge instructions with no questions regarding instructions. Diana score 20. Pain level 0/10.  Discharged from unit via ambulatory. Patient discharged to home.

## 2024-06-11 NOTE — OP NOTE
[facility]  Ophthalmology Full Operative Note    Pre-operative diagnosis: Combined form of age-related cataract, left eye [H25.812]  Presbyopia [H52.4]   Post-operative diagnosis Same   Procedure: Procedure(s):  Phacoemulsification Cataract Extraction Posterior Chamber Lens Left Eye   Surgeon: Glen Shirley MD   Assistants(s):    Anesthesia: MAC with Local    Estimated blood loss: None    Total IV fluids: (See anesthesia record)   Specimens: None   Implants: 19.5 CCWET0 Vivity   Findings:    Complications: None   Condition: Stable   Comments:       PROCEDURAL ANESTHESIA:     Topical/MAC.  Lidocaine 2% jelly topically and Lidocaine 1% preservative-free intracamerally.     PROCEDURE:  The patient was brought to the Operating Room and prepped and draped in a sterile manner.  A wire lid speculum was placed.  A paracentesis was created and 1% Lidocaine was instilled in the anterior chamber.  The anterior chamber was then filled with Amvisc viscoelastic.  A clear cornea temporal wound was created using a 2.8 mm keratome.  A cystotome was used to initiate a flap in the anterior capsule and a Utrata forceps was used to create a continuous tear capsulorhexis.  Hydrodissection was performed.  The phacoemulsification tip was inserted into the eye and the nucleus and epinucleus were removed using a divide and conquer technique.  The residual cortex was removed using the I/A handpiece.  The anterior chamber was then refilled with viscoelastic and the wound was enlarged with the keratome.  The intraocular lens, 19.5 diopter, Model CCWET0, was placed into the injector and injected into the capsular bag. It was checked to make sure that it was central and stable.  Residual viscoelastic was removed using the I/A.  The anterior chamber was refilled with BSS.  The wounds were hydrated with BSS and were noted to be watertight with no suture necessary.  Topical pilocarpine 1%, Betagan, and Vigamox was applied.  A hard  No retinopathy seen on exam. shield was placed.     The patient tolerated the procedure well and was sent to the Recovery Room in satisfactory condition.

## 2024-06-11 NOTE — ANESTHESIA POSTPROCEDURE EVALUATION
Patient: Denise Taylor    Procedure: Procedure(s):  Phacoemulsification Cataract Extraction Posterior Chamber Lens Left Eye       Anesthesia Type:  MAC    Note:  Disposition: Outpatient   Postop Pain Control: Uneventful            Sign Out: Well controlled pain   PONV: No   Neuro/Psych: Uneventful            Sign Out: Acceptable/Baseline neuro status   Airway/Respiratory: Uneventful            Sign Out: Acceptable/Baseline resp. status   CV/Hemodynamics: Uneventful            Sign Out: Acceptable CV status; No obvious hypovolemia; No obvious fluid overload   Other NRE: NONE   DID A NON-ROUTINE EVENT OCCUR? No         Last vitals:  Vitals Value Taken Time   /88 06/11/24 1325   Temp     Pulse 44 06/11/24 1330   Resp     SpO2 97 % 06/11/24 1330       Electronically Signed By: CESAR Oliver CRNA  June 11, 2024  2:46 PM

## 2024-07-11 ENCOUNTER — LAB (OUTPATIENT)
Dept: LAB | Facility: OTHER | Age: 69
End: 2024-07-11
Attending: NURSE PRACTITIONER
Payer: MEDICARE

## 2024-07-11 ENCOUNTER — HOSPITAL ENCOUNTER (OUTPATIENT)
Dept: BONE DENSITY | Facility: HOSPITAL | Age: 69
Discharge: HOME | End: 2024-07-11
Attending: NURSE PRACTITIONER
Payer: MEDICARE

## 2024-07-11 DIAGNOSIS — Z85.3 PERSONAL HISTORY OF MALIGNANT NEOPLASM OF BREAST: ICD-10-CM

## 2024-07-11 DIAGNOSIS — Z78.0 POST-MENOPAUSAL: ICD-10-CM

## 2024-07-11 DIAGNOSIS — Z79.811 USE OF AROMATASE INHIBITORS: ICD-10-CM

## 2024-07-11 LAB
ALBUMIN SERPL BCG-MCNC: 4.4 G/DL (ref 3.5–5.2)
ALP SERPL-CCNC: 69 U/L (ref 40–150)
ALT SERPL W P-5'-P-CCNC: 36 U/L (ref 0–50)
ANION GAP SERPL CALCULATED.3IONS-SCNC: 11 MMOL/L (ref 7–15)
AST SERPL W P-5'-P-CCNC: 47 U/L (ref 0–45)
BASOPHILS # BLD AUTO: 0.1 10E3/UL (ref 0–0.2)
BASOPHILS NFR BLD AUTO: 1 %
BILIRUB SERPL-MCNC: 0.4 MG/DL
BUN SERPL-MCNC: 20.1 MG/DL (ref 8–23)
CALCIUM SERPL-MCNC: 9.6 MG/DL (ref 8.8–10.2)
CHLORIDE SERPL-SCNC: 102 MMOL/L (ref 98–107)
CREAT SERPL-MCNC: 0.83 MG/DL (ref 0.51–0.95)
DEPRECATED HCO3 PLAS-SCNC: 26 MMOL/L (ref 22–29)
EGFRCR SERPLBLD CKD-EPI 2021: 76 ML/MIN/1.73M2
EOSINOPHIL # BLD AUTO: 0.2 10E3/UL (ref 0–0.7)
EOSINOPHIL NFR BLD AUTO: 2 %
ERYTHROCYTE [DISTWIDTH] IN BLOOD BY AUTOMATED COUNT: 12.8 % (ref 10–15)
GLUCOSE SERPL-MCNC: 84 MG/DL (ref 70–99)
HCT VFR BLD AUTO: 40.5 % (ref 35–47)
HGB BLD-MCNC: 13.6 G/DL (ref 11.7–15.7)
IMM GRANULOCYTES # BLD: 0 10E3/UL
IMM GRANULOCYTES NFR BLD: 0 %
LYMPHOCYTES # BLD AUTO: 3 10E3/UL (ref 0.8–5.3)
LYMPHOCYTES NFR BLD AUTO: 44 %
MCH RBC QN AUTO: 29.4 PG (ref 26.5–33)
MCHC RBC AUTO-ENTMCNC: 33.6 G/DL (ref 31.5–36.5)
MCV RBC AUTO: 88 FL (ref 78–100)
MONOCYTES # BLD AUTO: 0.6 10E3/UL (ref 0–1.3)
MONOCYTES NFR BLD AUTO: 9 %
NEUTROPHILS # BLD AUTO: 3 10E3/UL (ref 1.6–8.3)
NEUTROPHILS NFR BLD AUTO: 44 %
NRBC # BLD AUTO: 0 10E3/UL
NRBC BLD AUTO-RTO: 0 /100
PLATELET # BLD AUTO: 173 10E3/UL (ref 150–450)
POTASSIUM SERPL-SCNC: 4.3 MMOL/L (ref 3.4–5.3)
PROT SERPL-MCNC: 7.4 G/DL (ref 6.4–8.3)
RBC # BLD AUTO: 4.63 10E6/UL (ref 3.8–5.2)
SODIUM SERPL-SCNC: 139 MMOL/L (ref 135–145)
WBC # BLD AUTO: 6.7 10E3/UL (ref 4–11)

## 2024-07-11 PROCEDURE — 85025 COMPLETE CBC W/AUTO DIFF WBC: CPT | Mod: ZL

## 2024-07-11 PROCEDURE — 80053 COMPREHEN METABOLIC PANEL: CPT | Mod: ZL

## 2024-07-11 PROCEDURE — 77080 DXA BONE DENSITY AXIAL: CPT

## 2024-07-11 PROCEDURE — 36415 COLL VENOUS BLD VENIPUNCTURE: CPT | Mod: ZL

## 2024-07-17 ENCOUNTER — ONCOLOGY VISIT (OUTPATIENT)
Dept: ONCOLOGY | Facility: OTHER | Age: 69
End: 2024-07-17
Attending: NURSE PRACTITIONER
Payer: COMMERCIAL

## 2024-07-17 VITALS
DIASTOLIC BLOOD PRESSURE: 62 MMHG | BODY MASS INDEX: 22.96 KG/M2 | HEIGHT: 65 IN | SYSTOLIC BLOOD PRESSURE: 118 MMHG | TEMPERATURE: 97.6 F | WEIGHT: 137.79 LBS | OXYGEN SATURATION: 99 % | HEART RATE: 53 BPM

## 2024-07-17 DIAGNOSIS — Z79.811 USE OF AROMATASE INHIBITORS: ICD-10-CM

## 2024-07-17 DIAGNOSIS — Z85.3 PERSONAL HISTORY OF MALIGNANT NEOPLASM OF BREAST: Primary | ICD-10-CM

## 2024-07-17 PROCEDURE — 99213 OFFICE O/P EST LOW 20 MIN: CPT | Mod: 24 | Performed by: NURSE PRACTITIONER

## 2024-07-17 PROCEDURE — G0463 HOSPITAL OUTPT CLINIC VISIT: HCPCS

## 2024-07-17 PROCEDURE — G2211 COMPLEX E/M VISIT ADD ON: HCPCS | Performed by: NURSE PRACTITIONER

## 2024-07-17 ASSESSMENT — PAIN SCALES - GENERAL: PAINLEVEL: NO PAIN (0)

## 2024-07-17 NOTE — PROGRESS NOTES
Oncology Follow-up Visit:  July 17, 2024    Reason for Visit:  Patient presents with:  Oncology Clinic Visit: Follow up. Personal history of malignant neoplasm of breast     Nursing Note and documentation reviewed: yes    HPI:   This is a 68-year-old female patient who presents to the oncology clinic today in follow-up of Stage Ia, invasive lobular carcinoma of the right breast diagnosed July 2019.  She has undergone bilateral mastectomies and she was placed on Arimidex in December 2019.  She took this until 5/2024 and was switched to Letrozole due to joint pains.    She presents to the clinic today stating she is doing good.  They returned from the [place] in April and states that time went well.  They do plan to go back again next January.  She had another respiratory infection in May and was placed on 2 types of antibiotics.  She did have fevers cough congestion and fatigue at that time but feels she is now recovered.  She feels she is doing much better from a joint pain standpoint on the letrozole.  She has been on this for about 2 months now.  She admits she does not exercise and she continues on her calcium with vitamin D twice a day.  She denies any changes around her breast implants or any new lumps or skin changes.  She will be seeing a new plastic surgeon in regards to her reconstruction on August 15.    Oncologic History:     7/24/2019 patient was seen by her PCP with concerns regarding a right breast mass  7/25/2019 patient underwent right diagnostic mammogram showing no abnormality      **She underwent ultrasound of the right breast with no discrete masses noted and recommendation for follow-up in 6 months  7/26/2019 patient was evaluated by Dr. Dupree with General Surgery with recommendation for biopsy   7/29/2019 Ultrasound guided biopsy showed invasive lobular carcinoma, grade 2, ER/PA positive and HER-2/jenn negative  8/12/2019  She underwent MRI of the both breasts with findings showing a small area of  enhancement in the upper outer quadrant of the right breast and the left breast was unremarkable     **Ultrasound of the right axilla was negative  8/23/2019  She went underwent lumpectomy with sentinel node procedure with pathology showing a 4.5 cm tumor that was consistent with invasive lobular carcinoma of the right breast, grade 2, ER/W[place] positive and HER-2/jenn negative; sentinel node was negative; she was staged pathologic T2N0.  Anterior and medial margin was positive on pathology.  9/19/2019  Patient was seen by Dr. Lulú Franz as well is Dr. Dale Paul with Plastic surgery to discuss reconstruction   10/9/2019 she underwent bilateral mastectomy with immediate construction and pathology showed no evidence of malignancy  11/5/2019  Patient was evaluated by Dr. Noyola  with Medical Oncology with Oncotype DX recurrence score of 12 consistent with low risk of breast cancer and recommendation for aromatase inhibitor therapy; PET and MRI brain were recommended  Patient was seen again by Dr. hair aviles with medical oncology with Oncotype DX 11/20/2019  She underwent PET scan:  IMPRESSION:   1.  Numerous new FDG avid groundglass and solid nodules throughout  both lungs concerning for recurrent disease. Additionally, there is  worsening atelectasis seen in both lungs. Infectious and noninfectious  granulomatous disease may have a similar appearance.  12/3/2019 she was seen again by Dr. Noyola with Medical Oncology to review PET scan results.  MRI was not obtained related to tissue expanders in place.  He recommended initiation of aromatase inhibitor with Arimidex 1 mg p.o. daily.  2/5/2020  Repeat PET scan:  IMPRESSION:   1.  No evidence of active disease. Nodules and areas of groundglass  opacification seen previously within the lungs have resolved.   2.  Bilateral breast implant revision. Leaking left breast implant is  first seen on the CT scan of the chest from 11/14/2019.   2/6/2020  She underwent  MRI of the brain evidence of metastatic disease     Current Chemo Regime/TX: Letrozole 2.5 mg daily initiated May 2024  Current Cycle:  n/a  # of completed cycles:  n/a     Previous treatment:  Arimidex 1 mg daily initiated 12/2019    Past Medical History:   Diagnosis Date    Basal cell carcinoma     Breast cancer (H) 07/2019    right breast    Bronchiectasis (H) 2019    CHF (congestive heart failure) (H) 8/12/2022    Dyslipidemia     BERTA (generalized anxiety disorder)     Invasive lobular carcinoma of right breast in female (H) 08/28/2019 5.12.2020- [facility]    Malignant melanoma of skin of face (H) 08/11/2022    Removed melanoma    Malignant neoplasm of upper-outer quadrant of right breast in female, estrogen receptor positive (H) 08/22/2019    Recurrent pneumonia 08/22/2019    Squamous cell carcinoma of skin 05/2020    right lower leg    Thyroid disease        Past Surgical History:   Procedure Laterality Date    ARTHROSCOPY SHOULDER ROTATOR CUFF REPAIR Left 03/07/2018    Procedure: ARTHROSCOPY SHOULDER ROTATOR CUFF REPAIR;  LEFT SHOULDER ARTHROSCOPY, REPAIR ROTATOR CUFF: subacromial Decompression and AC Joint Resection;  Surgeon: Baldev Lou DO;  Location: [facility] OR    ARTHROTOMY SHOULDER, ROTATOR CUFF REPAIR, COMBINED Right 11/14/2018    Procedure: RIGHT SHOULDER DEBRIDEMENT, EXCISION OF LIPOMA  RIGHT UPPER ARM, EXCISION SCAR TISSUE AC JOINT;  Surgeon: Baldev Lou DO;  Location: [facility] OR    BIOPSY BREAST NEEDLE LOCALIZATION, BIOPSY NODE SENTINEL, COMBINED Right 08/23/2019    Procedure: RIGHT WIRE LOCALIZED LUMPECTOMY WITH SENTINEL  LYMP NODE;  Surgeon: Michael Dupree MD;  Location: [facility] OR    BREAST RECONSTRUCTION WITH DEEP INFERIOR EPIGASTRIC  (AILYN) FLAP,  07/2020    done in [place]    COLONOSCOPY  2006    Dr Lara    COLONOSCOPY N/A 10/10/2016    Procedure: COLONOSCOPY;  Surgeon: Christine Cintron MD;  Location: [facility] OR    EYE SURGERY  1995     Lasik    INSERT TISSUE EXPANDER BREAST BILATERAL Bilateral 10/09/2019    Procedure: BILATERAL TISSUE EXPANDERS (RAMIREZ);  Surgeon: Dale Paul MD;  Location: [facility]    MASTECTOMY, NIPPLE SPARING Bilateral 10/09/2019    Procedure: BILATERAL SKIN SPARRING MASTECTOMY (CASS);  Surgeon: Opal Rubin MD;  Location:  OR    ORTHOPEDIC SURGERY Right     feet neuromas removed    PHACOEMULSIFICATION WITH STANDARD INTRAOCULAR LENS IMPLANT Right 1/9/2024    Procedure: Phacoemulsification Cataract Extraction Posterior Chamber Lens Right Eye;  Surgeon: Glen Shirley MD;  Location: [facility]    PHACOEMULSIFICATION WITH STANDARD INTRAOCULAR LENS IMPLANT Left 6/11/2024    Procedure: Phacoemulsification Cataract Extraction Posterior Chamber Lens Left Eye;  Surgeon: Glen Shirley MD;  Location: [facility] OR    right ankle ORIF  06/2020    Dr Torres    SHOULDER SURGERY Right 2011/10/2012       Family History   Problem Relation Age of Onset    Myocardial Infarction Mother     Hypertension Mother     Coronary Artery Disease Mother     Hyperlipidemia Mother     Thyroid Disease Mother     Depression Mother     Stomach Cancer Father     Coronary Artery Disease Father     Hypertension Father     Colon Cancer Father     Diabetes Paternal Grandmother     Heart Disease Maternal Grandmother     Heart Disease Maternal Grandfather     Diabetes Paternal Grandfather     Cerebrovascular Disease No family hx of     Breast Cancer No family hx of     Prostate Cancer No family hx of     Anesthesia Reaction No family hx of     Asthma No family hx of     Genetic Disorder No family hx of        Social History     Socioeconomic History    Marital status:      Spouse name: Not on file    Number of children: 2    Years of education: 16    Highest education level: Not on file   Occupational History    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   Tobacco Use    Smoking status: Former     Current packs/day: 0.00     Average packs/day: 0.5 packs/day  for 10.0 years (5.0 ttl pk-yrs)     Types: Cigarettes     Start date: 10/10/1974     Quit date: 10/10/1984     Years since quittin.7    Smokeless tobacco: Former     Quit date: 10/10/1984   Vaping Use    Vaping status: Never Used   Substance and Sexual Activity    Alcohol use: Yes     Alcohol/week: 1.7 standard drinks of alcohol     Types: 2 Glasses of wine per week     Comment: occasionally    Drug use: Never    Sexual activity: Not Currently     Partners: Female     Birth control/protection: Post-menopausal     Comment: I m old.   Other Topics Concern    Parent/sibling w/ CABG, MI or angioplasty before 65F 55M? Yes     Comment: Mother    Social History Narrative     -- n/a    Caffeine -- 2c/day    Concussion -- n/a     Social Determinants of Health     Financial Resource Strain: Low Risk  (2023)    Financial Resource Strain     Within the past 12 months, have you or your family members you live with been unable to get utilities (heat, electricity) when it was really needed?: No   Food Insecurity: Low Risk  (2023)    Food Insecurity     Within the past 12 months, did you worry that your food would run out before you got money to buy more?: No     Within the past 12 months, did the food you bought just not last and you didn t have money to get more?: No   Transportation Needs: Low Risk  (2023)    Transportation Needs     Within the past 12 months, has lack of transportation kept you from medical appointments, getting your medicines, non-medical meetings or appointments, work, or from getting things that you need?: No   Physical Activity: Insufficiently Active (2020)    Exercise Vital Sign     Days of Exercise per Week: 5 days     Minutes of Exercise per Session: 20 min   Stress: Not on file   Social Connections: Not on file   Interpersonal Safety: Low Risk  (2024)    Interpersonal Safety     Do you feel physically and emotionally safe where you currently live?: Yes      Within the past 12 months, have you been hit, slapped, kicked or otherwise physically hurt by someone?: No     Within the past 12 months, have you been humiliated or emotionally abused in other ways by your partner or ex-partner?: No   Housing Stability: Low Risk  (12/22/2023)    Housing Stability     Do you have housing? : Yes     Are you worried about losing your housing?: No       Current Outpatient Medications   Medication Sig Dispense Refill    calcium carbonate-vitamin D (OS-STEVE) 600-400 MG-UNIT chewable tablet Take 1 chew tab by mouth 2 times daily (with meals) 180 tablet 3    FLUoxetine (PROZAC) 10 MG capsule TAKE ONE CAPSULE BY MOUTH EVERY DAY 90 capsule 2    ILEVRO 0.3 % ophthalmic suspension Place 1 drop into the right eye daily      letrozole (FEMARA) 2.5 MG tablet Take 1 tablet (2.5 mg) by mouth daily 90 tablet 1    levocetirizine (XYZAL) 5 MG tablet TAKE ONE TABLET BY MOUTH EVERY EVENING 90 tablet 3    levothyroxine (SYNTHROID/LEVOTHROID) 88 MCG tablet TAKE 1 TABLET BY MOUTH DAILY 90 tablet 2    rosuvastatin (CRESTOR) 10 MG tablet Take 1 tablet (10 mg) by mouth daily 90 tablet 2    SUMAtriptan (IMITREX) 50 MG tablet Take 1 tablet (50 mg) by mouth at onset of headache for migraine May repeat in 2 hours. Max 4 tablets/24 hours. 18 tablet 3     No current facility-administered medications for this visit.        Allergies   Allergen Reactions    Pcn [Penicillins] Rash    Zithromax [Azithromycin] Rash       Review Of Systems:  Constitutional:    denies weight changes, chills, and night sweats.  Eyes:    denies blurred or double vision  Ears/Nose/Throat:   denies ear pain, nose problems, difficulty swallowing  Respiratory:   see HPI  Skin:   denies new rash, lesions  Breast/Chest wall:   denies pain, lumps or skin changes  Cardiovascular:   denies chest pain, palpitations, edema  Gastrointestinal:   denies abdominal pain, bloating, nausea, early satiety; no change in bowel habits or blood in  "stool  Genitourinary:   denies difficulty with urination, blood in urine  Musculoskeletal:    denies new muscle pain, bone pain  Neurologic:   denies lightheadedness, headaches, numbness or tingling  Psychiatric:   denies anxiety, depression  Hematologic/Lymphatic/Immunologic:   denies easy bruising, easy bleeding, lumps or bumps noted  Endocrine:   Denies increased thirst      Physical Exam:  /62 (BP Location: Right arm, Patient Position: Sitting, Cuff Size: Adult Regular)   Pulse 53   Temp 97.6  F (36.4  C) (Tympanic)   Ht 1.651 m (5' 5\")   Wt 62.5 kg (137 lb 12.6 oz)   SpO2 99%   BMI 22.93 kg/m      GENERAL APPEARANCE: Healthy, alert and in no acute distress.  HEENT: Normocephalic, Sclerae anicteric.  No obvious oral lesions or thrush  NECK:   No asymmetry or masses, no thyromegaly.  LYMPHATICS: No palpable cervical, supraclavicular, axillary, or inguinal nodes   RESP: Lungs clear to auscultation bilaterally, respirations regular and easy  CARDIOVASCULAR: Regular rate and rhythm. Normal S1, S2; no murmur, gallop, or rub.  ABDOMEN: Soft, nontender. Bowel sounds auscultated all 4 quadrants. No palpable organomegaly or masses.  BREAST/Chest wall: Deferred today   MUSCULOSKELETAL: Extremities without gross deformities noted. No edema of bilateral lower extremities.  SKIN: No suspicious lesions or rashes to exposed skin  NEURO: Alert and oriented x 3.  Gait steady.  PSYCHIATRIC: Mentation and affect appear normal.  Mood appropriate.    Laboratory:  Component      Latest Ref Rng 7/11/2024  11:18 AM   WBC      4.0 - 11.0 10e3/uL 6.7    RBC Count      3.80 - 5.20 10e6/uL 4.63    Hemoglobin      11.7 - 15.7 g/dL 13.6    Hematocrit      35.0 - 47.0 % 40.5    MCV      78 - 100 fL 88    MCH      26.5 - 33.0 pg 29.4    MCHC      31.5 - 36.5 g/dL 33.6    RDW      10.0 - 15.0 % 12.8    Platelet Count      150 - 450 10e3/uL 173    % Neutrophils      % 44    % Lymphocytes      % 44    % Monocytes      % 9    % " Eosinophils      % 2    % Basophils      % 1    % Immature Granulocytes      % 0    NRBCs per 100 WBC      <1 /100 0    Absolute Neutrophils      1.6 - 8.3 10e3/uL 3.0    Absolute Lymphocytes      0.8 - 5.3 10e3/uL 3.0    Absolute Monocytes      0.0 - 1.3 10e3/uL 0.6    Absolute Eosinophils      0.0 - 0.7 10e3/uL 0.2    Absolute Basophils      0.0 - 0.2 10e3/uL 0.1    Absolute Immature Granulocytes      <=0.4 10e3/uL 0.0    Absolute NRBCs      10e3/uL 0.0    Sodium      135 - 145 mmol/L 139    Potassium      3.4 - 5.3 mmol/L 4.3    Carbon Dioxide (CO2)      22 - 29 mmol/L 26    Anion Gap      7 - 15 mmol/L 11    Urea Nitrogen      8.0 - 23.0 mg/dL 20.1    Creatinine      0.51 - 0.95 mg/dL 0.83    GFR Estimate      >60 mL/min/1.73m2 76    Calcium      8.8 - 10.2 mg/dL 9.6    Chloride      98 - 107 mmol/L 102    Glucose      70 - 99 mg/dL 84    Alkaline Phosphatase      40 - 150 U/L 69    AST      0 - 45 U/L 47 (H)    ALT      0 - 50 U/L 36    Protein Total      6.4 - 8.3 g/dL 7.4    Albumin      3.5 - 5.2 g/dL 4.4    Bilirubin Total      <=1.2 mg/dL 0.4       Legend:  (H) High    Imaging Studies:     Results for orders placed or performed during the hospital encounter of 07/11/24   DX Hip/Pelvis/Spine    Narrative    PROCEDURE: DX AXIAL HIPS/SPINE 7/11/2024 11:09 AM    HISTORY: post menopausal/on AI; Use of aromatase inhibitors;  Post-menopausal    COMPARISONS: None.    TECHNIQUE: DEXA bone mineral density study of the left hip and lumbar  spine    FINDINGS: Bone mineral density in the left hip measures 0.946 g/sq cm  with a T score of 0. There has been a 1.1% decline from 2022 which is  not statistically significant. Bone mineral density of the femoral  neck measured 0.753 g/sq cm with a T score of -0.9. Bone mineral  density of the lumbar spine L1-L4 measures 0.986 g/sq cm with a T  score of -0.6. There is been a 2.5% improvement from 2022 which is  statistically significant.         Impression    IMPRESSION:  Normal bone mineral density. Fracture risk is low    HUMZA MCCORMACK MD         SYSTEM ID:  [id number]        ASSESSMENT/PLAN:    #1 Breast cancer:   Stage Ia, invasive lobular carcinoma of the right breast diagnosed August 2019.  She was placed on Arimidex 1 mg daily in December 2019 and this was discontinued in May 2024 related to joint pains.  She was switched to letrozole 2.5 mg daily and is doing better on this so we will continue.  I will see her back in 6 months with a CBC and a CMP and we will obtain a breast cancer index to evaluate length of need for the aromatase inhibitor.    #2  On aromatase inhibitor therapy: DEXA scan was normal.  She will continue on the calcium with vitamin D twice a day.  We will repeat her DEXA scan in 2 years.     I encouraged patient to call with any questions or concerns.    The longitudinal plan of care for the diagnosis(es)/condition(s) as documented were addressed during this visit. Due to the added complexity in care, I will continue to support Denise in the subsequent management and with ongoing continuity of care.       Che Sesay NP  APRN, FNP-BC, AOCNP

## 2024-07-17 NOTE — NURSING NOTE
"Oncology Rooming Note    July 17, 2024 9:03 AM   Denise Taylor is a 68 year old female who presents for:    Chief Complaint   Patient presents with    Oncology Clinic Visit     Follow up. Personal history of malignant neoplasm of breast     Initial Vitals: /62 (BP Location: Right arm, Patient Position: Sitting, Cuff Size: Adult Regular)   Pulse 53   Temp 97.6  F (36.4  C) (Tympanic)   Ht 1.651 m (5' 5\")   Wt 62.5 kg (137 lb 12.6 oz)   SpO2 99%   BMI 22.93 kg/m   Estimated body mass index is 22.93 kg/m  as calculated from the following:    Height as of this encounter: 1.651 m (5' 5\").    Weight as of this encounter: 62.5 kg (137 lb 12.6 oz). Body surface area is 1.69 meters squared.  No Pain (0) Comment: Data Unavailable   No LMP recorded. Patient is postmenopausal.  Allergies reviewed: Yes  Medications reviewed: Yes    Medications: Medication refills not needed today.  Pharmacy name entered into EPIC:    [facility] - [address] [id number] - [place], [place] - [address] [address] [facility]    Frailty Screening:   Is the patient here for a new oncology consult visit in cancer care? 2. No      Clinical concerns: none       Lizzie Weber LPN              "

## 2024-08-21 DIAGNOSIS — F41.1 GAD (GENERALIZED ANXIETY DISORDER): ICD-10-CM

## 2024-08-21 DIAGNOSIS — Z85.3 PERSONAL HISTORY OF MALIGNANT NEOPLASM OF BREAST: ICD-10-CM

## 2024-08-21 RX ORDER — FLUOXETINE 10 MG/1
CAPSULE ORAL
Qty: 90 CAPSULE | Refills: 0 | Status: SHIPPED | OUTPATIENT
Start: 2024-08-21

## 2024-08-21 NOTE — TELEPHONE ENCOUNTER
Jacinto      Last Written Prescription Date:  05/08/24  Last Fill Quantity: 90,   # refills: 1  Last Office Visit: 07/17/24  Future Office visit:

## 2024-08-21 NOTE — TELEPHONE ENCOUNTER
Prozac      Last Written Prescription Date:  09/08/23  Last Fill Quantity: 90,   # refills: 2  Last Office Visit: 05/31/24  Future Office visit:

## 2024-08-21 NOTE — TELEPHONE ENCOUNTER
SSR[facility] Protocol [id number]/21/2024 10:32 AM   Protocol Details BERTA-7 score of less than 5 in past 6 months.

## 2024-08-23 RX ORDER — LETROZOLE 2.5 MG/1
2.5 TABLET, FILM COATED ORAL DAILY
Qty: 90 TABLET | Refills: 0 | Status: SHIPPED | OUTPATIENT
Start: 2024-08-23

## 2024-08-25 DIAGNOSIS — Z79.899 OTHER LONG TERM (CURRENT) DRUG THERAPY: ICD-10-CM

## 2024-08-25 DIAGNOSIS — R79.89 ELEVATED TSH: Primary | ICD-10-CM

## 2024-08-25 DIAGNOSIS — E03.9 ACQUIRED HYPOTHYROIDISM: ICD-10-CM

## 2024-08-31 ENCOUNTER — MYC MEDICAL ADVICE (OUTPATIENT)
Dept: FAMILY MEDICINE | Facility: OTHER | Age: 69
End: 2024-08-31

## 2024-09-01 ENCOUNTER — HEALTH MAINTENANCE LETTER (OUTPATIENT)
Age: 69
End: 2024-09-01

## 2024-09-04 ENCOUNTER — LAB (OUTPATIENT)
Dept: LAB | Facility: OTHER | Age: 69
End: 2024-09-04
Payer: MEDICARE

## 2024-09-04 DIAGNOSIS — E03.9 ACQUIRED HYPOTHYROIDISM: ICD-10-CM

## 2024-09-04 DIAGNOSIS — Z79.899 OTHER LONG TERM (CURRENT) DRUG THERAPY: ICD-10-CM

## 2024-09-04 LAB — TSH SERPL DL<=0.005 MIU/L-ACNC: 0.44 UIU/ML (ref 0.3–4.2)

## 2024-09-04 PROCEDURE — 36415 COLL VENOUS BLD VENIPUNCTURE: CPT | Mod: ZL

## 2024-09-04 PROCEDURE — 84443 ASSAY THYROID STIM HORMONE: CPT | Mod: ZL

## 2024-09-04 RX ORDER — LEVOTHYROXINE SODIUM 88 UG/1
88 TABLET ORAL DAILY
Qty: 90 TABLET | Refills: 2 | Status: SHIPPED | OUTPATIENT
Start: 2024-09-04 | End: 2024-09-10

## 2024-09-10 DIAGNOSIS — E03.9 ACQUIRED HYPOTHYROIDISM: ICD-10-CM

## 2024-09-10 RX ORDER — LEVOTHYROXINE SODIUM 88 UG/1
88 TABLET ORAL DAILY
Qty: 90 TABLET | Refills: 2 | Status: SHIPPED | OUTPATIENT
Start: 2024-09-10

## 2024-10-17 ENCOUNTER — TELEPHONE (OUTPATIENT)
Dept: FAMILY MEDICINE | Facility: OTHER | Age: 69
End: 2024-10-17

## 2024-10-17 ENCOUNTER — OFFICE VISIT (OUTPATIENT)
Dept: FAMILY MEDICINE | Facility: OTHER | Age: 69
End: 2024-10-17
Attending: PHYSICIAN ASSISTANT
Payer: COMMERCIAL

## 2024-10-17 VITALS
OXYGEN SATURATION: 97 % | RESPIRATION RATE: 18 BRPM | SYSTOLIC BLOOD PRESSURE: 134 MMHG | TEMPERATURE: 97.5 F | HEIGHT: 65 IN | DIASTOLIC BLOOD PRESSURE: 81 MMHG | HEART RATE: 51 BPM | WEIGHT: 139.8 LBS | BODY MASS INDEX: 23.29 KG/M2

## 2024-10-17 DIAGNOSIS — D83.1: ICD-10-CM

## 2024-10-17 DIAGNOSIS — Z01.818 PREOP GENERAL PHYSICAL EXAM: Primary | ICD-10-CM

## 2024-10-17 DIAGNOSIS — C50.911 INVASIVE LOBULAR CARCINOMA OF RIGHT BREAST IN FEMALE (H): ICD-10-CM

## 2024-10-17 PROBLEM — J44.9 CHRONIC OBSTRUCTIVE PULMONARY DISEASE, UNSPECIFIED COPD TYPE (H): Status: ACTIVE | Noted: 2024-10-17

## 2024-10-17 LAB
ANION GAP SERPL CALCULATED.3IONS-SCNC: 11 MMOL/L (ref 7–15)
BASOPHILS # BLD AUTO: 0.1 10E3/UL (ref 0–0.2)
BASOPHILS NFR BLD AUTO: 1 %
BUN SERPL-MCNC: 19.7 MG/DL (ref 8–23)
CALCIUM SERPL-MCNC: 9.7 MG/DL (ref 8.8–10.4)
CHLORIDE SERPL-SCNC: 101 MMOL/L (ref 98–107)
CREAT SERPL-MCNC: 0.79 MG/DL (ref 0.51–0.95)
EGFRCR SERPLBLD CKD-EPI 2021: 81 ML/MIN/1.73M2
EOSINOPHIL # BLD AUTO: 0.2 10E3/UL (ref 0–0.7)
EOSINOPHIL NFR BLD AUTO: 2 %
ERYTHROCYTE [DISTWIDTH] IN BLOOD BY AUTOMATED COUNT: 12.8 % (ref 10–15)
GLUCOSE SERPL-MCNC: 85 MG/DL (ref 70–99)
HCO3 SERPL-SCNC: 25 MMOL/L (ref 22–29)
HCT VFR BLD AUTO: 39.3 % (ref 35–47)
HGB BLD-MCNC: 13.4 G/DL (ref 11.7–15.7)
IMM GRANULOCYTES # BLD: 0 10E3/UL
IMM GRANULOCYTES NFR BLD: 0 %
LYMPHOCYTES # BLD AUTO: 2.6 10E3/UL (ref 0.8–5.3)
LYMPHOCYTES NFR BLD AUTO: 35 %
MCH RBC QN AUTO: 29.7 PG (ref 26.5–33)
MCHC RBC AUTO-ENTMCNC: 34.1 G/DL (ref 31.5–36.5)
MCV RBC AUTO: 87 FL (ref 78–100)
MONOCYTES # BLD AUTO: 0.6 10E3/UL (ref 0–1.3)
MONOCYTES NFR BLD AUTO: 8 %
NEUTROPHILS # BLD AUTO: 4 10E3/UL (ref 1.6–8.3)
NEUTROPHILS NFR BLD AUTO: 54 %
NRBC # BLD AUTO: 0 10E3/UL
NRBC BLD AUTO-RTO: 0 /100
PLATELET # BLD AUTO: 169 10E3/UL (ref 150–450)
POTASSIUM SERPL-SCNC: 4.2 MMOL/L (ref 3.4–5.3)
RBC # BLD AUTO: 4.51 10E6/UL (ref 3.8–5.2)
SODIUM SERPL-SCNC: 137 MMOL/L (ref 135–145)
WBC # BLD AUTO: 7.5 10E3/UL (ref 4–11)

## 2024-10-17 PROCEDURE — 36415 COLL VENOUS BLD VENIPUNCTURE: CPT | Mod: ZL | Performed by: PHYSICIAN ASSISTANT

## 2024-10-17 PROCEDURE — 99214 OFFICE O/P EST MOD 30 MIN: CPT | Performed by: PHYSICIAN ASSISTANT

## 2024-10-17 PROCEDURE — 93005 ELECTROCARDIOGRAM TRACING: CPT | Performed by: PHYSICIAN ASSISTANT

## 2024-10-17 PROCEDURE — 80048 BASIC METABOLIC PNL TOTAL CA: CPT | Mod: ZL | Performed by: PHYSICIAN ASSISTANT

## 2024-10-17 PROCEDURE — 93010 ELECTROCARDIOGRAM REPORT: CPT | Performed by: INTERNAL MEDICINE

## 2024-10-17 PROCEDURE — 85004 AUTOMATED DIFF WBC COUNT: CPT | Mod: ZL | Performed by: PHYSICIAN ASSISTANT

## 2024-10-17 PROCEDURE — G0463 HOSPITAL OUTPT CLINIC VISIT: HCPCS

## 2024-10-17 NOTE — PROGRESS NOTES
Preoperative Evaluation  [facility] [place]  [address]  [place] [address]  Phone: [phone]  Primary Provider: DASH Vital  Pre-op Performing Provider: DASH Vital  Oct 17, 2024     Fax number          10/17/2024   Surgical Information   What procedure is being done? breast implant removal   Facility or Hospital where procedure/surgery will be performed: [facility]   Who is doing the procedure / surgery? dr bay   Date of surgery / procedure: 11/04/24   Time of surgery / procedure: 7:00 am   Where do you plan to recover after surgery? at home with family        Fax number for surgical facility: [phone]  Office fax  [facility] (her Office)  [phone]      Assessment & Plan     The proposed surgical procedure is considered INTERMEDIATE risk.    Preop general physical exam  She is going to be optimized for her surgery.  She will be having her implants removed.  Had her implants placed 4 years ago. Having a revision done.     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 Feeling well and breathing well.     Invasive lobular carcinoma of right breast in female (H)  [facility].  Doing well.             - No identified additional risk factors other than previously addressed         Recommendation  Approval given to proceed with proposed procedure, without further diagnostic evaluation.    Tanya Walker is a 69 year old, presenting for the following:[facility].   Pre-Op Exam          10/17/2024     9:41 AM   Additional Questions   Roomed by bronson spencer   Accompanied by none         10/17/2024     9:41 AM   Patient Reported Additional Medications   Patient reports taking the following new medications none     HPI related to upcoming procedure: will be having her implants removed due to discomfort and pain, this will be  done by Dr. Anguiano in [facility].         10/17/2024   Pre-Op Questionnaire   Have you ever had a heart attack or stroke? No   Have you ever had surgery on your heart or blood vessels, such as a stent placement, a coronary artery bypass, or surgery on an artery in your head, neck, heart, or legs? No   Do you have chest pain with activity? No   Do you have a history of heart failure? No   Do you currently have a cold, bronchitis or symptoms of other infection? No   Do you have a cough, shortness of breath, or wheezing? No   Do you or anyone in your family have previous history of blood clots? No   Do you or does anyone in your family have a serious bleeding problem such as prolonged bleeding following surgeries or cuts? No   Have you ever had problems with anemia or been told to take iron pills? No   Have you had any abnormal blood loss such as black, tarry or bloody stools, or abnormal vaginal bleeding? No   Have you ever had a blood transfusion? No   Are you willing to have a blood transfusion if it is medically needed before, during, or after your surgery? Yes   Have you or any of your relatives ever had problems with anesthesia? No   Do you have sleep apnea, excessive snoring or daytime drowsiness? No   Do you have any artifical heart valves or other implanted medical devices like a pacemaker, defibrillator, or continuous glucose monitor? No   Do you have artificial joints? No   Are you allergic to latex? No        Health Care Directive  Patient has a Health Care Directive on file      Preoperative Review of    reviewed - no record of controlled substances prescribed.      Status of Chronic Conditions:  HYPOTHYROIDISM - Patient has a longstanding history of chronic Hypothyroidism. Patient has been doing well, noting no tremor, insomnia, hair loss or changes in skin texture. Continues to take medications as directed, without adverse reactions or side effects. Last TSH   Lab Results    Component Value Date    TSH 0.44 09/04/2024   .      Patient Active Problem List    Diagnosis Date Noted    History of recurrent pulmonary infection 01/06/2023     Priority: Medium    Sinus bradycardia 09/26/2022     Priority: Medium    CHF (congestive heart failure) (H) 08/12/2022     Priority: Medium    Lung nodule 12/01/2021     Priority: Medium     Formatting of this note might be different from the original.  Added automatically from request for surgery [id number]      Use of aromatase inhibitors 01/26/2021     Priority: Medium    Seasonal allergies 10/09/2020     Priority: Medium    Migraine without status migrainosus, not intractable, unspecified migraine type 10/09/2020     Priority: Medium    Personal history of malignant neoplasm of breast 10/09/2019     Priority: Medium    Invasive lobular carcinoma of right breast in female (H) 08/28/2019     Priority: Medium     5.12.2020- Review and distribute treatment summary- [facility]      Cigarette nicotine dependence in remission 08/22/2019     Priority: Medium    ACP (advance care planning) 07/27/2017     Priority: Medium     Advance Care Planning 7/27/2017: ACP Review of Chart / Resources Provided:  Reviewed chart for advance care plan.  Denise Taylor has no plan or code status on file. Discussed available resources and provided with information.   Added by Marah Henry            Acquired hypothyroidism 07/27/2017     Priority: Medium    History of basal cell carcinoma 05/18/2015     Priority: Medium    Hyperlipidemia with target LDL less than 130 05/18/2015     Priority: Medium     Diagnosis updated by automated process. Provider to review and confirm.      Actinic keratosis 12/01/2011     Priority: Medium    AC separation, type 5 06/27/2011     Priority: Medium     Overview:   IMO Update 10/11      Capillary disease 08/06/2007     Priority: Medium     Overview:   IMO Update 10/11      Other dyschromia 08/06/2007     Priority: Medium    Scar  condition and fibrosis of skin 08/06/2007     Priority: Medium      Past Medical History:   Diagnosis Date    Basal cell carcinoma     Breast cancer (H) 07/2019    right breast    Bronchiectasis (H) 2019    CHF (congestive heart failure) (H) 8/12/2022    Dyslipidemia     BERTA (generalized anxiety disorder)     Invasive lobular carcinoma of right breast in female (H) 08/28/2019 5.12.2020- Review and distribute treatment summary- [facility]    Malignant melanoma of skin of face (H) 08/11/2022    Removed melanoma    Malignant neoplasm of upper-outer quadrant of right breast in female, estrogen receptor positive (H) 08/22/2019    Recurrent pneumonia 08/22/2019    Squamous cell carcinoma of skin 05/2020    right lower leg    Thyroid disease      Past Surgical History:   Procedure Laterality Date    ARTHROSCOPY SHOULDER ROTATOR CUFF REPAIR Left 03/07/2018    Procedure: ARTHROSCOPY SHOULDER ROTATOR CUFF REPAIR;  LEFT SHOULDER ARTHROSCOPY, REPAIR ROTATOR CUFF: subacromial Decompression and AC Joint Resection;  Surgeon: Baldev Lou DO;  Location: [facility] OR    ARTHROTOMY SHOULDER, ROTATOR CUFF REPAIR, COMBINED Right 11/14/2018    Procedure: RIGHT SHOULDER DEBRIDEMENT, EXCISION OF LIPOMA  RIGHT UPPER ARM, EXCISION SCAR TISSUE AC JOINT;  Surgeon: Baldev Lou DO;  Location: [facility] OR    BIOPSY BREAST NEEDLE LOCALIZATION, BIOPSY NODE SENTINEL, COMBINED Right 08/23/2019    Procedure: RIGHT WIRE LOCALIZED LUMPECTOMY WITH SENTINEL  LYMP NODE;  Surgeon: Michael Dupree MD;  Location: [facility] OR    BREAST RECONSTRUCTION WITH DEEP INFERIOR EPIGASTRIC  (AILYN) FLAP,  07/2020    done in [place]    COLONOSCOPY  2006    Dr Lara    COLONOSCOPY N/A 10/10/2016    Procedure: COLONOSCOPY;  Surgeon: Christine Cintron MD;  Location: [facility] OR    EYE SURGERY  1995    Lasik    INSERT TISSUE EXPANDER BREAST BILATERAL Bilateral 10/09/2019    Procedure: BILATERAL TISSUE EXPANDERS (CATHYIENIA);  Surgeon: Dale Paul MD;  Location:  [facility] OR    MASTECTOMY, NIPPLE SPARING Bilateral 10/09/2019    Procedure: BILATERAL SKIN SPARRING MASTECTOMY (CASS);  Surgeon: Opal Rubin MD;  Location: [facility] OR    ORTHOPEDIC SURGERY Right     feet neuromas removed    PHACOEMULSIFICATION WITH STANDARD INTRAOCULAR LENS IMPLANT Right 1/9/2024    Procedure: Phacoemulsification Cataract Extraction Posterior Chamber Lens Right Eye;  Surgeon: Glen Shirley MD;  Location: [facility] OR    PHACOEMULSIFICATION WITH STANDARD INTRAOCULAR LENS IMPLANT Left 6/11/2024    Procedure: Phacoemulsification Cataract Extraction Posterior Chamber Lens Left Eye;  Surgeon: Glen Shirley MD;  Location: [facility] OR    right ankle ORIF  06/2020    Dr Torres    SHOULDER SURGERY Right 2011/10/2012     Current Outpatient Medications   Medication Sig Dispense Refill    calcium carbonate-vitamin D (OS-STEVE) 600-400 MG-UNIT chewable tablet Take 1 chew tab by mouth 2 times daily (with meals) 180 tablet 3    FLUoxetine (PROZAC) 10 MG capsule TAKE ONE CAPSULE BY MOUTH EVERY DAY 90 capsule 0    letrozole (FEMARA) 2.5 MG tablet Take 1 tablet (2.5 mg) by mouth daily 90 tablet 0    levocetirizine (XYZAL) 5 MG tablet TAKE ONE TABLET BY MOUTH EVERY EVENING 90 tablet 3    levothyroxine (SYNTHROID/LEVOTHROID) 88 MCG tablet Take 1 tablet (88 mcg) by mouth daily. 90 tablet 2    rosuvastatin (CRESTOR) 10 MG tablet Take 1 tablet (10 mg) by mouth daily 90 tablet 2    SUMAtriptan (IMITREX) 50 MG tablet Take 1 tablet (50 mg) by mouth at onset of headache for migraine May repeat in 2 hours. Max 4 tablets/24 hours. 18 tablet 3    ILEVRO 0.3 % ophthalmic suspension Place 1 drop into the right eye daily (Patient not taking: Reported on 10/17/2024)         Allergies   Allergen Reactions    Pcn [Penicillins] Rash    Zithromax [Azithromycin] Rash        Social History     Tobacco Use    Smoking status: Former     Current packs/day: 0.00     Average packs/day: 0.5 packs/day for 10.0 years (5.0 ttl pk-yrs)     Types:  "Cigarettes     Start date: 10/10/1974     Quit date: 10/10/1984     Years since quittin.0    Smokeless tobacco: Former     Quit date: 10/10/1984   Substance Use Topics    Alcohol use: Yes     Alcohol/week: 1.7 standard drinks of alcohol     Types: 2 Glasses of wine per week     Comment: occasionally     Family History   Problem Relation Age of Onset    Myocardial Infarction Mother     Hypertension Mother     Coronary Artery Disease Mother     Hyperlipidemia Mother     Thyroid Disease Mother     Depression Mother     Stomach Cancer Father     Coronary Artery Disease Father     Hypertension Father     Colon Cancer Father     Diabetes Paternal Grandmother     Heart Disease Maternal Grandmother     Heart Disease Maternal Grandfather     Diabetes Paternal Grandfather     Cerebrovascular Disease No family hx of     Breast Cancer No family hx of     Prostate Cancer No family hx of     Anesthesia Reaction No family hx of     Asthma No family hx of     Genetic Disorder No family hx of      History   Drug Use Unknown             Review of Systems  Constitutional, HEENT, cardiovascular, pulmonary, GI, , musculoskeletal, neuro, skin, endocrine and psych systems are negative, except as otherwise noted.    Objective    /81 (BP Location: Right arm, Patient Position: Sitting, Cuff Size: Adult Regular)   Pulse 51   Temp 97.5  F (36.4  C) (Tympanic)   Resp 18   Ht 1.651 m (5' 5\")   Wt 63.4 kg (139 lb 12.8 oz)   SpO2 97%   BMI 23.26 kg/m     Estimated body mass index is 23.26 kg/m  as calculated from the following:    Height as of this encounter: 1.651 m (5' 5\").    Weight as of this encounter: 63.4 kg (139 lb 12.8 oz).  Physical Exam  GENERAL: alert and no distress  EYES: Eyes grossly normal to inspection, PERRL and conjunctivae and sclerae normal  HENT: ear canals and TM's normal, nose and mouth without ulcers or lesions  NECK: no adenopathy, no asymmetry, masses, or scars  RESP: lungs clear to auscultation - no " rales, rhonchi or wheezes  CV: regular rate and rhythm, normal S1 S2, no S3 or S4, no murmur, click or rub, no peripheral edema  ABDOMEN: soft, nontender, no hepatosplenomegaly, no masses and bowel sounds normal  MS: no gross musculoskeletal defects noted, no edema  SKIN: no suspicious lesions or rashes  NEURO: Normal strength and tone, mentation intact and speech normal  PSYCH: mentation appears normal, affect normal/bright  LYMPH: no cervical, supraclavicular, axillary, or inguinal adenopathy    Recent Labs   Lab Test 07/11/24  1118 05/31/24  1230   HGB 13.6 13.9    159    140   POTASSIUM 4.3 4.4   CR 0.83 0.74        Diagnostics  No results found for this or any previous visit (from the past 24 hour(s)).   EKG: appears normal, NSR, sinus bradycardia, normal axis, normal intervals, no acute ST/T changes c/w ischemia, no LVH by voltage criteria, unchanged from previous tracings    Revised Cardiac Risk Index (RCRI)  The patient has the following serious cardiovascular risks for perioperative complications:   - No serious cardiac risks = 0 points     RCRI Interpretation: 0 points: Class I (very low risk - 0.4% complication rate)         Signed Electronically by: DASH Vital  A copy of this evaluation report is provided to the requesting physician.

## 2024-10-17 NOTE — RESULT ENCOUNTER NOTE
Patient notified of results. Seen by patient Denise PEDRO Brandon on 10/17/2024  2:01 PM  Nola Mueller LPN

## 2024-10-19 LAB
ATRIAL RATE - MUSE: 45 BPM
DIASTOLIC BLOOD PRESSURE - MUSE: NORMAL MMHG
INTERPRETATION ECG - MUSE: NORMAL
P AXIS - MUSE: 24 DEGREES
PR INTERVAL - MUSE: 158 MS
QRS DURATION - MUSE: 78 MS
QT - MUSE: 518 MS
QTC - MUSE: 448 MS
R AXIS - MUSE: 14 DEGREES
SYSTOLIC BLOOD PRESSURE - MUSE: NORMAL MMHG
T AXIS - MUSE: 35 DEGREES
VENTRICULAR RATE- MUSE: 45 BPM

## 2024-11-04 PROCEDURE — 88304 TISSUE EXAM BY PATHOLOGIST: CPT | Mod: 26 | Performed by: PATHOLOGY

## 2024-11-04 PROCEDURE — 88304 TISSUE EXAM BY PATHOLOGIST: CPT | Mod: TC,ORL | Performed by: PLASTIC SURGERY

## 2024-11-05 ENCOUNTER — LAB REQUISITION (OUTPATIENT)
Dept: LAB | Facility: CLINIC | Age: 69
End: 2024-11-05
Payer: MEDICARE

## 2024-11-06 LAB
PATH REPORT.COMMENTS IMP SPEC: NORMAL
PATH REPORT.COMMENTS IMP SPEC: NORMAL
PATH REPORT.FINAL DX SPEC: NORMAL
PATH REPORT.GROSS SPEC: NORMAL
PATH REPORT.MICROSCOPIC SPEC OTHER STN: NORMAL
PATH REPORT.RELEVANT HX SPEC: NORMAL
PHOTO IMAGE: NORMAL

## 2024-11-12 ENCOUNTER — MYC MEDICAL ADVICE (OUTPATIENT)
Dept: ONCOLOGY | Facility: OTHER | Age: 69
End: 2024-11-12

## 2025-01-17 ENCOUNTER — LAB (OUTPATIENT)
Dept: LAB | Facility: OTHER | Age: 70
End: 2025-01-17
Attending: NURSE PRACTITIONER
Payer: MEDICARE

## 2025-01-17 DIAGNOSIS — Z85.3 PERSONAL HISTORY OF MALIGNANT NEOPLASM OF BREAST: ICD-10-CM

## 2025-01-17 LAB
ALBUMIN SERPL BCG-MCNC: 4.4 G/DL (ref 3.5–5.2)
ALP SERPL-CCNC: 77 U/L (ref 40–150)
ALT SERPL W P-5'-P-CCNC: 35 U/L (ref 0–50)
ANION GAP SERPL CALCULATED.3IONS-SCNC: 9 MMOL/L (ref 7–15)
AST SERPL W P-5'-P-CCNC: 43 U/L (ref 0–45)
BASOPHILS # BLD AUTO: 0.1 10E3/UL (ref 0–0.2)
BASOPHILS NFR BLD AUTO: 1 %
BILIRUB SERPL-MCNC: 0.3 MG/DL
BUN SERPL-MCNC: 13.9 MG/DL (ref 8–23)
CALCIUM SERPL-MCNC: 9.6 MG/DL (ref 8.8–10.4)
CHLORIDE SERPL-SCNC: 103 MMOL/L (ref 98–107)
CREAT SERPL-MCNC: 0.8 MG/DL (ref 0.51–0.95)
EGFRCR SERPLBLD CKD-EPI 2021: 79 ML/MIN/1.73M2
EOSINOPHIL # BLD AUTO: 0.2 10E3/UL (ref 0–0.7)
EOSINOPHIL NFR BLD AUTO: 3 %
ERYTHROCYTE [DISTWIDTH] IN BLOOD BY AUTOMATED COUNT: 12.3 % (ref 10–15)
GLUCOSE SERPL-MCNC: 110 MG/DL (ref 70–99)
HCO3 SERPL-SCNC: 27 MMOL/L (ref 22–29)
HCT VFR BLD AUTO: 40.3 % (ref 35–47)
HGB BLD-MCNC: 13.8 G/DL (ref 11.7–15.7)
IMM GRANULOCYTES # BLD: 0 10E3/UL
IMM GRANULOCYTES NFR BLD: 0 %
LYMPHOCYTES # BLD AUTO: 4.1 10E3/UL (ref 0.8–5.3)
LYMPHOCYTES NFR BLD AUTO: 48 %
MCH RBC QN AUTO: 30.1 PG (ref 26.5–33)
MCHC RBC AUTO-ENTMCNC: 34.2 G/DL (ref 31.5–36.5)
MCV RBC AUTO: 88 FL (ref 78–100)
MONOCYTES # BLD AUTO: 0.7 10E3/UL (ref 0–1.3)
MONOCYTES NFR BLD AUTO: 8 %
NEUTROPHILS # BLD AUTO: 3.4 10E3/UL (ref 1.6–8.3)
NEUTROPHILS NFR BLD AUTO: 40 %
NRBC # BLD AUTO: 0 10E3/UL
NRBC BLD AUTO-RTO: 0 /100
PLATELET # BLD AUTO: 215 10E3/UL (ref 150–450)
POTASSIUM SERPL-SCNC: 4 MMOL/L (ref 3.4–5.3)
PROT SERPL-MCNC: 7.5 G/DL (ref 6.4–8.3)
RBC # BLD AUTO: 4.59 10E6/UL (ref 3.8–5.2)
SODIUM SERPL-SCNC: 139 MMOL/L (ref 135–145)
WBC # BLD AUTO: 8.5 10E3/UL (ref 4–11)

## 2025-01-17 PROCEDURE — 82947 ASSAY GLUCOSE BLOOD QUANT: CPT | Mod: ZL

## 2025-01-17 PROCEDURE — 82565 ASSAY OF CREATININE: CPT | Mod: ZL

## 2025-01-17 PROCEDURE — 85004 AUTOMATED DIFF WBC COUNT: CPT | Mod: ZL

## 2025-01-17 PROCEDURE — 36415 COLL VENOUS BLD VENIPUNCTURE: CPT | Mod: ZL

## 2025-01-17 PROCEDURE — 85041 AUTOMATED RBC COUNT: CPT | Mod: ZL

## 2025-01-21 DIAGNOSIS — F41.1 GAD (GENERALIZED ANXIETY DISORDER): ICD-10-CM

## 2025-01-21 NOTE — TELEPHONE ENCOUNTER
SSRIs Protocol [id number]/21/2025 09:41 AM   Protocol Details BERTA-7 score of less than 5 in past 6 months.

## 2025-01-21 NOTE — TELEPHONE ENCOUNTER
Prozac       Last Written Prescription Date:  12/06/2024  Last Fill Quantity: 90,   # refills: 0  Last Office Visit: 10/17/2024  Future Office visit:

## 2025-01-22 RX ORDER — FLUOXETINE 10 MG/1
CAPSULE ORAL
Qty: 90 CAPSULE | Refills: 0 | Status: SHIPPED | OUTPATIENT
Start: 2025-01-22

## 2025-03-03 DIAGNOSIS — F41.1 GAD (GENERALIZED ANXIETY DISORDER): ICD-10-CM

## 2025-03-03 NOTE — TELEPHONE ENCOUNTER
SSRIs Protocol [id number]/03/2025 01:51 PM   Protocol Details BERTA-7 score of less than 5 in past 6 months.

## 2025-03-03 NOTE — TELEPHONE ENCOUNTER
Prozac      Last Written Prescription Date:  01/22/25  Last Fill Quantity: 90,   # refills: 0  Last Office Visit: 10/17/24  Future Office visit:

## 2025-03-05 RX ORDER — FLUOXETINE 10 MG/1
10 CAPSULE ORAL DAILY
Qty: 90 CAPSULE | Refills: 0 | Status: SHIPPED | OUTPATIENT
Start: 2025-03-05

## 2025-04-09 NOTE — PROGRESS NOTES
Assessment & Plan     Encounter to establish care  followed with Mirna Roblero with last visit 10/17/2024 for preop    Hyperlipidemia with target LDL less than 130  Due for lipids, but recent lipid panel look good. Will wait until next lab draw  - rosuvastatin (CRESTOR) 10 MG tablet; Take 1 tablet (10 mg) by mouth daily.    Acquired hypothyroidism  TSH (9/4/2024) 0.44  - c/w levothyroxine (SYNTHROID/LEVOTHROID) 88 MCG tablet; Take 1 tablet (88 mcg) by mouth daily.    Chronic obstructive pulmonary disease, unspecified COPD type (H)  Stable. 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 Note reviewed. Follow up prn  - does well with doxy + cefuroxime for pneumonia    Invasive lobular carcinoma of right breast in female (H)  Follows with Teresa Sesay with last visit 1/17/2025. Note reviewed. Completed surveillance and follow up with PCP  right breast cancer. Dx 7/2019  - s/p b/l mastectomies  - arimidex from 12/2019 to 5/2024, then switched to letrozole d/t joint pain   - stopped her letrozole 10/2024 for joint pain  - s/p implants removed (11/2024)  - has had some fat grafting done with repeat this fall     Seasonal allergies  - levocetirizine (XYZAL) 5 MG tablet; TAKE ONE TABLET BY MOUTH EVERY EVENING    BERTA (generalized anxiety disorder)  Mood stable. Dosage of prozac is adequate   - FLUoxetine (PROZAC) 10 MG capsule; Take 1 capsule (10 mg) by mouth daily.      The longitudinal plan of care for the diagnosis(es)/condition(s) as documented were addressed during this visit. Due to the added complexity in care, I will continue to support Denise in the subsequent management and with ongoing continuity of care.      See Patient Instructions    Return in about 5 months (around 9/14/2025) for CDM.    Tanya Walker is a 69 year old, presenting  "for the following health issues:  Establish Care        4/14/2025    11:22 AM   Additional Questions   Roomed by bronson spencer     History of Present Illness       Reason for visit:  Establish new care provider   She is taking medications regularly.        Est care: followed with Mirna Roblero with last visit 10/17/2024 for preop    Hyperlipidemia Follow-Up    Are you regularly taking any medication or supplement to lower your cholesterol?   Yes- crestor  Are you having muscle aches or other side effects that you think could be caused by your cholesterol lowering medication?  No    Hypertension Follow-up    Do you check your blood pressure regularly outside of the clinic? No   Are you following a low salt diet? No  Are your blood pressures ever more than 140 on the top number (systolic) OR more   than 90 on the bottom number (diastolic), for example 140/90? No    BP Readings from Last 2 Encounters:   04/14/25 139/90   01/17/25 118/86     COPD Follow-Up  Overall, how are your COPD symptoms since your last clinic visit?  Better  How much fatigue or shortness of breath do you have when you are walking?  None  How much shortness of breath do you have when you are resting?  None  How often do you cough? Rarely  Have you noticed any change in your sputum/phlegm?  No  Have you experienced a recent fever? No  Please describe how far you can walk without stopping to rest:  Greater than 2 miles  How many flights of stairs are you able to walk up without stopping?  3 or more  Have you had any Emergency Room Visits, Urgent Care Visits, or Hospital Admissions because of your COPD since your last office visit?  No    History   Smoking Status    Former    Types: Cigarettes   Smokeless Tobacco    Former    Quit date: 10/10/1984     No results found for: \"FEV1\", \"TLQ5TZR\"      Hypothyroidism Follow-up    Since last visit, patient describes the following symptoms: weight gain of 5 lbs    TSH   Date Value Ref Range Status   09/04/2024 " "0.44 0.30 - 4.20 uIU/mL Final   02/04/2022 1.83 0.40 - 4.00 mU/L Final   06/01/2020 3.24 0.40 - 4.00 mU/L Final         # Common variable immunodeficiency with predominant mmunoregulatory T cell disorder    # Oncology:  right breast cancer. Dx 7/2019  Follows with Teresa Sesay with last visit 1/17/2025. Note reviewed.   - s/p b/l mastectomies  - arimidex from 12/2019 to 5/2024, then switched to letrozole d/t joint pain   - stopped her letrozole 10/2024 for joint pain  - s/p implants removed (11/2024)  - has had some fat grafting done with repeat this fall     - dexa scan (7/11/2024) normal      Wt Readings from Last 4 Encounters:   04/14/25 64 kg (141 lb 3.2 oz)   01/17/25 62.7 kg (138 lb 3.7 oz)   10/17/24 63.4 kg (139 lb 12.8 oz)   07/17/24 62.5 kg (137 lb 12.6 oz)       # Social   - retired  in [employer]         Review of Systems  Constitutional, HEENT, cardiovascular, pulmonary, gi and gu systems are negative, except as otherwise noted.      Objective    /90 (BP Location: Left arm, Patient Position: Sitting, Cuff Size: Adult Regular)   Pulse 56   Temp 97.1  F (36.2  C) (Tympanic)   Resp 16   Ht 1.651 m (5' 5\")   Wt 64 kg (141 lb 3.2 oz)   SpO2 97%   BMI 23.50 kg/m    Body mass index is 23.5 kg/m .  Physical Exam  Constitutional:       General: She is not in acute distress.     Appearance: She is not ill-appearing.   Cardiovascular:      Rate and Rhythm: Normal rate and regular rhythm.      Heart sounds: No murmur heard.  Pulmonary:      Effort: Pulmonary effort is normal. No respiratory distress.      Breath sounds: No wheezing or rales.   Musculoskeletal:      Right lower leg: No edema.      Left lower leg: No edema.   Neurological:      Mental Status: She is alert.   Psychiatric:         Mood and Affect: Mood normal.              Recent Labs   Lab Test 06/23/23  0824 02/04/22  1257   CHOL 153 157   HDL 56 67   LDL 73 65   TRIG 119 123         Signed Electronically by: Nikia" David Joyner MD

## 2025-04-14 ENCOUNTER — OFFICE VISIT (OUTPATIENT)
Dept: FAMILY MEDICINE | Facility: OTHER | Age: 70
End: 2025-04-14
Attending: FAMILY MEDICINE
Payer: COMMERCIAL

## 2025-04-14 VITALS
BODY MASS INDEX: 23.53 KG/M2 | SYSTOLIC BLOOD PRESSURE: 139 MMHG | HEART RATE: 56 BPM | OXYGEN SATURATION: 97 % | TEMPERATURE: 97.1 F | RESPIRATION RATE: 16 BRPM | HEIGHT: 65 IN | WEIGHT: 141.2 LBS | DIASTOLIC BLOOD PRESSURE: 90 MMHG

## 2025-04-14 DIAGNOSIS — F41.1 GAD (GENERALIZED ANXIETY DISORDER): ICD-10-CM

## 2025-04-14 DIAGNOSIS — J44.9 CHRONIC OBSTRUCTIVE PULMONARY DISEASE, UNSPECIFIED COPD TYPE (H): ICD-10-CM

## 2025-04-14 DIAGNOSIS — E03.9 ACQUIRED HYPOTHYROIDISM: ICD-10-CM

## 2025-04-14 DIAGNOSIS — C50.911 INVASIVE LOBULAR CARCINOMA OF RIGHT BREAST IN FEMALE (H): ICD-10-CM

## 2025-04-14 DIAGNOSIS — E78.5 HYPERLIPIDEMIA WITH TARGET LDL LESS THAN 130: ICD-10-CM

## 2025-04-14 DIAGNOSIS — D83.1: ICD-10-CM

## 2025-04-14 DIAGNOSIS — J30.2 SEASONAL ALLERGIES: ICD-10-CM

## 2025-04-14 DIAGNOSIS — Z76.89 ENCOUNTER TO ESTABLISH CARE: Primary | ICD-10-CM

## 2025-04-14 PROCEDURE — G2211 COMPLEX E/M VISIT ADD ON: HCPCS | Performed by: FAMILY MEDICINE

## 2025-04-14 PROCEDURE — G0463 HOSPITAL OUTPT CLINIC VISIT: HCPCS

## 2025-04-14 PROCEDURE — 99214 OFFICE O/P EST MOD 30 MIN: CPT | Performed by: FAMILY MEDICINE

## 2025-04-14 PROCEDURE — 3075F SYST BP GE 130 - 139MM HG: CPT | Performed by: FAMILY MEDICINE

## 2025-04-14 PROCEDURE — 3080F DIAST BP >= 90 MM HG: CPT | Performed by: FAMILY MEDICINE

## 2025-04-14 RX ORDER — LEVOCETIRIZINE DIHYDROCHLORIDE 5 MG/1
TABLET, FILM COATED ORAL
Qty: 90 TABLET | Refills: 3 | Status: SHIPPED | OUTPATIENT
Start: 2025-04-14

## 2025-04-14 RX ORDER — LEVOTHYROXINE SODIUM 88 UG/1
88 TABLET ORAL DAILY
Qty: 90 TABLET | Refills: 2 | Status: SHIPPED | OUTPATIENT
Start: 2025-04-14

## 2025-04-14 RX ORDER — ROSUVASTATIN CALCIUM 10 MG/1
10 TABLET, COATED ORAL DAILY
Qty: 90 TABLET | Refills: 3 | Status: SHIPPED | OUTPATIENT
Start: 2025-04-14

## 2025-04-14 RX ORDER — FLUOXETINE 10 MG/1
10 CAPSULE ORAL DAILY
Qty: 90 CAPSULE | Refills: 3 | Status: SHIPPED | OUTPATIENT
Start: 2025-04-14

## 2025-06-01 DIAGNOSIS — E78.5 HYPERLIPIDEMIA WITH TARGET LDL LESS THAN 130: ICD-10-CM

## 2025-06-02 RX ORDER — ROSUVASTATIN CALCIUM 10 MG/1
10 TABLET, COATED ORAL
Qty: 90 TABLET | Refills: 3 | Status: SHIPPED | OUTPATIENT
Start: 2025-06-02

## 2025-06-02 NOTE — TELEPHONE ENCOUNTER
ROSUVASTATIN CALCIUM 10 MG TAB       Last Written Prescription Date:  04/14/2025  Last Fill Quantity: 90,   # refills: 3  Last Office Visit: 04/14/2025  Future Office visit:       Routing refill request to provider for review/approval because:  Antihyperlipidemic agents [id number]/01/2025 07:25 AM   Protocol Details LDL on file in the past 12 months    Medication is active on med list and the sig matches. RN to manually verify dose and sig if red X/fail.       Kimberly Boecker, RN

## 2025-06-04 ENCOUNTER — TELEPHONE (OUTPATIENT)
Dept: FAMILY MEDICINE | Facility: OTHER | Age: 70
End: 2025-06-04

## 2025-06-04 NOTE — TELEPHONE ENCOUNTER
9:43 AM    Reason for Call: OVERBOOK    Patient is having the following symptoms: Cough, fever, fatigue for 1 weeks.    The patient is requesting an appointment for TOMORROW (06/05) with Dr. Joyner.    Was an appointment offered for this call? No -- nothing avail for tomorrow as patient requested. Appts booking into July     Preferred method for responding to this message: Telephone Call  What is your phone number ?[phone]     If we cannot reach you directly, may we leave a detailed response at the number you provided? Yes    Can this message wait until your PCP/provider returns, if unavailable today? YES, patient is aware that PCP is out of office today and may not hear back until tomorrow     Ruthie Johansen

## 2025-06-05 ENCOUNTER — RESULTS FOLLOW-UP (OUTPATIENT)
Dept: FAMILY MEDICINE | Facility: OTHER | Age: 70
End: 2025-06-05

## 2025-06-05 ENCOUNTER — ANCILLARY PROCEDURE (OUTPATIENT)
Dept: GENERAL RADIOLOGY | Facility: OTHER | Age: 70
End: 2025-06-05
Attending: FAMILY MEDICINE
Payer: MEDICARE

## 2025-06-05 ENCOUNTER — OFFICE VISIT (OUTPATIENT)
Dept: FAMILY MEDICINE | Facility: OTHER | Age: 70
End: 2025-06-05
Attending: FAMILY MEDICINE
Payer: MEDICARE

## 2025-06-05 VITALS
SYSTOLIC BLOOD PRESSURE: 130 MMHG | BODY MASS INDEX: 23.13 KG/M2 | HEIGHT: 65 IN | OXYGEN SATURATION: 99 % | DIASTOLIC BLOOD PRESSURE: 80 MMHG | HEART RATE: 50 BPM | WEIGHT: 138.8 LBS | TEMPERATURE: 98 F | RESPIRATION RATE: 17 BRPM

## 2025-06-05 DIAGNOSIS — R05.1 ACUTE COUGH: ICD-10-CM

## 2025-06-05 DIAGNOSIS — D83.1: ICD-10-CM

## 2025-06-05 DIAGNOSIS — R05.1 ACUTE COUGH: Primary | ICD-10-CM

## 2025-06-05 PROBLEM — A31.8 MYCOBACTERIUM ABSCESSUS INFECTION: Status: ACTIVE | Noted: 2022-12-01

## 2025-06-05 PROBLEM — R91.8 PULMONARY INFILTRATE ON CHEST X-RAY: Status: ACTIVE | Noted: 2021-12-01

## 2025-06-05 PROCEDURE — G0463 HOSPITAL OUTPT CLINIC VISIT: HCPCS

## 2025-06-05 PROCEDURE — 71046 X-RAY EXAM CHEST 2 VIEWS: CPT | Mod: 26 | Performed by: RADIOLOGY

## 2025-06-05 PROCEDURE — 71046 X-RAY EXAM CHEST 2 VIEWS: CPT | Mod: TC

## 2025-06-05 RX ORDER — DOXYCYCLINE 100 MG/1
100 CAPSULE ORAL 2 TIMES DAILY
Qty: 14 CAPSULE | Refills: 0 | Status: SHIPPED | OUTPATIENT
Start: 2025-06-05 | End: 2025-06-12

## 2025-06-05 RX ORDER — CEFUROXIME AXETIL 500 MG/1
500 TABLET ORAL 2 TIMES DAILY
Qty: 14 TABLET | Refills: 0 | Status: SHIPPED | OUTPATIENT
Start: 2025-06-05 | End: 2025-06-12

## 2025-06-05 ASSESSMENT — PAIN SCALES - GENERAL: PAINLEVEL_OUTOF10: NO PAIN (0)

## 2025-06-05 NOTE — PROGRESS NOTES
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 Due to the weekend coming up and her h/o difficult to tx pneumonia, will Rx ceftin/doxy just in case she take a turn for the worse over the weekend. Denise will only take the abx if she is getting worse. Follows with [facility]  - XR CHEST 2 VW (Clinic Performed); Future  - cefuroxime (CEFTIN) 500 MG tablet; Take 1 tablet (500 mg) by mouth 2 times daily for 7 days.  - doxycycline hyclate (VIBRAMYCIN) 100 MG capsule; Take 1 capsule (100 mg) by mouth 2 times daily for 7 days.    The longitudinal plan of care for the diagnosis(es)/condition(s) as documented were addressed during this visit. Due to the added complexity in care, I will continue to support Denise in the subsequent management and with ongoing continuity of care.    Follow-up  Return if symptoms worsen or fail to improve.    Subjective   Denise is a 69 year old, presenting for the following health issues:  URI        6/5/2025    11:12 AM   Additional Questions   Roomed by Nola Ramirez   Accompanied by self     History of Present Illness       Reason for visit:  Upper respiratory illness    She eats 2-3 servings of fruits and vegetables daily.She consumes 0 sweetened beverage(s) daily.She exercises with enough effort to increase her heart rate 10 to 19 minutes per day.  She exercises with enough effort to increase her heart rate 3 or less days per week.   She is taking medications regularly.          Acute Illness  Acute illness concerns: cough temp fatigue  Onset/Duration: 8 days ago  Symptoms:  Fever: YES- 101.2  Chills/Sweats: YES- chills and sweats  Headache (location?): No  Sinus Pressure: No  Conjunctivitis:  No  Ear Pain: no  Rhinorrhea: No  Congestion: No  Sore Throat: No  Cough: YES  Wheeze: No  Decreased Appetite: No  Nausea: No  Vomiting: No  Diarrhea:  "No  Dysuria/Freq.: No  Dysuria or Hematuria: No  Fatigue/Achiness: YES- fatigue  Sick/Strep Exposure: No  Therapies tried and outcome: Ibuprofen    - 5 pack year history of smoking. Quit 1984  - has been fevering with last fever was last night  - no tick exposures  - feeling better today   - no sick contact   - declines tessalon perles     Follows with [facility] with last visit 8/22/2023. Note reviewed. Follow up prn  - does well with doxy + cefuroxime for pneumonia      Review of Systems  Constitutional, HEENT, cardiovascular, pulmonary, gi and gu systems are negative, except as otherwise noted.      Objective    /80   Pulse 50   Temp 98  F (36.7  C) (Tympanic)   Resp 17   Ht 1.651 m (5' 5\")   Wt 63 kg (138 lb 12.8 oz)   SpO2 99%   BMI 23.10 kg/m    Body mass index is 23.1 kg/m .  Physical Exam  Constitutional:       General: She is not in acute distress.     Appearance: She is not ill-appearing.   HENT:      Right Ear: Tympanic membrane normal.      Left Ear: Tympanic membrane normal.      Nose: No congestion or rhinorrhea.   Cardiovascular:      Rate and Rhythm: Normal rate and regular rhythm.      Heart sounds: No murmur heard.  Pulmonary:      Effort: Pulmonary effort is normal. No respiratory distress.      Breath sounds: Examination of the left-lower field reveals wheezing. Wheezing present. No rales.   Lymphadenopathy:      Cervical: No cervical adenopathy.   Neurological:      Mental Status: She is alert.        Estimated Creatinine Clearance: 66 mL/min (based on SCr of 0.8 mg/dL).      XR CHEST 2 VW (Clinic Performed)  Result Date: 6/5/2025  PROCEDURE:  XR CHEST 2 VIEWS HISTORY:  Acute cough. COMPARISON:  None. FINDINGS: The cardiac silhouette is normal in size. The pulmonary vasculature is normal.  The lungs are clear. No pleural effusion or pneumothorax.     IMPRESSION:  No acute cardiopulmonary disease.  HUMZA MCCORMACK MD   SYSTEM ID:  [id number]        Signed Electronically by: Nikia" David Joyner MD

## 2025-06-09 ENCOUNTER — MYC MEDICAL ADVICE (OUTPATIENT)
Dept: FAMILY MEDICINE | Facility: OTHER | Age: 70
End: 2025-06-09

## 2025-07-12 ENCOUNTER — HEALTH MAINTENANCE LETTER (OUTPATIENT)
Age: 70
End: 2025-07-12

## 2025-07-23 ENCOUNTER — TELEPHONE (OUTPATIENT)
Dept: FAMILY MEDICINE | Facility: OTHER | Age: 70
End: 2025-07-23

## 2025-07-23 NOTE — TELEPHONE ENCOUNTER
Attempt # 1  Outcome: Left Message   Comment: Per quality list patient is due for annual physical with Dr Joyner

## (undated) DEVICE — CANISTER-SUCTION 2000CC

## (undated) DEVICE — EYE PREP BETADINE 5% SOLUTION 30ML 0065-0411-30

## (undated) DEVICE — DRSG-SPONGE STERILE 4 X 4

## (undated) DEVICE — TUBING-SUCTION 20FT

## (undated) DEVICE — PACK EYE CUSTOM SEY32EPMBO

## (undated) DEVICE — APPLICATOR-CHLORAPREP 26ML TINTED CHG 2%+ 70% IPA-SURGICAL

## (undated) DEVICE — COVER-TABLE SHEET

## (undated) DEVICE — STOCKINETTE IMPERVIOUS-LARGE 9X48

## (undated) DEVICE — BIN-LENS IMPLANT CART

## (undated) DEVICE — BIN-CATARACT BIN BN37

## (undated) DEVICE — WAND-APOLLO RF 90 MULTIPORT

## (undated) DEVICE — IRRIGATION-H2O 1000ML

## (undated) DEVICE — GOWN-SURG XL LVL 3 REINFORCED

## (undated) DEVICE — LIGHT HANDLE COVER

## (undated) DEVICE — CANNULA IRR 7/8IN 25GA VSTC VSCFL 45D 9MM DISP 585045

## (undated) DEVICE — BIN-ROTATOR CUFF (SHOULDER)

## (undated) DEVICE — SUTURE-MONOCRYL 3-0 PS-1 Y936H

## (undated) DEVICE — SENSOR-OXISENSOR II ADULT

## (undated) DEVICE — PACK-SHOULDER ARTHROSCOPY-CUSTOM

## (undated) DEVICE — PREP SKIN SCRUB TRAY 4461A

## (undated) DEVICE — SOL NACL 0.9% INJ 1000ML BAG 2B1324X

## (undated) DEVICE — SU MONOCRYL 4-0 PS-2 18" UND Y496G

## (undated) DEVICE — PAD CHUX UNDERPAD 23X24" 7136

## (undated) DEVICE — GLOVE PROTEXIS BLUE W/NEU-THERA 6.5  2D73EB65

## (undated) DEVICE — SLING-SUPER MEDIUM

## (undated) DEVICE — DRSG-SPONGE STERILE 8 X 4

## (undated) DEVICE — STERI-STRIP-1/2" X 4"

## (undated) DEVICE — CAUTERY PAD-POLYHESIVE II ADULT

## (undated) DEVICE — SU VICRYL 3-0 TIE 12X18" J904T

## (undated) DEVICE — NDL-22G X 1 1/2" NON-SAFETY

## (undated) DEVICE — GLOVE PROTEXIS POWDER FREE CLSC 7.5  2D72PL75X

## (undated) DEVICE — SU MONOCRYL 3-0 PS-2 27" Y427H

## (undated) DEVICE — GLOVE PROTEXIS POWDER FREE 7.0 ORTHOPEDIC 2D73ET70

## (undated) DEVICE — BIN-MINI, MAXI, MICRO DRIVER BLADES BIN

## (undated) DEVICE — SOL NACL 0.9% IRRIG 1000ML BOTTLE 2F7124

## (undated) DEVICE — LIMB HOLDER-QUILTED OR

## (undated) DEVICE — GLV-8.5 ORTHO PROTEXIS PI LF/PF

## (undated) DEVICE — BLADE-FLUSH CUT OVAL BURR AR-8500FOE

## (undated) DEVICE — PACK-SURG SHOULDER

## (undated) DEVICE — EYE KNIFE MICRO 15DEG BEVEL 377516

## (undated) DEVICE — LINEN TOWEL PACK X5 5464

## (undated) DEVICE — CAUTERY PENCIL

## (undated) DEVICE — DRAIN JACKSON PRATT 15FR ROUND SIL LF JP-2229

## (undated) DEVICE — SET HANDPIECE IRRIG/ASPIRATION 2.2-2.8X5.4" 45DEG TIP 85910S

## (undated) DEVICE — DRSG-NON ADHERING 3 X 8 TELFA

## (undated) DEVICE — GLOVE PROTEXIS MICRO 8.0  2D73PM80

## (undated) DEVICE — SU SILK 2-0 FSL 18" 677G

## (undated) DEVICE — SCD SLEEVE-KNEE REG.

## (undated) DEVICE — POLISHER LENS OLIVE 1INX23GA BD VISITEC .6X2X1MM 585143

## (undated) DEVICE — CAUTERY-EXTENDED 6.5" BLADE

## (undated) DEVICE — MARKER-SKIN REG

## (undated) DEVICE — SPONGE LAP 18X18" X8435

## (undated) DEVICE — NDL-SPINAL 18G X 3.5" QUINCKE

## (undated) DEVICE — LIGACLIP-MEDIUM 29.2CM CLIP APPLIER

## (undated) DEVICE — SUTURE-MONOCRYL 4-0 PS-2 Y496G

## (undated) DEVICE — NDL-BLUNT FILL 18G 1.5"

## (undated) DEVICE — TAPE-MEDIPORE 4" X 2YD

## (undated) DEVICE — RETRACTOR-XX-SMALL ALEXIS PROTECTOR

## (undated) DEVICE — SOL WATER IRRIG 1000ML BOTTLE 2F7114

## (undated) DEVICE — DRAPE-CHEST/BREAST

## (undated) DEVICE — EYE CANN IRR 25GA HYDRODISSECTING 585037

## (undated) DEVICE — SYR 50ML LL W/O NDL 309653

## (undated) DEVICE — SUTURE-PROLENE 2-0 CT-2 8411H

## (undated) DEVICE — BIN-TECNIS DCB00 LENSES

## (undated) DEVICE — BNDG ELASTIC 6" DBL LENGTH UNSTERILE 6611-16

## (undated) DEVICE — SU SILK 2-0 SH CR 8X18" C012D

## (undated) DEVICE — EYE KNIFE SLIT XSTAR VISITEC 2.8MM 45DEG 373728

## (undated) DEVICE — SUTURE-VICRYL 0 CT-2 J270H

## (undated) DEVICE — GLV-7.5 BIOGEL LATEX

## (undated) DEVICE — CANNULA IRR 7/8IN 30GA VSTC VSCFL 45D 9MM DISP 585046

## (undated) DEVICE — EYE LENS CARTRIDGE FOR PLATINUM IOL INJECTOR 1MTEC30

## (undated) DEVICE — DRAIN JACKSON PRATT RESERVOIR 100ML SU130-1305

## (undated) DEVICE — ESU PENCIL W/SMOKE EVAC NEPTUNE STRYKER 0703-046-000

## (undated) DEVICE — BLADE-SCALPEL #15

## (undated) DEVICE — DRAPE BREAST/CHEST 29420

## (undated) DEVICE — PACK PHACO STELLARIS VAC 1 AUX DRP 1 NDL WRNCH BL5110

## (undated) DEVICE — TOPICAL SKIN ADHESIVE EXOFIN

## (undated) DEVICE — DRSG-NON-ADHERING 3 X 8 ADAPTIC

## (undated) DEVICE — DRSG KERLIX 4 1/2"X4YDS ROLL 6715

## (undated) DEVICE — ESU ELEC BLADE 2.75" COATED/INSULATED E1455

## (undated) DEVICE — BLADE-SCALPEL #11

## (undated) DEVICE — STPL SKIN 35W ROTATING HEAD PRW35

## (undated) DEVICE — INSTRUMENT WIPE VISIWIPE 581047

## (undated) DEVICE — BDG-COBAN 4 INCH

## (undated) DEVICE — DRAPE-STERI 45X60CM #1010

## (undated) DEVICE — LABEL-STERILE PREPRINTED FOR OR

## (undated) DEVICE — DRAPE-U DRAPE-CLEAR 47" X 51"

## (undated) DEVICE — CAUTERY-MEGADYNE TIP

## (undated) DEVICE — TUBING-ARTHROSCOPY-INFLOW

## (undated) DEVICE — FLUID TRAP FOR VIROSAFE FILTERS

## (undated) DEVICE — SLING-ARM-LARGE

## (undated) DEVICE — TUBING-CONNECTING 18" SUCTION

## (undated) DEVICE — DECANTER BAG 2002S

## (undated) DEVICE — SYRINGE-ASEPTO IRRIGATION

## (undated) DEVICE — IRRIGATION-NACL 3000ML (BAG)

## (undated) DEVICE — BLADE-DISSECTOR AR-8400DS

## (undated) DEVICE — DRSG-SPONGE X-RAY 4 X 4

## (undated) DEVICE — EYE CANN IRR 25GA CYSTOTOME 581610

## (undated) DEVICE — BIN-ARTHROSCOPY CART

## (undated) DEVICE — IRRIGATION-NACL 1000ML

## (undated) DEVICE — GLOVE PROTEXIS MICRO 6.0  2D73PM60

## (undated) DEVICE — DRILL FOR FIBERTAK 1.70MM

## (undated) DEVICE — KIT SURGICAL TURNOVER FVSD-01D

## (undated) DEVICE — SUTURE-VICRYL 2-0 CT-2 J269H

## (undated) DEVICE — BLANKET-BAIR LOWER EXTREMITY

## (undated) DEVICE — SPONGE-LAPAROTOMY PADS 18 X 18

## (undated) DEVICE — DRAPE-IOBAN 2 35CM X 35CM

## (undated) DEVICE — NEEDLE-SCORPION MULTIFIRE

## (undated) DEVICE — NDL COUNTER-20-40 CT MAGNET/FOAM BLOCK

## (undated) DEVICE — SYRINGE-20CC LUER LOCK

## (undated) DEVICE — Device

## (undated) DEVICE — SUTURE-VICRYL 3-0 SH J416H

## (undated) DEVICE — PACK MAJOR SBA15MAFSI

## (undated) DEVICE — CLIP ETHICON LIGACLIP SM BLUE LT100

## (undated) DEVICE — SU VICRYL 3-0 SH 27" J316H

## (undated) DEVICE — PACK-LAPAROTOMY-CUSTOM

## (undated) RX ORDER — LIDOCAINE HYDROCHLORIDE 20 MG/ML
INJECTION, SOLUTION EPIDURAL; INFILTRATION; INTRACAUDAL; PERINEURAL
Status: DISPENSED
Start: 2019-08-23

## (undated) RX ORDER — DEXAMETHASONE SODIUM PHOSPHATE 10 MG/ML
INJECTION, SOLUTION INTRAMUSCULAR; INTRAVENOUS
Status: DISPENSED
Start: 2019-08-23

## (undated) RX ORDER — CLINDAMYCIN PHOSPHATE 900 MG/50ML
INJECTION, SOLUTION INTRAVENOUS
Status: DISPENSED
Start: 2019-10-09

## (undated) RX ORDER — EPHEDRINE SULFATE 50 MG/ML
INJECTION, SOLUTION INTRAMUSCULAR; INTRAVENOUS; SUBCUTANEOUS
Status: DISPENSED
Start: 2018-03-07

## (undated) RX ORDER — ACETAMINOPHEN 500 MG
TABLET ORAL
Status: DISPENSED
Start: 2019-10-09

## (undated) RX ORDER — GLYCOPYRROLATE 0.2 MG/ML
INJECTION, SOLUTION INTRAMUSCULAR; INTRAVENOUS
Status: DISPENSED
Start: 2018-11-14

## (undated) RX ORDER — FENTANYL CITRATE 50 UG/ML
INJECTION, SOLUTION INTRAMUSCULAR; INTRAVENOUS
Status: DISPENSED
Start: 2019-10-09

## (undated) RX ORDER — ONDANSETRON 2 MG/ML
INJECTION INTRAMUSCULAR; INTRAVENOUS
Status: DISPENSED
Start: 2019-08-23

## (undated) RX ORDER — NEOSTIGMINE METHYLSULFATE 1 MG/ML
VIAL (ML) INJECTION
Status: DISPENSED
Start: 2018-11-14

## (undated) RX ORDER — DEXAMETHASONE SODIUM PHOSPHATE 10 MG/ML
INJECTION, SOLUTION INTRAMUSCULAR; INTRAVENOUS
Status: DISPENSED
Start: 2018-11-14

## (undated) RX ORDER — HYDROXYZINE HYDROCHLORIDE 25 MG/1
TABLET, FILM COATED ORAL
Status: DISPENSED
Start: 2019-10-09

## (undated) RX ORDER — EPHEDRINE SULFATE 50 MG/ML
INJECTION, SOLUTION INTRAMUSCULAR; INTRAVENOUS; SUBCUTANEOUS
Status: DISPENSED
Start: 2018-11-14

## (undated) RX ORDER — PROPOFOL 10 MG/ML
INJECTION, EMULSION INTRAVENOUS
Status: DISPENSED
Start: 2018-03-07

## (undated) RX ORDER — HYDROMORPHONE HYDROCHLORIDE 2 MG/ML
INJECTION, SOLUTION INTRAMUSCULAR; INTRAVENOUS; SUBCUTANEOUS
Status: DISPENSED
Start: 2019-08-23

## (undated) RX ORDER — PROPOFOL 10 MG/ML
INJECTION, EMULSION INTRAVENOUS
Status: DISPENSED
Start: 2019-10-09

## (undated) RX ORDER — NEOSTIGMINE METHYLSULFATE 1 MG/ML
VIAL (ML) INJECTION
Status: DISPENSED
Start: 2018-03-07

## (undated) RX ORDER — BUPIVACAINE HYDROCHLORIDE 5 MG/ML
INJECTION, SOLUTION EPIDURAL; INTRACAUDAL
Status: DISPENSED
Start: 2019-10-09

## (undated) RX ORDER — APREPITANT 40 MG/1
CAPSULE ORAL
Status: DISPENSED
Start: 2019-10-09

## (undated) RX ORDER — LIDOCAINE HYDROCHLORIDE 20 MG/ML
INJECTION, SOLUTION EPIDURAL; INFILTRATION; INTRACAUDAL; PERINEURAL
Status: DISPENSED
Start: 2018-11-14

## (undated) RX ORDER — LIDOCAINE HYDROCHLORIDE 20 MG/ML
INJECTION, SOLUTION EPIDURAL; INFILTRATION; INTRACAUDAL; PERINEURAL
Status: DISPENSED
Start: 2018-03-07

## (undated) RX ORDER — PREGABALIN 150 MG/1
CAPSULE ORAL
Status: DISPENSED
Start: 2019-10-09

## (undated) RX ORDER — PROPOFOL 10 MG/ML
INJECTION, EMULSION INTRAVENOUS
Status: DISPENSED
Start: 2018-11-14

## (undated) RX ORDER — FENTANYL CITRATE 50 UG/ML
INJECTION, SOLUTION INTRAMUSCULAR; INTRAVENOUS
Status: DISPENSED
Start: 2018-11-14

## (undated) RX ORDER — FENTANYL CITRATE 50 UG/ML
INJECTION, SOLUTION INTRAMUSCULAR; INTRAVENOUS
Status: DISPENSED
Start: 2019-08-23

## (undated) RX ORDER — ONDANSETRON 2 MG/ML
INJECTION INTRAMUSCULAR; INTRAVENOUS
Status: DISPENSED
Start: 2024-01-09

## (undated) RX ORDER — EPHEDRINE SULFATE 50 MG/ML
INJECTION, SOLUTION INTRAVENOUS
Status: DISPENSED
Start: 2019-08-23

## (undated) RX ORDER — BUPIVACAINE HYDROCHLORIDE 2.5 MG/ML
INJECTION, SOLUTION EPIDURAL; INFILTRATION; INTRACAUDAL
Status: DISPENSED
Start: 2019-10-09

## (undated) RX ORDER — HYDROMORPHONE HYDROCHLORIDE 1 MG/ML
INJECTION, SOLUTION INTRAMUSCULAR; INTRAVENOUS; SUBCUTANEOUS
Status: DISPENSED
Start: 2019-10-09

## (undated) RX ORDER — NEOSTIGMINE METHYLSULFATE 1 MG/ML
VIAL (ML) INJECTION
Status: DISPENSED
Start: 2019-10-09

## (undated) RX ORDER — ONDANSETRON 2 MG/ML
INJECTION INTRAMUSCULAR; INTRAVENOUS
Status: DISPENSED
Start: 2018-11-14

## (undated) RX ORDER — CEFAZOLIN SODIUM 1 G/3ML
INJECTION, POWDER, FOR SOLUTION INTRAMUSCULAR; INTRAVENOUS
Status: DISPENSED
Start: 2019-10-09

## (undated) RX ORDER — EPINEPHRINE 1 MG/ML
INJECTION, SOLUTION INTRAMUSCULAR; SUBCUTANEOUS
Status: DISPENSED
Start: 2019-10-09

## (undated) RX ORDER — ROCURONIUM BROMIDE 50 MG/5 ML
SYRINGE (ML) INTRAVENOUS
Status: DISPENSED
Start: 2018-11-14

## (undated) RX ORDER — FENTANYL CITRATE 50 UG/ML
INJECTION, SOLUTION INTRAMUSCULAR; INTRAVENOUS
Status: DISPENSED
Start: 2018-03-07

## (undated) RX ORDER — ONDANSETRON 2 MG/ML
INJECTION INTRAMUSCULAR; INTRAVENOUS
Status: DISPENSED
Start: 2019-10-09

## (undated) RX ORDER — OXYCODONE HCL 10 MG/1
TABLET, FILM COATED, EXTENDED RELEASE ORAL
Status: DISPENSED
Start: 2019-10-09

## (undated) RX ORDER — PROPOFOL 10 MG/ML
INJECTION, EMULSION INTRAVENOUS
Status: DISPENSED
Start: 2019-08-23